# Patient Record
Sex: FEMALE | Race: WHITE | Employment: OTHER | URBAN - NONMETROPOLITAN AREA
[De-identification: names, ages, dates, MRNs, and addresses within clinical notes are randomized per-mention and may not be internally consistent; named-entity substitution may affect disease eponyms.]

---

## 2017-01-11 ENCOUNTER — OFFICE VISIT (OUTPATIENT)
Dept: PODIATRY | Age: 82
End: 2017-01-11

## 2017-01-11 VITALS
SYSTOLIC BLOOD PRESSURE: 118 MMHG | DIASTOLIC BLOOD PRESSURE: 78 MMHG | HEART RATE: 78 BPM | HEIGHT: 63 IN | WEIGHT: 144 LBS | BODY MASS INDEX: 25.52 KG/M2

## 2017-01-11 DIAGNOSIS — M79.674 PAIN IN TOES OF BOTH FEET: ICD-10-CM

## 2017-01-11 DIAGNOSIS — B35.1 DERMATOPHYTOSIS OF NAIL: Primary | ICD-10-CM

## 2017-01-11 DIAGNOSIS — M79.675 PAIN IN TOES OF BOTH FEET: ICD-10-CM

## 2017-01-11 PROCEDURE — 11720 DEBRIDE NAIL 1-5: CPT | Performed by: PODIATRIST

## 2017-01-17 ENCOUNTER — PROCEDURE VISIT (OUTPATIENT)
Dept: OPHTHALMOLOGY | Age: 82
End: 2017-01-17

## 2017-01-17 DIAGNOSIS — H02.056 TRICHIASIS WITHOUT ENTROPION, LEFT: Primary | ICD-10-CM

## 2017-01-17 PROCEDURE — 67825 REVISE EYELASHES: CPT | Performed by: OPHTHALMOLOGY

## 2017-01-17 ASSESSMENT — ENCOUNTER SYMPTOMS
RESPIRATORY NEGATIVE: 1
GASTROINTESTINAL NEGATIVE: 1

## 2017-01-17 ASSESSMENT — VISUAL ACUITY
METHOD: SNELLEN - LINEAR
OS_CC: 20/40
OS_CC+: -1

## 2017-01-17 ASSESSMENT — SLIT LAMP EXAM - LIDS: COMMENTS: 1+ TRICHIASIS - LOWER LID

## 2017-02-17 ENCOUNTER — OFFICE VISIT (OUTPATIENT)
Dept: PODIATRY | Age: 82
End: 2017-02-17

## 2017-02-17 VITALS
RESPIRATION RATE: 20 BRPM | HEIGHT: 63 IN | DIASTOLIC BLOOD PRESSURE: 70 MMHG | WEIGHT: 146.2 LBS | HEART RATE: 72 BPM | BODY MASS INDEX: 25.91 KG/M2 | SYSTOLIC BLOOD PRESSURE: 124 MMHG

## 2017-02-17 DIAGNOSIS — M20.42 HAMMER TOES OF BOTH FEET: ICD-10-CM

## 2017-02-17 DIAGNOSIS — M20.41 HAMMER TOES OF BOTH FEET: ICD-10-CM

## 2017-02-17 DIAGNOSIS — M77.8 CAPSULITIS OF FOOT, LEFT: Primary | ICD-10-CM

## 2017-02-17 DIAGNOSIS — M79.672 LEFT FOOT PAIN: ICD-10-CM

## 2017-02-17 DIAGNOSIS — L84 CORNS AND CALLOSITIES: ICD-10-CM

## 2017-02-17 PROCEDURE — 11056 PARNG/CUTG B9 HYPRKR LES 2-4: CPT | Performed by: PODIATRIST

## 2017-02-17 PROCEDURE — 99213 OFFICE O/P EST LOW 20 MIN: CPT | Performed by: PODIATRIST

## 2017-02-17 PROCEDURE — L3040 FT ARCH SUPRT PREMOLD LONGIT: HCPCS | Performed by: PODIATRIST

## 2017-02-20 DIAGNOSIS — I10 ESSENTIAL HYPERTENSION: ICD-10-CM

## 2017-02-20 RX ORDER — LOSARTAN POTASSIUM 100 MG/1
TABLET ORAL
Qty: 90 TABLET | Refills: 3 | Status: SHIPPED | OUTPATIENT
Start: 2017-02-20 | End: 2018-02-17 | Stop reason: SDUPTHER

## 2017-03-10 ENCOUNTER — OFFICE VISIT (OUTPATIENT)
Dept: OPHTHALMOLOGY | Age: 82
End: 2017-03-10

## 2017-03-10 DIAGNOSIS — H25.13 SENILE NUCLEAR SCLEROSIS, BILATERAL: ICD-10-CM

## 2017-03-10 DIAGNOSIS — H43.812 VITREOUS DEGENERATION, LEFT: ICD-10-CM

## 2017-03-10 DIAGNOSIS — H40.003 GLAUCOMA SUSPECT, BILATERAL: ICD-10-CM

## 2017-03-10 DIAGNOSIS — H02.056 TRICHIASIS WITHOUT ENTROPION, LEFT: Primary | ICD-10-CM

## 2017-03-10 PROCEDURE — 99214 OFFICE O/P EST MOD 30 MIN: CPT | Performed by: OPHTHALMOLOGY

## 2017-03-10 RX ORDER — BENOXINATE HCL/FLUORESCEIN SOD 0.4%-0.25%
1 DROPS OPHTHALMIC (EYE) ONCE
Status: COMPLETED | OUTPATIENT
Start: 2017-03-10 | End: 2017-03-10

## 2017-03-10 RX ORDER — PHENYLEPHRINE HCL 2.5 %
1 DROPS OPHTHALMIC (EYE) ONCE
Status: COMPLETED | OUTPATIENT
Start: 2017-03-10 | End: 2017-03-10

## 2017-03-10 RX ORDER — TROPICAMIDE 10 MG/ML
1 SOLUTION/ DROPS OPHTHALMIC ONCE
Status: COMPLETED | OUTPATIENT
Start: 2017-03-10 | End: 2017-03-10

## 2017-03-10 RX ADMIN — Medication 1 DROP: at 10:49

## 2017-03-10 RX ADMIN — TROPICAMIDE 1 DROP: 10 SOLUTION/ DROPS OPHTHALMIC at 10:50

## 2017-03-10 ASSESSMENT — CONF VISUAL FIELD
METHOD: COUNTING FINGERS
OD_NORMAL: 1
OS_NORMAL: 1

## 2017-03-10 ASSESSMENT — ENCOUNTER SYMPTOMS
GASTROINTESTINAL NEGATIVE: 1
RESPIRATORY NEGATIVE: 1

## 2017-03-10 ASSESSMENT — VISUAL ACUITY
CORRECTION_TYPE: GLASSES
METHOD: SNELLEN - LINEAR
OS_CC: 20/30
OS_CC+: -1

## 2017-03-10 ASSESSMENT — TONOMETRY
OS_IOP_MMHG: 24
OD_IOP_MMHG: 26
IOP_METHOD: APPLANATION W FLURESS DROP

## 2017-03-10 ASSESSMENT — SLIT LAMP EXAM - LIDS
COMMENTS: NORMAL
COMMENTS: NORMAL

## 2017-03-14 ENCOUNTER — OFFICE VISIT (OUTPATIENT)
Dept: OPHTHALMOLOGY | Age: 82
End: 2017-03-14
Payer: MEDICARE

## 2017-03-14 DIAGNOSIS — H40.003 GLAUCOMA SUSPECT, BILATERAL: Primary | ICD-10-CM

## 2017-03-14 PROCEDURE — 92083 EXTENDED VISUAL FIELD XM: CPT | Performed by: OPHTHALMOLOGY

## 2017-03-27 ENCOUNTER — OFFICE VISIT (OUTPATIENT)
Dept: PRIMARY CARE CLINIC | Age: 82
End: 2017-03-27
Payer: MEDICARE

## 2017-03-27 VITALS
BODY MASS INDEX: 25.86 KG/M2 | OXYGEN SATURATION: 96 % | DIASTOLIC BLOOD PRESSURE: 80 MMHG | WEIGHT: 146 LBS | SYSTOLIC BLOOD PRESSURE: 126 MMHG | HEART RATE: 75 BPM

## 2017-03-27 DIAGNOSIS — L98.9 ARM SKIN LESION, LEFT: Primary | ICD-10-CM

## 2017-03-27 PROCEDURE — 99213 OFFICE O/P EST LOW 20 MIN: CPT | Performed by: PHYSICIAN ASSISTANT

## 2017-03-29 ASSESSMENT — ENCOUNTER SYMPTOMS
COLOR CHANGE: 1
COUGH: 0
RESPIRATORY NEGATIVE: 1

## 2017-04-14 ENCOUNTER — OFFICE VISIT (OUTPATIENT)
Dept: OPHTHALMOLOGY | Age: 82
End: 2017-04-14
Payer: MEDICARE

## 2017-04-14 ENCOUNTER — TELEPHONE (OUTPATIENT)
Dept: INTERNAL MEDICINE | Age: 82
End: 2017-04-14

## 2017-04-14 DIAGNOSIS — H40.10X1 COAG (CHRONIC OPEN-ANGLE GLAUCOMA), MILD STAGE: Primary | ICD-10-CM

## 2017-04-14 DIAGNOSIS — H25.13 SENILE NUCLEAR SCLEROSIS, BILATERAL: ICD-10-CM

## 2017-04-14 PROCEDURE — 99213 OFFICE O/P EST LOW 20 MIN: CPT | Performed by: OPHTHALMOLOGY

## 2017-04-14 RX ORDER — BENOXINATE HCL/FLUORESCEIN SOD 0.4%-0.25%
1 DROPS OPHTHALMIC (EYE) ONCE
Status: COMPLETED | OUTPATIENT
Start: 2017-04-14 | End: 2017-04-14

## 2017-04-14 RX ORDER — TIMOLOL MALEATE 5 MG/ML
1 SOLUTION/ DROPS OPHTHALMIC 2 TIMES DAILY
Qty: 1 BOTTLE | Refills: 1 | Status: SHIPPED | OUTPATIENT
Start: 2017-04-14 | End: 2017-07-22 | Stop reason: SDUPTHER

## 2017-04-14 RX ADMIN — Medication 1 DROP: at 10:08

## 2017-04-14 ASSESSMENT — VISUAL ACUITY
OS_CC: 20/30
CORRECTION_TYPE: GLASSES
METHOD: SNELLEN - LINEAR
OS_CC+: -1

## 2017-04-14 ASSESSMENT — TONOMETRY
IOP_METHOD: APPLANATION W FLURESS DROP
OD_IOP_MMHG: 24
OS_IOP_MMHG: 23

## 2017-04-14 ASSESSMENT — SLIT LAMP EXAM - LIDS
COMMENTS: NORMAL
COMMENTS: NORMAL

## 2017-04-14 ASSESSMENT — ENCOUNTER SYMPTOMS
RESPIRATORY NEGATIVE: 1
GASTROINTESTINAL NEGATIVE: 1

## 2017-04-21 DIAGNOSIS — I10 ESSENTIAL HYPERTENSION: ICD-10-CM

## 2017-04-21 RX ORDER — AMLODIPINE BESYLATE 2.5 MG/1
TABLET ORAL
Qty: 90 TABLET | Refills: 3 | Status: SHIPPED | OUTPATIENT
Start: 2017-04-21 | End: 2018-01-23 | Stop reason: SDUPTHER

## 2017-04-25 ENCOUNTER — PROCEDURE VISIT (OUTPATIENT)
Dept: SURGERY | Age: 82
End: 2017-04-25
Payer: MEDICARE

## 2017-04-25 ENCOUNTER — HOSPITAL ENCOUNTER (OUTPATIENT)
Age: 82
Setting detail: SPECIMEN
Discharge: HOME | End: 2017-04-25
Payer: MEDICARE

## 2017-04-25 ENCOUNTER — TELEPHONE (OUTPATIENT)
Dept: SURGERY | Age: 82
End: 2017-04-25

## 2017-04-25 VITALS
HEIGHT: 63 IN | SYSTOLIC BLOOD PRESSURE: 136 MMHG | DIASTOLIC BLOOD PRESSURE: 70 MMHG | TEMPERATURE: 97.7 F | WEIGHT: 144 LBS | HEART RATE: 64 BPM | BODY MASS INDEX: 25.52 KG/M2

## 2017-04-25 DIAGNOSIS — L98.9 SKIN LESION OF LEFT ARM: Primary | ICD-10-CM

## 2017-04-25 PROCEDURE — 99212 OFFICE O/P EST SF 10 MIN: CPT | Performed by: SURGERY

## 2017-04-27 LAB — DERMATOLOGY PATHOLOGY REPORT: NORMAL

## 2017-05-01 ENCOUNTER — NURSE ONLY (OUTPATIENT)
Dept: SURGERY | Age: 82
End: 2017-05-01

## 2017-05-01 VITALS
SYSTOLIC BLOOD PRESSURE: 130 MMHG | DIASTOLIC BLOOD PRESSURE: 70 MMHG | HEART RATE: 76 BPM | BODY MASS INDEX: 25.87 KG/M2 | TEMPERATURE: 98.3 F | WEIGHT: 146 LBS | HEIGHT: 63 IN

## 2017-05-01 DIAGNOSIS — L98.9 SKIN LESION OF LEFT ARM: Primary | ICD-10-CM

## 2017-05-10 ENCOUNTER — OFFICE VISIT (OUTPATIENT)
Dept: OPHTHALMOLOGY | Age: 82
End: 2017-05-10
Payer: MEDICARE

## 2017-05-10 DIAGNOSIS — H40.10X1 COAG (CHRONIC OPEN-ANGLE GLAUCOMA), MILD STAGE: Primary | ICD-10-CM

## 2017-05-10 DIAGNOSIS — H25.13 SENILE NUCLEAR SCLEROSIS, BILATERAL: ICD-10-CM

## 2017-05-10 PROCEDURE — 99213 OFFICE O/P EST LOW 20 MIN: CPT | Performed by: OPHTHALMOLOGY

## 2017-05-10 RX ORDER — BENOXINATE HCL/FLUORESCEIN SOD 0.4%-0.25%
1 DROPS OPHTHALMIC (EYE) ONCE
Status: COMPLETED | OUTPATIENT
Start: 2017-05-10 | End: 2017-05-10

## 2017-05-10 RX ORDER — TIMOLOL MALEATE 5 MG/ML
1 SOLUTION/ DROPS OPHTHALMIC 2 TIMES DAILY
Qty: 3 BOTTLE | Refills: 3 | Status: SHIPPED | OUTPATIENT
Start: 2017-05-10 | End: 2017-08-08

## 2017-05-10 RX ADMIN — Medication 1 DROP: at 08:24

## 2017-05-10 ASSESSMENT — TONOMETRY
OD_IOP_MMHG: 18
OS_IOP_MMHG: 20

## 2017-05-10 ASSESSMENT — ENCOUNTER SYMPTOMS
GASTROINTESTINAL NEGATIVE: 1
RESPIRATORY NEGATIVE: 1

## 2017-05-10 ASSESSMENT — SLIT LAMP EXAM - LIDS
COMMENTS: NORMAL
COMMENTS: NORMAL

## 2017-05-10 ASSESSMENT — VISUAL ACUITY
METHOD: SNELLEN - LINEAR
CORRECTION_TYPE: GLASSES
OS_CC: 20/30

## 2017-05-31 ENCOUNTER — TELEPHONE (OUTPATIENT)
Dept: INTERNAL MEDICINE | Age: 82
End: 2017-05-31

## 2017-06-01 ENCOUNTER — OFFICE VISIT (OUTPATIENT)
Dept: INTERNAL MEDICINE | Age: 82
End: 2017-06-01
Payer: MEDICARE

## 2017-06-01 ENCOUNTER — TELEPHONE (OUTPATIENT)
Dept: INTERNAL MEDICINE | Age: 82
End: 2017-06-01

## 2017-06-01 VITALS
SYSTOLIC BLOOD PRESSURE: 132 MMHG | HEIGHT: 63 IN | WEIGHT: 147 LBS | HEART RATE: 60 BPM | DIASTOLIC BLOOD PRESSURE: 78 MMHG | BODY MASS INDEX: 26.05 KG/M2

## 2017-06-01 DIAGNOSIS — M85.80 OSTEOPENIA: ICD-10-CM

## 2017-06-01 DIAGNOSIS — E03.9 HYPOTHYROIDISM, UNSPECIFIED TYPE: ICD-10-CM

## 2017-06-01 DIAGNOSIS — R73.01 IMPAIRED FASTING GLUCOSE: ICD-10-CM

## 2017-06-01 DIAGNOSIS — S22.32XA CLOSED FRACTURE OF ONE RIB OF LEFT SIDE, INITIAL ENCOUNTER: Primary | ICD-10-CM

## 2017-06-01 DIAGNOSIS — I10 ESSENTIAL HYPERTENSION: ICD-10-CM

## 2017-06-01 DIAGNOSIS — D36.9 ADENOMATOUS POLYP: ICD-10-CM

## 2017-06-01 DIAGNOSIS — E78.5 DYSLIPIDEMIA: ICD-10-CM

## 2017-06-01 PROCEDURE — 99214 OFFICE O/P EST MOD 30 MIN: CPT | Performed by: INTERNAL MEDICINE

## 2017-06-01 RX ORDER — HYDROCODONE BITARTRATE AND ACETAMINOPHEN 5; 325 MG/1; MG/1
TABLET ORAL
Refills: 0 | COMMUNITY
Start: 2017-05-29 | End: 2017-06-13

## 2017-06-03 ASSESSMENT — ENCOUNTER SYMPTOMS
EYE DISCHARGE: 0
DIARRHEA: 0
BLOOD IN STOOL: 0
ABDOMINAL PAIN: 0
CONSTIPATION: 0
VOMITING: 0
COUGH: 0
EYE PAIN: 0
WHEEZING: 0
SORE THROAT: 0
BLURRED VISION: 0
DOUBLE VISION: 0
SHORTNESS OF BREATH: 0
NAUSEA: 0

## 2017-06-13 ENCOUNTER — OFFICE VISIT (OUTPATIENT)
Dept: INTERNAL MEDICINE | Age: 82
End: 2017-06-13
Payer: MEDICARE

## 2017-06-13 ENCOUNTER — OFFICE VISIT (OUTPATIENT)
Dept: PODIATRY | Age: 82
End: 2017-06-13
Payer: MEDICARE

## 2017-06-13 VITALS
SYSTOLIC BLOOD PRESSURE: 138 MMHG | BODY MASS INDEX: 25.92 KG/M2 | HEIGHT: 63 IN | RESPIRATION RATE: 20 BRPM | HEART RATE: 68 BPM | WEIGHT: 146.3 LBS | DIASTOLIC BLOOD PRESSURE: 68 MMHG

## 2017-06-13 VITALS
DIASTOLIC BLOOD PRESSURE: 82 MMHG | SYSTOLIC BLOOD PRESSURE: 132 MMHG | BODY MASS INDEX: 25.87 KG/M2 | HEART RATE: 68 BPM | HEIGHT: 63 IN | WEIGHT: 146 LBS

## 2017-06-13 DIAGNOSIS — S22.32XD CLOSED FRACTURE OF ONE RIB OF LEFT SIDE WITH ROUTINE HEALING, SUBSEQUENT ENCOUNTER: ICD-10-CM

## 2017-06-13 DIAGNOSIS — M79.674 PAIN IN TOES OF BOTH FEET: ICD-10-CM

## 2017-06-13 DIAGNOSIS — B35.1 DERMATOPHYTOSIS OF NAIL: Primary | ICD-10-CM

## 2017-06-13 DIAGNOSIS — Z85.828 HISTORY OF SKIN CANCER: Primary | ICD-10-CM

## 2017-06-13 DIAGNOSIS — R73.01 IMPAIRED FASTING GLUCOSE: ICD-10-CM

## 2017-06-13 DIAGNOSIS — D36.9 ADENOMATOUS POLYP: ICD-10-CM

## 2017-06-13 DIAGNOSIS — E03.9 HYPOTHYROIDISM, UNSPECIFIED TYPE: ICD-10-CM

## 2017-06-13 DIAGNOSIS — C44.90 SKIN CANCER: ICD-10-CM

## 2017-06-13 DIAGNOSIS — E78.5 DYSLIPIDEMIA: ICD-10-CM

## 2017-06-13 DIAGNOSIS — M79.675 PAIN IN TOES OF BOTH FEET: ICD-10-CM

## 2017-06-13 DIAGNOSIS — G31.84 MCI (MILD COGNITIVE IMPAIRMENT): ICD-10-CM

## 2017-06-13 DIAGNOSIS — Z00.00 ROUTINE GENERAL MEDICAL EXAMINATION AT A HEALTH CARE FACILITY: ICD-10-CM

## 2017-06-13 DIAGNOSIS — I10 ESSENTIAL HYPERTENSION: Primary | ICD-10-CM

## 2017-06-13 DIAGNOSIS — M85.80 OSTEOPENIA: ICD-10-CM

## 2017-06-13 PROCEDURE — G0439 PPPS, SUBSEQ VISIT: HCPCS | Performed by: INTERNAL MEDICINE

## 2017-06-13 PROCEDURE — 99213 OFFICE O/P EST LOW 20 MIN: CPT | Performed by: INTERNAL MEDICINE

## 2017-06-13 PROCEDURE — 11720 DEBRIDE NAIL 1-5: CPT | Performed by: PODIATRIST

## 2017-06-13 ASSESSMENT — ENCOUNTER SYMPTOMS
NAUSEA: 0
CONSTIPATION: 0
DIARRHEA: 0
COUGH: 0
DOUBLE VISION: 0
WHEEZING: 0
EYE DISCHARGE: 0
ABDOMINAL PAIN: 0
SHORTNESS OF BREATH: 0
VOMITING: 0
EYE PAIN: 0
BLURRED VISION: 0
SORE THROAT: 0
BLOOD IN STOOL: 0

## 2017-06-13 ASSESSMENT — PATIENT HEALTH QUESTIONNAIRE - PHQ9: SUM OF ALL RESPONSES TO PHQ QUESTIONS 1-9: 0

## 2017-06-13 ASSESSMENT — ANXIETY QUESTIONNAIRES: GAD7 TOTAL SCORE: 0

## 2017-06-13 ASSESSMENT — LIFESTYLE VARIABLES: HOW OFTEN DO YOU HAVE A DRINK CONTAINING ALCOHOL: 0

## 2017-07-22 ENCOUNTER — OFFICE VISIT (OUTPATIENT)
Dept: PRIMARY CARE CLINIC | Age: 82
End: 2017-07-22
Payer: MEDICARE

## 2017-07-22 VITALS
DIASTOLIC BLOOD PRESSURE: 70 MMHG | HEART RATE: 76 BPM | WEIGHT: 143 LBS | TEMPERATURE: 97.7 F | OXYGEN SATURATION: 94 % | SYSTOLIC BLOOD PRESSURE: 124 MMHG | HEIGHT: 63 IN | RESPIRATION RATE: 16 BRPM | BODY MASS INDEX: 25.34 KG/M2

## 2017-07-22 DIAGNOSIS — S81.801A LEG WOUND, RIGHT, INITIAL ENCOUNTER: Primary | ICD-10-CM

## 2017-07-22 PROCEDURE — 99213 OFFICE O/P EST LOW 20 MIN: CPT | Performed by: NURSE PRACTITIONER

## 2017-07-22 ASSESSMENT — ENCOUNTER SYMPTOMS
EYES NEGATIVE: 1
CONSTIPATION: 0
TROUBLE SWALLOWING: 0
SHORTNESS OF BREATH: 0
ALLERGIC/IMMUNOLOGIC NEGATIVE: 1
VOMITING: 0
GASTROINTESTINAL NEGATIVE: 1
COUGH: 0
NAUSEA: 0
ABDOMINAL PAIN: 0
RESPIRATORY NEGATIVE: 1
CHEST TIGHTNESS: 0
DIARRHEA: 0
SINUS PRESSURE: 0

## 2017-07-24 RX ORDER — LEVOTHYROXINE SODIUM 137 UG/1
TABLET ORAL
Qty: 90 TABLET | Refills: 3 | Status: SHIPPED | OUTPATIENT
Start: 2017-07-24 | End: 2018-07-09 | Stop reason: SDUPTHER

## 2017-08-17 RX ORDER — AMMONIUM LACTATE 12 G/100G
CREAM TOPICAL
Qty: 385 G | Refills: 3 | Status: SHIPPED | OUTPATIENT
Start: 2017-08-17 | End: 2018-10-20 | Stop reason: SDUPTHER

## 2017-09-11 ENCOUNTER — OFFICE VISIT (OUTPATIENT)
Dept: OPHTHALMOLOGY | Age: 82
End: 2017-09-11
Payer: MEDICARE

## 2017-09-11 DIAGNOSIS — H40.1190 COAG (CHRONIC OPEN-ANGLE GLAUCOMA): Primary | ICD-10-CM

## 2017-09-11 DIAGNOSIS — H25.13 SENILE NUCLEAR SCLEROSIS, BILATERAL: ICD-10-CM

## 2017-09-11 PROCEDURE — 99213 OFFICE O/P EST LOW 20 MIN: CPT | Performed by: OPHTHALMOLOGY

## 2017-09-11 RX ORDER — BENOXINATE HCL/FLUORESCEIN SOD 0.4%-0.25%
1 DROPS OPHTHALMIC (EYE) ONCE
Status: COMPLETED | OUTPATIENT
Start: 2017-09-11 | End: 2017-09-11

## 2017-09-11 RX ORDER — TIMOLOL MALEATE 5 MG/ML
1 SOLUTION/ DROPS OPHTHALMIC 2 TIMES DAILY
COMMUNITY
Start: 2017-08-21 | End: 2018-10-01 | Stop reason: SINTOL

## 2017-09-11 RX ADMIN — Medication 1 DROP: at 09:19

## 2017-09-11 ASSESSMENT — ENCOUNTER SYMPTOMS
GASTROINTESTINAL NEGATIVE: 1
RESPIRATORY NEGATIVE: 1

## 2017-09-11 ASSESSMENT — VISUAL ACUITY
METHOD: SNELLEN - LINEAR
OS_CC+: -2
OD_CC+: -1
CORRECTION_TYPE: GLASSES
OS_CC: 20/30

## 2017-09-11 ASSESSMENT — TONOMETRY
IOP_METHOD: APPLANATION W FLURESS DROP
OS_IOP_MMHG: 20
OD_IOP_MMHG: 20

## 2017-09-11 ASSESSMENT — SLIT LAMP EXAM - LIDS
COMMENTS: NORMAL
COMMENTS: NORMAL

## 2017-10-06 ENCOUNTER — OFFICE VISIT (OUTPATIENT)
Dept: OPHTHALMOLOGY | Age: 82
End: 2017-10-06
Payer: MEDICARE

## 2017-10-06 DIAGNOSIS — R09.89 LEFT CAROTID BRUIT: Primary | ICD-10-CM

## 2017-10-06 DIAGNOSIS — R09.89 BRUIT OF LEFT CAROTID ARTERY: ICD-10-CM

## 2017-10-06 PROCEDURE — 99214 OFFICE O/P EST MOD 30 MIN: CPT | Performed by: OPHTHALMOLOGY

## 2017-10-06 RX ORDER — TROPICAMIDE 10 MG/ML
1 SOLUTION/ DROPS OPHTHALMIC ONCE
Status: COMPLETED | OUTPATIENT
Start: 2017-10-06 | End: 2017-10-06

## 2017-10-06 RX ORDER — PHENYLEPHRINE HCL 2.5 %
1 DROPS OPHTHALMIC (EYE) ONCE
Status: COMPLETED | OUTPATIENT
Start: 2017-10-06 | End: 2017-10-06

## 2017-10-06 RX ADMIN — TROPICAMIDE 1 DROP: 10 SOLUTION/ DROPS OPHTHALMIC at 14:02

## 2017-10-06 RX ADMIN — Medication 1 DROP: at 14:02

## 2017-10-06 ASSESSMENT — VISUAL ACUITY
OD_CC+: -2
CORRECTION_TYPE: GLASSES
OS_CC: 20/40
METHOD: SNELLEN - LINEAR

## 2017-10-06 ASSESSMENT — SLIT LAMP EXAM - LIDS
COMMENTS: NORMAL
COMMENTS: NORMAL

## 2017-10-06 ASSESSMENT — ENCOUNTER SYMPTOMS
RESPIRATORY NEGATIVE: 1
GASTROINTESTINAL NEGATIVE: 1

## 2017-10-06 NOTE — MR AVS SNAPSHOT
[facility] Ophthalmology [address] [facility] [address] [phone]      You Were Seen for:         Comments    Left carotid bruit   [[id number]]         Vital Signs     Last Menstrual Period Smoking Status                (LMP Unknown) Never Smoker          Additional Information about your Body Mass Index (BMI)           Your BMI as listed above is considered overweight (25.0-29.9). BMI is an estimate of body fat, calculated from your height and weight. The higher your BMI, the greater your risk of heart disease, high blood pressure, type 2 diabetes, stroke, gallstones, arthritis, sleep apnea, and certain cancers. BMI is not perfect. It may overestimate body fat in athletes and people who are more muscular. If your body fat is high you can improve your BMI by decreasing your calorie intake and becoming more physically active. Learn more at: [email or web address]          Instructions    Take medication as prescribed. If you have any problems or concerns before your next scheduled appointment, please call the office at [phone].             Medications and Orders      Medications You Received Today        Date/Time Order Dose Route Action     10/06/2017 1402 phenylephrine (MYDFRIN) 2.5 % ophthalmic solution 1 drop 1 drop Left Eye Given     10/06/2017 1402 tropicamide (MYDRIACYL) 1 % ophthalmic solution 1 drop 1 drop Left Eye Given      Your Current Medications Are              timolol (TIMOPTIC) 0.5 % ophthalmic solution Place 1 drop into both eyes 2 times daily    ammonium lactate (AMLACTIN) 12 % cream apply to affected area twice a day if needed    levothyroxine (SYNTHROID) 137 MCG tablet TAKE 1 TABLET DAILY    Polyethylene Glycol 3350 (MIRALAX PO) Take 17 g by mouth daily as needed    amLODIPine (NORVASC) 2.5 MG tablet TAKE 1 TABLET DAILY    losartan (COZAAR) 100 MG tablet TAKE 1 TABLET DAILY calcium-vitamin D (OSCAL 500/200 D-3) 500-200 MG-UNIT per tablet Take 1 tablet by mouth 2 times daily    Multiple Vitamin (DAILY MULTIVITAMIN PO) Take 2 tablets by mouth daily     aspirin 81 MG tablet Take 81 mg by mouth daily. Allergies           No Known Allergies      We Ordered/Performed the following           ECHO Compl W Dop Color Flow          Additional Information        Basic Information     Date Of Birth Sex Race Ethnicity Preferred Language    3/21/1930 Female White Non-/Non  English      Problem List as of 10/6/2017  Date Reviewed: 10/6/2017                GERD (gastroesophageal reflux disease)    Sciatica    Essential hypertension    Hypothyroidism    Syncope and collapse    MCI (mild cognitive impairment)    Dyslipidemia    Osteopenia    Impaired fasting glucose    Adenomatous polyp      Immunizations as of 10/6/2017     Name Date    DT 11/5/2004    Influenza Virus Vaccine 10/10/2012, 10/12/2011, 10/29/2010    Influenza, High Dose 10/11/2016, 9/30/2015, 11/4/2014, 9/11/2013    Influenza, MDCK, Preservative free 10/4/2014    Pneumococcal 13-valent Conjugate ([id number]) 5/7/2015    Pneumococcal Polysaccharide ([id number]) 10/4/2011, 9/3/2008, 10/28/1998    Tdap (Boostrix, Adacel) 7/22/2015    Zoster 5/23/2008      Preventive Care        Date Due    Yearly Flu Vaccine (1) 9/1/2017    Tetanus Combination Vaccine (2 - Td) 7/22/2025            MyChart Signup           Our records indicate that you have declined MyChart signup.

## 2017-10-06 NOTE — PROGRESS NOTES
Dagmar Lombardi presents today for transient obscuration vision OS. Chief Complaint   Patient presents with    Spots and/or Floaters   . HPI     OS VA/D  OS 'Aura\" seeing a bright light x 3 days  Lasts 15 minutes, then clears, 3 episodes  No HA  Timolol Maleate OU 2xd  HPI as above         I have reviewed the HPI I agree and will use it for the pt. HPI. Review of Systems   Review of Systems   Constitutional: Negative. HENT: Negative. Respiratory: Negative. Cardiovascular: Negative. Gastrointestinal: Negative. Musculoskeletal: Negative. Skin: Negative. Neurological: Negative. Endo/Heme/Allergies: Negative. Main Ophthalmology Exam     External Exam      Right Left    External Normal Normal      Slit Lamp Exam      Right Left    Lids/Lashes Normal Normal    Conjunctiva/Sclera White and quiet White and quiet    Cornea Clear Clear    Anterior Chamber Deep and quiet Deep and quiet    Iris Round and reactive Round and reactive    Lens 2+ Nuclear sclerosis, 1+ Cortical cataract 2+ Nuclear sclerosis, 1+ Cortical cataract      Fundus Exam      Right Left    Disc  Normal    C/D Ratio  0.75    Macula  Normal    Vessels  Normal    Periphery  Normal    Auscultation of carotids  R louder than left                                           L systolic bruit? Not recorded        Visual Acuity     Visual Acuity (Snellen - Linear)      Right Left   Dist cc 20/30 -2 20/40       Correction:  Glasses               Not recorded          Not recorded          Not recorded          IMPRESSION:  1. Left carotid bruit         PLAN:  Discussed all below with patient. 1. Left carotid bruit  Carotid ultrasound    There are no Patient Instructions on file for this visit. No Follow-up on file.

## 2017-10-06 NOTE — PATIENT INSTRUCTIONS
Take medication as prescribed. If you have any problems or concerns before your next scheduled appointment, please call the office at [phone].

## 2017-10-09 ENCOUNTER — OFFICE VISIT (OUTPATIENT)
Dept: PODIATRY | Age: 82
End: 2017-10-09
Payer: MEDICARE

## 2017-10-09 VITALS
HEART RATE: 64 BPM | DIASTOLIC BLOOD PRESSURE: 70 MMHG | RESPIRATION RATE: 20 BRPM | WEIGHT: 145 LBS | SYSTOLIC BLOOD PRESSURE: 124 MMHG | HEIGHT: 63 IN | BODY MASS INDEX: 25.69 KG/M2

## 2017-10-09 DIAGNOSIS — B35.1 DERMATOPHYTOSIS OF NAIL: ICD-10-CM

## 2017-10-09 DIAGNOSIS — I73.9 PVD (PERIPHERAL VASCULAR DISEASE) (HCC): Primary | ICD-10-CM

## 2017-10-09 PROCEDURE — 11721 DEBRIDE NAIL 6 OR MORE: CPT | Performed by: PODIATRIST

## 2017-10-09 NOTE — PROGRESS NOTES
Subjective:  Des Emmanuel is a 80 y.o. female who presents to the office today for routine foot care. Currently denies F/C/N/V. No Known Allergies    Past Medical History:   Diagnosis Date    Adenomatous polyp 06/10    With recommendation for repeat 06/15. Also, diverticulosis was noted.  Cataracts, bilateral     Corneal scar, right eye     Cystocele     history of     Dyslipidemia     GERD (gastroesophageal reflux disease)     egd  4/15 ok    Goiter     benign, history of     Hiatal hernia     Hypertension     Hypokalemia     Hypothyroidism     Impaired fasting glucose     MCI (mild cognitive impairment)     Mini-Mental Status exam 27/30 6/14  declines meds at this point,  MMSE 29/30 on June 13, 2017    Migraines     Murmur, functional     Grade 1/6 systolic, history of     Osteopenia     T -1.0 hip, 03/09, T -0.3 spine, 03/09. 1) Repeat DEXA scan 09/12 with T +0.15, T -1.2 at the hip but -1.8 at the mean femoral neck giving her a FRAX score of 4.3 at the hip. Reclast 10/13 and given 2014,2015, and 1/4/2017, on calcium and vit d,  Redo Dexa 10/14 Frax 4.2% , Frax 4.8% 10 yr hip fracture risk on Dexa 1/17  On Reclast    Posterior vitreous detachment of both eyes     Skin cancer     History of multiple skin cancers. Following with Dr. Lainey Saba. 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  Authorizing Provider   timolol (TIMOPTIC) 0.5 % ophthalmic solution Place 1 drop into both eyes 2 times daily 8/21/17  Yes Historical Provider, MD   ammonium lactate (AMLACTIN) 12 % cream apply to affected area twice a day if needed 8/17/17  Yes Zainab Reyes MD   levothyroxine (SYNTHROID) 137 MCG tablet TAKE 1 TABLET DAILY 7/24/17  Yes Zainab Reyes MD   Polyethylene Glycol 3350 (MIRALAX PO) Take 17 g by mouth daily as needed   Yes Historical Provider, MD   amLODIPine (NORVASC) 2.5 MG tablet TAKE 1 TABLET DAILY 4/21/17  Yes Elvin Jaime MD   losartan (COZAAR) 100 MG tablet TAKE 1 TABLET DAILY 2/20/17  Yes Elvin Jaime MD   calcium-vitamin D (OSCAL 500/200 D-3) 500-200 MG-UNIT per tablet Take 1 tablet by mouth 2 times daily   Yes Historical Provider, MD   Multiple Vitamin (DAILY MULTIVITAMIN PO) Take 2 tablets by mouth daily    Yes Historical Provider, MD   aspirin 81 MG tablet Take 81 mg by mouth daily. Yes Historical Provider, MD       Past Surgical History:   Procedure Laterality Date    APPENDECTOMY      CHOLECYSTECTOMY      Remote.  COLONOSCOPY  06/08/10    COLONOSCOPY  07/18/02    CYSTOCELE REPAIR  02/12/99    Vaginal prolapse, cystocele plus pelvic relaxation.  DILATION AND CURETTAGE OF UTERUS      with hysteroscopy   Mary Lou    still has ovaries     MOHS SURGERY  10/07/09    SCC, left nasal sidewall.  OTHER SURGICAL HISTORY  11/11/08    Anterior repair.  OTHER SURGICAL HISTORY  1988    laser cone    UPPER GASTROINTESTINAL ENDOSCOPY  4/8/2015    hiatal hernia    VEIN SURGERY  06/08/09    Laser ablation of right greater saphenous vein. Family History   Problem Relation Age of Onset    Coronary Art Dis Father     Colon Cancer Brother     Diabetes Neg Hx        Social History   Substance Use Topics    Smoking status: Never Smoker    Smokeless tobacco: Never Used    Alcohol use 0.0 oz/week      Comment: Infrequently. Review of Systems: All 12 systems reviewed and pertinent positives noted above. Lower Extremity Physical Examination:     Vitals:   Vitals:    10/09/17 1436   BP: 124/70   Pulse: 64   Resp: 20     General: AAO x 3 in NAD.      Vascular: DP and PT pulses palpable 2/4, bilateral.  CFT <3 seconds, bilateral.  Hair growth present to the level of the digits, bilateral.  Edema absent, bilateral.  Varicosities absent, bilateral. Erythema absent, bilateral. Distal Rubor absent bilateral.  Temperature within normal limits bilateral.

## 2017-10-16 ENCOUNTER — HOSPITAL ENCOUNTER (OUTPATIENT)
Dept: INTERVENTIONAL RADIOLOGY/VASCULAR | Age: 82
Discharge: HOME | End: 2017-10-16
Payer: MEDICARE

## 2017-10-16 DIAGNOSIS — R09.89 LEFT CAROTID BRUIT: ICD-10-CM

## 2017-10-16 DIAGNOSIS — R09.89 BRUIT OF LEFT CAROTID ARTERY: ICD-10-CM

## 2017-10-16 PROCEDURE — 93880 EXTRACRANIAL BILAT STUDY: CPT

## 2017-10-18 ENCOUNTER — NURSE ONLY (OUTPATIENT)
Dept: LAB | Age: 82
End: 2017-10-18
Payer: MEDICARE

## 2017-10-18 DIAGNOSIS — Z23 NEED FOR PROPHYLACTIC VACCINATION AND INOCULATION AGAINST INFLUENZA: Primary | ICD-10-CM

## 2017-10-18 PROCEDURE — 90662 IIV NO PRSV INCREASED AG IM: CPT | Performed by: INTERNAL MEDICINE

## 2017-10-18 PROCEDURE — G0008 ADMIN INFLUENZA VIRUS VAC: HCPCS | Performed by: INTERNAL MEDICINE

## 2017-10-26 ENCOUNTER — OFFICE VISIT (OUTPATIENT)
Dept: VASCULAR SURGERY | Age: 82
End: 2017-10-26
Payer: MEDICARE

## 2017-10-26 VITALS
HEART RATE: 68 BPM | DIASTOLIC BLOOD PRESSURE: 76 MMHG | BODY MASS INDEX: 25.69 KG/M2 | WEIGHT: 145 LBS | HEIGHT: 63 IN | SYSTOLIC BLOOD PRESSURE: 116 MMHG

## 2017-10-26 DIAGNOSIS — H53.9 VISUAL DISTURBANCE: ICD-10-CM

## 2017-10-26 PROCEDURE — 99203 OFFICE O/P NEW LOW 30 MIN: CPT | Performed by: SURGERY

## 2017-10-26 NOTE — PROGRESS NOTES
[address] [facility]  [facility] VASCULAR SURG  [address] [address]  Ham Payan  3/21/1930  80 y. o.female       Chief Complaint:  Chief Complaint   Patient presents with    Carotid Disease     bruit left carotid artery        History of present Illness:  Pt is here today for evaluation and treatment of carotid stenosis. This is an 80 yr old female who is otherwise healthy. One month ago she experienced transient visual disturbances described as bright lights in the peripheral vision. This occurred on 3 occasions and she has not had anything since. She was worked up and a carotid duplex was ordered based on a carotid bruit. I reviewed her carotid ultrasound with her which shows very minimal disease. I discussed with her that she is at no risk of stroke based on these findings and that she will not need any further testing. Her visual changes are unrelated to carotid disease. 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 Past Surgical History:   Past Surgical History:   Procedure Laterality Date    APPENDECTOMY      CHOLECYSTECTOMY      Remote.  COLONOSCOPY  06/08/10    COLONOSCOPY  07/18/02    CYSTOCELE REPAIR  02/12/99    Vaginal prolapse, cystocele plus pelvic relaxation.  DILATION AND CURETTAGE OF UTERUS      with hysteroscopy   Mary Lou    still has ovaries     MOHS SURGERY  10/07/09    SCC, left nasal sidewall.  OTHER SURGICAL HISTORY  11/11/08    Anterior repair.      OTHER SURGICAL HISTORY  1988    laser cone    UPPER GASTROINTESTINAL ENDOSCOPY  4/8/2015    hiatal hernia    VEIN SURGERY  06/08/09    Laser ablation of right greater saphenous vein. Social History:  reports that she has never smoked. She has never used smokeless tobacco. She reports that she drinks alcohol. She reports that she does not use drugs. Family History: family history includes Colon Cancer in her brother; Coronary Art Dis in her father. Review of Systems:   Constitutional:  negative for  fevers, chills, sweats, fatigue, and weight loss  HEENT:  Positive for vision changes, negative for hearing changes,   Respiratory:  negative for shortness of breath, cough, or congestion  Cardiovascular:  negative for  chest pain, palpitations  Gastrointestinal:  negative for nausea, vomiting, diarrhea, constipation, abdominal pain  Genitourinary:  negative for frequency, dysuria  Integument/Breast:  negative for rash, skin lesions  Musculoskeletal:  negative for muscle aches or joint pain  Neurological:  negative for headaches, dizziness, lightheadedness, numbness, pain and tingling extremities  Behavior/Psych:  negative for depression and anxiety    Allergies: Review of patient's allergies indicates no known allergies.     Current Meds:  Current Outpatient Prescriptions:     timolol (TIMOPTIC) 0.5 % ophthalmic solution, Place 1 drop into both eyes 2 times daily, Disp: , Rfl:     ammonium lactate (AMLACTIN) 12 % cream, apply to affected area twice a day if needed, Disp: 385 g, Rfl: 3    levothyroxine (SYNTHROID) 137 MCG tablet, TAKE 1 TABLET DAILY, Disp: 90 tablet, Rfl: 3    Polyethylene Glycol 3350 (MIRALAX PO), Take 17 g by mouth daily as needed, Disp: , Rfl:     amLODIPine (NORVASC) 2.5 MG tablet, TAKE 1 TABLET DAILY, Disp: 90 tablet, Rfl: 3    losartan (COZAAR) 100 MG tablet, TAKE 1 TABLET DAILY, Disp: 90 tablet, Rfl: 3    calcium-vitamin D (OSCAL 500/200 D-3) 500-200 MG-UNIT per tablet, Take 1 tablet by mouth 2 times daily, Disp: , Rfl:     Multiple Vitamin (DAILY MULTIVITAMIN PO), Take 2 tablets by mouth daily , Disp: , Rfl:     aspirin 81 MG tablet, Take 81 mg by mouth daily. , Disp: , Rfl:     Vital Signs:  Vitals:    10/26/17 1343   BP: 116/76   Pulse: 68       Physical Exam:  General appearance - alert, well appearing and in no acute distress  Mental status - oriented to person, place and time with normal affect  Head - normocephalic and atraumatic  Eyes - pupils equal and reactive, extraocular eye movements intact, conjunctiva clear  Ears - hearing appears to be intact  Nose - no drainage noted  Mouth - mucous membranes moist  Neck - supple, unable to appreciate a carotid bruit, thyroid not palpable, no JVD  Chest - clear to auscultation, normal effort  Heart - normal rate, regular rhythm, no murmurs  Abdomen - soft, non-tender, non-distended, bowel sounds present all four quadrants, no masses, hepatomegaly, splenomegaly or aortic enlargement  Neurological - normal speech, no focal findings or movement disorder noted, cranial nerves II through XII grossly intact  Extremities - peripheral pulses palpable, no pedal edema or calf pain with palpation  Skin - no gross lesions, rashes, or induration noted      R brachial 2+ L brachial 2+   R radial 2+ L radial 2+   R femoral 2+ L femoral 2+   R popliteal 2+ L popliteal 2+   R posterior tibial 2+ L posterior tibial 2+   R dorsalis pedis 2+ L dorsalis pedis 2+     Labs:   Lab Results   Component Value Date    WBC 5.9 02/03/2015    HGB 12.5 02/03/2015    HCT 38.0 02/03/2015    MCV 94.3 02/03/2015     02/03/2015     Lab Results   Component Value Date     01/31/2017    K 4.1 01/31/2017     01/31/2017    CO2 30 01/31/2017    BUN 14 01/31/2017    CREATININE 0.56 01/31/2017    GLUCOSE 114 01/31/2017    CALCIUM 8.7 01/31/2017     No results found for: ALKPHOS, ALT, AST, PROT, BILITOT, BILIDIR, LABALBU  No results found for: LACTA  No results

## 2017-12-08 ENCOUNTER — HOSPITAL ENCOUNTER (OUTPATIENT)
Dept: LAB | Age: 82
Setting detail: SPECIMEN
Discharge: HOME | End: 2017-12-08
Payer: MEDICARE

## 2017-12-08 DIAGNOSIS — E03.9 HYPOTHYROIDISM, UNSPECIFIED TYPE: ICD-10-CM

## 2017-12-08 DIAGNOSIS — R73.01 IMPAIRED FASTING GLUCOSE: ICD-10-CM

## 2017-12-08 LAB
ANION GAP SERPL CALCULATED.3IONS-SCNC: 10 MMOL/L (ref 9–17)
BUN BLDV-MCNC: 12 MG/DL (ref 8–23)
BUN/CREAT BLD: 18 (ref 9–20)
CALCIUM SERPL-MCNC: 8.9 MG/DL (ref 8.6–10.4)
CHLORIDE BLD-SCNC: 101 MMOL/L (ref 98–107)
CO2: 31 MMOL/L (ref 20–31)
CREAT SERPL-MCNC: 0.65 MG/DL (ref 0.5–0.9)
GFR AFRICAN AMERICAN: >60 ML/MIN
GFR NON-AFRICAN AMERICAN: >60 ML/MIN
GFR SERPL CREATININE-BSD FRML MDRD: ABNORMAL ML/MIN/{1.73_M2}
GFR SERPL CREATININE-BSD FRML MDRD: ABNORMAL ML/MIN/{1.73_M2}
GLUCOSE BLD-MCNC: 110 MG/DL (ref 70–99)
POTASSIUM SERPL-SCNC: 3.9 MMOL/L (ref 3.7–5.3)
SODIUM BLD-SCNC: 142 MMOL/L (ref 135–144)
TSH SERPL DL<=0.05 MIU/L-ACNC: 8.12 MIU/L (ref 0.3–5)

## 2017-12-08 PROCEDURE — 84443 ASSAY THYROID STIM HORMONE: CPT

## 2017-12-08 PROCEDURE — 36415 COLL VENOUS BLD VENIPUNCTURE: CPT

## 2017-12-08 PROCEDURE — 80048 BASIC METABOLIC PNL TOTAL CA: CPT

## 2017-12-14 ENCOUNTER — OFFICE VISIT (OUTPATIENT)
Dept: INTERNAL MEDICINE | Age: 82
End: 2017-12-14
Payer: MEDICARE

## 2017-12-14 VITALS
SYSTOLIC BLOOD PRESSURE: 136 MMHG | HEIGHT: 63 IN | DIASTOLIC BLOOD PRESSURE: 68 MMHG | BODY MASS INDEX: 26.05 KG/M2 | HEART RATE: 72 BPM | WEIGHT: 147 LBS

## 2017-12-14 DIAGNOSIS — R73.01 IMPAIRED FASTING GLUCOSE: ICD-10-CM

## 2017-12-14 DIAGNOSIS — E03.9 HYPOTHYROIDISM, UNSPECIFIED TYPE: Primary | ICD-10-CM

## 2017-12-14 DIAGNOSIS — M85.80 OSTEOPENIA, UNSPECIFIED LOCATION: ICD-10-CM

## 2017-12-14 DIAGNOSIS — I10 ESSENTIAL HYPERTENSION: ICD-10-CM

## 2017-12-14 DIAGNOSIS — G31.84 MCI (MILD COGNITIVE IMPAIRMENT): ICD-10-CM

## 2017-12-14 DIAGNOSIS — E78.5 DYSLIPIDEMIA: ICD-10-CM

## 2017-12-14 DIAGNOSIS — D36.9 ADENOMATOUS POLYP: ICD-10-CM

## 2017-12-14 PROCEDURE — 99213 OFFICE O/P EST LOW 20 MIN: CPT | Performed by: INTERNAL MEDICINE

## 2017-12-14 NOTE — PATIENT INSTRUCTIONS
Patient Education        High Blood Pressure: Care Instructions  Your Care Instructions    If your blood pressure is usually above 140/90, you have high blood pressure, or hypertension. That means the top number is 140 or higher or the bottom number is 90 or higher, or both. Despite what a lot of people think, high blood pressure usually doesn't cause headaches or make you feel dizzy or lightheaded. It usually has no symptoms. But it does increase your risk for heart attack, stroke, and kidney or eye damage. The higher your blood pressure, the more your risk increases. Your doctor will give you a goal for your blood pressure. Your goal will be based on your health and your age. An example of a goal is to keep your blood pressure below 140/90. Lifestyle changes, such as eating healthy and being active, are always important to help lower blood pressure. You might also take medicine to reach your blood pressure goal.  Follow-up care is a key part of your treatment and safety. Be sure to make and go to all appointments, and call your doctor if you are having problems. It's also a good idea to know your test results and keep a list of the medicines you take. How can you care for yourself at home? Medical treatment  · If you stop taking your medicine, your blood pressure will go back up. You may take one or more types of medicine to lower your blood pressure. Be safe with medicines. Take your medicine exactly as prescribed. Call your doctor if you think you are having a problem with your medicine. · Talk to your doctor before you start taking aspirin every day. Aspirin can help certain people lower their risk of a heart attack or stroke. But taking aspirin isn't right for everyone, because it can cause serious bleeding. · See your doctor regularly. You may need to see the doctor more often at first or until your blood pressure comes down.   · If you are taking blood pressure medicine, talk to your doctor before you take decongestants or anti-inflammatory medicine, such as ibuprofen. Some of these medicines can raise blood pressure. · Learn how to check your blood pressure at home. Lifestyle changes  · Stay at a healthy weight. This is especially important if you put on weight around the waist. Losing even 10 pounds can help you lower your blood pressure. · If your doctor recommends it, get more exercise. Walking is a good choice. Bit by bit, increase the amount you walk every day. Try for at least 30 minutes on most days of the week. You also may want to swim, bike, or do other activities. · Avoid or limit alcohol. Talk to your doctor about whether you can drink any alcohol. · Try to limit how much sodium you eat to less than 2,300 milligrams (mg) a day. Your doctor may ask you to try to eat less than 1,500 mg a day. · Eat plenty of fruits (such as bananas and oranges), vegetables, legumes, whole grains, and low-fat dairy products. · Lower the amount of saturated fat in your diet. Saturated fat is found in animal products such as milk, cheese, and meat. Limiting these foods may help you lose weight and also lower your risk for heart disease. · Do not smoke. Smoking increases your risk for heart attack and stroke. If you need help quitting, talk to your doctor about stop-smoking programs and medicines. These can increase your chances of quitting for good. When should you call for help? Call 911 anytime you think you may need emergency care. This may mean having symptoms that suggest that your blood pressure is causing a serious heart or blood vessel problem. Your blood pressure may be over 180/110. ? For example, call 911 if:  ? · You have symptoms of a heart attack. These may include:  ¨ Chest pain or pressure, or a strange feeling in the chest.  ¨ Sweating. ¨ Shortness of breath. ¨ Nausea or vomiting.   ¨ Pain, pressure, or a strange feeling in the back, neck, jaw, or upper belly or in one or both shoulders or arms.  ¨ Lightheadedness or sudden weakness. ¨ A fast or irregular heartbeat. ? · You have symptoms of a stroke. These may include:  ¨ Sudden numbness, tingling, weakness, or loss of movement in your face, arm, or leg, especially on only one side of your body. ¨ Sudden vision changes. ¨ Sudden trouble speaking. ¨ Sudden confusion or trouble understanding simple statements. ¨ Sudden problems with walking or balance. ¨ A sudden, severe headache that is different from past headaches. ? · You have severe back or belly pain. ?Do not wait until your blood pressure comes down on its own. Get help right away. ?Call your doctor now or seek immediate care if:  ? · Your blood pressure is much higher than normal (such as 180/110 or higher), but you don't have symptoms. ? · You think high blood pressure is causing symptoms, such as:  ¨ Severe headache. ¨ Blurry vision. ? Watch closely for changes in your health, and be sure to contact your doctor if:  ? · Your blood pressure measures 140/90 or higher at least 2 times. That means the top number is 140 or higher or the bottom number is 90 or higher, or both. ? · You think you may be having side effects from your blood pressure medicine. ? · Your blood pressure is usually normal, but it goes above normal at least 2 times. Where can you learn more? Go to https://InterValvepe"[employer].MODLOFT. org and sign in to your [employer] account. Enter E164 in the Unyqe box to learn more about \"High Blood Pressure: Care Instructions. \"     If you do not have an account, please click on the \"Sign Up Now\" link. Current as of: September 21, 2016  Content Version: 11.4  © 5895-8257 [employer]. Care instructions adapted under license by [employer] [facility] ([facility]).  If you have questions about a medical condition or this instruction, always ask your healthcare professional. Megan Mccloud any warranty or liability for your use of this

## 2017-12-14 NOTE — PROGRESS NOTES
Smita Jackson received counseling on the following healthy behaviors: exercise  Reviewed prior labs and health maintenance  Continue current medications except where noted below, diet and exercise. Discussed use, benefit, and side effects of prescribed medications. Barriers to medication compliance addressed. Patient given educational materials - see patient instructions  Was a self-tracking handout given in paper form or via eduliohart? Yes    Requested Prescriptions      No prescriptions requested or ordered in this encounter       All patient questions answered. Patient voiced understanding. Quality Measures    Body mass index is 25.83 kg/m². Normal. Weight control planned discussed: healthy diet and regular exercise. BP: (!) 152/72. Blood pressure is high. Treatment plan consists of: see progress note below. Fall Risk 6/13/2017 6/2/2016 6/26/2015 6/4/2014   2 or more falls in past year? no no no no   Fall with injury in past year? yes no no no     The patient does not have a history of falls. I did not - not indicated , complete a risk assessment for falls.  A plan of care for falls No Treatment plan indicated    Lab Results   Component Value Date    LDLCHOLESTEROL 93 11/28/2016    (goal LDL reduction with dx if diabetes is 50% LDL reduction)    PHQ Scores 6/13/2017 6/2/2016 3/1/2016 2/3/2015 2/26/2014   PHQ2 Score 0 0 0 0 0   PHQ9 Score 0 0 0 0 0     Interpretation of Total Score Depression Severity: 1-4 = Minimal depression, 5-9 = Mild depression, 10-14 = Moderate depression, 15-19 = Moderately severe depression, 20-27 = Severe depression

## 2017-12-18 NOTE — PROGRESS NOTES
Ron Galan  YOB: 1930    Date of Service:  12/14/2017      HPI:  Cherri Lawrence was seen in the internal medicine office today for:  Chief Complaint  Patient presents with  · Thyroid problem. · Hypertension. · Dyslipidemia. · and continued evaluation and management of chronic medical problems. We addressed the following new, acute or uncontrolled/unstable issues:    · We talked About the thyroid and  the thyroid med is reviewed. The TSH is up out of range . We have not adjusted the dosage. We discussed the way she takes this and she does take it with other pills, particularly with the calcium. I think we need to change that and then repeat the labs. I do not think we should make an adjustment in the meds today. We addressed the following chronic issues:    · Hypertension  - She is doing well with her antihypertensive meds. No chest pain, dizziness or palpitations. · Impaired fasting glucose  - We discussed diet and activity level as it relates to blood sugars and Impaired fasting Glucose and she is working on this. · Colon Polyp  - She has not had any rectal bleeding or change in BMs to suggest any trouble with her history of colon polyp. · Osteopenia  - Doing OK with the prescription medication,  vit D, and Calcium. Working on [employer] bearing exercise. · Dyslipidemia  - She declines med. Working on diet          Review of Systems:  Constitutional:  Negative for chills, fever, and weight loss. HENT:  Negative for congestion, ear pain, and sore throat. Eyes:  Negative for blurred vision, double vision, pain and discharge. Respiratory:  Negative for cough, shortness of breath, and wheezing. Cardiovascular:  Negative for chest pain, palpitations, and PND. Gastrointestinal:  Negative for abdominal pain, blood in stool, constipation, diarrhea, nausea and vomiting. Genitourinary:  Negative for dysuria, frequency, and hematuria.   Musculoskeletal:  Negative for myalgias. Skin:  Negative for rash. Neurological:  Negative for tingling, sensory change, speech change, focal weakness, seizures, and headaches. Endo/Heme/Allergies:  Does not bruise/bleed easily. Psychiatric/Behavioral:  Negative for hallucinations and suicidal ideas. Patient Active Problem List   Diagnosis    Dyslipidemia    Osteopenia    Impaired fasting glucose    Adenomatous polyp    MCI (mild cognitive impairment)    Syncope and collapse    Essential hypertension    Hypothyroidism    Sciatica    GERD (gastroesophageal reflux disease)    Visual disturbance       Medications:    Current Outpatient Prescriptions   Medication Sig Dispense Refill    timolol (TIMOPTIC) 0.5 % ophthalmic solution Place 1 drop into both eyes 2 times daily      ammonium lactate (AMLACTIN) 12 % cream apply to affected area twice a day if needed 385 g 3    levothyroxine (SYNTHROID) 137 MCG tablet TAKE 1 TABLET DAILY 90 tablet 3    Polyethylene Glycol 3350 (MIRALAX PO) Take 17 g by mouth daily as needed      amLODIPine (NORVASC) 2.5 MG tablet TAKE 1 TABLET DAILY 90 tablet 3    losartan (COZAAR) 100 MG tablet TAKE 1 TABLET DAILY 90 tablet 3    calcium-vitamin D (OSCAL 500/200 D-3) 500-200 MG-UNIT per tablet Take 1 tablet by mouth 2 times daily      Multiple Vitamin (DAILY MULTIVITAMIN PO) Take 2 tablets by mouth daily       aspirin 81 MG tablet Take 81 mg by mouth daily. No current facility-administered medications for this visit. Past Medical History:        Diagnosis Date    Adenomatous polyp 06/10    With recommendation for repeat 06/15. Also, diverticulosis was noted.      Cataracts, bilateral     Corneal scar, right eye     Cystocele     history of     Dyslipidemia     GERD (gastroesophageal reflux disease)     egd  4/15 ok    Goiter     benign, history of     Hiatal hernia     Hypertension     Hypokalemia     Hypothyroidism     Impaired fasting glucose     MCI (mild cognitive tenderness. There is no rebound and no guarding. Musculoskeletal:  She exhibits no edema or tenderness. Lymphadenopathy:    She has no cervical adenopathy. Neurological:  She is alert. She has normal strength. She displays normal reflexes. No cranial nerve deficit or sensory deficit. She exhibits normal muscle tone. Skin:  Skin is warm and dry. No rash noted. She is not diaphoretic. No pallor. Psychiatric:  She has a normal mood and affect. Her behavior is normal.  Judgment normal.  Vitals reviewed. Vitals:    12/14/17 0841 12/14/17 0913   BP: (!) 152/72 136/68   Site: Left Arm    Position: Sitting    Cuff Size: Large Adult    Pulse: 72    Weight: 147 lb (66.7 kg)    Height: 5' 3.25\" (1.607 m)      Body mass index is 25.83 kg/m². Wt Readings from Last 3 Encounters:   12/14/17 147 lb (66.7 kg)   10/26/17 145 lb (65.8 kg)   10/09/17 145 lb (65.8 kg)     BP Readings from Last 3 Encounters:   12/14/17 136/68   10/26/17 116/76   10/09/17 124/70       Lab Results   Component Value Date    K 3.9 12/08/2017    CREATININE 0.65 12/08/2017    TSH 8.12 12/08/2017    HCT 38.0 02/03/2015    LABA1C 5.7 01/21/2015    GLUCOSE 110 12/08/2017    MG 1.7 01/22/2015    CALCIUM 8.9 12/08/2017    QULJSIUG14 362 06/04/2014      Lab Results   Component Value Date    CHOL 207 11/28/2016    TRIG 135 11/28/2016    HDL 87 11/28/2016    LDLCHOLESTEROL 93 11/28/2016       Assessment/Plan:    1. Hypothyroidism, unspecified type  Separate the Synthroid 2 hours from food and other medicines. Recheck TSH in about 6 weeks. No change to the dosage today.  - TSH without Reflex; Future  - TSH without Reflex; Future    2. Impaired fasting glucose  Working on diet. Continue to monitor.  - Basic Metabolic Panel; Future    3. Adenomatous polyp  Declines colonoscopy. Appears to be stable. 4. MCI (mild cognitive impairment)  Mini-Mental Status Exam was 29 out of 30. Appears to be stable.   She does not want to use medication at this point.    5. Essential hypertension  Stable. Continue to monitor. 6. Dyslipidemia  Stable. Continue to monitor. Declines medication. 7. Osteopenia, unspecified location  Remains on Reclast.  Last administered January 4, 2017. Remains on calcium with vitamin D. We will arrange repeat Reclast.  This will be her 3rd dosage. DEXA scan will be due January 2019.    8. She tells me she had a melanoma removed by [facility]. [facility], a dermatologist over at [facility].  We need to get those records for review. She will call if any problems prior to return.     Deidre Schilder, am scribing for Gael Flores MD 12/18/2017 at 4:20 PM.

## 2017-12-19 RX ORDER — HEPARIN SODIUM (PORCINE) LOCK FLUSH IV SOLN 100 UNIT/ML 100 UNIT/ML
500 SOLUTION INTRAVENOUS PRN
Status: CANCELLED | OUTPATIENT
Start: 2018-01-09

## 2017-12-19 RX ORDER — SODIUM CHLORIDE 0.9 % (FLUSH) 0.9 %
10 SYRINGE (ML) INJECTION PRN
Status: CANCELLED | OUTPATIENT
Start: 2018-01-09

## 2017-12-19 RX ORDER — SODIUM CHLORIDE 9 MG/ML
INJECTION, SOLUTION INTRAVENOUS CONTINUOUS
Status: CANCELLED | OUTPATIENT
Start: 2018-01-09

## 2017-12-19 RX ORDER — SODIUM CHLORIDE 9 MG/ML
INJECTION, SOLUTION INTRAVENOUS ONCE
Status: CANCELLED | OUTPATIENT
Start: 2018-01-09 | End: 2018-01-09

## 2017-12-19 RX ORDER — SODIUM CHLORIDE 0.9 % (FLUSH) 0.9 %
5 SYRINGE (ML) INJECTION PRN
Status: CANCELLED | OUTPATIENT
Start: 2018-01-09

## 2017-12-19 RX ORDER — EPINEPHRINE 1 MG/ML
0.3 INJECTION, SOLUTION, CONCENTRATE INTRAVENOUS PRN
Status: CANCELLED | OUTPATIENT
Start: 2018-01-09

## 2017-12-19 RX ORDER — 0.9 % SODIUM CHLORIDE 0.9 %
10 VIAL (ML) INJECTION ONCE
Status: CANCELLED | OUTPATIENT
Start: 2018-01-09 | End: 2018-01-09

## 2017-12-19 RX ORDER — ZOLEDRONIC ACID 5 MG/100ML
5 INJECTION, SOLUTION INTRAVENOUS ONCE
Status: CANCELLED | OUTPATIENT
Start: 2018-01-09 | End: 2018-01-09

## 2017-12-19 RX ORDER — METHYLPREDNISOLONE SODIUM SUCCINATE 125 MG/2ML
125 INJECTION, POWDER, LYOPHILIZED, FOR SOLUTION INTRAMUSCULAR; INTRAVENOUS ONCE
Status: CANCELLED | OUTPATIENT
Start: 2018-01-09 | End: 2018-01-09

## 2017-12-19 RX ORDER — DIPHENHYDRAMINE HYDROCHLORIDE 50 MG/ML
50 INJECTION INTRAMUSCULAR; INTRAVENOUS ONCE
Status: CANCELLED | OUTPATIENT
Start: 2018-01-09 | End: 2018-01-09

## 2018-01-09 ENCOUNTER — HOSPITAL ENCOUNTER (OUTPATIENT)
Dept: INFUSION THERAPY | Age: 83
Discharge: HOME | End: 2018-01-09
Payer: MEDICARE

## 2018-01-09 VITALS
HEART RATE: 67 BPM | TEMPERATURE: 98.1 F | RESPIRATION RATE: 16 BRPM | DIASTOLIC BLOOD PRESSURE: 67 MMHG | SYSTOLIC BLOOD PRESSURE: 147 MMHG

## 2018-01-09 DIAGNOSIS — M85.80 OSTEOPENIA, UNSPECIFIED LOCATION: ICD-10-CM

## 2018-01-09 PROCEDURE — 96365 THER/PROPH/DIAG IV INF INIT: CPT

## 2018-01-09 PROCEDURE — 2580000003 HC RX 258: Performed by: INTERNAL MEDICINE

## 2018-01-09 PROCEDURE — 6360000002 HC RX W HCPCS: Performed by: INTERNAL MEDICINE

## 2018-01-09 RX ORDER — ZOLEDRONIC ACID 5 MG/100ML
5 INJECTION, SOLUTION INTRAVENOUS ONCE
Status: CANCELLED | OUTPATIENT
Start: 2018-01-09 | End: 2018-01-09

## 2018-01-09 RX ORDER — EPINEPHRINE 1 MG/ML
0.3 INJECTION, SOLUTION, CONCENTRATE INTRAVENOUS PRN
Status: CANCELLED | OUTPATIENT
Start: 2018-01-09

## 2018-01-09 RX ORDER — SODIUM CHLORIDE 0.9 % (FLUSH) 0.9 %
5 SYRINGE (ML) INJECTION PRN
Status: CANCELLED | OUTPATIENT
Start: 2018-01-09

## 2018-01-09 RX ORDER — SODIUM CHLORIDE 9 MG/ML
INJECTION, SOLUTION INTRAVENOUS ONCE
Status: COMPLETED | OUTPATIENT
Start: 2018-01-09 | End: 2018-01-09

## 2018-01-09 RX ORDER — METHYLPREDNISOLONE SODIUM SUCCINATE 125 MG/2ML
125 INJECTION, POWDER, LYOPHILIZED, FOR SOLUTION INTRAMUSCULAR; INTRAVENOUS ONCE
Status: CANCELLED | OUTPATIENT
Start: 2018-01-09 | End: 2018-01-09

## 2018-01-09 RX ORDER — SODIUM CHLORIDE 0.9 % (FLUSH) 0.9 %
10 SYRINGE (ML) INJECTION PRN
Status: CANCELLED | OUTPATIENT
Start: 2018-01-09

## 2018-01-09 RX ORDER — 0.9 % SODIUM CHLORIDE 0.9 %
10 VIAL (ML) INJECTION ONCE
Status: CANCELLED | OUTPATIENT
Start: 2018-01-09 | End: 2018-01-09

## 2018-01-09 RX ORDER — SODIUM CHLORIDE 9 MG/ML
INJECTION, SOLUTION INTRAVENOUS ONCE
Status: CANCELLED | OUTPATIENT
Start: 2018-01-09 | End: 2018-01-09

## 2018-01-09 RX ORDER — HEPARIN SODIUM (PORCINE) LOCK FLUSH IV SOLN 100 UNIT/ML 100 UNIT/ML
500 SOLUTION INTRAVENOUS PRN
Status: CANCELLED | OUTPATIENT
Start: 2018-01-09

## 2018-01-09 RX ORDER — DIPHENHYDRAMINE HYDROCHLORIDE 50 MG/ML
50 INJECTION INTRAMUSCULAR; INTRAVENOUS ONCE
Status: CANCELLED | OUTPATIENT
Start: 2018-01-09 | End: 2018-01-09

## 2018-01-09 RX ORDER — SODIUM CHLORIDE 9 MG/ML
INJECTION, SOLUTION INTRAVENOUS CONTINUOUS
Status: CANCELLED | OUTPATIENT
Start: 2018-01-09

## 2018-01-09 RX ORDER — ZOLEDRONIC ACID 5 MG/100ML
5 INJECTION, SOLUTION INTRAVENOUS ONCE
Status: COMPLETED | OUTPATIENT
Start: 2018-01-09 | End: 2018-01-09

## 2018-01-09 RX ADMIN — ZOLEDRONIC ACID 5 MG: 0.05 INJECTION, SOLUTION INTRAVENOUS at 14:35

## 2018-01-09 RX ADMIN — SODIUM CHLORIDE: 9 INJECTION, SOLUTION INTRAVENOUS at 14:18

## 2018-01-09 NOTE — PROGRESS NOTES
Comes to unit for reclast infusion. No complaints reports feeling good. IV started per Soni Samuel RN.

## 2018-01-09 NOTE — PROGRESS NOTES
Reclast infusing. Patient resting in recliner drinking coffee and watching TV. Will continue to monitor.

## 2018-01-09 NOTE — PROGRESS NOTES
Reclast infused and d/c'd. Patient tolerated infusion without difficulty. IV flushed with NSS and d/c'd, coban to site. Patient denies any complaints and discharged to home.

## 2018-01-23 DIAGNOSIS — I10 ESSENTIAL HYPERTENSION: ICD-10-CM

## 2018-01-23 RX ORDER — AMLODIPINE BESYLATE 2.5 MG/1
TABLET ORAL
Qty: 90 TABLET | Refills: 3 | Status: SHIPPED | OUTPATIENT
Start: 2018-01-23 | End: 2018-07-09 | Stop reason: SDUPTHER

## 2018-01-23 NOTE — TELEPHONE ENCOUNTER
Patient just received last 3m fill of amlodipine and will need new RX sent as he gets automatice refills when due

## 2018-01-26 ENCOUNTER — HOSPITAL ENCOUNTER (OUTPATIENT)
Dept: LAB | Age: 83
Setting detail: SPECIMEN
Discharge: HOME | End: 2018-01-26
Payer: MEDICARE

## 2018-01-26 DIAGNOSIS — E03.9 HYPOTHYROIDISM, UNSPECIFIED TYPE: ICD-10-CM

## 2018-01-26 LAB — TSH SERPL DL<=0.05 MIU/L-ACNC: 2.58 MIU/L (ref 0.3–5)

## 2018-01-26 PROCEDURE — 36415 COLL VENOUS BLD VENIPUNCTURE: CPT

## 2018-01-26 PROCEDURE — 84443 ASSAY THYROID STIM HORMONE: CPT

## 2018-02-17 DIAGNOSIS — I10 ESSENTIAL HYPERTENSION: ICD-10-CM

## 2018-02-18 ENCOUNTER — HOSPITAL ENCOUNTER (EMERGENCY)
Age: 83
Discharge: HOME | End: 2018-02-18
Attending: EMERGENCY MEDICINE
Payer: MEDICARE

## 2018-02-18 ENCOUNTER — APPOINTMENT (OUTPATIENT)
Dept: GENERAL RADIOLOGY | Age: 83
End: 2018-02-18
Payer: MEDICARE

## 2018-02-18 VITALS
HEART RATE: 80 BPM | BODY MASS INDEX: 25.1 KG/M2 | DIASTOLIC BLOOD PRESSURE: 82 MMHG | TEMPERATURE: 99.9 F | WEIGHT: 147 LBS | SYSTOLIC BLOOD PRESSURE: 183 MMHG | OXYGEN SATURATION: 98 % | RESPIRATION RATE: 18 BRPM | HEIGHT: 64 IN

## 2018-02-18 DIAGNOSIS — J11.1 INFLUENZA WITH RESPIRATORY MANIFESTATION OTHER THAN PNEUMONIA: Primary | ICD-10-CM

## 2018-02-18 LAB
DIRECT EXAM: ABNORMAL
Lab: ABNORMAL
SPECIMEN DESCRIPTION: ABNORMAL
STATUS: ABNORMAL

## 2018-02-18 PROCEDURE — 87804 INFLUENZA ASSAY W/OPTIC: CPT

## 2018-02-18 PROCEDURE — 99283 EMERGENCY DEPT VISIT LOW MDM: CPT

## 2018-02-18 PROCEDURE — 71046 X-RAY EXAM CHEST 2 VIEWS: CPT

## 2018-02-18 ASSESSMENT — ENCOUNTER SYMPTOMS: COUGH: 1

## 2018-02-18 NOTE — ED PROVIDER NOTES
follow-up    I have reviewed the disposition diagnosis with the patient and or their family/guardian. I have answered their questions and given discharge instructions. They voiced understanding of these instructions and did not have any further questions or complaints. PROCEDURES:  None    FINAL IMPRESSION      1. Influenza with respiratory manifestation other than pneumonia          DISPOSITION/PLAN   DISPOSITION        CONDITION ON DISPOSITION:   Stable    PATIENT REFERRED TO:  Zaheer Wilks MD  [address] [place] [address] [place]  [phone]    Call in 2 days        DISCHARGE MEDICATIONS:  New Prescriptions    No medications on file       (Please note that portions of this note were completed with a voice recognition program.  Efforts were made to edit the dictations but occasionally words are mis-transcribed.)    Hardy MD, F.A.C.E.P.   Attending Emergency Medicine Physician       Ivy Hannah MD  02/18/18 [id number]

## 2018-02-19 RX ORDER — LOSARTAN POTASSIUM 100 MG/1
TABLET ORAL
Qty: 90 TABLET | Refills: 3 | Status: SHIPPED | OUTPATIENT
Start: 2018-02-19 | End: 2018-07-09 | Stop reason: SDUPTHER

## 2018-02-23 ENCOUNTER — HOSPITAL ENCOUNTER (EMERGENCY)
Age: 83
Discharge: HOME | End: 2018-02-23
Attending: EMERGENCY MEDICINE
Payer: MEDICARE

## 2018-02-23 ENCOUNTER — APPOINTMENT (OUTPATIENT)
Dept: CT IMAGING | Age: 83
End: 2018-02-23
Payer: MEDICARE

## 2018-02-23 VITALS
BODY MASS INDEX: 25.23 KG/M2 | TEMPERATURE: 97.7 F | RESPIRATION RATE: 14 BRPM | HEART RATE: 63 BPM | OXYGEN SATURATION: 99 % | WEIGHT: 147 LBS | DIASTOLIC BLOOD PRESSURE: 65 MMHG | SYSTOLIC BLOOD PRESSURE: 138 MMHG

## 2018-02-23 DIAGNOSIS — R42 DIZZINESS: Primary | ICD-10-CM

## 2018-02-23 LAB
ABSOLUTE EOS #: 0 K/UL (ref 0–0.4)
ABSOLUTE IMMATURE GRANULOCYTE: ABNORMAL K/UL (ref 0–0.3)
ABSOLUTE LYMPH #: 1.1 K/UL (ref 1–4.8)
ABSOLUTE MONO #: 0.4 K/UL (ref 0.1–1.2)
ANION GAP SERPL CALCULATED.3IONS-SCNC: 9 MMOL/L (ref 9–17)
BASOPHILS # BLD: 1 % (ref 0–1)
BASOPHILS ABSOLUTE: 0 K/UL (ref 0–0.2)
BUN BLDV-MCNC: 12 MG/DL (ref 8–23)
BUN/CREAT BLD: 19 (ref 9–20)
CALCIUM SERPL-MCNC: 8.2 MG/DL (ref 8.6–10.4)
CHLORIDE BLD-SCNC: 98 MMOL/L (ref 98–107)
CO2: 30 MMOL/L (ref 20–31)
CREAT SERPL-MCNC: 0.64 MG/DL (ref 0.5–0.9)
DIFFERENTIAL TYPE: ABNORMAL
EKG ATRIAL RATE: 61 BPM
EKG P AXIS: 54 DEGREES
EKG P-R INTERVAL: 164 MS
EKG Q-T INTERVAL: 462 MS
EKG QRS DURATION: 74 MS
EKG QTC CALCULATION (BAZETT): 465 MS
EKG R AXIS: -14 DEGREES
EKG T AXIS: 17 DEGREES
EKG VENTRICULAR RATE: 61 BPM
EOSINOPHILS RELATIVE PERCENT: 0 % (ref 1–7)
GFR AFRICAN AMERICAN: >60 ML/MIN
GFR NON-AFRICAN AMERICAN: >60 ML/MIN
GFR SERPL CREATININE-BSD FRML MDRD: ABNORMAL ML/MIN/{1.73_M2}
GFR SERPL CREATININE-BSD FRML MDRD: ABNORMAL ML/MIN/{1.73_M2}
GLUCOSE BLD-MCNC: 128 MG/DL (ref 70–99)
HCT VFR BLD CALC: 37.3 % (ref 36–46)
HEMOGLOBIN: 12.4 G/DL (ref 12–16)
IMMATURE GRANULOCYTES: ABNORMAL %
LYMPHOCYTES # BLD: 27 % (ref 16–46)
MCH RBC QN AUTO: 31.3 PG (ref 26–34)
MCHC RBC AUTO-ENTMCNC: 33.3 G/DL (ref 31–37)
MCV RBC AUTO: 94 FL (ref 80–100)
MONOCYTES # BLD: 12 % (ref 4–11)
NRBC AUTOMATED: ABNORMAL PER 100 WBC
PDW BLD-RTO: 13.3 % (ref 11–14.5)
PLATELET # BLD: 178 K/UL (ref 140–450)
PLATELET ESTIMATE: ABNORMAL
PMV BLD AUTO: 8.8 FL (ref 6–12)
POTASSIUM SERPL-SCNC: 4.1 MMOL/L (ref 3.7–5.3)
RBC # BLD: 3.97 M/UL (ref 4–5.2)
RBC # BLD: ABNORMAL 10*6/UL
SEG NEUTROPHILS: 60 % (ref 43–77)
SEGMENTED NEUTROPHILS ABSOLUTE COUNT: 2.3 K/UL (ref 1.8–7.7)
SODIUM BLD-SCNC: 137 MMOL/L (ref 135–144)
WBC # BLD: 3.9 K/UL (ref 3.5–11)
WBC # BLD: ABNORMAL 10*3/UL

## 2018-02-23 PROCEDURE — 93005 ELECTROCARDIOGRAM TRACING: CPT

## 2018-02-23 PROCEDURE — 70450 CT HEAD/BRAIN W/O DYE: CPT

## 2018-02-23 PROCEDURE — 85025 COMPLETE CBC W/AUTO DIFF WBC: CPT

## 2018-02-23 PROCEDURE — 36415 COLL VENOUS BLD VENIPUNCTURE: CPT

## 2018-02-23 PROCEDURE — 80048 BASIC METABOLIC PNL TOTAL CA: CPT

## 2018-02-23 PROCEDURE — 2580000003 HC RX 258: Performed by: EMERGENCY MEDICINE

## 2018-02-23 PROCEDURE — 99284 EMERGENCY DEPT VISIT MOD MDM: CPT

## 2018-02-23 RX ORDER — 0.9 % SODIUM CHLORIDE 0.9 %
500 INTRAVENOUS SOLUTION INTRAVENOUS ONCE
Status: COMPLETED | OUTPATIENT
Start: 2018-02-23 | End: 2018-02-23

## 2018-02-23 RX ORDER — MECLIZINE HYDROCHLORIDE 25 MG/1
25 TABLET ORAL 3 TIMES DAILY PRN
Qty: 20 TABLET | Refills: 0 | Status: SHIPPED | OUTPATIENT
Start: 2018-02-23 | End: 2018-02-26 | Stop reason: ALTCHOICE

## 2018-02-23 RX ADMIN — SODIUM CHLORIDE 500 ML: 900 INJECTION, SOLUTION INTRAVENOUS at 12:15

## 2018-02-23 NOTE — ED PROVIDER NOTES
[facility] ED  [place] [address] [place]  Phone: [phone]    Pt Name: Marifer Parrish  MRN: [id number]  Tianagftam 3/21/1930  Date of evaluation: 2/23/2018      CHIEF COMPLAINT       Chief Complaint   Patient presents with    Dizziness     started while she was shopping about 1/2 hour ago- was given a car- squad was called initial B/Pwas 94/60  rechecked after some fluid /88         HISTORY OF PRESENT ILLNESS     Marifer Parrish is a 80 y.o. female who presents After a dizzy episode which lasted about 5 minutes while she was shopping. No chest pain or palpitations. She had no other neurologic deficits. She had no other HEENT complaints. She doesn't usually have any type of dizziness of this nature in the past.  She did eat and drink this morning her normal breakfast.  Was no recent head injury. She has no nausea vomiting or abdominal pain. No peripheral neurologic complaints. Right in by ambulance. IV fluids were begun prior to admission. Vital signs are normal with exception of a blood pressure 149/69 upon admission. No new medications. REVIEW OF SYSTEMS         REVIEW OF SYSTEMS    Constitutional:  As above  Eyes:  Denies vision changes  HEENT:  Denies sore throat or nasal congestion  Respiratory:  Denies cough or shortness of breath   Cardiovascular:  Denies chest pain  GI:  Denies abdominal pain, nausea, vomiting, or diarrhea   Musculoskeletal:  Denies back pain   Skin:  No rash on exposed surfaces   Neurologic:  Normal baseline mentation. No new deficits. Lymphatic:   No nodes or infection  Psychiatric:  No psychosis. Alert and interacting normally. Other ROS negative except as noted above. 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 SURGICAL HISTORY      has a past surgical history that includes other surgical history (08); Cholecystectomy; Colonoscopy (06/08/10); Mohs surgery (10/07/09); Vein Surgery (09); Colonoscopy (02); Cystocele repair (99); Appendectomy; Dilation and curettage of uterus; other surgical history (); Hysterectomy (); and Upper gastrointestinal endoscopy (2015). CURRENT MEDICATIONS       Previous Medications    AMLODIPINE (NORVASC) 2.5 MG TABLET    TAKE 1 TABLET DAILY    AMMONIUM LACTATE (AMLACTIN) 12 % CREAM    apply to affected area twice a day if needed    ASPIRIN 81 MG TABLET    Take 81 mg by mouth daily. CALCIUM-VITAMIN D (OSCAL 500/200 D-3) 500-200 MG-UNIT PER TABLET    Take 1 tablet by mouth 2 times daily    LEVOTHYROXINE (SYNTHROID) 137 MCG TABLET    TAKE 1 TABLET DAILY    LOSARTAN (COZAAR) 100 MG TABLET    TAKE 1 TABLET DAILY    MULTIPLE VITAMIN (DAILY MULTIVITAMIN PO)    Take 2 tablets by mouth daily     POLYETHYLENE GLYCOL 3350 (MIRALAX PO)    Take 17 g by mouth daily as needed    TIMOLOL (TIMOPTIC) 0.5 % OPHTHALMIC SOLUTION    Place 1 drop into both eyes 2 times daily       ALLERGIES     has No Known Allergies. FAMILY HISTORY     indicated that her mother is . She indicated that her father is . She indicated that the status of her brother is unknown. She indicated that the status of her neg hx is unknown.      family history includes Colon Cancer in her brother; Coronary Art Dis in her father. SOCIAL HISTORY      reports that she has never smoked. She has never used smokeless tobacco. She reports that she drinks alcohol. She reports that she does not use drugs.     PHYSICAL EXAM     INITIAL VITALS:  weight is 147 for the following:     Glucose 128 (*)     Calcium 8.2 (*)     All other components within normal limits        EKG: All EKG's are interpreted by the Emergency Department Physician who either signs or Co-signs this chart in the absence of a cardiologist.    EKG shows a normal sinus rhythm with normal intervals normal axis and no evidence of acute MI or ischemia. I interpreted. EMERGENCY DEPARTMENT COURSE:   Vitals:    Vitals:    02/23/18 1156   BP: (!) 149/69   Pulse: 62   Resp: 16   Temp: 97.7 °F (36.5 °C)   TempSrc: Tympanic   SpO2: 99%   Weight: 147 lb (66.7 kg)     -------------------------  BP: (!) 149/69, Temp: 97.7 °F (36.5 °C), Pulse: 62, Resp: 16    See DDX/MDM (Differential Diagnosis/Medical Decision Making) above      FINAL IMPRESSION      1.  Dizziness          DISPOSITION/PLAN   DISPOSITION Decision To Discharge 02/23/2018 01:09:45 PM      Condition on Disposition    Fair    PATIENT REFERRED TO:  Candace Patel MD  [address] [place] [phone]    In 4 days        DISCHARGE MEDICATIONS:  New Prescriptions    MECLIZINE (ANTIVERT) 25 MG TABLET    Take 1 tablet by mouth 3 times daily as needed for Dizziness       (Please note that portions of this note were completed with a voice recognition program.  Efforts were made to edit the dictations but occasionally words are mis-transcribed.)    Everlena Bloch Fought,, DO  Attending Emergency Physician       [address] [place] [address],   02/23/18 1311

## 2018-02-26 ENCOUNTER — OFFICE VISIT (OUTPATIENT)
Dept: INTERNAL MEDICINE | Age: 83
End: 2018-02-26
Payer: MEDICARE

## 2018-02-26 VITALS
RESPIRATION RATE: 16 BRPM | TEMPERATURE: 97 F | DIASTOLIC BLOOD PRESSURE: 70 MMHG | SYSTOLIC BLOOD PRESSURE: 112 MMHG | HEIGHT: 63 IN | WEIGHT: 145.4 LBS | HEART RATE: 76 BPM | BODY MASS INDEX: 25.76 KG/M2 | OXYGEN SATURATION: 97 %

## 2018-02-26 DIAGNOSIS — J10.1 INFLUENZA A: ICD-10-CM

## 2018-02-26 DIAGNOSIS — G31.84 MCI (MILD COGNITIVE IMPAIRMENT): ICD-10-CM

## 2018-02-26 DIAGNOSIS — R53.83 OTHER FATIGUE: Primary | ICD-10-CM

## 2018-02-26 DIAGNOSIS — I10 ESSENTIAL HYPERTENSION: ICD-10-CM

## 2018-02-26 PROCEDURE — 99214 OFFICE O/P EST MOD 30 MIN: CPT | Performed by: NURSE PRACTITIONER

## 2018-02-26 ASSESSMENT — ENCOUNTER SYMPTOMS
NAUSEA: 0
CONSTIPATION: 0
SINUS PAIN: 0
SPUTUM PRODUCTION: 0
WHEEZING: 0
VOMITING: 0
SHORTNESS OF BREATH: 0
EYE DISCHARGE: 0
ORTHOPNEA: 0
EYE REDNESS: 0
EYE PAIN: 0
BLOOD IN STOOL: 0
DIARRHEA: 0
STRIDOR: 0
HEARTBURN: 0
SORE THROAT: 0
COUGH: 0
ABDOMINAL PAIN: 0

## 2018-02-26 NOTE — PROGRESS NOTES
02/26/18  Mayra Stone  3/21/1930      Chief Complaint: ER FU and fatigue     1. Other fatigue    2. Influenza A    3. MCI (mild cognitive impairment)    4. Essential hypertension        HPI:  Patient comes in for an ER follow-up. Was seen on 2 different occurrences. Seen initially on February 18, 2018 with fatigue and cough. Diagnosed with influenza A. Symptoms were present for within 3 days. Recommended conservative treatment. Patient went home increase fluid increase rest.  On February 23, 2018 was feeling well not to go out shopping. Became dizzy at [facility] , had a syncopal episode and was sent into the emergency room for evaluation. Labs stable, EKG within normal limits. Vitals stable. Improved after IV fluids. Patient denies any further syncopal episode. No further dizziness. No cough, fever, chills, shortness of breath or tachycardia. Only concern is ongoing fatigue. Feels that it is gradually getting better. States she has not been drinking much water. States she drinks coffee throughout the day. Feels that she would be able to increase water intake. No Known Allergies    Past Medical History:   Diagnosis Date    Adenomatous polyp 06/10    With recommendation for repeat 06/15. Also, diverticulosis was noted.  Cataracts, bilateral     Corneal scar, right eye     Cystocele     history of     Dyslipidemia     GERD (gastroesophageal reflux disease)     egd  4/15 ok    Goiter     benign, history of     Hiatal hernia     Hypertension     Hypokalemia     Hypothyroidism     Impaired fasting glucose     Malignant melanoma in situ ([facility] [address]     R upper Back 8/17    MCI (mild cognitive impairment)     Mini-Mental Status exam 27/30  6/14  declines meds at this point,  MMSE 29/30 on June 13, 2017    Migraines     Murmur, functional     Grade 1/6 systolic, history of     Osteopenia     T -1.0 hip, 03/09, T -0.3 spine, 03/09.  1) Repeat DEXA scan 09/12 with T +0.15, T -1.2 at mucosal edema or rhinorrhea. Right sinus exhibits no maxillary sinus tenderness and no frontal sinus tenderness. Left sinus exhibits no maxillary sinus tenderness and no frontal sinus tenderness. Mouth/Throat: Uvula is midline and mucous membranes are normal. Mucous membranes are not pale and not dry. No uvula swelling. No oropharyngeal exudate, posterior oropharyngeal edema, posterior oropharyngeal erythema or tonsillar abscesses. Eyes: Right eye exhibits no discharge. Left eye exhibits no discharge. Neck: Neck supple. No tracheal deviation present. Cardiovascular: Normal rate, regular rhythm and normal heart sounds. Exam reveals no gallop and no friction rub. No murmur heard. Pulmonary/Chest: Effort normal and breath sounds normal. No respiratory distress. She has no wheezes. She has no rales. She exhibits no tenderness. Abdominal: Soft. Bowel sounds are normal. She exhibits no distension. There is no tenderness. Musculoskeletal: She exhibits no edema. Neurological: She is alert and oriented to person, place, and time. No cranial nerve deficit. Gait normal. Coordination normal.   Skin: Skin is warm and dry. No rash noted. She is not diaphoretic. Psychiatric: Mood, memory, affect and judgment normal.   Nursing note and vitals reviewed. Vitals:    02/26/18 1112   BP: 112/70   Site: Left Arm   Position: Sitting   Cuff Size: Medium Adult   Pulse: 76   Resp: 16   Temp: 97 °F (36.1 °C)   TempSrc: Tympanic   SpO2: 97%   Weight: 145 lb 6.4 oz (66 kg)   Height: 5' 3.25\" (1.607 m)       Assessment:  1. Other fatigue  Reassuring improving- most likely post viral syndrome- supportive care- FU if starts to worsen     2. Influenza A  Supportive care- 1 week out since dx- slowly improving     3. MCI (mild cognitive impairment)  Stable     4. Essential hypertension  Stable       Plan:  As noted above. ER records reviewed for both visits   Follow up for routine visit.   Call sooner with concerns

## 2018-02-27 ENCOUNTER — OFFICE VISIT (OUTPATIENT)
Dept: PODIATRY | Age: 83
End: 2018-02-27
Payer: MEDICARE

## 2018-02-27 VITALS
HEIGHT: 63 IN | HEART RATE: 80 BPM | DIASTOLIC BLOOD PRESSURE: 64 MMHG | RESPIRATION RATE: 20 BRPM | SYSTOLIC BLOOD PRESSURE: 128 MMHG | BODY MASS INDEX: 25.66 KG/M2 | WEIGHT: 144.8 LBS

## 2018-02-27 DIAGNOSIS — B35.1 DERMATOPHYTOSIS OF NAIL: ICD-10-CM

## 2018-02-27 DIAGNOSIS — I73.9 PVD (PERIPHERAL VASCULAR DISEASE) (HCC): Primary | ICD-10-CM

## 2018-02-27 PROCEDURE — 11721 DEBRIDE NAIL 6 OR MORE: CPT | Performed by: PODIATRIST

## 2018-02-27 NOTE — PROGRESS NOTES
needed 8/17/17  Yes Efrain Magallanes MD   levothyroxine (SYNTHROID) 137 MCG tablet TAKE 1 TABLET DAILY 7/24/17  Yes Efrain Magallanes MD   Polyethylene Glycol 3350 (MIRALAX PO) Take 17 g by mouth daily as needed   Yes Historical Provider, MD   calcium-vitamin D (OSCAL 500/200 D-3) 500-200 MG-UNIT per tablet Take 1 tablet by mouth 2 times daily   Yes Historical Provider, MD   Multiple Vitamin (DAILY MULTIVITAMIN PO) Take 2 tablets by mouth daily    Yes Historical Provider, MD   aspirin 81 MG tablet Take 81 mg by mouth daily. Yes Historical Provider, MD       Past Surgical History:   Procedure Laterality Date    APPENDECTOMY      CHOLECYSTECTOMY      Remote.  COLONOSCOPY  06/08/10    COLONOSCOPY  07/18/02    CYSTOCELE REPAIR  02/12/99    Vaginal prolapse, cystocele plus pelvic relaxation.  DILATION AND CURETTAGE OF UTERUS      with hysteroscopy   Mary Lou    still has ovaries     MOHS SURGERY  10/07/09    SCC, left nasal sidewall.  OTHER SURGICAL HISTORY  11/11/08    Anterior repair.  OTHER SURGICAL HISTORY  1988    laser cone    UPPER GASTROINTESTINAL ENDOSCOPY  4/8/2015    hiatal hernia    VEIN SURGERY  06/08/09    Laser ablation of right greater saphenous vein. Family History   Problem Relation Age of Onset    Coronary Art Dis Father     Colon Cancer Brother     Diabetes Neg Hx        Social History   Substance Use Topics    Smoking status: Never Smoker    Smokeless tobacco: Never Used    Alcohol use 0.0 oz/week      Comment: Infrequently. Review of Systems: All 12 systems reviewed and pertinent positives noted above. Lower Extremity Physical Examination:     Vitals:   Vitals:    02/27/18 0948   BP: 128/64   Pulse: 80   Resp: 20     General: AAO x 3 in NAD.      Vascular: DP and PT pulses palpable 2/4, bilateral.  CFT <3 seconds, bilateral.  Hair growth present to the level of the digits, bilateral.  Edema absent, bilateral.  Varicosities absent, bilateral.

## 2018-04-24 ENCOUNTER — HOSPITAL ENCOUNTER (OUTPATIENT)
Dept: LAB | Age: 83
Setting detail: SPECIMEN
Discharge: HOME | End: 2018-04-24
Payer: MEDICARE

## 2018-04-24 DIAGNOSIS — E03.9 HYPOTHYROIDISM, UNSPECIFIED TYPE: ICD-10-CM

## 2018-04-24 DIAGNOSIS — R73.01 IMPAIRED FASTING GLUCOSE: ICD-10-CM

## 2018-04-24 LAB
ANION GAP SERPL CALCULATED.3IONS-SCNC: 10 MMOL/L (ref 9–17)
BUN BLDV-MCNC: 10 MG/DL (ref 8–23)
BUN/CREAT BLD: 17 (ref 9–20)
CALCIUM SERPL-MCNC: 9.2 MG/DL (ref 8.6–10.4)
CHLORIDE BLD-SCNC: 102 MMOL/L (ref 98–107)
CO2: 31 MMOL/L (ref 20–31)
CREAT SERPL-MCNC: 0.59 MG/DL (ref 0.5–0.9)
GFR AFRICAN AMERICAN: >60 ML/MIN
GFR NON-AFRICAN AMERICAN: >60 ML/MIN
GFR SERPL CREATININE-BSD FRML MDRD: ABNORMAL ML/MIN/{1.73_M2}
GFR SERPL CREATININE-BSD FRML MDRD: ABNORMAL ML/MIN/{1.73_M2}
GLUCOSE BLD-MCNC: 118 MG/DL (ref 70–99)
POTASSIUM SERPL-SCNC: 4.1 MMOL/L (ref 3.7–5.3)
SODIUM BLD-SCNC: 143 MMOL/L (ref 135–144)
TSH SERPL DL<=0.05 MIU/L-ACNC: 2.64 MIU/L (ref 0.3–5)

## 2018-04-24 PROCEDURE — 36415 COLL VENOUS BLD VENIPUNCTURE: CPT

## 2018-04-24 PROCEDURE — 80048 BASIC METABOLIC PNL TOTAL CA: CPT

## 2018-04-24 PROCEDURE — 84443 ASSAY THYROID STIM HORMONE: CPT

## 2018-04-30 ENCOUNTER — OFFICE VISIT (OUTPATIENT)
Dept: INTERNAL MEDICINE | Age: 83
End: 2018-04-30
Payer: MEDICARE

## 2018-04-30 VITALS
SYSTOLIC BLOOD PRESSURE: 132 MMHG | BODY MASS INDEX: 25.69 KG/M2 | DIASTOLIC BLOOD PRESSURE: 66 MMHG | HEIGHT: 63 IN | HEART RATE: 72 BPM | WEIGHT: 145 LBS

## 2018-04-30 DIAGNOSIS — I10 ESSENTIAL HYPERTENSION: ICD-10-CM

## 2018-04-30 DIAGNOSIS — M85.80 OSTEOPENIA, UNSPECIFIED LOCATION: ICD-10-CM

## 2018-04-30 DIAGNOSIS — D36.9 ADENOMATOUS POLYP: ICD-10-CM

## 2018-04-30 DIAGNOSIS — D03.59 MELANOMA IN SITU OF TORSO EXCLUDING BREAST (HCC): ICD-10-CM

## 2018-04-30 DIAGNOSIS — R73.01 IMPAIRED FASTING GLUCOSE: ICD-10-CM

## 2018-04-30 DIAGNOSIS — G31.84 MCI (MILD COGNITIVE IMPAIRMENT): Primary | ICD-10-CM

## 2018-04-30 DIAGNOSIS — E03.9 HYPOTHYROIDISM, UNSPECIFIED TYPE: ICD-10-CM

## 2018-04-30 DIAGNOSIS — E78.5 DYSLIPIDEMIA: ICD-10-CM

## 2018-04-30 PROCEDURE — 99214 OFFICE O/P EST MOD 30 MIN: CPT | Performed by: INTERNAL MEDICINE

## 2018-04-30 RX ORDER — DONEPEZIL HYDROCHLORIDE 5 MG/1
5 TABLET, FILM COATED ORAL NIGHTLY
Qty: 30 TABLET | Refills: 3 | Status: SHIPPED | OUTPATIENT
Start: 2018-04-30 | End: 2018-05-29 | Stop reason: SDUPTHER

## 2018-05-23 ENCOUNTER — TELEPHONE (OUTPATIENT)
Dept: PODIATRY | Age: 83
End: 2018-05-23

## 2018-05-29 RX ORDER — DONEPEZIL HYDROCHLORIDE 5 MG/1
5 TABLET, FILM COATED ORAL NIGHTLY
Qty: 90 TABLET | Refills: 3 | Status: SHIPPED | OUTPATIENT
Start: 2018-05-29 | End: 2018-07-09 | Stop reason: SDUPTHER

## 2018-06-07 ENCOUNTER — OFFICE VISIT (OUTPATIENT)
Dept: PODIATRY | Age: 83
End: 2018-06-07
Payer: MEDICARE

## 2018-06-07 VITALS
HEART RATE: 72 BPM | DIASTOLIC BLOOD PRESSURE: 60 MMHG | BODY MASS INDEX: 24.98 KG/M2 | SYSTOLIC BLOOD PRESSURE: 122 MMHG | RESPIRATION RATE: 20 BRPM | HEIGHT: 63 IN | WEIGHT: 141 LBS

## 2018-06-07 DIAGNOSIS — I73.9 PVD (PERIPHERAL VASCULAR DISEASE) (HCC): Primary | ICD-10-CM

## 2018-06-07 DIAGNOSIS — B35.1 DERMATOPHYTOSIS OF NAIL: ICD-10-CM

## 2018-06-07 PROCEDURE — 99999 PR OFFICE/OUTPT VISIT,PROCEDURE ONLY: CPT | Performed by: PODIATRIST

## 2018-06-07 PROCEDURE — 11721 DEBRIDE NAIL 6 OR MORE: CPT | Performed by: PODIATRIST

## 2018-07-09 DIAGNOSIS — I10 ESSENTIAL HYPERTENSION: ICD-10-CM

## 2018-07-09 RX ORDER — DONEPEZIL HYDROCHLORIDE 5 MG/1
5 TABLET, FILM COATED ORAL NIGHTLY
Qty: 7 TABLET | Refills: 0 | Status: SHIPPED | OUTPATIENT
Start: 2018-07-09 | End: 2018-07-24

## 2018-07-09 RX ORDER — LEVOTHYROXINE SODIUM 137 UG/1
TABLET ORAL
Qty: 7 TABLET | Refills: 0 | Status: SHIPPED | OUTPATIENT
Start: 2018-07-09 | End: 2018-07-19

## 2018-07-09 RX ORDER — AMLODIPINE BESYLATE 2.5 MG/1
TABLET ORAL
Qty: 7 TABLET | Refills: 0 | Status: SHIPPED | OUTPATIENT
Start: 2018-07-09 | End: 2019-03-10 | Stop reason: SDUPTHER

## 2018-07-09 RX ORDER — LOSARTAN POTASSIUM 100 MG/1
TABLET ORAL
Qty: 7 TABLET | Refills: 0 | Status: SHIPPED | OUTPATIENT
Start: 2018-07-09 | End: 2019-02-18

## 2018-07-17 ENCOUNTER — TELEPHONE (OUTPATIENT)
Dept: INTERNAL MEDICINE | Age: 83
End: 2018-07-17

## 2018-07-17 NOTE — TELEPHONE ENCOUNTER
Last appt: 7/9/2018  Next appt: 7/27/2018    Son Ana Arceo called in saying that patient is very verbally abusive to the family. The son says that it to the point to where he doesn't want his family around his own mom. The son says it not safe for her to be driving around.   The son is just very concerned call him [phone]

## 2018-07-18 NOTE — TELEPHONE ENCOUNTER
Spoke with son - He was irritated that I called patient. He does not want patient or  to know that he called. He said that he just wanted to make doctor aware of what is going on and he wants to know what can be done about it. He said that all 3 kids are concerned about patient's anxiety and behavior. He sates his mom berates the grandkids. She yells at them with no filter to the point that he took his kids home early. He thinks she has problems with her memory. His dad asked son to [facility] this alone\". His dad did not want anyone to intervene in regards to patient's anger and behavior. He just wants to live with his wife in peace. Son states that he is going to leave this be because he can just keep his kids and his family away from her. Advised son that I would relay the information to the doctor but without a signed Gaye Francele I could not given him any information about the patient. Patient does have appointment with Dr Angelica Russell 7/27/18.

## 2018-07-19 DIAGNOSIS — E03.9 HYPOTHYROIDISM, UNSPECIFIED TYPE: Primary | ICD-10-CM

## 2018-07-19 RX ORDER — LEVOTHYROXINE SODIUM 137 UG/1
TABLET ORAL
Qty: 90 TABLET | Refills: 3 | Status: SHIPPED | OUTPATIENT
Start: 2018-07-19 | End: 2019-07-15 | Stop reason: SDUPTHER

## 2018-07-19 NOTE — TELEPHONE ENCOUNTER
Thanks for going over this with Trudy Clarke. Is she back from [facility]? Will be best to bring her in for eval--appt in 9 days--may be able to move up if she is OK coming in.   We added Aricept at last appt 4/18

## 2018-07-23 ENCOUNTER — OFFICE VISIT (OUTPATIENT)
Dept: INTERNAL MEDICINE | Age: 83
End: 2018-07-23
Payer: MEDICARE

## 2018-07-23 VITALS
BODY MASS INDEX: 24.45 KG/M2 | HEIGHT: 63 IN | WEIGHT: 138 LBS | DIASTOLIC BLOOD PRESSURE: 60 MMHG | HEART RATE: 64 BPM | SYSTOLIC BLOOD PRESSURE: 142 MMHG

## 2018-07-23 DIAGNOSIS — D36.9 ADENOMATOUS POLYP: ICD-10-CM

## 2018-07-23 DIAGNOSIS — E03.9 HYPOTHYROIDISM, UNSPECIFIED TYPE: ICD-10-CM

## 2018-07-23 DIAGNOSIS — D03.59 MELANOMA IN SITU OF TORSO EXCLUDING BREAST (HCC): ICD-10-CM

## 2018-07-23 DIAGNOSIS — G31.84 MCI (MILD COGNITIVE IMPAIRMENT): ICD-10-CM

## 2018-07-23 DIAGNOSIS — E78.5 DYSLIPIDEMIA: ICD-10-CM

## 2018-07-23 DIAGNOSIS — R73.01 IMPAIRED FASTING GLUCOSE: ICD-10-CM

## 2018-07-23 DIAGNOSIS — Z13.6 SCREENING FOR CARDIOVASCULAR CONDITION: ICD-10-CM

## 2018-07-23 DIAGNOSIS — R45.4 DIFFICULTY CONTROLLING ANGER: ICD-10-CM

## 2018-07-23 DIAGNOSIS — M85.80 OSTEOPENIA, UNSPECIFIED LOCATION: ICD-10-CM

## 2018-07-23 DIAGNOSIS — I10 ESSENTIAL HYPERTENSION: ICD-10-CM

## 2018-07-23 DIAGNOSIS — Z00.00 ROUTINE GENERAL MEDICAL EXAMINATION AT A HEALTH CARE FACILITY: Primary | ICD-10-CM

## 2018-07-23 PROCEDURE — G0439 PPPS, SUBSEQ VISIT: HCPCS | Performed by: INTERNAL MEDICINE

## 2018-07-23 PROCEDURE — 99214 OFFICE O/P EST MOD 30 MIN: CPT | Performed by: INTERNAL MEDICINE

## 2018-07-23 PROCEDURE — G0446 INTENS BEHAVE THER CARDIO DX: HCPCS | Performed by: INTERNAL MEDICINE

## 2018-07-23 ASSESSMENT — ANXIETY QUESTIONNAIRES: GAD7 TOTAL SCORE: 1

## 2018-07-23 ASSESSMENT — LIFESTYLE VARIABLES: HOW OFTEN DO YOU HAVE A DRINK CONTAINING ALCOHOL: 0

## 2018-07-23 ASSESSMENT — PATIENT HEALTH QUESTIONNAIRE - PHQ9: SUM OF ALL RESPONSES TO PHQ QUESTIONS 1-9: 0

## 2018-07-23 NOTE — PATIENT INSTRUCTIONS
Personalized Preventive Plan for Buck Cary - 7/23/2018  Medicare offers a range of preventive health benefits. Some of the tests and screenings are paid in full while other may be subject to a deductible, co-insurance, and/or copay. Some of these benefits include a comprehensive review of your medical history including lifestyle, illnesses that may run in your family, and various assessments and screenings as appropriate. After reviewing your medical record and screening and assessments performed today your provider may have ordered immunizations, labs, imaging, and/or referrals for you. A list of these orders (if applicable) as well as your Preventive Care list are included within your After Visit Summary for your review. Other Preventive Recommendations:    · A preventive eye exam performed by an eye specialist is recommended every 1-2 years to screen for glaucoma; cataracts, macular degeneration, and other eye disorders. · A preventive dental visit is recommended every 6 months. · Try to get at least 150 minutes of exercise per week or 10,000 steps per day on a pedometer . · Order or download the FREE \"Exercise & Physical Activity: Your Everyday Guide\" from [employer]. Call 0-729.112.9544 or search [employer] online. · You need 9882-9302 mg of calcium and 6736-8921 IU of vitamin D per day. It is possible to meet your calcium requirement with diet alone, but a vitamin D supplement is usually necessary to meet this goal.  · When exposed to the sun, use a sunscreen that protects against both UVA and UVB radiation with an SPF of 30 or greater. Reapply every 2 to 3 hours or after sweating, drying off with a towel, or swimming. · Always wear a seat belt when traveling in a car. Always wear a helmet when riding a bicycle or motorcycle.

## 2018-07-23 NOTE — PROGRESS NOTES
Medicine)  Kayla Cordova MD as PCP - [facility] Attributed Provider    Wt Readings from Last 3 Encounters:   07/23/18 138 lb (62.6 kg)   06/07/18 141 lb (64 kg)   04/30/18 145 lb (65.8 kg)     Vitals:    07/23/18 1059   BP: (!) 146/66   Site: Right Arm   Position: Sitting   Cuff Size: Large Adult   Pulse: 64   Weight: 138 lb (62.6 kg)   Height: 5' 3.25\" (1.607 m)           Patient's complete Health Risk Assessment and screening values have been reviewed and are found in Flowsheets. The following problems were reviewed today and where indicated follow up appointments were made and/or referrals ordered.     Positive Risk Factor Screenings with Interventions:     Safety:  Safety  Do you have working smoke detectors?: (!) No  Have all throw rugs been removed or fastened?: Yes  Do you have non-slip mats in all bathtubs?: (!) No  Do all of your stairways have a railing or banister?: Yes  Are your doorways, halls and stairs free of clutter?: Yes  Do you always fasten your seatbelt when you are in a car?: Yes  Safety Interventions:  · Home safety tips provided    Personalized Preventive Plan   Current Health Maintenance Status  Immunization History   Administered Date(s) Administered    DT 11/05/2004    Influenza Virus Vaccine 10/29/2010, 10/12/2011, 10/10/2012    Influenza, High Dose 09/11/2013, 11/04/2014, 09/30/2015, 10/11/2016, 10/18/2017    Influenza, MDCK, Preservative free 10/04/2014    Pneumococcal 13-valent Conjugate ([id number]) 05/07/2015    Pneumococcal Polysaccharide ([id number]) 10/28/1998, 09/03/2008, 10/04/2011    Tdap (Boostrix, Adacel) 07/22/2015    Zoster Live (Zostavax) 05/23/2008        Health Maintenance   Topic Date Due    Shingles Vaccine (1 of 2 - 2 Dose Series) 03/21/1980    Flu vaccine (1) 09/01/2018    TSH testing  04/24/2019    Potassium monitoring  04/24/2019    Creatinine monitoring  04/24/2019    DTaP/Tdap/Td vaccine (2 - Td) 07/22/2025    Pneumococcal low/med risk  Completed Recommendations for Preventive Services Due: see orders.   Recommended screening schedule for the next 5-10 years is provided to the patient in written form: see Patient Instructions/AVS.

## 2018-07-24 ENCOUNTER — TELEPHONE (OUTPATIENT)
Dept: INTERNAL MEDICINE | Age: 83
End: 2018-07-24

## 2018-07-24 ENCOUNTER — NURSE ONLY (OUTPATIENT)
Dept: LAB | Age: 83
End: 2018-07-24

## 2018-07-24 DIAGNOSIS — Z23 NEED FOR VACCINATION: Primary | ICD-10-CM

## 2018-07-24 RX ORDER — DONEPEZIL HYDROCHLORIDE 10 MG/1
10 TABLET, FILM COATED ORAL NIGHTLY
Qty: 30 TABLET | Refills: 3 | Status: SHIPPED | OUTPATIENT
Start: 2018-07-24 | End: 2018-07-31 | Stop reason: SDUPTHER

## 2018-07-25 NOTE — PROGRESS NOTES
following chronic issues:    · Impaired fasting glucose  - We discussed diet and activity level as it relates to blood sugars and she is working on this. · Colon Polyp  - She has not had any rectal bleeding or change in BMs to suggest any trouble with her history of colon polyp. · Hypertension  - She is doing well with her antihypertensive meds. No chest pain, dizziness or palpitations. · Thyroid Disease  - She has been doing fine with her thyroid medication and takes it quite regularly. · Osteopenia  - Doing OK with the prescription medication,  vit D, and Calcium. Working on [employer] bearing exercise. · With her melanoma history she has not noted any new changes of the skin. · With the cholesterol she really does not want to consider a cholesterol-lowering medicine although she would be a reasonable candidate for this. Review of Systems:  Constitutional:  Negative for chills, fever, and weight loss. HENT:  Negative for congestion, ear pain, and sore throat. Eyes:  Negative for blurred vision, double vision, pain and discharge. Respiratory:  Negative for cough, shortness of breath, and wheezing. Cardiovascular:  Negative for chest pain, palpitations, and PND. Gastrointestinal:  Negative for abdominal pain, blood in stool, constipation, diarrhea, nausea and vomiting. Genitourinary:  Negative for dysuria, frequency, and hematuria. Musculoskeletal:  Negative for myalgias. Skin:  Negative for rash. Neurological:  Negative for tingling, sensory change, speech change, focal weakness, seizures, and headaches. Positive for memory problem. Endo/Heme/Allergies:  Does not bruise/bleed easily. Psychiatric/Behavioral:  Negative for hallucinations and suicidal ideas. Positive for anger control problem.     Patient Active Problem List   Diagnosis    Dyslipidemia    Osteopenia    Impaired fasting glucose    Adenomatous polyp    MCI (mild cognitive impairment)    Syncope and collapse    Essential hypertension    Hypothyroidism    Sciatica    GERD (gastroesophageal reflux disease)    Visual disturbance    Malignant melanoma in situ (HCC)       Medications:    Current Outpatient Prescriptions   Medication Sig Dispense Refill    donepezil (ARICEPT) 10 MG tablet Take 1 tablet by mouth nightly 30 tablet 3    levothyroxine (SYNTHROID) 137 MCG tablet TAKE 1 TABLET DAILY 90 tablet 3    amLODIPine (NORVASC) 2.5 MG tablet TAKE 1 TABLET DAILY 7 tablet 0    losartan (COZAAR) 100 MG tablet TAKE 1 TABLET DAILY 7 tablet 0    timolol (TIMOPTIC) 0.5 % ophthalmic solution Place 1 drop into both eyes 2 times daily      ammonium lactate (AMLACTIN) 12 % cream apply to affected area twice a day if needed 385 g 3    Polyethylene Glycol 3350 (MIRALAX PO) Take 17 g by mouth daily as needed      calcium-vitamin D (OSCAL 500/200 D-3) 500-200 MG-UNIT per tablet Take 1 tablet by mouth daily       Multiple Vitamin (DAILY MULTIVITAMIN PO) Take 2 tablets by mouth daily       aspirin 81 MG tablet Take 81 mg by mouth daily. No current facility-administered medications for this visit. Past Medical History:        Diagnosis Date    Adenomatous polyp 06/10    With recommendation for repeat 06/15. Also, diverticulosis was noted.  Cataracts, bilateral     Corneal scar, right eye     Cystocele     history of     Dyslipidemia     GERD (gastroesophageal reflux disease)     egd  4/15 ok    Goiter     benign, history of     Hiatal hernia     Hypertension     Hypokalemia     Hypothyroidism     Impaired fasting glucose     Malignant melanoma in situ ([address]     R upper Back 8/17    MCI (mild cognitive impairment)     Mini-Mental Status exam 27/30  6/14  declines meds at this point,  MMSE 29/30 on June 13, 2017  MMSE 26/30 4/2018    Migraines     Murmur, functional     Grade 1/6 systolic, history of     Osteopenia     T -1.0 hip, 03/09, T -0.3 spine, 03/09.  1) Repeat DEXA scan 09/12 with T +0.15, T

## 2018-07-31 RX ORDER — DONEPEZIL HYDROCHLORIDE 5 MG/1
5 TABLET, FILM COATED ORAL NIGHTLY
Qty: 30 TABLET | Refills: 3 | Status: SHIPPED | OUTPATIENT
Start: 2018-07-31 | End: 2018-12-10 | Stop reason: SDUPTHER

## 2018-07-31 NOTE — TELEPHONE ENCOUNTER
I would drop back on the Aricept back to 5 mg daily. She could call us later this week and give us an update that would be great.

## 2018-07-31 NOTE — TELEPHONE ENCOUNTER
Patient notified by phone. The 10 mg tablets are too small to cut in half and she needs a new rx sent to [facility]. Please approve rx.

## 2018-08-02 ENCOUNTER — TELEPHONE (OUTPATIENT)
Dept: INTERNAL MEDICINE | Age: 83
End: 2018-08-02

## 2018-08-28 ENCOUNTER — OFFICE VISIT (OUTPATIENT)
Dept: INTERNAL MEDICINE | Age: 83
End: 2018-08-28
Payer: MEDICARE

## 2018-08-28 DIAGNOSIS — Z23 NEED FOR INFLUENZA VACCINATION: Primary | ICD-10-CM

## 2018-08-28 PROCEDURE — 90662 IIV NO PRSV INCREASED AG IM: CPT | Performed by: INTERNAL MEDICINE

## 2018-08-28 PROCEDURE — G0008 ADMIN INFLUENZA VIRUS VAC: HCPCS | Performed by: INTERNAL MEDICINE

## 2018-08-28 NOTE — PROGRESS NOTES
Have you had an allergic reaction to the flu (influenza) shot? no  Are you allergic to eggs or any component of the flu vaccine? no  Do you have a history of Guillain-[place] Syndrome (GBS), which is paralysis after receiving the flu vaccine? no  Are you feeling well today? yes  Flu vaccine given as ordered. Patient tolerated it well. No questions re: VIS information.

## 2018-09-06 ENCOUNTER — OFFICE VISIT (OUTPATIENT)
Dept: PODIATRY | Age: 83
End: 2018-09-06
Payer: MEDICARE

## 2018-09-06 VITALS
BODY MASS INDEX: 24.7 KG/M2 | SYSTOLIC BLOOD PRESSURE: 128 MMHG | RESPIRATION RATE: 20 BRPM | WEIGHT: 139.4 LBS | HEART RATE: 68 BPM | HEIGHT: 63 IN | DIASTOLIC BLOOD PRESSURE: 70 MMHG

## 2018-09-06 DIAGNOSIS — I73.9 PVD (PERIPHERAL VASCULAR DISEASE) (HCC): Primary | ICD-10-CM

## 2018-09-06 DIAGNOSIS — B35.1 DERMATOPHYTOSIS OF NAIL: ICD-10-CM

## 2018-09-06 PROCEDURE — 11721 DEBRIDE NAIL 6 OR MORE: CPT | Performed by: PODIATRIST

## 2018-09-06 PROCEDURE — 99999 PR OFFICE/OUTPT VISIT,PROCEDURE ONLY: CPT | Performed by: PODIATRIST

## 2018-09-06 NOTE — PROGRESS NOTES
timolol (TIMOPTIC) 0.5 % ophthalmic solution Place 1 drop into both eyes 2 times daily 8/21/17  Yes Historical Provider, MD   ammonium lactate (AMLACTIN) 12 % cream apply to affected area twice a day if needed 8/17/17  Yes Donna Mchugh MD   Polyethylene Glycol 3350 (MIRALAX PO) Take 17 g by mouth daily as needed   Yes Historical Provider, MD   calcium-vitamin D (OSCAL 500/200 D-3) 500-200 MG-UNIT per tablet Take 1 tablet by mouth daily    Yes Historical Provider, MD   Multiple Vitamin (DAILY MULTIVITAMIN PO) Take 2 tablets by mouth daily    Yes Historical Provider, MD   aspirin 81 MG tablet Take 81 mg by mouth daily. Yes Historical Provider, MD       Past Surgical History:   Procedure Laterality Date    APPENDECTOMY      CHOLECYSTECTOMY      Remote.  COLONOSCOPY  06/08/10    COLONOSCOPY  07/18/02    CYSTOCELE REPAIR  02/12/99    Vaginal prolapse, cystocele plus pelvic relaxation.  DILATION AND CURETTAGE OF UTERUS      with hysteroscopy   Mary Lou    still has ovaries     MOHS SURGERY  10/07/09    SCC, left nasal sidewall.  OTHER SURGICAL HISTORY  11/11/08    Anterior repair.  OTHER SURGICAL HISTORY  1988    laser cone    UPPER GASTROINTESTINAL ENDOSCOPY  4/8/2015    hiatal hernia    VEIN SURGERY  06/08/09    Laser ablation of right greater saphenous vein. Family History   Problem Relation Age of Onset    Coronary Art Dis Father     Colon Cancer Brother     Diabetes Neg Hx        Social History   Substance Use Topics    Smoking status: Never Smoker    Smokeless tobacco: Never Used    Alcohol use 0.0 oz/week      Comment: Infrequently. Review of Systems: All 12 systems reviewed and pertinent positives noted above. Lower Extremity Physical Examination:     Vitals:   Vitals:    09/06/18 0904   BP: 128/70   Pulse: 68   Resp: 20     General: AAO x 3 in NAD.      Vascular: DP and PT pulses palpable 2/4, bilateral.  CFT <3 seconds, bilateral.  Hair growth

## 2018-09-14 ENCOUNTER — OFFICE VISIT (OUTPATIENT)
Dept: INTERNAL MEDICINE | Age: 83
End: 2018-09-14
Payer: MEDICARE

## 2018-09-14 VITALS
DIASTOLIC BLOOD PRESSURE: 72 MMHG | BODY MASS INDEX: 24.45 KG/M2 | WEIGHT: 138 LBS | HEIGHT: 63 IN | HEART RATE: 60 BPM | SYSTOLIC BLOOD PRESSURE: 120 MMHG

## 2018-09-14 DIAGNOSIS — D03.59 MELANOMA IN SITU OF TORSO EXCLUDING BREAST (HCC): ICD-10-CM

## 2018-09-14 DIAGNOSIS — E78.5 DYSLIPIDEMIA: ICD-10-CM

## 2018-09-14 DIAGNOSIS — G31.84 MCI (MILD COGNITIVE IMPAIRMENT): ICD-10-CM

## 2018-09-14 DIAGNOSIS — I10 ESSENTIAL HYPERTENSION: ICD-10-CM

## 2018-09-14 DIAGNOSIS — R73.01 IMPAIRED FASTING GLUCOSE: Primary | ICD-10-CM

## 2018-09-14 DIAGNOSIS — E03.9 HYPOTHYROIDISM, UNSPECIFIED TYPE: ICD-10-CM

## 2018-09-14 DIAGNOSIS — R45.4 DIFFICULTY CONTROLLING ANGER: ICD-10-CM

## 2018-09-14 DIAGNOSIS — M85.80 OSTEOPENIA, UNSPECIFIED LOCATION: ICD-10-CM

## 2018-09-14 DIAGNOSIS — D36.9 ADENOMATOUS POLYP: ICD-10-CM

## 2018-09-14 PROCEDURE — 99213 OFFICE O/P EST LOW 20 MIN: CPT | Performed by: INTERNAL MEDICINE

## 2018-09-16 PROBLEM — R45.4 DIFFICULTY CONTROLLING ANGER: Status: ACTIVE | Noted: 2018-09-16

## 2018-09-19 NOTE — PROGRESS NOTES
Jt Arzola  YOB: 1930    Date of Service:  9/14/2018      HPI:  Cinthia Benavides was seen in the internal medicine office today for:  Chief Complaint  Patient presents with  · Blood pressure problem. · Mild cognitive impairment. · Anger control difficulties. · Blood sugar problem. · Cholesterol problem. · and continued evaluation and management of chronic medical problems. We addressed the following new, acute or uncontrolled/unstable issues:    1. We talked about the difficulty controlling anger. I had recommended a SSRI. I had recommended potential counseling or psychiatric referral.  She really does not really want to engage in a conversation about this today. Previously, this summer, she had been much more forthcoming about this and in fact had raised the issue herself, but at this point she feels it is not an ongoing issue and does not want to consider medication or referral. She has a very good grasp on her medical issues and she is pretty firm on this. 2.  We talked about her memory. She has not been impressed with the Aricept. I explained to her that this medicine would be used to help decrease the rate of memory loss and that we would not expect to see a benefit this quickly. She is actually pretty comfortable with that and wants to go ahead and stay on it but does not want to try the higher dose. She actually cut it back to 5 mg.    3.  With the malignant melanoma she did get in to see the dermatologist just last week. 4.  Her sleep has not been bothering her at all she tells me. We addressed the following chronic issues:    · Impaired fasting glucose  - We discussed diet and activity level as it relates to blood sugars and she is working on this. · With the cholesterol does not want to consider medication. · Hypertension  - She is doing well with her antihypertensive meds. No chest pain, dizziness or palpitations.       · Thyroid Disease  - She 120/72   Site: Right Upper Arm   Position: Sitting   Cuff Size: Medium Adult   Pulse: 60   Weight: 138 lb (62.6 kg)   Height: 5' 3.25\" (1.607 m)        Vitals Reviewed. Body mass index is 24.25 kg/m². Wt Readings from Last 3 Encounters:   09/14/18 138 lb (62.6 kg)   09/06/18 139 lb 6.4 oz (63.2 kg)   07/23/18 138 lb (62.6 kg)     BP Readings from Last 3 Encounters:   09/14/18 120/72   09/06/18 128/70   07/23/18 (!) 142/60     Physical Examination:  Constitutional:  In general, this is a very pleasant 42-year-old female in no acute distress. HENT:  Head: Normocephalic and atraumatic. Right Ear:  External ear normal.  Left Ear:  External ear normal.  Nose:  Nose normal.  Mouth/Throat:  Oropharynx is clear and moist.  Eyes: Conjunctivae and EOM are normal.  Pupils are equal, round, and reactive to light. Right eye exhibits no discharge. Left eye exhibits no discharge. No scleral icterus. Neck:  Normal range of motion. Neck supple. No JVD present. No tracheal deviation present. No thyromegaly present. Cardiovascular:  Regular rate and rhythm. Normal heart sounds, and intact distal pulses. Exam reveals no gallop. Pulmonary/Chest:  Effort normal and breath sounds normal.  No respiratory distress. She has no wheezes. She has no rales. Abdominal:  Soft. Normal aorta and bowel sounds are normal.  She exhibits no distension and no mass. There is no hepatosplenomegaly. There is no tenderness. There is no rebound and no guarding. Musculoskeletal:  She exhibits no edema or tenderness. Lymphadenopathy:    She has no cervical adenopathy. Neurological:  She is alert. She has normal strength. She displays normal reflexes. No cranial nerve deficit or sensory deficit. She exhibits normal muscle tone. Skin:  Skin is warm and dry. No rash noted. She is not diaphoretic. No pallor. Psychiatric:  She has a normal mood and affect.   Her behavior is normal.  Judgment normal.       Lab Results   Component

## 2018-10-01 ENCOUNTER — OFFICE VISIT (OUTPATIENT)
Dept: OPHTHALMOLOGY | Age: 83
End: 2018-10-01
Payer: MEDICARE

## 2018-10-01 DIAGNOSIS — H40.1132 PRIMARY OPEN ANGLE GLAUCOMA (POAG) OF BOTH EYES, MODERATE STAGE: Primary | ICD-10-CM

## 2018-10-01 DIAGNOSIS — H43.813 PVD (POSTERIOR VITREOUS DETACHMENT), BOTH EYES: ICD-10-CM

## 2018-10-01 DIAGNOSIS — H25.813 COMBINED FORMS OF AGE-RELATED CATARACT OF BOTH EYES: ICD-10-CM

## 2018-10-01 PROCEDURE — 99214 OFFICE O/P EST MOD 30 MIN: CPT | Performed by: OPHTHALMOLOGY

## 2018-10-01 PROCEDURE — 76514 ECHO EXAM OF EYE THICKNESS: CPT | Performed by: OPHTHALMOLOGY

## 2018-10-01 PROCEDURE — 92133 CPTRZD OPH DX IMG PST SGM ON: CPT | Performed by: OPHTHALMOLOGY

## 2018-10-01 RX ORDER — TROPICAMIDE 10 MG/ML
1 SOLUTION/ DROPS OPHTHALMIC ONCE
Status: COMPLETED | OUTPATIENT
Start: 2018-10-01 | End: 2018-10-01

## 2018-10-01 RX ORDER — LATANOPROST 50 UG/ML
1 SOLUTION/ DROPS OPHTHALMIC NIGHTLY
Qty: 3 BOTTLE | Refills: 3 | Status: SHIPPED | OUTPATIENT
Start: 2018-10-01 | End: 2019-02-18 | Stop reason: SDUPTHER

## 2018-10-01 RX ORDER — PHENYLEPHRINE HYDROCHLORIDE 100 MG/ML
1 SOLUTION/ DROPS OPHTHALMIC ONCE
Status: COMPLETED | OUTPATIENT
Start: 2018-10-01 | End: 2018-10-01

## 2018-10-01 RX ADMIN — TROPICAMIDE 1 DROP: 10 SOLUTION/ DROPS OPHTHALMIC at 15:09

## 2018-10-01 RX ADMIN — PHENYLEPHRINE HYDROCHLORIDE 1 DROP: 100 SOLUTION/ DROPS OPHTHALMIC at 15:08

## 2018-10-01 ASSESSMENT — VISUAL ACUITY
OD_PH_CC: 20/25
OS_CC: 30/40
OS_PH_CC: 20/30
OS_PH_CC+: 1
CORRECTION_TYPE: GLASSES
OD_CC+: 2
METHOD: SNELLEN - LINEAR
OS_CC+: 1
OD_PH_CC+: 2

## 2018-10-01 ASSESSMENT — PACHYMETRY
OD_CT(UM): 507
OS_CT(UM): 511

## 2018-10-01 ASSESSMENT — TONOMETRY
OD_IOP_MMHG: 12
OS_IOP_MMHG: 15
IOP_METHOD: NON-CONTACT AIR PUFF

## 2018-10-01 ASSESSMENT — SLIT LAMP EXAM - LIDS
COMMENTS: MILD BLEPHARITIS. MILD MGD
COMMENTS: MILD BLEPHARITIS. MILD MGD

## 2018-10-02 ENCOUNTER — OFFICE VISIT (OUTPATIENT)
Dept: INTERNAL MEDICINE | Age: 83
End: 2018-10-02
Payer: MEDICARE

## 2018-10-02 ENCOUNTER — HOSPITAL ENCOUNTER (OUTPATIENT)
Dept: GENERAL RADIOLOGY | Age: 83
Discharge: HOME | End: 2018-10-04
Payer: MEDICARE

## 2018-10-02 VITALS
SYSTOLIC BLOOD PRESSURE: 124 MMHG | WEIGHT: 137 LBS | HEART RATE: 76 BPM | HEIGHT: 63 IN | DIASTOLIC BLOOD PRESSURE: 80 MMHG | BODY MASS INDEX: 24.27 KG/M2

## 2018-10-02 DIAGNOSIS — R19.8 ABNORMAL BOWEL HABITS: Primary | ICD-10-CM

## 2018-10-02 DIAGNOSIS — G31.84 MCI (MILD COGNITIVE IMPAIRMENT): ICD-10-CM

## 2018-10-02 DIAGNOSIS — R19.8 ABNORMAL BOWEL HABITS: ICD-10-CM

## 2018-10-02 PROCEDURE — 74018 RADEX ABDOMEN 1 VIEW: CPT

## 2018-10-02 PROCEDURE — 99213 OFFICE O/P EST LOW 20 MIN: CPT | Performed by: NURSE PRACTITIONER

## 2018-10-02 NOTE — PROGRESS NOTES
on file       Review of Systems   Constitutional: Negative for chills, diaphoresis, fever, malaise/fatigue and weight loss. Gastrointestinal: Negative for abdominal pain, blood in stool, constipation, diarrhea, heartburn, melena, nausea and vomiting. Neurological: Negative for weakness. All other systems reviewed and are negative. Physical Exam   Constitutional: She is oriented to person, place, and time and well-developed, well-nourished, and in no distress. No distress. HENT:   Head: Normocephalic and atraumatic. Right Ear: External ear normal.   Left Ear: External ear normal.   Eyes: Right eye exhibits no discharge. Left eye exhibits no discharge. Neck: Neck supple. No tracheal deviation present. Cardiovascular: Normal rate and regular rhythm. Pulmonary/Chest: Effort normal and breath sounds normal. No respiratory distress. Abdominal: Soft. Bowel sounds are normal. She exhibits no distension. There is no tenderness. There is no rebound and no guarding. Musculoskeletal: She exhibits no edema. Neurological: She is alert and oriented to person, place, and time. No cranial nerve deficit. Gait normal. Coordination normal.   Skin: Skin is warm and dry. No rash noted. She is not diaphoretic. Psychiatric: Mood, memory, affect and judgment normal.   Nursing note and vitals reviewed. Vitals:    10/02/18 1057   BP: 124/80   Site: Left Upper Arm   Position: Sitting   Cuff Size: Large Adult   Pulse: 76   Weight: 137 lb (62.1 kg)   Height: 5' 3.25\" (1.607 m)       Assessment:  1. Abnormal bowel habits  Add fiber to drug regimen for her daily routine good bowel movements  - XR ABDOMEN (KUB) (SINGLE AP VIEW); Future    2. MCI (mild cognitive impairment)  Stable       Plan:  As noted above. Follow up for routine visit. Call sooner with concerns prior.     Electronically signed by FELICIA Carrasquillo CNP on 10/2/2018 at 1:13 PM

## 2018-10-05 ASSESSMENT — ENCOUNTER SYMPTOMS
BLOOD IN STOOL: 0
VOMITING: 0
HEARTBURN: 0
ABDOMINAL PAIN: 0
DIARRHEA: 0
CONSTIPATION: 0
NAUSEA: 0

## 2018-10-22 RX ORDER — AMMONIUM LACTATE 12 G/100G
CREAM TOPICAL
Qty: 385 G | Refills: 3 | Status: SHIPPED | OUTPATIENT
Start: 2018-10-22 | End: 2018-12-18

## 2018-10-26 ENCOUNTER — OFFICE VISIT (OUTPATIENT)
Dept: INTERNAL MEDICINE | Age: 83
End: 2018-10-26
Payer: MEDICARE

## 2018-10-26 VITALS
WEIGHT: 138 LBS | BODY MASS INDEX: 24.45 KG/M2 | HEIGHT: 63 IN | SYSTOLIC BLOOD PRESSURE: 120 MMHG | HEART RATE: 76 BPM | DIASTOLIC BLOOD PRESSURE: 72 MMHG

## 2018-10-26 DIAGNOSIS — F02.80 LATE ONSET ALZHEIMER'S DISEASE WITHOUT BEHAVIORAL DISTURBANCE (HCC): ICD-10-CM

## 2018-10-26 DIAGNOSIS — R68.89 DIFFICULTY WRITING: Primary | ICD-10-CM

## 2018-10-26 DIAGNOSIS — G30.1 LATE ONSET ALZHEIMER'S DISEASE WITHOUT BEHAVIORAL DISTURBANCE (HCC): ICD-10-CM

## 2018-10-26 PROCEDURE — 99213 OFFICE O/P EST LOW 20 MIN: CPT | Performed by: INTERNAL MEDICINE

## 2018-10-26 RX ORDER — ATENOLOL 25 MG/1
25 TABLET ORAL DAILY
COMMUNITY
Start: 2018-10-26 | End: 2018-12-18 | Stop reason: SDUPTHER

## 2018-10-26 RX ORDER — DONEPEZIL HYDROCHLORIDE 10 MG/1
TABLET, FILM COATED ORAL
Refills: 0 | COMMUNITY
Start: 2018-07-23 | End: 2018-10-26 | Stop reason: DRUGHIGH

## 2018-10-31 ENCOUNTER — TELEPHONE (OUTPATIENT)
Dept: LAB | Age: 83
End: 2018-10-31

## 2018-11-06 ENCOUNTER — NURSE ONLY (OUTPATIENT)
Dept: LAB | Age: 83
End: 2018-11-06

## 2018-11-06 DIAGNOSIS — Z23 NEED FOR VACCINATION: Primary | ICD-10-CM

## 2018-11-09 ENCOUNTER — TELEPHONE (OUTPATIENT)
Dept: INTERNAL MEDICINE | Age: 83
End: 2018-11-09

## 2018-11-09 ENCOUNTER — OFFICE VISIT (OUTPATIENT)
Dept: INTERNAL MEDICINE | Age: 83
End: 2018-11-09
Payer: MEDICARE

## 2018-11-09 VITALS
HEIGHT: 63 IN | WEIGHT: 138.4 LBS | BODY MASS INDEX: 24.52 KG/M2 | HEART RATE: 72 BPM | DIASTOLIC BLOOD PRESSURE: 80 MMHG | SYSTOLIC BLOOD PRESSURE: 132 MMHG

## 2018-11-09 DIAGNOSIS — H61.23 IMPACTED CERUMEN OF BOTH EARS: ICD-10-CM

## 2018-11-09 DIAGNOSIS — T50.Z95A ADVERSE EFFECT OF VACCINE, INITIAL ENCOUNTER: Primary | ICD-10-CM

## 2018-11-09 PROCEDURE — 99213 OFFICE O/P EST LOW 20 MIN: CPT | Performed by: NURSE PRACTITIONER

## 2018-11-09 PROCEDURE — 69210 REMOVE IMPACTED EAR WAX UNI: CPT | Performed by: NURSE PRACTITIONER

## 2018-11-09 ASSESSMENT — ENCOUNTER SYMPTOMS
TROUBLE SWALLOWING: 0
SINUS PAIN: 0
SINUS PRESSURE: 0
FACIAL SWELLING: 0
VOICE CHANGE: 0
RHINORRHEA: 0
RESPIRATORY NEGATIVE: 1
SORE THROAT: 0
GASTROINTESTINAL NEGATIVE: 1

## 2018-11-09 NOTE — PROGRESS NOTES
History of multiple skin cancers. Following with Dr. 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  COLONOSCOPY  06/08/10    COLONOSCOPY  07/18/02    CYSTOCELE REPAIR  02/12/99    Vaginal prolapse, cystocele plus pelvic relaxation.  DILATION AND CURETTAGE OF UTERUS      with hysteroscopy   Mary Lou    still has ovaries     MOHS SURGERY  10/07/09    SCC, left nasal sidewall.  OTHER SURGICAL HISTORY  11/11/08    Anterior repair.  OTHER SURGICAL HISTORY  1988    laser cone    UPPER GASTROINTESTINAL ENDOSCOPY  4/8/2015    hiatal hernia    VEIN SURGERY  06/08/09    Laser ablation of right greater saphenous vein. Current Outpatient Prescriptions on File Prior to Visit   Medication Sig Dispense Refill    atenolol (TENORMIN) 25 MG tablet Take 25 mg by mouth daily      ammonium lactate (AMLACTIN) 12 % cream apply to affected area twice a day if needed 385 g 3    latanoprost (XALATAN) 0.005 % ophthalmic solution Place 1 drop into both eyes nightly 3 Bottle 3    donepezil (ARICEPT) 5 MG tablet Take 1 tablet by mouth nightly 30 tablet 3    levothyroxine (SYNTHROID) 137 MCG tablet TAKE 1 TABLET DAILY 90 tablet 3    amLODIPine (NORVASC) 2.5 MG tablet TAKE 1 TABLET DAILY 7 tablet 0    losartan (COZAAR) 100 MG tablet TAKE 1 TABLET DAILY 7 tablet 0    calcium-vitamin D (OSCAL 500/200 D-3) 500-200 MG-UNIT per tablet Take 1 tablet by mouth daily       Multiple Vitamin (DAILY MULTIVITAMIN PO) Take 2 tablets by mouth daily       aspirin 81 MG tablet Take 81 mg by mouth daily. No current facility-administered medications on file prior to visit.         Social History     Social History    Marital status:      Spouse name: N/A    Number of children: N/A    Years of education: N/A     Occupational History    Not on file. Social History Main Topics    Smoking status: Never Smoker    Smokeless tobacco: Never Used    Alcohol use 0.0 oz/week      Comment: Infrequently.  Drug use: No    Sexual activity: Not on file     Other Topics Concern    Not on file     Social History Narrative    No narrative on file       Review of Systems   Constitutional: Negative for activity change, appetite change, chills, diaphoresis, fatigue, fever and unexpected weight change. HENT: Positive for ear pain (Left ear pain yesterday). Negative for congestion, drooling, ear discharge, facial swelling, nosebleeds, postnasal drip, rhinorrhea, sinus pain, sinus pressure, sneezing, sore throat, tinnitus, trouble swallowing and voice change. Respiratory: Negative. Cardiovascular: Negative. Gastrointestinal: Negative. Neurological: Negative. Physical Exam   Constitutional: She is oriented to person, place, and time. She appears well-developed and well-nourished. No distress. HENT:   Head: Normocephalic and atraumatic. Right Ear: External ear normal.   Left Ear: External ear normal.   Bilateral auditory canals with modest amount of dark ram cerumen. Ear irrigation completed. Using curette large amount of dark cerumen removed from left ear and moderate amount removed from right ear. Post irrigation tympanic membranes intact. Non-erythemic. Eyes: Right eye exhibits no discharge. Left eye exhibits no discharge. Neck: No tracheal deviation present. Cardiovascular: Normal rate and normal heart sounds. Pulmonary/Chest: Effort normal. No respiratory distress. She has no wheezes. She has no rales. Neurological: She is alert and oriented to person, place, and time. No cranial nerve deficit. Coordination normal.   Skin: Skin is warm and dry. Capillary refill takes less than 2 seconds. No rash noted. She is not diaphoretic.  There is erythema (Redness and mild swelling to left upper arm mild tenderness to

## 2018-12-07 ENCOUNTER — OFFICE VISIT (OUTPATIENT)
Dept: PODIATRY | Age: 83
End: 2018-12-07
Payer: MEDICARE

## 2018-12-07 VITALS
SYSTOLIC BLOOD PRESSURE: 128 MMHG | BODY MASS INDEX: 24.63 KG/M2 | HEART RATE: 64 BPM | DIASTOLIC BLOOD PRESSURE: 78 MMHG | HEIGHT: 63 IN | WEIGHT: 139 LBS

## 2018-12-07 DIAGNOSIS — B35.1 DERMATOPHYTOSIS OF NAIL: ICD-10-CM

## 2018-12-07 DIAGNOSIS — I73.9 PVD (PERIPHERAL VASCULAR DISEASE) (HCC): Primary | ICD-10-CM

## 2018-12-07 PROCEDURE — 11721 DEBRIDE NAIL 6 OR MORE: CPT | Performed by: PODIATRIST

## 2018-12-07 PROCEDURE — 99999 PR OFFICE/OUTPT VISIT,PROCEDURE ONLY: CPT | Performed by: PODIATRIST

## 2018-12-07 ASSESSMENT — PATIENT HEALTH QUESTIONNAIRE - PHQ9
1. LITTLE INTEREST OR PLEASURE IN DOING THINGS: 0
2. FEELING DOWN, DEPRESSED OR HOPELESS: 0
SUM OF ALL RESPONSES TO PHQ QUESTIONS 1-9: 0
SUM OF ALL RESPONSES TO PHQ QUESTIONS 1-9: 0
SUM OF ALL RESPONSES TO PHQ9 QUESTIONS 1 & 2: 0

## 2018-12-10 RX ORDER — DONEPEZIL HYDROCHLORIDE 5 MG/1
5 TABLET, FILM COATED ORAL NIGHTLY
Qty: 90 TABLET | Refills: 3 | Status: SHIPPED | OUTPATIENT
Start: 2018-12-10 | End: 2019-10-01 | Stop reason: SDUPTHER

## 2018-12-11 ENCOUNTER — HOSPITAL ENCOUNTER (OUTPATIENT)
Dept: LAB | Age: 83
Discharge: HOME | End: 2018-12-11
Payer: MEDICARE

## 2018-12-11 DIAGNOSIS — E03.9 HYPOTHYROIDISM, UNSPECIFIED TYPE: ICD-10-CM

## 2018-12-11 DIAGNOSIS — I10 ESSENTIAL HYPERTENSION: ICD-10-CM

## 2018-12-11 DIAGNOSIS — R73.01 IMPAIRED FASTING GLUCOSE: ICD-10-CM

## 2018-12-11 LAB
ABSOLUTE EOS #: 0.1 K/UL (ref 0–0.4)
ABSOLUTE IMMATURE GRANULOCYTE: NORMAL K/UL (ref 0–0.3)
ABSOLUTE LYMPH #: 1.8 K/UL (ref 1–4.8)
ABSOLUTE MONO #: 0.6 K/UL (ref 0.1–1.2)
ANION GAP SERPL CALCULATED.3IONS-SCNC: 9 MMOL/L (ref 9–17)
BASOPHILS # BLD: 1 % (ref 0–1)
BASOPHILS ABSOLUTE: 0.1 K/UL (ref 0–0.2)
BUN BLDV-MCNC: 13 MG/DL (ref 8–23)
BUN/CREAT BLD: 18 (ref 9–20)
CALCIUM SERPL-MCNC: 9.1 MG/DL (ref 8.6–10.4)
CHLORIDE BLD-SCNC: 102 MMOL/L (ref 98–107)
CO2: 30 MMOL/L (ref 20–31)
CREAT SERPL-MCNC: 0.74 MG/DL (ref 0.5–0.9)
DIFFERENTIAL TYPE: NORMAL
EOSINOPHILS RELATIVE PERCENT: 1 % (ref 1–7)
GFR AFRICAN AMERICAN: >60 ML/MIN
GFR NON-AFRICAN AMERICAN: >60 ML/MIN
GFR SERPL CREATININE-BSD FRML MDRD: ABNORMAL ML/MIN/{1.73_M2}
GFR SERPL CREATININE-BSD FRML MDRD: ABNORMAL ML/MIN/{1.73_M2}
GLUCOSE BLD-MCNC: 122 MG/DL (ref 70–99)
HCT VFR BLD CALC: 42 % (ref 36–46)
HEMOGLOBIN: 13.5 G/DL (ref 12–16)
IMMATURE GRANULOCYTES: NORMAL %
LYMPHOCYTES # BLD: 24 % (ref 16–46)
MCH RBC QN AUTO: 31 PG (ref 26–34)
MCHC RBC AUTO-ENTMCNC: 32.3 G/DL (ref 31–37)
MCV RBC AUTO: 96.1 FL (ref 80–100)
MONOCYTES # BLD: 8 % (ref 4–11)
NRBC AUTOMATED: NORMAL PER 100 WBC
PDW BLD-RTO: 14.3 % (ref 11–14.5)
PLATELET # BLD: 253 K/UL (ref 140–450)
PLATELET ESTIMATE: NORMAL
PMV BLD AUTO: 8.7 FL (ref 6–12)
POTASSIUM SERPL-SCNC: 4.2 MMOL/L (ref 3.7–5.3)
RBC # BLD: 4.37 M/UL (ref 4–5.2)
RBC # BLD: NORMAL 10*6/UL
SEG NEUTROPHILS: 66 % (ref 43–77)
SEGMENTED NEUTROPHILS ABSOLUTE COUNT: 4.8 K/UL (ref 1.8–7.7)
SODIUM BLD-SCNC: 141 MMOL/L (ref 135–144)
TSH SERPL DL<=0.05 MIU/L-ACNC: 39.48 MIU/L (ref 0.3–5)
WBC # BLD: 7.3 K/UL (ref 3.5–11)
WBC # BLD: NORMAL 10*3/UL

## 2018-12-11 PROCEDURE — 36415 COLL VENOUS BLD VENIPUNCTURE: CPT

## 2018-12-11 PROCEDURE — 85025 COMPLETE CBC W/AUTO DIFF WBC: CPT

## 2018-12-11 PROCEDURE — 84443 ASSAY THYROID STIM HORMONE: CPT

## 2018-12-11 PROCEDURE — 80048 BASIC METABOLIC PNL TOTAL CA: CPT

## 2018-12-14 ENCOUNTER — TELEPHONE (OUTPATIENT)
Dept: INTERNAL MEDICINE | Age: 83
End: 2018-12-14

## 2018-12-14 NOTE — TELEPHONE ENCOUNTER
Lopez Phelps. (patient's son) phoned in reporting that his daughter has been diagnosed with borderline personality disorder. He thinks this might help you with his parents. Rajendra Almeida requested a call from Dr. Cathren Angelucci to further discuss.

## 2018-12-18 ENCOUNTER — OFFICE VISIT (OUTPATIENT)
Dept: INTERNAL MEDICINE | Age: 83
End: 2018-12-18
Payer: MEDICARE

## 2018-12-18 VITALS
DIASTOLIC BLOOD PRESSURE: 62 MMHG | HEIGHT: 63 IN | BODY MASS INDEX: 24.8 KG/M2 | WEIGHT: 140 LBS | HEART RATE: 64 BPM | SYSTOLIC BLOOD PRESSURE: 130 MMHG

## 2018-12-18 DIAGNOSIS — C44.719 BASAL CELL CARCINOMA (BCC) OF LEFT LOWER LEG: ICD-10-CM

## 2018-12-18 DIAGNOSIS — R73.01 IMPAIRED FASTING GLUCOSE: ICD-10-CM

## 2018-12-18 DIAGNOSIS — D36.9 ADENOMATOUS POLYP: ICD-10-CM

## 2018-12-18 DIAGNOSIS — I10 ESSENTIAL HYPERTENSION: ICD-10-CM

## 2018-12-18 DIAGNOSIS — G30.1 LATE ONSET ALZHEIMER'S DISEASE WITHOUT BEHAVIORAL DISTURBANCE (HCC): ICD-10-CM

## 2018-12-18 DIAGNOSIS — R25.1 TREMOR: ICD-10-CM

## 2018-12-18 DIAGNOSIS — E78.5 DYSLIPIDEMIA: ICD-10-CM

## 2018-12-18 DIAGNOSIS — D03.59 MELANOMA IN SITU OF TORSO EXCLUDING BREAST (HCC): ICD-10-CM

## 2018-12-18 DIAGNOSIS — M85.80 OSTEOPENIA, UNSPECIFIED LOCATION: ICD-10-CM

## 2018-12-18 DIAGNOSIS — F02.80 LATE ONSET ALZHEIMER'S DISEASE WITHOUT BEHAVIORAL DISTURBANCE (HCC): ICD-10-CM

## 2018-12-18 DIAGNOSIS — E03.9 HYPOTHYROIDISM, UNSPECIFIED TYPE: Primary | ICD-10-CM

## 2018-12-18 PROCEDURE — 99214 OFFICE O/P EST MOD 30 MIN: CPT | Performed by: INTERNAL MEDICINE

## 2018-12-18 RX ORDER — ATENOLOL 25 MG/1
25 TABLET ORAL DAILY
Qty: 90 TABLET | Refills: 3 | Status: SHIPPED | OUTPATIENT
Start: 2018-12-18 | End: 2019-11-25 | Stop reason: SDUPTHER

## 2018-12-18 ASSESSMENT — PATIENT HEALTH QUESTIONNAIRE - PHQ9
1. LITTLE INTEREST OR PLEASURE IN DOING THINGS: 0
SUM OF ALL RESPONSES TO PHQ QUESTIONS 1-9: 1
SUM OF ALL RESPONSES TO PHQ9 QUESTIONS 1 & 2: 1
2. FEELING DOWN, DEPRESSED OR HOPELESS: 1
SUM OF ALL RESPONSES TO PHQ QUESTIONS 1-9: 1

## 2018-12-23 PROBLEM — R25.1 TREMOR: Status: ACTIVE | Noted: 2018-12-23

## 2018-12-27 NOTE — PROGRESS NOTES
Arm   Position: Sitting   Cuff Size: Large Adult   Pulse: 64   Weight: 140 lb (63.5 kg)   Height: 5' 3.25\" (1.607 m)        Vitals Reviewed. Body mass index is 24.6 kg/m². Wt Readings from Last 3 Encounters:   12/18/18 140 lb (63.5 kg)   12/07/18 139 lb (63 kg)   11/09/18 138 lb 6.4 oz (62.8 kg)     BP Readings from Last 3 Encounters:   12/18/18 130/62   12/07/18 128/78   11/09/18 132/80     Physical Examination:  Constitutional:  She appears well-developed and well-nourished. No distress. HENT:  Head: Normocephalic and atraumatic. Right Ear:  External ear normal.  Left Ear:  External ear normal.  Nose:  Nose normal.  Mouth/Throat:  Oropharynx is clear and moist.  Eyes: Conjunctivae and EOM are normal.  Pupils are equal, round, and reactive to light. Right eye exhibits no discharge. Left eye exhibits no discharge. No scleral icterus. Neck:  Normal range of motion. Neck supple. No JVD present. No tracheal deviation present. No thyromegaly present. No carotid bruit. Cardiovascular:  Regular rate and rhythm. Normal heart sounds, and intact distal pulses. Exam reveals no gallop. Pulmonary/Chest:  Effort normal and breath sounds normal.  No respiratory distress. She has no wheezes. She has no rales. Abdominal:  Soft. Normal aorta and bowel sounds are normal.  She exhibits no distension and no mass. There is no hepatosplenomegaly. There is no tenderness. There is no rebound and no guarding. Musculoskeletal:  She exhibits no edema or tenderness. Lymphadenopathy:    She has no cervical adenopathy. Neurological:  She is alert. She has normal strength. She displays normal reflexes. No cranial nerve deficit or sensory deficit. She exhibits normal muscle tone. Skin:  Skin is warm and dry. On examination of the skin on the left leg there is a 12 mm rolled raised lesion consistent with basal cell carcinoma. No rash noted. She is not diaphoretic. No pallor.    Psychiatric:  She has a normal mood Dispense:  90 tablet     Refill:  3        Carmelo GREGORIO, am personally transcribing for Issa Wilson MD 12/27/2018 at 10:38 AM.    Issa GREGORIO MD, personally performed the services described in this document as transcribed by Carmelo Dietrich, and it is both accurate and complete.     Electronically signed by Issa Wilsno MD 12/27/2018 at 11:22 AM.

## 2019-01-03 RX ORDER — LATANOPROST 50 UG/ML
1 SOLUTION/ DROPS OPHTHALMIC NIGHTLY
Qty: 1 BOTTLE | Refills: 0 | Status: SHIPPED | OUTPATIENT
Start: 2019-01-03 | End: 2019-02-26 | Stop reason: SDUPTHER

## 2019-01-17 ENCOUNTER — HOSPITAL ENCOUNTER (OUTPATIENT)
Dept: LAB | Age: 84
Discharge: HOME | End: 2019-01-17
Payer: MEDICARE

## 2019-01-17 DIAGNOSIS — E03.9 HYPOTHYROIDISM, UNSPECIFIED TYPE: ICD-10-CM

## 2019-01-17 LAB — TSH SERPL DL<=0.05 MIU/L-ACNC: 2.62 MIU/L (ref 0.3–5)

## 2019-01-17 PROCEDURE — 84443 ASSAY THYROID STIM HORMONE: CPT

## 2019-01-17 PROCEDURE — 36415 COLL VENOUS BLD VENIPUNCTURE: CPT

## 2019-02-17 DIAGNOSIS — I10 ESSENTIAL HYPERTENSION: ICD-10-CM

## 2019-02-18 RX ORDER — LATANOPROST 50 UG/ML
1 SOLUTION/ DROPS OPHTHALMIC NIGHTLY
Qty: 3 BOTTLE | Refills: 3 | Status: SHIPPED | OUTPATIENT
Start: 2019-02-18 | End: 2020-01-22 | Stop reason: SDUPTHER

## 2019-02-18 RX ORDER — LOSARTAN POTASSIUM 100 MG/1
TABLET ORAL
Qty: 90 TABLET | Refills: 3 | Status: SHIPPED | OUTPATIENT
Start: 2019-02-18 | End: 2020-02-18 | Stop reason: SDUPTHER

## 2019-02-22 ENCOUNTER — OFFICE VISIT (OUTPATIENT)
Dept: PODIATRY | Age: 84
End: 2019-02-22
Payer: MEDICARE

## 2019-02-22 VITALS
BODY MASS INDEX: 24.59 KG/M2 | HEIGHT: 63 IN | DIASTOLIC BLOOD PRESSURE: 74 MMHG | SYSTOLIC BLOOD PRESSURE: 122 MMHG | HEART RATE: 72 BPM | WEIGHT: 138.8 LBS

## 2019-02-22 DIAGNOSIS — I73.9 PVD (PERIPHERAL VASCULAR DISEASE) (HCC): Primary | ICD-10-CM

## 2019-02-22 DIAGNOSIS — B35.1 DERMATOPHYTOSIS OF NAIL: ICD-10-CM

## 2019-02-22 PROCEDURE — 11721 DEBRIDE NAIL 6 OR MORE: CPT | Performed by: PODIATRIST

## 2019-02-22 PROCEDURE — 99999 PR OFFICE/OUTPT VISIT,PROCEDURE ONLY: CPT | Performed by: PODIATRIST

## 2019-02-26 ENCOUNTER — OFFICE VISIT (OUTPATIENT)
Dept: INTERNAL MEDICINE | Age: 84
End: 2019-02-26
Payer: MEDICARE

## 2019-02-26 VITALS
HEIGHT: 63 IN | HEART RATE: 64 BPM | BODY MASS INDEX: 24.45 KG/M2 | DIASTOLIC BLOOD PRESSURE: 60 MMHG | SYSTOLIC BLOOD PRESSURE: 136 MMHG | WEIGHT: 138 LBS

## 2019-02-26 DIAGNOSIS — Z78.0 MENOPAUSE: ICD-10-CM

## 2019-02-26 DIAGNOSIS — D36.9 ADENOMATOUS POLYP: ICD-10-CM

## 2019-02-26 DIAGNOSIS — E03.9 HYPOTHYROIDISM, UNSPECIFIED TYPE: ICD-10-CM

## 2019-02-26 DIAGNOSIS — D03.59 MELANOMA IN SITU OF TORSO EXCLUDING BREAST (HCC): ICD-10-CM

## 2019-02-26 DIAGNOSIS — R25.1 TREMOR: ICD-10-CM

## 2019-02-26 DIAGNOSIS — E78.5 DYSLIPIDEMIA: ICD-10-CM

## 2019-02-26 DIAGNOSIS — R45.4 DIFFICULTY CONTROLLING ANGER: Primary | ICD-10-CM

## 2019-02-26 DIAGNOSIS — G30.1 LATE ONSET ALZHEIMER'S DISEASE WITHOUT BEHAVIORAL DISTURBANCE (HCC): ICD-10-CM

## 2019-02-26 DIAGNOSIS — I10 ESSENTIAL HYPERTENSION: ICD-10-CM

## 2019-02-26 DIAGNOSIS — F02.80 LATE ONSET ALZHEIMER'S DISEASE WITHOUT BEHAVIORAL DISTURBANCE (HCC): ICD-10-CM

## 2019-02-26 DIAGNOSIS — M85.80 OSTEOPENIA, UNSPECIFIED LOCATION: ICD-10-CM

## 2019-02-26 DIAGNOSIS — R73.01 IMPAIRED FASTING GLUCOSE: ICD-10-CM

## 2019-02-26 PROCEDURE — 99214 OFFICE O/P EST MOD 30 MIN: CPT | Performed by: INTERNAL MEDICINE

## 2019-02-26 RX ORDER — SERTRALINE HYDROCHLORIDE 25 MG/1
25 TABLET, FILM COATED ORAL DAILY
Qty: 30 TABLET | Refills: 3 | Status: SHIPPED | OUTPATIENT
Start: 2019-02-26 | End: 2019-04-11

## 2019-02-26 ASSESSMENT — PATIENT HEALTH QUESTIONNAIRE - PHQ9
1. LITTLE INTEREST OR PLEASURE IN DOING THINGS: 1
SUM OF ALL RESPONSES TO PHQ QUESTIONS 1-9: 2
SUM OF ALL RESPONSES TO PHQ9 QUESTIONS 1 & 2: 2
SUM OF ALL RESPONSES TO PHQ QUESTIONS 1-9: 2
2. FEELING DOWN, DEPRESSED OR HOPELESS: 1

## 2019-03-04 ENCOUNTER — TELEPHONE (OUTPATIENT)
Dept: INTERNAL MEDICINE | Age: 84
End: 2019-03-04

## 2019-03-08 ENCOUNTER — TELEPHONE (OUTPATIENT)
Dept: OPHTHALMOLOGY | Age: 84
End: 2019-03-08

## 2019-03-10 DIAGNOSIS — I10 ESSENTIAL HYPERTENSION: ICD-10-CM

## 2019-03-11 RX ORDER — AMLODIPINE BESYLATE 2.5 MG/1
TABLET ORAL
Qty: 90 TABLET | Refills: 3 | Status: SHIPPED | OUTPATIENT
Start: 2019-03-11 | End: 2020-03-10 | Stop reason: SDUPTHER

## 2019-03-11 RX ORDER — LATANOPROST 50 UG/ML
1 SOLUTION/ DROPS OPHTHALMIC NIGHTLY
Qty: 1 BOTTLE | Refills: 0 | Status: CANCELLED | OUTPATIENT
Start: 2019-03-11

## 2019-03-14 DIAGNOSIS — I10 ESSENTIAL HYPERTENSION: Primary | ICD-10-CM

## 2019-03-15 RX ORDER — SODIUM CHLORIDE 9 MG/ML
INJECTION, SOLUTION INTRAVENOUS CONTINUOUS
Status: CANCELLED | OUTPATIENT
Start: 2019-03-20

## 2019-03-15 RX ORDER — SODIUM CHLORIDE 0.9 % (FLUSH) 0.9 %
10 SYRINGE (ML) INJECTION PRN
Status: CANCELLED | OUTPATIENT
Start: 2019-03-20

## 2019-03-15 RX ORDER — SODIUM CHLORIDE 0.9 % (FLUSH) 0.9 %
5 SYRINGE (ML) INJECTION PRN
Status: CANCELLED | OUTPATIENT
Start: 2019-03-20

## 2019-03-15 RX ORDER — ZOLEDRONIC ACID 5 MG/100ML
5 INJECTION, SOLUTION INTRAVENOUS ONCE
Status: CANCELLED | OUTPATIENT
Start: 2019-03-20 | End: 2019-03-20

## 2019-03-15 RX ORDER — SODIUM CHLORIDE 9 MG/ML
INJECTION, SOLUTION INTRAVENOUS ONCE
Status: CANCELLED | OUTPATIENT
Start: 2019-03-20 | End: 2019-03-20

## 2019-03-15 RX ORDER — DIPHENHYDRAMINE HYDROCHLORIDE 50 MG/ML
50 INJECTION INTRAMUSCULAR; INTRAVENOUS ONCE
Status: CANCELLED | OUTPATIENT
Start: 2019-03-20 | End: 2019-03-20

## 2019-03-15 RX ORDER — EPINEPHRINE 1 MG/ML
0.3 INJECTION, SOLUTION, CONCENTRATE INTRAVENOUS PRN
Status: CANCELLED | OUTPATIENT
Start: 2019-03-20

## 2019-03-15 RX ORDER — 0.9 % SODIUM CHLORIDE 0.9 %
10 VIAL (ML) INJECTION ONCE
Status: CANCELLED | OUTPATIENT
Start: 2019-03-20 | End: 2019-03-20

## 2019-03-15 RX ORDER — HEPARIN SODIUM (PORCINE) LOCK FLUSH IV SOLN 100 UNIT/ML 100 UNIT/ML
500 SOLUTION INTRAVENOUS PRN
Status: CANCELLED | OUTPATIENT
Start: 2019-03-20

## 2019-03-15 RX ORDER — METHYLPREDNISOLONE SODIUM SUCCINATE 125 MG/2ML
125 INJECTION, POWDER, LYOPHILIZED, FOR SOLUTION INTRAMUSCULAR; INTRAVENOUS ONCE
Status: CANCELLED | OUTPATIENT
Start: 2019-03-20 | End: 2019-03-20

## 2019-03-20 ENCOUNTER — HOSPITAL ENCOUNTER (OUTPATIENT)
Dept: INFUSION THERAPY | Age: 84
Discharge: HOME | End: 2019-03-20
Payer: MEDICARE

## 2019-03-20 VITALS
SYSTOLIC BLOOD PRESSURE: 138 MMHG | TEMPERATURE: 98 F | HEART RATE: 64 BPM | RESPIRATION RATE: 16 BRPM | OXYGEN SATURATION: 94 % | DIASTOLIC BLOOD PRESSURE: 50 MMHG

## 2019-03-20 DIAGNOSIS — M85.80 OSTEOPENIA, UNSPECIFIED LOCATION: Primary | ICD-10-CM

## 2019-03-20 LAB
ANION GAP SERPL CALCULATED.3IONS-SCNC: 9 MMOL/L (ref 9–17)
BUN BLDV-MCNC: 13 MG/DL (ref 8–23)
BUN/CREAT BLD: 21 (ref 9–20)
CALCIUM SERPL-MCNC: 8.9 MG/DL (ref 8.6–10.4)
CHLORIDE BLD-SCNC: 100 MMOL/L (ref 98–107)
CO2: 31 MMOL/L (ref 20–31)
CREAT SERPL-MCNC: 0.61 MG/DL (ref 0.5–0.9)
GFR AFRICAN AMERICAN: >60 ML/MIN
GFR NON-AFRICAN AMERICAN: >60 ML/MIN
GFR SERPL CREATININE-BSD FRML MDRD: ABNORMAL ML/MIN/{1.73_M2}
GFR SERPL CREATININE-BSD FRML MDRD: ABNORMAL ML/MIN/{1.73_M2}
GLUCOSE BLD-MCNC: 147 MG/DL (ref 70–99)
POTASSIUM SERPL-SCNC: 4.1 MMOL/L (ref 3.7–5.3)
SODIUM BLD-SCNC: 140 MMOL/L (ref 135–144)

## 2019-03-20 PROCEDURE — 2580000003 HC RX 258: Performed by: INTERNAL MEDICINE

## 2019-03-20 PROCEDURE — 80048 BASIC METABOLIC PNL TOTAL CA: CPT

## 2019-03-20 PROCEDURE — 6360000002 HC RX W HCPCS: Performed by: INTERNAL MEDICINE

## 2019-03-20 PROCEDURE — 96365 THER/PROPH/DIAG IV INF INIT: CPT

## 2019-03-20 RX ORDER — SODIUM CHLORIDE 9 MG/ML
INJECTION, SOLUTION INTRAVENOUS ONCE
Status: COMPLETED | OUTPATIENT
Start: 2019-03-20 | End: 2019-03-20

## 2019-03-20 RX ORDER — SODIUM CHLORIDE 9 MG/ML
INJECTION, SOLUTION INTRAVENOUS CONTINUOUS
Status: CANCELLED | OUTPATIENT
Start: 2019-03-20

## 2019-03-20 RX ORDER — 0.9 % SODIUM CHLORIDE 0.9 %
10 VIAL (ML) INJECTION ONCE
Status: CANCELLED | OUTPATIENT
Start: 2019-03-20 | End: 2019-03-20

## 2019-03-20 RX ORDER — EPINEPHRINE 1 MG/ML
0.3 INJECTION, SOLUTION, CONCENTRATE INTRAVENOUS PRN
Status: CANCELLED | OUTPATIENT
Start: 2019-03-20

## 2019-03-20 RX ORDER — HEPARIN SODIUM (PORCINE) LOCK FLUSH IV SOLN 100 UNIT/ML 100 UNIT/ML
500 SOLUTION INTRAVENOUS PRN
Status: CANCELLED | OUTPATIENT
Start: 2019-03-20

## 2019-03-20 RX ORDER — SODIUM CHLORIDE 0.9 % (FLUSH) 0.9 %
5 SYRINGE (ML) INJECTION PRN
Status: CANCELLED | OUTPATIENT
Start: 2019-03-20

## 2019-03-20 RX ORDER — METHYLPREDNISOLONE SODIUM SUCCINATE 125 MG/2ML
125 INJECTION, POWDER, LYOPHILIZED, FOR SOLUTION INTRAMUSCULAR; INTRAVENOUS ONCE
Status: CANCELLED | OUTPATIENT
Start: 2019-03-20 | End: 2019-03-20

## 2019-03-20 RX ORDER — DIPHENHYDRAMINE HYDROCHLORIDE 50 MG/ML
50 INJECTION INTRAMUSCULAR; INTRAVENOUS ONCE
Status: CANCELLED | OUTPATIENT
Start: 2019-03-20 | End: 2019-03-20

## 2019-03-20 RX ORDER — ZOLEDRONIC ACID 5 MG/100ML
5 INJECTION, SOLUTION INTRAVENOUS ONCE
Status: COMPLETED | OUTPATIENT
Start: 2019-03-20 | End: 2019-03-20

## 2019-03-20 RX ORDER — ZOLEDRONIC ACID 5 MG/100ML
5 INJECTION, SOLUTION INTRAVENOUS ONCE
Status: CANCELLED | OUTPATIENT
Start: 2019-03-20 | End: 2019-03-20

## 2019-03-20 RX ORDER — SODIUM CHLORIDE 9 MG/ML
INJECTION, SOLUTION INTRAVENOUS ONCE
Status: CANCELLED | OUTPATIENT
Start: 2019-03-20 | End: 2019-03-20

## 2019-03-20 RX ORDER — SODIUM CHLORIDE 0.9 % (FLUSH) 0.9 %
10 SYRINGE (ML) INJECTION PRN
Status: CANCELLED | OUTPATIENT
Start: 2019-03-20

## 2019-03-20 RX ADMIN — SODIUM CHLORIDE: 9 INJECTION, SOLUTION INTRAVENOUS at 12:56

## 2019-03-20 RX ADMIN — ZOLEDRONIC ACID 5 MG: 0.05 INJECTION, SOLUTION INTRAVENOUS at 13:40

## 2019-03-20 NOTE — PROGRESS NOTES
Reclast infused and d/c'd.  IV flushed with NSS. Patient denies any complaints. IV d/c'd, coban to site. Patient discharged to home.

## 2019-03-20 NOTE — PROGRESS NOTES
Patient on unit for reclast infusion. IV started in right antecubital BMP drawn. Patient eports taking Calcium with VIt D and not going to destist d/t lack of teeth encouraged if sore in mouth to bring to her primary care physician immediately.

## 2019-04-11 ENCOUNTER — OFFICE VISIT (OUTPATIENT)
Dept: INTERNAL MEDICINE | Age: 84
End: 2019-04-11
Payer: MEDICARE

## 2019-04-11 VITALS
HEIGHT: 63 IN | DIASTOLIC BLOOD PRESSURE: 70 MMHG | HEART RATE: 64 BPM | BODY MASS INDEX: 24.27 KG/M2 | SYSTOLIC BLOOD PRESSURE: 136 MMHG | WEIGHT: 137 LBS

## 2019-04-11 DIAGNOSIS — G30.1 LATE ONSET ALZHEIMER'S DISEASE WITHOUT BEHAVIORAL DISTURBANCE (HCC): ICD-10-CM

## 2019-04-11 DIAGNOSIS — R25.1 TREMOR: ICD-10-CM

## 2019-04-11 DIAGNOSIS — D36.9 ADENOMATOUS POLYP: ICD-10-CM

## 2019-04-11 DIAGNOSIS — R45.4 DIFFICULTY CONTROLLING ANGER: Primary | ICD-10-CM

## 2019-04-11 DIAGNOSIS — E03.9 HYPOTHYROIDISM, UNSPECIFIED TYPE: ICD-10-CM

## 2019-04-11 DIAGNOSIS — F02.80 LATE ONSET ALZHEIMER'S DISEASE WITHOUT BEHAVIORAL DISTURBANCE (HCC): ICD-10-CM

## 2019-04-11 DIAGNOSIS — D03.59 MELANOMA IN SITU OF TORSO EXCLUDING BREAST (HCC): ICD-10-CM

## 2019-04-11 DIAGNOSIS — M85.80 OSTEOPENIA, UNSPECIFIED LOCATION: ICD-10-CM

## 2019-04-11 DIAGNOSIS — I10 ESSENTIAL HYPERTENSION: ICD-10-CM

## 2019-04-11 DIAGNOSIS — E78.5 DYSLIPIDEMIA: ICD-10-CM

## 2019-04-11 DIAGNOSIS — R73.01 IMPAIRED FASTING GLUCOSE: ICD-10-CM

## 2019-04-11 PROCEDURE — 99214 OFFICE O/P EST MOD 30 MIN: CPT | Performed by: INTERNAL MEDICINE

## 2019-04-11 NOTE — PROGRESS NOTES
Noe Del Toro received counseling on the following healthy behaviors: exercise  Reviewed prior labs and health maintenance  Continue current medications except where noted below, diet and exercise. Discussed use, benefit, and side effects of prescribed medications. Barriers to medication compliance addressed. Patient given educational materials - see patient instructions  Was a self-tracking handout given in paper form or via [employer]? Yes    Requested Prescriptions      No prescriptions requested or ordered in this encounter       All patient questions answered. Patient voiced understanding. Quality Measures    Body mass index is 24.08 kg/m². Normal. Weight control planned discussed: healthy diet and regular exercise. BP: 136/70. Blood pressure is normal. Treatment plan consists of: see progress note below. Fall Risk 7/23/2018 6/13/2017 6/2/2016 6/26/2015 6/4/2014   2 or more falls in past year? no no no no no   Fall with injury in past year? no yes no no no     The patient does not have a history of falls. I did not - not indicated , complete a risk assessment for falls.  A plan of care for falls No Treatment plan indicated    Lab Results   Component Value Date    LDLCHOLESTEROL 93 11/28/2016    (goal LDL reduction with dx if diabetes is 50% LDL reduction)    PHQ Scores 2/26/2019 12/18/2018 12/7/2018 7/23/2018 6/13/2017 6/2/2016 3/1/2016   PHQ2 Score 2 1 0 0 0 0 0   PHQ9 Score 2 1 0 0 0 0 0     Interpretation of Total Score Depression Severity: 1-4 = Minimal depression, 5-9 = Mild depression, 10-14 = Moderate depression, 15-19 = Moderately severe depression, 20-27 = Severe depression

## 2019-04-11 NOTE — PATIENT INSTRUCTIONS

## 2019-04-15 NOTE — PROGRESS NOTES
Kingsley Peralta  YOB: 1930    Date of Service:  4/11/2019      HPI:  Andrew Leon was seen in the internal medicine office today for:  Chief Complaint  Patient presents with  · Anger control difficulties. · Memory  · tremor  · and continued evaluation and management of chronic medical problems. We addressed the following new, acute or uncontrolled/unstable issues:    1. She has not been very impressed with the Zoloft yet. She has been on that about 6 weeks. We talked about making a bump in the medication. I think it is too early to call this an unsuccessful treatment trial in that we have not tried it for an adequate period of time or an adequate dosage. I would recommend bumping up to 50 and she is actually pretty receptive to that. She has concerns about her , but she definitely recognizes that she has had trouble with her patience and she tends to have outbreaks of anger which will pass fairly quickly but she does not like that she cannot control that. 2.  The other main concern today is this tremor. We had talked about this before in October 2018. What she really notices is trouble with her writing. We had tried some atenolol. She is very disinclined to try any other medication. We could pursue further evaluation specifically with Neurology for this. This does not really look like Parkinson's, more of an intention tremor or specific tremor with her writing possibly. She thinks that this is progressing, however. We addressed the following chronic issues:    · We talked about her memory and she does not want to consider a change in medicine but she has been taking the Aricept she tells me. · She does not want to consider a lipid-lowering medicine. · Thyroid Disease  - She has been doing fine with her thyroid medication and takes it quite regularly. · Osteopenia  - Doing OK with the prescription medication, vit D, and Calcium.   Working on [employer] bearing exercise. · Impaired fasting glucose  - We discussed diet and activity level as it relates to blood sugars and she is working on this. · Colon Polyp  - She has not had any rectal bleeding or change in BMs to suggest any trouble with her history of colon polyp. Review of Systems:  Constitutional:  Negative for chills, fever, and weight loss. HENT:  Negative for congestion, ear pain, and sore throat. Eyes:  Negative for blurred vision, double vision, pain and discharge. Respiratory:  Negative for cough, shortness of breath, and wheezing. Cardiovascular:  Negative for chest pain, palpitations, and PND. Gastrointestinal:  Negative for abdominal pain, blood in stool, constipation, diarrhea, nausea and vomiting. Genitourinary:  Negative for dysuria, frequency, and hematuria. Musculoskeletal:  Negative for myalgias. Skin:  Negative for rash. Neurological:  Negative for tingling, sensory change, speech change, focal weakness, seizures, and headaches. Positive for tremor. Endo/Heme/Allergies:  Does not bruise/bleed easily. Psychiatric/Behavioral:  Negative for hallucinations and suicidal ideas. Positive for anger control problems.     Patient Active Problem List   Diagnosis    Dyslipidemia    Osteopenia    Impaired fasting glucose    Adenomatous polyp    Syncope and collapse    Essential hypertension    Hypothyroidism    Sciatica    GERD (gastroesophageal reflux disease)    Visual disturbance    Malignant melanoma in situ ([facility] [address]    Difficulty controlling anger    Primary open angle glaucoma (POAG) of both eyes, moderate stage    PVD (posterior vitreous detachment), both eyes    Combined forms of age-related cataract of both eyes    Dementia    Tremor       Medications:    Current Outpatient Medications   Medication Sig Dispense Refill    sertraline (ZOLOFT) 50 MG tablet Take 1 tablet by mouth daily 30 tablet 5    amLODIPine (NORVASC) 2.5 MG tablet TAKE 1 TABLET DAILY 90 tablet 3  losartan (COZAAR) 100 MG tablet TAKE 1 TABLET DAILY 90 tablet 3    latanoprost (XALATAN) 0.005 % ophthalmic solution Place 1 drop into both eyes nightly 3 Bottle 3    atenolol (TENORMIN) 25 MG tablet Take 1 tablet by mouth daily 90 tablet 3    donepezil (ARICEPT) 5 MG tablet Take 1 tablet by mouth nightly 90 tablet 3    levothyroxine (SYNTHROID) 137 MCG tablet TAKE 1 TABLET DAILY 90 tablet 3    calcium-vitamin D (OSCAL 500/200 D-3) 500-200 MG-UNIT per tablet Take 1 tablet by mouth daily       Multiple Vitamin (DAILY MULTIVITAMIN PO) Take 1 tablet by mouth daily       aspirin 81 MG tablet Take 81 mg by mouth daily. No current facility-administered medications for this visit. Past Medical History:        Diagnosis Date    Adenomatous polyp 06/10    With recommendation for repeat 06/15. Also, diverticulosis was noted.  Cataracts, bilateral     Corneal scar, right eye     Cystocele     history of     Dementia     Mini-Mental Status exam 27/30  6/14  declines meds at this point,  MMSE 29/30 on June 13, 2017  MMSE 26/30 4/2018    Dyslipidemia     GERD (gastroesophageal reflux disease)     egd  4/15 ok    Goiter     benign, history of     Hiatal hernia     Hypertension     Hypokalemia     Hypothyroidism     Impaired fasting glucose     Malignant melanoma in situ ([facility] [address]     R upper Back 8/17    Migraines     Murmur, functional     Grade 1/6 systolic, history of     Osteopenia     T -1.0 hip, 03/09, T -0.3 spine, 03/09. 1) Repeat DEXA scan 09/12 with T +0.15, T -1.2 at the hip but -1.8 at the mean femoral neck giving her a FRAX score of 4.3 at the hip. Reclast 10/13 and given 2014,2015, and 1/4/2017, on calcium and vit d,  Redo Dexa 10/14 Frax 4.2% , Frax 4.8% 10 yr hip fracture risk on Dexa 1/17  On Reclast    Posterior vitreous detachment of both eyes     Skin cancer     History of multiple skin cancers.  Following with Dr. 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 Social History     Tobacco Use   Smoking Status Never Smoker   Smokeless Tobacco Never Used     Social History     Substance and Sexual Activity   Alcohol Use Yes    Alcohol/week: 0.0 oz    Comment: Infrequently. Vitals:    04/11/19 1002   BP: 136/70   Site: Left Upper Arm   Position: Sitting   Cuff Size: Small Adult   Pulse: 64   Weight: 137 lb (62.1 kg)   Height: 5' 3.25\" (1.607 m)        Vitals Reviewed. Body mass index is 24.08 kg/m². Wt Readings from Last 3 Encounters:   04/11/19 137 lb (62.1 kg)   02/26/19 138 lb (62.6 kg)   02/22/19 138 lb 12.8 oz (63 kg)     BP Readings from Last 3 Encounters:   04/11/19 136/70   03/20/19 (!) 138/50   02/26/19 136/60     Physical Examination:  Constitutional:  She appears well-developed and well-nourished. No distress. HENT:  Head: Normocephalic and atraumatic. Right Ear:  External ear normal.  Left Ear:  External ear normal.  Nose:  Nose normal.  Mouth/Throat:  Oropharynx is clear and moist.  Eyes: Conjunctivae and EOM are normal.  Pupils are equal, round, and reactive to light. Right eye exhibits no discharge. Left eye exhibits no discharge. No scleral icterus. Neck:  Normal range of motion. Neck supple. No JVD present. No tracheal deviation present. No thyromegaly present. No carotid bruit. Cardiovascular:  Regular rate and rhythm. Normal heart sounds, and intact distal pulses. Exam reveals no gallop. Pulmonary/Chest:  Effort normal and breath sounds normal.  No respiratory distress. She has no wheezes. She has no rales. Abdominal:  Soft. Normal aorta and bowel sounds are normal.  She exhibits no distension and no mass. There is no hepatosplenomegaly. There is no tenderness. There is no rebound and no guarding.   Musculoskeletal:  She exhibits no edema or

## 2019-04-24 ENCOUNTER — PROCEDURE VISIT (OUTPATIENT)
Dept: OPHTHALMOLOGY | Age: 84
End: 2019-04-24
Payer: MEDICARE

## 2019-04-24 DIAGNOSIS — H40.1132 PRIMARY OPEN ANGLE GLAUCOMA (POAG) OF BOTH EYES, MODERATE STAGE: Primary | ICD-10-CM

## 2019-04-24 PROCEDURE — 92083 EXTENDED VISUAL FIELD XM: CPT | Performed by: OPHTHALMOLOGY

## 2019-05-02 ENCOUNTER — OFFICE VISIT (OUTPATIENT)
Dept: OPHTHALMOLOGY | Age: 84
End: 2019-05-02
Payer: MEDICARE

## 2019-05-02 DIAGNOSIS — H40.1132 PRIMARY OPEN ANGLE GLAUCOMA (POAG) OF BOTH EYES, MODERATE STAGE: Primary | ICD-10-CM

## 2019-05-02 PROCEDURE — 92020 GONIOSCOPY: CPT | Performed by: OPHTHALMOLOGY

## 2019-05-02 PROCEDURE — 99213 OFFICE O/P EST LOW 20 MIN: CPT | Performed by: OPHTHALMOLOGY

## 2019-05-02 ASSESSMENT — GONIOSCOPY
OS_TEMPORAL: 2
OD_NASAL: 3
OD_SUPERIOR: 2
OD_TEMPORAL: 2
OS_NASAL: 3
OD_INFERIOR: 3
OS_INFERIOR: 3

## 2019-05-02 ASSESSMENT — VISUAL ACUITY
OS_PH_CC: 20/25
OS_PH_CC+: 2
METHOD: SNELLEN - LINEAR
CORRECTION_TYPE: GLASSES
OS_CC: 20/30
OD_PH_CC: 20/30

## 2019-05-02 ASSESSMENT — SLIT LAMP EXAM - LIDS
COMMENTS: MILD BLEPHARITIS. MILD MGD
COMMENTS: MILD BLEPHARITIS. MILD MGD

## 2019-05-02 ASSESSMENT — CONF VISUAL FIELD
OS_NORMAL: 1
OD_NORMAL: 1

## 2019-05-02 ASSESSMENT — PACHYMETRY
OS_CT(UM): 511
OD_CT(UM): 507

## 2019-05-02 ASSESSMENT — TONOMETRY
OS_IOP_MMHG: 15
IOP_METHOD: NON-CONTACT AIR PUFF
OD_IOP_MMHG: 14

## 2019-05-02 NOTE — PROGRESS NOTES
Stiven dennison 80 y.o. female here for a complete eye exam.      Chief Complaint   Patient presents with    Glaucoma       HPI     Glaucoma     In both eyes. Comments     Patient is here for a 6 month follow up from 10/01/18 for glaucoma. Patient had a visual field done on 04/24/19. GlaucomaLatanoprost OU QHS, last drop was last night. Fundus photos completed today. Review of Systems      Main Ophthalmology Exam     Slit Lamp Exam       Right Left    Lids/Lashes Mild Blepharitis. Mild MGD Mild Blepharitis. Mild MGD    Conjunctiva/Sclera Clear and white Clear and white    Cornea Clear Clear    Anterior Chamber VH 2 Temporal, no cell, no flare VH 2 Temporal, no cell, no flare    Iris Round and reactive Round and reactive    Lens 2+ Nuclear sclerosis, 2+ Cortical cataract 2+ Nuclear sclerosis, 2+ Cortical cataract                   Tonometry     Tonometry (Non-contact air puff, 11:13 AM)       Right Left    Pressure 14 15              Visual Acuity     Visual Acuity (Snellen - Linear)       Right Left    Dist cc 20/40 20/30    Dist ph cc 20/30 20/25 2    Correction:  Glasses               Not recorded         Confrontational Visual Fields     Visual Fields       Right Left     Full Full               Extraocular Movement     Extraocular Movement       Right Left     Full, Ortho Full, Ortho               Not recorded          Past Medical History:   Diagnosis Date    Adenomatous polyp 06/10    With recommendation for repeat 06/15. Also, diverticulosis was noted.      Cataracts, bilateral     Corneal scar, right eye     Cystocele     history of     Dementia     Mini-Mental Status exam 27/30  6/14  declines meds at this point,  MMSE 29/30 on June 13, 2017  MMSE 26/30 4/2018    Dyslipidemia     GERD (gastroesophageal reflux disease)     egd  4/15 ok    Goiter     benign, history of     Hiatal hernia     Hypertension     Hypokalemia     Hypothyroidism     Impaired fasting glucose     Malignant melanoma in situ (HCC)     R upper Back 8/17    Migraines     Murmur, functional     Grade 1/6 systolic, history of     Osteopenia     T -1.0 hip, 03/09, T -0.3 spine, 03/09. 1) Repeat DEXA scan 09/12 with T +0.15, T -1.2 at the hip but -1.8 at the mean femoral neck giving her a FRAX score of 4.3 at the hip. Reclast 10/13 and given 2014,2015, and 1/4/2017, on calcium and vit d,  Redo Dexa 10/14 Frax 4.2% , Frax 4.8% 10 yr hip fracture risk on Dexa 1/17  On Reclast    Posterior vitreous detachment of both eyes     Skin cancer     History of multiple skin cancers. Following with Dr. 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           Current Outpatient Medications:     sertraline (ZOLOFT) 50 MG tablet, Take 1 tablet by mouth daily, Disp: 30 tablet, Rfl: 5    amLODIPine (NORVASC) 2.5 MG tablet, TAKE 1 TABLET DAILY, Disp: 90 tablet, Rfl: 3    losartan (COZAAR) 100 MG tablet, TAKE 1 TABLET DAILY, Disp: 90 tablet, Rfl: 3    latanoprost (XALATAN) 0.005 % ophthalmic solution, Place 1 drop into both eyes nightly, Disp: 3 Bottle, Rfl: 3    atenolol (TENORMIN) 25 MG tablet, Take 1 tablet by mouth daily, Disp: 90 tablet, Rfl: 3    donepezil (ARICEPT) 5 MG tablet, Take 1 tablet by mouth nightly, Disp: 90 tablet, Rfl: 3    levothyroxine (SYNTHROID) 137 MCG tablet, TAKE 1 TABLET DAILY, Disp: 90 tablet, Rfl: 3    calcium-vitamin D (OSCAL 500/200 D-3) 500-200 MG-UNIT per tablet, Take 1 tablet by mouth daily , Disp: , Rfl:     Multiple Vitamin (DAILY MULTIVITAMIN PO), Take 1 tablet by mouth daily , Disp: , Rfl:     aspirin 81 MG tablet, Take 81 mg by mouth daily. , Disp: , Rfl:      No Known Allergies     IMPRESSION:  1. Primary open angle glaucoma (POAG) of both eyes, moderate stage        PLAN:    1.  Primary open angle glaucoma (POAG) of both eyes, moderate stage    Gonioscopy demonstrates mild narrowing of angles OU,  however, IOP stable OU and pt states that she is not  interested in cataract surgery at this time.     Pt is tolerating Latanoprost OU QHS.    CPM.    Electronically signed by Krysta Valentino MD on 5/2/2019 at 11:34 AM

## 2019-05-08 ENCOUNTER — OFFICE VISIT (OUTPATIENT)
Dept: PODIATRY | Age: 84
End: 2019-05-08
Payer: MEDICARE

## 2019-05-08 VITALS
WEIGHT: 139 LBS | BODY MASS INDEX: 24.63 KG/M2 | SYSTOLIC BLOOD PRESSURE: 132 MMHG | HEART RATE: 70 BPM | DIASTOLIC BLOOD PRESSURE: 74 MMHG | HEIGHT: 63 IN

## 2019-05-08 DIAGNOSIS — L84 CORNS AND CALLOSITIES: ICD-10-CM

## 2019-05-08 DIAGNOSIS — B35.1 DERMATOPHYTOSIS OF NAIL: ICD-10-CM

## 2019-05-08 DIAGNOSIS — I73.9 PVD (PERIPHERAL VASCULAR DISEASE) (HCC): Primary | ICD-10-CM

## 2019-05-08 PROCEDURE — 99999 PR OFFICE/OUTPT VISIT,PROCEDURE ONLY: CPT | Performed by: PODIATRIST

## 2019-05-08 PROCEDURE — 11055 PARING/CUTG B9 HYPRKER LES 1: CPT | Performed by: PODIATRIST

## 2019-05-08 PROCEDURE — 11721 DEBRIDE NAIL 6 OR MORE: CPT | Performed by: PODIATRIST

## 2019-05-08 NOTE — PROGRESS NOTES
Foot Care Worksheet  PCP: Anabell Allan MD  Last visit: 4/11/19    Nail description:  Thick , Yellow , Crumbly , Marked limitation of ambulation     Pain resulting from thickened and dystrophy of nail plate Yes    Nails involved  Right   1, 2, 3, 4, 5  (T5-T9)  Left     1, 2, 3, 4, 5  (TA-T4)    Q7 1 Class A Finding - Non traumatic amputation of foot No    Q8 2 Class B Findings - Absent DP pulse No, Absent PT pulse No, Advanced tropic changes (3 required) Yes    Decrease hair growth Yes, Nail changes/thickening Yes, Pigmented changes/discoloration No, Skin texture (thin, shiny) No, Skin color (rubor/redness) No    Q9 1 Class B and 2 Class C Findings  Claudication No, Temperature change Yes, Paresthesia No, Burning No, Edema Yes

## 2019-05-08 NOTE — PROGRESS NOTES
Subjective:  Arian Leung is a 80 y.o. female who presents to the office today for routine foot care. Currently denies F/C/N/V. No Known Allergies    Past Medical History:   Diagnosis Date    Adenomatous polyp 06/10    With recommendation for repeat 06/15. Also, diverticulosis was noted.  Cataracts, bilateral     Corneal scar, right eye     Cystocele     history of     Dementia     Mini-Mental Status exam 27/30  6/14  declines meds at this point,  MMSE 29/30 on June 13, 2017  MMSE 26/30 4/2018    Dyslipidemia     GERD (gastroesophageal reflux disease)     egd  4/15 ok    Goiter     benign, history of     Hiatal hernia     Hypertension     Hypokalemia     Hypothyroidism     Impaired fasting glucose     Malignant melanoma in situ ([address]     R upper Back 8/17    Migraines     Murmur, functional     Grade 1/6 systolic, history of     Osteopenia     T -1.0 hip, 03/09, T -0.3 spine, 03/09. 1) Repeat DEXA scan 09/12 with T +0.15, T -1.2 at the hip but -1.8 at the mean femoral neck giving her a FRAX score of 4.3 at the hip. Reclast 10/13 and given 2014,2015, and 1/4/2017, on calcium and vit d,  Redo Dexa 10/14 Frax 4.2% , Frax 4.8% 10 yr hip fracture risk on Dexa 1/17  On Reclast    Posterior vitreous detachment of both eyes     Skin cancer     History of multiple skin cancers. Following with Dr. 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  Authorizing Provider   sertraline (ZOLOFT) 50 MG tablet Take 1 tablet by mouth daily 4/11/19  Yes Ewelina Tanner MD   amLODIPine (NORVASC) 2.5 MG tablet TAKE 1 TABLET DAILY 3/11/19  Yes Ewelina Tanner MD   losartan (COZAAR) 100 MG tablet TAKE 1 TABLET DAILY 2/18/19  Yes Ewelina Tanner MD   latanoprost (XALATAN) 0.005 % ophthalmic solution Place 1 drop into both eyes nightly 2/18/19  Yes Dayo Carcamo MD atenolol (TENORMIN) 25 MG tablet Take 1 tablet by mouth daily 12/18/18  Yes Tami Rosales MD   donepezil (ARICEPT) 5 MG tablet Take 1 tablet by mouth nightly 12/10/18  Yes Tami Rosales MD   levothyroxine (SYNTHROID) 137 MCG tablet TAKE 1 TABLET DAILY 7/19/18  Yes Taim Rosales MD   calcium-vitamin D (OSCAL 500/200 D-3) 500-200 MG-UNIT per tablet Take 1 tablet by mouth daily    Yes Historical Provider, MD   Multiple Vitamin (DAILY MULTIVITAMIN PO) Take 1 tablet by mouth daily    Yes Historical Provider, MD   aspirin 81 MG tablet Take 81 mg by mouth daily. Yes Historical Provider, MD       Past Surgical History:   Procedure Laterality Date    APPENDECTOMY      CHOLECYSTECTOMY      Remote.  COLONOSCOPY  06/08/10    COLONOSCOPY  07/18/02    CYSTOCELE REPAIR  02/12/99    Vaginal prolapse, cystocele plus pelvic relaxation.  DILATION AND CURETTAGE OF UTERUS      with hysteroscopy   Mary Lou    still has ovaries     MOHS SURGERY  10/07/09    SCC, left nasal sidewall.  OTHER SURGICAL HISTORY  11/11/08    Anterior repair.  OTHER SURGICAL HISTORY  1988    laser cone    UPPER GASTROINTESTINAL ENDOSCOPY  4/8/2015    hiatal hernia    VEIN SURGERY  06/08/09    Laser ablation of right greater saphenous vein. Family History   Problem Relation Age of Onset    Coronary Art Dis Father     Colon Cancer Brother     Diabetes Neg Hx        Social History     Tobacco Use    Smoking status: Never Smoker    Smokeless tobacco: Never Used   Substance Use Topics    Alcohol use: Yes     Alcohol/week: 0.0 oz     Comment: Infrequently. Review of Systems: All 12 systems reviewed and pertinent positives noted above. Lower Extremity Physical Examination:     Vitals:   Vitals:    05/08/19 1018   BP: 132/74   Pulse: 70     General: AAO x 3 in NAD.      Vascular: DP and PT pulses palpable 2/4, bilateral.  CFT <3 seconds, bilateral.  Hair growth present to the level of the digits, bilateral.  Edema absent, bilateral.  Varicosities absent, bilateral. Erythema absent, bilateral. Distal Rubor absent bilateral.  Temperature within normal limits bilateral. Hyperpigmentation absent bilateral. No atrophic skin. Neurological: Sensation intact to light touch to level of digits, bilateral.  Protective sensation intact 10/10 sites via 5.07/10g Colora-Mary Monofilament, bilateral.  negative Tinel's, bilateral.  negative Valleix sign, bilateral.  Vibratory intact bilateral.  Reflexes Decreased bilateral.  Paresthesias negative. Dysthesias negative. Sharp/dull intact bilateral.    Musculoskeletal: Muscle strength 5/5, bilateral.  Pain present upon palpation L hallux nail. Normal medial longitudinal arch, bilateral.  Ankle ROM within normal limits,bilateral.  1st MPJ ROM within normal limits, bilateral.  Dorsally contracted digits present. No other foot deformities. Integument: Warm, dry, supple, Bilateral.  Open lesion absent, Bilateral.  Interdigital maceration absent to web spaces absent, Bilateral.  Nails left 1, 2,3, 4,5 and right 1,2,3,4,5 thickened, dystrophic and crumbly, discolored with subungual debris. Fissures absent, Bilateral. Hyperkeratotic tissue is present. Seen sub R 5th met head    Asessment: Patient is a 80 y.o. female with:    Diagnosis Orders   1. PVD (peripheral vascular disease) ([facility])  KY DEBRIDEMENT OF NAILS, 6 OR MORE    KY TRIM HYPERKERATOTIC SKIN LESION, ONE   2. Dermatophytosis of nail  KY DEBRIDEMENT OF NAILS, 6 OR MORE   3. Corns and callosities  KY TRIM HYPERKERATOTIC SKIN LESION, ONE       Plan: Patient examined and evaluated. Current condition and treatment options discussed in detail. All nails as mentioned above debrided in length and thickness. Paring of 1 benign hyperkeratotic lesions (as listed above) took place with #10 blade or tissue nippers. Patient advised OTC methods for treatment of the mycotic nails. Patient will follow up in 10 weeks.

## 2019-05-17 ENCOUNTER — TELEPHONE (OUTPATIENT)
Dept: INTERNAL MEDICINE | Age: 84
End: 2019-05-17

## 2019-05-17 ENCOUNTER — OFFICE VISIT (OUTPATIENT)
Dept: INTERNAL MEDICINE | Age: 84
End: 2019-05-17
Payer: MEDICARE

## 2019-05-17 VITALS
DIASTOLIC BLOOD PRESSURE: 80 MMHG | HEIGHT: 63 IN | SYSTOLIC BLOOD PRESSURE: 128 MMHG | HEART RATE: 60 BPM | WEIGHT: 142 LBS | BODY MASS INDEX: 25.16 KG/M2

## 2019-05-17 DIAGNOSIS — R73.01 IMPAIRED FASTING GLUCOSE: ICD-10-CM

## 2019-05-17 DIAGNOSIS — D36.9 ADENOMATOUS POLYP: ICD-10-CM

## 2019-05-17 DIAGNOSIS — F02.80 LATE ONSET ALZHEIMER'S DISEASE WITHOUT BEHAVIORAL DISTURBANCE (HCC): ICD-10-CM

## 2019-05-17 DIAGNOSIS — E78.5 DYSLIPIDEMIA: ICD-10-CM

## 2019-05-17 DIAGNOSIS — I10 ESSENTIAL HYPERTENSION: ICD-10-CM

## 2019-05-17 DIAGNOSIS — G30.1 LATE ONSET ALZHEIMER'S DISEASE WITHOUT BEHAVIORAL DISTURBANCE (HCC): ICD-10-CM

## 2019-05-17 DIAGNOSIS — D03.59 MELANOMA IN SITU OF TORSO EXCLUDING BREAST (HCC): ICD-10-CM

## 2019-05-17 DIAGNOSIS — E03.9 HYPOTHYROIDISM, UNSPECIFIED TYPE: ICD-10-CM

## 2019-05-17 DIAGNOSIS — R45.4 DIFFICULTY CONTROLLING ANGER: Primary | ICD-10-CM

## 2019-05-17 DIAGNOSIS — M85.80 OSTEOPENIA, UNSPECIFIED LOCATION: ICD-10-CM

## 2019-05-17 DIAGNOSIS — R25.1 TREMOR: ICD-10-CM

## 2019-05-17 PROCEDURE — 99214 OFFICE O/P EST MOD 30 MIN: CPT | Performed by: INTERNAL MEDICINE

## 2019-05-17 RX ORDER — SERTRALINE HYDROCHLORIDE 100 MG/1
100 TABLET, FILM COATED ORAL DAILY
Qty: 30 TABLET | Refills: 5 | Status: SHIPPED | OUTPATIENT
Start: 2019-05-17 | End: 2019-10-01 | Stop reason: ALTCHOICE

## 2019-05-17 NOTE — PROGRESS NOTES
Vic Castro received counseling on the following healthy behaviors: nutrition, exercise and medication adherence  Reviewed prior labs and health maintenance  Continue current medications except where noted below, diet and exercise. Discussed use, benefit, and side effects of prescribed medications. Barriers to medication compliance addressed. Patient given educational materials - see patient instructions  Was a self-tracking handout given in paper form or via [employer]? Yes    Requested Prescriptions      No prescriptions requested or ordered in this encounter       All patient questions answered. Patient voiced understanding. Quality Measures    Body mass index is 24.96 kg/m². Normal. Weight control planned discussed: healthy diet and regular exercise. BP: 128/80. Blood pressure is normal. Treatment plan consists of: see progress note below. Fall Risk 7/23/2018 6/13/2017 6/2/2016 6/26/2015 6/4/2014   2 or more falls in past year? no no no no no   Fall with injury in past year? no yes no no no     The patient does not have a history of falls. I did , complete a risk assessment for falls.  A plan of care for falls No Treatment plan indicated    Lab Results   Component Value Date    LDLCHOLESTEROL 93 11/28/2016    (goal LDL reduction with dx if diabetes is 50% LDL reduction)    PHQ Scores 2/26/2019 12/18/2018 12/7/2018 7/23/2018 6/13/2017 6/2/2016 3/1/2016   PHQ2 Score 2 1 0 0 0 0 0   PHQ9 Score 2 1 0 0 0 0 0     Interpretation of Total Score Depression Severity: 1-4 = Minimal depression, 5-9 = Mild depression, 10-14 = Moderate depression, 15-19 = Moderately severe depression, 20-27 = Severe depression

## 2019-05-17 NOTE — TELEPHONE ENCOUNTER
Patient would like to establish care with Dr. Hadley Shields. She is a prior patient of Dr. Azucena Barboza. He would like her follow up to be with in 1-2 months, due to medication change of her Zoloft. Please call her to schedule at [phone].

## 2019-05-20 ENCOUNTER — TELEPHONE (OUTPATIENT)
Dept: INTERNAL MEDICINE | Age: 84
End: 2019-05-20

## 2019-05-20 NOTE — TELEPHONE ENCOUNTER
Called patient to schedule DEXA. Patient states she was scheduled for a dexa but cancelled the appointmnet. She does not want to reschedule it.

## 2019-05-21 NOTE — PROGRESS NOTES
Noemy Jackson  YOB: 1930    Date of Service:  5/17/2019      HPI:  Jeanne Mishra was seen in the internal medicine office today for:  Chief Complaint  Patient presents with  · Anger control. · Memory problem. · Blood pressure problem. · Tremor. · Thyroid problem. · and continued evaluation and management of chronic medical problems. We addressed the following new, acute or uncontrolled/unstable issues:    1. The anger control difficulty really is the biggest issue. The Zoloft she is not sure how well that it is working. She is not sure it has really made a lot of difference. We talked about an increased dose of Zoloft. We talked about a change to Prozac as an alternate. After discussion we decided to try a somewhat higher dosage prior to making a change. She made a trip to [place], [place] and that went well and she thought she interacted with the kids well and then one of her children visited and that went well, but she tends to have trouble with her  more than anything with regard to her anger control. We addressed the following chronic issues:    · With her memory, she really does not want to make a change in the medicines there. When we have moved her up to 10 mg on the Aricept she did poorly, but she is comfortable on the 5 mg. · Hypertension  - She is doing well with her antihypertensive meds. No chest pain, dizziness or palpitations. · With the lipids, she tries to work on her diet but does not want to consider a medication. · We talked about her tremor. Her pulse is in the high 50s. I think a higher dose of a beta-blocker is not attractive. At this point, she does not want to try any new medications for this concerned about possible side effects and I think it is reasonable. She seems to be doing pretty well with this although it affects her writing.     · Thyroid Disease  - She has been doing fine with her thyroid medication and takes it quite regularly. · Impaired fasting glucose  - We discussed diet and activity level as it relates to blood sugars and she is working on this. · Colon Polyp  - She has not had any rectal bleeding or change in BMs to suggest any trouble with her history of colon polyp. Review of Systems:  Constitutional:  Negative for chills, fever, and weight loss. HENT:  Negative for congestion, ear pain, and sore throat. Eyes:  Negative for blurred vision, double vision, pain and discharge. Respiratory:  Negative for cough, shortness of breath, and wheezing. Cardiovascular:  Negative for chest pain, palpitations, and PND. Gastrointestinal:  Negative for abdominal pain, blood in stool, constipation, diarrhea, nausea and vomiting. Genitourinary:  Negative for dysuria, frequency, and hematuria. Musculoskeletal:  Negative for myalgias. Skin:  Negative for rash. Neurological:  Negative for tingling, sensory change, speech change, focal weakness, seizures, and headaches. Positive for tremor. Positive for memory problem. Endo/Heme/Allergies:  Does not bruise/bleed easily. Psychiatric/Behavioral:  Negative for hallucinations and suicidal ideas. Positive for anger control problem.     Patient Active Problem List   Diagnosis    Dyslipidemia    Osteopenia    Impaired fasting glucose    Adenomatous polyp    Syncope and collapse    Essential hypertension    Hypothyroidism    Sciatica    GERD (gastroesophageal reflux disease)    Visual disturbance    Malignant melanoma in situ ([facility] [address]    Difficulty controlling anger    Primary open angle glaucoma (POAG) of both eyes, moderate stage    PVD (posterior vitreous detachment), both eyes    Combined forms of age-related cataract of both eyes    Dementia    Tremor       Medications:    Current Outpatient Medications   Medication Sig Dispense Refill    sertraline (ZOLOFT) 100 MG tablet Take 1 tablet by mouth daily 30 tablet 5    amLODIPine (NORVASC) 2.5 MG exhibits no edema or tenderness. Lymphadenopathy:    She has no cervical adenopathy. Neurological:  She is alert. She has normal strength. She displays normal reflexes. No cranial nerve deficit or sensory deficit. She exhibits normal muscle tone. Skin:  Skin is warm and dry. No rash noted. She is not diaphoretic. No pallor. Psychiatric:  She has a normal mood and affect. Her behavior is normal.  Judgment normal.       Lab Results   Component Value Date    K 4.1 03/20/2019    CREATININE 0.61 03/20/2019    TSH 2.62 01/17/2019    HCT 42.0 12/11/2018    LABA1C 5.7 01/21/2015    GLUCOSE 147 03/20/2019    MG 1.7 01/22/2015    CALCIUM 8.9 03/20/2019    GVAJTSEP09 362 06/04/2014      Lab Results   Component Value Date    CHOL 207 11/28/2016    TRIG 135 11/28/2016    HDL 87 11/28/2016    LDLCHOLESTEROL 93 11/28/2016     Labs reviewed and discussed with the patient? Yes    Assessment/Plan:    1. Difficulty controlling anger  Go ahead and increase Zoloft to 100 mg by mouth daily. Followup in one month or two. The plan to establish with Dr. Mari Salcido. If the Zoloft at the higher dose is not helpful for her we could consider a change to Prozac. 2. Late onset Alzheimer's disease without behavioral disturbance  Stable on the lower dose Aricept. She did not do well on the 10 mg dosage. She would prefer not to have another medicine at this point. 3. Essential hypertension  Well controlled on current regimen. No change. 4. Dyslipidemia  She declines lipid-lowering medicine. Trying to work on diet. 5. Tremor  Predominantly she is bothered by the tremor with writing. She wants to hold off on neurology consultation. She will continue the low dose of atenolol. 6. Melanoma in situ of torso excluding breast [facility])  Following with Dermatology. Stable. 7. Hypothyroidism, unspecified type  On Synthroid. TSH has looked good recently. Continue to monitor. No change today.     8. Osteopenia, unspecified location  She had the Reclast in March. We will set up repeat DEXA scan. 9. Impaired fasting glucose  Stable. Continue to monitor. 10. Adenomatous polyp  Stable. Continue to monitor. Declines repeat colonoscopy. She will call if any problems prior to return. Orders Placed This Encounter   Medications    sertraline (ZOLOFT) 100 MG tablet     Sig: Take 1 tablet by mouth daily     Dispense:  30 tablet     Refill:  5        Scotty GREGORIO am personally transcribing for Nini Ahmadi MD 5/21/19 at 7:47 AM.    Nini GREGORIO MD, personally performed the services described in this document as transcribed by Scotty Perez, and it is both accurate and complete.     Electronically signed by Nini Ahmadi MD 5/21/2019 at 12:33 PM.

## 2019-06-28 ENCOUNTER — OFFICE VISIT (OUTPATIENT)
Dept: INTERNAL MEDICINE | Age: 84
End: 2019-06-28
Payer: MEDICARE

## 2019-06-28 VITALS
RESPIRATION RATE: 16 BRPM | WEIGHT: 139 LBS | HEIGHT: 63 IN | HEART RATE: 78 BPM | BODY MASS INDEX: 24.63 KG/M2 | DIASTOLIC BLOOD PRESSURE: 60 MMHG | SYSTOLIC BLOOD PRESSURE: 130 MMHG

## 2019-06-28 DIAGNOSIS — M85.80 OSTEOPENIA, UNSPECIFIED LOCATION: ICD-10-CM

## 2019-06-28 DIAGNOSIS — E78.2 MIXED HYPERLIPIDEMIA: ICD-10-CM

## 2019-06-28 DIAGNOSIS — R45.4 DIFFICULTY CONTROLLING ANGER: ICD-10-CM

## 2019-06-28 DIAGNOSIS — G30.1 LATE ONSET ALZHEIMER'S DISEASE WITHOUT BEHAVIORAL DISTURBANCE (HCC): ICD-10-CM

## 2019-06-28 DIAGNOSIS — I10 ESSENTIAL HYPERTENSION: ICD-10-CM

## 2019-06-28 DIAGNOSIS — E03.4 HYPOTHYROIDISM DUE TO ACQUIRED ATROPHY OF THYROID: ICD-10-CM

## 2019-06-28 DIAGNOSIS — F02.80 LATE ONSET ALZHEIMER'S DISEASE WITHOUT BEHAVIORAL DISTURBANCE (HCC): ICD-10-CM

## 2019-06-28 DIAGNOSIS — R73.01 ELEVATED FASTING GLUCOSE: Primary | ICD-10-CM

## 2019-06-28 DIAGNOSIS — H40.1132 PRIMARY OPEN ANGLE GLAUCOMA (POAG) OF BOTH EYES, MODERATE STAGE: ICD-10-CM

## 2019-06-28 PROCEDURE — 99214 OFFICE O/P EST MOD 30 MIN: CPT | Performed by: INTERNAL MEDICINE

## 2019-06-28 NOTE — PROGRESS NOTES
nocturia or urinary frequency/urgency  Musculoskeletal: positive for - joint pain  negative for - muscle pain or muscular weakness  Neurologic: positive for - memory loss  negative for - gait disturbance, impaired coordination/balance, numbness/tingling, seizures or tremors  Psychiatric: positive for - anger management issues  negative for - anxiety or depression     PHYSICAL EXAMINATION:    Wt Readings from Last 2 Encounters:   06/28/19 139 lb (63 kg)   05/17/19 142 lb (64.4 kg)       Vitals:    06/28/19 0938   BP: 130/60   Site: Right Upper Arm   Position: Sitting   Cuff Size: Medium Adult   Pulse: 78   Resp: 16   Weight: 139 lb (63 kg)   Height: 5' 3.27\" (1.607 m)     Body mass index is 24.41 kg/m². General: This is a 80 y.o.  female who is alert and oriented to person, place and time. She appears to be her stated age and does not appear to be in any acute distress. Skin: Skin color, texture, turgor normal. No rashes or lesions. HEENT/Neck: Head: Normal, normocephalic, atraumatic. Eye: Normal external eye, conjunctiva, lids cornea, TRACIE. Ears: Normal TM's bilaterally. Normal auditory canals and external ears. Non-tender. Nose: Normal external nose, mucus membranes and septum. Pharynx: Dental Hygiene adequate. Normal buccal mucosa. Normal pharynx. Neck / Thyroid: Supple, no masses, nodes, nodules or enlargement. Lungs: Normal - CTA without rales, rhonchi, or wheezing. Heart: regular rate and rhythm, S1, S2 normal, no murmur, click, rub or gallop No S3 or S4. Abdomen: Non-obese, soft, non-distended, non-tender, normal active bowel sounds, no masses palpated and no hepatosplenomegaly  Extremities: Strength is 5/5 bilaterally. Radial pulses are +2/4 bilaterally. Dorsalis pedis pulses are +2/4 bilaterally. There is no clubbing, cyanosis, or edema in any of the extremities. Neurologic: Deep tendon reflexes are 2+/4+ bilaterally.  cranial nerves II-XII are grossly intact  Osteopathic Structural Examination: Patient was examined in the seated and standing positions. There was full range of motion in the cervical, thoracic, and lumbar spines. No scoliosis. No change in thoracic kyphosis or lumbar lordosis. No increased paravertebral muscle tenderness. ASSESSMENT/PLAN:    1. Elevated fasting glucose, stable  - She will continue to watch her diet and exercise to keep her elevated fasting glucose under control.   - We will continue to monitor for the development of diabetes. - Hemoglobin A1C; Future  - Basic Metabolic Panel; Future    2. Essential hypertension, controlled  - She will continue to take her antihypertensive medication     3. Mixed hyperlipidemia, stable  - She will continue to watch her diet and exercise    4. Hypothyroidism due to acquired atrophy of thyroid, stable  - She will continue to take Synthroid    5. Difficulty controlling anger, stable  - We talked about potentially switching to Prozac like Dr. Valencia Lennox had suggested, but she would like to hold off on that for now  - We will continue the Zoloft 100 mg daily    6. Late onset Alzheimer's disease without behavioral disturbance, stable  - She will continue to take Aricept    7. Osteopenia, unspecified location, stable  - We will continue to monitor     8. Primary open angle glaucoma (POAG) of both eyes, moderate stage, stable  - She will continue to use the Xalatan eye drops  - She will continue to see Dr. Jadon Lu This Encounter   Procedures    Hemoglobin A1C     Standing Status:   Future     Standing Expiration Date:   6/27/2020    Basic Metabolic Panel     Standing Status:   Future     Standing Expiration Date:   6/27/2020       Requested Prescriptions      No prescriptions requested or ordered in this encounter       Labs as ordered above. Return in about 2 months (around 8/28/2019).         Electronically signed by Marva Jain DO on 6/28/2019 at 10:31 AM  Internal Medicine

## 2019-06-28 NOTE — PATIENT INSTRUCTIONS
Patient Education        Eating Healthy Foods: Care Instructions  Your Care Instructions    Eating healthy foods can help lower your risk for disease. Healthy food gives you energy and keeps your heart strong, your brain active, your muscles working, and your bones strong. A healthy diet includes a variety of foods from the basic food groups: grains, vegetables, fruits, milk and milk products, and meat and beans. Some people may eat more of their favorite foods from only one food group and, as a result, miss getting the nutrients they need. So, it is important to pay attention not only to what you eat but also to what you are missing from your diet. You can eat a healthy, balanced diet by making a few small changes. Follow-up care is a key part of your treatment and safety. Be sure to make and go to all appointments, and call your doctor if you are having problems. It's also a good idea to know your test results and keep a list of the medicines you take. How can you care for yourself at home? Look at what you eat  · Keep a food diary for a week or two and record everything you eat or drink. Track the number of servings you eat from each food group. · For a balanced diet every day, eat a variety of:  ? 6 or more ounce-equivalents of grains, such as cereals, breads, crackers, rice, or pasta, every day. An ounce-equivalent is 1 slice of bread, 1 cup of ready-to-eat cereal, or ½ cup of cooked rice, cooked pasta, or cooked cereal.  ? 2½ cups of vegetables, especially:  § Dark-green vegetables such as broccoli and spinach. § Orange vegetables such as carrots and sweet potatoes. § Dry beans (such as kwok and kidney beans) and peas (such as lentils). ? 2 cups of fresh, frozen, or canned fruit. A small apple or 1 banana or orange equals 1 cup. ? 3 cups of nonfat or low-fat milk, yogurt, or other milk products. ? 5½ ounces of meat and beans, such as chicken, fish, lean meat, beans, nuts, and seeds.  One egg, 1 marinara sauce instead of cream sauce. ? A vegetable wrap or grilled chicken wrap. ? Broiled or poached food instead of fried or breaded items. Make healthy choices easy  · Buy packaged, prewashed, ready-to-eat fresh vegetables and fruits, such as baby carrots, salad mixes, and chopped or shredded broccoli and cauliflower. · Buy packaged, presliced fruits, such as melon or pineapple. · Choose 100% fruit or vegetable juice instead of soda. Limit juice intake to 4 to 6 oz (½ to ¾ cup) a day. · Blend low-fat yogurt, fruit juice, and canned or frozen fruit to make a smoothie for breakfast or a snack. Where can you learn more? Go to https://[employer]Getbazza. org and sign in to your fitaborate account. Enter T201 in the V2contact box to learn more about \"Eating Healthy Foods: Care Instructions. \"     If you do not have an account, please click on the \"Sign Up Now\" link. Current as of: November 7, 2018  Content Version: 12.0  © 7066-6112 [email or web address]. Care instructions adapted under license by [facility] ([facility]). If you have questions about a medical condition or this instruction, always ask your healthcare professional. [address] any warranty or liability for your use of this information. Patient Education        Walking for Exercise: Care Instructions  Your Care Instructions    Walking is one of the easiest ways to get the exercise you need for good health. A brisk, 30-minute walk each day can help you feel better and have more energy. It can help you lower your risk of disease. Walking can help you keep your bones strong and your heart healthy. Check with your doctor before you start a walking plan if you have heart problems, other health issues, or you have not been active in a long time. Follow your doctor's instructions for safe levels of exercise. Follow-up care is a key part of your treatment and safety.  Be sure to make and go to all appointments, and call your doctor if you are having problems. It's also a good idea to know your test results and keep a list of the medicines you take. How can you care for yourself at home? Getting started  · Start slowly and set a short-term goal. For example, walk for 5 or 10 minutes every day. · Bit by bit, increase the amount you walk every day. Try for at least 30 minutes on most days of the week. You also may want to swim, bike, or do other activities. · If finding enough time is a problem, it is fine to be active in blocks of 10 minutes or more throughout your day and week. · To get the heart-healthy benefits of walking, you need to walk briskly enough to increase your heart rate and breathing, but not so fast that you cannot talk comfortably. · Wear comfortable shoes that fit well and provide good support for your feet and ankles. Staying with your plan  · After you've made walking a habit, set a longer-term goal. You may want to set a goal of walking briskly for longer or walking farther. Experts say to do 2½ hours of moderate activity a week. A faster heartbeat is what defines moderate-level activity. · To stay motivated, walk with friends, coworkers, or pets. · Use a phone shannon or pedometer to track your steps each day. Set a goal to increase your steps. Once you get there, set a higher goal. Aim for 10,000 steps a day. · If the weather keeps you from walking outside, go for walks at the mall with a friend. Local schools and churches may have indoor gyms where you can walk. Fitting a walk into your workday  · Park several blocks away from work, or get off the bus a few stops early. · Use the stairs instead of the elevator, at least for a few floors. · Suggest holding meetings with colleagues during a walk inside or outside the building. · Use the restroom that is the farthest from your desk or workstation. · Use your morning and afternoon breaks to take quick 15-minute walks.   Staying

## 2019-07-17 ENCOUNTER — OFFICE VISIT (OUTPATIENT)
Dept: PODIATRY | Age: 84
End: 2019-07-17
Payer: MEDICARE

## 2019-07-17 VITALS
BODY MASS INDEX: 24.56 KG/M2 | WEIGHT: 138.6 LBS | RESPIRATION RATE: 18 BRPM | DIASTOLIC BLOOD PRESSURE: 70 MMHG | HEIGHT: 63 IN | HEART RATE: 64 BPM | SYSTOLIC BLOOD PRESSURE: 132 MMHG

## 2019-07-17 DIAGNOSIS — B35.1 DERMATOPHYTOSIS OF NAIL: ICD-10-CM

## 2019-07-17 DIAGNOSIS — I73.9 PVD (PERIPHERAL VASCULAR DISEASE) (HCC): Primary | ICD-10-CM

## 2019-07-17 DIAGNOSIS — L84 CORNS AND CALLOSITIES: ICD-10-CM

## 2019-07-17 PROCEDURE — 11721 DEBRIDE NAIL 6 OR MORE: CPT | Performed by: PODIATRIST

## 2019-07-17 PROCEDURE — 99999 PR OFFICE/OUTPT VISIT,PROCEDURE ONLY: CPT | Performed by: PODIATRIST

## 2019-07-17 NOTE — PROGRESS NOTES
reviewed and pertinent positives noted above. Lower Extremity Physical Examination:     Vitals:   Vitals:    07/17/19 1018   BP: 132/70   Pulse: 64   Resp: 18     General: AAO x 3 in NAD. Vascular: DP and PT pulses palpable 2/4, bilateral.  CFT <3 seconds, bilateral.  Hair growth present to the level of the digits, bilateral.  Edema absent, bilateral.  Varicosities absent, bilateral. Erythema absent, bilateral. Distal Rubor absent bilateral.  Temperature within normal limits bilateral. Hyperpigmentation absent bilateral. No atrophic skin. Neurological: Sensation intact to light touch to level of digits, bilateral.  Protective sensation intact 10/10 sites via 5.07/10g [place] Monofilament, bilateral.  negative Tinel's, bilateral.  negative Valleix sign, bilateral.  Vibratory intact bilateral.  Reflexes Decreased bilateral.  Paresthesias negative. Dysthesias negative. Sharp/dull intact bilateral.    Musculoskeletal: Muscle strength 5/5, bilateral.  Pain present upon palpation L hallux nail. Normal medial longitudinal arch, bilateral.  Ankle ROM within normal limits,bilateral.  1st MPJ ROM within normal limits, bilateral.  Dorsally contracted digits present. No other foot deformities. Integument: Warm, dry, supple, Bilateral.  Open lesion absent, Bilateral.  Interdigital maceration absent to web spaces absent, Bilateral.  Nails left 1, 2,3, 4,5 and right 1,2,3,4,5 thickened, dystrophic and crumbly, discolored with subungual debris. Fissures absent, Bilateral. Hyperkeratotic tissue is absent. Seen sub R 5th met head    Asessment: Patient is a 80 y.o. female with:    Diagnosis Orders   1. PVD (peripheral vascular disease) ([facility] [address]     2. Dermatophytosis of nail     3. Corns and callosities         Plan: Patient examined and evaluated. Current condition and treatment options discussed in detail. All nails as mentioned above debrided in length and thickness.   Patient advised OTC methods for treatment of the mycotic nails. Patient will follow up in 10 weeks.

## 2019-10-01 ENCOUNTER — OFFICE VISIT (OUTPATIENT)
Dept: PODIATRY | Age: 84
End: 2019-10-01
Payer: MEDICARE

## 2019-10-01 ENCOUNTER — OFFICE VISIT (OUTPATIENT)
Dept: INTERNAL MEDICINE | Age: 84
End: 2019-10-01
Payer: MEDICARE

## 2019-10-01 VITALS
RESPIRATION RATE: 16 BRPM | HEIGHT: 63 IN | DIASTOLIC BLOOD PRESSURE: 60 MMHG | SYSTOLIC BLOOD PRESSURE: 132 MMHG | HEART RATE: 60 BPM | WEIGHT: 138 LBS | BODY MASS INDEX: 24.45 KG/M2

## 2019-10-01 VITALS
HEIGHT: 63 IN | DIASTOLIC BLOOD PRESSURE: 76 MMHG | BODY MASS INDEX: 24.59 KG/M2 | SYSTOLIC BLOOD PRESSURE: 134 MMHG | HEART RATE: 68 BPM | WEIGHT: 138.8 LBS

## 2019-10-01 DIAGNOSIS — E03.4 HYPOTHYROIDISM DUE TO ACQUIRED ATROPHY OF THYROID: ICD-10-CM

## 2019-10-01 DIAGNOSIS — I10 ESSENTIAL HYPERTENSION: ICD-10-CM

## 2019-10-01 DIAGNOSIS — R73.01 ELEVATED FASTING GLUCOSE: Primary | ICD-10-CM

## 2019-10-01 DIAGNOSIS — G30.1 LATE ONSET ALZHEIMER'S DISEASE WITHOUT BEHAVIORAL DISTURBANCE (HCC): ICD-10-CM

## 2019-10-01 DIAGNOSIS — G25.0 ESSENTIAL TREMOR: ICD-10-CM

## 2019-10-01 DIAGNOSIS — I73.9 PVD (PERIPHERAL VASCULAR DISEASE) (HCC): Primary | ICD-10-CM

## 2019-10-01 DIAGNOSIS — Z23 INFLUENZA VACCINE NEEDED: ICD-10-CM

## 2019-10-01 DIAGNOSIS — B35.1 DERMATOPHYTOSIS OF NAIL: ICD-10-CM

## 2019-10-01 DIAGNOSIS — M85.80 OSTEOPENIA, UNSPECIFIED LOCATION: ICD-10-CM

## 2019-10-01 DIAGNOSIS — R45.4 DIFFICULTY CONTROLLING ANGER: ICD-10-CM

## 2019-10-01 DIAGNOSIS — E78.2 MIXED HYPERLIPIDEMIA: ICD-10-CM

## 2019-10-01 DIAGNOSIS — F02.80 LATE ONSET ALZHEIMER'S DISEASE WITHOUT BEHAVIORAL DISTURBANCE (HCC): ICD-10-CM

## 2019-10-01 DIAGNOSIS — H40.1132 PRIMARY OPEN ANGLE GLAUCOMA (POAG) OF BOTH EYES, MODERATE STAGE: ICD-10-CM

## 2019-10-01 PROCEDURE — 99999 PR OFFICE/OUTPT VISIT,PROCEDURE ONLY: CPT | Performed by: PODIATRIST

## 2019-10-01 PROCEDURE — 99214 OFFICE O/P EST MOD 30 MIN: CPT | Performed by: INTERNAL MEDICINE

## 2019-10-01 PROCEDURE — 11721 DEBRIDE NAIL 6 OR MORE: CPT | Performed by: PODIATRIST

## 2019-10-01 PROCEDURE — 90653 IIV ADJUVANT VACCINE IM: CPT | Performed by: INTERNAL MEDICINE

## 2019-10-01 PROCEDURE — G0008 ADMIN INFLUENZA VIRUS VAC: HCPCS | Performed by: INTERNAL MEDICINE

## 2019-10-01 RX ORDER — PRIMIDONE 50 MG/1
50 TABLET ORAL NIGHTLY
Qty: 30 TABLET | Refills: 5 | Status: SHIPPED | OUTPATIENT
Start: 2019-10-01 | End: 2020-02-11 | Stop reason: SDUPTHER

## 2019-10-01 RX ORDER — FLUOXETINE HYDROCHLORIDE 20 MG/1
20 CAPSULE ORAL DAILY
Qty: 30 CAPSULE | Refills: 0 | Status: SHIPPED | OUTPATIENT
Start: 2019-10-01 | End: 2020-02-11 | Stop reason: SDUPTHER

## 2019-10-01 RX ORDER — DONEPEZIL HYDROCHLORIDE 5 MG/1
5 TABLET, FILM COATED ORAL NIGHTLY
Qty: 90 TABLET | Refills: 3 | Status: SHIPPED | OUTPATIENT
Start: 2019-10-01 | End: 2020-09-08

## 2019-10-01 RX ORDER — OYSTER SHELL CALCIUM WITH VITAMIN D 500; 200 MG/1; [IU]/1
1 TABLET, FILM COATED ORAL DAILY
Qty: 90 TABLET | Refills: 3 | Status: SHIPPED | OUTPATIENT
Start: 2019-10-01 | End: 2021-07-23 | Stop reason: SDUPTHER

## 2019-10-21 ENCOUNTER — TELEPHONE (OUTPATIENT)
Dept: INTERNAL MEDICINE | Age: 84
End: 2019-10-21

## 2019-10-21 ENCOUNTER — IMMUNIZATION (OUTPATIENT)
Dept: LAB | Age: 84
End: 2019-10-21
Payer: MEDICARE

## 2019-10-21 PROCEDURE — 90653 IIV ADJUVANT VACCINE IM: CPT | Performed by: INTERNAL MEDICINE

## 2019-10-21 PROCEDURE — G0008 ADMIN INFLUENZA VIRUS VAC: HCPCS | Performed by: INTERNAL MEDICINE

## 2019-11-04 ENCOUNTER — OFFICE VISIT (OUTPATIENT)
Dept: OPHTHALMOLOGY | Age: 84
End: 2019-11-04
Payer: MEDICARE

## 2019-11-04 DIAGNOSIS — H43.813 PVD (POSTERIOR VITREOUS DETACHMENT), BOTH EYES: ICD-10-CM

## 2019-11-04 DIAGNOSIS — H25.813 COMBINED FORMS OF AGE-RELATED CATARACT OF BOTH EYES: ICD-10-CM

## 2019-11-04 DIAGNOSIS — H40.1132 PRIMARY OPEN ANGLE GLAUCOMA (POAG) OF BOTH EYES, MODERATE STAGE: Primary | ICD-10-CM

## 2019-11-04 PROCEDURE — 92250 FUNDUS PHOTOGRAPHY W/I&R: CPT | Performed by: OPHTHALMOLOGY

## 2019-11-04 PROCEDURE — 99213 OFFICE O/P EST LOW 20 MIN: CPT | Performed by: OPHTHALMOLOGY

## 2019-11-04 ASSESSMENT — ENCOUNTER SYMPTOMS
GASTROINTESTINAL NEGATIVE: 0
EYES NEGATIVE: 0
RESPIRATORY NEGATIVE: 0
ALLERGIC/IMMUNOLOGIC NEGATIVE: 0

## 2019-11-04 ASSESSMENT — CONF VISUAL FIELD
OD_NORMAL: 1
OS_NORMAL: 1

## 2019-11-04 ASSESSMENT — PACHYMETRY
OD_CT(UM): 507
OS_CT(UM): 511

## 2019-11-04 ASSESSMENT — SLIT LAMP EXAM - LIDS
COMMENTS: MILD BLEPHARITIS. MILD MGD
COMMENTS: MILD BLEPHARITIS. MILD MGD

## 2019-11-04 ASSESSMENT — TONOMETRY
OS_IOP_MMHG: 18
OD_IOP_MMHG: 20
IOP_METHOD: NON-CONTACT AIR PUFF

## 2019-11-04 ASSESSMENT — VISUAL ACUITY
METHOD: SNELLEN - LINEAR
OS_PH_CC: 20/30
OS_CC: 20/40
CORRECTION_TYPE: GLASSES
OD_PH_CC: 20/30

## 2019-11-25 ENCOUNTER — OFFICE VISIT (OUTPATIENT)
Dept: PRIMARY CARE CLINIC | Age: 84
End: 2019-11-25
Payer: MEDICARE

## 2019-11-25 ENCOUNTER — HOSPITAL ENCOUNTER (OUTPATIENT)
Age: 84
Setting detail: SPECIMEN
Discharge: HOME | End: 2019-11-25
Payer: MEDICARE

## 2019-11-25 VITALS
WEIGHT: 135.6 LBS | SYSTOLIC BLOOD PRESSURE: 128 MMHG | HEART RATE: 72 BPM | BODY MASS INDEX: 23.15 KG/M2 | OXYGEN SATURATION: 97 % | HEIGHT: 64 IN | DIASTOLIC BLOOD PRESSURE: 76 MMHG | TEMPERATURE: 99.5 F

## 2019-11-25 DIAGNOSIS — R41.82 ALTERED MENTAL STATUS, UNSPECIFIED ALTERED MENTAL STATUS TYPE: ICD-10-CM

## 2019-11-25 DIAGNOSIS — N30.01 ACUTE CYSTITIS WITH HEMATURIA: Primary | ICD-10-CM

## 2019-11-25 LAB
-: ABNORMAL
AMORPHOUS: ABNORMAL
BACTERIA: ABNORMAL
BILIRUBIN URINE: NEGATIVE
CASTS UA: ABNORMAL /LPF (ref 0–2)
COLOR: ABNORMAL
COMMENT UA: ABNORMAL
CRYSTALS, UA: ABNORMAL /HPF
EPITHELIAL CELLS UA: ABNORMAL /HPF (ref 0–5)
GLUCOSE URINE: NEGATIVE
KETONES, URINE: NEGATIVE
LEUKOCYTE ESTERASE, URINE: ABNORMAL
MUCUS: ABNORMAL
NITRITE, URINE: POSITIVE
OTHER OBSERVATIONS UA: ABNORMAL
PH UA: 6 (ref 5–6)
PROTEIN UA: ABNORMAL
RBC UA: ABNORMAL /HPF (ref 0–4)
RENAL EPITHELIAL, UA: ABNORMAL /HPF
SPECIFIC GRAVITY UA: 1.03 (ref 1.01–1.02)
TRICHOMONAS: ABNORMAL
TURBIDITY: ABNORMAL
URINE HGB: ABNORMAL
UROBILINOGEN, URINE: NORMAL
WBC UA: >50 /HPF (ref 0–4)
YEAST: ABNORMAL

## 2019-11-25 PROCEDURE — 99213 OFFICE O/P EST LOW 20 MIN: CPT | Performed by: NURSE PRACTITIONER

## 2019-11-25 PROCEDURE — 81001 URINALYSIS AUTO W/SCOPE: CPT

## 2019-11-25 PROCEDURE — 87086 URINE CULTURE/COLONY COUNT: CPT

## 2019-11-25 PROCEDURE — 87186 SC STD MICRODIL/AGAR DIL: CPT

## 2019-11-25 PROCEDURE — 87077 CULTURE AEROBIC IDENTIFY: CPT

## 2019-11-25 RX ORDER — CIPROFLOXACIN 500 MG/1
500 TABLET, FILM COATED ORAL 2 TIMES DAILY
Qty: 10 TABLET | Refills: 0 | Status: SHIPPED | OUTPATIENT
Start: 2019-11-25 | End: 2019-11-30

## 2019-11-25 RX ORDER — ATENOLOL 25 MG/1
25 TABLET ORAL DAILY
Qty: 90 TABLET | Refills: 3 | Status: SHIPPED | OUTPATIENT
Start: 2019-11-25 | End: 2020-08-19

## 2019-11-25 ASSESSMENT — ENCOUNTER SYMPTOMS
SORE THROAT: 0
NAUSEA: 0
GASTROINTESTINAL NEGATIVE: 1
ABDOMINAL PAIN: 0
RESPIRATORY NEGATIVE: 1
COUGH: 0
VOMITING: 0

## 2019-11-27 LAB
CULTURE: ABNORMAL
Lab: ABNORMAL
SPECIMEN DESCRIPTION: ABNORMAL

## 2019-12-10 ENCOUNTER — OFFICE VISIT (OUTPATIENT)
Dept: PODIATRY | Age: 84
End: 2019-12-10
Payer: MEDICARE

## 2019-12-10 VITALS
BODY MASS INDEX: 23.69 KG/M2 | HEART RATE: 68 BPM | SYSTOLIC BLOOD PRESSURE: 124 MMHG | DIASTOLIC BLOOD PRESSURE: 64 MMHG | WEIGHT: 138 LBS

## 2019-12-10 DIAGNOSIS — I70.203 ATHEROSCLEROSIS OF NATIVE ARTERY OF BOTH LOWER EXTREMITIES, WITH UNSPECIFIED PRESENCE OF CLINICAL MANIFESTATION (HCC): Primary | ICD-10-CM

## 2019-12-10 DIAGNOSIS — B35.1 DERMATOPHYTOSIS OF NAIL: ICD-10-CM

## 2019-12-10 PROCEDURE — 99999 PR OFFICE/OUTPT VISIT,PROCEDURE ONLY: CPT | Performed by: PODIATRIST

## 2019-12-10 PROCEDURE — 11721 DEBRIDE NAIL 6 OR MORE: CPT | Performed by: PODIATRIST

## 2019-12-16 ENCOUNTER — OFFICE VISIT (OUTPATIENT)
Dept: OPTOMETRY | Age: 84
End: 2019-12-16
Payer: COMMERCIAL

## 2019-12-16 DIAGNOSIS — H40.1190 PRIMARY OPEN ANGLE GLAUCOMA, UNSPECIFIED GLAUCOMA STAGE, UNSPECIFIED LATERALITY: ICD-10-CM

## 2019-12-16 DIAGNOSIS — H53.8 BLURRED VISION, BILATERAL: Primary | ICD-10-CM

## 2019-12-16 DIAGNOSIS — H25.13 NUCLEAR SCLEROSIS OF BOTH EYES: ICD-10-CM

## 2019-12-16 PROCEDURE — 99203 OFFICE O/P NEW LOW 30 MIN: CPT | Performed by: OPTOMETRIST

## 2019-12-16 ASSESSMENT — PACHYMETRY
OS_CT(UM): 511
OD_CT(UM): 507

## 2019-12-16 ASSESSMENT — REFRACTION_MANIFEST
OD_CYLINDER: -1.50
OS_SPHERE: +2.50
OD_ADD: +2.50
OS_AXIS: 086
OD_AXIS: 085
OS_ADD: +2.50
OS_CYLINDER: -2.50
OD_SPHERE: +3.50

## 2019-12-16 ASSESSMENT — VISUAL ACUITY
CORRECTION_TYPE: GLASSES
OS_CC: 20/70
METHOD: SNELLEN - LINEAR
OD_CC: 20/50 OU
OS_CC+: -1

## 2019-12-16 ASSESSMENT — REFRACTION_WEARINGRX
OS_CYLINDER: -1.25
OS_ADD: +2.50
OD_SPHERE: +2.75
OD_CYLINDER: -0.25
OS_SPHERE: +2.00
OD_ADD: +2.50
OS_AXIS: 088
OD_AXIS: 093
SPECS_TYPE: BIFOCAL

## 2019-12-16 ASSESSMENT — TONOMETRY
IOP_METHOD: NON-CONTACT AIR PUFF
OD_IOP_MMHG: 14
OS_IOP_MMHG: 13

## 2019-12-16 ASSESSMENT — SLIT LAMP EXAM - LIDS
COMMENTS: NORMAL
COMMENTS: NORMAL

## 2019-12-16 ASSESSMENT — ENCOUNTER SYMPTOMS
EYES NEGATIVE: 0
GASTROINTESTINAL NEGATIVE: 0
ALLERGIC/IMMUNOLOGIC NEGATIVE: 0
RESPIRATORY NEGATIVE: 0

## 2019-12-30 ENCOUNTER — HOSPITAL ENCOUNTER (OUTPATIENT)
Dept: LAB | Age: 84
Discharge: HOME | End: 2019-12-30
Payer: MEDICARE

## 2019-12-30 DIAGNOSIS — R73.01 ELEVATED FASTING GLUCOSE: ICD-10-CM

## 2019-12-30 LAB
ANION GAP SERPL CALCULATED.3IONS-SCNC: 12 MMOL/L (ref 9–17)
BUN BLDV-MCNC: 18 MG/DL (ref 8–23)
BUN/CREAT BLD: 25 (ref 9–20)
CALCIUM SERPL-MCNC: 8.8 MG/DL (ref 8.6–10.4)
CHLORIDE BLD-SCNC: 104 MMOL/L (ref 98–107)
CO2: 29 MMOL/L (ref 20–31)
CREAT SERPL-MCNC: 0.71 MG/DL (ref 0.5–0.9)
ESTIMATED AVERAGE GLUCOSE: 120 MG/DL
GFR AFRICAN AMERICAN: >60 ML/MIN
GFR NON-AFRICAN AMERICAN: >60 ML/MIN
GFR SERPL CREATININE-BSD FRML MDRD: ABNORMAL ML/MIN/{1.73_M2}
GFR SERPL CREATININE-BSD FRML MDRD: ABNORMAL ML/MIN/{1.73_M2}
GLUCOSE BLD-MCNC: 113 MG/DL (ref 70–99)
HBA1C MFR BLD: 5.8 % (ref 4.8–5.9)
POTASSIUM SERPL-SCNC: 3.8 MMOL/L (ref 3.7–5.3)
SODIUM BLD-SCNC: 145 MMOL/L (ref 135–144)

## 2019-12-30 PROCEDURE — 83036 HEMOGLOBIN GLYCOSYLATED A1C: CPT

## 2019-12-30 PROCEDURE — 36415 COLL VENOUS BLD VENIPUNCTURE: CPT

## 2019-12-30 PROCEDURE — 80048 BASIC METABOLIC PNL TOTAL CA: CPT

## 2020-01-02 ENCOUNTER — TELEPHONE (OUTPATIENT)
Dept: INTERNAL MEDICINE | Age: 85
End: 2020-01-02

## 2020-01-02 ENCOUNTER — OFFICE VISIT (OUTPATIENT)
Dept: INTERNAL MEDICINE | Age: 85
End: 2020-01-02
Payer: MEDICARE

## 2020-01-02 VITALS
DIASTOLIC BLOOD PRESSURE: 60 MMHG | HEIGHT: 64 IN | WEIGHT: 138 LBS | BODY MASS INDEX: 23.56 KG/M2 | HEART RATE: 60 BPM | SYSTOLIC BLOOD PRESSURE: 138 MMHG | RESPIRATION RATE: 16 BRPM

## 2020-01-02 PROCEDURE — 99214 OFFICE O/P EST MOD 30 MIN: CPT | Performed by: INTERNAL MEDICINE

## 2020-01-02 ASSESSMENT — PATIENT HEALTH QUESTIONNAIRE - PHQ9
SUM OF ALL RESPONSES TO PHQ QUESTIONS 1-9: 0
SUM OF ALL RESPONSES TO PHQ9 QUESTIONS 1 & 2: 0
SUM OF ALL RESPONSES TO PHQ QUESTIONS 1-9: 0
1. LITTLE INTEREST OR PLEASURE IN DOING THINGS: 0
2. FEELING DOWN, DEPRESSED OR HOPELESS: 0

## 2020-01-02 NOTE — PROGRESS NOTES
visit. Cataract: We will refer to ophthalmology. Reassess next visit. Tension: Blood pressure controlled today. Continue same medications. Diagnosis Orders   1. Essential tremor  CBC Auto Differential    Comprehensive Metabolic Panel   2. Hypothyroidism, unspecified type  TSH with Reflex   3. Late onset Alzheimer's disease without behavioral disturbance (HCC)  Vitamin B12    Vitamin D 25 Hydroxy   4. Vitamin D deficiency  Vitamin D 25 Hydroxy   5. Cataract, unspecified cataract type, unspecified laterality  [facility]         No follow-ups on file. Patient given educational materials - see patient instructions. Discussed use, benefit, and side effects of prescribed medications. All patient questions answered. Pt voiced understanding. Reviewed health maintenance. Electronically signed George Solis MD on 1/2/2020 at 10:31 AM      This note has been created using the Epic electronic health record, and dictated in part by [employer] dictation system. Despite the documenting physician's best efforts, there may be errors in spelling, grammar or syntax.

## 2020-01-02 NOTE — TELEPHONE ENCOUNTER
Patient was seen in office today and forgot to ask you to address a specific problem. She has hand tremors which makes it very difficult to write. She says she can barely sign her name let alone  Write anything else. States if she holds her hands out, they don't shake but seems to be when trying to write. What might you advise?   [phone]

## 2020-01-13 ENCOUNTER — TELEPHONE (OUTPATIENT)
Dept: OPTOMETRY | Age: 85
End: 2020-01-13

## 2020-01-15 ENCOUNTER — TELEPHONE (OUTPATIENT)
Dept: OPHTHALMOLOGY | Age: 85
End: 2020-01-15

## 2020-01-15 NOTE — TELEPHONE ENCOUNTER
Monday 01/13/2020 pt came in asking for specifics on her \"surgery\" on the 22 of January. I explained to her that she is not having surgery on 01/22/2020 but a cataract evaluation to determine if she is a candidate. She left confused as to why she was only having an evaluation when she remembers setting up for surgery. Then this morning she called in and said \" now I am just confirming that I have surgery 01/22/2020\". I explained that no she was just seeing the DrAncelmo To be evaluated for surgery. She explained that she clearly remembers setting up the date for surgery, on the 22nd. I explained that we spoke on Monday when she was in and that I had went through her notes at that time and explained that she was to only see Dr Debbi Harada for an evaluation and not the actual surgery. She was very confused, but said she would see us on Wednesday. I did tell her she would need to bring a  as she will be dilated.

## 2020-01-22 ENCOUNTER — OFFICE VISIT (OUTPATIENT)
Dept: OPHTHALMOLOGY | Age: 85
End: 2020-01-22
Payer: MEDICARE

## 2020-01-22 PROCEDURE — 99213 OFFICE O/P EST LOW 20 MIN: CPT | Performed by: OPHTHALMOLOGY

## 2020-01-22 PROCEDURE — 99211 OFF/OP EST MAY X REQ PHY/QHP: CPT

## 2020-01-22 RX ORDER — PHENYLEPHRINE HCL 2.5 %
1 DROPS OPHTHALMIC (EYE) ONCE
Status: COMPLETED | OUTPATIENT
Start: 2020-01-22 | End: 2020-01-22

## 2020-01-22 RX ORDER — LATANOPROST 50 UG/ML
1 SOLUTION/ DROPS OPHTHALMIC NIGHTLY
Qty: 3 BOTTLE | Refills: 3 | Status: SHIPPED | OUTPATIENT
Start: 2020-01-22 | End: 2020-06-08 | Stop reason: SDUPTHER

## 2020-01-22 RX ORDER — TROPICAMIDE 10 MG/ML
1 SOLUTION/ DROPS OPHTHALMIC ONCE
Status: COMPLETED | OUTPATIENT
Start: 2020-01-22 | End: 2020-01-22

## 2020-01-22 RX ADMIN — TROPICAMIDE 1 DROP: 10 SOLUTION/ DROPS OPHTHALMIC at 15:23

## 2020-01-22 RX ADMIN — Medication 1 DROP: at 15:23

## 2020-01-22 ASSESSMENT — CONF VISUAL FIELD
OS_NORMAL: 1
OD_NORMAL: 1

## 2020-01-22 ASSESSMENT — SLIT LAMP EXAM - LIDS
COMMENTS: 2+ DERMATOCHALASIS - UPPER LID
COMMENTS: 2+ DERMATOCHALASIS - UPPER LID

## 2020-01-22 ASSESSMENT — VISUAL ACUITY
OS_PH_CC: 20/40
CORRECTION_TYPE: GLASSES
OS_CC: 20/50
METHOD: SNELLEN - LINEAR
OS_PH_CC+: -1

## 2020-01-22 ASSESSMENT — TONOMETRY
OS_IOP_MMHG: 16
OD_IOP_MMHG: 17
IOP_METHOD: NON-CONTACT AIR PUFF

## 2020-01-22 ASSESSMENT — PACHYMETRY
OS_CT(UM): 511
OD_CT(UM): 507

## 2020-01-22 NOTE — PROGRESS NOTES
Shalonda Solomon juma 80 y.o. female here for a complete eye exam.      Chief Complaint   Patient presents with    Cataract    Vision Exam       HPI     Cataract     In both eyes. Associated symptoms include blurred vision. Affected activities include driving, night driving and reading. Comments     Pt presents today for a cataract evaluation. Pt says distance while driving is  Problem. Dr. Morales Challenger:  Reports she is getting along fine but does notice some blur especially with some road signs.                   ROS     Positive for: HENT    Negative for: Constitutional, Gastrointestinal, Neurological, Skin, Genitourinary, Musculoskeletal, Endocrine, Cardiovascular, Eyes, Respiratory, Psychiatric, Allergic/Imm, Heme/Lymph          Main Ophthalmology Exam     External Exam       Right Left    External Normal Normal          Slit Lamp Exam       Right Left    Lids/Lashes 2+ Dermatochalasis - upper lid 2+ Dermatochalasis - upper lid    Conjunctiva/Sclera White and quiet White and quiet    Cornea trauma scar centrally Clear    Anterior Chamber Deep and quiet Deep and quiet    Iris Round and reactive Round and reactive    Lens 3+ Nuclear sclerosis, Trace Cortical cataract 3+ Nuclear sclerosis, Trace Cortical cataract    Vitreous Posterior vitreous detachment Posterior vitreous detachment          Fundus Exam       Right Left    Disc Normal Normal    C/D Ratio Vertical 0.4 0.4    Macula 1-2 drusenoid changes -- no RPE mottling or heme Normal    Vessels Normal Normal    Periphery Normal Normal                   Tonometry     Tonometry (Non-contact air puff, 10:40 AM)       Right Left    Pressure 17 16              Visual Acuity     Visual Acuity (Snellen - Linear)       Right Left    Dist cc 20/40 20/50    Dist ph cc NI 20/40 -1    Correction:  Glasses              Pupils     Pupils       Pupils    Right PERRL    Left PERRL               Confrontational Visual Fields     Visual Fields       Right Left     Full Full               Extraocular Movement     Extraocular Movement       Right Left     Full Full               Not recorded          Past Medical History:   Diagnosis Date    Adenomatous polyp 06/10    With recommendation for repeat 06/15. Also, diverticulosis was noted.  Cataracts, bilateral     Combined forms of age-related cataract of both eyes 10/1/2018    Corneal scar, right eye     Cystocele     history of     Dementia ([facility] [address]     Mini-Mental Status exam 27/30 6/14  declines meds at this point,  MMSE 29/30 on June 13, 2017  MMSE 26/30 4/2018    Difficulty controlling anger 9/16/2018    Dyslipidemia     GERD (gastroesophageal reflux disease)     egd  4/15 ok    Goiter     benign, history of     Hiatal hernia     Hypertension     Hypokalemia     Hypothyroidism     Impaired fasting glucose     Malignant melanoma in situ ([facility] [place] [address]     R upper Back 8/17    Migraines     Murmur, functional     Grade 1/6 systolic, history of     Osteopenia     T -1.0 hip, 03/09, T -0.3 spine, 03/09. 1) Repeat DEXA scan 09/12 with T +0.15, T -1.2 at the hip but -1.8 at the mean femoral neck giving her a FRAX score of 4.3 at the hip. Reclast 10/13 and given 2014,2015, and 1/4/2017, on calcium and vit d,  Redo Dexa 10/14 Frax 4.2% , Frax 4.8% 10 yr hip fracture risk on Dexa 1/17  On Reclast    Posterior vitreous detachment of both eyes     Primary open angle glaucoma (POAG) of both eyes, moderate stage 10/1/2018    Sciatica 12/15/2015    Skin cancer     History of multiple skin cancers.  Following with Dr. Jesu Rodriguez--- invasive squamous cell Ca 8/17 L forearm    Syncope and collapse 1/21/2015    Tremor 12/23/2018    Trichiasis of left lower eyelid without entropion     Visual disturbance 10/26/2017          Current Outpatient Medications:     latanoprost (XALATAN) 0.005 % ophthalmic solution, Place 1 drop into both eyes nightly, Disp: 3 Bottle, Rfl: 3    atenolol (TENORMIN) 25 MG tablet, Take 1 tablet by mouth peripheral vision, areas of missing vision such as a curtain or veil over the vision. The patient verbalized understanding. All questions were answered. 3. Primary open angle glaucoma (POAG) of both eyes, moderate stage  Refilled Xalatan OU nightly.   The patient does not have glaucomatous appearing optic nerves presume had a history of high intraocular pressure      Electronically signed by Kenny Pace MD on 1/22/2020 at 11:28 AM

## 2020-01-27 ENCOUNTER — TELEPHONE (OUTPATIENT)
Dept: OPHTHALMOLOGY | Age: 85
End: 2020-01-27

## 2020-02-03 ENCOUNTER — TELEPHONE (OUTPATIENT)
Dept: INTERNAL MEDICINE | Age: 85
End: 2020-02-03

## 2020-02-03 NOTE — TELEPHONE ENCOUNTER
Patient is scheduled with you on 2/11/2020. The family (daughter)  has concerns regarding anxiety, anger that has interrupted her quality of life. They would like to keep it between you and them versus letting the patient know. She tends to get offensive/upset. Daughter would like you to know before the appointment. Feel free to call the daughter, Mane Yeh, with any questions/concerns at [phone]      Patient does have a lot of shaking with hands, concern for her. Patient is defensive about a lot of things.

## 2020-02-11 ENCOUNTER — OFFICE VISIT (OUTPATIENT)
Dept: INTERNAL MEDICINE | Age: 85
End: 2020-02-11
Payer: MEDICARE

## 2020-02-11 ENCOUNTER — TELEPHONE (OUTPATIENT)
Dept: INTERNAL MEDICINE | Age: 85
End: 2020-02-11

## 2020-02-11 VITALS
HEART RATE: 74 BPM | HEIGHT: 64 IN | BODY MASS INDEX: 23.9 KG/M2 | RESPIRATION RATE: 16 BRPM | SYSTOLIC BLOOD PRESSURE: 138 MMHG | DIASTOLIC BLOOD PRESSURE: 68 MMHG | WEIGHT: 140 LBS

## 2020-02-11 PROCEDURE — 99214 OFFICE O/P EST MOD 30 MIN: CPT | Performed by: INTERNAL MEDICINE

## 2020-02-11 PROCEDURE — 99214 OFFICE O/P EST MOD 30 MIN: CPT

## 2020-02-11 RX ORDER — PRIMIDONE 50 MG/1
50 TABLET ORAL NIGHTLY
Qty: 30 TABLET | Refills: 0 | Status: SHIPPED | OUTPATIENT
Start: 2020-02-11 | End: 2020-05-18 | Stop reason: SDUPTHER

## 2020-02-11 RX ORDER — FLUOXETINE 10 MG/1
10 CAPSULE ORAL DAILY
Qty: 90 CAPSULE | Refills: 3 | Status: SHIPPED | OUTPATIENT
Start: 2020-02-11 | End: 2020-04-13 | Stop reason: SDUPTHER

## 2020-02-11 NOTE — TELEPHONE ENCOUNTER
Patients daughter Chris Pérez called and stated that she forgot to ask about the baby aspirin. She said that [facility] mentioned that the Dr may recommend her to stop taking it. Patient and her daughter are wondering if she is still supposed to be taking her baby aspirin or if Dr wants her to stop.

## 2020-02-11 NOTE — PROGRESS NOTES
Rfl: 3    latanoprost (XALATAN) 0.005 % ophthalmic solution, Place 1 drop into both eyes nightly, Disp: 3 Bottle, Rfl: 3    calcium-vitamin D (OSCAL 500/200 D-3) 500-200 MG-UNIT per tablet, Take 1 tablet by mouth daily, Disp: 90 tablet, Rfl: 3    donepezil (ARICEPT) 5 MG tablet, Take 1 tablet by mouth nightly, Disp: 90 tablet, Rfl: 3    levothyroxine (SYNTHROID) 137 MCG tablet, TAKE 1 TABLET DAILY, Disp: 90 tablet, Rfl: 3    amLODIPine (NORVASC) 2.5 MG tablet, TAKE 1 TABLET DAILY, Disp: 90 tablet, Rfl: 3    losartan (COZAAR) 100 MG tablet, TAKE 1 TABLET DAILY, Disp: 90 tablet, Rfl: 3    Multiple Vitamin (DAILY MULTIVITAMIN PO), Take 1 tablet by mouth daily , Disp: , Rfl:     aspirin 81 MG tablet, Take 81 mg by mouth daily. , Disp: , Rfl:     The patient has No Known Allergies. Past Medical History  Isaac Segal  has a past medical history of Adenomatous polyp, Cataracts, bilateral, Combined forms of age-related cataract of both eyes, Corneal scar, right eye, Cystocele, Dementia ([address] Difficulty controlling anger, Dyslipidemia, GERD (gastroesophageal reflux disease), Goiter, Hiatal hernia, Hypertension, Hypokalemia, Hypothyroidism, Impaired fasting glucose, Malignant melanoma in situ ([address] Migraines, Murmur, functional, Osteopenia, Posterior vitreous detachment of both eyes, Primary open angle glaucoma (POAG) of both eyes, moderate stage, Sciatica, Skin cancer, Syncope and collapse, Tremor, 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 Past Surgical History  The patient  has a past surgical history that includes other surgical history (11/11/08); Cholecystectomy; Colonoscopy (06/08/10); Mohs surgery (10/07/09); Vein Surgery (06/08/09); Colonoscopy (07/18/02); Cystocele repair (02/12/99); Appendectomy; Dilation and curettage of uterus; other surgical history (1988); Hysterectomy (1995); and Upper gastrointestinal endoscopy (4/8/2015).     Family History  This patient's family history includes Colon Cancer in her brother; Coronary Art Dis in her father. Social History  Janet Gonzalez  reports that she has never smoked. She has never used smokeless tobacco. She reports current alcohol use. She reports that she does not use drugs. Health Maintenance:    Health Maintenance   Topic Date Due    TSH testing  01/17/2020    Potassium monitoring  12/30/2020    Creatinine monitoring  12/30/2020    DTaP/Tdap/Td vaccine (3 - Td) 07/22/2025    Flu vaccine  Completed    Shingles Vaccine  Completed    Pneumococcal 65+ years Vaccine  Completed    Hepatitis A vaccine  Aged Out    Hepatitis B vaccine  Aged Out    Hib vaccine  Aged Out    Meningococcal (ACWY) vaccine  Aged Out           Objective:     PHYSICAL EXAM  /68 (Site: Left Upper Arm, Position: Sitting, Cuff Size: Medium Adult)   Pulse 74   Resp 16   Ht 5' 4.02\" (1.626 m)   Wt 140 lb (63.5 kg)   LMP  (LMP Unknown)   BMI 24.02 kg/m²   Constitutional: No acute distress. Sits in chair comfortably  Eyes: Sclerae nonicteric. No lid lag or proptosis  HENT: External ears normal. No external lesions on the nose  Neck: No gross masses. Trachea visibly midline  Respiratory: Good air entry bilaterally. No wheezing or crackles  Cardiovascular: Normal S1-S2. No murmurs. No lower extremity edema  Gastrointestinal: No visible masses. No visible hernias  Skin: No abnormal rashes. No abnormal masses  Neurologic: Cranial nerves grossly intact  Psychiatric: Normal affect.  Alert and oriented        Labs Reviewed 2/11/2020:    Lab Results   Component Value Date    WBC 7.3 12/11/2018    HGB 13.5 12/11/2018    HCT 42.0 12/11/2018     12/11/2018    CHOL 207 (H) 11/28/2016    TRIG 135 11/28/2016    HDL 87 11/28/2016     (H) 12/30/2019    K 3.8 12/30/2019     12/30/2019    CREATININE 0.71 12/30/2019    BUN 18 12/30/2019    CO2 29 12/30/2019    TSH 2.62 01/17/2019    INR 1.0 (L) 01/21/2015    LABA1C 5.8 12/30/2019       Plan Alzheimer's disease: Appears to be stable. Continue Aricept for now. Reassess next visit. Melanoma in situ: Followed with dermatology. Will reassess next visit. Tremor: We will prescribe primidone. Reassess next visit. Hypothyroidism: We will obtain TSH. Continue levothyroxine. Hypertension: We will discontinue atenolol. Reassess next visit. Diagnosis Orders   1. Late onset Alzheimer's disease without behavioral disturbance ([facility] [address]     2. Melanoma in situ of torso excluding breast ([facility] [address]     3. Tremor     4. Hypothyroidism, unspecified type     5. Essential hypertension           No follow-ups on file. Patient given educational materials - see patient instructions. Discussed use, benefit, and side effects of prescribed medications. All patient questions answered. Pt voiced understanding. Reviewed health maintenance. Electronically signed Mitzi Lennon MD on 2/11/2020 at 3:32 PM      This note has been created using the Epic electronic health record, and dictated in part by [employer] dictation system. Despite the documenting physician's best efforts, there may be errors in spelling, grammar or syntax.

## 2020-02-12 ENCOUNTER — TELEPHONE (OUTPATIENT)
Dept: INTERNAL MEDICINE | Age: 85
End: 2020-02-12

## 2020-02-18 ENCOUNTER — OFFICE VISIT (OUTPATIENT)
Dept: PODIATRY | Age: 85
End: 2020-02-18
Payer: MEDICARE

## 2020-02-18 VITALS
HEIGHT: 63 IN | HEART RATE: 72 BPM | WEIGHT: 138 LBS | DIASTOLIC BLOOD PRESSURE: 72 MMHG | RESPIRATION RATE: 20 BRPM | BODY MASS INDEX: 24.45 KG/M2 | SYSTOLIC BLOOD PRESSURE: 126 MMHG

## 2020-02-18 PROCEDURE — 99999 PR OFFICE/OUTPT VISIT,PROCEDURE ONLY: CPT | Performed by: PODIATRIST

## 2020-02-18 PROCEDURE — 11721 DEBRIDE NAIL 6 OR MORE: CPT | Performed by: PODIATRIST

## 2020-02-18 RX ORDER — LOSARTAN POTASSIUM 100 MG/1
TABLET ORAL
Qty: 90 TABLET | Refills: 3 | Status: SHIPPED | OUTPATIENT
Start: 2020-02-18 | End: 2020-08-19 | Stop reason: ALTCHOICE

## 2020-02-18 NOTE — PROGRESS NOTES
Alcohol/week: 0.0 standard drinks     Comment: Infrequently. Review of Systems: All 12 systems reviewed and pertinent positives noted above. Lower Extremity Physical Examination:     Vitals:   Vitals:    02/18/20 0952   BP: 126/72   Pulse: 72   Resp: 20     General: AAO x 3 in NAD. Vascular: DP and PT pulses palpable 2/4, bilateral.  CFT <3 seconds, bilateral.  Hair growth present to the level of the digits, bilateral.  Edema absent, bilateral.  Varicosities absent, bilateral. Erythema absent, bilateral. Distal Rubor absent bilateral.  Temperature within normal limits bilateral. Hyperpigmentation absent bilateral. No atrophic skin. Neurological: Sensation intact to light touch to level of digits, bilateral.  Protective sensation intact 10/10 sites via 5.07/10g [place] Monofilament, bilateral.  negative Tinel's, bilateral.  negative Valleix sign, bilateral.  Vibratory intact bilateral.  Reflexes Decreased bilateral.  Paresthesias negative. Dysthesias negative. Sharp/dull intact bilateral.    Musculoskeletal: Muscle strength 5/5, bilateral.  Pain present upon palpation L hallux nail. Normal medial longitudinal arch, bilateral.  Ankle ROM within normal limits,bilateral.  1st MPJ ROM within normal limits, bilateral.  Dorsally contracted digits present. No other foot deformities. Integument: Warm, dry, supple, Bilateral.  Open lesion absent, Bilateral.  Interdigital maceration absent to web spaces absent, Bilateral.  Nails left 1, 2,3, 4,5 and right 1,2,3,4,5 thickened, dystrophic and crumbly, discolored with subungual debris. Fissures absent, Bilateral. Hyperkeratotic tissue is absent. Seen sub R 5th met head    Asessment: Patient is a 80 y.o. female with:    Diagnosis Orders   1. Atherosclerosis of native artery of both lower extremities, with unspecified presence of clinical manifestation ([facility] [address]     2. Dermatophytosis of nail     3.  Corns and callosities         Plan: Patient examined and evaluated. Current condition and treatment options discussed in detail. All nails as mentioned above debrided in length and thickness. Patient advised OTC methods for treatment of the mycotic nails. Patient will follow up in 10 weeks.

## 2020-03-10 RX ORDER — AMLODIPINE BESYLATE 2.5 MG/1
TABLET ORAL
Qty: 90 TABLET | Refills: 3 | Status: SHIPPED | OUTPATIENT
Start: 2020-03-10 | End: 2021-03-06

## 2020-03-11 ENCOUNTER — OFFICE VISIT (OUTPATIENT)
Dept: INTERNAL MEDICINE | Age: 85
End: 2020-03-11
Payer: MEDICARE

## 2020-03-11 VITALS
WEIGHT: 138.01 LBS | HEIGHT: 63 IN | RESPIRATION RATE: 16 BRPM | HEART RATE: 64 BPM | BODY MASS INDEX: 24.45 KG/M2 | SYSTOLIC BLOOD PRESSURE: 138 MMHG | DIASTOLIC BLOOD PRESSURE: 62 MMHG

## 2020-03-11 PROCEDURE — 99214 OFFICE O/P EST MOD 30 MIN: CPT | Performed by: INTERNAL MEDICINE

## 2020-03-11 PROCEDURE — 99214 OFFICE O/P EST MOD 30 MIN: CPT

## 2020-03-11 NOTE — PROGRESS NOTES
[place] [address]  Dept: [phone]  Dept Fax: [phone]  Loc: [phone]     Visit Date:  3/11/2020    Patient:  Khalif Castro  YOB: 1930  Provider: Leticia Velasco MD      NEXT VISIT: Follow-up on neurology recommendations. Follow-up on DEXA      HPI:   She was seen in the internal medicine office today for   Chief Complaint   Patient presents with    Hypertension    Hypothyroidism          Still complains of tremor, which she says is most pronounced with fine writing. Already on primidone, with no benefit. Says that she does not have a lot of tremor with gross movement, says that she is able to handle things well. Denies fever, chills, headache, vision changes. Has a history of osteopenia on previous DEXA. Stable. I advised that we need to order another DEXA to assess. Has a history of mood problems/depression, and was previously on Prozac many years ago. Was started on Prozac last visit, and says that she is doing fine now. Does not think the Prozac changed much. Subjective:      REVIEW OF SYSTEMS    Constitutional: Denies fever, chills  Eyes: Denies recent vision changes  Cardiovascular: Denies chest pain, LL edema  Respiratory: Denies cough, wheezes  Gastrointestinal: Denies abdominal pain, nausea, vomiting  Skin: Denies rash  Endocrine: Denies polyuria  Hematologic: Denies bruising  Genitourinary: Denies dysuria, hematuria  Neurological: Denies headache, numbness        Medications    Current Outpatient Medications:     amLODIPine (NORVASC) 2.5 MG tablet, Take one tablet daily. , Disp: 90 tablet, Rfl: 3    losartan (COZAAR) 100 MG tablet, TAKE 1 TABLET DAILY, Disp: 90 tablet, Rfl: 3    primidone (MYSOLINE) 50 MG tablet, Take 1 tablet by mouth nightly, Disp: 30 tablet, Rfl: 0    FLUoxetine (PROZAC) 10 MG capsule, Take 1 capsule by mouth daily, Disp: 90 capsule, Rfl: 3    latanoprost (XALATAN) 0.005 % ophthalmic solution, Place 1 drop into both eyes nightly, Disp: 3 Bottle, Rfl: 3    calcium-vitamin D (OSCAL 500/200 D-3) 500-200 MG-UNIT per tablet, Take 1 tablet by mouth daily, Disp: 90 tablet, Rfl: 3    donepezil (ARICEPT) 5 MG tablet, Take 1 tablet by mouth nightly, Disp: 90 tablet, Rfl: 3    levothyroxine (SYNTHROID) 137 MCG tablet, TAKE 1 TABLET DAILY, Disp: 90 tablet, Rfl: 3    Multiple Vitamin (DAILY MULTIVITAMIN PO), Take 1 tablet by mouth daily , Disp: , Rfl:     The patient has No Known Allergies. Past Medical History  Manpreet Messina  has a past medical history of Adenomatous polyp, Cataracts, bilateral, Combined forms of age-related cataract of both eyes, Corneal scar, right eye, Cystocele, Dementia ([address] Difficulty controlling anger, Dyslipidemia, GERD (gastroesophageal reflux disease), Goiter, Hiatal hernia, Hypertension, Hypokalemia, Hypothyroidism, Impaired fasting glucose, Malignant melanoma in situ ([address] Migraines, Murmur, functional, Osteopenia, Posterior vitreous detachment of both eyes, Primary open angle glaucoma (POAG) of both eyes, moderate stage, Sciatica, Skin cancer, Syncope and collapse, Tremor, 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 Past Surgical History  The patient  has a past surgical history that includes other surgical history (11/11/08); Cholecystectomy; Colonoscopy (06/08/10); Mohs surgery (10/07/09); Vein Surgery (06/08/09); Colonoscopy (07/18/02); Cystocele repair (02/12/99); Appendectomy; Dilation and curettage of uterus; other surgical history (1988); Hysterectomy (1995); and Upper gastrointestinal endoscopy (4/8/2015). Family History  This patient's family history includes Colon Cancer in her brother; Coronary Art Dis in her father. Social History  Manpreet Messina  reports that she has never smoked. She has never used smokeless tobacco. She reports current alcohol use.  She reports that she does not use drugs. Health Maintenance:    Health Maintenance   Topic Date Due    TSH testing  01/17/2020    Potassium monitoring  12/30/2020    Creatinine monitoring  12/30/2020    DTaP/Tdap/Td vaccine (3 - Td) 07/22/2025    Flu vaccine  Completed    Shingles Vaccine  Completed    Pneumococcal 65+ years Vaccine  Completed    Hepatitis A vaccine  Aged Out    Hepatitis B vaccine  Aged Out    Hib vaccine  Aged Out    Meningococcal (ACWY) vaccine  Aged Out           Objective:     PHYSICAL EXAM  /62 (Site: Left Upper Arm, Position: Sitting, Cuff Size: Medium Adult)   Pulse 64   Resp 16   Ht 5' 2.99\" (1.6 m)   Wt 138 lb 0.1 oz (62.6 kg)   LMP  (LMP Unknown)   BMI 24.45 kg/m²   Constitutional: No acute distress. Sits in chair comfortably  Eyes: Sclerae nonicteric. No lid lag or proptosis  HENT: External ears normal. No external lesions on the nose  Neck: No gross masses. Trachea visibly midline  Respiratory: Good air entry bilaterally. No wheezing or crackles  Cardiovascular: Normal S1-S2. No murmurs. No lower extremity edema  Gastrointestinal: No visible masses. No visible hernias  Skin: No abnormal rashes. No abnormal masses  Neurologic: Cranial nerves grossly intact  Psychiatric: Normal affect. Alert and oriented        Labs Reviewed 3/11/2020:    Lab Results   Component Value Date    WBC 7.3 12/11/2018    HGB 13.5 12/11/2018    HCT 42.0 12/11/2018     12/11/2018    CHOL 207 (H) 11/28/2016    TRIG 135 11/28/2016    HDL 87 11/28/2016     (H) 12/30/2019    K 3.8 12/30/2019     12/30/2019    CREATININE 0.71 12/30/2019    BUN 18 12/30/2019    CO2 29 12/30/2019    TSH 2.62 01/17/2019    INR 1.0 (L) 01/21/2015    LABA1C 5.8 12/30/2019       Plan        Tremor: Possibly caused by Parkinson's. Will refer to neurology. Only affects her with with writing, but she says that it is annoying. Will reassess next visit. Osteopenia:  We will order DEXA scan next visit. Reassess next visit. Difficult to control anger: Continue Prozac for now. Will reassess the need for next visit. Hypertension: Blood pressure controlled. Continue same medication. Diagnosis Orders   1. Tremor  Catalina Bailey MD, Neurology, [place]   2. Osteopenia, unspecified location  DEXA AXIAL SKELETON W VERTEBRAL FX ASST   3. Abnormal findings on diagnostic imaging of limbs   DEXA AXIAL SKELETON W VERTEBRAL FX ASST         No follow-ups on file. Patient given educational materials - see patient instructions. Discussed use, benefit, and side effects of prescribed medications. All patient questions answered. Pt voiced understanding. Reviewed health maintenance. Electronically signed Juan Logan MD on 3/11/2020 at 11:34 AM      This note has been created using the Epic electronic health record, and dictated in part by [facility] dictation system. Despite the documenting physician's best efforts, there may be errors in spelling, grammar or syntax.

## 2020-04-07 RX ORDER — AMMONIUM LACTATE 12 G/100G
CREAM TOPICAL
Qty: 385 G | Refills: 3 | Status: ON HOLD | OUTPATIENT
Start: 2020-04-07 | End: 2022-09-06 | Stop reason: HOSPADM

## 2020-04-13 RX ORDER — FLUOXETINE HYDROCHLORIDE 20 MG/1
20 CAPSULE ORAL DAILY
Qty: 30 CAPSULE | Refills: 2 | Status: SHIPPED | OUTPATIENT
Start: 2020-04-13 | End: 2020-06-02 | Stop reason: SDUPTHER

## 2020-04-13 NOTE — PROGRESS NOTES
I was called by patient's daughter and was told that her mood is not under control, and that she is being really aggressive. Patient is currently on Prozac 10 mg daily, so I advised that we can increase the dose to 20 mg daily. I called the patient and informed him that I will send it to [facility]. Patient is agreeable to the plan, and had previously consented to me talking to her daughter about her condition.

## 2020-04-28 ENCOUNTER — OFFICE VISIT (OUTPATIENT)
Dept: PODIATRY | Age: 85
End: 2020-04-28
Payer: MEDICARE

## 2020-04-28 VITALS
BODY MASS INDEX: 23.99 KG/M2 | WEIGHT: 135.4 LBS | SYSTOLIC BLOOD PRESSURE: 128 MMHG | DIASTOLIC BLOOD PRESSURE: 68 MMHG | HEIGHT: 63 IN | RESPIRATION RATE: 20 BRPM | HEART RATE: 60 BPM

## 2020-04-28 PROCEDURE — 11721 DEBRIDE NAIL 6 OR MORE: CPT | Performed by: PODIATRIST

## 2020-04-28 NOTE — PROGRESS NOTES
Foot Care Worksheet  PCP: Yaw Trejo  Last visit: 03/11/2020    Nail description:  Thick , Yellow , Crumbly , Marked limitation of ambulation     Pain resulting from thickened and dystrophy of nail plate Yes    Nails involved  Right   1, 2, 3, 4, 5  (T5-T9)  Left     1, 2, 3, 4, 5  (TA-T4)    Q7 1 Class A Finding - Non traumatic amputation of foot No    Q8 2 Class B Findings - Absent DP pulse No, Absent PT pulse No, Advanced tropic changes (3 required) Yes    Decrease hair growth Yes, Nail changes/thickening Yes, Pigmented changes/discoloration No, Skin texture (thin, shiny) No, Skin color (rubor/redness) No    Q9 1 Class B and 2 Class C Findings  Claudication No, Temperature change Yes, Paresthesia No, Burning No, Edema Yes
Ina Hopkins MD   ammonium lactate (AMLACTIN) 12 % cream apply to affected area twice a day if needed 4/7/20  Yes Ina Hopkins MD   amLODIPine (NORVASC) 2.5 MG tablet Take one tablet daily. 3/10/20  Yes Ina Hopkins MD   losartan (COZAAR) 100 MG tablet TAKE 1 TABLET DAILY 2/18/20  Yes FELICIA Huston CNP   latanoprost (XALATAN) 0.005 % ophthalmic solution Place 1 drop into both eyes nightly 1/22/20  Yes Anna Perry MD   calcium-vitamin D (OSCAL 500/200 D-3) 500-200 MG-UNIT per tablet Take 1 tablet by mouth daily 10/1/19  Yes Mery Johns,    donepezil (ARICEPT) 5 MG tablet Take 1 tablet by mouth nightly 10/1/19  Yes Desmond Lundberg,    levothyroxine (SYNTHROID) 137 MCG tablet TAKE 1 TABLET DAILY 7/15/19  Yes FELICIA Huston CNP   Multiple Vitamin (DAILY MULTIVITAMIN PO) Take 1 tablet by mouth daily    Yes Historical Provider, MD   primidone (MYSOLINE) 50 MG tablet Take 1 tablet by mouth nightly 2/11/20 3/12/20  Ina Hopkins MD   atenolol (TENORMIN) 25 MG tablet Take 1 tablet by mouth daily 11/25/19 2/11/20  Ina Hopkins MD       Past Surgical History:   Procedure Laterality Date    APPENDECTOMY      CHOLECYSTECTOMY      Remote.  COLONOSCOPY  06/08/10    COLONOSCOPY  07/18/02    CYSTOCELE REPAIR  02/12/99    Vaginal prolapse, cystocele plus pelvic relaxation.  DILATION AND CURETTAGE OF UTERUS      with hysteroscopy   [facility]    still has ovaries     MOHS SURGERY  10/07/09    SCC, left nasal sidewall.  OTHER SURGICAL HISTORY  11/11/08    Anterior repair.  OTHER SURGICAL HISTORY  1988    laser cone    UPPER GASTROINTESTINAL ENDOSCOPY  4/8/2015    hiatal hernia    VEIN SURGERY  06/08/09    Laser ablation of right greater saphenous vein.         Family History   Problem Relation Age of Onset    Coronary Art Dis Father     Colon Cancer Brother     Diabetes Neg Hx     Cataracts Neg Hx     Glaucoma Neg Hx        Social History     Tobacco Use   

## 2020-05-13 ENCOUNTER — HOSPITAL ENCOUNTER (OUTPATIENT)
Dept: LAB | Age: 85
Discharge: HOME | End: 2020-05-13
Payer: MEDICARE

## 2020-05-13 LAB
ABSOLUTE EOS #: 0.13 K/UL (ref 0–0.44)
ABSOLUTE IMMATURE GRANULOCYTE: 0.04 K/UL (ref 0–0.3)
ABSOLUTE LYMPH #: 1.9 K/UL (ref 1.1–3.7)
ABSOLUTE MONO #: 0.77 K/UL (ref 0.1–1.2)
ALBUMIN SERPL-MCNC: 4.3 G/DL (ref 3.5–5.2)
ALBUMIN/GLOBULIN RATIO: 1.6 (ref 1–2.5)
ALP BLD-CCNC: 45 U/L (ref 35–104)
ALT SERPL-CCNC: 6 U/L (ref 5–33)
ANION GAP SERPL CALCULATED.3IONS-SCNC: 10 MMOL/L (ref 9–17)
AST SERPL-CCNC: 21 U/L
BASOPHILS # BLD: 1 % (ref 0–2)
BASOPHILS ABSOLUTE: 0.05 K/UL (ref 0–0.2)
BILIRUB SERPL-MCNC: 0.59 MG/DL (ref 0.3–1.2)
BUN BLDV-MCNC: 18 MG/DL (ref 8–23)
BUN/CREAT BLD: 25 (ref 9–20)
CALCIUM SERPL-MCNC: 9 MG/DL (ref 8.6–10.4)
CHLORIDE BLD-SCNC: 102 MMOL/L (ref 98–107)
CO2: 30 MMOL/L (ref 20–31)
CREAT SERPL-MCNC: 0.72 MG/DL (ref 0.5–0.9)
DIFFERENTIAL TYPE: ABNORMAL
EOSINOPHILS RELATIVE PERCENT: 2 % (ref 1–4)
GFR AFRICAN AMERICAN: >60 ML/MIN
GFR NON-AFRICAN AMERICAN: >60 ML/MIN
GFR SERPL CREATININE-BSD FRML MDRD: ABNORMAL ML/MIN/{1.73_M2}
GFR SERPL CREATININE-BSD FRML MDRD: ABNORMAL ML/MIN/{1.73_M2}
GLUCOSE BLD-MCNC: 118 MG/DL (ref 70–99)
HCT VFR BLD CALC: 40.8 % (ref 36.3–47.1)
HEMOGLOBIN: 13.1 G/DL (ref 11.9–15.1)
IMMATURE GRANULOCYTES: 1 %
LYMPHOCYTES # BLD: 26 % (ref 24–43)
MCH RBC QN AUTO: 32.2 PG (ref 25.2–33.5)
MCHC RBC AUTO-ENTMCNC: 32.1 G/DL (ref 25.2–33.5)
MCV RBC AUTO: 100.2 FL (ref 82.6–102.9)
MONOCYTES # BLD: 10 % (ref 3–12)
NRBC AUTOMATED: 0 PER 100 WBC
PDW BLD-RTO: 13.6 % (ref 11.8–14.4)
PLATELET # BLD: 241 K/UL (ref 138–453)
PLATELET ESTIMATE: ABNORMAL
PMV BLD AUTO: 10.7 FL (ref 8.1–13.5)
POTASSIUM SERPL-SCNC: 3.9 MMOL/L (ref 3.7–5.3)
RBC # BLD: 4.07 M/UL (ref 3.95–5.11)
RBC # BLD: ABNORMAL 10*6/UL
SEG NEUTROPHILS: 60 % (ref 36–65)
SEGMENTED NEUTROPHILS ABSOLUTE COUNT: 4.57 K/UL (ref 1.5–8.1)
SODIUM BLD-SCNC: 142 MMOL/L (ref 135–144)
THYROXINE, FREE: 1.42 NG/DL (ref 0.93–1.7)
TOTAL PROTEIN: 7 G/DL (ref 6.4–8.3)
TSH SERPL DL<=0.05 MIU/L-ACNC: 10.21 MIU/L (ref 0.3–5)
VITAMIN B-12: 396 PG/ML (ref 232–1245)
VITAMIN D 25-HYDROXY: 21.4 NG/ML (ref 30–100)
WBC # BLD: 7.5 K/UL (ref 3.5–11.3)
WBC # BLD: ABNORMAL 10*3/UL

## 2020-05-13 PROCEDURE — 36415 COLL VENOUS BLD VENIPUNCTURE: CPT

## 2020-05-13 PROCEDURE — 85025 COMPLETE CBC W/AUTO DIFF WBC: CPT

## 2020-05-13 PROCEDURE — 84439 ASSAY OF FREE THYROXINE: CPT

## 2020-05-13 PROCEDURE — 84443 ASSAY THYROID STIM HORMONE: CPT

## 2020-05-13 PROCEDURE — 82607 VITAMIN B-12: CPT

## 2020-05-13 PROCEDURE — 80053 COMPREHEN METABOLIC PANEL: CPT

## 2020-05-13 PROCEDURE — 82306 VITAMIN D 25 HYDROXY: CPT

## 2020-05-18 ENCOUNTER — OFFICE VISIT (OUTPATIENT)
Dept: INTERNAL MEDICINE | Age: 85
End: 2020-05-18
Payer: MEDICARE

## 2020-05-18 VITALS
WEIGHT: 136 LBS | HEART RATE: 68 BPM | DIASTOLIC BLOOD PRESSURE: 70 MMHG | BODY MASS INDEX: 24.1 KG/M2 | RESPIRATION RATE: 16 BRPM | SYSTOLIC BLOOD PRESSURE: 134 MMHG | TEMPERATURE: 97.6 F | HEIGHT: 63 IN

## 2020-05-18 PROCEDURE — 99214 OFFICE O/P EST MOD 30 MIN: CPT

## 2020-05-18 PROCEDURE — 99214 OFFICE O/P EST MOD 30 MIN: CPT | Performed by: INTERNAL MEDICINE

## 2020-05-18 RX ORDER — PRIMIDONE 50 MG/1
50 TABLET ORAL NIGHTLY
Qty: 90 TABLET | Refills: 0 | Status: SHIPPED | OUTPATIENT
Start: 2020-05-18 | End: 2020-08-19 | Stop reason: SINTOL

## 2020-05-18 NOTE — PROGRESS NOTES
[place] [address]  Dept: [phone]  Dept Fax: [phone]  Loc: [phone]     Visit Date:  5/18/2020    Patient:  Mary Carmen Bhatt  YOB: 1930  Provider: Yue Avnia MD      NEXT VISIT: Follow-up on thyroid levels      HPI:   She was seen in the internal medicine office today for   Chief Complaint   Patient presents with    Hypertension    Hypothyroidism          Presents to follow-up on chronic medical conditions. Has a history of hypothyroidism, TSH was elevated on last blood work. Says that she takes her thyroid with her other pills. I advised that she needs to take her thyroid for her other pills because it will not be absorbed. She verbalized her agreement. Reassess next visit. Has a history of hypertension which has been stable. Has a history of Alzheimer's dementia which has been stable      Subjective:      REVIEW OF SYSTEMS    Constitutional: Denies fever, chills  Eyes: Denies recent vision changes  Cardiovascular: Denies chest pain, LL edema  Respiratory: Denies cough, wheezes  Gastrointestinal: Denies abdominal pain, nausea, vomiting  Skin: Denies rash  Endocrine: Denies polyuria  Hematologic: Denies bruising  Genitourinary: Denies dysuria, hematuria  Neurological: Denies headache, numbness        Medications    Current Outpatient Medications:     primidone (MYSOLINE) 50 MG tablet, Take 1 tablet by mouth nightly, Disp: 90 tablet, Rfl: 0    FLUoxetine (PROZAC) 20 MG capsule, Take 1 capsule by mouth daily, Disp: 30 capsule, Rfl: 2    ammonium lactate (AMLACTIN) 12 % cream, apply to affected area twice a day if needed, Disp: 385 g, Rfl: 3    amLODIPine (NORVASC) 2.5 MG tablet, Take one tablet daily. , Disp: 90 tablet, Rfl: 3    losartan (COZAAR) 100 MG tablet, TAKE 1 TABLET DAILY, Disp: 90 tablet, Rfl: 3    latanoprost (XALATAN) 0.005 % ophthalmic solution, Place 1 drop into both eyes nightly, Disp: 3 Bottle, Rfl: 3    calcium-vitamin D (OSCAL 500/200 D-3) 500-200 MG-UNIT per tablet, Take 1 tablet by mouth daily, Disp: 90 tablet, Rfl: 3    donepezil (ARICEPT) 5 MG tablet, Take 1 tablet by mouth nightly, Disp: 90 tablet, Rfl: 3    levothyroxine (SYNTHROID) 137 MCG tablet, TAKE 1 TABLET DAILY, Disp: 90 tablet, Rfl: 3    Multiple Vitamin (DAILY MULTIVITAMIN PO), Take 1 tablet by mouth daily , Disp: , Rfl:     The patient has No Known Allergies. Past Medical History  Sammi Verdin  has a past medical history of Adenomatous polyp, Cataracts, bilateral, Combined forms of age-related cataract of both eyes, Corneal scar, right eye, Cystocele, Dementia ([address] Difficulty controlling anger, Dyslipidemia, GERD (gastroesophageal reflux disease), Goiter, Hiatal hernia, Hypertension, Hypokalemia, Hypothyroidism, Impaired fasting glucose, Malignant melanoma in situ ([address] Migraines, Murmur, functional, Osteopenia, Posterior vitreous detachment of both eyes, Primary open angle glaucoma (POAG) of both eyes, moderate stage, Sciatica, Skin cancer, Syncope and collapse, Tremor, 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 Past Surgical History  The patient  has a past surgical history that includes other surgical history (11/11/08); Cholecystectomy; Colonoscopy (06/08/10); Mohs surgery (10/07/09); Vein Surgery (06/08/09); Colonoscopy (07/18/02); Cystocele repair (02/12/99); Appendectomy; Dilation and curettage of uterus; other surgical history (1988); Hysterectomy (1995); and Upper gastrointestinal endoscopy (4/8/2015). Family History  This patient's family history includes Colon Cancer in her brother; Coronary Art Dis in her father. Social History  Sammi Verdin  reports that she has never smoked. She has never used smokeless tobacco. She reports current alcohol use. She reports that she does not use drugs.     Health Maintenance:    Health

## 2020-05-22 ENCOUNTER — OFFICE VISIT (OUTPATIENT)
Dept: NEUROLOGY | Age: 85
End: 2020-05-22
Payer: MEDICARE

## 2020-05-22 VITALS
HEIGHT: 63 IN | WEIGHT: 136 LBS | OXYGEN SATURATION: 98 % | SYSTOLIC BLOOD PRESSURE: 122 MMHG | BODY MASS INDEX: 24.1 KG/M2 | DIASTOLIC BLOOD PRESSURE: 72 MMHG | HEART RATE: 60 BPM

## 2020-05-22 PROBLEM — G31.9 ACQUIRED CEREBRAL ATROPHY (HCC): Status: ACTIVE | Noted: 2020-05-22

## 2020-05-22 PROBLEM — R42 DIZZINESS: Status: ACTIVE | Noted: 2020-05-22

## 2020-05-22 PROBLEM — I67.82 CHRONIC CEREBRAL ISCHEMIA: Status: ACTIVE | Noted: 2020-05-22

## 2020-05-22 PROBLEM — G25.0 ESSENTIAL TREMOR: Status: ACTIVE | Noted: 2020-05-22

## 2020-05-22 PROCEDURE — 99205 OFFICE O/P NEW HI 60 MIN: CPT | Performed by: PSYCHIATRY & NEUROLOGY

## 2020-05-22 PROCEDURE — 99214 OFFICE O/P EST MOD 30 MIN: CPT | Performed by: PSYCHIATRY & NEUROLOGY

## 2020-05-22 ASSESSMENT — ENCOUNTER SYMPTOMS
SINUS PRESSURE: 0
SHORTNESS OF BREATH: 0
NAUSEA: 0
EYE ITCHING: 0
WHEEZING: 0
COLOR CHANGE: 0
COUGH: 0
CHOKING: 0
SORE THROAT: 0
EYE DISCHARGE: 0
EYE REDNESS: 0
PHOTOPHOBIA: 0
VOICE CHANGE: 0
CONSTIPATION: 0
BOWEL INCONTINENCE: 0
EYE PAIN: 0
TROUBLE SWALLOWING: 0
ABDOMINAL DISTENTION: 0
ABDOMINAL PAIN: 0
BACK PAIN: 0
VOMITING: 0
VISUAL CHANGE: 0
APNEA: 0
CHEST TIGHTNESS: 0
DIARRHEA: 0
FACIAL SWELLING: 0
BLOOD IN STOOL: 0

## 2020-05-22 NOTE — PROGRESS NOTES
[facility]  Neurology  [address] [place] [address]  [facility]:[phone]   Fax: [phone]    SUBJECTIVE:     PATIENT ID:  Dai Oakes is a  RIGHT    HANDED 80 y.o. female. Neurologic Problem   The patient's primary symptoms include memory loss. The patient's pertinent negatives include no altered mental status, clumsiness, focal sensory loss, focal weakness, loss of balance, near-syncope, slurred speech, syncope, visual change or weakness. Primary symptoms comment: TREMORS   BOTH  HANDS. This is a recurrent problem. Episode onset: FOR    3  YEARS. The neurological problem developed insidiously. The problem is unchanged. There was right-sided, left-sided and upper extremity focality noted. Pertinent negatives include no abdominal pain, auditory change, aura, back pain, bladder incontinence, bowel incontinence, chest pain, confusion, diaphoresis, dizziness, fatigue, fever, headaches, light-headedness, nausea, neck pain, palpitations, shortness of breath, vertigo or vomiting. Past treatments include bed rest, sleep and medication. The treatment provided no relief. Her past medical history is significant for dementia. There is no history of a bleeding disorder, a clotting disorder, a CVA, head trauma, liver disease, mood changes or seizures. History obtained from  The patient     and other  available   medical  records   were  Also  reviewed. The  Duration,  Quality,  Severity,  Location,  Timing,  Context,  Modifying  Factors   Of   The   Chief   Complaint   And  Present  Illness       Was   Reviewed   In   Chronological   Manner.                                             PATIENT'S  MAIN  CONCERNS INCLUDE :                       1)     H  /O     ESSENTIAL  TREMOR     BOTH  HANDS     FOR  3 -    4   YEARS                                       MORE  SO     RIGHT  HAND                            2)     H/O   FUNCTIONAL IMPAIREMENT       INCLUDING   WRITING DUE  TO     TREMORS                          3)       NO    FAMILY    H/O     ESSENTIAL  TREMOR                       4)        H/O     MILD  DEMENTIA                                       -   ON  ARICEPT     5  MG  DAILY                                PATIENT   NOT  CONCERNED   ABOUT   MEMORY PROBLEMS                         5)    NO  EVIDENCE OF  PARKINSON'S  DISEASE  CURRENTLY                        6)     H/O   DIZZINESS    IN       2018                                             -   RESOLVED                         7)     CT  HEAD  IN   FEB. 2019    SHOWED                                    NO  ACUTE  PATHOLOGY                                      CHRONIC  CEREBRAL ISCHEMIA                                        AND      CEREBRAL      ATROPHY                            8)    MULTIPLE     CO   MORBID  MEDICAL  CONDITIONS                              BEING  FOLLOWED   BY   HER    PCP                                9)    PATIENT  ALSO   ON  PROZAC     20   MG  PO  DAILY                          10)     AT    RISK   FOR  TIA,   CEREBRAL ISCHEMIA  AND  STROKE                         11)    PATIENT   RECEIVED  PRESCRIPTION    FOR      MYSOLINE     50   MG                                FROM   HER  PCP  . PATIENT  MAY    START   THE  SAME     AT   BED. 12)     IN  VIEW  OF  THE  PATIENT'S    MULTIPLE   NEUROLOGICAL                           SYMPTOMS  AND  CONCERNS    FOR  PROLONGED   DURATION,                           AND    MULTIPLE   CO MORBID  MEDICAL  CONDITIONS,                           PATIENT    NEEDS  NEURO  DIAGNOSTIC  EVALUATIONS                      FOR   ANY   NEUROLOGICAL  PATHOLOGIES    AND  OTHER                        CORRECTABLE   ETIOLOGIES;     AND                              PATIENT  REQUESTS   THE  SAME.                                                            PRECIPITATING  FACTORS: including Primary open angle glaucoma (POAG) of both eyes, moderate stage 10/1/2018    Sciatica 12/15/2015    Skin cancer     History of multiple skin cancers. Following with Dr. Kd Fischer--- invasive squamous cell Ca 8/17 L forearm    Syncope and collapse 1/21/2015    Tremor 12/23/2018    Trichiasis of left lower eyelid without entropion     Visual disturbance 10/26/2017         Past Surgical History:   Procedure Laterality Date    APPENDECTOMY      CHOLECYSTECTOMY      Remote.  COLONOSCOPY  06/08/10    COLONOSCOPY  07/18/02    CYSTOCELE REPAIR  02/12/99    Vaginal prolapse, cystocele plus pelvic relaxation.  DILATION AND CURETTAGE OF UTERUS      with hysteroscopy   Mary Lou    still has ovaries     MOHS SURGERY  10/07/09    SCC, left nasal sidewall.  OTHER SURGICAL HISTORY  11/11/08    Anterior repair.  OTHER SURGICAL HISTORY  1988    laser cone    UPPER GASTROINTESTINAL ENDOSCOPY  4/8/2015    hiatal hernia    VEIN SURGERY  06/08/09    Laser ablation of right greater saphenous vein. Current Outpatient Medications   Medication Sig Dispense Refill    primidone (MYSOLINE) 50 MG tablet Take 1 tablet by mouth nightly 90 tablet 0    FLUoxetine (PROZAC) 20 MG capsule Take 1 capsule by mouth daily 30 capsule 2    ammonium lactate (AMLACTIN) 12 % cream apply to affected area twice a day if needed 385 g 3    amLODIPine (NORVASC) 2.5 MG tablet Take one tablet daily.  90 tablet 3    losartan (COZAAR) 100 MG tablet TAKE 1 TABLET DAILY 90 tablet 3    latanoprost (XALATAN) 0.005 % ophthalmic solution Place 1 drop into both eyes nightly 3 Bottle 3    calcium-vitamin D (OSCAL 500/200 D-3) 500-200 MG-UNIT per tablet Take 1 tablet by mouth daily 90 tablet 3    donepezil (ARICEPT) 5 MG tablet Take 1 tablet by mouth nightly 90 tablet 3    levothyroxine (SYNTHROID) 137 MCG tablet TAKE 1 TABLET DAILY 90 tablet 3    Multiple Vitamin (DAILY MULTIVITAMIN PO) Take 1 tablet by mouth daily No current facility-administered medications for this visit. No Known Allergies      Family History   Problem Relation Age of Onset    Coronary Art Dis Father     Colon Cancer Brother     Diabetes Neg Hx     Cataracts Neg Hx     Glaucoma Neg Hx          Social History     Socioeconomic History    Marital status:      Spouse name: Not on file    Number of children: Not on file    Years of education: Not on file    Highest education level: Not on file   Occupational History    Not on file   Social Needs    Financial resource strain: Not on file    Food insecurity     Worry: Not on file     Inability: Not on file    Transportation needs     Medical: Not on file     Non-medical: Not on file   Tobacco Use    Smoking status: Never Smoker    Smokeless tobacco: Never Used   Substance and Sexual Activity    Alcohol use: Yes     Alcohol/week: 0.0 standard drinks     Comment: Infrequently.      Drug use: No    Sexual activity: Not on file   Lifestyle    Physical activity     Days per week: Not on file     Minutes per session: Not on file    Stress: Not on file   Relationships    Social connections     Talks on phone: Not on file     Gets together: Not on file     Attends Yazidi service: Not on file     Active member of club or organization: Not on file     Attends meetings of clubs or organizations: Not on file     Relationship status: Not on file    Intimate partner violence     Fear of current or ex partner: Not on file     Emotionally abused: Not on file     Physically abused: Not on file     Forced sexual activity: Not on file   Other Topics Concern    Not on file   Social History Narrative    Not on file       Vitals:    05/22/20 0923   BP: 122/72   Pulse: 60   SpO2: 98%         Wt Readings from Last 3 Encounters:   05/22/20 136 lb (61.7 kg)   05/18/20 136 lb (61.7 kg)   04/28/20 135 lb 6.4 oz (61.4 kg)         BP Readings from Last 3 Encounters:   05/22/20 122/72   05/18/20 134/70   04/28/20 128/68       Hematology and Coagulation  Lab Results   Component Value Date    WBC 7.5 05/13/2020    RBC 4.07 05/13/2020    HGB 13.1 05/13/2020    HCT 40.8 05/13/2020    .2 05/13/2020    MCH 32.2 05/13/2020    MCHC 32.1 05/13/2020    RDW 13.6 05/13/2020     05/13/2020    MPV 10.7 05/13/2020       Chemistries  Lab Results   Component Value Date     05/13/2020    K 3.9 05/13/2020     05/13/2020    CO2 30 05/13/2020    BUN 18 05/13/2020    CREATININE 0.72 05/13/2020    CALCIUM 9.0 05/13/2020    PROT 7.0 05/13/2020    LABALBU 4.3 05/13/2020    BILITOT 0.59 05/13/2020    ALKPHOS 45 05/13/2020    AST 21 05/13/2020    ALT 6 05/13/2020     Lab Results   Component Value Date    ALKPHOS 45 05/13/2020    ALT 6 05/13/2020    AST 21 05/13/2020    PROT 7.0 05/13/2020    BILITOT 0.59 05/13/2020    LABALBU 4.3 05/13/2020     Lab Results   Component Value Date    BUN 18 05/13/2020    CREATININE 0.72 05/13/2020     Lab Results   Component Value Date    CALCIUM 9.0 05/13/2020    MG 1.7 01/22/2015     Lab Results   Component Value Date    AST 21 05/13/2020    ALT 6 05/13/2020       Lab Results   Component Value Date    CKTOTAL 39 01/21/2015     Lab Results   Component Value Date    [id number] 396 05/13/2020         Review of Systems   Constitutional: Negative for appetite change, chills, diaphoresis, fatigue, fever and unexpected weight change. HENT: Negative for congestion, dental problem, drooling, ear discharge, ear pain, facial swelling, hearing loss, mouth sores, nosebleeds, postnasal drip, sinus pressure, sore throat, tinnitus, trouble swallowing and voice change. Eyes: Negative for photophobia, pain, discharge, redness, itching and visual disturbance. Respiratory: Negative for apnea, cough, choking, chest tightness, shortness of breath and wheezing. Cardiovascular: Negative for chest pain, palpitations, leg swelling and near-syncope.    Gastrointestinal: Negative for abdominal bifurcation as well as soft plaquing seen in the mid common carotid   artery producing approximately 26 and 33% luminal narrowing respectively by   visual diameter assessment.       The peak ICA systolic velocity measurement is 104/13 cm per sec. The peak   systolic common carotid artery velocity 140/18 cm per sec. The ICA to CCA   ratio 0.7.       Antegrade flow to noted in the right vertebral artery.       LEFT:       There are soft atherosclerotic plaquing in the mid left common carotid artery   producing approximately 22% luminal narrowing by visual diameter assessment. There is no plaquing or narrowing identified of the internal carotid artery. The peak ICA systolic velocity measurement is 104/11 cm per sec. The peak   systolic common carotid artery velocity 110/13 cm per sec. The ICA to CCA   ratio 0.9.       Antegrade flow noted in the right left vertebral artery.           Impression   No significant interval change.  The right internal carotid artery   demonstrates 0-50% stenosis .  There is minimal hard plaquing in the proximal   right ICA producing approximately 26% luminal narrowing by visual diameter   assessment.       The left internal carotid artery demonstrates 0-50% stenosis .       Mild soft plaquing at mid bilateral common carotid artery producing   approximately 33% luminal narrowing on the right and 22% on the left side.       Bilateral vertebral arteries are patent with flow in the normal direction.             VISITING DIAGNOSIS:      ICD-10-CM    1. Essential tremor G25.0 CT Head WO Contrast     VL DUP CAROTID BILATERAL     US Transcranial Doppler Complete   2. PVD (posterior vitreous detachment), both eyes H43.813    3. Combined forms of age-related cataract of both eyes H25.813    4. Difficulty controlling anger R45.4    5. Hypothyroidism due to acquired atrophy of thyroid E03.4    6. Essential hypertension I10    7. Impaired fasting glucose R73.01    8.  Osteopenia, unspecified tolerance/dependence & alternative treatments discussed. , Assessed functional status. Dave Anaya MD)                      Orders Placed This Encounter   Procedures    CT Head WO Contrast    VL DUP CAROTID BILATERAL    US Transcranial Doppler Complete                             *  PATIENT  TO  RETURN  THE  CLINIC  AFTER   ABOVE,                       FOR   FURTHER    RE EVALUATIONS                               AND  ADDITIONAL  RECOMMENDATIONS ,                        INCLUDING  MEDICAL  MANAGEMENT,                        AS   CLINICALLY    INDICATED. *PATIENT   TO  FOLLOW  UP  WITH   PRIMARY  CARE         OTHER  CONSULTANTS  AS  BEFORE. *  Maintain   Healthy  Life Style    With   Periodic  Monitoring  Of      Any  Medical  Conditions  Including   Elevated  Blood  Pressure,  Lipid  Profile,     Blood  Sugar levels  AndHeart  Disease. *   Period   Screening  For  Cancers  Involving  Breast,  Colon,    lungs  And  Other  Organs  As  Applicable,  In consultation   With  Your  Primary Care Providers. *Second  Neurological  Opinion  And  Evaluations  In  [facility]  Setting  If  Patient  Is  Interested. * Please   Contact   Neurology  Clinic   Early   If   Are  Any  New  Neurological   And  Any neurological  Concerns. *  If  The  Patient remains  Neurologically  Stable   Return   To  [facility] Neurology Department   IN      2    MONTHS  TIME   FOR  FURTHER              FOLLOW UP.                       *   The  Neurological   Findings,  Possible  Diagnosis,  Differential diagnoses                    And  Options  For    Further   Investigations                   And  management   Are  Discussed  Comprehensively. Medications   And  Prescription   Risks  And  Side effects  Are   Also  Discussed.                      *  If   There is  Any  Significant  Worsening   Of Current  Symptoms  And  Or                  If patient  Develops   Any additional  New  NeurologicalSymptoms                  Or  Significant  Concerns   Should  Call  911 or  Go  To  Emergency  Department  For  Further  Immediate  Evaluation. The   Above  Were  Reviewed  With  patient   and                       questions  Answered  In  Detail. More   Than  50% of face  To face Time   Was  Spent  On  Counseling                    And   Coordination  Of  Care   Of   Patient's  multiple   Neurological  Problems                         And   Comorbid  Medical   Conditions. Electronically signed by Mario Benjamin MD    Board Certified in  Neurology &  In  [facility] ([facility])      DISCLAIMER:   Although every effort was made to ensure the accuracy of this  electronictranscription, some errors in transcription may have occurred. GENERAL PATIENT INSTRUCTIONS:      A Healthy Lifestyle: Care Instructions   Your Care Instructions   A healthy lifestyle can help you feel good, stay at ahealthy weight, and have plenty of energy for both work and play. A healthy lifestyle is something you can share with your whole family.  A healthy lifestyle also can lower your risk for serious health problems, such ashigh blood pressure, heart disease, and diabetes.  You can follow a few steps listed below to improve your health and the health of your family.  Follow-up careis a key part of your treatment and safety. Be sure to make and go to all appointments, and call your doctor if you are having problems. Its also a good idea to know your test results and keep a list of the medicines you take.  How can you care for yourself at home?  Do not eat too much sugar, fat, or fast foods. You can still have dessert and treats nowand then. The goal is moderation.  Start small to improve your eating habits.  Pay attention to portion sizes, drink less juice and soda pop, and eat more fruits and vegetables.  Eat a healthy amount of food. A 3-ounce serving of meat, for example, is about the size of a deck of cards. Fill the rest of your plate with vegetables and whole grains.  Limit theamount of soda and sports drinks you have every day. Drink more water when you are thirsty.  Eat at least 5 servings of fruits and vegetables every day. It may seem like a lot, but it is not hard to reach this goal. Aserving or helping is 1 piece of fruit, 1 cup of vegetables, or 2 cups of leafy, raw vegetables. Have an apple or some carrot sticks as an afternoon snack instead of a candy bar. Try to have fruits and/or vegetables at everymeal.   Make exercise part of your daily routine. You may want to start with simple activities, such as walking, bicycling, or slow swimming. Try chang active 30 to 60 minutes every day. You do not need to do all 30 to 60 minutes all at once. For example, you can exercise 3 times a day for 10 or 20 minutes. Moderate exercise is safe for most people, but it is always agood idea to talk to your doctor before starting an exercise program.   Keep moving. Mayuri Bang the lawn, work in the garden, or [employer]. Take the stairs instead of the elevator at work.  If you smoke, quit. Peoplewho smoke have an increased risk for heart attack, stroke, cancer, and other lung illnesses. Quitting is hard, but there are ways to boost your chance of quitting tobacco for good.  Use nicotine gum, patches, or lozenges.  Ask your doctor about stop-smoking programs and medicines.  Keep trying.  In addition to reducing your risk of diseases in the future, you will notice some benefits soon after you stop using tobacco. If you have shortness of breath or asthma symptoms, they will likely getbetter within a few weeks after you quit.  Limit how much alcohol you drink.  Moderate amounts of alcohol (up to 2 drinks a day for men, 1drink a day for women) are okay. But drinking too much can lead to liver problems, high blood pressure, and other health problems.  health   If you have a family, there are many things you can do together to improve your health.  Eat meals together as a family as often as possible.  Eat healthy foods. This includes fruits, vegetables, lean meats and dairy, and whole grains.  Include your family in your fitness plan. Most peoplethink of activities such as jogging or tennis as the way to fitness, but there are many ways you and your family can be more active. Anything that makes you breathe hard and gets your heart pumping is exercise. Here are sometips:   Walk to do errands or to take your child to school or the bus.  Go for a family bike ride after dinner instead of watching TV.  Where can you learn more?  Go toSensingStriptps://[email or web address].healthFreeLunched. org and sign in to your [employer] account. Enter E789 in the Search HealthInformation box to learn more about \"A Healthy Lifestyle: Care Instructions. \"     If you do not have anaccount, please click on the \"Sign Up Now\" link.  Current as of: July 26, 2016   Content Version: 11.2   © 6987-2099 [employer]. Care instructions adapted under license by [facility] ([facility]). If you have questions about a medical condition or this instruction, always ask your healthcare professional. [employer] disclaims any warranty or liability for your use of this information.

## 2020-05-22 NOTE — PATIENT INSTRUCTIONS
[facility] health department says you can return to your normal activities. Stay home except to get medical care  People who are mildly ill with COVID-19 are able to isolate at home during their illness. You should restrict activities outside your home, except for getting medical care. Do not go to work, school, or public areas. Avoid using public transportation, ride-sharing, or taxis. Separate yourself from other people and animals in your home  People: As much as possible, you should stay in a specific room and away from other people in your home. Also, you should use a separate bathroom, if available. Animals: You should restrict contact with pets and other animals while you are sick with COVID-19, just like you would around other people. Although there have not been reports of pets or other animals becoming sick with COVID-19, it is still recommended that people sick with COVID-19 limit contact with animals until more information is known about the virus. When possible, have another member of your household care for your animals while you are sick. If you are sick with COVID-19, avoid contact with your pet, including petting, snuggling, being kissed or licked, and sharing food. If you must care for your pet or be around animals while you are sick, wash your hands before and after you interact with pets and wear a facemask. Call ahead before visiting your doctor  If you have a medical appointment, call the healthcare provider and tell them that you have or may have COVID-19. This will help the healthcare providers office take steps to keep other people from getting infected or exposed. Wear a facemask  You should wear a facemask when you are around other people (e.g., sharing a room or vehicle) or pets and before you enter a healthcare providers office.  If you are not able to wear a facemask (for example, because it causes trouble breathing), then people who live with you should not stay in the same room with being evaluated for, COVID-19. Put on a facemask before you enter the facility. These steps will help the healthcare providers office to keep other people in the office or waiting room from getting infected or exposed. Ask your healthcare provider to call the local or state health department. Persons who are placed under active monitoring or facilitated self-monitoring should follow instructions provided by their local health department or occupational health professionals, as appropriate. When working with your local health department check their available hours. If you have a medical emergency and need to call 911, notify the dispatch personnel that you have, or are being evaluated for COVID-19. If possible, put on a facemask before emergency medical services arrive. Discontinuing home isolation  Patients with confirmed COVID-19 should remain under home isolation precautions until the risk of secondary transmission to others is thought to be low. The decision to discontinue home isolation precautions should be made on a case-by-case basis, in consultation with healthcare providers and [facility] health departments. * FALL   PRECAUTIONS. *  USE   WALKING  ASSISTANCE  DEVICES     QUAD  CANE  /   [place]      *    TO   AVOID   TO  SLEEP  IN   SUPINE  POSITION. *      WEIGHT   LOSS. *   ADEQUATE   FLUID  INTAKE   AND  ELECTROLYTE  BALANCE           * KEEP  DAIRY  OF   THE  NEUROLOGICAL  SYMPTOMS          *  TO  MAINTAIN  REGULAR  SLEEP  WAKE  CYCLES. *   TO  HAVE  ADEQUATE  REST  AND   SLEEP    HOURS.        *    AVOID  USAGE OF   TOBACCO,  EXCESSIVE  ALCOHOL                AND   ILLEGAL   SUBSTANCES,  IF  ANY          *  Maintain   Healthy  Life Style    With   Periodic  Monitoring  Of      Any  Medical  Conditions  Including   Elevated  Blood  Pressure,  Lipid  Profile,     Blood  Sugar levels  And   Heart  Disease.                 *   Period   Screening  For  Cancers  Involving

## 2020-06-02 RX ORDER — FLUOXETINE HYDROCHLORIDE 20 MG/1
20 CAPSULE ORAL DAILY
Qty: 90 CAPSULE | Refills: 3 | Status: SHIPPED | OUTPATIENT
Start: 2020-06-02 | End: 2020-09-16 | Stop reason: ALTCHOICE

## 2020-06-05 ENCOUNTER — HOSPITAL ENCOUNTER (OUTPATIENT)
Dept: CT IMAGING | Age: 85
Discharge: HOME | End: 2020-06-07
Payer: MEDICARE

## 2020-06-05 ENCOUNTER — HOSPITAL ENCOUNTER (OUTPATIENT)
Dept: INTERVENTIONAL RADIOLOGY/VASCULAR | Age: 85
Discharge: HOME | End: 2020-06-07
Payer: MEDICARE

## 2020-06-05 PROCEDURE — 70450 CT HEAD/BRAIN W/O DYE: CPT

## 2020-06-05 PROCEDURE — 93880 EXTRACRANIAL BILAT STUDY: CPT

## 2020-06-08 RX ORDER — LATANOPROST 50 UG/ML
1 SOLUTION/ DROPS OPHTHALMIC NIGHTLY
Qty: 3 BOTTLE | Refills: 3 | Status: SHIPPED | OUTPATIENT
Start: 2020-06-08 | End: 2021-01-20

## 2020-06-10 ENCOUNTER — HOSPITAL ENCOUNTER (OUTPATIENT)
Dept: NEUROLOGY | Age: 85
Discharge: HOME | End: 2020-06-10
Payer: MEDICARE

## 2020-06-10 PROCEDURE — 93886 INTRACRANIAL COMPLETE STUDY: CPT

## 2020-06-10 NOTE — PROGRESS NOTES
TCD Completed without Emboli Detection. PCP: Kandis See MD    Ordering: Tennille Molina. Demetrio Neurologist    Interpreting Physician: Tennille Molina.  Mia Atkinson    Electronically signed by Leanna Newell RN on 6/10/2020 at 3:32 PM

## 2020-06-23 ENCOUNTER — TELEPHONE (OUTPATIENT)
Dept: PODIATRY | Age: 85
End: 2020-06-23

## 2020-06-23 ENCOUNTER — OFFICE VISIT (OUTPATIENT)
Dept: PODIATRY | Age: 85
End: 2020-06-23
Payer: MEDICARE

## 2020-06-23 VITALS
SYSTOLIC BLOOD PRESSURE: 128 MMHG | WEIGHT: 135 LBS | HEART RATE: 68 BPM | BODY MASS INDEX: 23.92 KG/M2 | DIASTOLIC BLOOD PRESSURE: 80 MMHG | HEIGHT: 63 IN

## 2020-06-23 PROCEDURE — 99999 PR OFFICE/OUTPT VISIT,PROCEDURE ONLY: CPT | Performed by: PODIATRIST

## 2020-06-23 PROCEDURE — 11721 DEBRIDE NAIL 6 OR MORE: CPT | Performed by: PODIATRIST

## 2020-06-23 NOTE — PROGRESS NOTES
6/8/20  Yes Leonarda George Mutter, OD   FLUoxetine (PROZAC) 20 MG capsule Take 1 capsule by mouth daily 6/2/20 5/28/21 Yes Jonny Duran MD   ammonium lactate (AMLACTIN) 12 % cream apply to affected area twice a day if needed 4/7/20  Yes Jonny Duran MD   amLODIPine (NORVASC) 2.5 MG tablet Take one tablet daily. 3/10/20  Yes Jonny Duran MD   losartan (COZAAR) 100 MG tablet TAKE 1 TABLET DAILY 2/18/20  Yes FELICIA Andrea CNP   calcium-vitamin D (OSCAL 500/200 D-3) 500-200 MG-UNIT per tablet Take 1 tablet by mouth daily 10/1/19  Yes William Akers DO   donepezil (ARICEPT) 5 MG tablet Take 1 tablet by mouth nightly 10/1/19  Yes Desmond Lundberg, DO   levothyroxine (SYNTHROID) 137 MCG tablet TAKE 1 TABLET DAILY 7/15/19  Yes FELICIA Andrea CNP   Multiple Vitamin (DAILY MULTIVITAMIN PO) Take 1 tablet by mouth daily    Yes Historical Provider, MD   primidone (MYSOLINE) 50 MG tablet Take 1 tablet by mouth nightly 5/18/20 6/17/20  Clearance MD Renee   atenolol (TENORMIN) 25 MG tablet Take 1 tablet by mouth daily 11/25/19 2/11/20  Clearance MD Renee       Past Surgical History:   Procedure Laterality Date    APPENDECTOMY      CHOLECYSTECTOMY      Remote.  COLONOSCOPY  06/08/10    COLONOSCOPY  07/18/02    CYSTOCELE REPAIR  02/12/99    Vaginal prolapse, cystocele plus pelvic relaxation.  DILATION AND CURETTAGE OF UTERUS      with hysteroscopy   Mary Lou    still has ovaries     MOHS SURGERY  10/07/09    SCC, left nasal sidewall.  OTHER SURGICAL HISTORY  11/11/08    Anterior repair.  OTHER SURGICAL HISTORY  1988    laser cone    UPPER GASTROINTESTINAL ENDOSCOPY  4/8/2015    hiatal hernia    VEIN SURGERY  06/08/09    Laser ablation of right greater saphenous vein.         Family History   Problem Relation Age of Onset    Coronary Art Dis Father     Colon Cancer Brother     Diabetes Neg Hx     Cataracts Neg Hx     Glaucoma Neg Hx        Social History     Tobacco Use    Smoking status: Dermatophytosis of nail     3. Corns and callosities         Plan: Patient examined and evaluated. Current condition and treatment options discussed in detail. All nails as mentioned above debrided in length and thickness. Patient advised OTC methods for treatment of the mycotic nails. Patient will follow up in 10 weeks.

## 2020-06-23 NOTE — TELEPHONE ENCOUNTER
Patient missed her appointment today with Dr. Oniel Collins at 1:00, she thought it was tomorrow. Patient wants to know if she can be squeezed in this week for nail care because she will be leaving for vacation next week. Please call patient back at [phone].

## 2020-06-24 ENCOUNTER — OFFICE VISIT (OUTPATIENT)
Dept: NEUROLOGY | Age: 85
End: 2020-06-24
Payer: MEDICARE

## 2020-06-24 VITALS
BODY MASS INDEX: 24.34 KG/M2 | WEIGHT: 137.4 LBS | RESPIRATION RATE: 14 BRPM | SYSTOLIC BLOOD PRESSURE: 128 MMHG | DIASTOLIC BLOOD PRESSURE: 70 MMHG | HEIGHT: 63 IN | HEART RATE: 68 BPM

## 2020-06-24 PROBLEM — I63.9 CEREBRAL INFARCTION (HCC): Status: ACTIVE | Noted: 2020-06-24

## 2020-06-24 PROCEDURE — 99214 OFFICE O/P EST MOD 30 MIN: CPT | Performed by: PSYCHIATRY & NEUROLOGY

## 2020-06-24 PROCEDURE — 99215 OFFICE O/P EST HI 40 MIN: CPT | Performed by: PSYCHIATRY & NEUROLOGY

## 2020-06-24 ASSESSMENT — ENCOUNTER SYMPTOMS
CHANGE IN BOWEL HABIT: 0
BOWEL INCONTINENCE: 0
EYE REDNESS: 0
VOMITING: 0
EYE ITCHING: 0
SHORTNESS OF BREATH: 0
COLOR CHANGE: 0
BLOOD IN STOOL: 0
SORE THROAT: 0
BACK PAIN: 0
FACIAL SWELLING: 0
CONSTIPATION: 0
EYE DISCHARGE: 0
EYE PAIN: 0
VISUAL CHANGE: 0
ABDOMINAL DISTENTION: 0
DIARRHEA: 0
APNEA: 0
NAUSEA: 0
SWOLLEN GLANDS: 0
WHEEZING: 0
TROUBLE SWALLOWING: 0
SINUS PRESSURE: 0
PHOTOPHOBIA: 0
COUGH: 0
ABDOMINAL PAIN: 0
CHEST TIGHTNESS: 0
VOICE CHANGE: 0
CHOKING: 0

## 2020-06-24 NOTE — PROGRESS NOTES
[facility]  Neurology  [address] [address]  TIMYO:[phone]   Fax: [phone]    SUBJECTIVE:     PATIENT ID:  Marcus Rogers is a  RIGHT    HANDED 80 y.o. female. Neurologic Problem   The patient's primary symptoms include memory loss. The patient's pertinent negatives include no altered mental status, clumsiness, focal sensory loss, focal weakness, loss of balance, near-syncope, slurred speech, syncope, visual change or weakness. Primary symptoms comment: TREMORS   BOTH  HANDS. This is a recurrent problem. Episode onset: FOR    3  YEARS. The neurological problem developed insidiously. The problem is unchanged. There was right-sided, left-sided and upper extremity focality noted. Associated symptoms include dizziness. Pertinent negatives include no abdominal pain, auditory change, aura, back pain, bladder incontinence, bowel incontinence, chest pain, confusion, diaphoresis, fatigue, fever, headaches, light-headedness, nausea, neck pain, palpitations, shortness of breath, vertigo or vomiting. Past treatments include bed rest, sleep and medication. The treatment provided no relief. Her past medical history is significant for a CVA and dementia. There is no history of a bleeding disorder, a clotting disorder, head trauma, liver disease, mood changes or seizures. Dizziness   This is a chronic problem. Episode onset: SINCE    2018. The problem occurs intermittently. The problem has been resolved. Pertinent negatives include no abdominal pain, anorexia, arthralgias, change in bowel habit, chest pain, chills, congestion, coughing, diaphoresis, fatigue, fever, headaches, joint swelling, myalgias, nausea, neck pain, numbness, rash, sore throat, swollen glands, urinary symptoms, vertigo, visual change, vomiting or weakness. The symptoms are aggravated by bending and exertion. She has tried rest and sleep for the symptoms. The treatment provided significant relief. MODIFYING  FACTORS:  fever/infection, exertion/relaxation, position change, stress, weather change,                        medications/alcohol, time of day/darkness/light  Are  absent             Patient   Indicates   The  Presence   And  The  Absence  Of  The  Following    Associated  And   Additional  Neurological    Symptoms:                                Balance  And coordination   problems  absent           Gait problems     absent            Headaches      absent              Migraines           absent           Memory problemspresent              Confusion        absent            Paresthesia   numbness          absent           Seizures  And  Starring  Episodes           absent           Syncope,  Near  syncopal episodes         absent           Speech   problems           absent             Swallowing   Problems      absent            Dizziness,  Light headedness           absent              Vertigo        absent             Generalized   Weakness    absent              focal  Weakness     absent             Tremors         PRESENT                 Sleep  Problems     absent             History  Of   Recent  Head  Injury     absent             History  Of   Recent  TIA     absent             History  Of   Recent    Stroke     absent             Neck  Pain   and   Neck muscle  Spasms  absent               Radiating  down   And   Weakness           absent            Lower back   Pain  And     Spasms  absent              Radiating    Down   And   Weakness          absent                H/OFALLS        absent               History  Of   Visual  Symptoms    absent                  Associated   Diplopia       absent                                               Also   Additional   Symptoms   Present    As  Documented    In   The   detailed                  Review  Of  Systems   And    Please   Refer   To    Them for   Additional    Information.                   Any components 100.2 05/13/2020    MCH 32.2 05/13/2020    MCHC 32.1 05/13/2020    RDW 13.6 05/13/2020     05/13/2020    MPV 10.7 05/13/2020       Chemistries  Lab Results   Component Value Date     05/13/2020    K 3.9 05/13/2020     05/13/2020    CO2 30 05/13/2020    BUN 18 05/13/2020    CREATININE 0.72 05/13/2020    CALCIUM 9.0 05/13/2020    PROT 7.0 05/13/2020    LABALBU 4.3 05/13/2020    BILITOT 0.59 05/13/2020    ALKPHOS 45 05/13/2020    AST 21 05/13/2020    ALT 6 05/13/2020     Lab Results   Component Value Date    ALKPHOS 45 05/13/2020    ALT 6 05/13/2020    AST 21 05/13/2020    PROT 7.0 05/13/2020    BILITOT 0.59 05/13/2020    LABALBU 4.3 05/13/2020     Lab Results   Component Value Date    BUN 18 05/13/2020    CREATININE 0.72 05/13/2020     Lab Results   Component Value Date    CALCIUM 9.0 05/13/2020    MG 1.7 01/22/2015     Lab Results   Component Value Date    AST 21 05/13/2020    ALT 6 05/13/2020       Lab Results   Component Value Date    CKTOTAL 39 01/21/2015     Lab Results   Component Value Date    [id number] 396 05/13/2020         Review of Systems   Constitutional: Negative for appetite change, chills, diaphoresis, fatigue, fever and unexpected weight change. HENT: Negative for congestion, dental problem, drooling, ear discharge, ear pain, facial swelling, hearing loss, mouth sores, nosebleeds, postnasal drip, sinus pressure, sore throat, tinnitus, trouble swallowing and voice change. Eyes: Negative for photophobia, pain, discharge, redness, itching and visual disturbance. Respiratory: Negative for apnea, cough, choking, chest tightness, shortness of breath and wheezing. Cardiovascular: Negative for chest pain, palpitations, leg swelling and near-syncope. Gastrointestinal: Negative for abdominal distention, abdominal pain, anorexia, blood in stool, bowel incontinence, change in bowel habit, constipation, diarrhea, nausea and vomiting.    Endocrine: Negative for cold intolerance, heat intolerance, polydipsia, polyphagia and polyuria. Genitourinary: Negative for bladder incontinence. Musculoskeletal: Negative for arthralgias, back pain, gait problem, joint swelling, myalgias, neck pain and neck stiffness. Skin: Negative for color change, pallor, rash and wound. Allergic/Immunologic: Negative for environmental allergies, food allergies and immunocompromised state. Neurological: Positive for dizziness and tremors. Negative for vertigo, focal weakness, seizures, syncope, facial asymmetry, speech difficulty, weakness, light-headedness, numbness, headaches and loss of balance. Hematological: Negative for adenopathy. Does not bruise/bleed easily. Psychiatric/Behavioral: Positive for memory loss. Negative for agitation, behavioral problems, confusion, decreased concentration, dysphoric mood, hallucinations, self-injury, sleep disturbance and suicidal ideas. The patient is not nervous/anxious and is not hyperactive. OBJECTIVE:    Physical Exam  Constitutional:       Appearance: She is well-developed. HENT:      Head: Normocephalic and atraumatic. No raccoon eyes or Giles's sign. Right Ear: External ear normal.      Left Ear: External ear normal.      Nose: Nose normal.   Eyes:      Conjunctiva/sclera: Conjunctivae normal.      Pupils: Pupils are equal, round, and reactive to light. Neck:      Musculoskeletal: Normal range of motion and neck supple. Normal range of motion. No neck rigidity or muscular tenderness. Thyroid: No thyroid mass or thyromegaly. Vascular: No carotid bruit. Trachea: No tracheal deviation. Meningeal: Brudzinski's sign and Kernig's sign absent. Cardiovascular:      Rate and Rhythm: Normal rate and regular rhythm. Pulmonary:      Effort: Pulmonary effort is normal.   Musculoskeletal:         General: No tenderness. Skin:     General: Skin is warm. Coloration: Skin is not pale. Findings: No erythema or rash. Nails: There is no clubbing. Psychiatric:         Attention and Perception: She is attentive. Mood and Affect: Mood is not anxious or depressed. Affect is not labile, blunt or inappropriate. Speech: She is communicative. Speech is delayed. Speech is not rapid and pressured, slurred or tangential.         Behavior: Behavior is slowed. Behavior is not agitated, aggressive, withdrawn, hyperactive or combative. Behavior is cooperative. Thought Content: Thought content is not paranoid or delusional. Thought content does not include homicidal or suicidal ideation. Thought content does not include homicidal or suicidal plan. Cognition and Memory: Cognition is impaired. Memory is impaired. She exhibits impaired recent memory and impaired remote memory. Judgment: Judgment is not impulsive or inappropriate. NEUROLOGICALEXAMINATION :      A) MENTAL STATUS:                   Alert and  oriented  To time, place  And  Person. No Aphasia. No  Dysarthria. Able   To  Follow      SIMPLE    commands   without   Any  Difficulty. No right  To left confusion. SLOW    Speech  And language function. Insight and  Judgment ,Fund  Of  Knowledge   DECREASED                  Recent  And  Remote memory    DECREASED                  Attention &  Concentration are   DECREASED                           B) CRANIAL NERVES :      CN : Visual  Acuity  And  Visual fields  within normal limits               Fundi  Could  Not  Be  Could  Not  Be  Evaluated. 3,4,6 CN : Both  Pupils are   Reactive and  Equal.  Movements  Are  Intact. No  Nystagmus. No  FEDERICO. No  Afferent  Pupillary  Defect noted. 5 CN :  Normal  Facial sensations and Corneal  Reflexes           7 CN:  Normal  Facial  Symmetry  And  Strength. No facial  Weakness.            8 CN :  Hearing  Appears systolic common carotid artery velocity 110/13 cm per sec. The ICA to CCA   ratio 0.9.       Antegrade flow noted in the right left vertebral artery.           Impression   No significant interval change.  The right internal carotid artery   demonstrates 0-50% stenosis .  There is minimal hard plaquing in the proximal   right ICA producing approximately 26% luminal narrowing by visual diameter   assessment.       The left internal carotid artery demonstrates 0-50% stenosis .       Mild soft plaquing at mid bilateral common carotid artery producing   approximately 33% luminal narrowing on the right and 22% on the left side.       Bilateral vertebral arteries are patent with flow in the normal direction.             VISITING DIAGNOSIS:      ICD-10-CM    1. Cerebral infarction, unspecified mechanism (HCC) I63.9    2. Dizziness R42    3. Difficulty controlling anger R45.4    4. 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 Essential tremor G25.0    6. Chronic cerebral ischemia I67.82    7. Tremor R25.1    8. Dyslipidemia E78.5    9. Visual disturbance H53.9    10. Primary open angle glaucoma (POAG) of both eyes, moderate stage H40.1132    11. Acquired cerebral atrophy ([address] G31.9    12. Dementia with behavioral disturbance, unspecified dementia type ([address] F03.91    13. Essential hypertension I10    14. Impaired fasting glucose R73.01    15. Gastroesophageal reflux disease without esophagitis K21.9    16. PVD (posterior vitreous detachment), both eyes H43.813    17. VBI (vertebrobasilar insufficiency) G45.0                 CONCERNS   &   INCREASED   RISK   FOR         * TIA,  CEREBRO  VASCULAR  ISCHEMIA,     *   DIZZINESS,   VERTEBROBASILAR  INSUFFICIENCY ,         *   COGNITIVE  &   MEMORY PROBLEMS  AND  DEMENTIAS                    VARIOUS  RISK   FACTORS   WERE  REVIEWED   AND   DISCUSSED.         *  PATIENT   HAS  MULTIPLE   MEDICAL, NEUROLOGICAL                        AND   MENTAL HEALTH   PROBLEMS is not hard to reach this goal. Aserving or helping is 1 piece of fruit, 1 cup of vegetables, or 2 cups of leafy, raw vegetables. Have an apple or some carrot sticks as an afternoon snack instead of a candy bar. Try to have fruits and/or vegetables at everymeal.   Make exercise part of your daily routine. You may want to start with simple activities, such as walking, bicycling, or slow swimming. Try chang active 30 to 60 minutes every day. You do not need to do all 30 to 60 minutes all at once. For example, you can exercise 3 times a day for 10 or 20 minutes. Moderate exercise is safe for most people, but it is always agood idea to talk to your doctor before starting an exercise program.   Keep moving. Juhi Mendenhalls the lawn, work in the garden, or [employer]ate. Take the stairs instead of the elevator at work.  If you smoke, quit. Peoplewho smoke have an increased risk for heart attack, stroke, cancer, and other lung illnesses. Quitting is hard, but there are ways to boost your chance of quitting tobacco for good.  Use nicotine gum, patches, or lozenges.  Ask your doctor about stop-smoking programs and medicines.  Keep trying.  In addition to reducing your risk of diseases in the future, you will notice some benefits soon after you stop using tobacco. If you have shortness of breath or asthma symptoms, they will likely getbetter within a few weeks after you quit.  Limit how much alcohol you drink. Moderate amounts of alcohol (up to 2 drinks a day for men, 1drink a day for women) are okay. But drinking too much can lead to liver problems, high blood pressure, and other health problems.  health   If you have a family, there are many things you can do together to improve your health.  Eat meals together as a family as often as possible.  Eat healthy foods. This includes fruits, vegetables, lean meats and dairy, and whole grains.  Include your family in your fitness plan.  Most peoplethink of activities such as jogging or tennis as the way to fitness, but there are many ways you and your family can be more active. Anything that makes you breathe hard and gets your heart pumping is exercise. Here are sometips:   Walk to do errands or to take your child to school or the bus.  Go for a family bike ride after dinner instead of watching TV.  Where can you learn more?  Go [email or web address] and sign in to your [employer] account. Enter W407 in the Search HealthInformation box to learn more about \"A Healthy Lifestyle: Care Instructions. \"     If you do not have anaccount, please click on the \"Sign Up Now\" link.  Current as of: July 26, 2016   Content Version: 11.2   © 8952-1497 [employer]. Care instructions adapted under license by [facility] ([facility], [place]). If you have questions about a medical condition or this instruction, always ask your healthcare professional. [employer] disclaims any warranty or liability for your use of this information.

## 2020-06-24 NOTE — PATIENT INSTRUCTIONS
[facility] NEUROLOGY    Due to the complex nature of our neurological testing, It is the policy of the Neurology Department not to release the results of your testing over the phone. Once all testing is completed and we have all the results back, Dr. Kenny Matthew will then personally go over all the results with you and answer any questions that you may have during a follow up appointment. If you are unable to keep this appointment, please notify the [address] @ [phone], as soon as possible. Please bring your prescription bottles to all appointments. * FALL   PRECAUTIONS. *   ADEQUATE   FLUID  INTAKE   AND  ELECTROLYTE  BALANCE           * KEEP  DAIRY  OF   THE  NEUROLOGICAL  SYMPTOMS          *  TO  MAINTAIN  REGULAR  SLEEP  WAKE  CYCLES. *   TO  HAVE  ADEQUATE  REST  AND   SLEEP    HOURS.        *    AVOID  USAGE OF   TOBACCO,  EXCESSIVE  ALCOHOL                AND   ILLEGAL   SUBSTANCES,  IF  ANY          *  Maintain   Healthy  Life Style    With   Periodic  Monitoring  Of      Any  Medical  Conditions  Including   Elevated  Blood  Pressure,  Lipid  Profile,     Blood  Sugar levels  And   Heart  Disease. *   Period   Screening  For  Cancers  Involving  Breast,  Colon,   lungs  And  Other  Organs  As  Applicable,  In consultation   With  Your  Primary Care Providers. *  If   There is  Any  Significant  Worsening   Of  Current  Symptoms  And  Or  If    Any additional  New  Neurological  Symptoms                 Or  Significant  Concerns   Should  Call  911 or  Go  To  Emergency  Department  For  Further  Immediate  Evaluation.

## 2020-07-09 RX ORDER — LEVOTHYROXINE SODIUM 137 UG/1
TABLET ORAL
Qty: 90 TABLET | Refills: 3 | Status: SHIPPED | OUTPATIENT
Start: 2020-07-09 | End: 2021-06-01

## 2020-07-27 ENCOUNTER — OFFICE VISIT (OUTPATIENT)
Dept: OPHTHALMOLOGY | Age: 85
End: 2020-07-27
Payer: MEDICARE

## 2020-07-27 PROCEDURE — 92083 EXTENDED VISUAL FIELD XM: CPT | Performed by: OPHTHALMOLOGY

## 2020-07-27 PROCEDURE — 66821 AFTER CATARACT LASER SURGERY: CPT | Performed by: OPHTHALMOLOGY

## 2020-07-27 PROCEDURE — 92134 CPTRZ OPH DX IMG PST SGM RTA: CPT | Performed by: OPHTHALMOLOGY

## 2020-07-27 PROCEDURE — 92133 CPTRZD OPH DX IMG PST SGM ON: CPT | Performed by: OPHTHALMOLOGY

## 2020-07-27 PROCEDURE — 67820 REVISE EYELASHES: CPT | Performed by: OPHTHALMOLOGY

## 2020-07-27 PROCEDURE — 92014 COMPRE OPH EXAM EST PT 1/>: CPT | Performed by: OPHTHALMOLOGY

## 2020-07-27 RX ORDER — PREDNISOLONE ACETATE 10 MG/ML
SUSPENSION/ DROPS OPHTHALMIC
Qty: 1 BOTTLE | Refills: 1 | Status: SHIPPED | OUTPATIENT
Start: 2020-07-27 | End: 2020-08-19 | Stop reason: ALTCHOICE

## 2020-07-27 RX ORDER — MOXIFLOXACIN 5 MG/ML
SOLUTION/ DROPS OPHTHALMIC
Qty: 1 BOTTLE | Refills: 1 | Status: SHIPPED | OUTPATIENT
Start: 2020-07-27 | End: 2020-08-19 | Stop reason: ALTCHOICE

## 2020-07-27 ASSESSMENT — VISUAL ACUITY
OS_CC: 20/60
OD_PH_CC: 20/30
OD_CC+: -3
METHOD: SNELLEN - LINEAR
CORRECTION_TYPE: GLASSES
OS_PH_CC+: -2
OS_PH_CC: 20/50

## 2020-07-27 ASSESSMENT — TONOMETRY
OD_IOP_MMHG: 15
OS_IOP_MMHG: 15
IOP_METHOD: TONOPEN

## 2020-07-27 ASSESSMENT — PACHYMETRY
OS_CT(UM): 511
OD_CT(UM): 507

## 2020-07-27 ASSESSMENT — SLIT LAMP EXAM - LIDS: COMMENTS: 2+ DERMATOCHALASIS - UPPER LID

## 2020-07-27 ASSESSMENT — ENCOUNTER SYMPTOMS
ALLERGIC/IMMUNOLOGIC NEGATIVE: 0
RESPIRATORY NEGATIVE: 0
GASTROINTESTINAL NEGATIVE: 0
EYES NEGATIVE: 0

## 2020-07-27 ASSESSMENT — CONF VISUAL FIELD
METHOD: COUNTING FINGERS
OD_NORMAL: 1
OS_NORMAL: 1

## 2020-07-27 NOTE — PROGRESS NOTES
Mick Bradford is a 80 y.o. female here for a complete eye exam.      Chief Complaint   Patient presents with    Glaucoma    Cataract       HPI     She is here today for glaucoma follow up and cataract evaluation. She does feel that her vision is getting worse.            Review of Systems  ROS     Positive for: HENT    Negative for: Constitutional, Gastrointestinal, Neurological, Skin, Genitourinary, Musculoskeletal, Endocrine, Cardiovascular, Eyes, Respiratory, Psychiatric, Allergic/Imm, Heme/Lymph          Main Ophthalmology Exam     External Exam       Right Left    External Normal Normal          Slit Lamp Exam       Right Left    Lids/Lashes 2+ Dermatochalasis - upper lid 2+ Dermatochalasis - upper lid, 2+ Trichiasis - lower lid along with iatrogenic madarosis    Conjunctiva/Sclera White and quiet White and quiet    Cornea trauma scar central just temporal to visual axis, tr PEE 1+ PEE inferior    Anterior Chamber Deep and quiet Deep and quiet    Iris Round and reactive Round and reactive    Lens 2+ Nuclear sclerosis, Trace Cortical cataract 2+ Nuclear sclerosis, Trace Cortical cataract    Vitreous Posterior vitreous detachment Posterior vitreous detachment          Fundus Exam       Right Left    Disc Normal Normal    C/D Ratio Vertical 0.4 0.35    Macula 1-2 drusenoid changes temporal -- no RPE mottling or heme Normal    Vessels Normal Normal    Periphery Normal Normal                   Tonometry     Tonometry (Tonopen, 1:36 PM)       Right Left    Pressure 15 15              Visual Acuity     Visual Acuity (Snellen - Linear)       Right Left    Dist cc 20/40 -3 20/60    Dist ph cc 20/30 20/50 -2    Correction:  Glasses              Pupils     Pupils       Pupils APD    Right PERRL None    Left PERRL None               Confrontational Visual Fields     Visual Fields (Counting fingers)       Right Left     Full Full   X(T) OD                Extraocular Movement     Extraocular Movement       Right Left Full, Ortho Full, Ortho               Not recorded          Past Medical History:   Diagnosis Date    Adenomatous polyp 06/10    With recommendation for repeat 06/15. Also, diverticulosis was noted.  Cataracts, bilateral     Combined forms of age-related cataract of both eyes 10/1/2018    Corneal scar, right eye     Cystocele     history of     Dementia ([facility] [address]     Mini-Mental Status exam 27/30 6/14  declines meds at this point,  MMSE 29/30 on June 13, 2017  MMSE 26/30 4/2018    Difficulty controlling anger 9/16/2018    Dyslipidemia     GERD (gastroesophageal reflux disease)     egd  4/15 ok    Goiter     benign, history of     Hiatal hernia     Hypertension     Hypokalemia     Hypothyroidism     Impaired fasting glucose     Malignant melanoma in situ ([facility] [address]     R upper Back 8/17    Migraines     Murmur, functional     Grade 1/6 systolic, history of     Osteopenia     T -1.0 hip, 03/09, T -0.3 spine, 03/09. 1) Repeat DEXA scan 09/12 with T +0.15, T -1.2 at the hip but -1.8 at the mean femoral neck giving her a FRAX score of 4.3 at the hip. Reclast 10/13 and given 2014,2015, and 1/4/2017, on calcium and vit d,  Redo Dexa 10/14 Frax 4.2% , Frax 4.8% 10 yr hip fracture risk on Dexa 1/17  On Reclast    Posterior vitreous detachment of both eyes     Primary open angle glaucoma (POAG) of both eyes, moderate stage 10/1/2018    Sciatica 12/15/2015    Skin cancer     History of multiple skin cancers.  Following with Dr. Ann Acharya--- invasive squamous cell Ca 8/17 L forearm    Syncope and collapse 1/21/2015    Tremor 12/23/2018    Trichiasis of left lower eyelid without entropion     Visual disturbance 10/26/2017          Current Outpatient Medications:     levothyroxine (SYNTHROID) 137 MCG tablet, TAKE 1 TABLET DAILY, Disp: 90 tablet, Rfl: 3    latanoprost (XALATAN) 0.005 % ophthalmic solution, Place 1 drop into both eyes nightly, Disp: 3 Bottle, Rfl: 3    FLUoxetine (PROZAC) 20 MG capsule, Take 1 capsule by mouth daily, Disp: 90 capsule, Rfl: 3    primidone (MYSOLINE) 50 MG tablet, Take 1 tablet by mouth nightly, Disp: 90 tablet, Rfl: 0    ammonium lactate (AMLACTIN) 12 % cream, apply to affected area twice a day if needed, Disp: 385 g, Rfl: 3    amLODIPine (NORVASC) 2.5 MG tablet, Take one tablet daily. , Disp: 90 tablet, Rfl: 3    losartan (COZAAR) 100 MG tablet, TAKE 1 TABLET DAILY, Disp: 90 tablet, Rfl: 3    calcium-vitamin D (OSCAL 500/200 D-3) 500-200 MG-UNIT per tablet, Take 1 tablet by mouth daily, Disp: 90 tablet, Rfl: 3    donepezil (ARICEPT) 5 MG tablet, Take 1 tablet by mouth nightly, Disp: 90 tablet, Rfl: 3    Multiple Vitamin (DAILY MULTIVITAMIN PO), Take 1 tablet by mouth daily , Disp: , Rfl:      No Known Allergies     IMPRESSION:  1. Combined forms of age-related cataract of both eyes    2. Primary open angle glaucoma (POAG) of both eyes, moderate stage    3. PVD (posterior vitreous detachment), both eyes    4. Trichiasis of left lower eyelid    5. Keratoconjunctivitis sicca of both eyes not specified as Sjogren's        PLAN:    Cataract, BOTH eyes: The cataract is visually significant to the patient and affecting their activities of daily living. I discussed at length the risks, benefits, alternatives, and indications to removal of the cataract and implantation of intraocular lens. The patient is aware that although a low risk there is a true risk for permanent and total loss of vision and need for additional surgeries due to catastrophic infection, retinal tear/detachment, bleeding in the eye. The patient is aware of the risks of refractive surprise, need for glasses at all distances, swelling, inflammation, damage to iris/cornea/other ocular structures, need for laser capsulotomy in future, glaucoma, ptosis. Discussed options of monovision, monofocal with or without toric, multifocal with or without toric IOLs.   The patient understands that toric and multifocal IOLs are not covered by insurance and this would incur additional out of pocket costs. The patient opts for a monofocal IOL without astigmatism correction for emmetropic target and realizes that they may need glasses for all distances. Surgical considerations  Good dilation  DNT, aim close to plano (hyperope)  Modest density  K scar OD, impact on vision discussed    2. Primary open angle glaucoma. Continue latanoprost.  Agree with Dr. Rivera Lobe that appearance without stereotypical glaucomatous appearing atrophy. Some thinning on OCT, which is stable. Unknown T-max. 4.  Areas of inferior conjunctiva and cornea with punctate erosions from lashes. After informed consent obtained, multiple lashes along area of midline left lower eyelid epilated with forceps. Patient tolerated well. Per prior note, patient previously had multiple hyfrecated  9 lashes LLL, which accounts for her madarosis. Return biometry, then CE/IOL OS then OD.     Electronically signed by Royce Pruett MD on 7/27/2020 at 2:58 PM

## 2020-08-04 ENCOUNTER — OFFICE VISIT (OUTPATIENT)
Dept: OPHTHALMOLOGY | Age: 85
End: 2020-08-04
Payer: MEDICARE

## 2020-08-04 PROCEDURE — 92136 OPHTHALMIC BIOMETRY: CPT | Performed by: OPHTHALMOLOGY

## 2020-08-04 NOTE — PATIENT INSTRUCTIONS
Following cataract surgery use:    Moxifloxacin  Use 1 drop in surgery eye 4 times a day for 1 week  Then STOP     Prednisolone Acetate   Use 1 drop in surgery eye 4 times a day for 1 week  Then 1 drop in surgery eye 3 times a day for 1 week  Then 1 drop in surgery eye 2 times a day for 1 week  Then 1 drop in surgery eye once daily for 1 week  Then STOP

## 2020-08-13 ENCOUNTER — HOSPITAL ENCOUNTER (OUTPATIENT)
Dept: LAB | Age: 85
Discharge: HOME | End: 2020-08-13
Payer: MEDICARE

## 2020-08-13 LAB
THYROXINE, FREE: 1.4 NG/DL (ref 0.93–1.7)
TSH SERPL DL<=0.05 MIU/L-ACNC: 10.68 MIU/L (ref 0.3–5)

## 2020-08-13 PROCEDURE — 84439 ASSAY OF FREE THYROXINE: CPT

## 2020-08-13 PROCEDURE — 36415 COLL VENOUS BLD VENIPUNCTURE: CPT

## 2020-08-13 PROCEDURE — 84443 ASSAY THYROID STIM HORMONE: CPT

## 2020-08-18 ENCOUNTER — PRE-PROCEDURE TELEPHONE (OUTPATIENT)
Dept: PREADMISSION TESTING | Age: 85
End: 2020-08-18

## 2020-08-19 ENCOUNTER — OFFICE VISIT (OUTPATIENT)
Dept: INTERNAL MEDICINE | Age: 85
End: 2020-08-19
Payer: MEDICARE

## 2020-08-19 VITALS
SYSTOLIC BLOOD PRESSURE: 120 MMHG | TEMPERATURE: 97.7 F | BODY MASS INDEX: 24.63 KG/M2 | HEART RATE: 72 BPM | DIASTOLIC BLOOD PRESSURE: 70 MMHG | RESPIRATION RATE: 16 BRPM | HEIGHT: 63 IN | WEIGHT: 139 LBS

## 2020-08-19 PROCEDURE — 99214 OFFICE O/P EST MOD 30 MIN: CPT | Performed by: INTERNAL MEDICINE

## 2020-08-19 PROCEDURE — 99214 OFFICE O/P EST MOD 30 MIN: CPT

## 2020-08-19 RX ORDER — ATENOLOL 25 MG/1
25 TABLET ORAL DAILY
COMMUNITY
End: 2020-08-19 | Stop reason: SDUPTHER

## 2020-08-19 RX ORDER — PREDNISOLONE ACETATE 10 MG/ML
1 SUSPENSION/ DROPS OPHTHALMIC 4 TIMES DAILY
COMMUNITY
End: 2020-10-28 | Stop reason: ALTCHOICE

## 2020-08-19 RX ORDER — LANOLIN ALCOHOL/MO/W.PET/CERES
1000 CREAM (GRAM) TOPICAL DAILY
COMMUNITY
End: 2021-10-30

## 2020-08-19 RX ORDER — MOXIFLOXACIN 5 MG/ML
1 SOLUTION/ DROPS OPHTHALMIC 3 TIMES DAILY
COMMUNITY
End: 2020-10-28 | Stop reason: ALTCHOICE

## 2020-08-20 ENCOUNTER — HOSPITAL ENCOUNTER (OUTPATIENT)
Dept: PREADMISSION TESTING | Age: 85
Setting detail: SPECIMEN
Discharge: HOME | End: 2020-08-24
Payer: MEDICARE

## 2020-08-20 PROCEDURE — U0003 INFECTIOUS AGENT DETECTION BY NUCLEIC ACID (DNA OR RNA); SEVERE ACUTE RESPIRATORY SYNDROME CORONAVIRUS 2 (SARS-COV-2) (CORONAVIRUS DISEASE [COVID-19]), AMPLIFIED PROBE TECHNIQUE, MAKING USE OF HIGH THROUGHPUT TECHNOLOGIES AS DESCRIBED BY CMS-2020-01-R: HCPCS

## 2020-08-20 RX ORDER — ATENOLOL 25 MG/1
25 TABLET ORAL DAILY
Qty: 90 TABLET | Refills: 3 | Status: SHIPPED | OUTPATIENT
Start: 2020-08-20 | End: 2021-01-13

## 2020-08-22 LAB — SARS-COV-2, NAA: NOT DETECTED

## 2020-08-23 ENCOUNTER — TELEPHONE (OUTPATIENT)
Dept: PRIMARY CARE CLINIC | Age: 85
End: 2020-08-23

## 2020-08-24 NOTE — PROGRESS NOTES
[place] [address]  Dept: [phone]  Dept Fax: [phone]  Loc: [phone]     Visit Date:  8/19/2020    Patient:  Mayra Jones  YOB: 1930  Provider: Mingo Jackson MD        HPI:   She was seen in the internal medicine office today for   Chief Complaint   Patient presents with    Hyperlipidemia    Hypertension    Hypothyroidism    Pre-op Exam     cataract          Presents to follow-up on chronic medical conditions. Has a history of hypothyroidism, and her last thyroid function testing showed that her thyroid levels are a bit low. Asymptomatic at this time, however she appears confused, which I said could be due to her hypothyroidism. Says that she does not take it early in the morning but takes it with her other pills. I advised that she needs to take at least 30 minutes before other pills. Has a history of hypertension which has been stable. She is not sure of what medication she is using, was on losartan as well as atenolol as well as low-dose amlodipine. I was informed by Sarah Lombardi that she is only using the atenolol, which is okay, as her blood pressure is controlled today. Has a history of Alzheimer's disease which has been stable previously, appears to be more confused today than her previous visits, which could represent a worsening of her condition or reflection of her poor thyroid control. Is set to undergo cataract surgery in a couple weeks.       Subjective:      REVIEW OF SYSTEMS    Constitutional: Denies fever, chills  Eyes: Denies recent vision changes  Cardiovascular: Denies chest pain, LL edema  Respiratory: Denies cough, wheezes  Gastrointestinal: Denies abdominal pain, nausea, vomiting  Skin: Denies rash  Endocrine: Denies polyuria  Hematologic: Denies bruising  Genitourinary: Denies dysuria, hematuria  Neurological: Denies headache, numbness        Medications    Current Outpatient Medications:     atenolol (TENORMIN) 25 MG tablet, Take 1 tablet by mouth daily, Disp: 90 tablet, Rfl: 3    vitamin B-12 (CYANOCOBALAMIN) 1000 MCG tablet, Take 1,000 mcg by mouth daily, Disp: , Rfl:     moxifloxacin (VIGAMOX) 0.5 % ophthalmic solution, 1 drop 3 times daily, Disp: , Rfl:     prednisoLONE acetate (PRED FORTE) 1 % ophthalmic suspension, Place 1 drop into the left eye 4 times daily , Disp: , Rfl:     levothyroxine (SYNTHROID) 137 MCG tablet, TAKE 1 TABLET DAILY, Disp: 90 tablet, Rfl: 3    latanoprost (XALATAN) 0.005 % ophthalmic solution, Place 1 drop into both eyes nightly, Disp: 3 Bottle, Rfl: 3    FLUoxetine (PROZAC) 20 MG capsule, Take 1 capsule by mouth daily, Disp: 90 capsule, Rfl: 3    ammonium lactate (AMLACTIN) 12 % cream, apply to affected area twice a day if needed, Disp: 385 g, Rfl: 3    amLODIPine (NORVASC) 2.5 MG tablet, Take one tablet daily. , Disp: 90 tablet, Rfl: 3    calcium-vitamin D (OSCAL 500/200 D-3) 500-200 MG-UNIT per tablet, Take 1 tablet by mouth daily, Disp: 90 tablet, Rfl: 3    Multiple Vitamin (DAILY MULTIVITAMIN PO), Take 1 tablet by mouth daily , Disp: , Rfl:     donepezil (ARICEPT) 5 MG tablet, Take 1 tablet by mouth nightly (Patient not taking: Reported on 8/19/2020), Disp: 90 tablet, Rfl: 3    The patient has No Known Allergies.     Past Medical History  Kaylee Yañez  has a past medical history of Adenomatous polyp, Cataracts, bilateral, Combined forms of age-related cataract of both eyes, Corneal scar, right eye, Cystocele, Dementia ([address] Difficulty controlling anger, Dyslipidemia, GERD (gastroesophageal reflux disease), Goiter, Hiatal hernia, Hypertension, Hypokalemia, Hypothyroidism, Impaired fasting glucose, Malignant melanoma in situ ([address] Migraines, Murmur, functional, Osteopenia, Posterior vitreous detachment of both eyes, Primary open angle glaucoma (POAG) of both eyes, moderate stage, Sciatica, Skin cancer, Syncope and collapse, Tremor, 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 Past Surgical History  The patient  has a past surgical history that includes other surgical history (11/11/08); Cholecystectomy; Colonoscopy (06/08/10); Mohs surgery (10/07/09); Vein Surgery (06/08/09); Colonoscopy (07/18/02); Cystocele repair (02/12/99); Appendectomy; Dilation and curettage of uterus; other surgical history (1988); Hysterectomy (1995); and Upper gastrointestinal endoscopy (4/8/2015). Family History  This patient's family history includes Colon Cancer in her brother; Coronary Art Dis in her father. Social History  Noe  reports that she has never smoked. She has never used smokeless tobacco. She reports current alcohol use. She reports that she does not use drugs. Health Maintenance:    Health Maintenance   Topic Date Due    Potassium monitoring  05/13/2021    Creatinine monitoring  05/13/2021    TSH testing  08/13/2021    DTaP/Tdap/Td vaccine (3 - Td) 07/22/2025    Flu vaccine  Completed    Shingles Vaccine  Completed    Pneumococcal 65+ years Vaccine  Completed    Hepatitis A vaccine  Aged Out    Hepatitis B vaccine  Aged Out    Hib vaccine  Aged Out    Meningococcal (ACWY) vaccine  Aged Out           Objective:     PHYSICAL EXAM  /70 (Site: Left Upper Arm, Position: Sitting, Cuff Size: Medium Adult)   Pulse 72   Temp 97.7 °F (36.5 °C) (Infrared)   Resp 16   Ht 5' 2.99\" (1.6 m)   Wt 139 lb (63 kg)   LMP  (LMP Unknown)   BMI 24.63 kg/m²   Constitutional: No acute distress. Sits in chair comfortably  Eyes: Sclerae nonicteric. No lid lag or proptosis  HENT: External ears normal. No external lesions on the nose  Neck: No gross masses. Trachea visibly midline  Respiratory: Good air entry bilaterally. No wheezing or crackles  Cardiovascular: Normal S1-S2. No murmurs. No lower extremity edema  Gastrointestinal: No visible masses.  No visible hernias  Skin: No abnormal rashes. No abnormal masses  Neurologic: Cranial nerves grossly intact  Psychiatric: Normal affect. Alert and oriented        Labs Reviewed 8/19/2020:    Lab Results   Component Value Date    WBC 7.5 05/13/2020    HGB 13.1 05/13/2020    HCT 40.8 05/13/2020     05/13/2020    CHOL 207 (H) 11/28/2016    TRIG 135 11/28/2016    HDL 87 11/28/2016    ALT 6 05/13/2020    AST 21 05/13/2020     05/13/2020    K 3.9 05/13/2020     05/13/2020    CREATININE 0.72 05/13/2020    BUN 18 05/13/2020    CO2 30 05/13/2020    TSH 10.68 (H) 08/13/2020    INR 1.0 (L) 01/21/2015    LABA1C 5.8 12/30/2019       Plan        Hypothyroidism: Advise regarding the importance of adherence to medication and taking it in the right time. Will obtain TFTs next visit. Hypertension: Blood pressure controlled. Continue atenolol and low-dose amlodipine. Reassess next. Alzheimer's: Continue same management. Will reassess next visit. Cataracts: Can proceed with cataract surgery. She is low-moderate risk       Diagnosis Orders   1. Hypothyroidism, unspecified type  TSH with Reflex   2. Essential hypertension     3. Late onset Alzheimer's disease without behavioral disturbance ([facility] [address]           No follow-ups on file. Patient given educational materials - see patient instructions. Discussed use, benefit, and side effects of prescribed medications. All patient questions answered. Pt voiced understanding. Reviewed health maintenance. Electronically signed Santy Felix MD on 8/19/2020 at 8:09 AM      This note has been created using the Epic electronic health record, and dictated in part by [employer] dictation system. Despite the documenting physician's best efforts, there may be errors in spelling, grammar or syntax.

## 2020-08-25 ENCOUNTER — ANESTHESIA EVENT (OUTPATIENT)
Dept: OPERATING ROOM | Age: 85
End: 2020-08-25
Payer: MEDICARE

## 2020-08-25 ENCOUNTER — ANESTHESIA (OUTPATIENT)
Dept: OPERATING ROOM | Age: 85
End: 2020-08-25
Payer: MEDICARE

## 2020-08-25 ENCOUNTER — HOSPITAL ENCOUNTER (OUTPATIENT)
Age: 85
Setting detail: OUTPATIENT SURGERY
Discharge: HOME | End: 2020-08-25
Attending: OPHTHALMOLOGY | Admitting: OPHTHALMOLOGY
Payer: MEDICARE

## 2020-08-25 VITALS
TEMPERATURE: 97.3 F | HEART RATE: 70 BPM | WEIGHT: 136.8 LBS | HEIGHT: 63 IN | DIASTOLIC BLOOD PRESSURE: 79 MMHG | RESPIRATION RATE: 16 BRPM | OXYGEN SATURATION: 97 % | SYSTOLIC BLOOD PRESSURE: 191 MMHG | BODY MASS INDEX: 24.24 KG/M2

## 2020-08-25 VITALS — DIASTOLIC BLOOD PRESSURE: 67 MMHG | OXYGEN SATURATION: 98 % | SYSTOLIC BLOOD PRESSURE: 160 MMHG

## 2020-08-25 PROCEDURE — 6370000000 HC RX 637 (ALT 250 FOR IP): Performed by: OPHTHALMOLOGY

## 2020-08-25 PROCEDURE — 3600000002 HC SURGERY LEVEL 2 BASE: Performed by: OPHTHALMOLOGY

## 2020-08-25 PROCEDURE — V2632 POST CHMBR INTRAOCULAR LENS: HCPCS | Performed by: OPHTHALMOLOGY

## 2020-08-25 PROCEDURE — 6360000002 HC RX W HCPCS: Performed by: NURSE ANESTHETIST, CERTIFIED REGISTERED

## 2020-08-25 PROCEDURE — 3600000012 HC SURGERY LEVEL 2 ADDTL 15MIN: Performed by: OPHTHALMOLOGY

## 2020-08-25 PROCEDURE — 2709999900 HC NON-CHARGEABLE SUPPLY: Performed by: OPHTHALMOLOGY

## 2020-08-25 PROCEDURE — 6360000002 HC RX W HCPCS: Performed by: OPHTHALMOLOGY

## 2020-08-25 PROCEDURE — 3700000001 HC ADD 15 MINUTES (ANESTHESIA): Performed by: OPHTHALMOLOGY

## 2020-08-25 PROCEDURE — 7100000010 HC PHASE II RECOVERY - FIRST 15 MIN: Performed by: OPHTHALMOLOGY

## 2020-08-25 PROCEDURE — 2500000003 HC RX 250 WO HCPCS: Performed by: OPHTHALMOLOGY

## 2020-08-25 PROCEDURE — 7100000011 HC PHASE II RECOVERY - ADDTL 15 MIN: Performed by: OPHTHALMOLOGY

## 2020-08-25 PROCEDURE — 3700000000 HC ANESTHESIA ATTENDED CARE: Performed by: OPHTHALMOLOGY

## 2020-08-25 DEVICE — IMPLANTABLE DEVICE: Type: IMPLANTABLE DEVICE | Site: EYE | Status: FUNCTIONAL

## 2020-08-25 RX ORDER — DEXAMETHASONE PHOSPHATE - MOXIFLOXACIN - KETOROLAC TROMETHAMINE 1; .5; .4 MG/ML; MG/ML; MG/ML
INJECTION, SOLUTION OPHTHALMIC PRN
Status: DISCONTINUED | OUTPATIENT
Start: 2020-08-25 | End: 2020-08-25 | Stop reason: ALTCHOICE

## 2020-08-25 RX ORDER — TETRACAINE HYDROCHLORIDE 5 MG/ML
1 SOLUTION OPHTHALMIC SEE ADMIN INSTRUCTIONS
Status: DISCONTINUED | OUTPATIENT
Start: 2020-08-25 | End: 2020-08-25 | Stop reason: HOSPADM

## 2020-08-25 RX ORDER — TROPICAMIDE 10 MG/ML
1 SOLUTION/ DROPS OPHTHALMIC SEE ADMIN INSTRUCTIONS
Status: DISCONTINUED | OUTPATIENT
Start: 2020-08-25 | End: 2020-08-25 | Stop reason: HOSPADM

## 2020-08-25 RX ORDER — LIDOCAINE HYDROCHLORIDE 20 MG/ML
JELLY TOPICAL PRN
Status: DISCONTINUED | OUTPATIENT
Start: 2020-08-25 | End: 2020-08-25 | Stop reason: ALTCHOICE

## 2020-08-25 RX ORDER — EPINEPHRINE 1 MG/ML
INJECTION, SOLUTION, CONCENTRATE INTRAVENOUS PRN
Status: DISCONTINUED | OUTPATIENT
Start: 2020-08-25 | End: 2020-08-25 | Stop reason: ALTCHOICE

## 2020-08-25 RX ORDER — PHENYLEPHRINE HCL 2.5 %
1 DROPS OPHTHALMIC (EYE) SEE ADMIN INSTRUCTIONS
Status: DISCONTINUED | OUTPATIENT
Start: 2020-08-25 | End: 2020-08-25 | Stop reason: HOSPADM

## 2020-08-25 RX ORDER — MIDAZOLAM HYDROCHLORIDE 1 MG/ML
INJECTION INTRAMUSCULAR; INTRAVENOUS PRN
Status: DISCONTINUED | OUTPATIENT
Start: 2020-08-25 | End: 2020-08-25 | Stop reason: SDUPTHER

## 2020-08-25 RX ADMIN — TETRACAINE HYDROCHLORIDE 1 DROP: 5 SOLUTION OPHTHALMIC at 12:44

## 2020-08-25 RX ADMIN — PHENYLEPHRINE HYDROCHLORIDE 1 DROP: 25 SOLUTION/ DROPS OPHTHALMIC at 12:30

## 2020-08-25 RX ADMIN — MIDAZOLAM HYDROCHLORIDE 2 MG: 1 INJECTION, SOLUTION INTRAMUSCULAR; INTRAVENOUS at 13:20

## 2020-08-25 RX ADMIN — TETRACAINE HYDROCHLORIDE 1 DROP: 5 SOLUTION OPHTHALMIC at 12:30

## 2020-08-25 RX ADMIN — TROPICAMIDE 1 DROP: 10 SOLUTION/ DROPS OPHTHALMIC at 12:20

## 2020-08-25 RX ADMIN — TROPICAMIDE 1 DROP: 10 SOLUTION/ DROPS OPHTHALMIC at 12:30

## 2020-08-25 RX ADMIN — TROPICAMIDE 1 DROP: 10 SOLUTION/ DROPS OPHTHALMIC at 12:44

## 2020-08-25 RX ADMIN — PHENYLEPHRINE HYDROCHLORIDE 1 DROP: 25 SOLUTION/ DROPS OPHTHALMIC at 12:20

## 2020-08-25 RX ADMIN — TETRACAINE HYDROCHLORIDE 1 DROP: 5 SOLUTION OPHTHALMIC at 12:20

## 2020-08-25 RX ADMIN — PHENYLEPHRINE HYDROCHLORIDE 1 DROP: 25 SOLUTION/ DROPS OPHTHALMIC at 12:44

## 2020-08-25 ASSESSMENT — PULMONARY FUNCTION TESTS
PIF_VALUE: 0

## 2020-08-25 ASSESSMENT — PAIN SCALES - GENERAL: PAINLEVEL_OUTOF10: 0

## 2020-08-25 ASSESSMENT — PAIN - FUNCTIONAL ASSESSMENT: PAIN_FUNCTIONAL_ASSESSMENT: 0-10

## 2020-08-25 NOTE — ANESTHESIA POSTPROCEDURE EVALUATION
Department of Anesthesiology  Postprocedure Note    Patient: Landon Worley  MRN: [id number]  YOB: 1930  Date of evaluation: 8/25/2020  Time:  1:51 PM     Procedure Summary     Date:  08/25/20 Room / Location:  [address] [place] [address]    Anesthesia Start:  1320 Anesthesia Stop:  1351    Procedure:  Left Cataract Extraction w/ IOL Implant (Left ) Diagnosis:  (U09.884 Left Cataract)    Surgeon:  Mg Marroquin MD Responsible Provider:  FELICIA Zacarias CRNA    Anesthesia Type:  MAC ASA Status:  3          Anesthesia Type: MAC    Alena Phase I: Alena Score: 9    Alena Phase II:      Last vitals: Reviewed and per EMR flowsheets.        Anesthesia Post Evaluation    Patient location during evaluation: PACU  Patient participation: complete - patient participated  Level of consciousness: awake, awake and alert and responsive to light touch  Pain score: 0  Airway patency: patent  Nausea & Vomiting: no nausea and no vomiting  Complications: no  Cardiovascular status: blood pressure returned to baseline and hemodynamically stable  Respiratory status: acceptable  Hydration status: euvolemic

## 2020-08-25 NOTE — OP NOTE
Surgical Operative Report  Date of procedure: 08/25/20 2:10 PM  Preoperative Diagnosis: Visually significant cataract  OS  Postoperative Diagosis:  same  Procedure: Cataract extraction with intraocular lens OS  Anesthesia:  topical proparacaine, lidocaine jelly 2%, intracameral Lidocaine 1%/Phenylephrine 1.5%  Estimated blood loss: Less than 5 cc  Indications for procedure: The patient reports significant difficulties with activities of daily living secondary to cataract formation. The patient understands the risks, benefits, and alternatives of cataract surgery as stated in the informed consent. The patient wishes to proceed with cataract surgery without reservation. Operative Summary:  Informed consent was reviewed and the operative site was confirmed by the patient and marked by the physician in the preop area. Topical proparacaine was instilled onto the operative eye and the patient was prepped and draped in the usual sterile fashion in the OR. A lid speculum was placed in the eye and topical lidocaine jelly 2% was placed on the cornea. LRI  @ with an AK blade          [x] No LRI performed     A paracentesis site was created temporally, and the anterior chamber was filled with Lidocaine 1%/Phenylephrine 1.5%. Viscoat was then placed in the anterior chamber and a temporal clear cornea incision was created. A cystotome was used to begin a capsulorrhexis that was completed with Utrata forceps. Hydrodissection was performed and the lens was then phacoemulsified with the phacohandpiece. The cortical material was then removed with the I/A handpiece and the capsule was filled with cohesive viscoelastic. A +24.0 [id number] @177 lens was inserted into the capsular bag and rotated into position. The cohesive viscoelastic was removed with the I/A handpiece, and the chamber was deepened with BSS. A 0.1 ml bolus of Moxifloxacin 5mg/ml was instilled into the anterior chamber.   The wounds were hydrated, checked for water tightness. 10-0 Biosorb suture in paracentesis site/keratome wound        [x]No suture     The wounds were appropriately sealed. The speculum was removed and the patient was taken to the recovery area. The patient tolerated the procedure well and there were no complications.      Addendum:Omidria 1%/0.3% in BSS    []Yes    [x]No      Electronically signed by Scott Britton MD on 2/6/2020 at 2:19 PM

## 2020-08-25 NOTE — ANESTHESIA PRE PROCEDURE
Department of Anesthesiology  Preprocedure Note       Name:  Deb Flores   Age:  80 y.o.  :  3/21/1930                                          MRN:  [id number]         Date:  2020      Surgeon: Elli Borrego):  Boris Shea MD    Procedure: Procedure(s):  Left Cataract Extraction w/ IOL Implant    Medications prior to admission:   Prior to Admission medications    Medication Sig Start Date End Date Taking? Authorizing Provider   atenolol (TENORMIN) 25 MG tablet Take 1 tablet by mouth daily 20  Yes Vivi Tong MD   vitamin B-12 (CYANOCOBALAMIN) 1000 MCG tablet Take 1,000 mcg by mouth daily   Yes Historical Provider, MD   levothyroxine (SYNTHROID) 137 MCG tablet TAKE 1 TABLET DAILY 20  Yes Vivi Tong MD   FLUoxetine (PROZAC) 20 MG capsule Take 1 capsule by mouth daily 20 Yes Vivi Tong MD   ammonium lactate (AMLACTIN) 12 % cream apply to affected area twice a day if needed 20  Yes Vivi Tong MD   amLODIPine (NORVASC) 2.5 MG tablet Take one tablet daily.  3/10/20  Yes Vivi Tong MD   calcium-vitamin D (OSCAL 500/200 D-3) 500-200 MG-UNIT per tablet Take 1 tablet by mouth daily 10/1/19  Yes Desmond Lundberg DO   Multiple Vitamin (DAILY MULTIVITAMIN PO) Take 1 tablet by mouth daily    Yes Historical Provider, MD   moxifloxacin (VIGAMOX) 0.5 % ophthalmic solution 1 drop 3 times daily    Historical Provider, MD   prednisoLONE acetate (PRED FORTE) 1 % ophthalmic suspension Place 1 drop into the left eye 4 times daily     Historical Provider, MD   latanoprost (XALATAN) 0.005 % ophthalmic solution Place 1 drop into both eyes nightly 20   Leonarda Cat, OD   donepezil (ARICEPT) 5 MG tablet Take 1 tablet by mouth nightly  Patient not taking: Reported on 2020 10/1/19   Regina Rodriguez DO       Current medications:    Current Facility-Administered Medications   Medication Dose Route Frequency Provider Last Rate Last Dose    tetracaine (TETRAVISC) 0.5 % ophthalmic solution 1 drop  1 drop Ophthalmic See Admin Instructions Cindy Appiah MD   1 drop at 08/25/20 1230    tropicamide (MYDRIACYL) 1 % ophthalmic solution 1 drop  1 drop Ophthalmic See Admin Instructions Cindy Appiah MD   1 drop at 08/25/20 1230    phenylephrine (MYDFRIN) 2.5 % ophthalmic solution 1 drop  1 drop Ophthalmic See Admin Instructions Cindy Appiah MD   1 drop at 08/25/20 1230       Allergies:  No Known Allergies    Problem List:    Patient Active Problem List   Diagnosis Code    Dyslipidemia E78.5    Osteopenia M85.80    Impaired fasting glucose R73.01    Adenomatous polyp D36.9    Essential hypertension I10    Hypothyroidism E03.9    Sciatica M54.30    GERD (gastroesophageal reflux disease) K21.9    Visual disturbance H53.9    Malignant melanoma in situ ([address] D03.9    Difficulty controlling anger R45.4    Primary open angle glaucoma (POAG) of both eyes, moderate stage H40.1132    PVD (posterior vitreous detachment), both eyes H43.813    Combined forms of age-related cataract of both eyes H25.813    Dementia ([address] F03.90    Tremor R25.1    Essential tremor G25.0    Dizziness R42    Chronic cerebral ischemia I67.82    Acquired cerebral atrophy ([address] G31.9    Cerebral infarction ([address] I63.9       Past Medical History:        Diagnosis Date    Adenomatous polyp 06/10    With recommendation for repeat 06/15. Also, diverticulosis was noted.      Cataracts, bilateral     Combined forms of age-related cataract of both eyes 10/1/2018    Corneal scar, right eye     Cystocele     history of     Dementia ([address]     Mini-Mental Status exam 27/30  6/14  declines meds at this point,  MMSE 29/30 on June 13, 2017  MMSE 26/30 4/2018    Difficulty controlling anger 9/16/2018    Dyslipidemia     GERD (gastroesophageal reflux disease)     egd  4/15 ok    Goiter     benign, history of     Hiatal hernia     Hypertension     Hypokalemia     Hypothyroidism     Impaired fasting glucose     Malignant melanoma in situ (HCC)     R upper Back 8/17    Migraines     Murmur, functional     Grade 1/6 systolic, history of     Osteopenia     T -1.0 hip, 03/09, T -0.3 spine, 03/09. 1) Repeat DEXA scan 09/12 with T +0.15, T -1.2 at the hip but -1.8 at the mean femoral neck giving her a FRAX score of 4.3 at the hip. Reclast 10/13 and given 2014,2015, and 1/4/2017, on calcium and vit d,  Redo Dexa 10/14 Frax 4.2% , Frax 4.8% 10 yr hip fracture risk on Dexa 1/17  On Reclast    Posterior vitreous detachment of both eyes     Primary open angle glaucoma (POAG) of both eyes, moderate stage 10/1/2018    Sciatica 12/15/2015    Skin cancer     History of multiple skin cancers. Following with Dr. Francisco Taveras--- invasive squamous cell Ca 8/17 L forearm    Syncope and collapse 1/21/2015    Tremor 12/23/2018    Trichiasis of left lower eyelid without entropion     Visual disturbance 10/26/2017       Past Surgical History:        Procedure Laterality Date    APPENDECTOMY      CHOLECYSTECTOMY      Remote.  COLONOSCOPY  06/08/10    COLONOSCOPY  07/18/02    CYSTOCELE REPAIR  02/12/99    Vaginal prolapse, cystocele plus pelvic relaxation.  DILATION AND CURETTAGE OF UTERUS      with hysteroscopy   Mary Lou    still has ovaries     MOHS SURGERY  10/07/09    SCC, left nasal sidewall.  OTHER SURGICAL HISTORY  11/11/08    Anterior repair.  OTHER SURGICAL HISTORY  1988    laser cone    UPPER GASTROINTESTINAL ENDOSCOPY  4/8/2015    hiatal hernia    VEIN SURGERY  06/08/09    Laser ablation of right greater saphenous vein. Social History:    Social History     Tobacco Use    Smoking status: Never Smoker    Smokeless tobacco: Never Used   Substance Use Topics    Alcohol use: Yes     Alcohol/week: 0.0 standard drinks     Comment: Infrequently.                                  Counseling given: Not Answered      Vital Signs (Current):   Vitals:    08/25/20 1220   BP: (!) 150/67 Pulse: 59   Resp: 16   Temp: 36.3 °C (97.3 °F)   TempSrc: Temporal   SpO2: 96%   Weight: 136 lb 12.8 oz (62.1 kg)   Height: 5' 3\" (1.6 m)                                              BP Readings from Last 3 Encounters:   08/25/20 (!) 150/67   08/19/20 120/70   06/24/20 128/70       NPO Status: Time of last liquid consumption: 0900                        Time of last solid consumption: 1730                        Date of last liquid consumption: 08/25/20                        Date of last solid food consumption: 08/24/20    BMI:   Wt Readings from Last 3 Encounters:   08/25/20 136 lb 12.8 oz (62.1 kg)   08/19/20 139 lb (63 kg)   06/24/20 137 lb 6.4 oz (62.3 kg)     Body mass index is 24.23 kg/m². CBC:   Lab Results   Component Value Date    WBC 7.5 05/13/2020    RBC 4.07 05/13/2020    HGB 13.1 05/13/2020    HCT 40.8 05/13/2020    .2 05/13/2020    RDW 13.6 05/13/2020     05/13/2020       CMP:   Lab Results   Component Value Date     05/13/2020    K 3.9 05/13/2020     05/13/2020    CO2 30 05/13/2020    BUN 18 05/13/2020    CREATININE 0.72 05/13/2020    GFRAA >60 05/13/2020    LABGLOM >60 05/13/2020    GLUCOSE 118 05/13/2020    PROT 7.0 05/13/2020    CALCIUM 9.0 05/13/2020    BILITOT 0.59 05/13/2020    ALKPHOS 45 05/13/2020    AST 21 05/13/2020    ALT 6 05/13/2020       POC Tests: No results for input(s): POCGLU, POCNA, POCK, POCCL, POCBUN, POCHEMO, POCHCT in the last 72 hours.     Coags:   Lab Results   Component Value Date    PROTIME 10.7 01/21/2015    INR 1.0 01/21/2015    APTT 22.8 01/21/2015       HCG (If Applicable): No results found for: PREGTESTUR, PREGSERUM, HCG, HCGQUANT     ABGs: No results found for: PHART, PO2ART, ZMO7KUK, XSA7KDV, BEART, Q9GJMXCF     Type & Screen (If Applicable):  No results found for: LABABO, LABRH    Drug/Infectious Status (If Applicable):  No results found for: HIV, HEPCAB    COVID-19 Screening (If Applicable):   Lab Results   Component Value Date COVID19 Not Detected 08/20/2020         Anesthesia Evaluation  Patient summary reviewed and Nursing notes reviewed no history of anesthetic complications:   Airway: Mallampati: II        Dental:    (+) upper dentures      Pulmonary:Negative Pulmonary ROS and normal exam                               Cardiovascular:  Exercise tolerance: good (>4 METS),   (+) hypertension:,     (-)  angina    ECG reviewed      Echocardiogram reviewed                  Neuro/Psych:   (+) headaches:, psychiatric history:            GI/Hepatic/Renal:   (+) GERD: well controlled,           Endo/Other:    (+) hypothyroidism::., .                 Abdominal:           Vascular: negative vascular ROS. Anesthesia Plan      MAC     ASA 3       Induction: intravenous. Anesthetic plan and risks discussed with patient.       Plan discussed with surgical team.                  FELICIA Peguero - CRNA   8/25/2020

## 2020-08-25 NOTE — H&P
Patient:  Heike Lynch  YOB: 1930  Provider: Akilah Funes MD           HPI:   She was seen in the internal medicine office today for        Chief Complaint   Patient presents with    Hyperlipidemia    Hypertension    Hypothyroidism    Pre-op Exam       cataract            Presents to follow-up on chronic medical conditions.     Has a history of hypothyroidism, and her last thyroid function testing showed that her thyroid levels are a bit low. Asymptomatic at this time, however she appears confused, which I said could be due to her hypothyroidism. Says that she does not take it early in the morning but takes it with her other pills. I advised that she needs to take at least 30 minutes before other pills.     Has a history of hypertension which has been stable. She is not sure of what medication she is using, was on losartan as well as atenolol as well as low-dose amlodipine.   I was informed by Francyne Dance that she is only using the atenolol, which is okay, as her blood pressure is controlled today.     Has a history of Alzheimer's disease which has been stable previously, appears to be more confused today than her previous visits, which could represent a worsening of her condition or reflection of her poor thyroid control.     Is set to undergo cataract surgery in a couple weeks.        Subjective:      REVIEW OF SYSTEMS     Constitutional: Denies fever, chills  Eyes: Denies recent vision changes  Cardiovascular: Denies chest pain, LL edema  Respiratory: Denies cough, wheezes  Gastrointestinal: Denies abdominal pain, nausea, vomiting  Skin: Denies rash  Endocrine: Denies polyuria  Hematologic: Denies bruising  Genitourinary: Denies dysuria, hematuria  Neurological: Denies headache, numbness           Medications  Current Medication     Current Outpatient Medications:     atenolol (TENORMIN) 25 MG tablet, Take 1 tablet by mouth daily, Disp: 90 tablet, Rfl: 3    vitamin B-12 (CYANOCOBALAMIN) 1000 MCG tablet, Take 1,000 mcg by mouth daily, Disp: , Rfl:     moxifloxacin (VIGAMOX) 0.5 % ophthalmic solution, 1 drop 3 times daily, Disp: , Rfl:     prednisoLONE acetate (PRED FORTE) 1 % ophthalmic suspension, Place 1 drop into the left eye 4 times daily , Disp: , Rfl:     levothyroxine (SYNTHROID) 137 MCG tablet, TAKE 1 TABLET DAILY, Disp: 90 tablet, Rfl: 3    latanoprost (XALATAN) 0.005 % ophthalmic solution, Place 1 drop into both eyes nightly, Disp: 3 Bottle, Rfl: 3    FLUoxetine (PROZAC) 20 MG capsule, Take 1 capsule by mouth daily, Disp: 90 capsule, Rfl: 3    ammonium lactate (AMLACTIN) 12 % cream, apply to affected area twice a day if needed, Disp: 385 g, Rfl: 3    amLODIPine (NORVASC) 2.5 MG tablet, Take one tablet daily. , Disp: 90 tablet, Rfl: 3    calcium-vitamin D (OSCAL 500/200 D-3) 500-200 MG-UNIT per tablet, Take 1 tablet by mouth daily, Disp: 90 tablet, Rfl: 3    Multiple Vitamin (DAILY MULTIVITAMIN PO), Take 1 tablet by mouth daily , Disp: , Rfl:     donepezil (ARICEPT) 5 MG tablet, Take 1 tablet by mouth nightly (Patient not taking: Reported on 8/19/2020), Disp: 90 tablet, Rfl: 3        The patient has No Known Allergies.     Past Medical History  Jelani Stevenson  has a past medical history of Adenomatous polyp, Cataracts, bilateral, Combined forms of age-related cataract of both eyes, Corneal scar, right eye, Cystocele, Dementia (HCC), Difficulty controlling anger, Dyslipidemia, GERD (gastroesophageal reflux disease), Goiter, Hiatal hernia, Hypertension, Hypokalemia, Hypothyroidism, Impaired fasting glucose, Malignant melanoma in situ ([facility] [address] Migraines, Murmur, functional, Osteopenia, Posterior vitreous detachment of both eyes, Primary open angle glaucoma (POAG) of both eyes, moderate stage, Sciatica, Skin cancer, Syncope and collapse, Tremor, 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     Past Surgical History  The patient  has a past surgical history that Lab Results   Component Value Date     WBC 7.5 05/13/2020     HGB 13.1 05/13/2020     HCT 40.8 05/13/2020      05/13/2020     CHOL 207 (H) 11/28/2016     TRIG 135 11/28/2016     HDL 87 11/28/2016     ALT 6 05/13/2020     AST 21 05/13/2020      05/13/2020     K 3.9 05/13/2020      05/13/2020     CREATININE 0.72 05/13/2020     BUN 18 05/13/2020     CO2 30 05/13/2020     TSH 10.68 (H) 08/13/2020     INR 1.0 (L) 01/21/2015     LABA1C 5.8 12/30/2019        Plan         Hypothyroidism: Advise regarding the importance of adherence to medication and taking it in the right time. Will obtain TFTs next visit.     Hypertension: Blood pressure controlled. Continue atenolol and low-dose amlodipine. Reassess next.     Alzheimer's: Continue same management. Will reassess next visit.     Cataracts: Can proceed with cataract surgery. She is low-moderate risk         Diagnosis Orders   1. Hypothyroidism, unspecified type  TSH with Reflex   2. Essential hypertension      3. Late onset Alzheimer's disease without behavioral disturbance ([facility] [address]              No follow-ups on file.     Patient given educational materials - see patient instructions. Discussed use, benefit, and side effects of prescribed medications. All patient questions answered. Pt voiced understanding. Reviewed health maintenance.          Electronically signed byYAMILE CRISOSTOMO MD on 8/19/2020 at 8:09 AM        This note has been created using the Epic electronic health record, and dictated in part by [employer] dictation system. Despite the documenting physician's best efforts, there may be errors in spelling, grammar or syntax. I have reviewed the history and physical. I examined the patient and find no relevant changes. I have reviewed with the patient the risks, benefits and alternatives to the planned procedures.      Electronically signed by Mahsa Lo MD on 8/25/2020 at 1:24 PM

## 2020-08-26 ENCOUNTER — OFFICE VISIT (OUTPATIENT)
Dept: OPHTHALMOLOGY | Age: 85
End: 2020-08-26
Payer: MEDICARE

## 2020-08-26 PROCEDURE — 99211 OFF/OP EST MAY X REQ PHY/QHP: CPT

## 2020-08-26 PROCEDURE — 99024 POSTOP FOLLOW-UP VISIT: CPT | Performed by: OPHTHALMOLOGY

## 2020-08-26 ASSESSMENT — VISUAL ACUITY
OS_SC: 20/50
METHOD: SNELLEN - LINEAR
OS_SC+: -2
OS_PH_SC: 20/50
OS_PH_SC+: +2

## 2020-08-26 ASSESSMENT — PACHYMETRY
OS_CT(UM): 511
OD_CT(UM): 507

## 2020-08-26 ASSESSMENT — CONF VISUAL FIELD
OD_NORMAL: 1
METHOD: COUNTING FINGERS
OS_NORMAL: 1

## 2020-08-26 ASSESSMENT — ENCOUNTER SYMPTOMS
GASTROINTESTINAL NEGATIVE: 0
ALLERGIC/IMMUNOLOGIC NEGATIVE: 0
RESPIRATORY NEGATIVE: 0
EYES NEGATIVE: 0

## 2020-08-26 ASSESSMENT — SLIT LAMP EXAM - LIDS: COMMENTS: 2+ DERMATOCHALASIS - UPPER LID

## 2020-08-26 ASSESSMENT — TONOMETRY
OS_IOP_MMHG: 27
IOP_METHOD: TONOPEN

## 2020-08-26 NOTE — PROGRESS NOTES
Nenita Singh is a 80 y.o. female here for a complete eye exam.      Chief Complaint   Patient presents with    Post-Op Check       HPI     1 day post op cataract OS   Doing well, no trouble yesterday or through the night.            Review of Systems  ROS     Positive for: HENT    Negative for: Constitutional, Gastrointestinal, Neurological, Skin, Genitourinary, Musculoskeletal, Endocrine, Cardiovascular, Eyes, Respiratory, Psychiatric, Allergic/Imm, Heme/Lymph          Main Ophthalmology Exam     External Exam       Right Left    External Normal Normal          Slit Lamp Exam       Right Left    Lids/Lashes 2+ Dermatochalasis - upper lid 2+ Dermatochalasis - upper lid, 2+ Trichiasis - lower lid along with iatrogenic madarosis    Conjunctiva/Sclera White and quiet White and quiet    Cornea trauma scar central just temporal to visual axis, tr PEE 1+ PEE inferior, tr edmea (diffuse), 2+ MCE main wound    Anterior Chamber Deep and quiet Deep and quiet    Iris Round and reactive Round and reactive    Lens 2+ Nuclear sclerosis, Trace Cortical cataract Posterior chamber intraocular lens, toric at 177, oblique PC folds temporal    Vitreous Posterior vitreous detachment Posterior vitreous detachment          Fundus Exam       Right Left    Disc Normal Normal    C/D Ratio Vertical 0.4 0.35    Macula 1-2 drusenoid changes temporal -- no RPE mottling or heme Normal    Vessels Normal Normal                   Tonometry     Tonometry (Tonopen, 11:17 AM)       Right Left    Pressure  27          Tonometry #2 (Tonopen, 11:17 AM)       Right Left    Pressure  25              Visual Acuity     Visual Acuity (Snellen - Linear)       Right Left    Dist sc  20/50 -2              Pupils     Pupils       Pupils APD    Right PERRL None    Left PERRL None               Confrontational Visual Fields     Visual Fields (Counting fingers)       Right Left     Full Full               Extraocular Movement     Extraocular Movement       Right Left     Full, Ortho Full, Ortho               Not recorded          Past Medical History:   Diagnosis Date    Adenomatous polyp 06/10    With recommendation for repeat 06/15. Also, diverticulosis was noted.  Cataracts, bilateral     Combined forms of age-related cataract of both eyes 10/1/2018    Corneal scar, right eye     Cystocele     history of     Dementia ([facility] [address]     Mini-Mental Status exam 27/30 6/14  declines meds at this point,  MMSE 29/30 on June 13, 2017  MMSE 26/30 4/2018    Difficulty controlling anger 9/16/2018    Dyslipidemia     GERD (gastroesophageal reflux disease)     egd  4/15 ok    Goiter     benign, history of     Hiatal hernia     Hypertension     Hypokalemia     Hypothyroidism     Impaired fasting glucose     Malignant melanoma in situ ([facility] [address]     R upper Back 8/17    Migraines     Murmur, functional     Grade 1/6 systolic, history of     Osteopenia     T -1.0 hip, 03/09, T -0.3 spine, 03/09. 1) Repeat DEXA scan 09/12 with T +0.15, T -1.2 at the hip but -1.8 at the mean femoral neck giving her a FRAX score of 4.3 at the hip. Reclast 10/13 and given 2014,2015, and 1/4/2017, on calcium and vit d,  Redo Dexa 10/14 Frax 4.2% , Frax 4.8% 10 yr hip fracture risk on Dexa 1/17  On Reclast    Posterior vitreous detachment of both eyes     Primary open angle glaucoma (POAG) of both eyes, moderate stage 10/1/2018    Sciatica 12/15/2015    Skin cancer     History of multiple skin cancers.  Following with Dr. Elina Dunham--- invasive squamous cell Ca 8/17 L forearm    Syncope and collapse 1/21/2015    Tremor 12/23/2018    Trichiasis of left lower eyelid without entropion     Visual disturbance 10/26/2017          Current Outpatient Medications:     atenolol (TENORMIN) 25 MG tablet, Take 1 tablet by mouth daily, Disp: 90 tablet, Rfl: 3    vitamin B-12 (CYANOCOBALAMIN) 1000 MCG tablet, Take 1,000 mcg by mouth daily, Disp: , Rfl:     moxifloxacin (VIGAMOX) 0.5 % ophthalmic solution, 1 drop 3 times daily, Disp: , Rfl:     prednisoLONE acetate (PRED FORTE) 1 % ophthalmic suspension, Place 1 drop into the left eye 4 times daily , Disp: , Rfl:     levothyroxine (SYNTHROID) 137 MCG tablet, TAKE 1 TABLET DAILY, Disp: 90 tablet, Rfl: 3    latanoprost (XALATAN) 0.005 % ophthalmic solution, Place 1 drop into both eyes nightly, Disp: 3 Bottle, Rfl: 3    FLUoxetine (PROZAC) 20 MG capsule, Take 1 capsule by mouth daily, Disp: 90 capsule, Rfl: 3    ammonium lactate (AMLACTIN) 12 % cream, apply to affected area twice a day if needed, Disp: 385 g, Rfl: 3    amLODIPine (NORVASC) 2.5 MG tablet, Take one tablet daily. , Disp: 90 tablet, Rfl: 3    calcium-vitamin D (OSCAL 500/200 D-3) 500-200 MG-UNIT per tablet, Take 1 tablet by mouth daily, Disp: 90 tablet, Rfl: 3    donepezil (ARICEPT) 5 MG tablet, Take 1 tablet by mouth nightly (Patient not taking: Reported on 8/19/2020), Disp: 90 tablet, Rfl: 3    Multiple Vitamin (DAILY MULTIVITAMIN PO), Take 1 tablet by mouth daily , Disp: , Rfl:      No Known Allergies     IMPRESSION:  1. Pseudophakia    2. Combined forms of age-related cataract of right eye    3. Primary open angle glaucoma (POAG) of both eyes, moderate stage    4. Keratoconjunctivitis sicca of both eyes not specified as Sjogren's    5. PVD (posterior vitreous detachment), both eyes        PLAN:  1. Patient status post CE IOL OS w/ toric. Mild IOP spike Will observe closely. Pt unsure whether took latanoprost last night. Restart gtt. Patient doing well. Postoperative drops and reasons to call or return reviewed. Proper follow-up arranged. 2.  Cataract, RIGHT eye: The cataract is visually significant to the patient and affecting their activities of daily living. I discussed at length the risks, benefits, alternatives, and indications to removal of the cataract and implantation of intraocular lens.   The patient is aware that although a low risk there is a true risk for permanent and total loss of vision and need for additional surgeries due to catastrophic infection, retinal tear/detachment, bleeding in the eye. The patient is aware of the risks of refractive surprise, need for glasses at all distances, swelling, inflammation, damage to iris/cornea/other ocular structures, need for laser capsulotomy in future, glaucoma, ptosis. Discussed options of monovision, monofocal with or without toric, multifocal with or without toric IOLs. The patient understands that toric and multifocal IOLs are not covered by insurance and this would incur additional out of pocket costs. Surgical considerations  Good dilation  Floppy capsule OS, watch w/ OD  Toric OD (B&L providing)  DNT, aim close to plano (hyperope)  Modest density  K scar OD, impact on vision discussed    3. Primary open angle glaucoma. Continue latanoprost.  Agree with Dr. Jason Ocampo that appearance without stereotypical glaucomatous appearing atrophy. Some thinning on OCT, which is stable. Unknown T-max. 4.  Areas of inferior conjunctiva and cornea with punctate erosions from lashes. After informed consent obtained, multiple lashes along area of midline left lower eyelid epilated with forceps. Patient tolerated well. Per prior note, patient previously had multiple hyfrecated  9 lashes LLL, which accounts for her madarosis. Return 1 wk post op with Dr. Marina Patton (check AR/MR for astigmatism correction), then CE/IOL OD.     Electronically signed by Demetra Mckenna MD on 8/26/2020 at 11:42 AM

## 2020-08-28 ENCOUNTER — TELEPHONE (OUTPATIENT)
Dept: INTERNAL MEDICINE | Age: 85
End: 2020-08-28
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
## 2020-08-31 ENCOUNTER — TELEPHONE (OUTPATIENT)
Dept: INTERNAL MEDICINE | Age: 85
End: 2020-08-31

## 2020-08-31 RX ORDER — QUETIAPINE FUMARATE 25 MG/1
25 TABLET, FILM COATED ORAL EVERY EVENING
Qty: 60 TABLET | Refills: 3 | Status: SHIPPED | OUTPATIENT
Start: 2020-08-31 | End: 2020-09-24 | Stop reason: SINTOL

## 2020-08-31 NOTE — TELEPHONE ENCOUNTER
Marcellina Kehr from [facility] calling and patient is confused how to take her medication. Would like referral to [address] [place] [address] Dionisio Baeza for skilled nursing to assist with setting up medication.

## 2020-08-31 NOTE — TELEPHONE ENCOUNTER
If Prozac is not helping at all, then we probably should taper it off. Seroquel could help with both depression and anxiety so that will be an option. Can start Seroquel at a low dose.   It has to be taken at night because it is a sedative

## 2020-08-31 NOTE — TELEPHONE ENCOUNTER
Let us cut Prozac in half and take it for 2 weeks and then stop it after 2 weeks, and also start Seroquel 25 mg nightly tomorrow night. Problems with Seroquel might cause her to feel drowsy because it is also a sedative in addition to being an anti-agitation/depression. Moreover, in theory it should not be continued long-term, however it might be the best solution for the short-term if it makes the situation better.   I sent Seroquel to [facility]

## 2020-09-01 ENCOUNTER — OFFICE VISIT (OUTPATIENT)
Dept: PODIATRY | Age: 85
End: 2020-09-01
Payer: MEDICARE

## 2020-09-01 VITALS
WEIGHT: 136.6 LBS | BODY MASS INDEX: 24.2 KG/M2 | RESPIRATION RATE: 20 BRPM | SYSTOLIC BLOOD PRESSURE: 128 MMHG | DIASTOLIC BLOOD PRESSURE: 64 MMHG | HEART RATE: 68 BPM

## 2020-09-01 PROCEDURE — 11721 DEBRIDE NAIL 6 OR MORE: CPT | Performed by: PODIATRIST

## 2020-09-01 PROCEDURE — 99999 PR OFFICE/OUTPT VISIT,PROCEDURE ONLY: CPT | Performed by: PODIATRIST

## 2020-09-01 NOTE — PROGRESS NOTES
Subjective:  Glenys Elias is a 80 y.o. female who presents to the office today for routine foot care. Currently denies F/C/N/V. No Known Allergies    Past Medical History:   Diagnosis Date    Adenomatous polyp 06/10    With recommendation for repeat 06/15. Also, diverticulosis was noted.  Cataracts, bilateral     Combined forms of age-related cataract of both eyes 10/1/2018    Corneal scar, right eye     Cystocele     history of     Dementia ([facility] [address]     Mini-Mental Status exam 27/30 6/14  declines meds at this point,  MMSE 29/30 on June 13, 2017  MMSE 26/30 4/2018    Difficulty controlling anger 9/16/2018    Dyslipidemia     GERD (gastroesophageal reflux disease)     egd  4/15 ok    Goiter     benign, history of     Hiatal hernia     Hypertension     Hypokalemia     Hypothyroidism     Impaired fasting glucose     Malignant melanoma in situ ([facility] [address]     R upper Back 8/17    Migraines     Murmur, functional     Grade 1/6 systolic, history of     Osteopenia     T -1.0 hip, 03/09, T -0.3 spine, 03/09. 1) Repeat DEXA scan 09/12 with T +0.15, T -1.2 at the hip but -1.8 at the mean femoral neck giving her a FRAX score of 4.3 at the hip. Reclast 10/13 and given 2014,2015, and 1/4/2017, on calcium and vit d,  Redo Dexa 10/14 Frax 4.2% , Frax 4.8% 10 yr hip fracture risk on Dexa 1/17  On Reclast    Posterior vitreous detachment of both eyes     Primary open angle glaucoma (POAG) of both eyes, moderate stage 10/1/2018    Sciatica 12/15/2015    Skin cancer     History of multiple skin cancers. Following with Dr. Casimiro Santizo--- invasive squamous cell Ca 8/17 L forearm    Syncope and collapse 1/21/2015    Tremor 12/23/2018    Trichiasis of left lower eyelid without entropion     Visual disturbance 10/26/2017       Prior to Admission medications    Medication Sig Start Date End Date Taking?  Authorizing Provider   QUEtiapine (SEROQUEL) 25 MG tablet Take 1 tablet by mouth every evening 8/31/20  Yes Brenden Camarena MD   atenolol (TENORMIN) 25 MG tablet Take 1 tablet by mouth daily 8/20/20  Yes Brenden Camarena MD   vitamin B-12 (CYANOCOBALAMIN) 1000 MCG tablet Take 1,000 mcg by mouth daily   Yes Historical Provider, MD   moxifloxacin (VIGAMOX) 0.5 % ophthalmic solution 1 drop 3 times daily   Yes Historical Provider, MD   prednisoLONE acetate (PRED FORTE) 1 % ophthalmic suspension Place 1 drop into the left eye 4 times daily    Yes Historical Provider, MD   levothyroxine (SYNTHROID) 137 MCG tablet TAKE 1 TABLET DAILY 7/9/20  Yes Brenden Camarena MD   latanoprost (XALATAN) 0.005 % ophthalmic solution Place 1 drop into both eyes nightly 6/8/20  Yes Leonarda Buck, OD   FLUoxetine (PROZAC) 20 MG capsule Take 1 capsule by mouth daily 6/2/20 5/28/21 Yes Brenden Camarena MD   ammonium lactate (AMLACTIN) 12 % cream apply to affected area twice a day if needed 4/7/20  Yes Brenden Camarena MD   amLODIPine (NORVASC) 2.5 MG tablet Take one tablet daily. 3/10/20  Yes Brenden Camarena MD   calcium-vitamin D (OSCAL 500/200 D-3) 500-200 MG-UNIT per tablet Take 1 tablet by mouth daily 10/1/19  Yes Sergio Briones, DO   donepezil (ARICEPT) 5 MG tablet Take 1 tablet by mouth nightly 10/1/19  Yes Desmond Lundberg, DO   Multiple Vitamin (DAILY MULTIVITAMIN PO) Take 1 tablet by mouth daily    Yes Historical Provider, MD       Past Surgical History:   Procedure Laterality Date    APPENDECTOMY      CHOLECYSTECTOMY      Remote.  COLONOSCOPY  06/08/10    COLONOSCOPY  07/18/02    CYSTOCELE REPAIR  02/12/99    Vaginal prolapse, cystocele plus pelvic relaxation.  DILATION AND CURETTAGE OF UTERUS      with hysteroscopy   [address]    still has ovaries     INTRACAPSULAR CATARACT EXTRACTION Left 8/25/2020    Left Cataract Extraction w/ IOL Implant performed by Gloria Thornton MD at [address] [place] [address]  10/07/09    SCC, left nasal sidewall.  OTHER SURGICAL HISTORY  11/11/08    Anterior repair.     [address] Nails left 1, 2,3, 4,5 and right 1,2,3,4,5 thickened, dystrophic and crumbly, discolored with subungual debris. Fissures absent, Bilateral. Hyperkeratotic tissue is absent. Seen sub R 5th met head    Asessment: Patient is a 80 y.o. female with:    Diagnosis Orders   1. Atherosclerosis of native artery of both lower extremities, with unspecified presence of clinical manifestation ([address]     2. Dermatophytosis of nail     3. Corns and callosities         Plan: Patient examined and evaluated. Current condition and treatment options discussed in detail. All nails as mentioned above debrided in length and thickness. Patient advised OTC methods for treatment of the mycotic nails. Patient will follow up in 10 weeks.

## 2020-09-01 NOTE — PROGRESS NOTES
Foot Care Worksheet  PCP: Jesus Hines  Last visit: 08 / 19 / 2020    Nail description:  Thick , Yellow , Crumbly , Marked limitation of ambulation     Pain resulting from thickened and dystrophy of nail plate Yes    Nails involved  Right   1, 2, 3, 4, 5  (T5-T9)  Left     1, 2, 3, 4, 5  (TA-T4)    Q7 1 Class A Finding - Non traumatic amputation of foot No    Q8 2 Class B Findings - Absent DP pulse No, Absent PT pulse No, Advanced tropic changes (3 required) Yes    Decrease hair growth Yes, Nail changes/thickening Yes, Pigmented changes/discoloration No, Skin texture (thin, shiny) No, Skin color (rubor/redness) No    Q9 1 Class B and 2 Class C Findings  Claudication No, Temperature change Yes, Paresthesia No, Burning No, Edema Yes

## 2020-09-02 ENCOUNTER — OFFICE VISIT (OUTPATIENT)
Dept: OPTOMETRY | Age: 85
End: 2020-09-02
Payer: MEDICARE

## 2020-09-02 PROCEDURE — 99024 POSTOP FOLLOW-UP VISIT: CPT | Performed by: OPTOMETRIST

## 2020-09-02 PROCEDURE — 99211 OFF/OP EST MAY X REQ PHY/QHP: CPT

## 2020-09-02 ASSESSMENT — REFRACTION_WEARINGRX
OD_CYLINDER: -0.25
OD_AXIS: 093
SPECS_TYPE: BIFOCAL
OD_ADD: +2.50
OS_CYLINDER: -1.25
OS_AXIS: 088
OS_SPHERE: +2.00
OS_ADD: +2.50
OD_SPHERE: +2.75

## 2020-09-02 ASSESSMENT — REFRACTION_MANIFEST
OS_AXIS: 060
OS_CYLINDER: -2.75
OS_SPHERE: +1.25

## 2020-09-02 ASSESSMENT — TONOMETRY
OS_IOP_MMHG: 22
OD_IOP_MMHG: 17
IOP_METHOD: NON-CONTACT AIR PUFF

## 2020-09-02 ASSESSMENT — PACHYMETRY
OS_CT(UM): 511
OD_CT(UM): 507

## 2020-09-02 ASSESSMENT — VISUAL ACUITY
METHOD: SNELLEN - LINEAR
OS_SC: 20/80
OD_CC+: -1

## 2020-09-02 NOTE — PATIENT INSTRUCTIONS
Taper as instructed  : use the prednisolone drops in the operated eye 3x per day for one week, 2x per day for one week, 1x per day for one week, then discontinue.     Call with any questions or concerns     D/c Moxifloxacin

## 2020-09-02 NOTE — PROGRESS NOTES
 Impaired fasting glucose     Malignant melanoma in situ (HCC)     R upper Back     Migraines     Murmur, functional     Grade 1/6 systolic, history of     Osteopenia     T -1.0 hip, , T -0.3 spine, . 1) Repeat DEXA scan  with T +0.15, T -1.2 at the hip but -1.8 at the mean femoral neck giving her a FRAX score of 4.3 at the hip. Reclast 10/13 and given ,, and 2017, on calcium and vit d,  Redo Dexa 10/14 Frax 4.2% , Frax 4.8% 10 yr hip fracture risk on Dexa   On Reclast    Posterior vitreous detachment of both eyes     Primary open angle glaucoma (POAG) of both eyes, moderate stage 10/1/2018    Sciatica 12/15/2015    Skin cancer     History of multiple skin cancers.  Following with Dr. Barrie Huynh--- invasive squamous cell Ca  L forearm    Syncope and collapse 2015    Tremor 2018    Trichiasis of left lower eyelid without entropion     Visual disturbance 10/26/2017           Main Ophthalmology Exam     External Exam       Right Left    External  Normal          Slit Lamp Exam       Right Left    Lids/Lashes  lashes stuck together and need cleaned     Conjunctiva/Sclera  White and quiet    Cornea  healing well     Anterior Chamber  Deep and quiet    Iris  Round and reactive    Lens  well centered Posterior chamber intraocular lens    Vitreous  vitreous degeneration                    Tonometry     Tonometry (Non-contact air puff, 10:30 AM)       Right Left    Pressure 17 22   IOP.1             14.4  CH:  10.2          3.9  WS: 4.0          1.2                  Not recorded         Not recorded          Visual Acuity (Snellen - Linear)       Right Left    Dist sc  20/80    Dist cc  -1           Pupils     Pupils       Pupils    Right PERRL    Left PERRL               Not recorded         Not recorded            Ophthalmology Exam     Wearing Rx       Sphere Cylinder Axis Add    Right +2.75 -0.25 093 +2.50    Left +2.00 -1.25 088 +2.50    Age:  [age](prior to cat sx)    Type:  Bifocal          Wearing Rx #2       Sphere Cylinder Axis Add    Right +3.50 -1.50 085 +2.50    Left +2.50 -2.50 086 +2.50    Age:  12/19/2019    Type:  do not fill;  cataract evaluation               Manifest Refraction     Manifest Refraction       Sphere Cylinder Axis Dist VA    Right        Left +1.25 -2.75 060 20/30++          Manifest Refraction #2 (Auto)       Sphere Cylinder Axis Dist VA    Right +4.00 -1.75 084     Left +1.25 -3.25 061                      1. Postoperative care for cataract of left eye           Patient Instructions   Taper as instructed  : use the prednisolone drops in the operated eye 3x per day for one week, 2x per day for one week, 1x per day for one week, then discontinue. Call with any questions or concerns     D/c Moxifloxacin        Return for 1 week after surgery on right eye .

## 2020-09-08 RX ORDER — DONEPEZIL HYDROCHLORIDE 5 MG/1
TABLET, FILM COATED ORAL
Qty: 90 TABLET | Refills: 3 | Status: SHIPPED | OUTPATIENT
Start: 2020-09-08 | End: 2020-09-16 | Stop reason: SDUPTHER

## 2020-09-16 ENCOUNTER — OFFICE VISIT (OUTPATIENT)
Dept: INTERNAL MEDICINE | Age: 85
End: 2020-09-16
Payer: MEDICARE

## 2020-09-16 VITALS
DIASTOLIC BLOOD PRESSURE: 62 MMHG | RESPIRATION RATE: 16 BRPM | HEART RATE: 60 BPM | SYSTOLIC BLOOD PRESSURE: 114 MMHG | BODY MASS INDEX: 24.8 KG/M2 | HEIGHT: 63 IN | WEIGHT: 140 LBS

## 2020-09-16 PROCEDURE — 99213 OFFICE O/P EST LOW 20 MIN: CPT | Performed by: INTERNAL MEDICINE

## 2020-09-16 PROCEDURE — 99213 OFFICE O/P EST LOW 20 MIN: CPT

## 2020-09-16 RX ORDER — DONEPEZIL HYDROCHLORIDE 10 MG/1
10 TABLET, FILM COATED ORAL NIGHTLY
Qty: 30 TABLET | Refills: 3 | Status: SHIPPED | OUTPATIENT
Start: 2020-09-16 | End: 2020-09-29 | Stop reason: DRUGHIGH

## 2020-09-21 ENCOUNTER — PATIENT MESSAGE (OUTPATIENT)
Dept: INTERNAL MEDICINE | Age: 85
End: 2020-09-21

## 2020-09-21 NOTE — TELEPHONE ENCOUNTER
It might be better to stop it, the problem with these medications is that the reaction to them is a bit unpredictable, and the only way to know if it can work or not is to actually prescribe it.

## 2020-09-22 ENCOUNTER — TELEPHONE (OUTPATIENT)
Dept: PREADMISSION TESTING | Age: 85
End: 2020-09-22

## 2020-09-23 ENCOUNTER — TELEPHONE (OUTPATIENT)
Dept: PREADMISSION TESTING | Age: 85
End: 2020-09-23

## 2020-09-28 NOTE — PROGRESS NOTES
[place] [address]  Dept: [phone]  Dept Fax: [phone]  Loc: [phone]     Visit Date:  9/16/2020    Patient:  Kamran Briones  YOB: 1930  Provider: Jennifer Bee MD        HPI:   She was seen in the internal medicine office today for   Chief Complaint   Patient presents with    Pre-op Exam     Right cataract          Presents for preoperative clearance. Has a history of cataracts in both eyes, and had the left eye cataract repaired several months ago. Is set to undergo a right cataract surgery in the next couple weeks. She is asymptomatic at this time    Has a history of hypertension which has been stable. Has a history of hypothyroidism which has been stable.       Subjective:      REVIEW OF SYSTEMS    Constitutional: Denies fever, chills  Eyes: Denies recent vision changes  Cardiovascular: Denies chest pain, LL edema  Respiratory: Denies cough, wheezes  Gastrointestinal: Denies abdominal pain, nausea, vomiting  Skin: Denies rash  Endocrine: Denies polyuria  Hematologic: Denies bruising  Genitourinary: Denies dysuria, hematuria  Neurological: Denies headache, numbness        Medications    Current Outpatient Medications:     donepezil (ARICEPT) 10 MG tablet, Take 1 tablet by mouth nightly, Disp: 30 tablet, Rfl: 3    atenolol (TENORMIN) 25 MG tablet, Take 1 tablet by mouth daily, Disp: 90 tablet, Rfl: 3    vitamin B-12 (CYANOCOBALAMIN) 1000 MCG tablet, Take 1,000 mcg by mouth daily, Disp: , Rfl:     moxifloxacin (VIGAMOX) 0.5 % ophthalmic solution, 1 drop 3 times daily, Disp: , Rfl:     prednisoLONE acetate (PRED FORTE) 1 % ophthalmic suspension, Place 1 drop into the left eye 4 times daily , Disp: , Rfl:     levothyroxine (SYNTHROID) 137 MCG tablet, TAKE 1 TABLET DAILY, Disp: 90 tablet, Rfl: 3    latanoprost (XALATAN) 0.005 % ophthalmic solution, Place 1 drop into both eyes nightly, Disp: 3 Bottle, Rfl: 3    ammonium lactate (AMLACTIN) 12 % cream, apply to affected area twice a day if needed, Disp: 385 g, Rfl: 3    amLODIPine (NORVASC) 2.5 MG tablet, Take one tablet daily. , Disp: 90 tablet, Rfl: 3    Multiple Vitamin (DAILY MULTIVITAMIN PO), Take 1 tablet by mouth daily , Disp: , Rfl:     calcium-vitamin D (OSCAL 500/200 D-3) 500-200 MG-UNIT per tablet, Take 1 tablet by mouth daily, Disp: 90 tablet, Rfl: 3    The patient has No Known Allergies. Past Medical History  Manolo Cedeño  has a past medical history of Adenomatous polyp, Cataracts, bilateral, Combined forms of age-related cataract of both eyes, Corneal scar, right eye, Cystocele, Dementia ([address] Difficulty controlling anger, Dyslipidemia, GERD (gastroesophageal reflux disease), Goiter, Hiatal hernia, Hypertension, Hypokalemia, Hypothyroidism, Impaired fasting glucose, Malignant melanoma in situ ([address] Migraines, Murmur, functional, Osteopenia, Posterior vitreous detachment of both eyes, Primary open angle glaucoma (POAG) of both eyes, moderate stage, Sciatica, Skin cancer, Syncope and collapse, Tremor, 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 Past Surgical History  The patient  has a past surgical history that includes other surgical history (11/11/08); Cholecystectomy; Colonoscopy (06/08/10); Mohs surgery (10/07/09); Vein Surgery (06/08/09); Colonoscopy (07/18/02); Cystocele repair (02/12/99); Appendectomy; Dilation and curettage of uterus; other surgical history (1988); Hysterectomy (1995); Upper gastrointestinal endoscopy (4/8/2015); and Intracapsular cataract extraction (Left, 8/25/2020). Family History  This patient's family history includes Colon Cancer in her brother; Coronary Art Dis in her father. Social History  Manolo Cedeño  reports that she has never smoked. She has never used smokeless tobacco. She reports current alcohol use.  She reports that she does not use drugs.    Health Maintenance:    Health Maintenance   Topic Date Due    Statin Therapy  03/21/1930    Potassium monitoring  05/13/2021    Creatinine monitoring  05/13/2021    TSH testing  08/13/2021    DTaP/Tdap/Td vaccine (3 - Td) 07/22/2025    Flu vaccine  Completed    Shingles Vaccine  Completed    Pneumococcal 65+ years Vaccine  Completed    Hepatitis A vaccine  Aged Out    Hepatitis B vaccine  Aged Out    Hib vaccine  Aged Out    Meningococcal (ACWY) vaccine  Aged Out           Objective:     PHYSICAL EXAM  /62 (Site: Left Upper Arm, Position: Sitting, Cuff Size: Medium Adult)   Pulse 60   Resp 16   Ht 5' 3\" (1.6 m)   Wt 140 lb (63.5 kg)   LMP  (LMP Unknown)   BMI 24.80 kg/m²   Constitutional: No acute distress. Sits in chair comfortably  Eyes: Sclerae nonicteric. No lid lag or proptosis  HENT: External ears normal. No external lesions on the nose  Neck: No gross masses. Trachea visibly midline  Respiratory: Good air entry bilaterally. No wheezing no crackles  Cardiovascular: Normal S1-S2. No murmurs. No lower extremity edema  Gastrointestinal: No visible masses. No visible hernias  Skin: No abnormal rashes. No abnormal masses  Neurologic: Cranial nerves grossly intact  Psychiatric: Normal affect. Alert and oriented        Labs Reviewed 9/16/2020:    Lab Results   Component Value Date    WBC 7.5 05/13/2020    HGB 13.1 05/13/2020    HCT 40.8 05/13/2020     05/13/2020    CHOL 207 (H) 11/28/2016    TRIG 135 11/28/2016    HDL 87 11/28/2016    ALT 6 05/13/2020    AST 21 05/13/2020     05/13/2020    K 3.9 05/13/2020     05/13/2020    CREATININE 0.72 05/13/2020    BUN 18 05/13/2020    CO2 30 05/13/2020    TSH 10.68 (H) 08/13/2020    INR 1.0 (L) 01/21/2015    LABA1C 5.8 12/30/2019       Plan        Right cataract: Does not need further testing. She is low-moderate risk. Hypertension: Blood pressure control. Continue same management.     Hypothyroidism: Not optimally controlled, however, dose was increased last visit, and it has not been rechecked yet. Asymptomatic at this time. Diagnosis Orders   1. Cataract, unspecified cataract type, unspecified laterality     2. Hypothyroidism, unspecified type     3. Essential hypertension           No follow-ups on file. Patient given educational materials - see patient instructions. Discussed use, benefit, and side effects of prescribed medications. All patient questions answered. Pt voiced understanding. Reviewed health maintenance. Electronically signed Karen Lujan MD on 9/16/2020 at 3:35 PM      This note has been created using the Epic electronic health record, and dictated in part by [employer] dictation system. Despite the documenting physician's best efforts, there may be errors in spelling, grammar or syntax.

## 2020-09-29 ENCOUNTER — PATIENT MESSAGE (OUTPATIENT)
Dept: INTERNAL MEDICINE | Age: 85
End: 2020-09-29

## 2020-09-29 ENCOUNTER — TELEPHONE (OUTPATIENT)
Dept: INTERNAL MEDICINE | Age: 85
End: 2020-09-29

## 2020-09-29 RX ORDER — DONEPEZIL HYDROCHLORIDE 5 MG/1
5 TABLET, ORALLY DISINTEGRATING ORAL NIGHTLY
COMMUNITY
End: 2021-02-25

## 2020-09-29 NOTE — TELEPHONE ENCOUNTER
patient will be going to [facility].  will arrive there from [facility] as well, and she is planning on going there on Thursday. Daughter, Oz Mackey called and said she will need COVID test first.  [facility] told her she could just come out here and get the test so she could move in on Thursday. Is that correct? I told her that I was not aware of that but maybe there was an alternative for GP patients?   Please let Oz Mackey know what she needs to do [phone]

## 2020-09-29 NOTE — TELEPHONE ENCOUNTER
Spoke with Daughter, [address] [facility]- virtual appt scheduled with Dr. Panfilo Joiner tomorrow am. Would like COVID testing tomorrow afternoon. (declines COVID testing at ).

## 2020-09-30 ENCOUNTER — PRE-PROCEDURE TELEPHONE (OUTPATIENT)
Dept: PREADMISSION TESTING | Age: 85
End: 2020-09-30

## 2020-09-30 ENCOUNTER — VIRTUAL VISIT (OUTPATIENT)
Dept: INTERNAL MEDICINE | Age: 85
End: 2020-09-30
Payer: MEDICARE

## 2020-09-30 ENCOUNTER — HOSPITAL ENCOUNTER (OUTPATIENT)
Dept: PREADMISSION TESTING | Age: 85
Setting detail: SPECIMEN
Discharge: HOME | End: 2020-10-04
Payer: MEDICARE

## 2020-09-30 ENCOUNTER — TELEPHONE (OUTPATIENT)
Dept: INTERNAL MEDICINE | Age: 85
End: 2020-09-30

## 2020-09-30 LAB
SARS-COV-2, RAPID: NOT DETECTED
SARS-COV-2: NORMAL
SARS-COV-2: NORMAL
SOURCE: NORMAL

## 2020-09-30 PROCEDURE — U0002 COVID-19 LAB TEST NON-CDC: HCPCS

## 2020-09-30 PROCEDURE — 99213 OFFICE O/P EST LOW 20 MIN: CPT | Performed by: INTERNAL MEDICINE

## 2020-09-30 NOTE — TELEPHONE ENCOUNTER
Surgery scheduler, Esau Sullivan, spoke with Dr Guillermo Barrera office and confirmed that the rapid Covid swab done today, Sept 30, was sufficient for the surgery on Oct 8.  No further  Covid testing needs to be done prior to her procedure on Oct 8

## 2020-09-30 NOTE — PROGRESS NOTES
DR. Vin Kasper - TELEHEALTH PROGRESS NOTE    CHIEF COMPLAINT/HISTORY OF CHIEF COMPLAINT: This 80 y.o.  female presents via [address] visit today for the purposes of getting a COVID-19 test so that she can be admitted to [facility] living. They would like her to be tested today and they would like the rapid test, if possible. She is asymptomatic for the virus as far as she is aware. There are no other complaints. ALLERGIES/INTOLERANCES: No Known Allergies    MEDICATIONS:   Outpatient Medications Marked as Taking for the 9/30/20 encounter (Virtual Visit) with Ford Goetz, DO   Medication Sig Dispense Refill    donepezil (ARICEPT ODT) 5 MG disintegrating tablet Take 5 mg by mouth nightly      atenolol (TENORMIN) 25 MG tablet Take 1 tablet by mouth daily 90 tablet 3    vitamin B-12 (CYANOCOBALAMIN) 1000 MCG tablet Take 1,000 mcg by mouth daily      levothyroxine (SYNTHROID) 137 MCG tablet TAKE 1 TABLET DAILY 90 tablet 3    ammonium lactate (AMLACTIN) 12 % cream apply to affected area twice a day if needed 385 g 3    amLODIPine (NORVASC) 2.5 MG tablet Take one tablet daily. 90 tablet 3    calcium-vitamin D (OSCAL 500/200 D-3) 500-200 MG-UNIT per tablet Take 1 tablet by mouth daily 90 tablet 3    Multiple Vitamin (DAILY MULTIVITAMIN PO) Take 1 tablet by mouth daily          IMMUNIZATIONS: Reviewed for influenza and pneumococcal status as indicated in electronic record. REVIEW OF SYSTEMS:     Please see history of chief complaint above; otherwise no new problems with respect to General, HEENT, Cardiovascular, Respiratory, Gastrointestinal, Genitourinary, Endocrinologic, Musculoskeletal, or Neuropsychiatric complaints. PHYSICAL EXAMINATION:    (Due to this being a TeleHealth encounter, evaluation of the following organ systems is limited: Vitals/General/Skin/HEENT/Lungs/Heart/Abdomen/Genitourinary/Musculoskeletal/Neurologic.     Wt Readings from Last 2 Encounters:   09/16/20 140 lb (63.5 kg)   09/01/20 136 lb 9.6 oz (62 kg)       Patient-Reported Vitals 9/30/2020   Patient-Reported Weight 140 lb   Patient-Reported Height 5' 3\"   Patient-Reported Systolic 483   Patient-Reported Diastolic 62   Patient-Reported Pulse 60        There is no height or weight on file to calculate BMI. General: This is a 80 y.o.  female who is alert and oriented to person, place and time. She appears to be her stated age and does not appear to be in any acute distress. She was able to follow commands. Affect appropriate for the situation. There were no hallucinations. Skin: Skin color, texture, turgor appears normal. No apparent rashes or lesions. HEENT/Neck: Head: Normal, normocephalic, atraumatic  Eye: Normal appearing external eye, conjunctiva, lids cornea. EOM appear intact bilaterally. Ears: Normal appearing external ears. Nose: Normal appearing external nose. No discharge. Pharynx: Mucous membranes appear moist.  Neck: No masses visualized. Pulmonary/Chest: Chest rises and falls symmetrically with inspiration and expiration. No accessory muscle use noted. Normal respiratory effort. No signs of difficulty breathing or respiratory distress visualized. Abdomen: Non-obese  Musculoskeletal: Normal appearing range of motion of neck. Extremities: Within the limitations of TeleHealth examination there does not appear to be any clubbing, cyanosis, or edema in any of the extremities. Neurologic: No gaze palsy. No facial asymmetry (Cranial Nerve 7 motor function). Exam limited due to video visit. Osteopathic Structural Examination: Unable to perform due to limitations of Telehealth. ASSESSMENT/PLAN:    1. Late onset Alzheimer's disease without behavioral disturbance ([facility] [address]  2. Person under investigation for COVID-19  - We will get the rapid COVID-19 test ordered for her and then we can send the result to [facility].       No orders of the defined types were placed in this encounter. Requested Prescriptions      No prescriptions requested or ordered in this encounter       She will return to see Dr. Mahogany Lee as previously scheduled or sooner if needed. Pursuant to the emergency declaration under the [facility], [address] [place] [address] authority and the [employer] Act, this TeleHealth visit was conducted, with patient's consent, to reduce the patient's risk of exposure to COVID-19 and provide continuity of care for an established patient. Services were provided through a video synchronous discussion virtually (using [email or web address] me) to substitute for in-person clinic visit. The originating site was the patient's home and the distant site was the provider's office. Patient's identity was verified via name and date of birth. Total time spent on the encounter was 20 minutes.          Electronically signed by Evita Tirado DO on 9/30/2020 at 11:35 AM  Internal Medicine

## 2020-09-30 NOTE — TELEPHONE ENCOUNTER
Spoke with [facility] - YARY CHENG @ [address] [facility] test is negative. Pt is planning on being admitted to [facility]. COVID results faxed to [facility]. Daughter Julian Avina [phone]) also notified per phone call. She will discuss with [facility] CHENG and mom if they (pt and - Koko Arriaga) will cotntinue following with Dr. Ervin or you. They will let you know.

## 2020-10-01 ENCOUNTER — OUTSIDE SERVICES (OUTPATIENT)
Dept: INTERNAL MEDICINE | Age: 85
End: 2020-10-01
Payer: MEDICARE

## 2020-10-01 VITALS
RESPIRATION RATE: 16 BRPM | DIASTOLIC BLOOD PRESSURE: 64 MMHG | SYSTOLIC BLOOD PRESSURE: 123 MMHG | HEART RATE: 60 BPM | TEMPERATURE: 98 F

## 2020-10-01 ASSESSMENT — ENCOUNTER SYMPTOMS
CHEST TIGHTNESS: 0
NAUSEA: 0
VOMITING: 0
SORE THROAT: 0
DIARRHEA: 0
SHORTNESS OF BREATH: 0

## 2020-10-01 NOTE — PROGRESS NOTES
[facility]      Jaycee Gonzalez is a 80 y.o. female resident of [facility] who presents today for medical conditions/complaints as noted below. HPI:     HPI    Patient of Dr. Ewelina Devine presents for H&P for admission to [facility]. Impaired fasting glucose is well controlled with diet, A1C 5.8    Hypertension is well controlled on amlodipine and atenolol. Denies chest pain or dyspnea     Not currently on statin for dyslipidemia. TSH is not well controlled. Previous concern for whether she has been taking levothyroxine appropriately. As she is now residing in [facility], staff will assist her in taking her medication properly. History of melanoma. Following with dermatology, Dr. Rees Nip with neurology for chronic cerebral ischemia, cerebral infarction, dementia, cerebral atrophy, and essential tremor. Stable on aricept. History of colon polyps, currently asymptomatic. Continues on calcium supplement for osteopenia. Current Outpatient Medications   Medication Sig Dispense Refill    donepezil (ARICEPT ODT) 5 MG disintegrating tablet Take 5 mg by mouth nightly      atenolol (TENORMIN) 25 MG tablet Take 1 tablet by mouth daily 90 tablet 3    vitamin B-12 (CYANOCOBALAMIN) 1000 MCG tablet Take 1,000 mcg by mouth daily      moxifloxacin (VIGAMOX) 0.5 % ophthalmic solution 1 drop 3 times daily      prednisoLONE acetate (PRED FORTE) 1 % ophthalmic suspension Place 1 drop into the left eye 4 times daily       levothyroxine (SYNTHROID) 137 MCG tablet TAKE 1 TABLET DAILY 90 tablet 3    latanoprost (XALATAN) 0.005 % ophthalmic solution Place 1 drop into both eyes nightly (Patient not taking: Reported on 9/30/2020) 3 Bottle 3    ammonium lactate (AMLACTIN) 12 % cream apply to affected area twice a day if needed 385 g 3    amLODIPine (NORVASC) 2.5 MG tablet Take one tablet daily.  90 tablet 3    calcium-vitamin D (OSCAL 500/200 D-3) 500-200 MG-UNIT per tablet Take 1 tablet by mouth daily 90 tablet 3    Multiple Vitamin (DAILY MULTIVITAMIN PO) Take 1 tablet by mouth daily        No current facility-administered medications for this visit. No Known Allergies    Health Maintenance   Topic Date Due    Potassium monitoring  05/13/2021    Creatinine monitoring  05/13/2021    TSH testing  08/13/2021    DTaP/Tdap/Td vaccine (3 - Td) 07/22/2025    Flu vaccine  Completed    Shingles Vaccine  Completed    Pneumococcal 65+ years Vaccine  Completed    Hepatitis A vaccine  Aged Out    Hepatitis B vaccine  Aged Out    Hib vaccine  Aged Out    Meningococcal (ACWY) vaccine  Aged Out       Subjective:      Review of Systems   Constitutional: Negative for chills and fever. HENT: Negative for congestion and sore throat. Respiratory: Negative for chest tightness and shortness of breath. Cardiovascular: Negative for chest pain and leg swelling. Gastrointestinal: Negative for diarrhea, nausea and vomiting. Genitourinary: Negative for difficulty urinating and dysuria. Musculoskeletal: Negative for gait problem and myalgias. Neurological: Negative for dizziness and headaches. Psychiatric/Behavioral: Negative for confusion and decreased concentration. Objective:     Vitals:    10/01/20 1418   BP: 123/64   Pulse: 60   Resp: 16   Temp: 98 °F (36.7 °C)     Physical Exam  Vitals signs and nursing note reviewed. Constitutional:       General: She is not in acute distress. Appearance: She is well-developed. HENT:      Head: Normocephalic and atraumatic. Right Ear: External ear normal.      Left Ear: External ear normal.   Eyes:      General: Lids are normal.      Extraocular Movements: Extraocular movements intact. Conjunctiva/sclera: Conjunctivae normal.   Neck:      Musculoskeletal: Neck supple. Thyroid: No thyromegaly. Cardiovascular:      Rate and Rhythm: Normal rate and regular rhythm. Heart sounds: No murmur.    Pulmonary:      Effort: Pulmonary effort is normal. No accessory muscle usage or respiratory distress. Breath sounds: Normal breath sounds. No wheezing, rhonchi or rales. Abdominal:      Palpations: Abdomen is soft. Tenderness: There is no abdominal tenderness. There is no guarding or rebound. Musculoskeletal: Normal range of motion. Right lower leg: No edema. Left lower leg: No edema. Lymphadenopathy:      Cervical: No cervical adenopathy. Skin:     General: Skin is warm and dry. Nails: There is no clubbing. Neurological:      Mental Status: She is alert. Coordination: Coordination normal.      Gait: Gait normal.   Psychiatric:         Mood and Affect: Mood normal.         Speech: Speech normal.         Behavior: Behavior normal.         Assessment/Plan:      1. Impaired fasting glucose, stable  - Continue efforts at diet and weight loss    2. Essential hypertension,  controlled  - Continue current medications    3. Dyslipidemia,  stable  - Continue to monitor    4. Hypothyroidism due to acquired atrophy of thyroid, stable  - Recheck TSH with reflex FT4 after patient has been taking levothyroxine regularly for 6 weeks    5. Melanoma in situ of torso excluding breast ([address] stable  - Continue to follow with dermatology    6. Chronic cerebral ischemia, stable  7. Cerebral infarction, unspecified mechanism ([address] stable  8. Dementia with behavioral disturbance, unspecified dementia type ([address] stable  9. Acquired cerebral atrophy ([address] stable  10. Essential tremor, stable  - Continue to follow with neurology    11. Adenomatous polyp, stable  - Currently asymptomatic. Continue to monitor    12. Gastroesophageal reflux disease without esophagitis,  stable  - Continue to monitor    13. Osteopenia, unspecified location, stable  - Continue supplement        Return for follow up with Dr. Jos Preciado as scheduled. No orders of the defined types were placed in this encounter.     No orders of the defined types were placed in this encounter.                 Electronically signed by FAVIAN Pinto on 10/1/2020 at 5:13 PM

## 2020-10-08 ENCOUNTER — HOSPITAL ENCOUNTER (OUTPATIENT)
Age: 85
Setting detail: OUTPATIENT SURGERY
Discharge: HOME | End: 2020-10-08
Attending: OPHTHALMOLOGY | Admitting: OPHTHALMOLOGY
Payer: MEDICARE

## 2020-10-08 ENCOUNTER — PATIENT MESSAGE (OUTPATIENT)
Dept: INTERNAL MEDICINE | Age: 85
End: 2020-10-08

## 2020-10-08 ENCOUNTER — ANESTHESIA EVENT (OUTPATIENT)
Dept: OPERATING ROOM | Age: 85
End: 2020-10-08
Payer: MEDICARE

## 2020-10-08 ENCOUNTER — ANESTHESIA (OUTPATIENT)
Dept: OPERATING ROOM | Age: 85
End: 2020-10-08
Payer: MEDICARE

## 2020-10-08 VITALS
HEIGHT: 64 IN | BODY MASS INDEX: 23.9 KG/M2 | OXYGEN SATURATION: 96 % | TEMPERATURE: 98.1 F | WEIGHT: 140 LBS | DIASTOLIC BLOOD PRESSURE: 76 MMHG | SYSTOLIC BLOOD PRESSURE: 188 MMHG | RESPIRATION RATE: 18 BRPM | HEART RATE: 56 BPM

## 2020-10-08 VITALS — SYSTOLIC BLOOD PRESSURE: 179 MMHG | OXYGEN SATURATION: 100 % | DIASTOLIC BLOOD PRESSURE: 76 MMHG

## 2020-10-08 PROCEDURE — 7100000010 HC PHASE II RECOVERY - FIRST 15 MIN: Performed by: OPHTHALMOLOGY

## 2020-10-08 PROCEDURE — 6360000002 HC RX W HCPCS: Performed by: NURSE ANESTHETIST, CERTIFIED REGISTERED

## 2020-10-08 PROCEDURE — 7100000011 HC PHASE II RECOVERY - ADDTL 15 MIN: Performed by: OPHTHALMOLOGY

## 2020-10-08 PROCEDURE — 3700000000 HC ANESTHESIA ATTENDED CARE: Performed by: OPHTHALMOLOGY

## 2020-10-08 PROCEDURE — 2780000010 HC IMPLANT OTHER: Performed by: OPHTHALMOLOGY

## 2020-10-08 PROCEDURE — 6360000002 HC RX W HCPCS: Performed by: OPHTHALMOLOGY

## 2020-10-08 PROCEDURE — 2709999900 HC NON-CHARGEABLE SUPPLY: Performed by: OPHTHALMOLOGY

## 2020-10-08 PROCEDURE — 3600000012 HC SURGERY LEVEL 2 ADDTL 15MIN: Performed by: OPHTHALMOLOGY

## 2020-10-08 PROCEDURE — 3700000001 HC ADD 15 MINUTES (ANESTHESIA): Performed by: OPHTHALMOLOGY

## 2020-10-08 PROCEDURE — 3600000002 HC SURGERY LEVEL 2 BASE: Performed by: OPHTHALMOLOGY

## 2020-10-08 PROCEDURE — 2500000003 HC RX 250 WO HCPCS: Performed by: OPHTHALMOLOGY

## 2020-10-08 PROCEDURE — 6370000000 HC RX 637 (ALT 250 FOR IP): Performed by: OPHTHALMOLOGY

## 2020-10-08 RX ORDER — PHENYLEPHRINE HCL 2.5 %
1 DROPS OPHTHALMIC (EYE) SEE ADMIN INSTRUCTIONS
Status: DISCONTINUED | OUTPATIENT
Start: 2020-10-08 | End: 2020-10-08 | Stop reason: HOSPADM

## 2020-10-08 RX ORDER — EPINEPHRINE 1 MG/ML
INJECTION, SOLUTION, CONCENTRATE INTRAVENOUS PRN
Status: DISCONTINUED | OUTPATIENT
Start: 2020-10-08 | End: 2020-10-08 | Stop reason: ALTCHOICE

## 2020-10-08 RX ORDER — PREDNISOLONE ACETATE 10 MG/ML
SUSPENSION/ DROPS OPHTHALMIC
Qty: 1 BOTTLE | Refills: 2 | Status: SHIPPED | OUTPATIENT
Start: 2020-10-08 | End: 2020-11-19 | Stop reason: ALTCHOICE

## 2020-10-08 RX ORDER — MOXIFLOXACIN 5 MG/ML
SOLUTION/ DROPS OPHTHALMIC
Qty: 1 BOTTLE | Refills: 0 | Status: SHIPPED | OUTPATIENT
Start: 2020-10-08 | End: 2020-10-28 | Stop reason: ALTCHOICE

## 2020-10-08 RX ORDER — LIDOCAINE HYDROCHLORIDE 20 MG/ML
JELLY TOPICAL PRN
Status: DISCONTINUED | OUTPATIENT
Start: 2020-10-08 | End: 2020-10-08 | Stop reason: ALTCHOICE

## 2020-10-08 RX ORDER — DEXAMETHASONE PHOSPHATE - MOXIFLOXACIN - KETOROLAC TROMETHAMINE 1; .5; .4 MG/ML; MG/ML; MG/ML
INJECTION, SOLUTION OPHTHALMIC PRN
Status: DISCONTINUED | OUTPATIENT
Start: 2020-10-08 | End: 2020-10-08 | Stop reason: ALTCHOICE

## 2020-10-08 RX ORDER — TROPICAMIDE 10 MG/ML
1 SOLUTION/ DROPS OPHTHALMIC SEE ADMIN INSTRUCTIONS
Status: DISCONTINUED | OUTPATIENT
Start: 2020-10-08 | End: 2020-10-08 | Stop reason: HOSPADM

## 2020-10-08 RX ORDER — TETRACAINE HYDROCHLORIDE 5 MG/ML
1 SOLUTION OPHTHALMIC SEE ADMIN INSTRUCTIONS
Status: DISCONTINUED | OUTPATIENT
Start: 2020-10-08 | End: 2020-10-08 | Stop reason: HOSPADM

## 2020-10-08 RX ORDER — MIDAZOLAM HYDROCHLORIDE 1 MG/ML
INJECTION INTRAMUSCULAR; INTRAVENOUS PRN
Status: DISCONTINUED | OUTPATIENT
Start: 2020-10-08 | End: 2020-10-08 | Stop reason: SDUPTHER

## 2020-10-08 RX ADMIN — TETRACAINE HYDROCHLORIDE 1 DROP: 5 SOLUTION OPHTHALMIC at 14:23

## 2020-10-08 RX ADMIN — MIDAZOLAM HYDROCHLORIDE 2 MG: 1 INJECTION, SOLUTION INTRAMUSCULAR; INTRAVENOUS at 14:49

## 2020-10-08 RX ADMIN — TROPICAMIDE 1 DROP: 10 SOLUTION/ DROPS OPHTHALMIC at 14:34

## 2020-10-08 RX ADMIN — TROPICAMIDE 1 DROP: 10 SOLUTION/ DROPS OPHTHALMIC at 14:29

## 2020-10-08 RX ADMIN — PHENYLEPHRINE HYDROCHLORIDE 1 DROP: 25 SOLUTION/ DROPS OPHTHALMIC at 14:29

## 2020-10-08 RX ADMIN — PHENYLEPHRINE HYDROCHLORIDE 1 DROP: 25 SOLUTION/ DROPS OPHTHALMIC at 14:24

## 2020-10-08 RX ADMIN — TETRACAINE HYDROCHLORIDE 1 DROP: 5 SOLUTION OPHTHALMIC at 14:34

## 2020-10-08 RX ADMIN — PHENYLEPHRINE HYDROCHLORIDE 1 DROP: 25 SOLUTION/ DROPS OPHTHALMIC at 14:34

## 2020-10-08 RX ADMIN — TETRACAINE HYDROCHLORIDE 1 DROP: 5 SOLUTION OPHTHALMIC at 14:29

## 2020-10-08 RX ADMIN — TROPICAMIDE 1 DROP: 10 SOLUTION/ DROPS OPHTHALMIC at 14:24

## 2020-10-08 ASSESSMENT — PULMONARY FUNCTION TESTS
PIF_VALUE: 0

## 2020-10-08 ASSESSMENT — PAIN SCALES - GENERAL
PAINLEVEL_OUTOF10: 0
PAINLEVEL_OUTOF10: 0

## 2020-10-08 ASSESSMENT — PAIN - FUNCTIONAL ASSESSMENT: PAIN_FUNCTIONAL_ASSESSMENT: 0-10

## 2020-10-08 NOTE — FLOWSHEET NOTE
rounding on unit. Assessment: Patient is waiting for procedure to begin. Because she ate breakfast patient is being delayed for procedure. Patient does not believe she was instructed not to have breakfast. Patient related that she and her  have had to move from their bungalow at assisted living to an apartment in the main building due to his declining health. She hopes to be able to return to the bungalow. Intervention: Engaged in conversation. Patient expressed appreciation for visit and offer of continued prayer. Plan: Chaplains are available on site or on call 24/7 for spiritual and emotional support. 10/08/20 1320   Encounter Summary   Services provided to: Patient   Referral/Consult From: [facility] Family members; Nondenominational/peri community   Continue Visiting   (10/8/20)   Complexity of Encounter Moderate   Length of Encounter 15 minutes   Routine   Type Pre-procedure   Spiritual/Taoism   Type Spiritual support   Assessment Calm; Approachable   Intervention Active listening;Explored feelings, thoughts, concerns;Sustaining presence/ Ministry of presence   Outcome Expressed gratitude;Engaged in conversation

## 2020-10-08 NOTE — ANESTHESIA PRE PROCEDURE
Department of Anesthesiology  Preprocedure Note       Name:  Frances Chavarria   Age:  80 y.o.  :  3/21/1930                                          MRN:  [id number]         Date:  10/8/2020      Surgeon: Georgina Hutchins):  Freddie Lamas MD    Procedure: Procedure(s):  Right Cataract Extraction w/ IOL Implant    Medications prior to admission:   Prior to Admission medications    Medication Sig Start Date End Date Taking? Authorizing Provider   donepezil (ARICEPT ODT) 5 MG disintegrating tablet Take 5 mg by mouth nightly    Historical Provider, MD   atenolol (TENORMIN) 25 MG tablet Take 1 tablet by mouth daily 20   Jose Bravo MD   vitamin B-12 (CYANOCOBALAMIN) 1000 MCG tablet Take 1,000 mcg by mouth daily    Historical Provider, MD   moxifloxacin (VIGAMOX) 0.5 % ophthalmic solution 1 drop 3 times daily    Historical Provider, MD   prednisoLONE acetate (PRED FORTE) 1 % ophthalmic suspension Place 1 drop into the left eye 4 times daily     Historical Provider, MD   levothyroxine (SYNTHROID) 137 MCG tablet TAKE 1 TABLET DAILY 20   Jose Bravo MD   latanoprost (XALATAN) 0.005 % ophthalmic solution Place 1 drop into both eyes nightly  Patient not taking: Reported on 2020   Leonarda Cat, PATRICIA   ammonium lactate (AMLACTIN) 12 % cream apply to affected area twice a day if needed 20   Jose Bravo MD   amLODIPine (NORVASC) 2.5 MG tablet Take one tablet daily. 3/10/20   Jose Bravo MD   calcium-vitamin D (OSCAL 500/200 D-3) 500-200 MG-UNIT per tablet Take 1 tablet by mouth daily 10/1/19   Contreras Lundberg DO   Multiple Vitamin (DAILY MULTIVITAMIN PO) Take 1 tablet by mouth daily     Historical Provider, MD       Current medications:    No current outpatient medications on file. No current facility-administered medications for this visit.         Allergies:  No Known Allergies    Problem List:    Patient Active Problem List   Diagnosis Code    Dyslipidemia E78.5    Osteopenia M85.80    Impaired fasting glucose R73.01    Adenomatous polyp D36.9    Essential hypertension I10    Hypothyroidism E03.9    Sciatica M54.30    GERD (gastroesophageal reflux disease) K21.9    Visual disturbance H53.9    Malignant melanoma in situ (HCC) D03.9    Difficulty controlling anger R45.4    Primary open angle glaucoma (POAG) of both eyes, moderate stage H40.1132    PVD (posterior vitreous detachment), both eyes H43.813    Combined forms of age-related cataract of both eyes H25.813    Dementia (HCC) F03.90    Tremor R25.1    Essential tremor G25.0    Dizziness R42    Chronic cerebral ischemia I67.82    Acquired cerebral atrophy (HCC) G31.9    Cerebral infarction (HCC) I63.9       Past Medical History:        Diagnosis Date    Adenomatous polyp 06/10    With recommendation for repeat 06/15. Also, diverticulosis was noted.  Cataracts, bilateral     Combined forms of age-related cataract of both eyes 10/1/2018    Corneal scar, right eye     Cystocele     history of     Dementia ([facility]     Mini-Mental Status exam 27/30  6/14  declines meds at this point,  MMSE 29/30 on June 13, 2017  MMSE 26/30 4/2018    Difficulty controlling anger 9/16/2018    Dyslipidemia     GERD (gastroesophageal reflux disease)     egd  4/15 ok    Goiter     benign, history of     Hiatal hernia     Hypertension     Hypokalemia     Hypothyroidism     Impaired fasting glucose     Malignant melanoma in situ ([facility]     R upper Back 8/17    Migraines     Murmur, functional     Grade 1/6 systolic, history of     Osteopenia     T -1.0 hip, 03/09, T -0.3 spine, 03/09. 1) Repeat DEXA scan 09/12 with T +0.15, T -1.2 at the hip but -1.8 at the mean femoral neck giving her a FRAX score of 4.3 at the hip.  Reclast 10/13 and given 2014,2015, and 1/4/2017, on calcium and vit d,  Redo Dexa 10/14 Frax 4.2% , Frax 4.8% 10 yr hip fracture risk on Dexa 1/17  On Reclast    Posterior vitreous detachment of both eyes     Primary open angle glaucoma (POAG) of both eyes, moderate stage 10/1/2018    Sciatica 12/15/2015    Skin cancer     History of multiple skin cancers. Following with Dr. Scott Gonzalez--- invasive squamous cell Ca 8/17 L forearm    Syncope and collapse 1/21/2015    Tremor 12/23/2018    Trichiasis of left lower eyelid without entropion     Visual disturbance 10/26/2017       Past Surgical History:        Procedure Laterality Date    APPENDECTOMY      CHOLECYSTECTOMY      Remote.  COLONOSCOPY  06/08/10    COLONOSCOPY  07/18/02    CYSTOCELE REPAIR  02/12/99    Vaginal prolapse, cystocele plus pelvic relaxation.  DILATION AND CURETTAGE OF UTERUS      with hysteroscopy   Mary Lou    still has ovaries     INTRACAPSULAR CATARACT EXTRACTION Left 8/25/2020    Left Cataract Extraction w/ IOL Implant performed by Angelique Morris MD at [address] [place] [address]  10/07/09    SCC, left nasal sidewall.  OTHER SURGICAL HISTORY  11/11/08    Anterior repair.  OTHER SURGICAL HISTORY  1988    laser cone    UPPER GASTROINTESTINAL ENDOSCOPY  4/8/2015    hiatal hernia    VEIN SURGERY  06/08/09    Laser ablation of right greater saphenous vein. Social History:    Social History     Tobacco Use    Smoking status: Never Smoker    Smokeless tobacco: Never Used   Substance Use Topics    Alcohol use: Yes     Alcohol/week: 0.0 standard drinks     Comment: Infrequently. Counseling given: Not Answered      Vital Signs (Current): There were no vitals filed for this visit.                                            BP Readings from Last 3 Encounters:   10/08/20 (!) 167/70   10/01/20 123/64   09/16/20 114/62       NPO Status:                                                                                 BMI:   Wt Readings from Last 3 Encounters:   10/08/20 140 lb (63.5 kg)   09/16/20 140 lb (63.5 kg)   09/01/20 136 lb 9.6 oz (62 kg)     There is no height or weight on file to calculate BMI.    CBC:   Lab Results   Component Value Date    WBC 7.5 05/13/2020    RBC 4.07 05/13/2020    HGB 13.1 05/13/2020    HCT 40.8 05/13/2020    .2 05/13/2020    RDW 13.6 05/13/2020     05/13/2020       CMP:   Lab Results   Component Value Date     05/13/2020    K 3.9 05/13/2020     05/13/2020    CO2 30 05/13/2020    BUN 18 05/13/2020    CREATININE 0.72 05/13/2020    GFRAA >60 05/13/2020    LABGLOM >60 05/13/2020    GLUCOSE 118 05/13/2020    PROT 7.0 05/13/2020    CALCIUM 9.0 05/13/2020    BILITOT 0.59 05/13/2020    ALKPHOS 45 05/13/2020    AST 21 05/13/2020    ALT 6 05/13/2020       POC Tests: No results for input(s): POCGLU, POCNA, POCK, POCCL, POCBUN, POCHEMO, POCHCT in the last 72 hours.     Coags:   Lab Results   Component Value Date    PROTIME 10.7 01/21/2015    INR 1.0 01/21/2015    APTT 22.8 01/21/2015       HCG (If Applicable): No results found for: PREGTESTUR, PREGSERUM, HCG, HCGQUANT     ABGs: No results found for: PHART, PO2ART, ORP3QDN, APW4THE, BEART, O1TPWHGR     Type & Screen (If Applicable):  No results found for: LABABO, LABRH    Drug/Infectious Status (If Applicable):  No results found for: HIV, HEPCAB    COVID-19 Screening (If Applicable):   Lab Results   Component Value Date    COVID19 Not Detected 09/30/2020    COVID19 Not Detected 08/20/2020         Anesthesia Evaluation  Patient summary reviewed and Nursing notes reviewed no history of anesthetic complications:   Airway: Mallampati: II        Dental:    (+) upper dentures      Pulmonary:Negative Pulmonary ROS and normal exam                               Cardiovascular:  Exercise tolerance: good (>4 METS),   (+) hypertension:,     (-)  angina    ECG reviewed      Echocardiogram reviewed                  Neuro/Psych:   (+) headaches:, psychiatric history:            GI/Hepatic/Renal:   (+) GERD: well controlled,           Endo/Other:    (+) hypothyroidism::., .                 Abdominal:           Vascular: negative vascular ROS. Anesthesia Plan      MAC     ASA 3       Induction: intravenous. Anesthetic plan and risks discussed with patient.       Plan discussed with surgical team.                  FELICIA Mckeon - CRNA   10/8/2020

## 2020-10-08 NOTE — H&P
Conjunctivae normal.   Neck:      Musculoskeletal: Neck supple. Thyroid: No thyromegaly. Cardiovascular:      Rate and Rhythm: Normal rate and regular rhythm. Heart sounds: No murmur. Pulmonary:      Effort: Pulmonary effort is normal. No accessory muscle usage or respiratory distress. Breath sounds: Normal breath sounds. No wheezing, rhonchi or rales. Abdominal:      Palpations: Abdomen is soft. Tenderness: There is no abdominal tenderness. There is no guarding or rebound. Musculoskeletal: Normal range of motion. Right lower leg: No edema. Left lower leg: No edema. Lymphadenopathy:      Cervical: No cervical adenopathy. Skin:     General: Skin is warm and dry. Nails: There is no clubbing. Neurological:      Mental Status: She is alert. Coordination: Coordination normal.      Gait: Gait normal.   Psychiatric:         Mood and Affect: Mood normal.         Speech: Speech normal.         Behavior: Behavior normal.            Assessment/Plan:      1. Impaired fasting glucose, stable  - Continue efforts at diet and weight loss     2. Essential hypertension,  controlled  - Continue current medications     3. Dyslipidemia,  stable  - Continue to monitor     4. Hypothyroidism due to acquired atrophy of thyroid, stable  - Recheck TSH with reflex FT4 after patient has been taking levothyroxine regularly for 6 weeks     5. Melanoma in situ of torso excluding breast ([address] stable  - Continue to follow with dermatology     6. Chronic cerebral ischemia, stable  7. Cerebral infarction, unspecified mechanism ([address] stable  8. Dementia with behavioral disturbance, unspecified dementia type ([address] stable  9. Acquired cerebral atrophy ([address] stable  10. Essential tremor, stable  - Continue to follow with neurology     11. Adenomatous polyp, stable  - Currently asymptomatic. Continue to monitor     12.  Gastroesophageal reflux disease without esophagitis,  stable  - Continue to monitor     13. Osteopenia, unspecified location, stable  - Continue supplement           Return for follow up with Dr. Ryne Byers as scheduled.     No orders of the defined types were placed in this encounter.       Encounter Medications    No orders of the defined types were placed in this encounter.                 Electronically signed by FAVIAN Adams on 10/1/2020 at 5:13 PM                       I have reviewed the history and physical. I examined the patient and find no relevant changes. I have reviewed with the patient the risks, benefits and alternatives to the planned procedures.      Electronically signed by Hanh Thao MD on 10/8/2020 at 2:00 PM

## 2020-10-08 NOTE — TELEPHONE ENCOUNTER
From: Guillaume Siddiqui  To: Stefanie Pro MD  Sent: 10/8/2020 9:01 AM EDT  Subject: Prescription Question    The nurse at [facility] would like an order to have my mom stop taking Quetiapine immediately.   Thank you,  Sparkle

## 2020-10-08 NOTE — OP NOTE
Surgical Operative Report  Date of procedure: 10/08/20 3:16 PM  Preoperative Diagnosis: Visually significant cataract  OD  Postoperative Diagosis:  same  Procedure: Cataract extraction with intraocular lens OD  Anesthesia:  topical proparacaine, lidocaine jelly 2%, intracameral Lidocaine 1%/Phenylephrine 1.5%  Estimated blood loss: Less than 5 cc  Indications for procedure: The patient reports significant difficulties with activities of daily living secondary to cataract formation. The patient understands the risks, benefits, and alternatives of cataract surgery as stated in the informed consent. The patient wishes to proceed with cataract surgery without reservation. Operative Summary:  Informed consent was reviewed and the operative site was confirmed by the patient and marked by the physician in the preop area. Topical proparacaine was instilled onto the operative eye and the patient was prepped and draped in the usual sterile fashion in the OR. A lid speculum was placed in the eye and topical lidocaine jelly 2% was placed on the cornea. LRI  @ with an AK blade          [x] No LRI performed     A paracentesis site was created temporally, and the anterior chamber was filled with Lidocaine 1%/Phenylephrine 1.5%. Viscoat was then placed in the anterior chamber and a temporal clear cornea incision was created. A cystotome was used to begin a capsulorrhexis that was completed with Utrata forceps. Hydrodissection was performed and the lens was then phacoemulsified with the phacohandpiece. The cortical material was then removed with the I/A handpiece and the capsule was filled with cohesive viscoelastic. A [id number]  lens was inserted into the capsular bag and rotated into position @ 7 degrees. The cohesive viscoelastic was removed with the I/A handpiece, and the chamber was deepened with BSS. A 0.1 ml bolus of Moxifloxacin 5mg/ml was instilled into the anterior chamber.   The wounds were hydrated, checked for water tightness. 10-0 Biosorb suture in paracentesis site/keratome wound        [x]No suture     The wounds were appropriately sealed. The speculum was removed and the patient was taken to the recovery area. The patient tolerated the procedure well and there were no complications.      Addendum:Omidria 1%/0.3% in BSS    []Yes    [x]No      Electronically signed by Maggie Guillermo MD on 2/6/2020 at 2:19 PM

## 2020-10-09 ENCOUNTER — OFFICE VISIT (OUTPATIENT)
Dept: OPHTHALMOLOGY | Age: 85
End: 2020-10-09
Payer: MEDICARE

## 2020-10-09 PROCEDURE — 99211 OFF/OP EST MAY X REQ PHY/QHP: CPT

## 2020-10-09 PROCEDURE — 99024 POSTOP FOLLOW-UP VISIT: CPT | Performed by: OPHTHALMOLOGY

## 2020-10-09 ASSESSMENT — ENCOUNTER SYMPTOMS
GASTROINTESTINAL NEGATIVE: 0
RESPIRATORY NEGATIVE: 0
EYES NEGATIVE: 0
ALLERGIC/IMMUNOLOGIC NEGATIVE: 0

## 2020-10-09 ASSESSMENT — CONF VISUAL FIELD
OD_NORMAL: 1
METHOD: COUNTING FINGERS
OS_NORMAL: 1

## 2020-10-09 ASSESSMENT — REFRACTION_MANIFEST
OD_AXIS: 036
METHOD_AUTOREFRACTION: 1
OS_SPHERE: +1.75
OS_CYLINDER: -3.00
OD_SPHERE: -0.25
OS_AXIS: 064
OD_CYLINDER: -1.50

## 2020-10-09 ASSESSMENT — KERATOMETRY
OS_K2POWER_DIOPTERS: 44.50
METHOD_AUTO_MANUAL: AUTOMATED
OD_K2POWER_DIOPTERS: 44.50
OS_AXISANGLE_DEGREES: 172
OD_K1POWER_DIOPTERS: 43.00
OS_K1POWER_DIOPTERS: 43.25
OD_AXISANGLE_DEGREES: 167
OD_AXISANGLE2_DEGREES: 077
OS_AXISANGLE2_DEGREES: 082

## 2020-10-09 ASSESSMENT — VISUAL ACUITY
METHOD: SNELLEN - LINEAR
OD_SC: 20/50

## 2020-10-09 ASSESSMENT — PACHYMETRY
OD_CT(UM): 507
OS_CT(UM): 511

## 2020-10-09 ASSESSMENT — TONOMETRY
IOP_METHOD: TONOPEN
OD_IOP_MMHG: 27

## 2020-10-09 ASSESSMENT — SLIT LAMP EXAM - LIDS: COMMENTS: 2+ DERMATOCHALASIS - UPPER LID

## 2020-10-09 NOTE — PROGRESS NOTES
Travon Rodgers is a 80 y.o. female here for a complete eye exam.      Chief Complaint   Patient presents with    Post-Op Check       HPI     1 day post cataract surgery right eye, doing well  Left eye 8/25/20          Review of Systems  ROS     Positive for: HENT    Negative for: Constitutional, Gastrointestinal, Neurological, Skin, Genitourinary, Musculoskeletal, Endocrine, Cardiovascular, Eyes, Respiratory, Psychiatric, Allergic/Imm, Heme/Lymph          Main Ophthalmology Exam     External Exam       Right Left    External Normal Normal          Slit Lamp Exam       Right Left    Lids/Lashes 2+ Dermatochalasis - upper lid 2+ Dermatochalasis - upper lid, 2+ Trichiasis - lower lid along with iatrogenic madarosis    Conjunctiva/Sclera White and quiet White and quiet    Cornea trauma scar central just temporal to visual axis, tr PEE; 1+ MCE central 1+ PEE inferior, tr edmea    Anterior Chamber 1+ cell, no flare Deep and quiet    Iris Round and reactive Round and reactive    Lens PCIOL toric at 10 degrees Posterior chamber intraocular lens, toric at 177, oblique PC folds temporal    Vitreous Posterior vitreous detachment Posterior vitreous detachment          Fundus Exam       Right Left    Disc Normal Normal    C/D Ratio Vertical 0.4 0.35    Macula 1-2 drusenoid changes temporal -- no RPE mottling or heme Normal    Vessels Normal Normal                   Tonometry     Tonometry (Tonopen, 8:39 AM)       Right Left    Pressure 27           Tonometry #2 (Tonopen, 8:42 AM)       Right Left    Pressure 29               Visual Acuity     Visual Acuity (Snellen - Linear)       Right Left    Dist sc 20/50               Pupils     Pupils       Pupils APD    Right PERRL None    Left PERRL None               Confrontational Visual Fields     Visual Fields (Counting fingers)       Right Left     Full Full               Extraocular Movement     Extraocular Movement       Right Left     Full, Ortho Full, Ortho Was the patient seen in the last year in this department? Yes  LV-9/18/17    Does patient have an active prescription for medications requested? No     Received Request Via: Pharmacy   Keratometry     Keratometry (Automated)       K1 Axis K2 Axis    Right 43.00 077 44.50 167    Left 43.25 082 44.50 172                Past Medical History:   Diagnosis Date    Adenomatous polyp 06/10    With recommendation for repeat 06/15. Also, diverticulosis was noted.  Cataracts, bilateral     Combined forms of age-related cataract of both eyes 10/1/2018    Corneal scar, right eye     Cystocele     history of     Dementia ([facility] [address]     Mini-Mental Status exam 27/30 6/14  declines meds at this point,  MMSE 29/30 on June 13, 2017  MMSE 26/30 4/2018    Difficulty controlling anger 9/16/2018    Dyslipidemia     GERD (gastroesophageal reflux disease)     egd  4/15 ok    Goiter     benign, history of     Hiatal hernia     Hypertension     Hypokalemia     Hypothyroidism     Impaired fasting glucose     Malignant melanoma in situ ([facility] [address]     R upper Back 8/17    Migraines     Murmur, functional     Grade 1/6 systolic, history of     Osteopenia     T -1.0 hip, 03/09, T -0.3 spine, 03/09. 1) Repeat DEXA scan 09/12 with T +0.15, T -1.2 at the hip but -1.8 at the mean femoral neck giving her a FRAX score of 4.3 at the hip. Reclast 10/13 and given 2014,2015, and 1/4/2017, on calcium and vit d,  Redo Dexa 10/14 Frax 4.2% , Frax 4.8% 10 yr hip fracture risk on Dexa 1/17  On Reclast    Posterior vitreous detachment of both eyes     Primary open angle glaucoma (POAG) of both eyes, moderate stage 10/1/2018    Sciatica 12/15/2015    Skin cancer     History of multiple skin cancers.  Following with Dr. 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     Visual disturbance 10/26/2017          Current Outpatient Medications:     prednisoLONE acetate (PRED FORTE) 1 % ophthalmic suspension, One drop in operated eye 4 times per day for 7 days; then 1 drop 3 times per day for 7 days; then 1 drop 2 times a day for 7 days; then 1 drop in each eye for 7 days, Disp: 1 Bottle, Rfl: 2    moxifloxacin (VIGAMOX) 0.5 % ophthalmic solution, One drop in operated eye 4 times per day for 7 days, Disp: 1 Bottle, Rfl: 0    donepezil (ARICEPT ODT) 5 MG disintegrating tablet, Take 5 mg by mouth nightly, Disp: , Rfl:     atenolol (TENORMIN) 25 MG tablet, Take 1 tablet by mouth daily, Disp: 90 tablet, Rfl: 3    vitamin B-12 (CYANOCOBALAMIN) 1000 MCG tablet, Take 1,000 mcg by mouth daily, Disp: , Rfl:     moxifloxacin (VIGAMOX) 0.5 % ophthalmic solution, 1 drop 3 times daily, Disp: , Rfl:     prednisoLONE acetate (PRED FORTE) 1 % ophthalmic suspension, Place 1 drop into the left eye 4 times daily , Disp: , Rfl:     levothyroxine (SYNTHROID) 137 MCG tablet, TAKE 1 TABLET DAILY, Disp: 90 tablet, Rfl: 3    latanoprost (XALATAN) 0.005 % ophthalmic solution, Place 1 drop into both eyes nightly (Patient not taking: Reported on 9/30/2020), Disp: 3 Bottle, Rfl: 3    ammonium lactate (AMLACTIN) 12 % cream, apply to affected area twice a day if needed, Disp: 385 g, Rfl: 3    amLODIPine (NORVASC) 2.5 MG tablet, Take one tablet daily. , Disp: 90 tablet, Rfl: 3    calcium-vitamin D (OSCAL 500/200 D-3) 500-200 MG-UNIT per tablet, Take 1 tablet by mouth daily, Disp: 90 tablet, Rfl: 3    Multiple Vitamin (DAILY MULTIVITAMIN PO), Take 1 tablet by mouth daily , Disp: , Rfl:      No Known Allergies     IMPRESSION:  1. Pseudophakia        PLAN:    1. Patient status post CE IOL OU with toric lenses OU. Mild IOP spike OD 1 day postop. Will observe closely. Hold on IOP drops for now. She has not started her postop drops as there is some confusion with her assisted care facility. Emphasize starting her drops. Replacement drops were sent to the pharmacy. Will check refraction and IOL position. Patient with evidence of loose zonules in both eyes during cataract surgery. Would hesitate to take back for lens rotation if needed. Jennie Antunez Patient doing well. Postoperative drops and reasons to call or return reviewed. Proper follow-up arranged. K scar may limit vision OD. 2.   Primary open angle glaucoma. Continue latanoprost.  Concerned that she is not taking her drops. Agree with Dr. Glenna Schultz that appearance without stereotypical glaucomatous appearing atrophy. Some thinning on OCT, which is stable. Unknown T-max. Return 1 week follow-up after toric CE IOL OU with Dr. Silverman, IOP check, reaffirm drop schedule.     Electronically signed by Merline Hawking, MD on 10/9/2020 at 10:59 AM    (Please note that portions of this note were completed with a voice recognition program. Efforts were made to edit the dictations but occasionally words are mis-transcribed.)

## 2020-10-13 ENCOUNTER — HOSPITAL ENCOUNTER (OUTPATIENT)
Age: 85
Setting detail: SPECIMEN
Discharge: HOME | End: 2020-10-13
Payer: MEDICARE

## 2020-10-13 PROCEDURE — 87086 URINE CULTURE/COLONY COUNT: CPT

## 2020-10-13 PROCEDURE — 81001 URINALYSIS AUTO W/SCOPE: CPT

## 2020-10-13 PROCEDURE — 87186 SC STD MICRODIL/AGAR DIL: CPT

## 2020-10-13 PROCEDURE — 87088 URINE BACTERIA CULTURE: CPT

## 2020-10-14 ENCOUNTER — OFFICE VISIT (OUTPATIENT)
Dept: OPTOMETRY | Age: 85
End: 2020-10-14
Payer: MEDICARE

## 2020-10-14 LAB
-: ABNORMAL
AMORPHOUS: ABNORMAL
BACTERIA: ABNORMAL
BILIRUBIN URINE: NEGATIVE
CASTS UA: ABNORMAL /LPF (ref 0–2)
COLOR: ABNORMAL
COMMENT UA: ABNORMAL
CRYSTALS, UA: ABNORMAL /HPF
EPITHELIAL CELLS UA: ABNORMAL /HPF (ref 0–5)
GLUCOSE URINE: NEGATIVE
KETONES, URINE: NEGATIVE
LEUKOCYTE ESTERASE, URINE: ABNORMAL
MUCUS: ABNORMAL
NITRITE, URINE: POSITIVE
OTHER OBSERVATIONS UA: ABNORMAL
PH UA: 6 (ref 5–6)
PROTEIN UA: NEGATIVE
RBC UA: ABNORMAL /HPF (ref 0–4)
RENAL EPITHELIAL, UA: ABNORMAL /HPF
SPECIFIC GRAVITY UA: 1.01 (ref 1.01–1.02)
TRICHOMONAS: ABNORMAL
TURBIDITY: ABNORMAL
URINE HGB: ABNORMAL
UROBILINOGEN, URINE: NORMAL
WBC UA: ABNORMAL /HPF (ref 0–4)
YEAST: ABNORMAL

## 2020-10-14 PROCEDURE — 99211 OFF/OP EST MAY X REQ PHY/QHP: CPT

## 2020-10-14 PROCEDURE — 99024 POSTOP FOLLOW-UP VISIT: CPT | Performed by: OPTOMETRIST

## 2020-10-14 ASSESSMENT — KERATOMETRY
OD_K1POWER_DIOPTERS: 43.00
OD_AXISANGLE_DEGREES: 004
OD_AXISANGLE2_DEGREES: 094
METHOD_AUTO_MANUAL: AUTOMATED
OD_K2POWER_DIOPTERS: 44.25

## 2020-10-14 ASSESSMENT — VISUAL ACUITY
OS_SC+: -1
OD_SC+: -2
OS_SC: 20/60
METHOD: SNELLEN - LINEAR
OD_SC: 20/50

## 2020-10-14 ASSESSMENT — REFRACTION_MANIFEST
OD_AXIS: 174
OS_SPHERE: +1.00
OS_CYLINDER: -2.75
OD_CYLINDER: -0.75
OD_SPHERE: -0.50
OS_AXIS: 060

## 2020-10-14 ASSESSMENT — PACHYMETRY
OD_CT(UM): 507
OS_CT(UM): 511

## 2020-10-14 ASSESSMENT — REFRACTION_WEARINGRX
OS_SPHERE: +1.25
OS_CYLINDER: -2.75
SPECS_TYPE: NOT FINAL
OS_AXIS: 060

## 2020-10-14 ASSESSMENT — SLIT LAMP EXAM - LIDS
COMMENTS: NORMAL
COMMENTS: NORMAL

## 2020-10-14 ASSESSMENT — TONOMETRY
IOP_METHOD: NON-CONTACT AIR PUFF
OD_IOP_MMHG: 10
OS_IOP_MMHG: 13

## 2020-10-14 NOTE — PATIENT INSTRUCTIONS
Prednisolone 4 x daily for 1 week; then START Friday 3 x daily for 1 week; then 2 x daily for 1 week; then 1 x daily for 1 week.   Discontinue the vigamox on Friday     Suggest +2.50 readers over the counter until getting new glasses prescription

## 2020-10-14 NOTE — PROGRESS NOTES
Roya Zimmerman presents today for   Chief Complaint   Patient presents with    Blurred Vision    Post-Op Check   . HPI     Blurred Vision     In both eyes. Vision is blurred. Context:  reading. Comments     Post op cataract surgery  OD: 10/8/2020  OS: 8/25/2020  Finished drops in left eye  Right Eye: Vigamox 4 x daily for 1 week   Prednisolone 4 x daily for 1 week; then START Friday 3 x daily for 1 week; then 2 x daily for 1 week; then 1 x daily for 1 week. Current Outpatient Medications   Medication Sig Dispense Refill    prednisoLONE acetate (PRED FORTE) 1 % ophthalmic suspension One drop in operated eye 4 times per day for 7 days; then 1 drop 3 times per day for 7 days; then 1 drop 2 times a day for 7 days; then 1 drop in each eye for 7 days 1 Bottle 2    moxifloxacin (VIGAMOX) 0.5 % ophthalmic solution One drop in operated eye 4 times per day for 7 days 1 Bottle 0    donepezil (ARICEPT ODT) 5 MG disintegrating tablet Take 5 mg by mouth nightly      atenolol (TENORMIN) 25 MG tablet Take 1 tablet by mouth daily 90 tablet 3    vitamin B-12 (CYANOCOBALAMIN) 1000 MCG tablet Take 1,000 mcg by mouth daily      moxifloxacin (VIGAMOX) 0.5 % ophthalmic solution 1 drop 3 times daily      prednisoLONE acetate (PRED FORTE) 1 % ophthalmic suspension Place 1 drop into the left eye 4 times daily       levothyroxine (SYNTHROID) 137 MCG tablet TAKE 1 TABLET DAILY 90 tablet 3    latanoprost (XALATAN) 0.005 % ophthalmic solution Place 1 drop into both eyes nightly 3 Bottle 3    ammonium lactate (AMLACTIN) 12 % cream apply to affected area twice a day if needed 385 g 3    amLODIPine (NORVASC) 2.5 MG tablet Take one tablet daily.  90 tablet 3    calcium-vitamin D (OSCAL 500/200 D-3) 500-200 MG-UNIT per tablet Take 1 tablet by mouth daily 90 tablet 3    Multiple Vitamin (DAILY MULTIVITAMIN PO) Take 1 tablet by mouth daily        No current facility-administered medications for this visit. Family History   Problem Relation Age of Onset    Coronary Art Dis Father     Colon Cancer Brother     Diabetes Neg Hx     Cataracts Neg Hx     Glaucoma Neg Hx      Social History     Socioeconomic History    Marital status:      Spouse name: None    Number of children: None    Years of education: None    Highest education level: None   Occupational History    None   Social Needs    Financial resource strain: None    Food insecurity     Worry: None     Inability: None    Transportation needs     Medical: None     Non-medical: None   Tobacco Use    Smoking status: Never Smoker    Smokeless tobacco: Never Used   Substance and Sexual Activity    Alcohol use: Yes     Alcohol/week: 0.0 standard drinks     Comment: Infrequently.  Drug use: No    Sexual activity: None   Lifestyle    Physical activity     Days per week: None     Minutes per session: None    Stress: None   Relationships    Social connections     Talks on phone: None     Gets together: None     Attends Religion service: None     Active member of club or organization: None     Attends meetings of clubs or organizations: None     Relationship status: None    Intimate partner violence     Fear of current or ex partner: None     Emotionally abused: None     Physically abused: None     Forced sexual activity: None   Other Topics Concern    None   Social History Narrative    None     Past Medical History:   Diagnosis Date    Adenomatous polyp 06/10    With recommendation for repeat 06/15. Also, diverticulosis was noted.      Cataracts, bilateral     Combined forms of age-related cataract of both eyes 10/1/2018    Corneal scar, right eye     Cystocele     history of     D[facility] ([facility]     Mini-Mental Status exam 27/30  6/14  declines meds at this point,  MMSE 29/30 on June 13, 2017  MMSE 26/30 4/2018    Difficulty controlling anger 9/16/2018    Dyslipidemia     GERD (gastroesophageal reflux disease)     egd 4/15 ok    Goiter     benign, history of     Hiatal hernia     Hypertension     Hypokalemia     Hypothyroidism     Impaired fasting glucose     Malignant melanoma in situ (HCC)     R upper Back     Migraines     Murmur, functional     Grade 1/6 systolic, history of     Osteopenia     T -1.0 hip, , T -0.3 spine, . 1) Repeat DEXA scan  with T +0.15, T -1.2 at the hip but -1.8 at the mean femoral neck giving her a FRAX score of 4.3 at the hip. Reclast 10/13 and given ,, and 2017, on calcium and vit d,  Redo Dexa 10/14 Frax 4.2% , Frax 4.8% 10 yr hip fracture risk on Dexa   On Reclast    Posterior vitreous detachment of both eyes     Primary open angle glaucoma (POAG) of both eyes, moderate stage 10/1/2018    Sciatica 12/15/2015    Skin cancer     History of multiple skin cancers.  Following with Dr. 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     Visual disturbance 10/26/2017           Main Ophthalmology Exam     External Exam       Right Left    External Normal Normal          Slit Lamp Exam       Right Left    Lids/Lashes Normal Normal    Conjunctiva/Sclera White and quiet White and quiet    Cornea healing well  healing well     Anterior Chamber Deep and quiet Deep and quiet    Iris Round and reactive Round and reactive    Lens Posterior chamber intraocular lens Posterior chamber intraocular lens    Vitreous Normal Normal                   Tonometry     Tonometry (Non-contact air puff, 2:14 PM)       Right Left    Pressure 10 13   IOP.2             10.8  CH:  10.6          9.8  WS: 5.1          4.3                  Not recorded         Not recorded          Visual Acuity (Snellen - Linear)       Right Left    Dist sc 20/50 -2 20/60 -1          Pupils     Pupils       Pupils    Right PERRL    Left PERRL              Neuro/Psych     Neuro/Psych Oriented x3:  Yes    Mood/Affect:  Normal              Keratometry     Keratometry (Automated)       K1 Axis K2 Axis    Right 43.00 094 44.25 004    Left                      Ophthalmology Exam     Wearing Rx       Sphere Cylinder Axis    Right       Left +1.25 -2.75 060    Type:  NOT FINAL              Manifest Refraction     Manifest Refraction       Sphere Cylinder Axis Dist VA    Right -0.50 -0.75 174 20/50    Left +1.00 -2.75 060 20/40          Manifest Refraction #2 (Auto)       Sphere Cylinder Axis Dist VA    Right -0.50 -0.50 174     Left +0.75 -3.25 054                Final Rx       Sphere Cylinder Axis Dist VA    Right -0.50 -0.75 174 20/50    Left +1.00 -2.75 060 20/40    Type:  do not fill             1. Postoperative care for cataract of right eye    2. Postoperative care for cataract of left eye           Patient Instructions   Prednisolone 4 x daily for 1 week; then START Friday 3 x daily for 1 week; then 2 x daily for 1 week; then 1 x daily for 1 week. Discontinue the vigamox on Friday     Suggest +2.50 readers over the counter until getting new glasses prescription      Return in about 3 weeks (around 11/4/2020) for final post op .

## 2020-10-15 LAB
CULTURE: ABNORMAL
Lab: ABNORMAL
SPECIMEN DESCRIPTION: ABNORMAL

## 2020-10-16 RX ORDER — CEPHALEXIN 500 MG/1
500 CAPSULE ORAL 2 TIMES DAILY
COMMUNITY
Start: 2020-10-16 | End: 2020-10-23

## 2020-10-20 ENCOUNTER — TELEPHONE (OUTPATIENT)
Dept: INTERNAL MEDICINE | Age: 85
End: 2020-10-20

## 2020-10-20 ENCOUNTER — VIRTUAL VISIT (OUTPATIENT)
Dept: INTERNAL MEDICINE | Age: 85
End: 2020-10-20
Payer: MEDICARE

## 2020-10-20 PROCEDURE — 99211 OFF/OP EST MAY X REQ PHY/QHP: CPT

## 2020-10-20 RX ORDER — FLUOXETINE 10 MG/1
10 CAPSULE ORAL DAILY
Qty: 90 CAPSULE | Refills: 0 | Status: SHIPPED | OUTPATIENT
Start: 2020-10-20 | End: 2020-11-10 | Stop reason: SDUPTHER

## 2020-10-20 NOTE — PROGRESS NOTES
donepezil (ARICEPT ODT) 5 MG disintegrating tablet Take 5 mg by mouth nightly      atenolol (TENORMIN) 25 MG tablet Take 1 tablet by mouth daily 90 tablet 3    vitamin B-12 (CYANOCOBALAMIN) 1000 MCG tablet Take 1,000 mcg by mouth daily      moxifloxacin (VIGAMOX) 0.5 % ophthalmic solution 1 drop 3 times daily      prednisoLONE acetate (PRED FORTE) 1 % ophthalmic suspension Place 1 drop into the left eye 4 times daily       levothyroxine (SYNTHROID) 137 MCG tablet TAKE 1 TABLET DAILY 90 tablet 3    latanoprost (XALATAN) 0.005 % ophthalmic solution Place 1 drop into both eyes nightly 3 Bottle 3    ammonium lactate (AMLACTIN) 12 % cream apply to affected area twice a day if needed 385 g 3    amLODIPine (NORVASC) 2.5 MG tablet Take one tablet daily. 90 tablet 3    calcium-vitamin D (OSCAL 500/200 D-3) 500-200 MG-UNIT per tablet Take 1 tablet by mouth daily 90 tablet 3    Multiple Vitamin (DAILY MULTIVITAMIN PO) Take 1 tablet by mouth daily        No current facility-administered medications for this visit. No Known Allergies    Health Maintenance   Topic Date Due    Potassium monitoring  05/13/2021    Creatinine monitoring  05/13/2021    TSH testing  08/13/2021    DTaP/Tdap/Td vaccine (3 - Td) 07/22/2025    Flu vaccine  Completed    Shingles Vaccine  Completed    Pneumococcal 65+ years Vaccine  Completed    Hepatitis A vaccine  Aged Out    Hepatitis B vaccine  Aged Out    Hib vaccine  Aged Out    Meningococcal (ACWY) vaccine  Aged Out       Subjective:      Review of Systems   Constitutional: Negative for chills and fever. HENT: Negative for congestion and sore throat. Respiratory: Negative for chest tightness and shortness of breath. Cardiovascular: Negative for chest pain and leg swelling. Gastrointestinal: Negative for diarrhea, nausea and vomiting. Genitourinary: Negative for difficulty urinating and dysuria. Musculoskeletal: Negative for gait problem and myalgias. Neurological: Negative for dizziness and headaches. Psychiatric/Behavioral: Negative for confusion and decreased concentration. Objective: There were no vitals filed for this visit. Physical Exam  Constitutional:       General: She is not in acute distress. Appearance: Normal appearance. HENT:      Head: Normocephalic and atraumatic. Right Ear: External ear normal.      Left Ear: External ear normal.      Nose: No rhinorrhea. Mouth/Throat:      Lips: No lesions. Eyes:      Extraocular Movements: Extraocular movements intact. Conjunctiva/sclera: Conjunctivae normal.   Neck:      Musculoskeletal: Normal range of motion. Vascular: No JVD. Pulmonary:      Effort: Pulmonary effort is normal. No accessory muscle usage or respiratory distress. Skin:     Coloration: Skin is not cyanotic or jaundiced. Neurological:      Mental Status: She is alert. Mental status is at baseline. Psychiatric:         Attention and Perception: Attention and perception normal.         Mood and Affect: Mood and affect normal.         Speech: Speech normal.         Behavior: Behavior normal. Behavior is cooperative. Assessment/Plan:        1. Dementia with behavioral disturbance, unspecified dementia type ([address] stable  - Given her current problems with medication noncompliance, family are in agreement that patient should remain in assisted living at this time. 2. History of medication noncompliance, stable  - [facility] staff is now administering medications to insure compliance    3. Hypothyroidism due to acquired atrophy of thyroid, uncontrolled  - Likely uncontrolled due to medication noncompliance as noted above. [facility] staff will be rechecking TSH this week    4. Anxiety, uncontrolled  5. Difficulty controlling anger, stable  - Will restart prozac given patient was previously well controlled with this    6.  Essential hypertension,  controlled  - Continue current medications    7. Dyslipidemia,  stable  - Continue to monitor        Return for follow up as scheduled with Dr. Ian Schneider. No orders of the defined types were placed in this encounter. Orders Placed This Encounter   Medications    FLUoxetine (PROZAC) 10 MG capsule     Sig: Take 1 capsule by mouth daily     Dispense:  90 capsule     Refill:  0       All patient questions answered. Pt voiced understanding. Spent 23 min on phone with patient's POA discussing care, as well as face to face visit at [facility], both occurred on 10/20/20. This progress note reflects both encounters and were not billed separately.       Electronically signed by FAVIAN Magaña on 10/21/2020 at 9:13 AM

## 2020-10-20 NOTE — TELEPHONE ENCOUNTER
I actually spoke with patient this morning at [facility]. I have phone visit scheduled with her daughter this afternoon.

## 2020-10-20 NOTE — TELEPHONE ENCOUNTER
Can we verify if patient's daughter can discuss at 2:00 instead of 2:30?  Would work better since the 2:00 is double booked and would take a while to room new patient

## 2020-10-21 PROBLEM — F41.9 ANXIETY: Status: ACTIVE | Noted: 2020-10-21

## 2020-10-21 ASSESSMENT — ENCOUNTER SYMPTOMS
CHEST TIGHTNESS: 0
NAUSEA: 0
VOMITING: 0
SHORTNESS OF BREATH: 0
SORE THROAT: 0
DIARRHEA: 0

## 2020-10-22 ENCOUNTER — HOSPITAL ENCOUNTER (OUTPATIENT)
Age: 85
Setting detail: SPECIMEN
Discharge: HOME | End: 2020-10-22
Payer: MEDICARE

## 2020-10-22 LAB — TSH SERPL DL<=0.05 MIU/L-ACNC: 0.32 MIU/L (ref 0.3–5)

## 2020-10-22 PROCEDURE — 84443 ASSAY THYROID STIM HORMONE: CPT

## 2020-10-23 ENCOUNTER — TELEPHONE (OUTPATIENT)
Dept: INTERNAL MEDICINE | Age: 85
End: 2020-10-23

## 2020-10-28 ENCOUNTER — OFFICE VISIT (OUTPATIENT)
Dept: OPTOMETRY | Age: 85
End: 2020-10-28
Payer: MEDICARE

## 2020-10-28 PROCEDURE — 99024 POSTOP FOLLOW-UP VISIT: CPT | Performed by: OPTOMETRIST

## 2020-10-28 PROCEDURE — 99211 OFF/OP EST MAY X REQ PHY/QHP: CPT

## 2020-10-28 ASSESSMENT — VISUAL ACUITY
OS_SC: 20/150
OD_SC: 20/100 OU
METHOD: SNELLEN - LINEAR
OD_SC: 20/150

## 2020-10-28 ASSESSMENT — REFRACTION_MANIFEST
OS_AXIS: 060
OD_CYLINDER: -1.25
OS_ADD: +2.50
OS_CYLINDER: -2.75
OD_ADD: +2.50
OD_SPHERE: -0.50
OS_SPHERE: +1.50
OD_AXIS: 105

## 2020-10-28 ASSESSMENT — REFRACTION_WEARINGRX
OS_CYLINDER: -2.75
OS_SPHERE: +1.00
OD_AXIS: 174
OD_CYLINDER: -0.75
SPECS_TYPE: DO NOT FILL
OS_AXIS: 060
OD_SPHERE: -0.50

## 2020-10-28 ASSESSMENT — SLIT LAMP EXAM - LIDS
COMMENTS: NORMAL
COMMENTS: NORMAL

## 2020-10-28 ASSESSMENT — PACHYMETRY
OD_CT(UM): 507
OS_CT(UM): 511

## 2020-10-28 ASSESSMENT — KERATOMETRY
OS_K1POWER_DIOPTERS: 43.00
OS_AXISANGLE2_DEGREES: 088
OS_K2POWER_DIOPTERS: 44.50
OS_AXISANGLE_DEGREES: 178
METHOD_AUTO_MANUAL: AUTOMATED

## 2020-10-28 ASSESSMENT — TONOMETRY
IOP_METHOD: NON-CONTACT AIR PUFF
OS_IOP_MMHG: 17
OD_IOP_MMHG: 17

## 2020-10-28 NOTE — PATIENT INSTRUCTIONS
New glasses recommended     Call with any questions or concerns    Discontinue eye drops for cataract surgery  post op    Continue the drop for glaucoma (xalatan at night in both eyes)

## 2020-10-28 NOTE — PROGRESS NOTES
Cj Gonzales presents today for   Chief Complaint   Patient presents with    Blurred Vision    Post-Op Check   . HPI     Blurred Vision     In both eyes. Vision is blurred. Context:  reading. Comments     Post op cataract surgery  OD: 10/8/2020  OS: 8/25/2020  Dr. Nilson Andrew  Using the glaucoma drops                Current Outpatient Medications   Medication Sig Dispense Refill    FLUoxetine (PROZAC) 10 MG capsule Take 1 capsule by mouth daily 90 capsule 0    prednisoLONE acetate (PRED FORTE) 1 % ophthalmic suspension One drop in operated eye 4 times per day for 7 days; then 1 drop 3 times per day for 7 days; then 1 drop 2 times a day for 7 days; then 1 drop in each eye for 7 days 1 Bottle 2    donepezil (ARICEPT ODT) 5 MG disintegrating tablet Take 5 mg by mouth nightly      atenolol (TENORMIN) 25 MG tablet Take 1 tablet by mouth daily 90 tablet 3    vitamin B-12 (CYANOCOBALAMIN) 1000 MCG tablet Take 1,000 mcg by mouth daily      levothyroxine (SYNTHROID) 137 MCG tablet TAKE 1 TABLET DAILY 90 tablet 3    latanoprost (XALATAN) 0.005 % ophthalmic solution Place 1 drop into both eyes nightly 3 Bottle 3    ammonium lactate (AMLACTIN) 12 % cream apply to affected area twice a day if needed 385 g 3    amLODIPine (NORVASC) 2.5 MG tablet Take one tablet daily. 90 tablet 3    calcium-vitamin D (OSCAL 500/200 D-3) 500-200 MG-UNIT per tablet Take 1 tablet by mouth daily 90 tablet 3    Multiple Vitamin (DAILY MULTIVITAMIN PO) Take 1 tablet by mouth daily        No current facility-administered medications for this visit.           Family History   Problem Relation Age of Onset    Coronary Art Dis Father     Colon Cancer Brother     Diabetes Neg Hx     Cataracts Neg Hx     Glaucoma Neg Hx      Social History     Socioeconomic History    Marital status:      Spouse name: None    Number of children: None    Years of education: None    Highest education level: None   Occupational History    None   Social Needs    Financial resource strain: None    Food insecurity     Worry: None     Inability: None    Transportation needs     Medical: None     Non-medical: None   Tobacco Use    Smoking status: Never Smoker    Smokeless tobacco: Never Used   Substance and Sexual Activity    Alcohol use: Yes     Alcohol/week: 0.0 standard drinks     Comment: Infrequently.  Drug use: No    Sexual activity: None   Lifestyle    Physical activity     Days per week: None     Minutes per session: None    Stress: None   Relationships    Social connections     Talks on phone: None     Gets together: None     Attends Rastafarian service: None     Active member of club or organization: None     Attends meetings of clubs or organizations: None     Relationship status: None    Intimate partner violence     Fear of current or ex partner: None     Emotionally abused: None     Physically abused: None     Forced sexual activity: None   Other Topics Concern    None   Social History Narrative    None     Past Medical History:   Diagnosis Date    Adenomatous polyp 06/10    With recommendation for repeat 06/15. Also, diverticulosis was noted.  Cataracts, bilateral     Combined forms of age-related cataract of both eyes 10/1/2018    Corneal scar, right eye     Cystocele     history of     Dementia ([facility] [address]     Mini-Mental Status exam 27/30  6/14  declines meds at this point,  MMSE 29/30 on June 13, 2017  MMSE 26/30 4/2018    Difficulty controlling anger 9/16/2018    Dyslipidemia     GERD (gastroesophageal reflux disease)     egd  4/15 ok    Goiter     benign, history of     Hiatal hernia     Hypertension     Hypokalemia     Hypothyroidism     Impaired fasting glucose     Malignant melanoma in situ ([address]     R upper Back 8/17    Migraines     Murmur, functional     Grade 1/6 systolic, history of     Osteopenia     T -1.0 hip, 03/09, T -0.3 spine, 03/09.  1) Repeat DEXA scan 09/12 with T +0.15, T -1.2 at the hip but -1.8 at the mean femoral neck giving her a FRAX score of 4.3 at the hip. Reclast 10/13 and given ,, and 2017, on calcium and vit d,  Redo Dexa 10/14 Frax 4.2% , Frax 4.8% 10 yr hip fracture risk on Dexa   On Reclast    Posterior vitreous detachment of both eyes     Primary open angle glaucoma (POAG) of both eyes, moderate stage 10/1/2018    Sciatica 12/15/2015    Skin cancer     History of multiple skin cancers.  Following with Dr. Cristina Tijerina--- invasive squamous cell Ca  L forearm    Syncope and collapse 2015    Tremor 2018    Trichiasis of left lower eyelid without entropion     Visual disturbance 10/26/2017           Main Ophthalmology Exam     External Exam       Right Left    External Normal Normal          Slit Lamp Exam       Right Left    Lids/Lashes Normal Normal    Conjunctiva/Sclera White and quiet White and quiet    Cornea Clear; some stromal irregularity  Clear; healing well     Anterior Chamber Deep and quiet Deep and quiet    Iris Round and reactive Round and reactive    Lens Posterior chamber intraocular lens Posterior chamber intraocular lens    Vitreous Normal Normal                   Tonometry     Tonometry (Non-contact air puff, 3:01 PM)       Right Left    Pressure 17 17   IOP.8             15.7  CH:  9.0          9.6  WS: 3.8          5.7                  Not recorded         Not recorded          Visual Acuity (Snellen - Linear)       Right Left    Dist sc 20/150 20/150    Near sc 20/100 OU           Pupils     Pupils       Pupils    Right PERRL    Left PERRL              Neuro/Psych     Neuro/Psych     Oriented x3:  Yes    Mood/Affect:  Normal              Keratometry     Keratometry (Automated)       K1 Axis K2 Axis    Right        Left 43.00 088 44.50 178                  Ophthalmology Exam     Wearing Rx       Sphere Cylinder Axis    Right -0.50 -0.75 174    Left +1.00 -2.75 060    Type:  do not fill               Manifest Refraction     Manifest Refraction       Sphere Cylinder [place] [facility]    Right -0.50 -1.25 105 20/40+ +2.50     Left +1.50 -2.75 060 20/40+ +2.50 20//30ou          Manifest Refraction #2 (Auto)       Sphere Cylinder [place] [facility]    Right +0.25 -1.75 105       Left +1.50 -2.75 060             Manifest Refraction Comments    20/40+ou                Final Rx       Sphere Cylinder Axis Add    Right -0.50 -1.25 105 +2.50    Left +1.50 -2.75 060 +2.50    Expiration Date:  10/29/2022            1. Postoperative care for cataract of right eye    2. Postoperative care for cataract of left eye           Patient Instructions   New glasses recommended     Call with any questions or concerns    Discontinue eye drops for cataract surgery  post op    Continue the drop for glaucoma (xalatan at night in both eyes)      Return in about 3 months (around 1/28/2021) for dilation and complete exam after cataract surgery .

## 2020-11-11 ENCOUNTER — OFFICE VISIT (OUTPATIENT)
Dept: PODIATRY | Age: 85
End: 2020-11-11
Payer: MEDICARE

## 2020-11-11 VITALS
HEART RATE: 72 BPM | RESPIRATION RATE: 20 BRPM | DIASTOLIC BLOOD PRESSURE: 70 MMHG | WEIGHT: 138 LBS | BODY MASS INDEX: 23.69 KG/M2 | SYSTOLIC BLOOD PRESSURE: 132 MMHG

## 2020-11-11 PROCEDURE — 11721 DEBRIDE NAIL 6 OR MORE: CPT | Performed by: PODIATRIST

## 2020-11-11 PROCEDURE — 99999 PR OFFICE/OUTPT VISIT,PROCEDURE ONLY: CPT | Performed by: PODIATRIST

## 2020-11-11 PROCEDURE — 11055 PARING/CUTG B9 HYPRKER LES 1: CPT | Performed by: PODIATRIST

## 2020-11-11 NOTE — PROGRESS NOTES
Foot Care Worksheet  PCP: Abhilash Greenfield / Marie Hardy. Gopi Jensen NP  Last visit: 10 / 20 / 2020    Nail description:  Thick , Yellow , Crumbly , Marked limitation of ambulation     Pain resulting from thickened and dystrophy of nail plate Yes    Nails involved  Right   1, 2, 3, 4, 5  (T5-T9)  Left     1, 2, 3, 4, 5  (TA-T4)    Q7 1 Class A Finding - Non traumatic amputation of foot No    Q8 2 Class B Findings - Absent DP pulse No, Absent PT pulse No, Advanced tropic changes (3 required) Yes    Decrease hair growth Yes, Nail changes/thickening Yes, Pigmented changes/discoloration No, Skin texture (thin, shiny) No, Skin color (rubor/redness) No    Q9 1 Class B and 2 Class C Findings  Claudication No, Temperature change Yes, Paresthesia No, Burning No, Edema Yes

## 2020-11-11 NOTE — PROGRESS NOTES
Subjective:  Zane Reyes is a 80 y.o. female who presents to the office today for routine foot care. Currently denies F/C/N/V. No Known Allergies    Past Medical History:   Diagnosis Date    Adenomatous polyp 06/10    With recommendation for repeat 06/15. Also, diverticulosis was noted.  Cataracts, bilateral     Combined forms of age-related cataract of both eyes 10/1/2018    Corneal scar, right eye     Cystocele     history of     Dementia ([facility] [address]     Mini-Mental Status exam 27/30 6/14  declines meds at this point,  MMSE 29/30 on June 13, 2017  MMSE 26/30 4/2018    Difficulty controlling anger 9/16/2018    Dyslipidemia     GERD (gastroesophageal reflux disease)     egd  4/15 ok    Goiter     benign, history of     Hiatal hernia     Hypertension     Hypokalemia     Hypothyroidism     Impaired fasting glucose     Malignant melanoma in situ ([facility] [address]     R upper Back 8/17    Migraines     Murmur, functional     Grade 1/6 systolic, history of     Osteopenia     T -1.0 hip, 03/09, T -0.3 spine, 03/09. 1) Repeat DEXA scan 09/12 with T +0.15, T -1.2 at the hip but -1.8 at the mean femoral neck giving her a FRAX score of 4.3 at the hip. Reclast 10/13 and given 2014,2015, and 1/4/2017, on calcium and vit d,  Redo Dexa 10/14 Frax 4.2% , Frax 4.8% 10 yr hip fracture risk on Dexa 1/17  On Reclast    Posterior vitreous detachment of both eyes     Primary open angle glaucoma (POAG) of both eyes, moderate stage 10/1/2018    Sciatica 12/15/2015    Skin cancer     History of multiple skin cancers. Following with Dr. Garry Newman--- invasive squamous cell Ca 8/17 L forearm    Syncope and collapse 1/21/2015    Tremor 12/23/2018    Trichiasis of left lower eyelid without entropion     Visual disturbance 10/26/2017       Prior to Admission medications    Medication Sig Start Date End Date Taking?  Authorizing Provider   FLUoxetine (PROZAC) 20 MG capsule Take 1 capsule by mouth daily 11/10/20  Yes Ajay Briseno, MD   prednisoLONE acetate (PRED FORTE) 1 % ophthalmic suspension One drop in operated eye 4 times per day for 7 days; then 1 drop 3 times per day for 7 days; then 1 drop 2 times a day for 7 days; then 1 drop in each eye for 7 days 10/8/20  Yes Rosibel Ferrer MD   donepezil (ARICEPT ODT) 5 MG disintegrating tablet Take 5 mg by mouth nightly   Yes Historical Provider, MD   atenolol (TENORMIN) 25 MG tablet Take 1 tablet by mouth daily 8/20/20  Yes Mario Maldonado MD   vitamin B-12 (CYANOCOBALAMIN) 1000 MCG tablet Take 1,000 mcg by mouth daily   Yes Historical Provider, MD   levothyroxine (SYNTHROID) 137 MCG tablet TAKE 1 TABLET DAILY 7/9/20  Yes Mario Maldonado MD   latanoprost (XALATAN) 0.005 % ophthalmic solution Place 1 drop into both eyes nightly 6/8/20  Yes Leonarda Cat, OD   ammonium lactate (AMLACTIN) 12 % cream apply to affected area twice a day if needed 4/7/20  Yes Mario Maldonado MD   amLODIPine (NORVASC) 2.5 MG tablet Take one tablet daily. 3/10/20  Yes Mario Maldonado MD   calcium-vitamin D (OSCAL 500/200 D-3) 500-200 MG-UNIT per tablet Take 1 tablet by mouth daily 10/1/19  Yes Desmond Lundberg, DO   Multiple Vitamin (DAILY MULTIVITAMIN PO) Take 1 tablet by mouth daily    Yes Historical Provider, MD       Past Surgical History:   Procedure Laterality Date    APPENDECTOMY      CHOLECYSTECTOMY      Remote.  COLONOSCOPY  06/08/10    COLONOSCOPY  07/18/02    CYSTOCELE REPAIR  02/12/99    Vaginal prolapse, cystocele plus pelvic relaxation.  DILATION AND CURETTAGE OF UTERUS      with hysteroscopy   Mary Lou    still has ovaries     INTRACAPSULAR CATARACT EXTRACTION Left 8/25/2020    Left Cataract Extraction w/ IOL Implant performed by Rosibel Ferrer MD at [address] [place] [address] Right 10/8/2020    Right Cataract Extraction w/ IOL Implant performed by Rosibel Ferrer MD at [facility]  10/07/09    SCC, left nasal sidewall.     OTHER SURGICAL HISTORY Interdigital maceration absent to web spaces absent, Bilateral.  Nails left 1, 2,3, 4,5 and right 1,2,3,4,5 thickened, dystrophic and crumbly, discolored with subungual debris. Fissures absent, Bilateral. Hyperkeratotic tissue is present. Sub R 4th toe, not returned sub R 5th met head    Asessment: Patient is a 80 y.o. female with:    Diagnosis Orders   1. Corns and callosities     2. Dermatophytosis of nail     3. Atherosclerosis of native artery of both lower extremities, with unspecified presence of clinical manifestation ([facility] [address]         Plan: Patient examined and evaluated. Current condition and treatment options discussed in detail. All nails as mentioned above debrided in length and thickness. Paring of 1 benign hyperkeratotic lesions (as listed above) took place with #10 blade or tissue nippers. Patient advised OTC methods for treatment of the mycotic nails. Patient will follow up in 10 weeks.

## 2020-11-19 ENCOUNTER — TELEPHONE (OUTPATIENT)
Dept: INTERNAL MEDICINE | Age: 85
End: 2020-11-19

## 2020-11-19 ENCOUNTER — OFFICE VISIT (OUTPATIENT)
Dept: INTERNAL MEDICINE | Age: 85
End: 2020-11-19
Payer: MEDICARE

## 2020-11-19 VITALS
SYSTOLIC BLOOD PRESSURE: 110 MMHG | RESPIRATION RATE: 16 BRPM | HEART RATE: 68 BPM | TEMPERATURE: 97.7 F | BODY MASS INDEX: 23.56 KG/M2 | HEIGHT: 64 IN | WEIGHT: 138 LBS | DIASTOLIC BLOOD PRESSURE: 62 MMHG

## 2020-11-19 PROCEDURE — 99214 OFFICE O/P EST MOD 30 MIN: CPT

## 2020-11-19 PROCEDURE — 99214 OFFICE O/P EST MOD 30 MIN: CPT | Performed by: INTERNAL MEDICINE

## 2020-11-20 NOTE — PROGRESS NOTES
[facility]  [address] [place], [place] [id number]  Dept: [phone]  Dept Fax: [phone]  Loc: [phone]     Visit Date:  11/19/2020  Patient:  Olivier Cline  YOB: 1930  Provider: Corinne Canada, MD    This note is in the APSO (Assessment/Plan/Subjective/Objective) format, to reduce scrolling and searching for information by the reviewer. Assessment & Plan      Dementia: Lives in assisted living. There are reports that she might not be taking her medications. Will order home health to assist with her taking her medications and to check her vitals. Hypertension: Blood pressure controlled. Continue same management. Hypothyroidism: We will continue to monitor TFTs. Continue levothyroxine. Diagnosis Orders   1. Dementia with behavioral disturbance, unspecified dementia type ([facility] [address]     2. Essential hypertension     3. Hypothyroidism due to acquired atrophy of thyroid         Discussed use, benefit, and side effects of prescribed medications. All questions answered. Patient voiced understanding. Reviewed health maintenance. HPI:     She was seen in the internal medicine office today for   Chief Complaint   Patient presents with    Blood Sugar Problem    Hypertension    Hyperlipidemia        Presents to follow-up. Has a history of Alzheimer's dementia, and was previously reported she might be forgetting to take her medications at the assisted living. She seems to be doing okay at this time, and is not sure that she needs any help, but ultimately does not mind having someone help her with her medications. Has a history of hypothyroidism which has been stable.     Has a history of hypertension which has been stable      Subjective:      REVIEW OF SYSTEMS    Constitutional: Denies fever, chills  Eyes: Denies recent vision changes  Cardiovascular: Denies chest pain, LL edema  Respiratory: Denies cough, wheezes  Gastrointestinal: Denies abdominal pain, nausea, vomiting  Skin: Denies rash  Endocrine: Denies polyuria  Hematologic: Denies bruising  Genitourinary: Denies dysuria, hematuria  Neurological: Denies headache, numbness      Medications    Current Outpatient Medications:     FLUoxetine (PROZAC) 20 MG capsule, Take 1 capsule by mouth daily, Disp: 90 capsule, Rfl: 1    donepezil (ARICEPT ODT) 5 MG disintegrating tablet, Take 5 mg by mouth nightly, Disp: , Rfl:     atenolol (TENORMIN) 25 MG tablet, Take 1 tablet by mouth daily, Disp: 90 tablet, Rfl: 3    vitamin B-12 (CYANOCOBALAMIN) 1000 MCG tablet, Take 1,000 mcg by mouth daily, Disp: , Rfl:     levothyroxine (SYNTHROID) 137 MCG tablet, TAKE 1 TABLET DAILY, Disp: 90 tablet, Rfl: 3    latanoprost (XALATAN) 0.005 % ophthalmic solution, Place 1 drop into both eyes nightly, Disp: 3 Bottle, Rfl: 3    ammonium lactate (AMLACTIN) 12 % cream, apply to affected area twice a day if needed, Disp: 385 g, Rfl: 3    amLODIPine (NORVASC) 2.5 MG tablet, Take one tablet daily. , Disp: 90 tablet, Rfl: 3    calcium-vitamin D (OSCAL 500/200 D-3) 500-200 MG-UNIT per tablet, Take 1 tablet by mouth daily, Disp: 90 tablet, Rfl: 3    Multiple Vitamin (DAILY MULTIVITAMIN PO), Take 1 tablet by mouth daily , Disp: , Rfl:     The patient has No Known Allergies.     Past Medical History  Xavier Bosworth  has a past medical history of Adenomatous polyp, Cataracts, bilateral, Combined forms of age-related cataract of both eyes, Corneal scar, right eye, Cystocele, Dementia ([address] Difficulty controlling anger, Dyslipidemia, GERD (gastroesophageal reflux disease), Goiter, Hiatal hernia, Hypertension, Hypokalemia, Hypothyroidism, Impaired fasting glucose, Malignant melanoma in situ ([address] Migraines, Murmur, functional, Osteopenia, Posterior vitreous detachment of both eyes, Primary open angle glaucoma (POAG) of both eyes, moderate stage, Sciatica, Skin cancer, Syncope and collapse, Tremor, 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 Past Surgical History  The patient  has a past surgical history that includes other surgical history (11/11/08); Cholecystectomy; Colonoscopy (06/08/10); Mohs surgery (10/07/09); Vein Surgery (06/08/09); Colonoscopy (07/18/02); Cystocele repair (02/12/99); Appendectomy; Dilation and curettage of uterus; other surgical history (1988); Hysterectomy (1995); Upper gastrointestinal endoscopy (4/8/2015); Intracapsular cataract extraction (Left, 8/25/2020); and Intracapsular cataract extraction (Right, 10/8/2020). Family History  This patient's family history includes Colon Cancer in her brother; Coronary Art Dis in her father. Social History  Destin Hightower  reports that she has never smoked. She has never used smokeless tobacco. She reports current alcohol use. She reports that she does not use drugs. Health Maintenance:    Health Maintenance   Topic Date Due    Potassium monitoring  05/13/2021    Creatinine monitoring  05/13/2021    TSH testing  10/22/2021    DTaP/Tdap/Td vaccine (3 - Td) 07/22/2025    Flu vaccine  Completed    Shingles Vaccine  Completed    Pneumococcal 65+ years Vaccine  Completed    Hepatitis A vaccine  Aged Out    Hepatitis B vaccine  Aged Out    Hib vaccine  Aged Out    Meningococcal (ACWY) vaccine  Aged Out           Objective:     PHYSICAL EXAM  /62 (Site: Left Upper Arm, Position: Sitting, Cuff Size: Medium Adult)   Pulse 68   Temp 97.7 °F (36.5 °C) (Infrared)   Resp 16   Ht 5' 4\" (1.626 m)   Wt 138 lb (62.6 kg)   LMP  (LMP Unknown)   BMI 23.69 kg/m²   Constitutional: No acute distress. Sits in chair comfortably  Eyes: Sclerae nonicteric. No lid lag or proptosis  HENT: External ears normal. No external lesions on the nose  Neck: No gross masses. Trachea visibly midline  Respiratory: Good air entry bilaterally.   No wheezing or crackles  Cardiovascular: Normal S1-S2. No murmurs. No lower extremity edema  Gastrointestinal: No visible masses. No visible hernias  Skin: No abnormal rashes. No abnormal masses  Neurologic: Cranial nerves grossly intact  Psychiatric: Normal affect. Alert and oriented        Labs Reviewed 11/19/2020:    Lab Results   Component Value Date    WBC 7.5 05/13/2020    HGB 13.1 05/13/2020    HCT 40.8 05/13/2020     05/13/2020    CHOL 207 (H) 11/28/2016    TRIG 135 11/28/2016    HDL 87 11/28/2016    ALT 6 05/13/2020    AST 21 05/13/2020     05/13/2020    K 3.9 05/13/2020     05/13/2020    CREATININE 0.72 05/13/2020    BUN 18 05/13/2020    CO2 30 05/13/2020    TSH 0.32 10/22/2020    INR 1.0 (L) 01/21/2015    LABA1C 5.8 12/30/2019            Electronically signed Shar CRISOSTOMO MD on 11/19/2020 at 3:08 PM      This note has been created using the Epic electronic health record, and dictated in part by [employer] dictation system. Despite the documenting physician's best efforts, there may be errors in spelling, grammar or syntax.

## 2020-11-30 ENCOUNTER — TELEPHONE (OUTPATIENT)
Dept: INTERNAL MEDICINE | Age: 85
End: 2020-11-30

## 2020-11-30 NOTE — TELEPHONE ENCOUNTER
If there is no growth or change for the last couple of years, then she probably does not need to have it checked, however I am not sure how accurate Mrs. Nicholas Zavala would be, and I do not mind referring her to dermatology if she wants it, or we can wait if she wants that too

## 2020-12-09 ENCOUNTER — OFFICE VISIT (OUTPATIENT)
Dept: OPTOMETRY | Age: 85
End: 2020-12-09
Payer: MEDICARE

## 2020-12-09 PROCEDURE — 99211 OFF/OP EST MAY X REQ PHY/QHP: CPT

## 2020-12-09 PROCEDURE — 99024 POSTOP FOLLOW-UP VISIT: CPT | Performed by: OPTOMETRIST

## 2020-12-09 ASSESSMENT — REFRACTION_MANIFEST
OD_AXIS: 060
OS_CYLINDER: -2.75
OD_SPHERE: PLANO
OD_ADD: +2.75
OS_ADD: +2.75
OS_SPHERE: +1.50
OD_CYLINDER: -1.00
OS_AXIS: 068

## 2020-12-09 ASSESSMENT — TONOMETRY
IOP_METHOD: NON-CONTACT AIR PUFF
OD_IOP_MMHG: 12
OS_IOP_MMHG: 16

## 2020-12-09 ASSESSMENT — REFRACTION_WEARINGRX
OS_ADD: +2.50
OS_AXIS: 060
OS_SPHERE: +1.50
OS_CYLINDER: -2.75
SPECS_TYPE: BIFOCAL
OD_ADD: +2.50
OD_SPHERE: -0.50
OD_CYLINDER: -1.25
OD_AXIS: 105

## 2020-12-09 ASSESSMENT — PACHYMETRY
OS_CT(UM): 511
OD_CT(UM): 507

## 2020-12-09 ASSESSMENT — VISUAL ACUITY
OD_CC: 20/30 OU
OS_CC: 20/25
OD_CC+: -2
METHOD: SNELLEN - LINEAR
CORRECTION_TYPE: GLASSES
OS_CC+: -1

## 2020-12-09 NOTE — PROGRESS NOTES
Jane Fuller presents today for   Chief Complaint   Patient presents with    Blurred Vision    Vision Exam   .    HPI     Blurred Vision     In both eyes. Vision is blurred. Context:  reading. Comments     Last Vision Exam: 10/28/2020 AW  Last Ophthalmology Exam: cat sx ou  Last Filled Glasses Rx: 11/6/2020 glasses remake because the rx did not check in (seg was also changed at this time)   Insurance: [employer] Medicare  Update: Glasses  Reading is getting more blurry  Can read for a short period of time, but then it gets blurry                 Current Outpatient Medications   Medication Sig Dispense Refill    FLUoxetine (PROZAC) 20 MG capsule Take 1 capsule by mouth daily 90 capsule 1    donepezil (ARICEPT ODT) 5 MG disintegrating tablet Take 5 mg by mouth nightly      atenolol (TENORMIN) 25 MG tablet Take 1 tablet by mouth daily 90 tablet 3    vitamin B-12 (CYANOCOBALAMIN) 1000 MCG tablet Take 1,000 mcg by mouth daily      levothyroxine (SYNTHROID) 137 MCG tablet TAKE 1 TABLET DAILY 90 tablet 3    latanoprost (XALATAN) 0.005 % ophthalmic solution Place 1 drop into both eyes nightly 3 Bottle 3    ammonium lactate (AMLACTIN) 12 % cream apply to affected area twice a day if needed 385 g 3    amLODIPine (NORVASC) 2.5 MG tablet Take one tablet daily. 90 tablet 3    calcium-vitamin D (OSCAL 500/200 D-3) 500-200 MG-UNIT per tablet Take 1 tablet by mouth daily 90 tablet 3    Multiple Vitamin (DAILY MULTIVITAMIN PO) Take 1 tablet by mouth daily        No current facility-administered medications for this visit.           Family History   Problem Relation Age of Onset    Coronary Art Dis Father     Colon Cancer Brother     Diabetes Neg Hx     Cataracts Neg Hx     Glaucoma Neg Hx      Social History     Socioeconomic History    Marital status:      Spouse name: None    Number of children: None    Years of education: None    Highest education level: None   Occupational History    None   Social Needs    Financial resource strain: None    Food insecurity     Worry: None     Inability: None    Transportation needs     Medical: None     Non-medical: None   Tobacco Use    Smoking status: Never Smoker    Smokeless tobacco: Never Used   Substance and Sexual Activity    Alcohol use: Yes     Alcohol/week: 0.0 standard drinks     Comment: Infrequently.  Drug use: No    Sexual activity: None   Lifestyle    Physical activity     Days per week: None     Minutes per session: None    Stress: None   Relationships    Social connections     Talks on phone: None     Gets together: None     Attends Mandaen service: None     Active member of club or organization: None     Attends meetings of clubs or organizations: None     Relationship status: None    Intimate partner violence     Fear of current or ex partner: None     Emotionally abused: None     Physically abused: None     Forced sexual activity: None   Other Topics Concern    None   Social History Narrative    None     Past Medical History:   Diagnosis Date    Adenomatous polyp 06/10    With recommendation for repeat 06/15. Also, diverticulosis was noted.  Cataracts, bilateral     Combined forms of age-related cataract of both eyes 10/1/2018    Corneal scar, right eye     Cystocele     history of     Dementia ([facility]     Mini-Mental Status exam 27/30  6/14  declines meds at this point,  MMSE 29/30 on June 13, 2017  MMSE 26/30 4/2018    Difficulty controlling anger 9/16/2018    Dyslipidemia     GERD (gastroesophageal reflux disease)     egd  4/15 ok    Goiter     benign, history of     Hiatal hernia     Hypertension     Hypokalemia     Hypothyroidism     Impaired fasting glucose     Malignant melanoma in situ ([facility]     R upper Back 8/17    Migraines     Murmur, functional     Grade 1/6 systolic, history of     Osteopenia     T -1.0 hip, 03/09, T -0.3 spine, 03/09.  1) Repeat DEXA scan 09/12 with T +0.15, T -1.2 at the hip but -1.8 at the mean femoral neck giving her a FRAX score of 4.3 at the hip. Reclast 10/13 and given ,, and 2017, on calcium and vit d,  Redo Dexa 10/14 Frax 4.2% , Frax 4.8% 10 yr hip fracture risk on Dexa   On Reclast    Posterior vitreous detachment of both eyes     Primary open angle glaucoma (POAG) of both eyes, moderate stage 10/1/2018    Sciatica 12/15/2015    Skin cancer     History of multiple skin cancers. Following with Dr. 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     Visual disturbance 10/26/2017            Not recorded         Tonometry     Tonometry (Non-contact air puff, 3:42 PM)       Right Left    Pressure 12 16   IOP.6             12.2  CH:  10.7          8.2  WS: 6.1          8.3                  Not recorded         Not recorded          Visual Acuity (Snellen - Linear)       Right Left    Dist cc 20/50 -2 20/25 -1    Near cc 20/30 OU     Correction:  Glasses          Pupils     Pupils       Pupils    Right PERRL    Left PERRL              Neuro/Psych     Neuro/Psych     Oriented x3: Yes               Not recorded            Ophthalmology Exam     Wearing Rx       Sphere Cylinder Axis Add    Right -0.50 -1.25 105 +2.50    Left +1.50 -2.75 060 +2.50    Age:  1m    Type:  Bifocal              Manifest Refraction     Manifest Refraction       Sphere Cylinder [place] [facility]    Right Lapel -1.00 060 20/40 +2.75     Left +1.50 -2.75 068 20/30+ +2.75 20/20ou-          Manifest Refraction #2 (Auto)       Sphere Cylinder [place] [facility]    Right +0.00 -0.25 062       Left +1.75 -2.75 068                  Final Rx       Sphere Cylinder Axis Add    Right Lapel -1.00 060 +2.75    Left +1.50 -2.75 068 +2.75    Type:  PAL    Expiration Date:  12/10/2022            1. Postoperative care for cataract of right eye    2.  Postoperative care for cataract of left eye           Patient Instructions   Remake glasses      Return for January with Dr. Marietta Saha .

## 2020-12-21 ENCOUNTER — APPOINTMENT (OUTPATIENT)
Dept: CT IMAGING | Age: 85
End: 2020-12-21
Payer: MEDICARE

## 2020-12-21 ENCOUNTER — APPOINTMENT (OUTPATIENT)
Dept: GENERAL RADIOLOGY | Age: 85
End: 2020-12-21
Payer: MEDICARE

## 2020-12-21 ENCOUNTER — HOSPITAL ENCOUNTER (EMERGENCY)
Age: 85
Discharge: ANOTHER HOSPITAL | End: 2020-12-21
Attending: EMERGENCY MEDICINE
Payer: MEDICARE

## 2020-12-21 VITALS
SYSTOLIC BLOOD PRESSURE: 105 MMHG | HEART RATE: 91 BPM | TEMPERATURE: 96.6 F | OXYGEN SATURATION: 99 % | WEIGHT: 130 LBS | BODY MASS INDEX: 23.04 KG/M2 | DIASTOLIC BLOOD PRESSURE: 58 MMHG | RESPIRATION RATE: 24 BRPM | HEIGHT: 63 IN

## 2020-12-21 LAB
ABSOLUTE EOS #: <0.03 K/UL (ref 0–0.44)
ABSOLUTE IMMATURE GRANULOCYTE: 0.09 K/UL (ref 0–0.3)
ABSOLUTE LYMPH #: 1.14 K/UL (ref 1.1–3.7)
ABSOLUTE MONO #: 0.77 K/UL (ref 0.1–1.2)
ALBUMIN SERPL-MCNC: 3.9 G/DL (ref 3.5–5.2)
ALBUMIN/GLOBULIN RATIO: 1.3 (ref 1–2.5)
ALP BLD-CCNC: 50 U/L (ref 35–104)
ALT SERPL-CCNC: <5 U/L (ref 5–33)
ANION GAP SERPL CALCULATED.3IONS-SCNC: 13 MMOL/L (ref 9–17)
AST SERPL-CCNC: 15 U/L
BASOPHILS # BLD: 1 % (ref 0–2)
BASOPHILS ABSOLUTE: 0.06 K/UL (ref 0–0.2)
BILIRUB SERPL-MCNC: 0.51 MG/DL (ref 0.3–1.2)
BUN BLDV-MCNC: 16 MG/DL (ref 8–23)
BUN/CREAT BLD: 17 (ref 9–20)
CALCIUM SERPL-MCNC: 9.1 MG/DL (ref 8.6–10.4)
CHLORIDE BLD-SCNC: 98 MMOL/L (ref 98–107)
CO2: 25 MMOL/L (ref 20–31)
CREAT SERPL-MCNC: 0.96 MG/DL (ref 0.5–0.9)
DIFFERENTIAL TYPE: ABNORMAL
EKG ATRIAL RATE: 111 BPM
EKG Q-T INTERVAL: 398 MS
EKG QRS DURATION: 72 MS
EKG QTC CALCULATION (BAZETT): 541 MS
EKG R AXIS: -14 DEGREES
EKG T AXIS: 96 DEGREES
EKG VENTRICULAR RATE: 111 BPM
EOSINOPHILS RELATIVE PERCENT: 0 % (ref 1–4)
GFR AFRICAN AMERICAN: >60 ML/MIN
GFR NON-AFRICAN AMERICAN: 55 ML/MIN
GFR SERPL CREATININE-BSD FRML MDRD: ABNORMAL ML/MIN/{1.73_M2}
GFR SERPL CREATININE-BSD FRML MDRD: ABNORMAL ML/MIN/{1.73_M2}
GLUCOSE BLD-MCNC: 188 MG/DL (ref 70–99)
HCT VFR BLD CALC: 43.6 % (ref 36.3–47.1)
HEMOGLOBIN: 13.9 G/DL (ref 11.9–15.1)
IMMATURE GRANULOCYTES: 1 %
INR BLD: 1
LYMPHOCYTES # BLD: 10 % (ref 24–43)
MCH RBC QN AUTO: 30.8 PG (ref 25.2–33.5)
MCHC RBC AUTO-ENTMCNC: 31.9 G/DL (ref 25.2–33.5)
MCV RBC AUTO: 96.7 FL (ref 82.6–102.9)
MONOCYTES # BLD: 7 % (ref 3–12)
NRBC AUTOMATED: 0 PER 100 WBC
PARTIAL THROMBOPLASTIN TIME: 24.5 SEC (ref 23.9–33.8)
PDW BLD-RTO: 13.4 % (ref 11.8–14.4)
PLATELET # BLD: 257 K/UL (ref 138–453)
PLATELET ESTIMATE: ABNORMAL
PMV BLD AUTO: 10.8 FL (ref 8.1–13.5)
POTASSIUM SERPL-SCNC: 3.9 MMOL/L (ref 3.7–5.3)
PROTHROMBIN TIME: 12.6 SEC (ref 11.5–14.2)
RBC # BLD: 4.51 M/UL (ref 3.95–5.11)
RBC # BLD: ABNORMAL 10*6/UL
SARS-COV-2, RAPID: NOT DETECTED
SARS-COV-2: NORMAL
SARS-COV-2: NORMAL
SEG NEUTROPHILS: 81 % (ref 36–65)
SEGMENTED NEUTROPHILS ABSOLUTE COUNT: 9.81 K/UL (ref 1.5–8.1)
SODIUM BLD-SCNC: 136 MMOL/L (ref 135–144)
SOURCE: NORMAL
TOTAL PROTEIN: 7 G/DL (ref 6.4–8.3)
TROPONIN INTERP: ABNORMAL
TROPONIN T: ABNORMAL NG/ML
TROPONIN, HIGH SENSITIVITY: 16 NG/L (ref 0–14)
WBC # BLD: 11.9 K/UL (ref 3.5–11.3)
WBC # BLD: ABNORMAL 10*3/UL

## 2020-12-21 PROCEDURE — 71045 X-RAY EXAM CHEST 1 VIEW: CPT

## 2020-12-21 PROCEDURE — 6370000000 HC RX 637 (ALT 250 FOR IP): Performed by: EMERGENCY MEDICINE

## 2020-12-21 PROCEDURE — 85025 COMPLETE CBC W/AUTO DIFF WBC: CPT

## 2020-12-21 PROCEDURE — 96375 TX/PRO/DX INJ NEW DRUG ADDON: CPT

## 2020-12-21 PROCEDURE — 80053 COMPREHEN METABOLIC PANEL: CPT

## 2020-12-21 PROCEDURE — U0003 INFECTIOUS AGENT DETECTION BY NUCLEIC ACID (DNA OR RNA); SEVERE ACUTE RESPIRATORY SYNDROME CORONAVIRUS 2 (SARS-COV-2) (CORONAVIRUS DISEASE [COVID-19]), AMPLIFIED PROBE TECHNIQUE, MAKING USE OF HIGH THROUGHPUT TECHNOLOGIES AS DESCRIBED BY CMS-2020-01-R: HCPCS

## 2020-12-21 PROCEDURE — 84484 ASSAY OF TROPONIN QUANT: CPT

## 2020-12-21 PROCEDURE — 96374 THER/PROPH/DIAG INJ IV PUSH: CPT

## 2020-12-21 PROCEDURE — 99291 CRITICAL CARE FIRST HOUR: CPT

## 2020-12-21 PROCEDURE — 93005 ELECTROCARDIOGRAM TRACING: CPT | Performed by: EMERGENCY MEDICINE

## 2020-12-21 PROCEDURE — 6360000002 HC RX W HCPCS

## 2020-12-21 PROCEDURE — 2500000003 HC RX 250 WO HCPCS: Performed by: EMERGENCY MEDICINE

## 2020-12-21 PROCEDURE — 85730 THROMBOPLASTIN TIME PARTIAL: CPT

## 2020-12-21 PROCEDURE — 12001 RPR S/N/AX/GEN/TRNK 2.5CM/<: CPT

## 2020-12-21 PROCEDURE — 70450 CT HEAD/BRAIN W/O DYE: CPT

## 2020-12-21 PROCEDURE — 6360000002 HC RX W HCPCS: Performed by: EMERGENCY MEDICINE

## 2020-12-21 PROCEDURE — 72125 CT NECK SPINE W/O DYE: CPT

## 2020-12-21 PROCEDURE — U0002 COVID-19 LAB TEST NON-CDC: HCPCS

## 2020-12-21 PROCEDURE — 85610 PROTHROMBIN TIME: CPT

## 2020-12-21 PROCEDURE — 36415 COLL VENOUS BLD VENIPUNCTURE: CPT

## 2020-12-21 RX ORDER — ASPIRIN 81 MG/1
324 TABLET, CHEWABLE ORAL ONCE
Status: COMPLETED | OUTPATIENT
Start: 2020-12-21 | End: 2020-12-21

## 2020-12-21 RX ORDER — HEPARIN SODIUM 1000 [USP'U]/ML
60 INJECTION, SOLUTION INTRAVENOUS; SUBCUTANEOUS ONCE
Status: COMPLETED | OUTPATIENT
Start: 2020-12-21 | End: 2020-12-21

## 2020-12-21 RX ORDER — LORAZEPAM 2 MG/ML
0.5 INJECTION INTRAMUSCULAR ONCE
Status: COMPLETED | OUTPATIENT
Start: 2020-12-21 | End: 2020-12-21

## 2020-12-21 RX ORDER — ACETAMINOPHEN 325 MG/1
650 TABLET ORAL ONCE
Status: COMPLETED | OUTPATIENT
Start: 2020-12-21 | End: 2020-12-21

## 2020-12-21 RX ORDER — MORPHINE SULFATE 2 MG/ML
INJECTION, SOLUTION INTRAMUSCULAR; INTRAVENOUS
Status: COMPLETED
Start: 2020-12-21 | End: 2020-12-21

## 2020-12-21 RX ORDER — HEPARIN SODIUM 10000 [USP'U]/100ML
INJECTION, SOLUTION INTRAVENOUS
Status: COMPLETED
Start: 2020-12-21 | End: 2020-12-21

## 2020-12-21 RX ORDER — HEPARIN SODIUM 1000 [USP'U]/ML
60 INJECTION, SOLUTION INTRAVENOUS; SUBCUTANEOUS PRN
Status: DISCONTINUED | OUTPATIENT
Start: 2020-12-21 | End: 2020-12-21 | Stop reason: HOSPADM

## 2020-12-21 RX ORDER — EPTIFIBATIDE 0.75 MG/ML
2 INJECTION, SOLUTION INTRAVENOUS CONTINUOUS
Status: DISCONTINUED | OUTPATIENT
Start: 2020-12-21 | End: 2020-12-21 | Stop reason: CLARIF

## 2020-12-21 RX ORDER — MORPHINE SULFATE 2 MG/ML
2 INJECTION, SOLUTION INTRAMUSCULAR; INTRAVENOUS ONCE
Status: COMPLETED | OUTPATIENT
Start: 2020-12-21 | End: 2020-12-21

## 2020-12-21 RX ORDER — LIDOCAINE HYDROCHLORIDE AND EPINEPHRINE 10; 10 MG/ML; UG/ML
20 INJECTION, SOLUTION INFILTRATION; PERINEURAL ONCE
Status: COMPLETED | OUTPATIENT
Start: 2020-12-21 | End: 2020-12-21

## 2020-12-21 RX ORDER — HEPARIN SODIUM 1000 [USP'U]/ML
30 INJECTION, SOLUTION INTRAVENOUS; SUBCUTANEOUS PRN
Status: DISCONTINUED | OUTPATIENT
Start: 2020-12-21 | End: 2020-12-21 | Stop reason: HOSPADM

## 2020-12-21 RX ORDER — HEPARIN SODIUM 10000 [USP'U]/100ML
12 INJECTION, SOLUTION INTRAVENOUS CONTINUOUS
Status: DISCONTINUED | OUTPATIENT
Start: 2020-12-21 | End: 2020-12-21 | Stop reason: HOSPADM

## 2020-12-21 RX ORDER — LORAZEPAM 2 MG/ML
INJECTION INTRAMUSCULAR
Status: DISCONTINUED
Start: 2020-12-21 | End: 2020-12-21 | Stop reason: HOSPADM

## 2020-12-21 RX ADMIN — LORAZEPAM 0.5 MG: 2 INJECTION INTRAMUSCULAR; INTRAVENOUS at 14:48

## 2020-12-21 RX ADMIN — ACETAMINOPHEN 650 MG: 325 TABLET ORAL at 12:49

## 2020-12-21 RX ADMIN — LIDOCAINE HYDROCHLORIDE,EPINEPHRINE BITARTRATE 20 ML: 10; .01 INJECTION, SOLUTION INFILTRATION; PERINEURAL at 14:34

## 2020-12-21 RX ADMIN — HEPARIN SODIUM AND DEXTROSE 12 UNITS/KG/HR: 10000; 5 INJECTION INTRAVENOUS at 15:10

## 2020-12-21 RX ADMIN — HEPARIN SODIUM 12 UNITS/KG/HR: 10000 INJECTION, SOLUTION INTRAVENOUS at 15:10

## 2020-12-21 RX ADMIN — MORPHINE SULFATE 2 MG: 2 INJECTION, SOLUTION INTRAMUSCULAR; INTRAVENOUS at 15:09

## 2020-12-21 RX ADMIN — ASPIRIN 324 MG: 81 TABLET, CHEWABLE ORAL at 14:57

## 2020-12-21 RX ADMIN — TIROFIBAN 0.07 MCG/KG/MIN: 5 INJECTION, SOLUTION INTRAVENOUS at 16:15

## 2020-12-21 RX ADMIN — HEPARIN SODIUM 3540 UNITS: 1000 INJECTION INTRAVENOUS; SUBCUTANEOUS at 15:28

## 2020-12-21 ASSESSMENT — PAIN SCALES - GENERAL
PAINLEVEL_OUTOF10: 0
PAINLEVEL_OUTOF10: 0
PAINLEVEL_OUTOF10: 3
PAINLEVEL_OUTOF10: 9

## 2020-12-21 NOTE — FLOWSHEET NOTE
responded to ED alert. Nurse stated patient was having a heart attack. Nurse said she had spoken to daughter, and was having the nursing home talk to patient's . Patient to be transferred. Patient calm as she said her chest hurt and her arms ached.  provided a supportive presence, prayed with patient, sat and held her hand, and bagged her belongings to ready for transfer. Nurse stated that patient's daughter believes that patient would want everything.   Nurse asked patient who confirmed she did want to receive CPR.       12/21/20 1541   Encounter Summary   Services provided to: Patient   Referral/Consult From: Nurse   Complexity of Encounter High   Length of Encounter 1 hour   Crisis   Type ED Alert   Assessment Calm;Coping   Intervention Active listening;Prayer   Outcome Receptive     Electronically signed by Selena Mcgee on 12/21/2020 at 3:51 PM

## 2020-12-21 NOTE — ED PROVIDER NOTES
[address]      Pt Name: Melvin Galan  MRN: [id number]  [place] 3/21/1930  Date of evaluation: 12/21/2020      CHIEF COMPLAINT       Chief Complaint   Patient presents with   Rheta Lipps     presents for lac to post head s/p fall \" was bending over helping my  and hit my head on the  door jamb\" denies any LOC         HISTORY OF PRESENT ILLNESS      The patient presents with a fall. She was leaning over to help her  and she fell, hitting her head on the door jam.  She did not suffer loss of consciousness. She was not dizzy prior to or following the fall. She denies significant neck pain. She denies weakness or numbness. She denies taking blood thinners. She denies other injury. REVIEW OF SYSTEMS       All systems reviewed and negative unless noted in HPI. The patient denies fever or constitutional symptoms. Denies vision change. Trauma to scalp. Denies any sore throat or rhinorrhea. Denies any neck pain or stiffness. Denies chest pain or shortness of breath. No nausea,  vomiting or diarrhea. Denies any dysuria. Denies urinary frequency or hematuria. Denies musculoskeletal injury, other than to head. Denies any weakness, numbness or focal neurologic deficit. Denies any skin rash or edema. No recent psychiatric issues. No easy bruising or bleeding. Denies any polyuria, polydypsia or history of immunocompromise.        PAST MEDICAL HISTORY has a past medical history of Adenomatous polyp, Cataracts, bilateral, Combined forms of age-related cataract of both eyes, Corneal scar, right eye, Cystocele, Dementia ([address] Difficulty controlling anger, Dyslipidemia, GERD (gastroesophageal reflux disease), Goiter, Hiatal hernia, Hypertension, Hypokalemia, Hypothyroidism, Impaired fasting glucose, Malignant melanoma in situ ([address] Migraines, Murmur, functional, Osteopenia, Posterior vitreous detachment of both eyes, Primary open angle glaucoma (POAG) of both eyes, moderate stage, Sciatica, Skin cancer, Syncope and collapse, Tremor, 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 SURGICAL HISTORY      has a past surgical history that includes other surgical history (11/11/08); Cholecystectomy; Colonoscopy (06/08/10); Mohs surgery (10/07/09); Vein Surgery (06/08/09); Colonoscopy (07/18/02); Cystocele repair (02/12/99); Appendectomy; Dilation and curettage of uterus; other surgical history (1988); Hysterectomy (1995); Upper gastrointestinal endoscopy (4/8/2015); Intracapsular cataract extraction (Left, 8/25/2020); and Intracapsular cataract extraction (Right, 10/8/2020). CURRENT MEDICATIONS       Previous Medications    AMLODIPINE (NORVASC) 2.5 MG TABLET    Take one tablet daily.     AMMONIUM LACTATE (AMLACTIN) 12 % CREAM    apply to affected area twice a day if needed    ATENOLOL (TENORMIN) 25 MG TABLET    Take 1 tablet by mouth daily    CALCIUM-VITAMIN D (OSCAL 500/200 D-3) 500-200 MG-UNIT PER TABLET    Take 1 tablet by mouth daily    DONEPEZIL (ARICEPT ODT) 5 MG DISINTEGRATING TABLET    Take 5 mg by mouth nightly    FLUOXETINE (PROZAC) 20 MG CAPSULE    Take 1 capsule by mouth daily    LATANOPROST (XALATAN) 0.005 % OPHTHALMIC SOLUTION    Place 1 drop into both eyes nightly    LEVOTHYROXINE (SYNTHROID) 137 MCG TABLET    TAKE 1 TABLET DAILY    MULTIPLE VITAMIN (DAILY MULTIVITAMIN PO)    Take 1 tablet by mouth daily RADIOLOGY:   I reviewed the radiologist interpretations:  XR CHEST PORTABLE   Final Result   Mild dependent left basilar atelectasis. Otherwise unremarkable chest.         CT CERVICAL SPINE WO CONTRAST   Final Result   No acute abnormality of the cervical spine. CT HEAD WO CONTRAST   Final Result   Chronic findings in the brain without acute CT abnormality identified. CT CERVICAL SPINE WO CONTRAST (Final result)  Result time 12/21/20 13:27:08  Final result by Raphael Lay MD (12/21/20 13:27:08)                Impression:    No acute abnormality of the cervical spine. Narrative:    EXAMINATION:   CT OF THE CERVICAL SPINE WITHOUT CONTRAST 12/21/2020 1:01 pm     TECHNIQUE:   CT of the cervical spine was performed without the administration of   intravenous contrast. Multiplanar reformatted images are provided for review. Dose modulation, iterative reconstruction, and/or weight based adjustment of   the mA/kV was utilized to reduce the radiation dose to as low as reasonably   achievable. COMPARISON:   None. HISTORY:   ORDERING SYSTEM PROVIDED HISTORY: fall   TECHNOLOGIST PROVIDED HISTORY:   fall   Reason for Exam: Radha Rebel today, laceration posterior head, denies neck pain   Acuity: Acute   Type of Exam: Initial     FINDINGS:   BONES/ALIGNMENT: There is no acute fracture or traumatic malalignment. DEGENERATIVE CHANGES: Mild to moderate multilevel cervical spondylosis noted. SOFT TISSUES: Prevertebral soft tissues are unremarkable.  Vascular   calcification is seen. Edgard Dale is no apical pneumothorax.                     CT HEAD WO CONTRAST (Final result)  Result time 12/21/20 13:08:25  Final result by Brenda Ugarte MD (12/21/20 13:08:25)                Impression:    Chronic findings in the brain without acute CT abnormality identified.              Narrative:    EXAMINATION:   CT OF THE HEAD WITHOUT CONTRAST  12/21/2020 1:00 pm     TECHNIQUE: CT of the head was performed without the administration of intravenous   contrast. Dose modulation, iterative reconstruction, and/or weight based   adjustment of the mA/kV was utilized to reduce the radiation dose to as low   as reasonably achievable. COMPARISON:   06/05/2020     HISTORY:   ORDERING SYSTEM PROVIDED HISTORY: fall   TECHNOLOGIST PROVIDED HISTORY:     fall   Reason for Exam: [address] today, laceration posterior head, denies neck pain   Acuity: Acute   Type of Exam: Initial     FINDINGS:   BRAIN/VENTRICLES: There is no acute intracranial hemorrhage, mass effect or   midline shift.  No abnormal extra-axial fluid collection. Amanda Star   in the lateral left frontal lobe again demonstrated.  Cortical atrophy and   chronic white matter changes in the brain and associated ventricular   enlargement are again demonstrated. ORBITS: The visualized portion of the orbits demonstrate no acute abnormality. SINUSES: The visualized paranasal sinuses and mastoid air cells demonstrate   no acute abnormality.      SOFT TISSUES/SKULL:  No acute abnormality of the visualized skull or soft   tissues.                       LABS:  Results for orders placed or performed during the hospital encounter of 12/21/20   Troponin   Result Value Ref Range    Troponin, High Sensitivity 16 (H) 0 - 14 ng/L    Troponin T NOT REPORTED <0.03 ng/mL    Troponin Interp NOT REPORTED    CBC Auto Differential   Result Value Ref Range    WBC 11.9 (H) 3.5 - 11.3 k/uL    RBC 4.51 3.95 - 5.11 m/uL    Hemoglobin 13.9 11.9 - 15.1 g/dL    Hematocrit 43.6 36.3 - 47.1 %    MCV 96.7 82.6 - 102.9 fL    MCH 30.8 25.2 - 33.5 pg    MCHC 31.9 25.2 - 33.5 g/dL    RDW 13.4 11.8 - 14.4 %    Platelets 281 115 - 151 k/uL    MPV 10.8 8.1 - 13.5 fL    NRBC Automated 0.0 0.0 per 100 WBC    Differential Type NOT REPORTED     Seg Neutrophils 81 (H) 36 - 65 %    Lymphocytes 10 (L) 24 - 43 %    Monocytes 7 3 - 12 %    Eosinophils % 0 (L) 1 - 4 % Basophils 1 0 - 2 %    Immature Granulocytes 1 (H) 0 %    Segs Absolute 9.81 (H) 1.50 - 8.10 k/uL    Absolute Lymph # 1.14 1.10 - 3.70 k/uL    Absolute Mono # 0.77 0.10 - 1.20 k/uL    Absolute Eos # <0.03 0.00 - 0.44 k/uL    Basophils Absolute 0.06 0.00 - 0.20 k/uL    Absolute Immature Granulocyte 0.09 0.00 - 0.30 k/uL    WBC Morphology NOT REPORTED     RBC Morphology NOT REPORTED     Platelet Estimate NOT REPORTED    Comprehensive Metabolic Panel   Result Value Ref Range    Glucose 188 (H) 70 - 99 mg/dL    BUN 16 8 - 23 mg/dL    CREATININE 0.96 (H) 0.50 - 0.90 mg/dL    Bun/Cre Ratio 17 9 - 20    Calcium 9.1 8.6 - 10.4 mg/dL    Sodium 136 135 - 144 mmol/L    Potassium 3.9 3.7 - 5.3 mmol/L    Chloride 98 98 - 107 mmol/L    CO2 25 20 - 31 mmol/L    Anion Gap 13 9 - 17 mmol/L    Alkaline Phosphatase 50 35 - 104 U/L    ALT <5 (L) 5 - 33 U/L    AST 15 <32 U/L    Total Bilirubin 0.51 0.3 - 1.2 mg/dL    Total Protein 7.0 6.4 - 8.3 g/dL    Alb 3.9 3.5 - 5.2 g/dL    Albumin/Globulin Ratio 1.3 1.0 - 2.5    GFR Non-African American 55 (L) >60 mL/min    GFR African American >60 >60 mL/min    GFR Comment          GFR Staging NOT REPORTED    Protime-INR   Result Value Ref Range    Protime 12.6 11.5 - 14.2 sec    INR 1.0    APTT   Result Value Ref Range    PTT 24.5 23.9 - 33.8 sec   COVID-19    Specimen: Other   Result Value Ref Range    SARS-CoV-2          SARS-CoV-2, Rapid Not Detected Not Detected    Source . NASAL SWAB     SARS-CoV-2         EKG 12 Lead   Result Value Ref Range    Ventricular Rate 111 BPM    Atrial Rate 111 BPM    QRS Duration 72 ms    Q-T Interval 398 ms    QTc Calculation (Bazett) 541 ms    R Axis -14 degrees    T Axis 96 degrees         EMERGENCY DEPARTMENT COURSE:   Vitals:    Vitals:    12/21/20 1436 12/21/20 1456 12/21/20 1500 12/21/20 1515   BP: 113/80 121/65 109/70 (!) 105/58   Pulse: 82 95 86 91   Resp: 27 18 19 24   Temp:       TempSrc:       SpO2: 98% 100% 100% 99%   Weight:       Height: -------------------------  BP: (!) 105/58, Temp: 96.6 °F (35.9 °C), Pulse: 91, Resp: 24      Re-evaluation Notes    1425 on reevaluation, the patient states that she feels poorly. She is having some discomfort in her chest and feels faint. She feels very cold. For this reason a cardiac work-up was initiated. I had completed the repair of the patient's wound and was planning to send her home. When I went to talk with her, she indicated she was feeling poorly. We brought her to the room and obtained an EKG which demonstrated a STEMI. I immediately was in contact with the stroke neurologist.  Due to the length of transport times and inability to fly today, the patient will be transferred to [facility], which is closer than [place].  The patient is transferred in stable condition. CRITICAL CARE:     Critical Care: The high probability of sudden, clinically significant deterioration in the patient's condition required the highest level of my preparedness to intervene urgently. ? The services I provided to this patient were to treat and/or prevent clinically significant deterioration. Services included the following: chart data review, reviewing nursing notes and/or old charts, documentation time, consultant collaboration regarding findings and treatment options, medication orders and management, direct patient care, vital sign assessments and ordering, interpreting and reviewing diagnostic studies/lab tests. ?  Aggregate critical care time includes only time during which I was engaged in work directly related to the patient's care, as described above, whether at the bedside or elsewhere in the Emergency Department. It did not include time spent performing other reported procedures or the services of residents, students, nurses, nurse practitioners or physician assistants. ?  Critical Care: 30 minutes. Management of STEMI.     CONSULTS: [facility] and [employer] initiated. 1455  Discussed with Dr. John Johnston, ED attending. Accepts pt for transfer. [address] ambulance has 1 hour ETA. 1520  Discussed with Dr. Louie Collado, fellow at cath lab. Will discuss TPA with Dr. Allegra Holstein. [facility] will call the hospital in [facility] to see if can take pt to cath lab there. 0  Discussed with Dr. Davon Henriquez at [facility].  Will check with his people. 1542  Discussed with Dr. Louie Collado. Pt should not be given TPA due to head trauma. Start aggrastat or integrelin. [address]  Dr. Davon Henriquez called to decline transfer. States the pt is too high a risk for their facility. [address] contacted to let them know that [facility] cannot accept the patient. [address]  Dr. Davon Henriquez called. Will accept pt for transfer. 1600  Pharmacy called; will substitute aggrastat. PROCEDURES:    Laceration repair: The patient's wound was cleansed with chlorhexidine, irrigated with saline, and explored. It was anesthetized with local instillation of 1% lidocaine with epinephrine. 5 staples were placed. FINAL IMPRESSION      1. ST elevation myocardial infarction (STEMI), unspecified artery ([address]    2. Laceration of scalp, initial encounter          DISPOSITION/PLAN   DISPOSITION        Condition on Disposition    stable    PATIENT REFERRED TO:  No follow-up provider specified.     DISCHARGE MEDICATIONS:  New Prescriptions    No medications on file       (Please note that portions of this note were completed with a voice recognition program.  Efforts were made to edit the dictations but occasionally words are mis-transcribed.)    Lopez MD   Attending Emergency Physician         Yoli Sheth MD  12/21/20 0176

## 2020-12-21 NOTE — ED NOTES
Writer spoke with patient's daughter Oralia Bernal. She states she just happened to look at her Mom's mychart and saw she had a ER visit today. Writer advised her patient did come in for a fall and needed staples to the back of her head. After fixing that up Brittani Lee started telling Dr. Farhat Garcia about some chest pain she was having, so we did an EKG. The EKG shows patient is having a heart attack. Writer continued on to let her know we are currently actively working to transfer patient to [facility] for intervention for the heart attack. She verbalizes understanding. Writer had a brief conversation with her regarding Daksha's code status, she states as far as she knows Brittani Lee would want everything done, but she will discuss with her brothers.        Aristeo Logan, RN  12/21/20 9413

## 2020-12-21 NOTE — ED NOTES
While pt is in ER hallway in w/c waiting for disposition to discharge plan for head injury and pt told Dr. Cat Media she was having some CP.      Erin Vega RN  12/21/20 [address], RN  12/21/20 6766

## 2020-12-21 NOTE — ED NOTES
Report given to ELOY KEENE [facility] Cath lab RN.      Jad Barksdale RN  12/21/20 8851

## 2020-12-21 NOTE — ED NOTES
Call received from Dr. Robert Block, he states he talked with his colleagues and they cannot accept the patient because she is too high risk for their hospital. Dr. Farhat Garcia is advised.      Aristeo Logan RN  12/21/20 4322

## 2020-12-21 NOTE — FLOWSHEET NOTE
rounding on ED     Assessment: Patient is stable but asked for food.  checked with nurse and obtained soup for her.  provided blankets to keep her warm and handed care off to incoming  and nursing staff. Intervention: Engaged in conversation. Patient expressed appreciation for visit and offer of continued prayer. Plan: Chaplains are available on site or on call 24/7 for spiritual and emotional support. 12/21/20 1440   Encounter Summary   Services provided to: Patient   Referral/Consult From: [address] [place] [address] Family members; Baptism/peri community;Home care staff   [facility]   ([facility]   Contact Baptism Completed   Continue Visiting   (12/21/20)   Complexity of Encounter Moderate   Length of Encounter 15 minutes   Spiritual/Voodoo   Type Spiritual support   Assessment Approachable   Intervention Active listening;Sustaining presence/ Ministry of presence   Outcome Expressed gratitude;Engaged in conversation

## 2020-12-21 NOTE — ED NOTES
[facility] contacted, no [facility] crew available, will send [facility] from [place] 1.5 hours.      Jad Barksdale RN  12/21/20 6507

## 2020-12-24 ENCOUNTER — APPOINTMENT (OUTPATIENT)
Dept: INTERVENTIONAL RADIOLOGY/VASCULAR | Age: 85
End: 2020-12-24
Payer: MEDICARE

## 2020-12-24 ENCOUNTER — HOSPITAL ENCOUNTER (EMERGENCY)
Age: 85
Discharge: HOME | End: 2020-12-24
Attending: EMERGENCY MEDICINE
Payer: MEDICARE

## 2020-12-24 ENCOUNTER — APPOINTMENT (OUTPATIENT)
Dept: GENERAL RADIOLOGY | Age: 85
End: 2020-12-24
Payer: MEDICARE

## 2020-12-24 VITALS
HEART RATE: 78 BPM | SYSTOLIC BLOOD PRESSURE: 182 MMHG | WEIGHT: 130 LBS | DIASTOLIC BLOOD PRESSURE: 62 MMHG | OXYGEN SATURATION: 95 % | BODY MASS INDEX: 23.03 KG/M2 | RESPIRATION RATE: 21 BRPM | TEMPERATURE: 98.4 F

## 2020-12-24 LAB
ABSOLUTE EOS #: 0 K/UL (ref 0–0.4)
ABSOLUTE IMMATURE GRANULOCYTE: 0 K/UL (ref 0–0.3)
ABSOLUTE LYMPH #: 0.39 K/UL (ref 1–4.8)
ABSOLUTE MONO #: 0.65 K/UL (ref 0.1–1.2)
ALBUMIN SERPL-MCNC: 3.6 G/DL (ref 3.5–5.2)
ALBUMIN/GLOBULIN RATIO: 1.2 (ref 1–2.5)
ALP BLD-CCNC: 48 U/L (ref 35–104)
ALT SERPL-CCNC: 19 U/L (ref 5–33)
ANION GAP SERPL CALCULATED.3IONS-SCNC: 9 MMOL/L (ref 9–17)
AST SERPL-CCNC: 51 U/L
ATYPICAL LYMPHOCYTE ABSOLUTE COUNT: 0.13 K/UL
ATYPICAL LYMPHOCYTES: 1 %
BASOPHILS # BLD: 0 % (ref 0–1)
BASOPHILS ABSOLUTE: 0 K/UL (ref 0–0.2)
BILIRUB SERPL-MCNC: 0.61 MG/DL (ref 0.3–1.2)
BILIRUBIN DIRECT: 0.18 MG/DL
BILIRUBIN, INDIRECT: 0.43 MG/DL (ref 0–1)
BNP INTERPRETATION: ABNORMAL
BUN BLDV-MCNC: 20 MG/DL (ref 8–23)
BUN/CREAT BLD: 33 (ref 9–20)
CALCIUM SERPL-MCNC: 8.9 MG/DL (ref 8.6–10.4)
CHLORIDE BLD-SCNC: 103 MMOL/L (ref 98–107)
CO2: 25 MMOL/L (ref 20–31)
CREAT SERPL-MCNC: 0.6 MG/DL (ref 0.5–0.9)
DIFFERENTIAL TYPE: ABNORMAL
EOSINOPHILS RELATIVE PERCENT: 0 % (ref 1–7)
GFR AFRICAN AMERICAN: >60 ML/MIN
GFR NON-AFRICAN AMERICAN: >60 ML/MIN
GFR SERPL CREATININE-BSD FRML MDRD: ABNORMAL ML/MIN/{1.73_M2}
GFR SERPL CREATININE-BSD FRML MDRD: ABNORMAL ML/MIN/{1.73_M2}
GLOBULIN: 2.9 G/DL (ref 1.5–3.8)
GLUCOSE BLD-MCNC: 126 MG/DL (ref 70–99)
HCT VFR BLD CALC: 35.6 % (ref 36.3–47.1)
HEMOGLOBIN: 11.2 G/DL (ref 11.9–15.1)
IMMATURE GRANULOCYTES: 0 %
INR BLD: 1.1
LYMPHOCYTES # BLD: 3 % (ref 16–46)
MAGNESIUM: 2 MG/DL (ref 1.6–2.6)
MCH RBC QN AUTO: 30.9 PG (ref 25.2–33.5)
MCHC RBC AUTO-ENTMCNC: 31.5 G/DL (ref 25.2–33.5)
MCV RBC AUTO: 98.1 FL (ref 82.6–102.9)
MONOCYTES # BLD: 5 % (ref 4–11)
MORPHOLOGY: ABNORMAL
NRBC AUTOMATED: 0 PER 100 WBC
PARTIAL THROMBOPLASTIN TIME: 27.3 SEC (ref 23.9–33.8)
PDW BLD-RTO: 13.9 % (ref 11.8–14.4)
PLATELET # BLD: 198 K/UL (ref 138–453)
PLATELET ESTIMATE: ABNORMAL
PMV BLD AUTO: 10.7 FL (ref 8.1–13.5)
POTASSIUM SERPL-SCNC: 3.8 MMOL/L (ref 3.7–5.3)
PRO-BNP: 3714 PG/ML
PROTHROMBIN TIME: 14 SEC (ref 11.5–14.2)
RBC # BLD: 3.63 M/UL (ref 3.95–5.11)
RBC # BLD: ABNORMAL 10*6/UL
SARS-COV-2, RAPID: NOT DETECTED
SARS-COV-2: NORMAL
SARS-COV-2: NORMAL
SEG NEUTROPHILS: 91 % (ref 43–77)
SEGMENTED NEUTROPHILS ABSOLUTE COUNT: 11.73 K/UL (ref 1.5–8.1)
SODIUM BLD-SCNC: 137 MMOL/L (ref 135–144)
SOURCE: NORMAL
TOTAL PROTEIN: 6.5 G/DL (ref 6.4–8.3)
TROPONIN INTERP: ABNORMAL
TROPONIN T: ABNORMAL NG/ML
TROPONIN, HIGH SENSITIVITY: 1193 NG/L (ref 0–14)
TROPONIN, HIGH SENSITIVITY: 1220 NG/L (ref 0–14)
TROPONIN, HIGH SENSITIVITY: 1249 NG/L (ref 0–14)
WBC # BLD: 12.9 K/UL (ref 3.5–11.3)
WBC # BLD: ABNORMAL 10*3/UL

## 2020-12-24 PROCEDURE — 99283 EMERGENCY DEPT VISIT LOW MDM: CPT

## 2020-12-24 PROCEDURE — 80048 BASIC METABOLIC PNL TOTAL CA: CPT

## 2020-12-24 PROCEDURE — 83880 ASSAY OF NATRIURETIC PEPTIDE: CPT

## 2020-12-24 PROCEDURE — 93971 EXTREMITY STUDY: CPT

## 2020-12-24 PROCEDURE — U0002 COVID-19 LAB TEST NON-CDC: HCPCS

## 2020-12-24 PROCEDURE — 2580000003 HC RX 258: Performed by: EMERGENCY MEDICINE

## 2020-12-24 PROCEDURE — 85610 PROTHROMBIN TIME: CPT

## 2020-12-24 PROCEDURE — 84484 ASSAY OF TROPONIN QUANT: CPT

## 2020-12-24 PROCEDURE — 36415 COLL VENOUS BLD VENIPUNCTURE: CPT

## 2020-12-24 PROCEDURE — 71045 X-RAY EXAM CHEST 1 VIEW: CPT

## 2020-12-24 PROCEDURE — 85730 THROMBOPLASTIN TIME PARTIAL: CPT

## 2020-12-24 PROCEDURE — 83735 ASSAY OF MAGNESIUM: CPT

## 2020-12-24 PROCEDURE — 80076 HEPATIC FUNCTION PANEL: CPT

## 2020-12-24 PROCEDURE — U0003 INFECTIOUS AGENT DETECTION BY NUCLEIC ACID (DNA OR RNA); SEVERE ACUTE RESPIRATORY SYNDROME CORONAVIRUS 2 (SARS-COV-2) (CORONAVIRUS DISEASE [COVID-19]), AMPLIFIED PROBE TECHNIQUE, MAKING USE OF HIGH THROUGHPUT TECHNOLOGIES AS DESCRIBED BY CMS-2020-01-R: HCPCS

## 2020-12-24 PROCEDURE — 93005 ELECTROCARDIOGRAM TRACING: CPT | Performed by: EMERGENCY MEDICINE

## 2020-12-24 PROCEDURE — 85025 COMPLETE CBC W/AUTO DIFF WBC: CPT

## 2020-12-24 RX ORDER — 0.9 % SODIUM CHLORIDE 0.9 %
250 INTRAVENOUS SOLUTION INTRAVENOUS ONCE
Status: COMPLETED | OUTPATIENT
Start: 2020-12-24 | End: 2020-12-24

## 2020-12-24 RX ADMIN — SODIUM CHLORIDE 250 ML: 9 INJECTION, SOLUTION INTRAVENOUS at 18:07

## 2020-12-24 ASSESSMENT — PAIN SCALES - GENERAL: PAINLEVEL_OUTOF10: 8

## 2020-12-24 ASSESSMENT — ENCOUNTER SYMPTOMS
COUGH: 0
SHORTNESS OF BREATH: 0

## 2020-12-24 ASSESSMENT — PAIN DESCRIPTION - LOCATION: LOCATION: LEG

## 2020-12-24 ASSESSMENT — PAIN DESCRIPTION - ORIENTATION: ORIENTATION: RIGHT

## 2020-12-24 NOTE — ED TRIAGE NOTES
Getting dressed around 9am and developed cramping to right leg that lasted about 20min. Leg is now aching and feels weak.

## 2020-12-24 NOTE — ED PROVIDER NOTES
 GERD (gastroesophageal reflux disease)     egd  4/15 ok    Goiter     benign, history of     Hiatal hernia     Hypertension     Hypokalemia     Hypothyroidism     Impaired fasting glucose     Malignant melanoma in situ (HCC)     R upper Back 8/17    Migraines     Murmur, functional     Grade 1/6 systolic, history of     Osteopenia     T -1.0 hip, 03/09, T -0.3 spine, 03/09. 1) Repeat DEXA scan 09/12 with T +0.15, T -1.2 at the hip but -1.8 at the mean femoral neck giving her a FRAX score of 4.3 at the hip. Reclast 10/13 and given 2014,2015, and 1/4/2017, on calcium and vit d,  Redo Dexa 10/14 Frax 4.2% , Frax 4.8% 10 yr hip fracture risk on Dexa 1/17  On Reclast    Posterior vitreous detachment of both eyes     Primary open angle glaucoma (POAG) of both eyes, moderate stage 10/1/2018    Sciatica 12/15/2015    Skin cancer     History of multiple skin cancers. Following with Dr. Earle Ha--- invasive squamous cell Ca 8/17 L forearm    Syncope and collapse 1/21/2015    Tremor 12/23/2018    Trichiasis of left lower eyelid without entropion     Visual disturbance 10/26/2017         SURGICALHISTORY       Past Surgical History:   Procedure Laterality Date    APPENDECTOMY      CHOLECYSTECTOMY      Remote.  COLONOSCOPY  06/08/10    COLONOSCOPY  07/18/02    CYSTOCELE REPAIR  02/12/99    Vaginal prolapse, cystocele plus pelvic relaxation.  DILATION AND CURETTAGE OF UTERUS      with hysteroscopy   Mary Lou    still has ovaries     INTRACAPSULAR CATARACT EXTRACTION Left 8/25/2020    Left Cataract Extraction w/ IOL Implant performed by Nadean Lefort, MD at [place] [id number] Right 10/8/2020    Right Cataract Extraction w/ IOL Implant performed by Nadean Lefort, MD at [address] [place] [address]  10/07/09    SCC, left nasal sidewall.  OTHER SURGICAL HISTORY  11/11/08    Anterior repair.      OTHER SURGICAL HISTORY  1988    laser cone  UPPER GASTROINTESTINAL ENDOSCOPY  4/8/2015    hiatal hernia    VEIN SURGERY  06/08/09    Laser ablation of right greater saphenous vein. CURRENT MEDICATIONS       Previous Medications    AMLODIPINE (NORVASC) 2.5 MG TABLET    Take one tablet daily. AMMONIUM LACTATE (AMLACTIN) 12 % CREAM    apply to affected area twice a day if needed    ATENOLOL (TENORMIN) 25 MG TABLET    Take 1 tablet by mouth daily    CALCIUM-VITAMIN D (OSCAL 500/200 D-3) 500-200 MG-UNIT PER TABLET    Take 1 tablet by mouth daily    DONEPEZIL (ARICEPT ODT) 5 MG DISINTEGRATING TABLET    Take 5 mg by mouth nightly    FLUOXETINE (PROZAC) 20 MG CAPSULE    Take 1 capsule by mouth daily    LATANOPROST (XALATAN) 0.005 % OPHTHALMIC SOLUTION    Place 1 drop into both eyes nightly    LEVOTHYROXINE (SYNTHROID) 137 MCG TABLET    TAKE 1 TABLET DAILY    MULTIPLE VITAMIN (DAILY MULTIVITAMIN PO)    Take 1 tablet by mouth daily     VITAMIN B-12 (CYANOCOBALAMIN) 1000 MCG TABLET    Take 1,000 mcg by mouth daily       ALLERGIES     Patient has no known allergies. FAMILY HISTORY       Family History   Problem Relation Age of Onset    Coronary Art Dis Father     Colon Cancer Brother     Diabetes Neg Hx     Cataracts Neg Hx     Glaucoma Neg Hx           SOCIAL HISTORY       Social History     Socioeconomic History    Marital status:      Spouse name: None    Number of children: None    Years of education: None    Highest education level: None   Occupational History    None   Social Needs    Financial resource strain: None    Food insecurity     Worry: None     Inability: None    Transportation needs     Medical: None     Non-medical: None   Tobacco Use    Smoking status: Never Smoker    Smokeless tobacco: Never Used   Substance and Sexual Activity    Alcohol use: Yes     Alcohol/week: 0.0 standard drinks     Comment: Infrequently.      Drug use: No    Sexual activity: None   Lifestyle    Physical activity Days per week: None     Minutes per session: None    Stress: None   Relationships    Social connections     Talks on phone: None     Gets together: None     Attends Zoroastrian service: None     Active member of club or organization: None     Attends meetings of clubs or organizations: None     Relationship status: None    Intimate partner violence     Fear of current or ex partner: None     Emotionally abused: None     Physically abused: None     Forced sexual activity: None   Other Topics Concern    None   Social History Narrative    None       SCREENINGS             PHYSICAL EXAM    (up to 7 for level 4, 8 or more for level 5)     ED Triage Vitals [12/24/20 1321]   BP Temp Temp Source Pulse Resp SpO2 Height Weight   (!) 172/52 98.4 °F (36.9 °C) Tympanic 65 16 93 % -- 130 lb (59 kg)       Physical Exam  Vitals signs reviewed. Constitutional:       General: She is not in acute distress. Appearance: She is not ill-appearing. HENT:      Head: Normocephalic. Right Ear: External ear normal.      Left Ear: External ear normal.      Nose: Nose normal.   Eyes:      Extraocular Movements: Extraocular movements intact. Neck:      Musculoskeletal: Neck supple. Cardiovascular:      Rate and Rhythm: Normal rate and regular rhythm. Pulmonary:      Effort: Pulmonary effort is normal.      Breath sounds: Normal breath sounds. Abdominal:      Palpations: Abdomen is soft. Tenderness: There is no abdominal tenderness. Musculoskeletal: Normal range of motion. General: Tenderness present. No swelling. Skin:     General: Skin is warm and dry. Coloration: Skin is pale. Neurological:      General: No focal deficit present. Mental Status: She is alert. Mental status is at baseline.          DIAGNOSTIC RESULTS     EKG: All EKG's are interpreted by the Emergency Department Physician who either signs orCo-signs this chart in the absence of a cardiologist. Sinus rhythm with rate of 61 beats a minute. Left axis deviation. Inferolateral ischemia. RADIOLOGY:   Non-plain film images such as CT, Ultrasound and MRI are read by the radiologist. Plain radiographic images are visualized and preliminarily interpreted by the emergency physician with the below findings:    Interpretation per the Radiologist below, ifavailable at the time of this note:    VL DUP LOWER EXTREMITY VENOUS RIGHT   Final Result   No evidence of DVT in the right lower extremity. XR CHEST PORTABLE   Final Result   Interval increase in interstitial markings and left pleural effusion with   left basilar atelectasis suggesting developing pulmonary vascular congestion.                ED BEDSIDE ULTRASOUND:   Performed by ED Physician - none    LABS:  Labs Reviewed   CBC WITH AUTO DIFFERENTIAL - Abnormal; Notable for the following components:       Result Value    WBC 12.9 (*)     RBC 3.63 (*)     Hemoglobin 11.2 (*)     Hematocrit 35.6 (*)     Lymphocytes 3 (*)     Seg Neutrophils 91 (*)     Eosinophils % 0 (*)     Absolute Lymph # 0.39 (*)     Segs Absolute 11.73 (*)     All other components within normal limits   HEPATIC FUNCTION PANEL - Abnormal; Notable for the following components:    AST 51 (*)     All other components within normal limits   TROPONIN - Abnormal; Notable for the following components:    Troponin, High Sensitivity 1,193 (*)     All other components within normal limits   BASIC METABOLIC PANEL - Abnormal; Notable for the following components:    Glucose 126 (*)     Bun/Cre Ratio 33 (*)     All other components within normal limits   TROPONIN - Abnormal; Notable for the following components:    Troponin, High Sensitivity 1,220 (*)     All other components within normal limits   BRAIN NATRIURETIC PEPTIDE - Abnormal; Notable for the following components:    Pro-BNP 3,714 (*)     All other components within normal limits

## 2020-12-25 LAB
EKG ATRIAL RATE: 59 BPM
EKG ATRIAL RATE: 61 BPM
EKG P AXIS: 76 DEGREES
EKG P AXIS: 88 DEGREES
EKG P-R INTERVAL: 156 MS
EKG P-R INTERVAL: 158 MS
EKG Q-T INTERVAL: 444 MS
EKG Q-T INTERVAL: 456 MS
EKG QRS DURATION: 82 MS
EKG QRS DURATION: 88 MS
EKG QTC CALCULATION (BAZETT): 446 MS
EKG QTC CALCULATION (BAZETT): 451 MS
EKG R AXIS: -34 DEGREES
EKG R AXIS: -35 DEGREES
EKG T AXIS: -12 DEGREES
EKG T AXIS: -22 DEGREES
EKG VENTRICULAR RATE: 59 BPM
EKG VENTRICULAR RATE: 61 BPM

## 2020-12-27 ENCOUNTER — HOSPITAL ENCOUNTER (EMERGENCY)
Age: 85
Discharge: HOME | End: 2020-12-27
Attending: EMERGENCY MEDICINE
Payer: MEDICARE

## 2020-12-27 ENCOUNTER — APPOINTMENT (OUTPATIENT)
Dept: GENERAL RADIOLOGY | Age: 85
End: 2020-12-27
Payer: MEDICARE

## 2020-12-27 VITALS
OXYGEN SATURATION: 94 % | WEIGHT: 138 LBS | TEMPERATURE: 97.7 F | SYSTOLIC BLOOD PRESSURE: 146 MMHG | BODY MASS INDEX: 23.56 KG/M2 | HEIGHT: 64 IN | RESPIRATION RATE: 16 BRPM | HEART RATE: 79 BPM | DIASTOLIC BLOOD PRESSURE: 84 MMHG

## 2020-12-27 LAB
ABSOLUTE EOS #: 0.06 K/UL (ref 0–0.44)
ABSOLUTE IMMATURE GRANULOCYTE: 0.07 K/UL (ref 0–0.3)
ABSOLUTE LYMPH #: 0.65 K/UL (ref 1.1–3.7)
ABSOLUTE MONO #: 0.91 K/UL (ref 0.1–1.2)
ANION GAP SERPL CALCULATED.3IONS-SCNC: 10 MMOL/L (ref 9–17)
BASOPHILS # BLD: 0 % (ref 0–2)
BASOPHILS ABSOLUTE: 0.03 K/UL (ref 0–0.2)
BNP INTERPRETATION: ABNORMAL
BUN BLDV-MCNC: 13 MG/DL (ref 8–23)
BUN/CREAT BLD: 21 (ref 9–20)
CALCIUM SERPL-MCNC: 8.3 MG/DL (ref 8.6–10.4)
CHLORIDE BLD-SCNC: 101 MMOL/L (ref 98–107)
CO2: 26 MMOL/L (ref 20–31)
CREAT SERPL-MCNC: 0.61 MG/DL (ref 0.5–0.9)
DIFFERENTIAL TYPE: ABNORMAL
EOSINOPHILS RELATIVE PERCENT: 1 % (ref 1–4)
GFR AFRICAN AMERICAN: >60 ML/MIN
GFR NON-AFRICAN AMERICAN: >60 ML/MIN
GFR SERPL CREATININE-BSD FRML MDRD: ABNORMAL ML/MIN/{1.73_M2}
GFR SERPL CREATININE-BSD FRML MDRD: ABNORMAL ML/MIN/{1.73_M2}
GLUCOSE BLD-MCNC: 103 MG/DL (ref 70–99)
HCT VFR BLD CALC: 35.6 % (ref 36.3–47.1)
HEMOGLOBIN: 11.4 G/DL (ref 11.9–15.1)
IMMATURE GRANULOCYTES: 1 %
LYMPHOCYTES # BLD: 7 % (ref 24–43)
MAGNESIUM: 1.8 MG/DL (ref 1.6–2.6)
MCH RBC QN AUTO: 30.6 PG (ref 25.2–33.5)
MCHC RBC AUTO-ENTMCNC: 32 G/DL (ref 25.2–33.5)
MCV RBC AUTO: 95.7 FL (ref 82.6–102.9)
MONOCYTES # BLD: 10 % (ref 3–12)
MYOGLOBIN: 40 NG/ML (ref 25–58)
NRBC AUTOMATED: 0 PER 100 WBC
PDW BLD-RTO: 13.6 % (ref 11.8–14.4)
PLATELET # BLD: 243 K/UL (ref 138–453)
PLATELET ESTIMATE: ABNORMAL
PMV BLD AUTO: 10.4 FL (ref 8.1–13.5)
POTASSIUM SERPL-SCNC: 3.1 MMOL/L (ref 3.7–5.3)
PRO-BNP: 2402 PG/ML
RBC # BLD: 3.72 M/UL (ref 3.95–5.11)
RBC # BLD: ABNORMAL 10*6/UL
SEG NEUTROPHILS: 81 % (ref 36–65)
SEGMENTED NEUTROPHILS ABSOLUTE COUNT: 7.68 K/UL (ref 1.5–8.1)
SODIUM BLD-SCNC: 137 MMOL/L (ref 135–144)
TROPONIN INTERP: ABNORMAL
TROPONIN INTERP: ABNORMAL
TROPONIN T: ABNORMAL NG/ML
TROPONIN T: ABNORMAL NG/ML
TROPONIN, HIGH SENSITIVITY: 779 NG/L (ref 0–14)
TROPONIN, HIGH SENSITIVITY: 791 NG/L (ref 0–14)
WBC # BLD: 9.4 K/UL (ref 3.5–11.3)
WBC # BLD: ABNORMAL 10*3/UL

## 2020-12-27 PROCEDURE — 6370000000 HC RX 637 (ALT 250 FOR IP): Performed by: EMERGENCY MEDICINE

## 2020-12-27 PROCEDURE — 83735 ASSAY OF MAGNESIUM: CPT

## 2020-12-27 PROCEDURE — 85025 COMPLETE CBC W/AUTO DIFF WBC: CPT

## 2020-12-27 PROCEDURE — 99285 EMERGENCY DEPT VISIT HI MDM: CPT

## 2020-12-27 PROCEDURE — 83880 ASSAY OF NATRIURETIC PEPTIDE: CPT

## 2020-12-27 PROCEDURE — 93005 ELECTROCARDIOGRAM TRACING: CPT | Performed by: EMERGENCY MEDICINE

## 2020-12-27 PROCEDURE — 83874 ASSAY OF MYOGLOBIN: CPT

## 2020-12-27 PROCEDURE — 84484 ASSAY OF TROPONIN QUANT: CPT

## 2020-12-27 PROCEDURE — 71045 X-RAY EXAM CHEST 1 VIEW: CPT

## 2020-12-27 PROCEDURE — 80048 BASIC METABOLIC PNL TOTAL CA: CPT

## 2020-12-27 PROCEDURE — 36415 COLL VENOUS BLD VENIPUNCTURE: CPT

## 2020-12-27 RX ORDER — IBUPROFEN 600 MG/1
600 TABLET ORAL 3 TIMES DAILY PRN
Qty: 15 TABLET | Refills: 0 | Status: SHIPPED | OUTPATIENT
Start: 2020-12-27 | End: 2020-12-27 | Stop reason: CLARIF

## 2020-12-27 RX ORDER — IBUPROFEN 800 MG/1
800 TABLET ORAL ONCE
Status: DISCONTINUED | OUTPATIENT
Start: 2020-12-27 | End: 2020-12-27

## 2020-12-27 RX ADMIN — POTASSIUM BICARBONATE 20 MEQ: 782 TABLET, EFFERVESCENT ORAL at 13:58

## 2020-12-27 ASSESSMENT — ENCOUNTER SYMPTOMS: SHORTNESS OF BREATH: 0

## 2020-12-27 NOTE — ED NOTES
Writer spoke with daughter Belgica Bagley and Nurse Isidro Damon at [facility]. Both updated. Transport arranged by [facility].      Fortunato Toro RN  12/27/20 [id number]

## 2020-12-27 NOTE — ED NOTES
Pt ambulatory to bathroom with steady and upright gait. Resp even and NL. Pt updated on plan of care and denies questions or distress.      Victor Hugo Lazo RN  12/27/20 0531

## 2020-12-27 NOTE — ED PROVIDER NOTES
[facility] ED  EMERGENCY DEPARTMENT ENCOUNTER      Pt Name: Gloria Brewer  MRN: [id number]  [place] 3/21/1930  Date of evaluation: 12/27/2020  Provider: Aga Tipton MD    CHIEF COMPLAINT     Chief Complaint   Patient presents with    Chest Pain     Pt c/o mid sternal chest pain occuring approx 20min prior to arrival while laying in bed. Denies SOB, nausea, lightheadedness or other associated symptom. Took 1 of her 's sl ntg and pain went away. Pt states \"I think I was feeling anxious and that was all. \" Pt placed on full cardio resp monitor upon arrival.         HISTORY OF PRESENT ILLNESS   (Location/Symptom, Timing/Onset, Context/Setting,Quality, Duration, Modifying Factors, Severity)  Note limiting factors. Gloria Brewer is a 80 y.o. female who presents to the emergency department stating she had anterior chest pain lasting about 2 minutes with complete resolution and not accompanied with any other symptoms. She thinks it was anxiety related. She pressed her life alert button. She states EMS gave her a nitroglycerin tablet but her pain had already resolved by then. She is symptom-free upon arrival.  6 days ago patient had presented with an inferior wall STEMI and was transferred to [facility] where she had 2 stents placed in her right coronary artery. The history is provided by the patient and medical records. Nursing Notes werereviewed. REVIEW OF SYSTEMS    (2-9 systems for level 4, 10 or more for level 5)     Review of Systems   Constitutional: Negative for fever. Respiratory: Negative for shortness of breath. Musculoskeletal: Negative for myalgias. All other systems reviewed and are negative. Except as noted above the remainder of the review of systems was reviewed and negative.        PAST MEDICAL HISTORY     Past Medical History:   Diagnosis Date    Adenomatous polyp 06/10 With recommendation for repeat 06/15. Also, diverticulosis was noted.  Cataracts, bilateral     Combined forms of age-related cataract of both eyes 10/1/2018    Corneal scar, right eye     Cystocele     history of     Dementia ([facility] [address]     Mini-Mental Status exam 27/30 6/14  declines meds at this point,  MMSE 29/30 on June 13, 2017  MMSE 26/30 4/2018    Difficulty controlling anger 9/16/2018    Dyslipidemia     GERD (gastroesophageal reflux disease)     egd  4/15 ok    Goiter     benign, history of     Hiatal hernia     Hypertension     Hypokalemia     Hypothyroidism     Impaired fasting glucose     Malignant melanoma in situ ([facility] [address]     R upper Back 8/17    Migraines     Murmur, functional     Grade 1/6 systolic, history of     Osteopenia     T -1.0 hip, 03/09, T -0.3 spine, 03/09. 1) Repeat DEXA scan 09/12 with T +0.15, T -1.2 at the hip but -1.8 at the mean femoral neck giving her a FRAX score of 4.3 at the hip. Reclast 10/13 and given 2014,2015, and 1/4/2017, on calcium and vit d,  Redo Dexa 10/14 Frax 4.2% , Frax 4.8% 10 yr hip fracture risk on Dexa 1/17  On Reclast    Posterior vitreous detachment of both eyes     Primary open angle glaucoma (POAG) of both eyes, moderate stage 10/1/2018    Sciatica 12/15/2015    Skin cancer     History of multiple skin cancers. Following with Dr. Ezequiel Eisenberg--- invasive squamous cell Ca 8/17 L forearm    Syncope and collapse 1/21/2015    Tremor 12/23/2018    Trichiasis of left lower eyelid without entropion     Visual disturbance 10/26/2017         SURGICALHISTORY       Past Surgical History:   Procedure Laterality Date    APPENDECTOMY      CHOLECYSTECTOMY      Remote.  COLONOSCOPY  06/08/10    COLONOSCOPY  07/18/02    CYSTOCELE REPAIR  02/12/99    Vaginal prolapse, cystocele plus pelvic relaxation.      DILATION AND CURETTAGE OF UTERUS      with hysteroscopy   Mary Lou    still has ovaries  INTRACAPSULAR CATARACT EXTRACTION Left 8/25/2020    Left Cataract Extraction w/ IOL Implant performed by Aide Coronado MD at [place] [address] Right 10/8/2020    Right Cataract Extraction w/ IOL Implant performed by Aide Coronado MD at [address] [place] [address]  10/07/09    SCC, left nasal sidewall.  OTHER SURGICAL HISTORY  11/11/08    Anterior repair.  OTHER SURGICAL HISTORY  1988    laser cone    UPPER GASTROINTESTINAL ENDOSCOPY  4/8/2015    hiatal hernia    VEIN SURGERY  06/08/09    Laser ablation of right greater saphenous vein. CURRENT MEDICATIONS       Current Discharge Medication List      CONTINUE these medications which have NOT CHANGED    Details   FLUoxetine (PROZAC) 20 MG capsule Take 1 capsule by mouth daily  Qty: 90 capsule, Refills: 1      donepezil (ARICEPT ODT) 5 MG disintegrating tablet Take 5 mg by mouth nightly      atenolol (TENORMIN) 25 MG tablet Take 1 tablet by mouth daily  Qty: 90 tablet, Refills: 3      vitamin B-12 (CYANOCOBALAMIN) 1000 MCG tablet Take 1,000 mcg by mouth daily      levothyroxine (SYNTHROID) 137 MCG tablet TAKE 1 TABLET DAILY  Qty: 90 tablet, Refills: 3    Associated Diagnoses: Hypothyroidism, unspecified type      latanoprost (XALATAN) 0.005 % ophthalmic solution Place 1 drop into both eyes nightly  Qty: 3 Bottle, Refills: 3      ammonium lactate (AMLACTIN) 12 % cream apply to affected area twice a day if needed  Qty: 385 g, Refills: 3      amLODIPine (NORVASC) 2.5 MG tablet Take one tablet daily. Qty: 90 tablet, Refills: 3    Associated Diagnoses: Essential hypertension      calcium-vitamin D (OSCAL 500/200 D-3) 500-200 MG-UNIT per tablet Take 1 tablet by mouth daily  Qty: 90 tablet, Refills: 3      Multiple Vitamin (DAILY MULTIVITAMIN PO) Take 1 tablet by mouth daily              ALLERGIES     Patient has no known allergies.     FAMILY HISTORY       Family History   Problem Relation Age of Onset  Coronary Art Dis Father     Colon Cancer Brother     Diabetes Neg Hx     Cataracts Neg Hx     Glaucoma Neg Hx           SOCIAL HISTORY       Social History     Socioeconomic History    Marital status:      Spouse name: Not on file    Number of children: Not on file    Years of education: Not on file    Highest education level: Not on file   Occupational History    Not on file   Social Needs    Financial resource strain: Not on file    Food insecurity     Worry: Not on file     Inability: Not on file    Transportation needs     Medical: Not on file     Non-medical: Not on file   Tobacco Use    Smoking status: Never Smoker    Smokeless tobacco: Never Used   Substance and Sexual Activity    Alcohol use: Yes     Alcohol/week: 0.0 standard drinks     Comment: Infrequently.      Drug use: No    Sexual activity: Not on file   Lifestyle    Physical activity     Days per week: Not on file     Minutes per session: Not on file    Stress: Not on file   Relationships    Social connections     Talks on phone: Not on file     Gets together: Not on file     Attends Jehovah's witness service: Not on file     Active member of club or organization: Not on file     Attends meetings of clubs or organizations: Not on file     Relationship status: Not on file    Intimate partner violence     Fear of current or ex partner: Not on file     Emotionally abused: Not on file     Physically abused: Not on file     Forced sexual activity: Not on file   Other Topics Concern    Not on file   Social History Narrative    Not on file       SCREENINGS    [place] Coma Scale  Eye Opening: Spontaneous  Best Verbal Response: Oriented  Best Motor Response: Obeys commands  [place] Coma Scale Score: 15        PHYSICAL EXAM    (up to 7 for level 4, 8 or more for level 5)     ED Triage Vitals [12/27/20 1201]   BP Temp Temp Source Pulse Resp SpO2 Height Weight (!) 175/58 97.7 °F (36.5 °C) Tympanic 57 15 96 % 5' 4\" (1.626 m) 138 lb (62.6 kg)       Physical Exam  Vitals signs reviewed. Constitutional:       General: She is not in acute distress. HENT:      Head: Normocephalic. Right Ear: External ear normal.      Left Ear: External ear normal.      Nose: Nose normal.   Eyes:      Extraocular Movements: Extraocular movements intact. Neck:      Musculoskeletal: Neck supple. Cardiovascular:      Rate and Rhythm: Regular rhythm. Bradycardia present. Pulmonary:      Effort: Pulmonary effort is normal.      Breath sounds: Normal breath sounds. Chest:      Chest wall: No tenderness. Abdominal:      General: Bowel sounds are normal.      Palpations: Abdomen is soft. Tenderness: There is no abdominal tenderness. Musculoskeletal: Normal range of motion. General: No swelling or tenderness. Skin:     General: Skin is warm and dry. Coloration: Skin is pale. Neurological:      General: No focal deficit present. Mental Status: She is alert and oriented to person, place, and time. Psychiatric:         Mood and Affect: Mood normal.         DIAGNOSTIC RESULTS     EKG: All EKG's are interpreted by the Emergency Department Physician who either signs orCo-signs this chart in the absence of a cardiologist.    Sinus bradycardia with a rate of 61 beats a minute. Leftward axis. No acute ST elevation. Old inferior wall infarct. QT prolongation. RADIOLOGY:   Non-plain film images such as CT, Ultrasound and MRI are read by the radiologist. Plain radiographic images are visualized and preliminarily interpreted by the emergency physician with the below findings:    Interpretation per the Radiologist below, ifavailable at the time of this note:    XR CHEST PORTABLE   Final Result   Unchanged left pleural effusion with possible underlying atelectasis and/or   pneumonia. Resolving vascular congestion.                ED BEDSIDE ULTRASOUND: 1. Atypical chest pain          DISPOSITION/PLAN   DISPOSITION Decision To Discharge 12/27/2020 02:49:32 PM      PATIENT REFERRED TO:  Logan Lee MD  [address] [place] [id number] [facility], [address] [place] [id number]  [phone]    In 3 days        DISCHARGE MEDICATIONS:         Problem List:  Patient Active Problem List   Diagnosis Code    Dyslipidemia E78.5    Osteopenia M85.80    Impaired fasting glucose R73.01    Adenomatous polyp D36.9    Essential hypertension I10    Hypothyroidism E03.9    Sciatica M54.30    GERD (gastroesophageal reflux disease) K21.9    Visual disturbance H53.9    Malignant melanoma in situ ([facility] [address] D03.9    Difficulty controlling anger R45.4    Primary open angle glaucoma (POAG) of both eyes, moderate stage H40.1132    PVD (posterior vitreous detachment), both eyes H43.813    Combined forms of age-related cataract of both eyes H25.813    Dementia ([address] F03.90    Tremor R25.1    Essential tremor G25.0    Dizziness R42    Chronic cerebral ischemia I67.82    Acquired cerebral atrophy (HCC) G31.9    Cerebral infarction (HCC) I63.9    Anxiety F41.9           Summation      Patient Course: Discharged. ED Medicationsadministered this visit:    Medications   potassium bicarb-citric acid (EFFER-K) effervescent tablet 20 mEq (20 mEq Oral Given 12/27/20 1358)       New Prescriptions from this visit:    Current Discharge Medication List          Follow-up:  Logan Lee MD  [address] [place] [id number]  [phone]    In 3 days          Final Impression:   1.  Atypical chest pain               (Please note that portions of this note were completed with a voice recognitionprogram.  Efforts were made to edit the dictations but occasionally words are mis-transcribed.)    Megan Arroyo MD (electronically signed)  Attending Emergency Physician Chago Rodas MD  12/27/20 3431

## 2020-12-28 ENCOUNTER — TELEPHONE (OUTPATIENT)
Dept: INTERNAL MEDICINE | Age: 85
End: 2020-12-28

## 2020-12-28 LAB
EKG ATRIAL RATE: 61 BPM
EKG Q-T INTERVAL: 500 MS
EKG QRS DURATION: 80 MS
EKG QTC CALCULATION (BAZETT): 503 MS
EKG R AXIS: -40 DEGREES
EKG T AXIS: 30 DEGREES
EKG VENTRICULAR RATE: 61 BPM

## 2020-12-28 NOTE — TELEPHONE ENCOUNTER
Patient was admitted to [facility] for one day. Has staples in back of head that need to be removed in 10-12 days. This happened on 12/22/2020. [facility] can remove these if they have a written order. Please advise.

## 2020-12-29 ENCOUNTER — HOSPITAL ENCOUNTER (OUTPATIENT)
Age: 85
Setting detail: SPECIMEN
Discharge: HOME | End: 2020-12-29
Payer: MEDICARE

## 2020-12-29 PROCEDURE — 81001 URINALYSIS AUTO W/SCOPE: CPT

## 2020-12-29 PROCEDURE — 87086 URINE CULTURE/COLONY COUNT: CPT

## 2020-12-30 LAB
-: ABNORMAL
AMORPHOUS: ABNORMAL
BACTERIA: ABNORMAL
BILIRUBIN URINE: NEGATIVE
CASTS UA: ABNORMAL /LPF (ref 0–2)
COLOR: ABNORMAL
COMMENT UA: ABNORMAL
CRYSTALS, UA: ABNORMAL /HPF
EPITHELIAL CELLS UA: ABNORMAL /HPF (ref 0–5)
GLUCOSE URINE: NEGATIVE
KETONES, URINE: NEGATIVE
LEUKOCYTE ESTERASE, URINE: NEGATIVE
MUCUS: ABNORMAL
NITRITE, URINE: NEGATIVE
OTHER OBSERVATIONS UA: ABNORMAL
PH UA: 7 (ref 5–6)
PROTEIN UA: NEGATIVE
RBC UA: ABNORMAL /HPF (ref 0–4)
RENAL EPITHELIAL, UA: ABNORMAL /HPF
SPECIFIC GRAVITY UA: 1.01 (ref 1.01–1.02)
TRICHOMONAS: ABNORMAL
TURBIDITY: ABNORMAL
URINE HGB: NEGATIVE
UROBILINOGEN, URINE: NORMAL
WBC UA: ABNORMAL /HPF (ref 0–4)
YEAST: ABNORMAL

## 2020-12-31 LAB
CULTURE: NORMAL
Lab: NORMAL
SPECIMEN DESCRIPTION: NORMAL

## 2021-01-06 ENCOUNTER — OFFICE VISIT (OUTPATIENT)
Dept: INTERNAL MEDICINE | Age: 86
End: 2021-01-06
Payer: MEDICARE

## 2021-01-06 ENCOUNTER — OFFICE VISIT (OUTPATIENT)
Dept: OPHTHALMOLOGY | Age: 86
End: 2021-01-06
Payer: MEDICARE

## 2021-01-06 VITALS
BODY MASS INDEX: 23.93 KG/M2 | HEIGHT: 64 IN | SYSTOLIC BLOOD PRESSURE: 136 MMHG | WEIGHT: 140.2 LBS | DIASTOLIC BLOOD PRESSURE: 80 MMHG | RESPIRATION RATE: 20 BRPM | TEMPERATURE: 98.3 F | HEART RATE: 62 BPM

## 2021-01-06 DIAGNOSIS — H40.1132 PRIMARY OPEN ANGLE GLAUCOMA (POAG) OF BOTH EYES, MODERATE STAGE: ICD-10-CM

## 2021-01-06 DIAGNOSIS — I25.10 CORONARY ARTERY DISEASE INVOLVING NATIVE HEART WITHOUT ANGINA PECTORIS, UNSPECIFIED VESSEL OR LESION TYPE: Primary | ICD-10-CM

## 2021-01-06 DIAGNOSIS — D03.59 MELANOMA IN SITU OF TORSO EXCLUDING BREAST (HCC): ICD-10-CM

## 2021-01-06 DIAGNOSIS — Z96.1 PSEUDOPHAKIA: Primary | ICD-10-CM

## 2021-01-06 DIAGNOSIS — I48.91 ATRIAL FIBRILLATION, UNSPECIFIED TYPE (HCC): ICD-10-CM

## 2021-01-06 DIAGNOSIS — F03.91 DEMENTIA WITH BEHAVIORAL DISTURBANCE, UNSPECIFIED DEMENTIA TYPE: ICD-10-CM

## 2021-01-06 DIAGNOSIS — H16.223 KERATOCONJUNCTIVITIS SICCA OF BOTH EYES NOT SPECIFIED AS SJOGREN'S: ICD-10-CM

## 2021-01-06 PROCEDURE — 1111F DSCHRG MED/CURRENT MED MERGE: CPT | Performed by: INTERNAL MEDICINE

## 2021-01-06 PROCEDURE — 99214 OFFICE O/P EST MOD 30 MIN: CPT | Performed by: INTERNAL MEDICINE

## 2021-01-06 PROCEDURE — 92012 INTRM OPH EXAM EST PATIENT: CPT | Performed by: OPHTHALMOLOGY

## 2021-01-06 ASSESSMENT — KERATOMETRY
OS_AXISANGLE_DEGREES: 179
OD_AXISANGLE2_DEGREES: 100
METHOD_AUTO_MANUAL: AUTOMATED
OD_K1POWER_DIOPTERS: 42.50
OS_K1POWER_DIOPTERS: 43.00
OD_K2POWER_DIOPTERS: 44.30
OS_AXISANGLE2_DEGREES: 089
OD_AXISANGLE_DEGREES: 010
OS_K2POWER_DIOPTERS: 45.25

## 2021-01-06 ASSESSMENT — VISUAL ACUITY
METHOD: SNELLEN - LINEAR
OD_CC: J2 OU
CORRECTION_TYPE: GLASSES
OS_CC: 20/40
OS_CC+: -1
OD_CC+: -1

## 2021-01-06 ASSESSMENT — PATIENT HEALTH QUESTIONNAIRE - PHQ9
SUM OF ALL RESPONSES TO PHQ QUESTIONS 1-9: 0
2. FEELING DOWN, DEPRESSED OR HOPELESS: 0
SUM OF ALL RESPONSES TO PHQ QUESTIONS 1-9: 0
SUM OF ALL RESPONSES TO PHQ QUESTIONS 1-9: 0

## 2021-01-06 ASSESSMENT — REFRACTION_WEARINGRX
OD_SPHERE: PLANO
OS_CYLINDER: -2.75
OD_AXIS: 060
SPECS_TYPE: PAL
OD_CYLINDER: -1.00
OS_SPHERE: +1.50
OS_AXIS: 068
OD_ADD: +2.75
OS_ADD: +2.75

## 2021-01-06 ASSESSMENT — REFRACTION_MANIFEST
OS_CYLINDER: -2.75
OD_AXIS: 089
OD_CYLINDER: -0.50
OD_SPHERE: -0.50
OS_AXIS: 064
METHOD_AUTOREFRACTION: 1
OS_SPHERE: +1.25

## 2021-01-06 ASSESSMENT — TONOMETRY
OS_IOP_MMHG: 15
IOP_METHOD: TONOPEN
OD_IOP_MMHG: 14

## 2021-01-06 ASSESSMENT — SLIT LAMP EXAM - LIDS: COMMENTS: 2+ DERMATOCHALASIS - UPPER LID

## 2021-01-06 ASSESSMENT — PACHYMETRY
OS_CT(UM): 511
OD_CT(UM): 507

## 2021-01-06 NOTE — PROGRESS NOTES
Carolyn Leon is a 80 y.o. female here for a complete eye exam.      Chief Complaint   Patient presents with    Post-Op Check       HPI     Follow up after cataract surgery  OD:10/08/2020  OS: 8/25/2020  Last saw Dr. Daniel Saavedra 12/9/2020 and got new glasses  Not currently taking any eye drops.           Review of Systems  ROS     Negative for: Constitutional, Neurological, Musculoskeletal, Cardiovascular, Psychiatric          Main Ophthalmology Exam     External Exam       Right Left    External Normal Normal          Slit Lamp Exam       Right Left    Lids/Lashes 2+ Dermatochalasis - upper lid 2+ Dermatochalasis - upper lid, 2+ Trichiasis - lower lid along with iatrogenic madarosis    Conjunctiva/Sclera White and quiet White and quiet    Cornea trauma scar central just temporal to visual axis, 1 unit the next expect with the next patient is here for+ PEE 1+ PEE inferior, tr edmea    Anterior Chamber no cell, no flare Deep and quiet    Iris Round and reactive Round and reactive    Lens PCIOL toric at 20 degrees Posterior chamber intraocular lens, toric at 25, PC folds temporal    Vitreous Posterior vitreous detachment Posterior vitreous detachment          Fundus Exam       Right Left    Disc Normal Normal    C/D Ratio Vertical 0.4 0.35    Macula 1-2 drusenoid changes temporal -- no RPE mottling or heme Normal    Vessels Normal Normal    Periphery Normal Normal                   Tonometry     Tonometry (tonopen, 2:34 PM)       Right Left    Pressure 14 15              Visual Acuity     Visual Acuity (Snellen - Linear)       Right Left    Dist cc 20/60 -1 20/40 -1    Dist ph cc NI NI    Near cc J2 OU     Correction: Glasses               Not recorded          Not recorded          Not recorded        Keratometry     Keratometry (Automated)       K1 Axis K2 Axis    Right 42.50 100 44.30 010    Left 43.00 089 45.25 179                Past Medical History:   Diagnosis Date    Adenomatous polyp 06/10    With recommendation for repeat 06/15. Also, diverticulosis was noted.  Cataracts, bilateral     Combined forms of age-related cataract of both eyes 10/1/2018    Corneal scar, right eye     Cystocele     history of     Dementia ([facility] [address]     Mini-Mental Status exam 27/30 6/14  declines meds at this point,  MMSE 29/30 on June 13, 2017  MMSE 26/30 4/2018    Difficulty controlling anger 9/16/2018    Dyslipidemia     GERD (gastroesophageal reflux disease)     egd  4/15 ok    Goiter     benign, history of     Hiatal hernia     Hypertension     Hypokalemia     Hypothyroidism     Impaired fasting glucose     Malignant melanoma in situ ([facility] [address]     R upper Back 8/17    Migraines     Murmur, functional     Grade 1/6 systolic, history of     Osteopenia     T -1.0 hip, 03/09, T -0.3 spine, 03/09. 1) Repeat DEXA scan 09/12 with T +0.15, T -1.2 at the hip but -1.8 at the mean femoral neck giving her a FRAX score of 4.3 at the hip. Reclast 10/13 and given 2014,2015, and 1/4/2017, on calcium and vit d,  Redo Dexa 10/14 Frax 4.2% , Frax 4.8% 10 yr hip fracture risk on Dexa 1/17  On Reclast    Posterior vitreous detachment of both eyes     Primary open angle glaucoma (POAG) of both eyes, moderate stage 10/1/2018    Sciatica 12/15/2015    Skin cancer     History of multiple skin cancers.  Following with Dr. Kaylan Grant--- invasive squamous cell Ca 8/17 L forearm    Syncope and collapse 1/21/2015    Tremor 12/23/2018    Trichiasis of left lower eyelid without entropion     Visual disturbance 10/26/2017          Current Outpatient Medications:     FLUoxetine (PROZAC) 20 MG capsule, Take 1 capsule by mouth daily, Disp: 90 capsule, Rfl: 1    donepezil (ARICEPT ODT) 5 MG disintegrating tablet, Take 5 mg by mouth nightly, Disp: , Rfl:     atenolol (TENORMIN) 25 MG tablet, Take 1 tablet by mouth daily, Disp: 90 tablet, Rfl: 3    vitamin B-12 (CYANOCOBALAMIN) 1000 MCG tablet, Take 1,000 mcg by mouth daily, Disp: , Rfl:    levothyroxine (SYNTHROID) 137 MCG tablet, TAKE 1 TABLET DAILY, Disp: 90 tablet, Rfl: 3    latanoprost (XALATAN) 0.005 % ophthalmic solution, Place 1 drop into both eyes nightly, Disp: 3 Bottle, Rfl: 3    ammonium lactate (AMLACTIN) 12 % cream, apply to affected area twice a day if needed, Disp: 385 g, Rfl: 3    amLODIPine (NORVASC) 2.5 MG tablet, Take one tablet daily. , Disp: 90 tablet, Rfl: 3    calcium-vitamin D (OSCAL 500/200 D-3) 500-200 MG-UNIT per tablet, Take 1 tablet by mouth daily, Disp: 90 tablet, Rfl: 3    Multiple Vitamin (DAILY MULTIVITAMIN PO), Take 1 tablet by mouth daily , Disp: , Rfl:      No Known Allergies     IMPRESSION:  1. Pseudophakia    2. Primary open angle glaucoma (POAG) of both eyes, moderate stage    3. Keratoconjunctivitis sicca of both eyes not specified as Sjogren's        PLAN:    1. Stabilizing after CE IOL OU. Lens OS is slightly off axis from intended axis. Per below, do not recommend repeat surgery. Patient with evidence of loose zonules in both eyes during cataract surgery. Patient doing well aside from some issues with reading. She is fairly dry. Will try artificial tears. Postoperative drops and reasons to call or return reviewed. Proper follow-up arranged. K scar may limit vision OD. 2.   Primary open angle glaucoma. Continue latanoprost.  Concerned that she is not taking her drops. Agree with Dr. Elizabeth Larry that appearance without stereotypical glaucomatous appearing atrophy. Some thinning on OCT, which is stable. Unknown T-max. 3.  Artificial tears 3-4 times a day OU      Return 2-3 mons f/u ALLIE AR.     Electronically signed by Chery Louis MD on 1/6/2021 at 3:25 PM    (Please note that portions of this note were completed with a voice recognition program. Efforts were made to edit the dictations but occasionally words are mis-transcribed.)

## 2021-01-07 ENCOUNTER — TELEPHONE (OUTPATIENT)
Dept: INTERNAL MEDICINE | Age: 86
End: 2021-01-07

## 2021-01-07 NOTE — TELEPHONE ENCOUNTER
I did ask the  and patient about his and they both stated she is taking all of her medications as directed.

## 2021-01-07 NOTE — TELEPHONE ENCOUNTER
Patients  called and stated the patient is having depression/anxiety. Very weak and has had taya cups all morning. Spoke with the patient and she is feeling uncertain and apprehensive. No thoughts of suicide. Patient stated it just started today.        [facility]

## 2021-01-07 NOTE — TELEPHONE ENCOUNTER
We need to make sure that she is taking her Prozac, because that would treat her depression anxiety.   If she is not then she can just restart it, I can prescribe it if she does not have it

## 2021-01-11 ENCOUNTER — TELEPHONE (OUTPATIENT)
Dept: INTERNAL MEDICINE | Age: 86
End: 2021-01-11
Payer: MEDICARE

## 2021-01-11 DIAGNOSIS — I25.10 ATHEROSCLEROSIS OF NATIVE CORONARY ARTERY OF NATIVE HEART WITHOUT ANGINA PECTORIS: ICD-10-CM

## 2021-01-11 DIAGNOSIS — Z48.812 AFTERCARE FOLLOWING SURGERY OF THE CIRCULATORY SYSTEM, NEC: Primary | ICD-10-CM

## 2021-01-11 DIAGNOSIS — I10 ESSENTIAL HYPERTENSION: ICD-10-CM

## 2021-01-11 DIAGNOSIS — E78.5 HYPERLIPIDEMIA, UNSPECIFIED HYPERLIPIDEMIA TYPE: ICD-10-CM

## 2021-01-11 PROCEDURE — G0180 MD CERTIFICATION HHA PATIENT: HCPCS | Performed by: INTERNAL MEDICINE

## 2021-01-13 RX ORDER — ATENOLOL 25 MG/1
TABLET ORAL
Qty: 90 TABLET | Refills: 3 | Status: SHIPPED | OUTPATIENT
Start: 2021-01-13 | End: 2021-02-01 | Stop reason: SDUPTHER

## 2021-01-20 ENCOUNTER — OFFICE VISIT (OUTPATIENT)
Dept: PODIATRY | Age: 86
End: 2021-01-20
Payer: MEDICARE

## 2021-01-20 ENCOUNTER — PATIENT MESSAGE (OUTPATIENT)
Dept: OPTOMETRY | Age: 86
End: 2021-01-20

## 2021-01-20 VITALS
HEART RATE: 60 BPM | BODY MASS INDEX: 23.41 KG/M2 | WEIGHT: 136.4 LBS | RESPIRATION RATE: 20 BRPM | SYSTOLIC BLOOD PRESSURE: 122 MMHG | DIASTOLIC BLOOD PRESSURE: 62 MMHG

## 2021-01-20 DIAGNOSIS — B35.1 DERMATOPHYTOSIS OF NAIL: ICD-10-CM

## 2021-01-20 DIAGNOSIS — I70.203 ATHEROSCLEROSIS OF NATIVE ARTERY OF BOTH LOWER EXTREMITIES, WITH UNSPECIFIED PRESENCE OF CLINICAL MANIFESTATION (HCC): Primary | ICD-10-CM

## 2021-01-20 DIAGNOSIS — L84 CORNS AND CALLOSITIES: ICD-10-CM

## 2021-01-20 PROCEDURE — 11721 DEBRIDE NAIL 6 OR MORE: CPT | Performed by: PODIATRIST

## 2021-01-20 PROCEDURE — 11055 PARING/CUTG B9 HYPRKER LES 1: CPT | Performed by: PODIATRIST

## 2021-01-20 PROCEDURE — 99999 PR OFFICE/OUTPT VISIT,PROCEDURE ONLY: CPT | Performed by: PODIATRIST

## 2021-01-20 NOTE — PROGRESS NOTES
Subjective:  Martha Virk is a 80 y.o. female who presents to the office today for routine foot care. Currently denies F/C/N/V. No Known Allergies    Past Medical History:   Diagnosis Date    Adenomatous polyp 06/10    With recommendation for repeat 06/15. Also, diverticulosis was noted.  Cataracts, bilateral     Combined forms of age-related cataract of both eyes 10/1/2018    Corneal scar, right eye     Cystocele     history of     Dementia ([facility] [address]     Mini-Mental Status exam 27/30 6/14  declines meds at this point,  MMSE 29/30 on June 13, 2017  MMSE 26/30 4/2018    Difficulty controlling anger 9/16/2018    Dyslipidemia     GERD (gastroesophageal reflux disease)     egd  4/15 ok    Goiter     benign, history of     Hiatal hernia     Hypertension     Hypokalemia     Hypothyroidism     Impaired fasting glucose     Malignant melanoma in situ ([facility] [address]     R upper Back 8/17    Migraines     Murmur, functional     Grade 1/6 systolic, history of     Osteopenia     T -1.0 hip, 03/09, T -0.3 spine, 03/09. 1) Repeat DEXA scan 09/12 with T +0.15, T -1.2 at the hip but -1.8 at the mean femoral neck giving her a FRAX score of 4.3 at the hip. Reclast 10/13 and given 2014,2015, and 1/4/2017, on calcium and vit d,  Redo Dexa 10/14 Frax 4.2% , Frax 4.8% 10 yr hip fracture risk on Dexa 1/17  On Reclast    Posterior vitreous detachment of both eyes     Primary open angle glaucoma (POAG) of both eyes, moderate stage 10/1/2018    Sciatica 12/15/2015    Skin cancer     History of multiple skin cancers. Following with Dr. Oneyda Benavides--- invasive squamous cell Ca 8/17 L forearm    Syncope and collapse 1/21/2015    Tremor 12/23/2018    Trichiasis of left lower eyelid without entropion     Visual disturbance 10/26/2017       Prior to Admission medications    Medication Sig Start Date End Date Taking?  Authorizing Provider   atenolol (TENORMIN) 25 MG tablet TAKE 1 TABLET DAILY 1/13/21  Yes Ki Chance MD FLUoxetine (PROZAC) 20 MG capsule Take 1 capsule by mouth daily 11/10/20  Yes Leeroy Kussmaul, MD   donepezil (ARICEPT ODT) 5 MG disintegrating tablet Take 5 mg by mouth nightly   Yes Historical Provider, MD   vitamin B-12 (CYANOCOBALAMIN) 1000 MCG tablet Take 1,000 mcg by mouth daily   Yes Historical Provider, MD   levothyroxine (SYNTHROID) 137 MCG tablet TAKE 1 TABLET DAILY 7/9/20  Yes Leeroy Kussmaul, MD   latanoprost (XALATAN) 0.005 % ophthalmic solution Place 1 drop into both eyes nightly 6/8/20  Yes Leonarda Cat, OD   ammonium lactate (AMLACTIN) 12 % cream apply to affected area twice a day if needed 4/7/20  Yes Leeroy Kussmaul, MD   amLODIPine (NORVASC) 2.5 MG tablet Take one tablet daily. 3/10/20  Yes Leeroy Kussmaul, MD   calcium-vitamin D (OSCAL 500/200 D-3) 500-200 MG-UNIT per tablet Take 1 tablet by mouth daily 10/1/19  Yes Desmond Lundberg, DO   Multiple Vitamin (DAILY MULTIVITAMIN PO) Take 1 tablet by mouth daily    Yes Historical Provider, MD   ibuprofen (ADVIL;MOTRIN) 600 MG tablet Take 1 tablet by mouth 3 times daily as needed for Pain (Take with food.) 12/27/20 12/27/20  Lisa Bland MD       Past Surgical History:   Procedure Laterality Date    APPENDECTOMY      CHOLECYSTECTOMY      Remote.  COLONOSCOPY  06/08/10    COLONOSCOPY  07/18/02    CYSTOCELE REPAIR  02/12/99    Vaginal prolapse, cystocele plus pelvic relaxation.  DILATION AND CURETTAGE OF UTERUS      with hysteroscopy   Mary Lou    still has ovaries     INTRACAPSULAR CATARACT EXTRACTION Left 8/25/2020    Left Cataract Extraction w/ IOL Implant performed by Telly Calabrese MD at [place] [address] Right 10/8/2020    Right Cataract Extraction w/ IOL Implant performed by Telly Calabrese MD at [address] [place] [address]  10/07/09    [facility], left nasal sidewall.  OTHER SURGICAL HISTORY  11/11/08    Anterior repair.      OTHER SURGICAL HISTORY  1988    laser cone    UPPER GASTROINTESTINAL ENDOSCOPY dystrophic and crumbly, discolored with subungual debris. Fissures absent, Bilateral. Hyperkeratotic tissue is present. Sub R 4th toe, not returned sub R 5th met head    Asessment: Patient is a 80 y.o. female with:    Diagnosis Orders   1. Atherosclerosis of native artery of both lower extremities, with unspecified presence of clinical manifestation ([address]     2. Dermatophytosis of nail     3. Corns and callosities         Plan: Patient examined and evaluated. Current condition and treatment options discussed in detail. All nails as mentioned above debrided in length and thickness. Paring of 1 benign hyperkeratotic lesions (as listed above) took place with #10 blade or tissue nippers. Patient advised OTC methods for treatment of the mycotic nails. Patient will follow up in 10 weeks.

## 2021-01-20 NOTE — TELEPHONE ENCOUNTER
From: Florentino Santillan  To: Oliva Jhaveri OD  Sent: 1/20/2021 1:29 PM EST  Subject: Prescription Question    Please remove latanoprost . No longer needed.

## 2021-01-20 NOTE — PROGRESS NOTES
Foot Care Worksheet  PCP: Oscar Mail   Last visit: 1 / 06 / 2021    Nail description:  Thick , Yellow , Crumbly , Marked limitation of ambulation     Pain resulting from thickened and dystrophy of nail plate Yes    Nails involved  Right   1, 2, 3, 4, 5  (T5-T9)  Left     1, 2, 3, 4, 5  (TA-T4)    Q7 1 Class A Finding - Non traumatic amputation of foot No    Q8 2 Class B Findings - Absent DP pulse No, Absent PT pulse No, Advanced tropic changes (3 required) Yes    Decrease hair growth Yes, Nail changes/thickening Yes, Pigmented changes/discoloration No, Skin texture (thin, shiny) No, Skin color (rubor/redness) No    Q9 1 Class B and 2 Class C Findings  Claudication No, Temperature change Yes, Paresthesia No, Burning No, Edema Yes

## 2021-01-21 DIAGNOSIS — I10 ESSENTIAL HYPERTENSION: ICD-10-CM

## 2021-01-21 DIAGNOSIS — E03.4 HYPOTHYROIDISM DUE TO ACQUIRED ATROPHY OF THYROID: ICD-10-CM

## 2021-01-21 DIAGNOSIS — R41.0 CONFUSION: Primary | ICD-10-CM

## 2021-01-22 ENCOUNTER — HOSPITAL ENCOUNTER (OUTPATIENT)
Dept: LAB | Age: 86
Discharge: HOME | End: 2021-01-22
Payer: MEDICARE

## 2021-01-22 DIAGNOSIS — R41.0 CONFUSION: ICD-10-CM

## 2021-01-22 DIAGNOSIS — I10 ESSENTIAL HYPERTENSION: ICD-10-CM

## 2021-01-22 DIAGNOSIS — E03.4 HYPOTHYROIDISM DUE TO ACQUIRED ATROPHY OF THYROID: ICD-10-CM

## 2021-01-22 LAB
-: ABNORMAL
ABSOLUTE EOS #: 0.08 K/UL (ref 0–0.44)
ABSOLUTE IMMATURE GRANULOCYTE: 0.04 K/UL (ref 0–0.3)
ABSOLUTE LYMPH #: 1.23 K/UL (ref 1.1–3.7)
ABSOLUTE MONO #: 0.77 K/UL (ref 0.1–1.2)
ALBUMIN SERPL-MCNC: 4.1 G/DL (ref 3.5–5.2)
ALBUMIN/GLOBULIN RATIO: 1.3 (ref 1–2.5)
ALP BLD-CCNC: 71 U/L (ref 35–104)
ALT SERPL-CCNC: 9 U/L (ref 5–33)
AMORPHOUS: ABNORMAL
ANION GAP SERPL CALCULATED.3IONS-SCNC: 8 MMOL/L (ref 9–17)
AST SERPL-CCNC: 22 U/L
BACTERIA: ABNORMAL
BASOPHILS # BLD: 1 % (ref 0–2)
BASOPHILS ABSOLUTE: 0.05 K/UL (ref 0–0.2)
BILIRUB SERPL-MCNC: 0.51 MG/DL (ref 0.3–1.2)
BILIRUBIN URINE: NEGATIVE
BUN BLDV-MCNC: 12 MG/DL (ref 8–23)
BUN/CREAT BLD: 18 (ref 9–20)
CALCIUM SERPL-MCNC: 9.5 MG/DL (ref 8.6–10.4)
CASTS UA: ABNORMAL /LPF (ref 0–2)
CHLORIDE BLD-SCNC: 99 MMOL/L (ref 98–107)
CO2: 31 MMOL/L (ref 20–31)
COLOR: ABNORMAL
COMMENT UA: ABNORMAL
CREAT SERPL-MCNC: 0.66 MG/DL (ref 0.5–0.9)
CRYSTALS, UA: ABNORMAL /HPF
DIFFERENTIAL TYPE: ABNORMAL
EOSINOPHILS RELATIVE PERCENT: 1 % (ref 1–4)
EPITHELIAL CELLS UA: ABNORMAL /HPF (ref 0–5)
GFR AFRICAN AMERICAN: >60 ML/MIN
GFR NON-AFRICAN AMERICAN: >60 ML/MIN
GFR SERPL CREATININE-BSD FRML MDRD: ABNORMAL ML/MIN/{1.73_M2}
GFR SERPL CREATININE-BSD FRML MDRD: ABNORMAL ML/MIN/{1.73_M2}
GLUCOSE BLD-MCNC: 184 MG/DL (ref 70–99)
GLUCOSE URINE: NEGATIVE
HCT VFR BLD CALC: 42.3 % (ref 36.3–47.1)
HEMOGLOBIN: 12.9 G/DL (ref 11.9–15.1)
IMMATURE GRANULOCYTES: 1 %
KETONES, URINE: NEGATIVE
LEUKOCYTE ESTERASE, URINE: ABNORMAL
LYMPHOCYTES # BLD: 16 % (ref 24–43)
MCH RBC QN AUTO: 30.3 PG (ref 25.2–33.5)
MCHC RBC AUTO-ENTMCNC: 30.5 G/DL (ref 25.2–33.5)
MCV RBC AUTO: 99.3 FL (ref 82.6–102.9)
MONOCYTES # BLD: 10 % (ref 3–12)
MUCUS: ABNORMAL
NITRITE, URINE: NEGATIVE
NRBC AUTOMATED: 0 PER 100 WBC
OTHER OBSERVATIONS UA: ABNORMAL
PDW BLD-RTO: 14.5 % (ref 11.8–14.4)
PH UA: 6 (ref 5–6)
PLATELET # BLD: 253 K/UL (ref 138–453)
PLATELET ESTIMATE: ABNORMAL
PMV BLD AUTO: 10.7 FL (ref 8.1–13.5)
POTASSIUM SERPL-SCNC: 3.6 MMOL/L (ref 3.7–5.3)
PROTEIN UA: NEGATIVE
RBC # BLD: 4.26 M/UL (ref 3.95–5.11)
RBC # BLD: ABNORMAL 10*6/UL
RBC UA: ABNORMAL /HPF (ref 0–4)
RENAL EPITHELIAL, UA: ABNORMAL /HPF
SEG NEUTROPHILS: 71 % (ref 36–65)
SEGMENTED NEUTROPHILS ABSOLUTE COUNT: 5.79 K/UL (ref 1.5–8.1)
SODIUM BLD-SCNC: 138 MMOL/L (ref 135–144)
SPECIFIC GRAVITY UA: 1.01 (ref 1.01–1.02)
THYROXINE, FREE: 1.94 NG/DL (ref 0.93–1.7)
TOTAL PROTEIN: 7.3 G/DL (ref 6.4–8.3)
TRICHOMONAS: ABNORMAL
TSH SERPL DL<=0.05 MIU/L-ACNC: 2.95 MIU/L (ref 0.3–5)
TURBIDITY: ABNORMAL
URINE HGB: ABNORMAL
UROBILINOGEN, URINE: NORMAL
WBC # BLD: 8 K/UL (ref 3.5–11.3)
WBC # BLD: ABNORMAL 10*3/UL
WBC UA: ABNORMAL /HPF (ref 0–4)
YEAST: ABNORMAL

## 2021-01-22 PROCEDURE — 84439 ASSAY OF FREE THYROXINE: CPT

## 2021-01-22 PROCEDURE — 85025 COMPLETE CBC W/AUTO DIFF WBC: CPT

## 2021-01-22 PROCEDURE — 81001 URINALYSIS AUTO W/SCOPE: CPT

## 2021-01-22 PROCEDURE — 36415 COLL VENOUS BLD VENIPUNCTURE: CPT

## 2021-01-22 PROCEDURE — 84443 ASSAY THYROID STIM HORMONE: CPT

## 2021-01-22 PROCEDURE — 80053 COMPREHEN METABOLIC PANEL: CPT

## 2021-01-25 RX ORDER — NITROFURANTOIN 25; 75 MG/1; MG/1
100 CAPSULE ORAL 2 TIMES DAILY
Qty: 14 CAPSULE | Refills: 0 | Status: SHIPPED | OUTPATIENT
Start: 2021-01-25 | End: 2021-02-01

## 2021-01-25 RX ORDER — OLANZAPINE 2.5 MG/1
2.5 TABLET ORAL NIGHTLY
Qty: 30 TABLET | Refills: 3 | Status: SHIPPED | OUTPATIENT
Start: 2021-01-25 | End: 2021-04-06 | Stop reason: SDUPTHER

## 2021-01-29 ENCOUNTER — OFFICE VISIT (OUTPATIENT)
Dept: CARDIOLOGY | Age: 86
End: 2021-01-29
Payer: MEDICARE

## 2021-01-29 VITALS
HEART RATE: 63 BPM | WEIGHT: 139 LBS | HEIGHT: 64 IN | BODY MASS INDEX: 23.73 KG/M2 | SYSTOLIC BLOOD PRESSURE: 138 MMHG | DIASTOLIC BLOOD PRESSURE: 65 MMHG

## 2021-01-29 DIAGNOSIS — E78.5 DYSLIPIDEMIA: ICD-10-CM

## 2021-01-29 DIAGNOSIS — I10 ESSENTIAL HYPERTENSION: ICD-10-CM

## 2021-01-29 DIAGNOSIS — I25.10 CORONARY ARTERY DISEASE INVOLVING NATIVE CORONARY ARTERY OF NATIVE HEART WITHOUT ANGINA PECTORIS: Primary | ICD-10-CM

## 2021-01-29 PROCEDURE — 93005 ELECTROCARDIOGRAM TRACING: CPT | Performed by: INTERNAL MEDICINE

## 2021-01-29 PROCEDURE — 93010 ELECTROCARDIOGRAM REPORT: CPT | Performed by: INTERNAL MEDICINE

## 2021-01-29 PROCEDURE — 99214 OFFICE O/P EST MOD 30 MIN: CPT | Performed by: INTERNAL MEDICINE

## 2021-01-29 PROCEDURE — 99204 OFFICE O/P NEW MOD 45 MIN: CPT | Performed by: INTERNAL MEDICINE

## 2021-01-29 RX ORDER — TICAGRELOR 90 MG/1
1 TABLET ORAL 2 TIMES DAILY
COMMUNITY
Start: 2020-12-22 | End: 2021-02-01 | Stop reason: SDUPTHER

## 2021-01-29 RX ORDER — NITROGLYCERIN 0.4 MG/1
0.4 TABLET SUBLINGUAL EVERY 5 MIN PRN
COMMUNITY
End: 2022-09-06

## 2021-01-29 RX ORDER — ASPIRIN 81 MG/1
1 TABLET ORAL DAILY
COMMUNITY
Start: 2020-12-21

## 2021-01-29 RX ORDER — ATORVASTATIN CALCIUM 40 MG/1
1 TABLET, FILM COATED ORAL DAILY
COMMUNITY
Start: 2020-12-22 | End: 2021-02-01 | Stop reason: SDUPTHER

## 2021-01-29 ASSESSMENT — ENCOUNTER SYMPTOMS
SHORTNESS OF BREATH: 0
CHEST TIGHTNESS: 0
VOMITING: 0
EYE DISCHARGE: 0
BACK PAIN: 0
ABDOMINAL PAIN: 0
NAUSEA: 0

## 2021-01-29 NOTE — PROGRESS NOTES
Today's Date: 1/29/2021  Patient's Name: Dewey Cyr  Patient's age: 80 y.o., 3/21/1930    Subjective: The patient is a 80y.o. year old, , female is in the office for CAD. Had recent STEMI with ALLIE of RCA on 12/2020. Feels very good with no chest pain or SOB, no dizziness or syncope and no palpitations. Past Medical History:   has a past medical history of Adenomatous polyp, Cataracts, bilateral, Combined forms of age-related cataract of both eyes, Corneal scar, right eye, Cystocele, Dementia ([address] Difficulty controlling anger, Dyslipidemia, GERD (gastroesophageal reflux disease), Goiter, Hiatal hernia, Hypertension, Hypokalemia, Hypothyroidism, Impaired fasting glucose, Malignant melanoma in situ ([address] Migraines, Murmur, functional, Osteopenia, Posterior vitreous detachment of both eyes, Primary open angle glaucoma (POAG) of both eyes, moderate stage, Sciatica, Skin cancer, Syncope and collapse, Tremor, 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 Past Surgical History:   has a past surgical history that includes other surgical history (11/11/08); Cholecystectomy; Colonoscopy (06/08/10); Mohs surgery (10/07/09); Vein Surgery (06/08/09); Colonoscopy (07/18/02); Cystocele repair (02/12/99); Appendectomy; Dilation and curettage of uterus; other surgical history (1988); Hysterectomy (1995); Upper gastrointestinal endoscopy (4/8/2015); Intracapsular cataract extraction (Left, 8/25/2020); and Intracapsular cataract extraction (Right, 10/8/2020). Home Medications:  Prior to Admission medications    Medication Sig Start Date End Date Taking?  Authorizing Provider   OLANZapine (ZYPREXA) 2.5 MG tablet Take 1 tablet by mouth nightly 1/25/21   Poonam Galo MD   nitrofurantoin, macrocrystal-monohydrate, (MACROBID) 100 MG capsule Take 1 capsule by mouth 2 times daily for 7 days 1/25/21 2/1/21  Poonam Galo MD   atenolol (TENORMIN) 25 MG tablet TAKE 1 TABLET DAILY 1/13/21   Alejandro Cabral MD   ibuprofen (ADVIL;MOTRIN) 600 MG tablet Take 1 tablet by mouth 3 times daily as needed for Pain (Take with food.) 12/27/20 12/27/20  Suzi Sol MD   FLUoxetine (PROZAC) 20 MG capsule Take 1 capsule by mouth daily 11/10/20   Alejandro Cabral MD   donepezil (ARICEPT ODT) 5 MG disintegrating tablet Take 5 mg by mouth nightly    Historical Provider, MD   vitamin B-12 (CYANOCOBALAMIN) 1000 MCG tablet Take 1,000 mcg by mouth daily    Historical Provider, MD   levothyroxine (SYNTHROID) 137 MCG tablet TAKE 1 TABLET DAILY 7/9/20   Alejandro Cabral MD   ammonium lactate (AMLACTIN) 12 % cream apply to affected area twice a day if needed 4/7/20   Alejandro Cabral MD   amLODIPine (NORVASC) 2.5 MG tablet Take one tablet daily. 3/10/20   Alejandro Cabral MD   calcium-vitamin D (OSCAL 500/200 D-3) 500-200 MG-UNIT per tablet Take 1 tablet by mouth daily 10/1/19   Cephus Rock Toño, DO   Multiple Vitamin (DAILY MULTIVITAMIN PO) Take 1 tablet by mouth daily     Historical Provider, MD       Allergies:  Patient has no known allergies. Social History:   reports that she has never smoked. She has never used smokeless tobacco. She reports current alcohol use. She reports that she does not use drugs. Review of Systems:  Review of Systems   Constitutional: Negative for chills, fatigue and fever. HENT: Negative for congestion and dental problem. Eyes: Negative for discharge. Respiratory: Negative for chest tightness and shortness of breath. Cardiovascular: Negative for chest pain and palpitations. Gastrointestinal: Negative for abdominal pain, nausea and vomiting. Endocrine: Negative for cold intolerance and heat intolerance. Genitourinary: Negative for difficulty urinating and dyspareunia. Musculoskeletal: Negative for arthralgias and back pain. Neurological: Negative for dizziness and facial asymmetry. Psychiatric/Behavioral: Negative for agitation and behavioral problems.        Physical Exam:  LMP  (LMP PVD (posterior vitreous detachment), both eyes    Combined forms of age-related cataract of both eyes    Dementia with behavioral disturbance (HCC)    Tremor    Essential tremor    Dizziness    Chronic cerebral ischemia    Acquired cerebral atrophy (HCC)    Cerebral infarction ([facility] [address]    Anxiety    Atrial fibrillation (HCC)       Assessment/Plan:  1. STEMI on 12/22020. ALLIE of proximal and distal RCA. Mild to moderate disease of other arteries. PReserved LV systolic function. Echo 12/2020 EF 45-50%. Mild to moderate TR. Moderate MR.  RVSP 46 mm Hg. Stable, continue current medications. 2. HPL. On Statin. Will follow on Lipid profile. 3. PAF at time of STEMI. Has been in [facility]. 4. Essential hypertension. Well controlled. 5. Hypothyroidism.  Followed by PCP        Damian Cueva MD  [facility]           [phone]

## 2021-02-01 ENCOUNTER — TELEPHONE (OUTPATIENT)
Dept: CARDIOLOGY | Age: 86
End: 2021-02-01

## 2021-02-01 ENCOUNTER — TELEPHONE (OUTPATIENT)
Dept: INTERNAL MEDICINE | Age: 86
End: 2021-02-01

## 2021-02-01 RX ORDER — ATORVASTATIN CALCIUM 40 MG/1
40 TABLET, FILM COATED ORAL DAILY
Qty: 90 TABLET | Refills: 3 | Status: SHIPPED | OUTPATIENT
Start: 2021-02-01 | End: 2021-02-01 | Stop reason: SDUPTHER

## 2021-02-01 RX ORDER — TICAGRELOR 90 MG/1
90 TABLET ORAL 2 TIMES DAILY
Qty: 60 TABLET | Refills: 0 | Status: SHIPPED | OUTPATIENT
Start: 2021-02-01 | End: 2022-04-20 | Stop reason: SDUPTHER

## 2021-02-01 RX ORDER — ATENOLOL 25 MG/1
25 TABLET ORAL DAILY
Qty: 90 TABLET | Refills: 3 | Status: SHIPPED | OUTPATIENT
Start: 2021-02-01 | End: 2022-01-10

## 2021-02-01 RX ORDER — ATORVASTATIN CALCIUM 40 MG/1
40 TABLET, FILM COATED ORAL DAILY
Qty: 30 TABLET | Refills: 0 | Status: SHIPPED | OUTPATIENT
Start: 2021-02-01 | End: 2021-02-25 | Stop reason: SDUPTHER

## 2021-02-01 RX ORDER — ATORVASTATIN CALCIUM 40 MG/1
40 TABLET, FILM COATED ORAL DAILY
Qty: 30 TABLET | Refills: 0 | Status: SHIPPED | OUTPATIENT
Start: 2021-02-01 | End: 2021-03-30

## 2021-02-01 RX ORDER — ATENOLOL 25 MG/1
25 TABLET ORAL DAILY
Qty: 30 TABLET | Refills: 0 | Status: SHIPPED | OUTPATIENT
Start: 2021-02-01 | End: 2021-02-08 | Stop reason: SDUPTHER

## 2021-02-01 RX ORDER — ATORVASTATIN CALCIUM 40 MG/1
40 TABLET, FILM COATED ORAL DAILY
Qty: 90 TABLET | Refills: 3 | Status: SHIPPED | OUTPATIENT
Start: 2021-02-01

## 2021-02-01 NOTE — TELEPHONE ENCOUNTER
Pt daughter called to ask for refill on Brilinta that the pt has been out of for a couple days. Please send to [facility] for a one month and then full script to [facility].     Mickey Ruiz- daughter [phone]    Last Appt:  1/29/2021  Next Appt:   7/30/2021  Med verified in [address] [place] [address] Rd

## 2021-02-01 NOTE — TELEPHONE ENCOUNTER
Pt's daughter called wondering if there is a cheaper alternative for the patient's Brilinta? Daughter stated that the patient is on a fixed income and that the [facility] is too expensive for her.      Last Appt:  1/29/2021  Next Appt:   7/30/2021  Med verified in [address] [place] [address] Rd

## 2021-02-01 NOTE — TELEPHONE ENCOUNTER
[facility] didn't get the script for the atenolol it and atorvastatin pended for [facility] and she needs 30 day supply to [facility]. It was the cardiologist in [place] that told her to double up on the Atenolol. On 12/29/2020. Daughter is okay with her going back on the 25mg.

## 2021-02-01 NOTE — TELEPHONE ENCOUNTER
Patient needs short term sent to [facility] until her [facility] order arrives.

## 2021-02-01 NOTE — PROGRESS NOTES
Post-Discharge Transitional Care Management Services or Hospital Follow Up      Karlee Yip   YOB: 1930    Date of Office Visit:  1/6/2021  Date of Hospital Admission: 12/27/20  Date of Hospital Discharge: 12/27/20  Readmission Risk Score(high >=14%. Medium >=10%):No data recorded    Care management risk score Rising risk (score 2-5) and Complex Care (Scores >=6): 5     Non face to face  following discharge, date last encounter closed (first attempt may have been earlier): *No documented post hospital discharge outreach found in the last 14 days *No documented post hospital discharge outreach found in the last 14 days    Call initiated 2 business days of discharge: *No response recorded in the last 14 days     Patient Active Problem List   Diagnosis    Dyslipidemia    Osteopenia    Impaired fasting glucose    Adenomatous polyp    Essential hypertension    Hypothyroidism    Sciatica    GERD (gastroesophageal reflux disease)    Visual disturbance    Malignant melanoma in situ ([address]    Difficulty controlling anger    Primary open angle glaucoma (POAG) of both eyes, moderate stage    PVD (posterior vitreous detachment), both eyes    Combined forms of age-related cataract of both eyes    Dementia with behavioral disturbance ([address]    Tremor    Essential tremor    Dizziness    Chronic cerebral ischemia    Acquired cerebral atrophy ([address]    Cerebral infarction ([address]    Anxiety    Atrial fibrillation ([address]       No Known Allergies    Medications listed as ordered at the time of discharge from hospital   Jose Francisco Saldivar   [employer] Medication Instructions RTD:[id number]    Printed on:02/01/21 0856   Medication Information                      amLODIPine (NORVASC) 2.5 MG tablet  Take one tablet daily.              ammonium lactate (AMLACTIN) 12 % cream  apply to affected area twice a day if needed             aspirin 81 MG EC tablet  Take 1 tablet by mouth daily atenolol (TENORMIN) 25 MG tablet  TAKE 1 TABLET DAILY             atorvastatin (LIPITOR) 40 MG tablet  Take 1 tablet by mouth daily             BRILINTA 90 MG TABS tablet  Take 1 tablet by mouth 2 times daily             calcium-vitamin D (OSCAL 500/200 D-3) 500-200 MG-UNIT per tablet  Take 1 tablet by mouth daily             donepezil (ARICEPT ODT) 5 MG disintegrating tablet  Take 5 mg by mouth nightly             FLUoxetine (PROZAC) 20 MG capsule  Take 1 capsule by mouth daily             levothyroxine (SYNTHROID) 137 MCG tablet  TAKE 1 TABLET DAILY             Multiple Vitamin (DAILY MULTIVITAMIN PO)  Take 1 tablet by mouth daily              nitrofurantoin, macrocrystal-monohydrate, (MACROBID) 100 MG capsule  Take 1 capsule by mouth 2 times daily for 7 days             nitroGLYCERIN (NITROSTAT) 0.4 MG SL tablet  Place 0.4 mg under the tongue every 5 minutes as needed for Chest pain up to max of 3 total doses. If no relief after 3 dose, call 911. OLANZapine (ZYPREXA) 2.5 MG tablet  Take 1 tablet by mouth nightly             vitamin B-12 (CYANOCOBALAMIN) 1000 MCG tablet  Take 1,000 mcg by mouth daily                   Medications marked \"taking\" at this time  Outpatient Medications Marked as Taking for the 1/6/21 encounter (Office Visit) with Alejandro Cabral MD   Medication Sig Dispense Refill    FLUoxetine (PROZAC) 20 MG capsule Take 1 capsule by mouth daily 90 capsule 1    donepezil (ARICEPT ODT) 5 MG disintegrating tablet Take 5 mg by mouth nightly      [DISCONTINUED] atenolol (TENORMIN) 25 MG tablet Take 1 tablet by mouth daily 90 tablet 3    vitamin B-12 (CYANOCOBALAMIN) 1000 MCG tablet Take 1,000 mcg by mouth daily      levothyroxine (SYNTHROID) 137 MCG tablet TAKE 1 TABLET DAILY 90 tablet 3    ammonium lactate (AMLACTIN) 12 % cream apply to affected area twice a day if needed 385 g 3    amLODIPine (NORVASC) 2.5 MG tablet Take one tablet daily.  90 tablet 3    calcium-vitamin D (OSCAL 500/200 D-3) 500-200 MG-UNIT per tablet Take 1 tablet by mouth daily 90 tablet 3    Multiple Vitamin (DAILY MULTIVITAMIN PO) Take 1 tablet by mouth daily           Medications patient taking as of now reconciled against medications ordered at time of hospital discharge: Yes    Chief Complaint   Patient presents with    Follow-Up from Hospital     chest pain on 12/27/20 /transferred to [address] [place] [address] 759 Hypertension    Hypothyroidism       HPI    Inpatient course: Discharge summary reviewed- see chart. Interval history/Current status: Was treated in [facility] for STEMI, underwent a cath which showed 99 disease in the RCA and had 2 stents placed in the RCA. Says that she feels better now, denies chest pain, shortness of breath, palpitations. Review of Systems    Vitals:    01/06/21 1336 01/06/21 1347   BP: (!) 140/70 136/80   Site: Left Upper Arm    Position: Sitting    Cuff Size: Medium Adult    Pulse: 62    Resp: 20    Temp: 98.3 °F (36.8 °C)    Weight: 140 lb 3.2 oz (63.6 kg)    Height: 5' 4\" (1.626 m)      Body mass index is 24.07 kg/m². Wt Readings from Last 3 Encounters:   01/29/21 139 lb (63 kg)   01/20/21 136 lb 6.4 oz (61.9 kg)   01/06/21 140 lb 3.2 oz (63.6 kg)     BP Readings from Last 3 Encounters:   01/29/21 138/65   01/20/21 122/62   01/06/21 136/80       Physical Exam        Assessment/Plan:  1. Dementia with behavioral disturbance, unspecified dementia type ([facility]  We will continue to monitor. Unsure that she is able to take her medications, however, it seems that they still want to be in assisted living rather than a nursing home type of environment. 2. Atrial fibrillation, unspecified type (HCC)  Regular rate and rhythm at this time. Will refer to cardiology for CAD as above    3. Melanoma in situ of torso excluding breast ([address]  Asymptomatic at this time    4.  Coronary artery disease involving native heart without angina pectoris, unspecified vessel or lesion type  Underwent cath in

## 2021-02-05 NOTE — TELEPHONE ENCOUNTER
I spoke to pt. She lives in a [place] [address] at [facility]. Then I spoke to Daughter Jennifer Ruiz. She asked to be called first.  She has since discovered that $275.00 was part of deductible this year. Brilinta is now $75 for 3 months, which is good for them. No need for samples.

## 2021-02-08 ENCOUNTER — TELEPHONE (OUTPATIENT)
Dept: INTERNAL MEDICINE | Age: 86
End: 2021-02-08
Payer: MEDICARE

## 2021-02-08 DIAGNOSIS — I10 ESSENTIAL HYPERTENSION: ICD-10-CM

## 2021-02-08 DIAGNOSIS — R53.1 WEAKNESS: ICD-10-CM

## 2021-02-08 DIAGNOSIS — E03.4 HYPOTHYROIDISM DUE TO ACQUIRED ATROPHY OF THYROID: ICD-10-CM

## 2021-02-08 DIAGNOSIS — F03.91 DEMENTIA WITH BEHAVIORAL DISTURBANCE, UNSPECIFIED DEMENTIA TYPE: Primary | ICD-10-CM

## 2021-02-08 PROCEDURE — G0180 MD CERTIFICATION HHA PATIENT: HCPCS | Performed by: INTERNAL MEDICINE

## 2021-02-24 ENCOUNTER — TELEPHONE (OUTPATIENT)
Dept: INTERNAL MEDICINE | Age: 86
End: 2021-02-24
Payer: MEDICARE

## 2021-02-24 DIAGNOSIS — Z48.812 AFTERCARE FOLLOWING SURGERY OF THE CIRCULATORY SYSTEM, NEC: Primary | ICD-10-CM

## 2021-02-24 DIAGNOSIS — E78.5 HYPERLIPIDEMIA, UNSPECIFIED HYPERLIPIDEMIA TYPE: ICD-10-CM

## 2021-02-24 DIAGNOSIS — I10 HYPERTENSION, ESSENTIAL: ICD-10-CM

## 2021-02-24 DIAGNOSIS — I25.10 ATHEROSCLEROSIS OF NATIVE CORONARY ARTERY OF NATIVE HEART WITHOUT ANGINA PECTORIS: ICD-10-CM

## 2021-02-24 PROCEDURE — G0179 MD RECERTIFICATION HHA PT: HCPCS | Performed by: INTERNAL MEDICINE

## 2021-02-25 RX ORDER — ACETAMINOPHEN 325 MG/1
650 TABLET ORAL 2 TIMES DAILY PRN
COMMUNITY
End: 2021-08-04

## 2021-02-25 RX ORDER — LOSARTAN POTASSIUM 100 MG/1
100 TABLET ORAL DAILY
COMMUNITY
End: 2021-04-06 | Stop reason: SDUPTHER

## 2021-03-01 RX ORDER — FLUOXETINE HYDROCHLORIDE 20 MG/1
20 CAPSULE ORAL DAILY
Qty: 90 CAPSULE | Refills: 3 | Status: SHIPPED | OUTPATIENT
Start: 2021-03-01 | End: 2021-06-03

## 2021-03-01 NOTE — TELEPHONE ENCOUNTER
Patients daughter called in requesting a refill on Prozac sent to [facility]     Medication pended if agreeable.     Last Appt:  1/6/2021  Next Appt:   3/9/2021  Med verified in [address] [place] [address] Rd

## 2021-03-03 ENCOUNTER — TELEPHONE (OUTPATIENT)
Dept: INTERNAL MEDICINE | Age: 86
End: 2021-03-03

## 2021-03-03 NOTE — TELEPHONE ENCOUNTER
Patients daughter informed and voices understanding, states patient has not been taking losartan so she would like it removed from medication list.

## 2021-03-03 NOTE — TELEPHONE ENCOUNTER
No need for blood work because we did blood work recently. Regarding losartan, she should just continue what she is doing right now, we will need to check her blood pressure during her next appointment and then we can decide. She should continue with if she has been already taking it, and should not start it if she has not been taking it.

## 2021-03-03 NOTE — TELEPHONE ENCOUNTER
Daughter called wanting to know if Dr. Yajaira Helms wants patient to have labs done before upcoming appt. If so they would like to come today to do those today. Please advise. They are also unsure if patient should be taking losartan they are aware it is not on her medication list but was on it previously they believe from a doctor in [address] [place] [address] and are unsure if she needs to continue it please adivse.    [facility]

## 2021-03-05 DIAGNOSIS — I10 ESSENTIAL HYPERTENSION: ICD-10-CM

## 2021-03-06 RX ORDER — AMLODIPINE BESYLATE 2.5 MG/1
TABLET ORAL
Qty: 90 TABLET | Refills: 3 | Status: ON HOLD | OUTPATIENT
Start: 2021-03-06 | End: 2021-11-04 | Stop reason: ALTCHOICE

## 2021-03-09 ENCOUNTER — OFFICE VISIT (OUTPATIENT)
Dept: INTERNAL MEDICINE | Age: 86
End: 2021-03-09
Payer: MEDICARE

## 2021-03-09 VITALS
WEIGHT: 142.3 LBS | BODY MASS INDEX: 24.3 KG/M2 | HEIGHT: 64 IN | TEMPERATURE: 97.8 F | RESPIRATION RATE: 18 BRPM | HEART RATE: 60 BPM | SYSTOLIC BLOOD PRESSURE: 138 MMHG | DIASTOLIC BLOOD PRESSURE: 68 MMHG

## 2021-03-09 DIAGNOSIS — Z00.00 ROUTINE GENERAL MEDICAL EXAMINATION AT A HEALTH CARE FACILITY: Primary | ICD-10-CM

## 2021-03-09 DIAGNOSIS — E78.5 HYPERLIPIDEMIA, UNSPECIFIED HYPERLIPIDEMIA TYPE: ICD-10-CM

## 2021-03-09 DIAGNOSIS — I10 ESSENTIAL HYPERTENSION: ICD-10-CM

## 2021-03-09 PROCEDURE — 99211 OFF/OP EST MAY X REQ PHY/QHP: CPT

## 2021-03-09 PROCEDURE — G0439 PPPS, SUBSEQ VISIT: HCPCS | Performed by: INTERNAL MEDICINE

## 2021-03-09 ASSESSMENT — LIFESTYLE VARIABLES
HAVE YOU OR SOMEONE ELSE BEEN INJURED AS A RESULT OF YOUR DRINKING: 0
HOW OFTEN DURING THE LAST YEAR HAVE YOU FAILED TO DO WHAT WAS NORMALLY EXPECTED FROM YOU BECAUSE OF DRINKING: 0
AUDIT TOTAL SCORE: 1
HOW MANY STANDARD DRINKS CONTAINING ALCOHOL DO YOU HAVE ON A TYPICAL DAY: 0
HAS A RELATIVE, FRIEND, DOCTOR, OR ANOTHER HEALTH PROFESSIONAL EXPRESSED CONCERN ABOUT YOUR DRINKING OR SUGGESTED YOU CUT DOWN: 0
HOW OFTEN DURING THE LAST YEAR HAVE YOU HAD A FEELING OF GUILT OR REMORSE AFTER DRINKING: 0
HOW OFTEN DURING THE LAST YEAR HAVE YOU NEEDED AN ALCOHOLIC DRINK FIRST THING IN THE MORNING TO GET YOURSELF GOING AFTER A NIGHT OF HEAVY DRINKING: 0

## 2021-03-09 ASSESSMENT — PATIENT HEALTH QUESTIONNAIRE - PHQ9
SUM OF ALL RESPONSES TO PHQ QUESTIONS 1-9: 0
1. LITTLE INTEREST OR PLEASURE IN DOING THINGS: 0

## 2021-03-09 NOTE — PROGRESS NOTES
tablet TAKE 1 TABLET DAILY  James Carvajal MD   ammonium lactate (AMLACTIN) 12 % cream apply to affected area twice a day if needed  James Carvajal MD   calcium-vitamin D (OSCAL 500/200 D-3) 500-200 MG-UNIT per tablet Take 1 tablet by mouth daily  Desmond Lundberg,    Multiple Vitamin (DAILY MULTIVITAMIN PO) Take 1 tablet by mouth daily   Historical Provider, MD       Past Medical History:   Diagnosis Date    Adenomatous polyp 06/10    With recommendation for repeat 06/15. Also, diverticulosis was noted.  Cataracts, bilateral     Combined forms of age-related cataract of both eyes 10/1/2018    Corneal scar, right eye     Cystocele     history of     Dementia ([facility]     Mini-Mental Status exam 27/30 6/14  declines meds at this point,  MMSE 29/30 on June 13, 2017  MMSE 26/30 4/2018    Difficulty controlling anger 9/16/2018    Dyslipidemia     GERD (gastroesophageal reflux disease)     egd  4/15 ok    Goiter     benign, history of     Hiatal hernia     Hypertension     Hypokalemia     Hypothyroidism     Impaired fasting glucose     Malignant melanoma in situ ([facility]     R upper Back 8/17    Migraines     Murmur, functional     Grade 1/6 systolic, history of     Osteopenia     T -1.0 hip, 03/09, T -0.3 spine, 03/09. 1) Repeat DEXA scan 09/12 with T +0.15, T -1.2 at the hip but -1.8 at the mean femoral neck giving her a FRAX score of 4.3 at the hip. Reclast 10/13 and given 2014,2015, and 1/4/2017, on calcium and vit d,  Redo Dexa 10/14 Frax 4.2% , Frax 4.8% 10 yr hip fracture risk on Dexa 1/17  On Reclast    Posterior vitreous detachment of both eyes     Primary open angle glaucoma (POAG) of both eyes, moderate stage 10/1/2018    Sciatica 12/15/2015    Skin cancer     History of multiple skin cancers.  Following with Dr. Edinson Ellison--- invasive squamous cell Ca 8/17 L forearm    Syncope and collapse 1/21/2015    Tremor 12/23/2018    Trichiasis of left lower eyelid without entropion     Visual disturbance 10/26/2017       Past Surgical History:   Procedure Laterality Date    APPENDECTOMY      CHOLECYSTECTOMY      Remote.  COLONOSCOPY  06/08/10    COLONOSCOPY  07/18/02    CYSTOCELE REPAIR  02/12/99    Vaginal prolapse, cystocele plus pelvic relaxation.  DILATION AND CURETTAGE OF UTERUS      with hysteroscopy   Mary Lou    still has ovaries     INTRACAPSULAR CATARACT EXTRACTION Left 8/25/2020    Left Cataract Extraction w/ IOL Implant performed by Ky Durant MD at [place] [address] Right 10/8/2020    Right Cataract Extraction w/ IOL Implant performed by Ky Durant MD at [address] [place] [address]  10/07/09    SCC, left nasal sidewall.  OTHER SURGICAL HISTORY  11/11/08    Anterior repair.  OTHER SURGICAL HISTORY  1988    laser cone    UPPER GASTROINTESTINAL ENDOSCOPY  4/8/2015    hiatal hernia    VEIN SURGERY  06/08/09    Laser ablation of right greater saphenous vein. Family History   Problem Relation Age of Onset    Coronary Art Dis Father     Colon Cancer Brother     Diabetes Neg Hx     Cataracts Neg Hx     Glaucoma Neg Hx        CareTeam (Including outside providers/suppliers regularly involved in providing care):   Patient Care Team:  Silver Howell MD as PCP - General (Internal Medicine)  Silver Howell MD as PCP - [address] [place] [address] Dr Begum Provider    Wt Readings from Last 3 Encounters:   01/29/21 139 lb (63 kg)   01/20/21 136 lb 6.4 oz (61.9 kg)   01/06/21 140 lb 3.2 oz (63.6 kg)     There were no vitals filed for this visit. There is no height or weight on file to calculate BMI. Based upon direct observation of the patient, evaluation of cognition reveals global memory impairment noted. Patient's complete Health Risk Assessment and screening values have been reviewed and are found in Flowsheets.  The following problems were reviewed today and where indicated follow up appointments were made and/or referrals ordered. Positive Risk Factor Screenings with Interventions:     Fall Risk:  2 or more falls in past year?: no  Fall with injury in past year?: (!) yes(12/2020 stitches in head)  Fall Risk Interventions:    · Home safety tips provided    Cognitive: Words recalled: 0 Words Recalled  Clock Drawing Test (CDT) Score: (!) Abnormal(all the numbers on the face of the clock were correct but the hands were placed incorrectly)  Total Score Interpretation: Positive Mini-Cog  Did the patient refuse to take the cognition test?: No  Cognitive Impairment Interventions:  · Patient advised to follow-up in this office for further evaluation and treatment within 3 month(s)       General Health and ACP:  General  In general, how would you say your health is?: Very Good  In the past 7 days, have you experienced any of the following?  New or Increased Pain, New or Increased Fatigue, Loneliness, Social Isolation, Stress or Anger?: (!) Anger  Do you get the social and emotional support that you need?: Yes  Do you have a Living Will?: Yes  Advance Directives     Power of  Living Will ACP-Advance Directive ACP-Power of Rony Garcia on 09/24/20 Not on File Not on File [address] [facility] Risk Interventions:  · Anger: patient advised to follow-up in this office for further evaluation and treatment within 3 month(s)    Health Habits/Nutrition:  Health Habits/Nutrition  Do you exercise for at least 20 minutes 2-3 times per week?: (!) No  Have you lost any weight without trying in the past 3 months?: No  Do you eat only one meal per day?: No  Have you seen the dentist within the past year?: Yes     Health Habits/Nutrition Interventions:  · Inadequate physical activity:  patient is not ready to increase his/her physical activity level at this time    Hearing/Vision:  No exam data present  Hearing/Vision  Do you or your family notice any trouble with your hearing that hasn't been managed with hearing aids?: No  Do you have difficulty driving, watching TV, or doing any of your daily activities because of your eyesight?: (!) Yes  Have you had an eye exam within the past year?: Yes  Hearing/Vision Interventions:  · Vision concerns:  patient encouraged to make appointment with his/her eye specialist    Safety:  Safety  Do you have working smoke detectors?: Yes  Have all throw rugs been removed or fastened?: (!) No  Do you have non-slip mats or surfaces in all bathtubs/showers?: Yes  Do all of your stairways have a railing or banister?: Yes  Are your doorways, halls and stairs free of clutter?: Yes  Do you always fasten your seatbelt when you are in a car?: Yes  Safety Interventions:  · Home safety tips provided    ADL:  ADLs  In the past 7 days, did you need help from others to perform any of the following everyday activities? Eating, dressing, grooming, bathing, toileting, or walking/balance?: None  In the past 7 days, did you need help from others to take care of any of the following?  Laundry, housekeeping, banking/finances, shopping, telephone use, food preparation, transportation, or taking medications?: (!) Taking Medications(Lives in an assisted facility, [employer], meals & transportation provided)  ADL Interventions:  · Patient declines any further evaluation/treatment for this issue, lives in assisted living    Personalized Preventive Plan   Current Health Maintenance Status  Immunization History   Administered Date(s) Administered    DT (pediatric) 11/05/2004    Influenza Virus Vaccine 10/29/2010, 10/12/2011, 10/10/2012    Influenza, High Dose (Fluzone 65 yrs and older) 09/11/2013, 11/04/2014, 09/30/2015, 10/11/2016, 10/18/2017, 08/28/2018, 08/16/2020    Influenza, MDCK, Preservative free 10/04/2014    Influenza, Triv, inactivated, subunit, adjuvanted, IM (Fluad 65 yrs and older) 10/01/2019, 10/21/2019, 10/15/2020    Pneumococcal Conjugate 13-valent ([id number]) 05/07/2015    Pneumococcal Polysaccharide

## 2021-03-09 NOTE — PATIENT INSTRUCTIONS
Personalized Preventive Plan for Lyle Brown - 3/9/2021  Medicare offers a range of preventive health benefits. Some of the tests and screenings are paid in full while other may be subject to a deductible, co-insurance, and/or copay. Some of these benefits include a comprehensive review of your medical history including lifestyle, illnesses that may run in your family, and various assessments and screenings as appropriate. After reviewing your medical record and screening and assessments performed today your provider may have ordered immunizations, labs, imaging, and/or referrals for you. A list of these orders (if applicable) as well as your Preventive Care list are included within your After Visit Summary for your review. Other Preventive Recommendations:    · A preventive eye exam performed by an eye specialist is recommended every 1-2 years to screen for glaucoma; cataracts, macular degeneration, and other eye disorders. · A preventive dental visit is recommended every 6 months. · Try to get at least 150 minutes of exercise per week or 10,000 steps per day on a pedometer . · Order or download the FREE \"Exercise & Physical Activity: Your Everyday Guide\" from [employer]. Call 9-242.584.5541 or search [employer] online. · You need 8278-9285 mg of calcium and 1455-8372 IU of vitamin D per day. It is possible to meet your calcium requirement with diet alone, but a vitamin D supplement is usually necessary to meet this goal.  · When exposed to the sun, use a sunscreen that protects against both UVA and UVB radiation with an SPF of 30 or greater. Reapply every 2 to 3 hours or after sweating, drying off with a towel, or swimming. · Always wear a seat belt when traveling in a car. Always wear a helmet when riding a bicycle or motorcycle.

## 2021-03-30 NOTE — PROGRESS NOTES
[facility] INTERNAL MEDICINE    Visit Date:  3/9/2021  Patient:  Kenn Diego  YOB: 1930    Assessment & Plan     Hypertension: Blood pressure controlled. Continue atenolol. Hyperlipidemia: Continue lovastatin. We will continue to monitor     Diagnosis Orders   1. Routine general medical examination at a health care facility     2. Essential hypertension     3. Hyperlipidemia, unspecified hyperlipidemia type       Subjective    Chief Complaint   Patient presents with    Medicare AWV     yearly    Hypertension    Hypothyroidism    Other     dementia with behavorial disturbance     Presents for follow-up. Has a history of hypertension and hyperlipidemia which have been unchanged. She is asymptomatic at this time. Lives in assisted living, and I received reports that she might forget to take her medications, however, she has no complaints at this time. Objective  /68   Pulse 60   Temp 97.8 °F (36.6 °C) (Tympanic)   Resp 18   Ht 5' 4\" (1.626 m)   Wt 142 lb 4.8 oz (64.5 kg)   LMP  (LMP Unknown)   BMI 24.43 kg/m²   Constitutional: No acute distress. Sits in chair comfortably  Eyes: Sclerae nonicteric. No lid lag or proptosis  HENT: External ears normal. No external lesions on the nose  Neck: No gross masses. Trachea visibly midline  Respiratory: Good air entry bilaterally. No wheezing or crackles  Cardiovascular: Normal S1-S2. No murmurs. No lower extremity edema  Gastrointestinal: No visible masses. No visible hernias  Skin: No abnormal rashes. No abnormal masses  Neurologic: Cranial nerves grossly intact  Psychiatric: Normal affect.  Alert and oriented    Medications  Current Outpatient Medications:     amLODIPine (NORVASC) 2.5 MG tablet, TAKE 1 TABLET DAILY, Disp: 90 tablet, Rfl: 3    FLUoxetine (PROZAC) 20 MG capsule, Take 1 capsule by mouth daily, Disp: 90 capsule, Rfl: 3    acetaminophen (TYLENOL) 325 MG tablet, Take 650 mg by mouth 2 times daily as needed for Pain, Disp: , Rfl:     losartan (COZAAR) 100 MG tablet, Take 100 mg by mouth daily, Disp: , Rfl:     BRILINTA 90 MG TABS tablet, Take 1 tablet by mouth 2 times daily, Disp: 60 tablet, Rfl: 0    atenolol (TENORMIN) 25 MG tablet, Take 1 tablet by mouth daily, Disp: 90 tablet, Rfl: 3    atorvastatin (LIPITOR) 40 MG tablet, Take 1 tablet by mouth daily, Disp: 90 tablet, Rfl: 3    aspirin 81 MG EC tablet, Take 1 tablet by mouth daily, Disp: , Rfl:     nitroGLYCERIN (NITROSTAT) 0.4 MG SL tablet, Place 0.4 mg under the tongue every 5 minutes as needed for Chest pain up to max of 3 total doses. If no relief after 3 dose, call 911., Disp: , Rfl:     OLANZapine (ZYPREXA) 2.5 MG tablet, Take 1 tablet by mouth nightly, Disp: 30 tablet, Rfl: 3    levothyroxine (SYNTHROID) 137 MCG tablet, TAKE 1 TABLET DAILY, Disp: 90 tablet, Rfl: 3    ammonium lactate (AMLACTIN) 12 % cream, apply to affected area twice a day if needed, Disp: 385 g, Rfl: 3    calcium-vitamin D (OSCAL 500/200 D-3) 500-200 MG-UNIT per tablet, Take 1 tablet by mouth daily, Disp: 90 tablet, Rfl: 3    Multiple Vitamin (DAILY MULTIVITAMIN PO), Take 1 tablet by mouth daily , Disp: , Rfl:     atorvastatin (LIPITOR) 40 MG tablet, Take 1 tablet by mouth daily, Disp: 30 tablet, Rfl: 0    vitamin B-12 (CYANOCOBALAMIN) 1000 MCG tablet, Take 1,000 mcg by mouth daily, Disp: , Rfl:   The patient has no active allergies.   Past Medical History  Humaira Contreras  has a past medical history of Adenomatous polyp, Cataracts, bilateral, Combined forms of age-related cataract of both eyes, Corneal scar, right eye, Cystocele, Dementia ([address] Difficulty controlling anger, Dyslipidemia, GERD (gastroesophageal reflux disease), Goiter, Hiatal hernia, Hypertension, Hypokalemia, Hypothyroidism, Impaired fasting glucose, Malignant melanoma in situ ([address] Migraines, Murmur, functional, Osteopenia, Posterior vitreous detachment of both eyes, Primary open angle glaucoma (POAG) of both eyes, moderate stage, Sciatica, Skin cancer, Syncope and collapse, Tremor, 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 Past Surgical History  The patient  has a past surgical history that includes other surgical history (11/11/08); Cholecystectomy; Colonoscopy (06/08/10); Mohs surgery (10/07/09); Vein Surgery (06/08/09); Colonoscopy (07/18/02); Cystocele repair (02/12/99); Appendectomy; Dilation and curettage of uterus; other surgical history (1988); Hysterectomy (1995); Upper gastrointestinal endoscopy (4/8/2015); Intracapsular cataract extraction (Left, 8/25/2020); and Intracapsular cataract extraction (Right, 10/8/2020). Family History  This patient's family history includes Colon Cancer in her brother; Coronary Art Dis in her father. Social History  Natasha Roman  reports that she has never smoked. She has never used smokeless tobacco. She reports current alcohol use. She reports that she does not use drugs. Discussed use, benefit, and side effects of prescribed medications. All questions answered. Patient voiced understanding. Reviewed health maintenance. Electronically signed Deisy Ogden MD on 3/9/2021 at 2:02 PM    This note has been created using the Epic electronic health record, and dictated in part by [email or web address] [facility] dictation system. Despite the documenting physician's best efforts, there may be errors in spelling, grammar or syntax.

## 2021-03-31 ENCOUNTER — OFFICE VISIT (OUTPATIENT)
Dept: PODIATRY | Age: 86
End: 2021-03-31
Payer: MEDICARE

## 2021-03-31 VITALS
RESPIRATION RATE: 20 BRPM | SYSTOLIC BLOOD PRESSURE: 134 MMHG | DIASTOLIC BLOOD PRESSURE: 66 MMHG | HEART RATE: 64 BPM | BODY MASS INDEX: 23.69 KG/M2 | WEIGHT: 138 LBS

## 2021-03-31 DIAGNOSIS — L84 CORNS AND CALLOSITIES: ICD-10-CM

## 2021-03-31 DIAGNOSIS — B35.1 DERMATOPHYTOSIS OF NAIL: ICD-10-CM

## 2021-03-31 DIAGNOSIS — I70.203 ATHEROSCLEROSIS OF NATIVE ARTERY OF BOTH LOWER EXTREMITIES, WITH UNSPECIFIED PRESENCE OF CLINICAL MANIFESTATION (HCC): Primary | ICD-10-CM

## 2021-03-31 PROCEDURE — 11721 DEBRIDE NAIL 6 OR MORE: CPT | Performed by: PODIATRIST

## 2021-03-31 PROCEDURE — 99999 PR OFFICE/OUTPT VISIT,PROCEDURE ONLY: CPT | Performed by: PODIATRIST

## 2021-03-31 NOTE — PROGRESS NOTES
FLUoxetine (PROZAC) 20 MG capsule Take 1 capsule by mouth daily 3/1/21  Yes Amandeep Gage MD   acetaminophen (TYLENOL) 325 MG tablet Take 650 mg by mouth 2 times daily as needed for Pain   Yes Historical Provider, MD   losartan (COZAAR) 100 MG tablet Take 100 mg by mouth daily   Yes Historical Provider, MD   BRILINTA 90 MG TABS tablet Take 1 tablet by mouth 2 times daily 2/1/21  Yes Vimal Seals,    atenolol (TENORMIN) 25 MG tablet Take 1 tablet by mouth daily 2/1/21  Yes Amandeep Gage MD   atorvastatin (LIPITOR) 40 MG tablet Take 1 tablet by mouth daily 2/1/21  Yes Amandeep Gage MD   aspirin 81 MG EC tablet Take 1 tablet by mouth daily 12/21/20  Yes Historical Provider, MD   nitroGLYCERIN (NITROSTAT) 0.4 MG SL tablet Place 0.4 mg under the tongue every 5 minutes as needed for Chest pain up to max of 3 total doses. If no relief after 3 dose, call 911. Yes Historical Provider, MD   OLANZapine (ZYPREXA) 2.5 MG tablet Take 1 tablet by mouth nightly 1/25/21  Yes Amandeep Gage MD   vitamin B-12 (CYANOCOBALAMIN) 1000 MCG tablet Take 1,000 mcg by mouth daily   Yes Historical Provider, MD   levothyroxine (SYNTHROID) 137 MCG tablet TAKE 1 TABLET DAILY 7/9/20  Yes Amandeep Gage MD   ammonium lactate (AMLACTIN) 12 % cream apply to affected area twice a day if needed 4/7/20  Yes Amandeep Gage MD   calcium-vitamin D (OSCAL 500/200 D-3) 500-200 MG-UNIT per tablet Take 1 tablet by mouth daily 10/1/19  Yes Desmond Lundberg,    Multiple Vitamin (DAILY MULTIVITAMIN PO) Take 1 tablet by mouth daily    Yes Historical Provider, MD   ibuprofen (ADVIL;MOTRIN) 600 MG tablet Take 1 tablet by mouth 3 times daily as needed for Pain (Take with food.) 12/27/20 12/27/20  Kenia Levin MD       Past Surgical History:   Procedure Laterality Date    APPENDECTOMY      CHOLECYSTECTOMY      Remote.  COLONOSCOPY  06/08/10    COLONOSCOPY  07/18/02    CYSTOCELE REPAIR  02/12/99    Vaginal prolapse, cystocele plus pelvic relaxation.      DILATION AND CURETTAGE OF UTERUS      with hysteroscopy   Mary Lou    still has ovaries     INTRACAPSULAR CATARACT EXTRACTION Left 8/25/2020    Left Cataract Extraction w/ IOL Implant performed by Dany Bucio MD at [place] [address] Right 10/8/2020    Right Cataract Extraction w/ IOL Implant performed by Dany Bucio MD at [address] [place] [address]  10/07/09    SCC, left nasal sidewall.  OTHER SURGICAL HISTORY  11/11/08    Anterior repair.  OTHER SURGICAL HISTORY  1988    laser cone    UPPER GASTROINTESTINAL ENDOSCOPY  4/8/2015    hiatal hernia    VEIN SURGERY  06/08/09    Laser ablation of right greater saphenous vein. Family History   Problem Relation Age of Onset    Coronary Art Dis Father     Colon Cancer Brother     Diabetes Neg Hx     Cataracts Neg Hx     Glaucoma Neg Hx        Social History     Tobacco Use    Smoking status: Never Smoker    Smokeless tobacco: Never Used   Substance Use Topics    Alcohol use: Yes     Alcohol/week: 0.0 standard drinks     Comment: Infrequently. Review of Systems: All 12 systems reviewed and pertinent positives noted above. Lower Extremity Physical Examination:     Vitals:   Vitals:    03/31/21 1305   BP: 134/66   Pulse: 64   Resp: 20     General: AAO x 3 in NAD. Vascular: DP and PT pulses palpable 2/4, bilateral.  CFT <3 seconds, bilateral.  Hair growth present to the level of the digits, bilateral.  Edema absent, bilateral.  Varicosities absent, bilateral. Erythema absent, bilateral. Distal Rubor absent bilateral.  Temperature within normal limits bilateral. Hyperpigmentation absent bilateral. No atrophic skin.     Neurological: Sensation intact to light touch to level of digits, bilateral.  Protective sensation intact 10/10 sites via 5.07/10g [place] Monofilament, bilateral.  negative Tinel's, bilateral.  negative Valleix sign, bilateral.  Vibratory intact bilateral.  Reflexes Decreased bilateral.  Paresthesias negative. Dysthesias negative. Sharp/dull intact bilateral.    Musculoskeletal: Muscle strength 5/5, bilateral.  Pain present upon palpation L hallux nail. Normal medial longitudinal arch, bilateral.  Ankle ROM within normal limits,bilateral.  1st MPJ ROM within normal limits, bilateral.  Dorsally contracted digits present. No other foot deformities. Integument: Warm, dry, supple, Bilateral.  Open lesion absent, Bilateral.  Interdigital maceration absent to web spaces absent, Bilateral.  Nails left 1, 2,3, 4,5 and right 1,2,3,4,5 thickened, dystrophic and crumbly, discolored with subungual debris. Fissures absent, Bilateral. Hyperkeratotic tissue is absent. Sub R 4th toe, not returned sub R 5th met head    Asessment: Patient is a 80 y.o. female with:    Diagnosis Orders   1. Atherosclerosis of native artery of both lower extremities, with unspecified presence of clinical manifestation ([address]     2. Dermatophytosis of nail     3. Corns and callosities         Plan: Patient examined and evaluated. Current condition and treatment options discussed in detail. All nails as mentioned above debrided in length and thickness. Patient advised OTC methods for treatment of the mycotic nails. Patient will follow up in 10 weeks.

## 2021-03-31 NOTE — PROGRESS NOTES
Foot Care Worksheet  PCP: Gina Up   Last visit: 03 / 09 / 2021    Nail description:  Thick , Yellow , Crumbly , Marked limitation of ambulation     Pain resulting from thickened and dystrophy of nail plate Yes    Nails involved  Right   1, 2, 3, 4, 5  (T5-T9)  Left     1, 2, 3, 4, 5  (TA-T4)    Q7 1 Class A Finding - Non traumatic amputation of foot No    Q8 2 Class B Findings - Absent DP pulse No, Absent PT pulse No, Advanced tropic changes (3 required) Yes    Decrease hair growth Yes, Nail changes/thickening Yes, Pigmented changes/discoloration No, Skin texture (thin, shiny) No, Skin color (rubor/redness) No    Q9 1 Class B and 2 Class C Findings  Claudication No, Temperature change Yes, Paresthesia No, Burning No, Edema Yes

## 2021-04-06 ENCOUNTER — PATIENT MESSAGE (OUTPATIENT)
Dept: INTERNAL MEDICINE | Age: 86
End: 2021-04-06

## 2021-04-06 RX ORDER — OLANZAPINE 2.5 MG/1
2.5 TABLET ORAL NIGHTLY
Qty: 90 TABLET | Refills: 3 | Status: SHIPPED | OUTPATIENT
Start: 2021-04-06 | End: 2022-02-28

## 2021-04-06 RX ORDER — LOSARTAN POTASSIUM 100 MG/1
100 TABLET ORAL DAILY
Qty: 90 TABLET | Refills: 3 | Status: SHIPPED | OUTPATIENT
Start: 2021-04-06 | End: 2022-03-14

## 2021-04-06 NOTE — TELEPHONE ENCOUNTER
From: Marta Back  To: Corinne Hutchins MD  Sent: 4/6/2021 4:10 PM EDT  Subject: Non-Urgent Medical Question    Request new RX for Olanzapine 2.5 mg  And  Losartin 100mg    From [employer] -its Currently at [facility].   Thank you,  Sparkle

## 2021-05-28 ENCOUNTER — TELEPHONE (OUTPATIENT)
Dept: INTERNAL MEDICINE | Age: 86
End: 2021-05-28

## 2021-05-28 DIAGNOSIS — I63.9 CEREBRAL INFARCTION, UNSPECIFIED MECHANISM (HCC): ICD-10-CM

## 2021-05-28 DIAGNOSIS — I48.91 ATRIAL FIBRILLATION, UNSPECIFIED TYPE (HCC): ICD-10-CM
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
## 2021-05-28 NOTE — TELEPHONE ENCOUNTER
Daughter called her Mom fell this morning. She thinks she is ok, but woulod like home health started again.

## 2021-05-29 ENCOUNTER — HOSPITAL ENCOUNTER (EMERGENCY)
Age: 86
Discharge: HOME | End: 2021-05-29
Attending: EMERGENCY MEDICINE
Payer: MEDICARE

## 2021-05-29 ENCOUNTER — APPOINTMENT (OUTPATIENT)
Dept: GENERAL RADIOLOGY | Age: 86
End: 2021-05-29
Payer: MEDICARE

## 2021-05-29 VITALS
HEIGHT: 64 IN | HEART RATE: 62 BPM | RESPIRATION RATE: 16 BRPM | DIASTOLIC BLOOD PRESSURE: 63 MMHG | OXYGEN SATURATION: 96 % | BODY MASS INDEX: 22.53 KG/M2 | TEMPERATURE: 96.7 F | WEIGHT: 132 LBS | SYSTOLIC BLOOD PRESSURE: 158 MMHG

## 2021-05-29 DIAGNOSIS — S20.211A RIB CONTUSION, RIGHT, INITIAL ENCOUNTER: Primary | ICD-10-CM

## 2021-05-29 PROCEDURE — 72100 X-RAY EXAM L-S SPINE 2/3 VWS: CPT

## 2021-05-29 PROCEDURE — 99285 EMERGENCY DEPT VISIT HI MDM: CPT

## 2021-05-29 PROCEDURE — 6370000000 HC RX 637 (ALT 250 FOR IP): Performed by: EMERGENCY MEDICINE

## 2021-05-29 PROCEDURE — 71101 X-RAY EXAM UNILAT RIBS/CHEST: CPT

## 2021-05-29 RX ORDER — IBUPROFEN 600 MG/1
600 TABLET ORAL ONCE
Status: COMPLETED | OUTPATIENT
Start: 2021-05-29 | End: 2021-05-29

## 2021-05-29 RX ORDER — HYDROCODONE BITARTRATE AND ACETAMINOPHEN 5; 325 MG/1; MG/1
1 TABLET ORAL EVERY 6 HOURS PRN
Qty: 10 TABLET | Refills: 0 | Status: SHIPPED | OUTPATIENT
Start: 2021-05-29 | End: 2021-06-01

## 2021-05-29 RX ADMIN — IBUPROFEN 600 MG: 600 TABLET, FILM COATED ORAL at 10:27

## 2021-05-29 ASSESSMENT — PAIN SCALES - GENERAL
PAINLEVEL_OUTOF10: 0
PAINLEVEL_OUTOF10: 0
PAINLEVEL_OUTOF10: 1

## 2021-05-29 ASSESSMENT — PAIN DESCRIPTION - PAIN TYPE: TYPE: ACUTE PAIN;OTHER (COMMENT)

## 2021-05-29 ASSESSMENT — PAIN DESCRIPTION - ONSET: ONSET: ON-GOING

## 2021-05-29 ASSESSMENT — ENCOUNTER SYMPTOMS
BACK PAIN: 1
SHORTNESS OF BREATH: 0
SORE THROAT: 0
DIARRHEA: 0
VOMITING: 0

## 2021-05-29 ASSESSMENT — PAIN DESCRIPTION - DESCRIPTORS: DESCRIPTORS: SHARP;STABBING

## 2021-05-29 ASSESSMENT — PAIN DESCRIPTION - FREQUENCY: FREQUENCY: INTERMITTENT

## 2021-05-29 ASSESSMENT — PAIN DESCRIPTION - LOCATION: LOCATION: BACK

## 2021-05-29 ASSESSMENT — PAIN DESCRIPTION - ORIENTATION: ORIENTATION: RIGHT

## 2021-05-29 ASSESSMENT — PAIN DESCRIPTION - DIRECTION: RADIATING_TOWARDS: DENIES

## 2021-05-29 NOTE — ED PROVIDER NOTES
[facility] ED  [address]  Phone: [phone]        [address] [place] [address] ED  EMERGENCY DEPARTMENT ENCOUNTER      Pt Name: Paula Gallo  MRN: [id number]  Lalito 3/21/1930  Date of evaluation: 5/29/2021  Provider: Marv Ferguson DO    CHIEF COMPLAINT       Chief Complaint   Patient presents with    Fall     Mechanical fall while ambulating into a house, slipped on a step and fell between the step and a car. Patient denies hit to head, negative LOC, denies blood thinners, no lightheadedness/dizziness prior to fall. Large bruise on upper right arm. Pain to right side of back, denies radiation of pain. HISTORY OF PRESENT ILLNESS   (Location/Symptom, Timing/Onset,Context/Setting, Quality, Duration, Modifying Factors, Severity)  Note limiting factors. Paula Gallo is a 80 y.o. female who presents to the emergency department For the evaluation of a fall. Patient states that yesterday she was walking into her house and she slipped and fell backwards under a car. She emphasizes that she was not run over by the car. She did not hit her head or lose consciousness. She did obtain a bruise on her right upper extremity and pain to her right middle/low back. Seems to only be bothering her when she walks. She has no pain at rest.  Hurts a little bit with a deep breath in. Again pain is in right middle/lower back. She denies bowel or bladder incontinence and denies numbness, tingling or weakness in her legs. She did not take any medicine for the pain. She denies any other acute complaints    Nursing Notes were reviewed. REVIEW OF SYSTEMS    (2-9systems for level 4, 10 or more for level 5)     Review of Systems   Constitutional: Negative for fever. HENT: Negative for sore throat. Respiratory: Negative for shortness of breath. Cardiovascular: Negative for chest pain. Gastrointestinal: Negative for diarrhea and vomiting.    Genitourinary: Negative for dysuria. Musculoskeletal: Positive for back pain. Skin: Negative for rash. Bruising right upper extremity   Neurological: Negative for weakness. All other systems reviewed and are negative. Except asnoted above the remainder of the review of systems was reviewed and negative. PAST MEDICAL HISTORY     Past Medical History:   Diagnosis Date    Adenomatous polyp 06/10    With recommendation for repeat 06/15. Also, diverticulosis was noted.  Cataracts, bilateral     Combined forms of age-related cataract of both eyes 10/1/2018    Corneal scar, right eye     Cystocele     history of     Dementia ([address]     Mini-Mental Status exam 27/30 6/14  declines meds at this point,  MMSE 29/30 on June 13, 2017  MMSE 26/30 4/2018    Difficulty controlling anger 9/16/2018    Dyslipidemia     GERD (gastroesophageal reflux disease)     egd  4/15 ok    Goiter     benign, history of     Hiatal hernia     Hypertension     Hypokalemia     Hypothyroidism     Impaired fasting glucose     Malignant melanoma in situ [address]     R upper Back 8/17    Migraines     Murmur, functional     Grade 1/6 systolic, history of     Osteopenia     T -1.0 hip, 03/09, T -0.3 spine, 03/09. 1) Repeat DEXA scan 09/12 with T +0.15, T -1.2 at the hip but -1.8 at the mean femoral neck giving her a FRAX score of 4.3 at the hip. Reclast 10/13 and given 2014,2015, and 1/4/2017, on calcium and vit d,  Redo Dexa 10/14 Frax 4.2% , Frax 4.8% 10 yr hip fracture risk on Dexa 1/17  On Reclast    Posterior vitreous detachment of both eyes     Primary open angle glaucoma (POAG) of both eyes, moderate stage 10/1/2018    Sciatica 12/15/2015    Skin cancer     History of multiple skin cancers.  Following with Dr. Meryl Claudio--- invasive squamous cell Ca 8/17 L forearm    Syncope and collapse 1/21/2015    Tremor 12/23/2018    Trichiasis of left lower eyelid without entropion     Visual disturbance 10/26/2017 SURGICAL HISTORY       Past Surgical History:   Procedure Laterality Date    APPENDECTOMY      CHOLECYSTECTOMY      Remote.  COLONOSCOPY  06/08/10    COLONOSCOPY  07/18/02    CYSTOCELE REPAIR  02/12/99    Vaginal prolapse, cystocele plus pelvic relaxation.  DILATION AND CURETTAGE OF UTERUS      with hysteroscopy   [id number] [address]    still has ovaries     INTRACAPSULAR CATARACT EXTRACTION Left 8/25/2020    Left Cataract Extraction w/ IOL Implant performed by Samuella Mohs, MD at [place] [address] Right 10/8/2020    Right Cataract Extraction w/ IOL Implant performed by Samuella Mohs, MD at [address] [place] [address]  10/07/09    SCC, left nasal sidewall.  OTHER SURGICAL HISTORY  11/11/08    Anterior repair.  OTHER SURGICAL HISTORY  1988    laser cone    UPPER GASTROINTESTINAL ENDOSCOPY  4/8/2015    hiatal hernia    VEIN SURGERY  06/08/09    Laser ablation of right greater saphenous vein. CURRENT MEDICATIONS     Previous Medications    ACETAMINOPHEN (TYLENOL) 325 MG TABLET    Take 650 mg by mouth 2 times daily as needed for Pain    AMLODIPINE (NORVASC) 2.5 MG TABLET    TAKE 1 TABLET DAILY    AMMONIUM LACTATE (AMLACTIN) 12 % CREAM    apply to affected area twice a day if needed    ASPIRIN 81 MG EC TABLET    Take 1 tablet by mouth daily    ATENOLOL (TENORMIN) 25 MG TABLET    Take 1 tablet by mouth daily    ATORVASTATIN (LIPITOR) 40 MG TABLET    Take 1 tablet by mouth daily    BRILINTA 90 MG TABS TABLET    Take 1 tablet by mouth 2 times daily    CALCIUM-VITAMIN D (OSCAL 500/200 D-3) 500-200 MG-UNIT PER TABLET    Take 1 tablet by mouth daily    FLUOXETINE (PROZAC) 20 MG CAPSULE    Take 1 capsule by mouth daily    LEVOTHYROXINE (SYNTHROID) 137 MCG TABLET    TAKE 1 TABLET DAILY    LOSARTAN (COZAAR) 100 MG TABLET    Take 1 tablet by mouth daily    MISC.  DEVICES [facility]    Home health referral. Dx: fall at home, atrial fibrillation, hx stroke MULTIPLE VITAMIN (DAILY MULTIVITAMIN PO)    Take 1 tablet by mouth daily     NITROGLYCERIN (NITROSTAT) 0.4 MG SL TABLET    Place 0.4 mg under the tongue every 5 minutes as needed for Chest pain up to max of 3 total doses. If no relief after 3 dose, call 911. OLANZAPINE (ZYPREXA) 2.5 MG TABLET    Take 1 tablet by mouth nightly    VITAMIN B-12 (CYANOCOBALAMIN) 1000 MCG TABLET    Take 1,000 mcg by mouth daily       ALLERGIES     Patient has no known allergies. FAMILY HISTORY       Family History   Problem Relation Age of Onset    Coronary Art Dis Father     Colon Cancer Brother     Diabetes Neg Hx     Cataracts Neg Hx     Glaucoma Neg Hx           SOCIAL HISTORY       Social History     Socioeconomic History    Marital status:      Spouse name: None    Number of children: None    Years of education: None    Highest education level: None   Occupational History    None   Tobacco Use    Smoking status: Never Smoker    Smokeless tobacco: Never Used   Vaping Use    Vaping Use: Never used   Substance and Sexual Activity    Alcohol use: Yes     Alcohol/week: 0.0 standard drinks     Comment: Infrequently.  Drug use: No    Sexual activity: None   Other Topics Concern    None   Social History Narrative    None     Social Determinants of Health     Financial Resource Strain:     Difficulty of Paying Living Expenses:    Food Insecurity:     Worried About Running Out of Food in the Last Year:     [address] in the Last Year:    Transportation Needs:     Lack of Transportation (Medical):      Lack of Transportation (Non-Medical):    Physical Activity:     Days of Exercise per Week:     Minutes of Exercise per Session:    Stress:     Feeling of Stress :    Social Connections:     Frequency of Communication with Friends and Family:     Frequency of Social Gatherings with Friends and Family:     Attends [employer] Services:     Active Member of Clubs or Organizations:     Attends [employer] or Organization Meetings:     Marital Status:    Intimate Partner Violence:     Fear of Current or Ex-Partner:     Emotionally Abused:     Physically Abused:     Sexually Abused:        SCREENINGS    [place] Coma Scale  Eye Opening: Spontaneous  Best Verbal Response: Oriented  Best Motor Response: Obeys commands  [place] Coma Scale Score: 15        PHYSICAL EXAM    (up to 7 for level 4, 8 or more for level 5)     ED Triage Vitals [05/29/21 0946]   BP Temp Temp Source Pulse Resp SpO2 Height Weight   (!) 174/53 96.7 °F (35.9 °C) Tympanic 60 18 96 % 5' 4\" (1.626 m) 132 lb (59.9 kg)       Physical Exam  Vitals and nursing note reviewed. Constitutional:       General: She is not in acute distress. Appearance: Normal appearance. She is not ill-appearing or toxic-appearing. HENT:      Head: Normocephalic and atraumatic. Nose: Nose normal. No congestion. Mouth/Throat:      Mouth: Mucous membranes are moist.   Eyes:      General:         Right eye: No discharge. Left eye: No discharge. Conjunctiva/sclera: Conjunctivae normal.   Cardiovascular:      Rate and Rhythm: Normal rate and regular rhythm. Pulses: Normal pulses. Heart sounds: Normal heart sounds. No murmur heard. Pulmonary:      Effort: Pulmonary effort is normal. No respiratory distress. Breath sounds: Normal breath sounds. No wheezing. Abdominal:      General: Abdomen is flat. There is no distension. Palpations: Abdomen is soft. Tenderness: There is no abdominal tenderness. Musculoskeletal:         General: Tenderness present. No deformity or signs of injury. Normal range of motion. Cervical back: Normal range of motion. Comments: Mild point tenderness in the right posterior lower ribs, no midline tenderness, step-off or deformity in the back. No bruising noted in the back. Skin:     General: Skin is warm and dry. Capillary Refill: Capillary refill takes less than 2 seconds. requiring hospitalization or inpatient management. They have remained hemodynamically stable and are stable for discharge with outpatient follow-up. Standard anticipatory guidance given to patient upon discharge. Have given them a specific time frame in which to follow-up and who to follow-up with. I have also advised them that they should return to the emergency department if they get worse, or not getting better or develop any new or concerning symptoms. Patient demonstrates understanding.    [TS]      ED Course User Index  [TS] Stiven Cardoso DO         PROCEDURES:  Unless otherwise noted below, none     Procedures    FINAL IMPRESSION      1. Rib contusion, right, initial encounter          DISPOSITION/PLAN   DISPOSITION Decision To Discharge 05/29/2021 10:26:18 AM      PATIENT REFERRED TO:  Vee Retana MD  [address] [place] [address]  [phone]    In 1 week        DISCHARGE MEDICATIONS:  New Prescriptions    HYDROCODONE-ACETAMINOPHEN (NORCO) 5-325 MG PER TABLET    Take 1 tablet by mouth every 6 hours as needed for Pain for up to 3 days. Intended supply: 3 days.  Take lowest dose possible to manage pain          (Please note that portions of this note were completed with a voice recognition program.  Efforts were made to edit the dictations but occasionally words are mis-transcribed.)    Stiven Cardoso DO (electronically signed)  Board Certified Emergency Physician          Stiven Cardoso DO  05/29/21 0941

## 2021-06-01 DIAGNOSIS — E03.9 HYPOTHYROIDISM, UNSPECIFIED TYPE: ICD-10-CM

## 2021-06-01 RX ORDER — LEVOTHYROXINE SODIUM 137 UG/1
TABLET ORAL
Qty: 90 TABLET | Refills: 3 | Status: SHIPPED | OUTPATIENT
Start: 2021-06-01 | End: 2021-09-30 | Stop reason: DRUGHIGH

## 2021-06-02 ENCOUNTER — TELEPHONE (OUTPATIENT)
Dept: INTERNAL MEDICINE | Age: 86
End: 2021-06-02

## 2021-06-03 RX ORDER — FLUOXETINE HYDROCHLORIDE 20 MG/1
CAPSULE ORAL
Qty: 90 CAPSULE | Refills: 3 | Status: SHIPPED | OUTPATIENT
Start: 2021-06-03 | End: 2022-05-31

## 2021-06-03 NOTE — TELEPHONE ENCOUNTER
Pharmacy requesting a refill of the below medication which has been pended for you:     Requested Prescriptions     Pending Prescriptions Disp Refills    FLUoxetine (PROZAC) 20 MG capsule [Pharmacy Med Name: FLUOXETINE HCL CAPS 20MG] 90 capsule 3     Sig: TAKE 1 CAPSULE DAILY       Last Appointment Date: 3/9/2021  Next Appointment Date: 6/9/2021    No Known Allergies

## 2021-06-07 ENCOUNTER — PATIENT MESSAGE (OUTPATIENT)
Dept: INTERNAL MEDICINE | Age: 86
End: 2021-06-07

## 2021-06-16 ENCOUNTER — OFFICE VISIT (OUTPATIENT)
Dept: PODIATRY | Age: 86
End: 2021-06-16
Payer: MEDICARE

## 2021-06-16 VITALS
DIASTOLIC BLOOD PRESSURE: 80 MMHG | RESPIRATION RATE: 20 BRPM | HEART RATE: 64 BPM | SYSTOLIC BLOOD PRESSURE: 138 MMHG | WEIGHT: 144 LBS | BODY MASS INDEX: 24.72 KG/M2

## 2021-06-16 DIAGNOSIS — I70.203 ATHEROSCLEROSIS OF NATIVE ARTERY OF BOTH LOWER EXTREMITIES, WITH UNSPECIFIED PRESENCE OF CLINICAL MANIFESTATION (HCC): Primary | ICD-10-CM

## 2021-06-16 DIAGNOSIS — B35.1 DERMATOPHYTOSIS OF NAIL: ICD-10-CM

## 2021-06-16 DIAGNOSIS — L84 CORNS AND CALLOSITIES: ICD-10-CM

## 2021-06-16 PROCEDURE — 99999 PR OFFICE/OUTPT VISIT,PROCEDURE ONLY: CPT | Performed by: PODIATRIST

## 2021-06-16 PROCEDURE — 11721 DEBRIDE NAIL 6 OR MORE: CPT | Performed by: PODIATRIST

## 2021-06-16 NOTE — PROGRESS NOTES
Foot Care Worksheet  PCP: Pramod Downing   Last visit: 03 / 09 / 2021    Nail description:  Thick , Yellow , Crumbly , Marked limitation of ambulation     Pain resulting from thickened and dystrophy of nail plate Yes    Nails involved  Right   1, 2, 3, 4, 5  (T5-T9)  Left     1, 2, 3, 4, 5  (TA-T4)    Q7 1 Class A Finding - Non traumatic amputation of foot No    Q8 2 Class B Findings - Absent DP pulse No, Absent PT pulse No, Advanced tropic changes (3 required) Yes    Decrease hair growth Yes, Nail changes/thickening Yes, Pigmented changes/discoloration No, Skin texture (thin, shiny) No, Skin color (rubor/redness) No    Q9 1 Class B and 2 Class C Findings  Claudication No, Temperature change Yes, Paresthesia No, Burning No, Edema Yes
07/18/02    CYSTOCELE REPAIR  02/12/99    Vaginal prolapse, cystocele plus pelvic relaxation.  DILATION AND CURETTAGE OF UTERUS      with hysteroscopy   Mary Lou    still has ovaries     INTRACAPSULAR CATARACT EXTRACTION Left 8/25/2020    Left Cataract Extraction w/ IOL Implant performed by Nirali Carrion MD at [facility] Right 10/8/2020    Right Cataract Extraction w/ IOL Implant performed by Nirali Carrion MD at [address] [place] [address]  10/07/09    SCC, left nasal sidewall.  OTHER SURGICAL HISTORY  11/11/08    Anterior repair.  OTHER SURGICAL HISTORY  1988    laser cone    UPPER GASTROINTESTINAL ENDOSCOPY  4/8/2015    hiatal hernia    VEIN SURGERY  06/08/09    Laser ablation of right greater saphenous vein. Family History   Problem Relation Age of Onset    Coronary Art Dis Father     Colon Cancer Brother     Diabetes Neg Hx     Cataracts Neg Hx     Glaucoma Neg Hx        Social History     Tobacco Use    Smoking status: Never Smoker    Smokeless tobacco: Never Used   Substance Use Topics    Alcohol use: Yes     Alcohol/week: 0.0 standard drinks     Comment: Infrequently. Review of Systems: All 12 systems reviewed and pertinent positives noted above. Lower Extremity Physical Examination:     Vitals:   Vitals:    06/16/21 1323   BP: 138/80   Pulse: 64   Resp: 20     General: AAO x 3 in NAD. Vascular: DP and PT pulses palpable 2/4, bilateral.  CFT <3 seconds, bilateral.  Hair growth present to the level of the digits, bilateral.  Edema absent, bilateral.  Varicosities absent, bilateral. Erythema absent, bilateral. Distal Rubor absent bilateral.  Temperature within normal limits bilateral. Hyperpigmentation absent bilateral. No atrophic skin.     Neurological: Sensation intact to light touch to level of digits, bilateral.  Protective sensation intact 10/10 sites via 5.07/10g [place]-Mary Monofilament, bilateral.

## 2021-06-22 ENCOUNTER — PATIENT MESSAGE (OUTPATIENT)
Dept: INTERNAL MEDICINE | Age: 86
End: 2021-06-22

## 2021-06-22 NOTE — TELEPHONE ENCOUNTER
I will see them both 3 months from now, which would be 6 months from the last appointment.   Obviously they are welcome to come in or call if they need anything in the meantime

## 2021-06-22 NOTE — TELEPHONE ENCOUNTER
From: Latoya Pettit  To: Jessica Rodriguez MD  Sent: 6/22/2021 9:38 AM EDT  Subject: Non-Urgent Medical Question    Hi Dr Georgette Dumont  My parents do not have a scheduled appt. in the past they seem to have one every 3 mos. Do you want them To continue or can we schedule as needed with a standing every 6 mos?

## 2021-07-09 ENCOUNTER — TELEPHONE (OUTPATIENT)
Dept: INTERNAL MEDICINE | Age: 86
End: 2021-07-09

## 2021-07-09 DIAGNOSIS — M85.88 OTHER SPECIFIED DISORDERS OF BONE DENSITY AND STRUCTURE, OTHER SITE: ICD-10-CM

## 2021-07-09 DIAGNOSIS — M85.80 OSTEOPENIA, UNSPECIFIED LOCATION: Primary | ICD-10-CM

## 2021-07-09 NOTE — TELEPHONE ENCOUNTER
[place] in rad called stating patient is due for her dexa, previous order is . Can we please place new order patient has appt Tuesday.

## 2021-07-13 ENCOUNTER — TELEPHONE (OUTPATIENT)
Dept: INTERNAL MEDICINE | Age: 86
End: 2021-07-13
Payer: MEDICARE

## 2021-07-13 ENCOUNTER — HOSPITAL ENCOUNTER (OUTPATIENT)
Dept: BONE DENSITY | Age: 86
Discharge: HOME | End: 2021-07-15
Payer: MEDICARE

## 2021-07-13 DIAGNOSIS — F02.818: ICD-10-CM

## 2021-07-13 DIAGNOSIS — I48.91 ATRIAL FIBRILLATION, UNSPECIFIED TYPE (HCC): ICD-10-CM

## 2021-07-13 DIAGNOSIS — M85.80 OSTEOPENIA, UNSPECIFIED LOCATION: ICD-10-CM

## 2021-07-13 DIAGNOSIS — M85.88 OTHER SPECIFIED DISORDERS OF BONE DENSITY AND STRUCTURE, OTHER SITE: ICD-10-CM

## 2021-07-13 DIAGNOSIS — E03.9 HYPOTHYROIDISM, UNSPECIFIED TYPE: ICD-10-CM

## 2021-07-13 DIAGNOSIS — I10 HYPERTENSION, ESSENTIAL: Primary | ICD-10-CM

## 2021-07-13 PROCEDURE — G0180 MD CERTIFICATION HHA PATIENT: HCPCS | Performed by: INTERNAL MEDICINE

## 2021-07-13 PROCEDURE — 77085 DXA BONE DENSITY AXL VRT FX: CPT

## 2021-07-21 ENCOUNTER — OUTSIDE SERVICES (OUTPATIENT)
Dept: INTERNAL MEDICINE | Age: 86
End: 2021-07-21
Payer: MEDICARE

## 2021-07-21 VITALS — HEART RATE: 90 BPM | SYSTOLIC BLOOD PRESSURE: 128 MMHG | RESPIRATION RATE: 16 BRPM | DIASTOLIC BLOOD PRESSURE: 82 MMHG

## 2021-07-21 DIAGNOSIS — I63.9 CEREBRAL INFARCTION, UNSPECIFIED MECHANISM (HCC): ICD-10-CM

## 2021-07-21 DIAGNOSIS — R45.4 DIFFICULTY CONTROLLING ANGER: ICD-10-CM

## 2021-07-21 DIAGNOSIS — I48.91 ATRIAL FIBRILLATION, UNSPECIFIED TYPE (HCC): ICD-10-CM

## 2021-07-21 DIAGNOSIS — G25.0 ESSENTIAL TREMOR: ICD-10-CM

## 2021-07-21 DIAGNOSIS — K21.9 GASTROESOPHAGEAL REFLUX DISEASE WITHOUT ESOPHAGITIS: ICD-10-CM

## 2021-07-21 DIAGNOSIS — I10 ESSENTIAL HYPERTENSION: ICD-10-CM

## 2021-07-21 DIAGNOSIS — E78.5 DYSLIPIDEMIA: ICD-10-CM

## 2021-07-21 DIAGNOSIS — G31.9 ACQUIRED CEREBRAL ATROPHY (HCC): ICD-10-CM

## 2021-07-21 DIAGNOSIS — R73.01 IMPAIRED FASTING GLUCOSE: Primary | ICD-10-CM

## 2021-07-21 DIAGNOSIS — E03.4 HYPOTHYROIDISM DUE TO ACQUIRED ATROPHY OF THYROID: ICD-10-CM

## 2021-07-21 ASSESSMENT — ENCOUNTER SYMPTOMS
VOMITING: 0
SHORTNESS OF BREATH: 0
CHEST TIGHTNESS: 0
NAUSEA: 0
DIARRHEA: 0
SORE THROAT: 0

## 2021-07-21 NOTE — PROGRESS NOTES
[facility]  [address] [employer], [address] Sandi Mosher  [phone]        HPI:     HPI    Patient presents for history and physical for admission to [facility]. Patient and her  currently reside in the [facility] to [facility], and moving to the [facility]. Impaired fasting glucose managed with diet. Hypertension is well controlled on amlodipine, atenolol, and losartan. Denies chest pain or dyspnea. Continues on atorvastatin for dyslipidemia. She has had previous evaluation by cardiology for atrial fibrillation. Currently anticoagulated as she has been in normal sinus rhythm. History of STEMI. ALLIE of proximal and distal RCA. Continues on Brilinta. Hypothyroidism is well controlled on current dose of levothyroxine. Follows with neurology for history of cerebral infarction, cerebral atrophy, and essential tremor. She does have a history of anger issues. Generally stable on Zyprexa. Current Outpatient Medications   Medication Sig Dispense Refill    FLUoxetine (PROZAC) 20 MG capsule TAKE 1 CAPSULE DAILY 90 capsule 3    levothyroxine (SYNTHROID) 137 MCG tablet TAKE 1 TABLET DAILY 90 tablet 3    Misc.  [facility] Home health referral. Dx: fall at home, atrial fibrillation, hx stroke 1 Device 0    losartan (COZAAR) 100 MG tablet Take 1 tablet by mouth daily 90 tablet 3    OLANZapine (ZYPREXA) 2.5 MG tablet Take 1 tablet by mouth nightly 90 tablet 3    amLODIPine (NORVASC) 2.5 MG tablet TAKE 1 TABLET DAILY 90 tablet 3    acetaminophen (TYLENOL) 325 MG tablet Take 650 mg by mouth 2 times daily as needed for Pain      BRILINTA 90 MG TABS tablet Take 1 tablet by mouth 2 times daily 60 tablet 0    atenolol (TENORMIN) 25 MG tablet Take 1 tablet by mouth daily 90 tablet 3    atorvastatin (LIPITOR) 40 MG tablet Take 1 tablet by mouth daily 90 tablet 3    aspirin 81 MG EC tablet Take 1 tablet by mouth daily      nitroGLYCERIN (NITROSTAT) 0.4 MG SL tablet External ear normal.      Left Ear: External ear normal.      Nose: No rhinorrhea. Mouth/Throat:      Lips: No lesions. Eyes:      Conjunctiva/sclera: Conjunctivae normal.   Neck:      Vascular: No JVD. Pulmonary:      Effort: Pulmonary effort is normal. No accessory muscle usage or respiratory distress. Musculoskeletal:      Cervical back: Normal range of motion. Skin:     Coloration: Skin is not cyanotic or jaundiced. Neurological:      Mental Status: She is alert. Mental status is at baseline. Psychiatric:         Attention and Perception: Attention and perception normal.         Mood and Affect: Mood and affect normal.         Speech: Speech normal.         Behavior: Behavior is cooperative. Thought Content: Thought content normal.         Assessment/Plan:        1. Impaired fasting glucose,  stable  - Continue to monitor    2. Essential hypertension,  stable  - Continue to monitor    3. Dyslipidemia,  stable  - Continue to monitor    4. Atrial fibrillation, unspecified type ([address] stable  - Continue to follow with cardiology    5. Hypothyroidism due to acquired atrophy of thyroid,  stable  - Continue to monitor    6. Gastroesophageal reflux disease without esophagitis,  stable  - Continue to monitor    7. Cerebral infarction, unspecified mechanism ([address] stable  8. Acquired cerebral atrophy ([address] stable  9. Essential tremor, stable  - Continue to follow with neurology    10. Difficulty controlling anger, stable  - Stable on zyprexa        All patient questions answered. Pt voiced understanding. Electronically signed by FELICIA Vidales CNP, NP-C on 7/21/2021 at 11:48 AM     German Yoo, was evaluated through a synchronous (real-time) audio-video encounter. The patient (or guardian if applicable) is aware that this is a billable service. Verbal consent to proceed has been obtained within the past 12 months.  The visit was conducted pursuant to the emergency declaration under the [address] [place] [address], [address] [place] [address] waiver authority and the [employer] General Act. Patient identification was verified, and a caregiver was present when appropriate. The patient was located in a state where the provider was credentialed to provide care. Total time spent for this encounter: Not billed by time    --FELICIA Carrington CNP on 7/21/2021 at 11:48 AM    An electronic signature was used to authenticate this note.

## 2021-07-23 ENCOUNTER — TELEPHONE (OUTPATIENT)
Dept: INTERNAL MEDICINE | Age: 86
End: 2021-07-23

## 2021-07-23 NOTE — TELEPHONE ENCOUNTER
I printed off future appts. Can Dr. Wali Odonnell put in the order for the Calcium and Vitamin D to be faxed as well?

## 2021-07-23 NOTE — TELEPHONE ENCOUNTER
----- Message from Mildred Carlos sent at 7/23/2021 10:14 AM EDT -----  Subject: Message to Provider    QUESTIONS  Information for Provider? needs a list of upcoming appointments fax to   [phone], also needs the order for calcium and vitamin d sent  ---------------------------------------------------------------------------  --------------  [address]  What is the best way for the office to contact you? OK to leave message on   voicemail  Preferred Call Back Phone Number? [phone]  ---------------------------------------------------------------------------  --------------  SCRIPT ANSWERS  Relationship to Patient?  Third Party  Representative Name? Kalvin Leventhal from [address] [place] [address] living in [place]

## 2021-07-30 ENCOUNTER — OFFICE VISIT (OUTPATIENT)
Dept: CARDIOLOGY | Age: 86
End: 2021-07-30
Payer: MEDICARE

## 2021-07-30 VITALS
HEIGHT: 64 IN | SYSTOLIC BLOOD PRESSURE: 132 MMHG | BODY MASS INDEX: 24.75 KG/M2 | WEIGHT: 145 LBS | DIASTOLIC BLOOD PRESSURE: 67 MMHG | HEART RATE: 58 BPM

## 2021-07-30 DIAGNOSIS — I25.10 CORONARY ARTERY DISEASE INVOLVING NATIVE CORONARY ARTERY OF NATIVE HEART WITHOUT ANGINA PECTORIS: Primary | ICD-10-CM

## 2021-07-30 DIAGNOSIS — I10 ESSENTIAL HYPERTENSION: ICD-10-CM

## 2021-07-30 DIAGNOSIS — E78.5 DYSLIPIDEMIA: ICD-10-CM

## 2021-07-30 DIAGNOSIS — Z98.62 S/P ANGIOPLASTY: ICD-10-CM

## 2021-07-30 DIAGNOSIS — I48.91 ATRIAL FIBRILLATION, UNSPECIFIED TYPE (HCC): ICD-10-CM

## 2021-07-30 PROCEDURE — 99214 OFFICE O/P EST MOD 30 MIN: CPT | Performed by: INTERNAL MEDICINE

## 2021-07-30 PROCEDURE — 93005 ELECTROCARDIOGRAM TRACING: CPT | Performed by: INTERNAL MEDICINE

## 2021-07-30 PROCEDURE — 93010 ELECTROCARDIOGRAM REPORT: CPT | Performed by: INTERNAL MEDICINE

## 2021-07-30 ASSESSMENT — ENCOUNTER SYMPTOMS
EYE DISCHARGE: 0
CHEST TIGHTNESS: 0
SHORTNESS OF BREATH: 0
ABDOMINAL PAIN: 0
BACK PAIN: 0
NAUSEA: 0
VOMITING: 0

## 2021-07-30 NOTE — PROGRESS NOTES
Today's Date: 7/30/2021  Patient's Name: Marin Peralta  Patient's age: 80 y.o., 3/21/1930    CC: follow up for CAD, PCI    HPI:  The patient is a 80y.o. year old, , female is in the office for CAD. Had recent STEMI with ALLIE of RCA on 12/2020. Feels very good with no chest pain or SOB, no dizziness or syncope and no palpitations. States she tries to stay busy and active. Lives at a NH. Past Medical History:   has a past medical history of Adenomatous polyp, Cataracts, bilateral, Combined forms of age-related cataract of both eyes, Corneal scar, right eye, Cystocele, Dementia ([address] Difficulty controlling anger, Dyslipidemia, GERD (gastroesophageal reflux disease), Goiter, Hiatal hernia, Hypertension, Hypokalemia, Hypothyroidism, Impaired fasting glucose, Malignant melanoma in situ ([address] Migraines, Murmur, functional, Osteopenia, Posterior vitreous detachment of both eyes, Primary open angle glaucoma (POAG) of both eyes, moderate stage, Sciatica, Skin cancer, Syncope and collapse, Tremor, 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 Past Surgical History:   has a past surgical history that includes other surgical history (11/11/08); Cholecystectomy; Colonoscopy (06/08/10); Mohs surgery (10/07/09); Vein Surgery (06/08/09); Colonoscopy (07/18/02); Cystocele repair (02/12/99); Appendectomy; Dilation and curettage of uterus; other surgical history (1988); Hysterectomy (1995); Upper gastrointestinal endoscopy (4/8/2015); Intracapsular cataract extraction (Left, 8/25/2020); and Intracapsular cataract extraction (Right, 10/8/2020). Home Medications:  Prior to Admission medications    Medication Sig Start Date End Date Taking?  Authorizing Provider   calcium-vitamin D (OSCAL 500/200 D-3) 500-200 MG-UNIT per tablet Take 1 tablet by mouth daily 7/23/21  Yes Sam Steinberg MD   FLUoxetine (PROZAC) 20 MG capsule TAKE 1 CAPSULE DAILY 6/3/21  Yes Amilcar Lux MD levothyroxine (SYNTHROID) 137 MCG tablet TAKE 1 TABLET DAILY 6/1/21  Yes Stephanie Skinner MD   losartan (COZAAR) 100 MG tablet Take 1 tablet by mouth daily 4/6/21  Yes Stephanie Skinner MD   OLANZapine (ZYPREXA) 2.5 MG tablet Take 1 tablet by mouth nightly 4/6/21  Yes Stephanie Skinner MD   amLODIPine (NORVASC) 2.5 MG tablet TAKE 1 TABLET DAILY 3/6/21  Yes Stephanie Skinner MD   acetaminophen (TYLENOL) 325 MG tablet Take 650 mg by mouth 2 times daily as needed for Pain   Yes Historical Provider, MD   BRILINTA 90 MG TABS tablet Take 1 tablet by mouth 2 times daily 2/1/21  Yes Vimal Seals DO   atenolol (TENORMIN) 25 MG tablet Take 1 tablet by mouth daily 2/1/21  Yes Stephanie Skinner MD   atorvastatin (LIPITOR) 40 MG tablet Take 1 tablet by mouth daily 2/1/21  Yes Stephanie Skinner MD   aspirin 81 MG EC tablet Take 1 tablet by mouth daily 12/21/20  Yes Historical Provider, MD   nitroGLYCERIN (NITROSTAT) 0.4 MG SL tablet Place 0.4 mg under the tongue every 5 minutes as needed for Chest pain up to max of 3 total doses. If no relief after 3 dose, call 911. Yes Historical Provider, MD   vitamin B-12 (CYANOCOBALAMIN) 1000 MCG tablet Take 1,000 mcg by mouth daily   Yes Historical Provider, MD   ammonium lactate (AMLACTIN) 12 % cream apply to affected area twice a day if needed 4/7/20  Yes Stephanie Skinner MD   Multiple Vitamin (DAILY MULTIVITAMIN PO) Take 1 tablet by mouth daily    Yes Historical Provider, MD   ibuprofen (ADVIL;MOTRIN) 600 MG tablet Take 1 tablet by mouth 3 times daily as needed for Pain (Take with food.) 12/27/20 12/27/20  Cyril Mosher MD       Allergies:  Patient has no known allergies. Social History:   reports that she has never smoked. She has never used smokeless tobacco. She reports current alcohol use. She reports that she does not use drugs. Review of Systems:  Review of Systems   Constitutional: Negative for chills, fatigue and fever. HENT: Negative for congestion and dental problem. Eyes: Negative for discharge.    Respiratory: Negative for chest tightness and shortness of breath. Cardiovascular: Negative for chest pain and palpitations. Gastrointestinal: Negative for abdominal pain, nausea and vomiting. Endocrine: Negative for cold intolerance and heat intolerance. Genitourinary: Negative for difficulty urinating and dyspareunia. Musculoskeletal: Negative for arthralgias and back pain. Neurological: Negative for dizziness and facial asymmetry. Psychiatric/Behavioral: Negative for agitation and behavioral problems. Physical Exam:  /67   Pulse 58   Ht 5' 4\" (1.626 m)   Wt 145 lb (65.8 kg)   LMP  (LMP Unknown)   BMI 24.89 kg/m² BP    Physical Exam  Constitutional:       Appearance: Normal appearance. HENT:      Head: Normocephalic and atraumatic. Cardiovascular:      Rate and Rhythm: Normal rate and regular rhythm. Pulses: Normal pulses. Heart sounds: Murmur (ejection systolic murmur) heard. Pulmonary:      Effort: Pulmonary effort is normal. No respiratory distress. Breath sounds: Normal breath sounds. No stridor. Abdominal:      General: There is no distension. Palpations: There is no mass. Musculoskeletal:         General: No swelling or tenderness. Cervical back: No rigidity. No muscular tenderness. Skin:     Capillary Refill: Capillary refill takes less than 2 seconds. Coloration: Skin is not jaundiced or pale. Neurological:      General: No focal deficit present. Mental Status: She is alert and oriented to person, place, and time. Cranial Nerves: No cranial nerve deficit. Sensory: No sensory deficit. Psychiatric:         Mood and Affect: Mood normal.         Behavior: Behavior normal.     Cardiac Data:  ECG:  Sinus  Bradycardia   -RSR(V1) -nondiagnostic.    -Left axis. Voltage criteria for LVH  (R(aVL) exceeds 1.01 mV). -  Nonspecific T-abnormality.      Labs:   Lab Results   Component Value Date    CHOL 207 (H) 11/28/2016    TRIG 135 11/28/2016    HDL 87 11/28/2016    LDLCHOLESTEROL 93 11/28/2016    VLDL 27 11/28/2016    CHOLHDLRATIO 2.4 11/28/2016     Lab Results   Component Value Date     01/22/2021    K 3.6 (L) 01/22/2021    CL 99 01/22/2021    CO2 31 01/22/2021    BUN 12 01/22/2021    CREATININE 0.66 01/22/2021    GLUCOSE 184 (H) 01/22/2021    CALCIUM 9.5 01/22/2021    PROT 7.3 01/22/2021    LABALBU 4.1 01/22/2021    BILITOT 0.51 01/22/2021    ALKPHOS 71 01/22/2021    AST 22 01/22/2021    ALT 9 01/22/2021    LABGLOM >60 01/22/2021    GFRAA >60 01/22/2021    GLOB 2.9 12/24/2020       Assessment/Plan:  1. CAD- s/p STEMI on 12/22020. ALLIE of proximal and distal RCA. Mild to moderate disease of other arteries. PReserved LV systolic function.  - Echo 12/2020 EF 45-50%. Mild to moderate TR. Moderate MR.  RVSP 46 mm Hg.   - continue current medications. 2. HPL. On Statin. - will update lipids    3. PAF at time of STEMI. Has been in NSR. 4. Essential hypertension. Well controlled. 5. Hypothyroidism. Followed by PCP    The patient is to continue heart healthy diet, weight loss and exercise as tolerated. Patient's medications and side effects were discussed. Medication refills were provided if needed. Follow up appointment timing was discussed. All questions and concerns were addressed to patient's satisfaction. The patient is to follow up in 6 months or sooner if necessary. Thank you for allowing me to participate in the care of this patient, please do not hesitate to call if you have any questions. Lynnette Taveras DO, [address] [facility], [address] [facility]  [facility]  [phone]

## 2021-08-02 ENCOUNTER — NURSE TRIAGE (OUTPATIENT)
Dept: OTHER | Facility: CLINIC | Age: 86
End: 2021-08-02

## 2021-08-02 NOTE — TELEPHONE ENCOUNTER
\"What do you think is causing the leg swelling? \"      Unknown    8. MEDICAL HISTORY: \"Do you have a history of heart failure, kidney disease, liver failure, or cancer? \"      No    9. RECURRENT SYMPTOM: \"Have you had leg swelling before? \" If so, ask: \"When was the last time? \" \"What happened that time? \"      No    10. OTHER SYMPTOMS: \"Do you have any other symptoms? \" (e.g., chest pain, difficulty breathing)        No    11. PREGNANCY: \"Is there any chance you are pregnant? \" \"When was your last menstrual period? \"        n/a    Protocols used: LEG SWELLING AND EDEMA-ADULT-[place]

## 2021-08-04 ENCOUNTER — PATIENT MESSAGE (OUTPATIENT)
Dept: INTERNAL MEDICINE | Age: 86
End: 2021-08-04

## 2021-08-04 RX ORDER — SENNOSIDES 8.6 MG
650 CAPSULE ORAL 2 TIMES DAILY PRN
COMMUNITY
End: 2022-09-20 | Stop reason: DRUGHIGH

## 2021-08-04 NOTE — TELEPHONE ENCOUNTER
From: Gloria Brewer  To: Stella Leblanc MD  Sent: 8/4/2021 1:18 PM EDT  Subject: Non-Urgent Medical Question    My parents are now in the assisted living at [facility]. They are no longer in their bungalow. Mom is getting more and more confused. I would like to request that if she calls for an appointment as she did a few days ago that you tell her to start with [facility]. We have all explained this but shes having a hard time remembering. [facility] is supposed to contact you as well but just in case I thought Id better let you know. My Dad understands and will go through the proper channels. Thank you. Please let me know if you have any concerns.   Chandler Farley

## 2021-08-05 ENCOUNTER — HOSPITAL ENCOUNTER (OUTPATIENT)
Age: 86
Setting detail: SPECIMEN
Discharge: HOME | End: 2021-08-05
Payer: MEDICARE

## 2021-08-05 DIAGNOSIS — I25.10 CORONARY ARTERY DISEASE INVOLVING NATIVE CORONARY ARTERY OF NATIVE HEART WITHOUT ANGINA PECTORIS: ICD-10-CM

## 2021-08-05 LAB
CHOLESTEROL/HDL RATIO: 1.8
CHOLESTEROL: 99 MG/DL
HDLC SERPL-MCNC: 55 MG/DL
LDL CHOLESTEROL: 35 MG/DL (ref 0–130)
TRIGL SERPL-MCNC: 45 MG/DL
VLDLC SERPL CALC-MCNC: NORMAL MG/DL (ref 1–30)

## 2021-08-05 PROCEDURE — 80061 LIPID PANEL: CPT

## 2021-08-05 PROCEDURE — 36415 COLL VENOUS BLD VENIPUNCTURE: CPT

## 2021-09-06 ENCOUNTER — APPOINTMENT (OUTPATIENT)
Dept: CT IMAGING | Age: 86
End: 2021-09-06
Payer: MEDICARE

## 2021-09-06 ENCOUNTER — HOSPITAL ENCOUNTER (EMERGENCY)
Age: 86
Discharge: HOME | End: 2021-09-07
Attending: EMERGENCY MEDICINE
Payer: MEDICARE

## 2021-09-06 DIAGNOSIS — S01.81XA LACERATION OF FOREHEAD, INITIAL ENCOUNTER: Primary | ICD-10-CM

## 2021-09-06 DIAGNOSIS — S09.90XA INJURY OF HEAD, INITIAL ENCOUNTER: ICD-10-CM

## 2021-09-06 PROCEDURE — 12011 RPR F/E/E/N/L/M 2.5 CM/<: CPT

## 2021-09-06 PROCEDURE — 72125 CT NECK SPINE W/O DYE: CPT

## 2021-09-06 PROCEDURE — 70486 CT MAXILLOFACIAL W/O DYE: CPT

## 2021-09-06 PROCEDURE — 99285 EMERGENCY DEPT VISIT HI MDM: CPT

## 2021-09-06 PROCEDURE — 70450 CT HEAD/BRAIN W/O DYE: CPT

## 2021-09-06 RX ORDER — LIDOCAINE HYDROCHLORIDE AND EPINEPHRINE 10; 10 MG/ML; UG/ML
20 INJECTION, SOLUTION INFILTRATION; PERINEURAL ONCE
Status: DISCONTINUED | OUTPATIENT
Start: 2021-09-07 | End: 2021-09-06

## 2021-09-06 RX ORDER — LIDOCAINE HYDROCHLORIDE 10 MG/ML
20 INJECTION, SOLUTION INFILTRATION; PERINEURAL ONCE
Status: COMPLETED | OUTPATIENT
Start: 2021-09-07 | End: 2021-09-07

## 2021-09-06 ASSESSMENT — ENCOUNTER SYMPTOMS
VOMITING: 0
RHINORRHEA: 0
SHORTNESS OF BREATH: 0
DIARRHEA: 0
ABDOMINAL PAIN: 0
COUGH: 0
BACK PAIN: 0
EYE PAIN: 0
SORE THROAT: 0
NAUSEA: 0

## 2021-09-07 ENCOUNTER — OUTSIDE SERVICES (OUTPATIENT)
Dept: INTERNAL MEDICINE | Age: 86
End: 2021-09-07
Payer: MEDICARE

## 2021-09-07 VITALS
BODY MASS INDEX: 23.56 KG/M2 | OXYGEN SATURATION: 96 % | HEIGHT: 64 IN | HEART RATE: 61 BPM | DIASTOLIC BLOOD PRESSURE: 53 MMHG | SYSTOLIC BLOOD PRESSURE: 152 MMHG | WEIGHT: 138 LBS | TEMPERATURE: 98.4 F | RESPIRATION RATE: 18 BRPM

## 2021-09-07 VITALS
SYSTOLIC BLOOD PRESSURE: 130 MMHG | HEART RATE: 67 BPM | OXYGEN SATURATION: 95 % | RESPIRATION RATE: 18 BRPM | TEMPERATURE: 97 F | DIASTOLIC BLOOD PRESSURE: 61 MMHG

## 2021-09-07 DIAGNOSIS — S01.111D LACERATION OF EYEBROW AND FOREHEAD, RIGHT, SUBSEQUENT ENCOUNTER: ICD-10-CM

## 2021-09-07 DIAGNOSIS — S01.81XD LACERATION OF EYEBROW AND FOREHEAD, RIGHT, SUBSEQUENT ENCOUNTER: ICD-10-CM
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
## 2021-09-07 DIAGNOSIS — S09.90XD INJURY OF HEAD, SUBSEQUENT ENCOUNTER: ICD-10-CM

## 2021-09-07 PROCEDURE — 2500000003 HC RX 250 WO HCPCS: Performed by: EMERGENCY MEDICINE

## 2021-09-07 RX ADMIN — LIDOCAINE HYDROCHLORIDE 20 ML: 10 INJECTION, SOLUTION INFILTRATION; PERINEURAL at 00:00

## 2021-09-07 ASSESSMENT — PAIN SCALES - GENERAL: PAINLEVEL_OUTOF10: 0

## 2021-09-07 ASSESSMENT — ENCOUNTER SYMPTOMS
RHINORRHEA: 0
VOMITING: 0
COUGH: 0
NAUSEA: 0
WHEEZING: 0
DIARRHEA: 0

## 2021-09-07 NOTE — PROGRESS NOTES
[facility]      Jane Fuller is a 80 y.o. female resident of [facility] who presents today for medical conditions/complaints as noted below. HPI:     HPI    Patient presents for ER follow-up after a recent fall at the assisted living facility in which she resides. She was reaching for a light switch, fell forward and struck her head. Received a 1 cm laceration to her right anterior temporal forehead. Did have CT of cervical spine, facial bones, and head, which showed no acute findings. She states she has been doing well since discharge. She did receive 4 sutures to her laceration. She denies any residual headache or dizziness. Advised to have sutures removed in 5 to 6 days. Current Outpatient Medications   Medication Sig Dispense Refill    acetaminophen (TYLENOL) 650 MG extended release tablet Take 650 mg by mouth 2 times daily as needed for Pain      calcium-vitamin D (OSCAL 500/200 D-3) 500-200 MG-UNIT per tablet Take 1 tablet by mouth daily 90 tablet 3    FLUoxetine (PROZAC) 20 MG capsule TAKE 1 CAPSULE DAILY 90 capsule 3    levothyroxine (SYNTHROID) 137 MCG tablet TAKE 1 TABLET DAILY 90 tablet 3    losartan (COZAAR) 100 MG tablet Take 1 tablet by mouth daily 90 tablet 3    OLANZapine (ZYPREXA) 2.5 MG tablet Take 1 tablet by mouth nightly 90 tablet 3    amLODIPine (NORVASC) 2.5 MG tablet TAKE 1 TABLET DAILY 90 tablet 3    BRILINTA 90 MG TABS tablet Take 1 tablet by mouth 2 times daily 60 tablet 0    atenolol (TENORMIN) 25 MG tablet Take 1 tablet by mouth daily 90 tablet 3    atorvastatin (LIPITOR) 40 MG tablet Take 1 tablet by mouth daily 90 tablet 3    aspirin 81 MG EC tablet Take 1 tablet by mouth daily      nitroGLYCERIN (NITROSTAT) 0.4 MG SL tablet Place 0.4 mg under the tongue every 5 minutes as needed for Chest pain up to max of 3 total doses. If no relief after 3 dose, call 911.       vitamin B-12 (CYANOCOBALAMIN) 1000 MCG tablet Take 1,000 mcg by mouth Mental status is at baseline. Psychiatric:         Mood and Affect: Mood normal.         Behavior: Behavior normal.         Assessment/Plan:        1. Fall, subsequent encounter  2. Laceration of eyebrow and forehead, right, subsequent encounter  3. Injury of head, subsequent encounter  - Patient doing well since discharge. Will plan to return for suture removal as scheduled, [facility] staff notes they already have plans for suture removal. Monitor for any acute changes. Return if symptoms worsen or fail to improve, for suture removal as scheduled. No orders of the defined types were placed in this encounter. No orders of the defined types were placed in this encounter.                 Electronically signed by Karyle Colder, APRN - CNP on 9/7/2021 at 10:25 AM

## 2021-09-07 NOTE — ED PROVIDER NOTES
[address] [facility] ED  [address]  Phone: [phone]    [place]          Pt Name: Lorene Walls  MRN: [id number]  [place] 3/21/1930  Date of evaluation: 9/6/2021      CHIEF COMPLAINT       Chief Complaint   Patient presents with    Laceration       HISTORY OF PRESENT ILLNESS       Lorene Walls is a 80 y.o. female who presents after tripping and falling. She was reaching for a light switch in an unfamiliar location and fell forward and struck her face. No loss of consciousness. Her  called EMS. Patient is awake, alert and otherwise appropriate. Denies pain anywhere else or any other injury. Does have a 1 cm laceration to her right anterior temporal forehead region. No other injuries noted. Denies other symptoms. States she is not on anticoagulants. REVIEW OF SYSTEMS       Review of Systems   Constitutional: Negative for chills, fatigue and fever. HENT: Negative for rhinorrhea and sore throat. Eyes: Negative for pain. Respiratory: Negative for cough and shortness of breath. Cardiovascular: Negative for chest pain. Gastrointestinal: Negative for abdominal pain, diarrhea, nausea and vomiting. Genitourinary: Negative for difficulty urinating. Musculoskeletal: Negative for back pain and neck pain. Skin: Positive for wound. Negative for rash. Neurological: Negative for weakness and headaches.           PAST MEDICAL HISTORY    has a past medical history of Adenomatous polyp, Cataracts, bilateral, Combined forms of age-related cataract of both eyes, Corneal scar, right eye, Cystocele, Dementia ([address] Difficulty controlling anger, Dyslipidemia, GERD (gastroesophageal reflux disease), Goiter, Hiatal hernia, Hypertension, Hypokalemia, Hypothyroidism, Impaired fasting glucose, Malignant melanoma in situ ([address] Migraines, Murmur, functional, Osteopenia, Posterior vitreous detachment of both eyes, Primary open angle glaucoma (POAG) of both eyes, moderate stage, Sciatica, Skin cancer, Syncope and collapse, Tremor, 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 SURGICAL HISTORY      has a past surgical history that includes other surgical history (11/11/08); Cholecystectomy; Colonoscopy (06/08/10); Mohs surgery (10/07/09); Vein Surgery (06/08/09); Colonoscopy (07/18/02); Cystocele repair (02/12/99); Appendectomy; Dilation and curettage of uterus; other surgical history (1988); Hysterectomy (1995); Upper gastrointestinal endoscopy (4/8/2015); Intracapsular cataract extraction (Left, 8/25/2020); and Intracapsular cataract extraction (Right, 10/8/2020). CURRENT MEDICATIONS       Current Discharge Medication List      CONTINUE these medications which have NOT CHANGED    Details   acetaminophen (TYLENOL) 650 MG extended release tablet Take 650 mg by mouth 2 times daily as needed for Pain      calcium-vitamin D (OSCAL 500/200 D-3) 500-200 MG-UNIT per tablet Take 1 tablet by mouth daily  Qty: 90 tablet, Refills: 3      FLUoxetine (PROZAC) 20 MG capsule TAKE 1 CAPSULE DAILY  Qty: 90 capsule, Refills: 3      levothyroxine (SYNTHROID) 137 MCG tablet TAKE 1 TABLET DAILY  Qty: 90 tablet, Refills: 3    Comments: YOUR PATIENT HAS REQUESTED A REFILL OF THIS MEDICATION, PREVIOUSLY AUTHORIZED BY ANOTHER PRESCRIBER.   Associated Diagnoses: Hypothyroidism, unspecified type      losartan (COZAAR) 100 MG tablet Take 1 tablet by mouth daily  Qty: 90 tablet, Refills: 3      OLANZapine (ZYPREXA) 2.5 MG tablet Take 1 tablet by mouth nightly  Qty: 90 tablet, Refills: 3      amLODIPine (NORVASC) 2.5 MG tablet TAKE 1 TABLET DAILY  Qty: 90 tablet, Refills: 3    Associated Diagnoses: Essential hypertension      BRILINTA 90 MG TABS tablet Take 1 tablet by mouth 2 times daily  Qty: 60 tablet, Refills: 0      atenolol (TENORMIN) 25 MG tablet Take 1 tablet by mouth daily  Qty: 90 tablet, Refills: 3      atorvastatin (LIPITOR) 40 MG tablet Take 1 tablet by mouth daily  Qty: 90 tablet, Refills: 3      aspirin 81 MG EC tablet Take 1 tablet by mouth daily      nitroGLYCERIN (NITROSTAT) 0.4 MG SL tablet Place 0.4 mg under the tongue every 5 minutes as needed for Chest pain up to max of 3 total doses. If no relief after 3 dose, call 911. vitamin B-12 (CYANOCOBALAMIN) 1000 MCG tablet Take 1,000 mcg by mouth daily      ammonium lactate (AMLACTIN) 12 % cream apply to affected area twice a day if needed  Qty: 385 g, Refills: 3      Multiple Vitamin (DAILY MULTIVITAMIN PO) Take 1 tablet by mouth daily              ALLERGIES     has No Known Allergies. FAMILY HISTORY     She indicated that her mother is . She indicated that her father is . She indicated that the status of her brother is unknown. She indicated that her other is alive. She indicated that the status of her neg hx is unknown.     family history includes Colon Cancer in her brother; Coronary Art Dis in her father. SOCIAL HISTORY      reports that she has never smoked. She has never used smokeless tobacco. She reports current alcohol use. She reports that she does not use drugs. PHYSICAL EXAM     INITIAL VITALS:  height is 5' 4\" (1.626 m) and weight is 138 lb (62.6 kg). Her tympanic temperature is 98.4 °F (36.9 °C). Her blood pressure is 152/53 (abnormal) and her pulse is 61. Her respiration is 18 and oxygen saturation is 96%. Physical Exam  Vitals reviewed. Constitutional:       General: She is not in acute distress. Appearance: She is well-developed. She is not ill-appearing or toxic-appearing. HENT:      Head: Normocephalic. Comments: 1 cm laceration as shown on the picture. No active bleeding. No other significant facial traumatic findings. Right Ear: External ear normal.      Left Ear: External ear normal.   Eyes:      General: Lids are normal.   Neck:      Trachea: No tracheal deviation.    Cardiovascular:      Rate and Rhythm: Normal rate and regular rhythm. Pulmonary:      Effort: Pulmonary effort is normal. No respiratory distress. Breath sounds: Normal breath sounds. Abdominal:      Palpations: Abdomen is soft. Tenderness: There is no abdominal tenderness. Skin:     General: Skin is warm and dry. Neurological:      Mental Status: She is alert. GCS: GCS eye subscore is 4. GCS verbal subscore is 5. GCS motor subscore is 6. Psychiatric:         Speech: Speech normal.           DIFFERENTIAL DIAGNOSIS/ MDM:     Plan to be to image the head, face and cervical spine and suture the wound. DIAGNOSTIC RESULTS     EKG: All EKG's are interpreted by the Emergency Department Physician who either signs or Co-signs this chart in the absence of a cardiologist.        RADIOLOGY:   Interpretation per the Radiologist below, if available at the time of this note:  CT CERVICAL SPINE WO CONTRAST   Final Result   1. Anterior right frontal scalp mild focal soft tissue contusion. 2. No evidence of associated acute intracranial trauma. 3. Bilateral mid frontal remote infarction. 4. Severe cerebral white matter chronic microvascular ischemic disease. 5. Punctate calcification within the right caudate nucleus suggesting   association with remote hemorrhage versus chronic granulomatous process. 6. No evidence of acute maxillofacial fracture. 7. No acute abnormality of the cervical spine. 8. C4/C5 mild bilateral, C5/C6 moderate left and mild right neural foraminal   stenosis secondary to encroachment by disc osteophyte complex. CT FACIAL BONES WO CONTRAST   Final Result   1. Anterior right frontal scalp mild focal soft tissue contusion. 2. No evidence of associated acute intracranial trauma. 3. Bilateral mid frontal remote infarction. 4. Severe cerebral white matter chronic microvascular ischemic disease.    5. Punctate calcification within the right caudate nucleus suggesting   association with remote hemorrhage versus chronic granulomatous process. 6. No evidence of acute maxillofacial fracture. 7. No acute abnormality of the cervical spine. 8. C4/C5 mild bilateral, C5/C6 moderate left and mild right neural foraminal   stenosis secondary to encroachment by disc osteophyte complex. CT HEAD WO CONTRAST   Final Result   1. Anterior right frontal scalp mild focal soft tissue contusion. 2. No evidence of associated acute intracranial trauma. 3. Bilateral mid frontal remote infarction. 4. Severe cerebral white matter chronic microvascular ischemic disease. 5. Punctate calcification within the right caudate nucleus suggesting   association with remote hemorrhage versus chronic granulomatous process. 6. No evidence of acute maxillofacial fracture. 7. No acute abnormality of the cervical spine. 8. C4/C5 mild bilateral, C5/C6 moderate left and mild right neural foraminal   stenosis secondary to encroachment by disc osteophyte complex. No results found. LABS:  No results found for this visit on 09/06/21. EMERGENCY DEPARTMENT COURSE:     The patient was given the following medications:  Orders Placed This Encounter   Medications    DISCONTD: lidocaine-EPINEPHrine 1 %-1:503113 injection 20 mL    lidocaine 1 % injection 20 mL        Vitals:    Vitals:    09/06/21 2257 09/06/21 2300   BP: (!) 143/57 (!) 152/53   Pulse: 61    Resp: 18    Temp: 98.4 °F (36.9 °C)    TempSrc: Tympanic    SpO2: 97% 96%   Weight: 138 lb (62.6 kg)    Height: 5' 4\" (1.626 m)      -------------------------  BP: (!) 152/53, Temp: 98.4 °F (36.9 °C), Pulse: 61, Resp: 18      Re-evaluation Notes    Patient doing well on reevaluation. No acute changes. No acute intracranial, cervical or facial findings from the fall today. Chronic changes and findings noted. No intracranial hemorrhage. The wound was sutured with 4 sutures and hemostasis was achieved.   Does have soft tissue swelling surrounding the area at this time as well. Plan to be to discharge patient home. She is awake, alert and appropriate and I feel appropriate for discharge and outpatient follow-up. Patient is agreeable and comfortable with this plan as well. Advised to have the sutures removed in the next 5 to 6 days. Advised return for any new or concerning symptoms. Wound was thoroughly cleansed and it is not dirty. Tetanus vaccine is up-to-date. I do not feel she requires antibiotics. There are no signs of infection, there are no foreign bodies within the wound, there are no signs of compartment syndrome. The pulses are 2/4. The motor is 5/5. The sensation is intact. The patient has full range of motion. The patient was instructed to follow up in 2-3 days with their family doctor for a wound check. We also discussed returning to the Emergency Department immediately if new or worsening symptoms occur. We have discussed the symptoms which are most concerning (e.g., increasing pain, fever, drainage from the wound or other signs of infection, numbness, weakness, color change or coldness to the extremity) that necessitate immediate return. The patient was advised to keep the wound clean and dry, they may apply antibiotic ointment to the wound. The patient understands that at this time there is no evidence for a more malignant underlying process, but the patient also understands that early in the process of an illness or injury, an emergency department workup can be falsely reassuring. Routine discharge counseling was given, and the patient understands that worsening, changing or persistent symptoms should prompt an immediate call or follow up with their primary physician or return to the emergency department. The importance of appropriate follow up was also discussed. I have reviewed the disposition diagnosis with the patient and or their family/guardian. I have answered their questions and given discharge instructions.   They voiced understanding of these instructions and did not have any further questions or complaints. CONSULTS:    None    CRITICAL CARE:     None    PROCEDURES:    Lac Repair    Date/Time: 9/7/2021 12:16 AM  Performed by: Julita Cancino DO  Authorized by: Julita Cancino DO     Consent:     Consent obtained:  Verbal    Consent given by:  Patient    Risks discussed:  Infection and poor cosmetic result  Anesthesia (see MAR for exact dosages): Anesthesia method:  Local infiltration    Local anesthetic:  Lidocaine 1% w/o epi  Laceration details:     Location:  Face    Face location:  Forehead    Length (cm):  1    Depth (mm):  4  Repair type:     Repair type: Intermediate  Pre-procedure details:     Preparation:  Patient was prepped and draped in usual sterile fashion  Exploration:     Wound exploration: wound explored through full range of motion and entire depth of wound probed and visualized      Wound extent: no underlying fracture noted    Treatment:     Area cleansed with:  Hibiclens    Amount of cleaning:  Standard    Irrigation solution:  Sterile water  Skin repair:     Repair method:  Sutures    Suture size:  5-0    Suture material:  Nylon    Suture technique:  Simple interrupted    Number of sutures:  4  Approximation:     Approximation:  Close  Post-procedure details:     Dressing:  Non-adherent dressing    Patient tolerance of procedure: Tolerated well, no immediate complications          FINAL IMPRESSION      1. Laceration of forehead, initial encounter    2. Injury of head, initial encounter          DISPOSITION/PLAN   DISPOSITION Decision To Discharge 09/07/2021 12:42:52 AM      Condition on Disposition    Improved    PATIENT REFERRED TO:  No follow-up provider specified.     DISCHARGE MEDICATIONS:  Current Discharge Medication List          (Please note that portions of this note were completed with a voice recognition program.  Efforts were made to edit the dictations but occasionally words are

## 2021-09-07 NOTE — ED TRIAGE NOTES
Pt states that she was reaching for something and fell forward loosing her balance and momentum hitting table at lateral right eyebrow approx 1cm

## 2021-09-08 ENCOUNTER — TELEPHONE (OUTPATIENT)
Dept: INTERNAL MEDICINE | Age: 86
End: 2021-09-08

## 2021-09-08 NOTE — TELEPHONE ENCOUNTER
Ok to remove sutures per ER visit recommendations  Visit was 09/06/21, was advised to have sutures removed in 5 or 6 days.

## 2021-09-08 NOTE — TELEPHONE ENCOUNTER
Last appt: 3/9/2021  Next appt: 9/23/2021    [facility] called asking for an order to remove sutures on Monday Sept 13.

## 2021-09-09 ENCOUNTER — PATIENT MESSAGE (OUTPATIENT)
Dept: INTERNAL MEDICINE | Age: 86
End: 2021-09-09

## 2021-09-09 RX ORDER — MEMANTINE HYDROCHLORIDE 5 MG/1
5 TABLET ORAL DAILY
COMMUNITY
End: 2021-09-22 | Stop reason: SDUPTHER

## 2021-09-09 NOTE — TELEPHONE ENCOUNTER
From: Camden Calero  To: Deidra Roldan MD  Sent: 9/9/2021 3:26 PM EDT  Subject: Non-Urgent Medical Question    Please see message sent 8/4/2021.   Thank you,  Jacky Martinez

## 2021-09-22 ENCOUNTER — PATIENT MESSAGE (OUTPATIENT)
Dept: INTERNAL MEDICINE | Age: 86
End: 2021-09-22

## 2021-09-22 RX ORDER — MEMANTINE HYDROCHLORIDE 5 MG/1
5 TABLET ORAL DAILY
Qty: 90 TABLET | Refills: 2 | Status: SHIPPED | OUTPATIENT
Start: 2021-09-22 | End: 2022-02-21 | Stop reason: SDUPTHER

## 2021-09-22 NOTE — TELEPHONE ENCOUNTER
From: Evi Ireland  To: Ambrose Almanzar MD  Sent: 9/22/2021 2:36 PM EDT  Subject: Prescription Question    Hi Dr Nirmala Rodgers,  If you havent already can you send the [facility]?   Thank you,  Sparkle

## 2021-09-23 ENCOUNTER — HOSPITAL ENCOUNTER (OUTPATIENT)
Dept: LAB | Age: 86
Discharge: HOME | End: 2021-09-23
Payer: MEDICARE

## 2021-09-23 ENCOUNTER — HOSPITAL ENCOUNTER (OUTPATIENT)
Age: 86
Setting detail: SPECIMEN
Discharge: HOME | End: 2021-09-23
Payer: MEDICARE

## 2021-09-23 ENCOUNTER — OFFICE VISIT (OUTPATIENT)
Dept: INTERNAL MEDICINE | Age: 86
End: 2021-09-23
Payer: MEDICARE

## 2021-09-23 VITALS
DIASTOLIC BLOOD PRESSURE: 72 MMHG | HEART RATE: 62 BPM | HEIGHT: 64 IN | BODY MASS INDEX: 24.72 KG/M2 | TEMPERATURE: 97.3 F | SYSTOLIC BLOOD PRESSURE: 134 MMHG | WEIGHT: 144.8 LBS | RESPIRATION RATE: 18 BRPM

## 2021-09-23 DIAGNOSIS — E78.5 DYSLIPIDEMIA: ICD-10-CM

## 2021-09-23 DIAGNOSIS — E03.9 HYPOTHYROIDISM, UNSPECIFIED TYPE: ICD-10-CM

## 2021-09-23 DIAGNOSIS — I10 ESSENTIAL HYPERTENSION: ICD-10-CM

## 2021-09-23 DIAGNOSIS — I10 ESSENTIAL HYPERTENSION: Primary | ICD-10-CM

## 2021-09-23 DIAGNOSIS — R41.3 MEMORY LOSS: ICD-10-CM

## 2021-09-23 LAB
ABSOLUTE EOS #: 0.13 K/UL (ref 0–0.44)
ABSOLUTE IMMATURE GRANULOCYTE: 0.04 K/UL (ref 0–0.3)
ABSOLUTE LYMPH #: 1.25 K/UL (ref 1.1–3.7)
ABSOLUTE MONO #: 0.83 K/UL (ref 0.1–1.2)
ALBUMIN SERPL-MCNC: 4.1 G/DL (ref 3.5–5.2)
ALBUMIN/GLOBULIN RATIO: 1.2 (ref 1–2.5)
ALP BLD-CCNC: 78 U/L (ref 35–104)
ALT SERPL-CCNC: 8 U/L (ref 5–33)
ANION GAP SERPL CALCULATED.3IONS-SCNC: 7 MMOL/L (ref 9–17)
AST SERPL-CCNC: 22 U/L
BASOPHILS # BLD: 1 % (ref 0–2)
BASOPHILS ABSOLUTE: 0.07 K/UL (ref 0–0.2)
BILIRUB SERPL-MCNC: 0.55 MG/DL (ref 0.3–1.2)
BUN BLDV-MCNC: 13 MG/DL (ref 8–23)
BUN/CREAT BLD: 16 (ref 9–20)
CALCIUM SERPL-MCNC: 9.4 MG/DL (ref 8.6–10.4)
CHLORIDE BLD-SCNC: 104 MMOL/L (ref 98–107)
CO2: 31 MMOL/L (ref 20–31)
CREAT SERPL-MCNC: 0.81 MG/DL (ref 0.5–0.9)
DIFFERENTIAL TYPE: ABNORMAL
EOSINOPHILS RELATIVE PERCENT: 2 % (ref 1–4)
GFR AFRICAN AMERICAN: >60 ML/MIN
GFR NON-AFRICAN AMERICAN: >60 ML/MIN
GFR SERPL CREATININE-BSD FRML MDRD: ABNORMAL ML/MIN/{1.73_M2}
GFR SERPL CREATININE-BSD FRML MDRD: ABNORMAL ML/MIN/{1.73_M2}
GLUCOSE BLD-MCNC: 103 MG/DL (ref 70–99)
HCT VFR BLD CALC: 36.4 % (ref 36.3–47.1)
HEMOGLOBIN: 11.2 G/DL (ref 11.9–15.1)
IMMATURE GRANULOCYTES: 1 %
LYMPHOCYTES # BLD: 15 % (ref 24–43)
MCH RBC QN AUTO: 30.3 PG (ref 25.2–33.5)
MCHC RBC AUTO-ENTMCNC: 30.8 G/DL (ref 25.2–33.5)
MCV RBC AUTO: 98.4 FL (ref 82.6–102.9)
MONOCYTES # BLD: 10 % (ref 3–12)
NRBC AUTOMATED: 0 PER 100 WBC
PDW BLD-RTO: 14.1 % (ref 11.8–14.4)
PLATELET # BLD: 261 K/UL (ref 138–453)
PLATELET ESTIMATE: ABNORMAL
PMV BLD AUTO: 10.7 FL (ref 8.1–13.5)
POTASSIUM SERPL-SCNC: 3.9 MMOL/L (ref 3.7–5.3)
RBC # BLD: 3.7 M/UL (ref 3.95–5.11)
RBC # BLD: ABNORMAL 10*6/UL
SEG NEUTROPHILS: 71 % (ref 36–65)
SEGMENTED NEUTROPHILS ABSOLUTE COUNT: 5.8 K/UL (ref 1.5–8.1)
SODIUM BLD-SCNC: 142 MMOL/L (ref 135–144)
THYROXINE, FREE: 2.47 NG/DL (ref 0.93–1.7)
TOTAL PROTEIN: 7.4 G/DL (ref 6.4–8.3)
TSH SERPL DL<=0.05 MIU/L-ACNC: 0.01 MIU/L (ref 0.3–5)
VITAMIN B-12: 758 PG/ML (ref 232–1245)
WBC # BLD: 8.1 K/UL (ref 3.5–11.3)
WBC # BLD: ABNORMAL 10*3/UL

## 2021-09-23 PROCEDURE — 99213 OFFICE O/P EST LOW 20 MIN: CPT

## 2021-09-23 PROCEDURE — 84439 ASSAY OF FREE THYROXINE: CPT

## 2021-09-23 PROCEDURE — 82607 VITAMIN B-12: CPT

## 2021-09-23 PROCEDURE — 36415 COLL VENOUS BLD VENIPUNCTURE: CPT

## 2021-09-23 PROCEDURE — 81001 URINALYSIS AUTO W/SCOPE: CPT

## 2021-09-23 PROCEDURE — 80053 COMPREHEN METABOLIC PANEL: CPT

## 2021-09-23 PROCEDURE — 99214 OFFICE O/P EST MOD 30 MIN: CPT | Performed by: INTERNAL MEDICINE

## 2021-09-23 PROCEDURE — 84443 ASSAY THYROID STIM HORMONE: CPT

## 2021-09-23 PROCEDURE — 85025 COMPLETE CBC W/AUTO DIFF WBC: CPT

## 2021-09-23 NOTE — PROGRESS NOTES
[facility] INTERNAL MEDICINE    Visit Date:  9/23/2021  Patient:  Travon Rodgers  YOB: 1930    Assessment & Plan     Memory loss: We will order UA as well as vitamin B12 and thyroid function. We will continue to monitor. Likely caused by Alzheimer's disease. Hypothyroidism: We will order TFTs. Continue to monitor. Hypertension: We will discontinue amlodipine due to edema. Blood pressure controlled today. Continue atenolol and amlodipine and losartan. Hyperlipidemia: Continue atorvastatin. We will continue to monitor. Diagnosis Orders   1. Essential hypertension  CBC Auto Differential    Comprehensive Metabolic Panel   2. Dyslipidemia     3. Hypothyroidism, unspecified type  TSH with Reflex   4. Memory loss  Vitamin B12    Urinalysis Reflex to Culture       Chief Complaint  Altered Mental Status (6mo), Hypertension, and Dementia    History of Present Illness   Presents to follow-up. She had a fall around a week ago at [facility] when trying to turn the light off. She herself does not recall the fall, and is not able to tell me why. It is possible that she had a mechanical fall and I was told by her caretaker that she could not catch her balance. Of note, she was on Lasix previously couple weeks ago. She reports that she has lower extremity swelling at this time, and is on amlodipine. I advised that we discontinue the amlodipine, but I will not want to start a water pill at this time because it might cause dizziness and falls. Has a history of hypertension, hyperlipidemia, hypothyroidism that is unchanged    Objective  /72   Pulse 62   Temp 97.3 °F (36.3 °C) (Tympanic)   Resp 18   Ht 5' 4\" (1.626 m)   Wt 144 lb 12.8 oz (65.7 kg)   LMP  (LMP Unknown)   BMI 24.85 kg/m²   Constitutional: No acute distress. Sits in chair comfortably  Eyes: Sclerae nonicteric.  No lid lag or proptosis  HENT: External ears normal. No external lesions on the nose  Neck: No gross masses. Trachea visibly midline  Respiratory: Good air entry bilaterally. No wheezing or crackles  Cardiovascular: Normal S1-S2. No murmurs. No lower extremity edema  Gastrointestinal: No visible masses. No visible hernias  Skin: No abnormal rashes. No abnormal masses  Neurologic: Cranial nerves grossly intact  Psychiatric: Normal affect. Alert and oriented    Medications  Current Outpatient Medications:     memantine (NAMENDA) 5 MG tablet, Take 1 tablet by mouth daily, Disp: 90 tablet, Rfl: 2    acetaminophen (TYLENOL) 650 MG extended release tablet, Take 650 mg by mouth 2 times daily as needed for Pain, Disp: , Rfl:     calcium-vitamin D (OSCAL 500/200 D-3) 500-200 MG-UNIT per tablet, Take 1 tablet by mouth daily, Disp: 90 tablet, Rfl: 3    FLUoxetine (PROZAC) 20 MG capsule, TAKE 1 CAPSULE DAILY, Disp: 90 capsule, Rfl: 3    levothyroxine (SYNTHROID) 137 MCG tablet, TAKE 1 TABLET DAILY, Disp: 90 tablet, Rfl: 3    losartan (COZAAR) 100 MG tablet, Take 1 tablet by mouth daily, Disp: 90 tablet, Rfl: 3    OLANZapine (ZYPREXA) 2.5 MG tablet, Take 1 tablet by mouth nightly, Disp: 90 tablet, Rfl: 3    amLODIPine (NORVASC) 2.5 MG tablet, TAKE 1 TABLET DAILY, Disp: 90 tablet, Rfl: 3    BRILINTA 90 MG TABS tablet, Take 1 tablet by mouth 2 times daily, Disp: 60 tablet, Rfl: 0    atenolol (TENORMIN) 25 MG tablet, Take 1 tablet by mouth daily, Disp: 90 tablet, Rfl: 3    atorvastatin (LIPITOR) 40 MG tablet, Take 1 tablet by mouth daily, Disp: 90 tablet, Rfl: 3    aspirin 81 MG EC tablet, Take 1 tablet by mouth daily, Disp: , Rfl:     nitroGLYCERIN (NITROSTAT) 0.4 MG SL tablet, Place 0.4 mg under the tongue every 5 minutes as needed for Chest pain up to max of 3 total doses.  If no relief after 3 dose, call 911., Disp: , Rfl:     vitamin B-12 (CYANOCOBALAMIN) 1000 MCG tablet, Take 1,000 mcg by mouth daily, Disp: , Rfl:     ammonium lactate (AMLACTIN) 12 % cream, apply to affected area twice a day if needed, Disp: 385 g, Rfl: 3    Multiple Vitamin (DAILY MULTIVITAMIN PO), Take 1 tablet by mouth daily , Disp: , Rfl:     Allergies  Patient has no known allergies. Past Medical History:   Diagnosis Date    Adenomatous polyp 06/10    With recommendation for repeat 06/15. Also, diverticulosis was noted.  Cataracts, bilateral     Combined forms of age-related cataract of both eyes 10/1/2018    Corneal scar, right eye     Cystocele     history of     Dementia ([facility] [address]     Mini-Mental Status exam 27/30 6/14  declines meds at this point,  MMSE 29/30 on June 13, 2017  MMSE 26/30 4/2018    Difficulty controlling anger 9/16/2018    Dyslipidemia     GERD (gastroesophageal reflux disease)     egd  4/15 ok    Goiter     benign, history of     Hiatal hernia     Hypertension     Hypokalemia     Hypothyroidism     Impaired fasting glucose     Malignant melanoma in situ ([facility] [address]     R upper Back 8/17    Migraines     Murmur, functional     Grade 1/6 systolic, history of     Osteopenia     T -1.0 hip, 03/09, T -0.3 spine, 03/09. 1) Repeat DEXA scan 09/12 with T +0.15, T -1.2 at the hip but -1.8 at the mean femoral neck giving her a FRAX score of 4.3 at the hip. Reclast 10/13 and given 2014,2015, and 1/4/2017, on calcium and vit d,  Redo Dexa 10/14 Frax 4.2% , Frax 4.8% 10 yr hip fracture risk on Dexa 1/17  On Reclast    Posterior vitreous detachment of both eyes     Primary open angle glaucoma (POAG) of both eyes, moderate stage 10/1/2018    Sciatica 12/15/2015    Skin cancer     History of multiple skin cancers. Following with Dr. 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      COLONOSCOPY  06/08/10    COLONOSCOPY  07/18/02    CYSTOCELE REPAIR 02/12/99    Vaginal prolapse, cystocele plus pelvic relaxation.  DILATION AND CURETTAGE OF UTERUS      with hysteroscopy   Mary Lou    still has ovaries     INTRACAPSULAR CATARACT EXTRACTION Left 8/25/2020    Left Cataract Extraction w/ IOL Implant performed by Prashanth Coats MD at [place] [address] Right 10/8/2020    Right Cataract Extraction w/ IOL Implant performed by Prashanth Coats MD at [address] [place] [address]  10/07/09    SCC, left nasal sidewall.  OTHER SURGICAL HISTORY  11/11/08    Anterior repair.  OTHER SURGICAL HISTORY  1988    laser cone    UPPER GASTROINTESTINAL ENDOSCOPY  4/8/2015    hiatal hernia    VEIN SURGERY  06/08/09    Laser ablation of right greater saphenous vein. Family History  This patient's family history includes Colon Cancer in her brother; Coronary Art Dis in her father. Social History  Yancy Rubio  reports that she has never smoked. She has never used smokeless tobacco. She reports current alcohol use. She reports that she does not use drugs. Discussed use, benefit, and side effects of prescribed medications. All questions answered. Patient voiced understanding. Reviewed health maintenance. Electronically signed Michael Grande MD on 9/23/2021 at 1:39 PM    This note has been created using the Epic electronic health record, and dictated in part by [employer] dictation system. Despite the documenting physician's best efforts, there may be errors in spelling, grammar or syntax.

## 2021-09-24 DIAGNOSIS — R41.3 MEMORY LOSS: ICD-10-CM

## 2021-09-24 LAB
-: ABNORMAL
AMORPHOUS: ABNORMAL
BACTERIA: ABNORMAL
BILIRUBIN URINE: NEGATIVE
CASTS UA: ABNORMAL /LPF (ref 0–2)
COLOR: ABNORMAL
COMMENT UA: ABNORMAL
CRYSTALS, UA: ABNORMAL /HPF
EPITHELIAL CELLS UA: ABNORMAL /HPF (ref 0–5)
GLUCOSE URINE: NEGATIVE
KETONES, URINE: NEGATIVE
LEUKOCYTE ESTERASE, URINE: NEGATIVE
MUCUS: ABNORMAL
NITRITE, URINE: NEGATIVE
OTHER OBSERVATIONS UA: ABNORMAL
PH UA: 7.5 (ref 5–6)
PROTEIN UA: NEGATIVE
RBC UA: ABNORMAL /HPF (ref 0–4)
RENAL EPITHELIAL, UA: ABNORMAL /HPF
SPECIFIC GRAVITY UA: 1.01 (ref 1.01–1.02)
TRICHOMONAS: ABNORMAL
TURBIDITY: ABNORMAL
URINE HGB: ABNORMAL
UROBILINOGEN, URINE: NORMAL
WBC UA: ABNORMAL /HPF (ref 0–4)
YEAST: ABNORMAL

## 2021-09-29 ENCOUNTER — OFFICE VISIT (OUTPATIENT)
Dept: PODIATRY | Age: 86
End: 2021-09-29
Payer: MEDICARE

## 2021-09-29 VITALS
BODY MASS INDEX: 25.1 KG/M2 | WEIGHT: 147 LBS | SYSTOLIC BLOOD PRESSURE: 126 MMHG | DIASTOLIC BLOOD PRESSURE: 68 MMHG | HEART RATE: 80 BPM | HEIGHT: 64 IN

## 2021-09-29 DIAGNOSIS — I70.203 ATHEROSCLEROSIS OF NATIVE ARTERY OF BOTH LOWER EXTREMITIES, WITH UNSPECIFIED PRESENCE OF CLINICAL MANIFESTATION (HCC): Primary | ICD-10-CM

## 2021-09-29 DIAGNOSIS — L84 CORNS AND CALLOSITIES: ICD-10-CM

## 2021-09-29 DIAGNOSIS — B35.1 DERMATOPHYTOSIS OF NAIL: ICD-10-CM

## 2021-09-29 PROCEDURE — 99999 PR OFFICE/OUTPT VISIT,PROCEDURE ONLY: CPT | Performed by: PODIATRIST

## 2021-09-29 PROCEDURE — 11056 PARNG/CUTG B9 HYPRKR LES 2-4: CPT | Performed by: PODIATRIST

## 2021-09-29 PROCEDURE — 11721 DEBRIDE NAIL 6 OR MORE: CPT | Performed by: PODIATRIST

## 2021-09-29 NOTE — PROGRESS NOTES
Foot Care Worksheet  PCP: Mary Da Silva   Last visit: 9/7/21. Fabiola Knapp CNP, Leatha Downing    Nail description:  Thick , Yellow , Crumbly , Marked limitation of ambulation     Pain resulting from thickened and dystrophy of nail plate Yes    Nails involved  Right   1, 2, 3, 4, 5  (T5-T9)  Left     1, 2, 3, 4, 5  (TA-T4)    Q7 1 Class A Finding - Non traumatic amputation of foot No    Q8 2 Class B Findings - Absent DP pulse No, Absent PT pulse No, Advanced tropic changes (3 required) Yes    Decrease hair growth Yes, Nail changes/thickening Yes, Pigmented changes/discoloration No, Skin texture (thin, shiny) No, Skin color (rubor/redness) No    Q9 1 Class B and 2 Class C Findings  Claudication No, Temperature change Yes, Paresthesia No, Burning No, Edema Yes

## 2021-09-29 NOTE — PROGRESS NOTES
Subjective:  Dipti Collins is a 80 y.o. female who presents to the office today for routine foot care. Currently denies F/C/N/V. No Known Allergies    Past Medical History:   Diagnosis Date    Adenomatous polyp 06/10    With recommendation for repeat 06/15. Also, diverticulosis was noted.  Cataracts, bilateral     Combined forms of age-related cataract of both eyes 10/1/2018    Corneal scar, right eye     Cystocele     history of     Dementia ([facility] [address]     Mini-Mental Status exam 27/30 6/14  declines meds at this point,  MMSE 29/30 on June 13, 2017  MMSE 26/30 4/2018    Difficulty controlling anger 9/16/2018    Dyslipidemia     GERD (gastroesophageal reflux disease)     egd  4/15 ok    Goiter     benign, history of     Hiatal hernia     Hypertension     Hypokalemia     Hypothyroidism     Impaired fasting glucose     Malignant melanoma in situ ([facility] [address]     R upper Back 8/17    Migraines     Murmur, functional     Grade 1/6 systolic, history of     Osteopenia     T -1.0 hip, 03/09, T -0.3 spine, 03/09. 1) Repeat DEXA scan 09/12 with T +0.15, T -1.2 at the hip but -1.8 at the mean femoral neck giving her a FRAX score of 4.3 at the hip. Reclast 10/13 and given 2014,2015, and 1/4/2017, on calcium and vit d,  Redo Dexa 10/14 Frax 4.2% , Frax 4.8% 10 yr hip fracture risk on Dexa 1/17  On Reclast    Posterior vitreous detachment of both eyes     Primary open angle glaucoma (POAG) of both eyes, moderate stage 10/1/2018    Sciatica 12/15/2015    Skin cancer     History of multiple skin cancers. Following with Dr. Paige Duarte--- invasive squamous cell Ca 8/17 L forearm    Syncope and collapse 1/21/2015    Tremor 12/23/2018    Trichiasis of left lower eyelid without entropion     Visual disturbance 10/26/2017       Prior to Admission medications    Medication Sig Start Date End Date Taking?  Authorizing Provider   [facility]) 5 MG tablet Take 1 tablet by mouth daily 9/22/21  Yes Teja Woodson MD acetaminophen (TYLENOL) 650 MG extended release tablet Take 650 mg by mouth 2 times daily as needed for Pain   Yes Historical Provider, MD   calcium-vitamin D (OSCAL 500/200 D-3) 500-200 MG-UNIT per tablet Take 1 tablet by mouth daily 7/23/21  Yes So Anderson MD   FLUoxetine (PROZAC) 20 MG capsule TAKE 1 CAPSULE DAILY 6/3/21  Yes Jules Roach MD   levothyroxine (SYNTHROID) 137 MCG tablet TAKE 1 TABLET DAILY  Patient taking differently: 125 mcg  6/1/21  Yes Jules Roach MD   losartan (COZAAR) 100 MG tablet Take 1 tablet by mouth daily 4/6/21  Yes Jules Roach MD   OLANZapine (ZYPREXA) 2.5 MG tablet Take 1 tablet by mouth nightly 4/6/21  Yes Jules Roach MD   amLODIPine (NORVASC) 2.5 MG tablet TAKE 1 TABLET DAILY 3/6/21  Yes Jules Roach MD   BRILINTA 90 MG TABS tablet Take 1 tablet by mouth 2 times daily 2/1/21  Yes Vimal Seals DO   atenolol (TENORMIN) 25 MG tablet Take 1 tablet by mouth daily 2/1/21  Yes Jules Roach MD   atorvastatin (LIPITOR) 40 MG tablet Take 1 tablet by mouth daily 2/1/21  Yes Jules Roach MD   aspirin 81 MG EC tablet Take 1 tablet by mouth daily 12/21/20  Yes Historical Provider, MD   nitroGLYCERIN (NITROSTAT) 0.4 MG SL tablet Place 0.4 mg under the tongue every 5 minutes as needed for Chest pain up to max of 3 total doses. If no relief after 3 dose, call 911. Yes Historical Provider, MD   vitamin B-12 (CYANOCOBALAMIN) 1000 MCG tablet Take 1,000 mcg by mouth daily   Yes Historical Provider, MD   ammonium lactate (AMLACTIN) 12 % cream apply to affected area twice a day if needed 4/7/20  Yes Jules Roach MD   Multiple Vitamin (DAILY MULTIVITAMIN PO) Take 1 tablet by mouth daily    Yes Historical Provider, MD   ibuprofen (ADVIL;MOTRIN) 600 MG tablet Take 1 tablet by mouth 3 times daily as needed for Pain (Take with food.) 12/27/20 12/27/20  Aga Tipton MD       Past Surgical History:   Procedure Laterality Date    APPENDECTOMY      CHOLECYSTECTOMY      Remote.      COLONOSCOPY  06/08/10    COLONOSCOPY 07/18/02    CYSTOCELE REPAIR  02/12/99    Vaginal prolapse, cystocele plus pelvic relaxation.  DILATION AND CURETTAGE OF UTERUS      with hysteroscopy   [address]    still has ovaries     INTRACAPSULAR CATARACT EXTRACTION Left 8/25/2020    Left Cataract Extraction w/ IOL Implant performed by Sintia Nicholson MD at [address] Right 10/8/2020    Right Cataract Extraction w/ IOL Implant performed by Sintia Nicholson MD at [facility]  10/07/09    SCC, left nasal sidewall.  OTHER SURGICAL HISTORY  11/11/08    Anterior repair.  OTHER SURGICAL HISTORY  1988    laser cone    UPPER GASTROINTESTINAL ENDOSCOPY  4/8/2015    hiatal hernia    VEIN SURGERY  06/08/09    Laser ablation of right greater saphenous vein. Family History   Problem Relation Age of Onset    Coronary Art Dis Father     Colon Cancer Brother     Diabetes Neg Hx     Cataracts Neg Hx     Glaucoma Neg Hx        Social History     Tobacco Use    Smoking status: Never Smoker    Smokeless tobacco: Never Used   Substance Use Topics    Alcohol use: Yes     Alcohol/week: 0.0 standard drinks     Comment: Infrequently. Review of Systems: All 12 systems reviewed and pertinent positives noted above. Lower Extremity Physical Examination:     Vitals:   Vitals:    09/29/21 0906   BP: 126/68   Pulse: 80     General: AAO x 3 in NAD. Vascular: DP and PT pulses palpable 2/4, bilateral.  CFT <3 seconds, bilateral.  Hair growth present to the level of the digits, bilateral.  Edema absent, bilateral.  Varicosities absent, bilateral. Erythema absent, bilateral. Distal Rubor absent bilateral.  Temperature within normal limits bilateral. Hyperpigmentation absent bilateral. No atrophic skin.     Neurological: Sensation intact to light touch to level of digits, bilateral.  Protective sensation intact 10/10 sites via 5.07/10g [place] Monofilament, bilateral.  negative Tinel's, bilateral.  negative Valleix sign, bilateral.  Vibratory intact bilateral.  Reflexes Decreased bilateral.  Paresthesias negative. Dysthesias negative. Sharp/dull intact bilateral.    Musculoskeletal: Muscle strength 5/5, bilateral.  Pain present upon palpation L hallux nail. Normal medial longitudinal arch, bilateral.  Ankle ROM within normal limits,bilateral.  1st MPJ ROM within normal limits, bilateral.  Dorsally contracted digits present. No other foot deformities. Integument: Warm, dry, supple, Bilateral.  Open lesion absent, Bilateral.  Interdigital maceration absent to web spaces absent, Bilateral.  Nails left 1, 2,3, 4,5 and right 1,2,3,4,5 thickened, dystrophic and crumbly, discolored with subungual debris. Fissures absent, Bilateral. Hyperkeratotic tissue is present. Sub R 4th toe, and medial l hallux    Asessment: Patient is a 80 y.o. female with:    Diagnosis Orders   1. Atherosclerosis of native artery of both lower extremities, with unspecified presence of clinical manifestation ([address]     2. Dermatophytosis of nail     3. Corns and callosities         Plan: Patient examined and evaluated. Current condition and treatment options discussed in detail. All nails as mentioned above debrided in length and thickness. Paring of 2 benign hyperkeratotic lesions (as listed above) took place with #10 blade or tissue nippers. Patient advised OTC methods for treatment of the mycotic nails. Patient will follow up in 10 weeks.

## 2021-09-30 DIAGNOSIS — E03.9 HYPOTHYROIDISM, UNSPECIFIED TYPE: ICD-10-CM

## 2021-09-30 RX ORDER — LEVOTHYROXINE SODIUM 0.12 MG/1
125 TABLET ORAL DAILY
Qty: 90 TABLET | Refills: 0 | Status: SHIPPED | OUTPATIENT
Start: 2021-09-30 | End: 2021-12-13

## 2021-09-30 NOTE — TELEPHONE ENCOUNTER
Pt needs a script for new dosage for her levothyroxine 125mcg, take one tablet daily. Pt uses [facility].

## 2021-10-30 ENCOUNTER — APPOINTMENT (OUTPATIENT)
Dept: CT IMAGING | Age: 86
DRG: 481 | End: 2021-10-30
Payer: MEDICARE

## 2021-10-30 ENCOUNTER — APPOINTMENT (OUTPATIENT)
Dept: GENERAL RADIOLOGY | Age: 86
DRG: 481 | End: 2021-10-30
Payer: MEDICARE

## 2021-10-30 ENCOUNTER — HOSPITAL ENCOUNTER (INPATIENT)
Age: 86
LOS: 6 days | Discharge: NURSING, REHAB OR LONG-TERM CARE | DRG: 481 | End: 2021-11-05
Attending: EMERGENCY MEDICINE | Admitting: FAMILY MEDICINE
Payer: MEDICARE

## 2021-10-30 DIAGNOSIS — S72.001A CLOSED RIGHT HIP FRACTURE, INITIAL ENCOUNTER (HCC): Primary | ICD-10-CM

## 2021-10-30 PROBLEM — K44.9 HIATAL HERNIA: Status: ACTIVE | Noted: 2021-10-30

## 2021-10-30 PROBLEM — N81.10 FEMALE BLADDER PROLAPSE: Status: ACTIVE | Noted: 2021-10-30
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
## 2021-10-30 PROBLEM — R01.1 HEART MURMUR: Status: ACTIVE | Noted: 2021-10-30

## 2021-10-30 PROBLEM — G43.909 MIGRAINE: Status: ACTIVE | Noted: 2021-10-30

## 2021-10-30 PROBLEM — H26.9 CATARACT: Status: ACTIVE | Noted: 2021-10-30

## 2021-10-30 PROBLEM — S72.141A DISPLACED INTERTROCHANTERIC FRACTURE OF RIGHT FEMUR, INITIAL ENCOUNTER FOR CLOSED FRACTURE (HCC): Status: ACTIVE | Noted: 2021-10-30

## 2021-10-30 PROBLEM — I25.10 CORONARY ARTERIOSCLEROSIS: Status: ACTIVE | Noted: 2021-10-30

## 2021-10-30 PROBLEM — S72.141A CLOSED DISPLACED INTERTROCHANTERIC FRACTURE OF RIGHT FEMUR (HCC): Status: ACTIVE | Noted: 2021-10-30

## 2021-10-30 PROBLEM — I21.3 ACUTE ST SEGMENT ELEVATION MYOCARDIAL INFARCTION (HCC): Status: ACTIVE | Noted: 2021-10-30

## 2021-10-30 PROBLEM — E78.00 HYPERCHOLESTEROLEMIA: Status: ACTIVE | Noted: 2021-10-30

## 2021-10-30 LAB
ABSOLUTE EOS #: 0.09 K/UL (ref 0–0.44)
ABSOLUTE IMMATURE GRANULOCYTE: 0.09 K/UL (ref 0–0.3)
ABSOLUTE LYMPH #: 0.8 K/UL (ref 1.1–3.7)
ABSOLUTE MONO #: 1.04 K/UL (ref 0.1–1.2)
ANION GAP SERPL CALCULATED.3IONS-SCNC: 10 MMOL/L (ref 9–17)
BASOPHILS # BLD: 1 % (ref 0–2)
BASOPHILS ABSOLUTE: 0.06 K/UL (ref 0–0.2)
BUN BLDV-MCNC: 16 MG/DL (ref 8–23)
BUN/CREAT BLD: 21 (ref 9–20)
CALCIUM SERPL-MCNC: 9 MG/DL (ref 8.6–10.4)
CHLORIDE BLD-SCNC: 106 MMOL/L (ref 98–107)
CO2: 27 MMOL/L (ref 20–31)
CREAT SERPL-MCNC: 0.76 MG/DL (ref 0.5–0.9)
DIFFERENTIAL TYPE: ABNORMAL
EOSINOPHILS RELATIVE PERCENT: 1 % (ref 1–4)
GFR AFRICAN AMERICAN: >60 ML/MIN
GFR NON-AFRICAN AMERICAN: >60 ML/MIN
GFR SERPL CREATININE-BSD FRML MDRD: ABNORMAL ML/MIN/{1.73_M2}
GFR SERPL CREATININE-BSD FRML MDRD: ABNORMAL ML/MIN/{1.73_M2}
GLUCOSE BLD-MCNC: 132 MG/DL (ref 70–99)
HCT VFR BLD CALC: 34.5 % (ref 36.3–47.1)
HEMOGLOBIN: 10.7 G/DL (ref 11.9–15.1)
IMMATURE GRANULOCYTES: 1 %
INR BLD: 1.1
LYMPHOCYTES # BLD: 7 % (ref 24–43)
MCH RBC QN AUTO: 30.2 PG (ref 25.2–33.5)
MCHC RBC AUTO-ENTMCNC: 31 G/DL (ref 25.2–33.5)
MCV RBC AUTO: 97.5 FL (ref 82.6–102.9)
MONOCYTES # BLD: 9 % (ref 3–12)
NRBC AUTOMATED: 0 PER 100 WBC
PARTIAL THROMBOPLASTIN TIME: 27.6 SEC (ref 23.9–33.8)
PDW BLD-RTO: 14.8 % (ref 11.8–14.4)
PLATELET # BLD: 229 K/UL (ref 138–453)
PLATELET ESTIMATE: ABNORMAL
PMV BLD AUTO: 10.9 FL (ref 8.1–13.5)
POTASSIUM SERPL-SCNC: 3.5 MMOL/L (ref 3.7–5.3)
PROTHROMBIN TIME: 14 SEC (ref 11.5–14.2)
RBC # BLD: 3.54 M/UL (ref 3.95–5.11)
RBC # BLD: ABNORMAL 10*6/UL
SARS-COV-2, RAPID: NOT DETECTED
SEG NEUTROPHILS: 81 % (ref 36–65)
SEGMENTED NEUTROPHILS ABSOLUTE COUNT: 9.23 K/UL (ref 1.5–8.1)
SODIUM BLD-SCNC: 143 MMOL/L (ref 135–144)
SPECIMEN DESCRIPTION: NORMAL
WBC # BLD: 11.3 K/UL (ref 3.5–11.3)
WBC # BLD: ABNORMAL 10*3/UL

## 2021-10-30 PROCEDURE — 36415 COLL VENOUS BLD VENIPUNCTURE: CPT

## 2021-10-30 PROCEDURE — 85730 THROMBOPLASTIN TIME PARTIAL: CPT

## 2021-10-30 PROCEDURE — 93005 ELECTROCARDIOGRAM TRACING: CPT | Performed by: EMERGENCY MEDICINE

## 2021-10-30 PROCEDURE — 71045 X-RAY EXAM CHEST 1 VIEW: CPT

## 2021-10-30 PROCEDURE — 85025 COMPLETE CBC W/AUTO DIFF WBC: CPT

## 2021-10-30 PROCEDURE — 6360000002 HC RX W HCPCS

## 2021-10-30 PROCEDURE — 87635 SARS-COV-2 COVID-19 AMP PRB: CPT

## 2021-10-30 PROCEDURE — 72125 CT NECK SPINE W/O DYE: CPT

## 2021-10-30 PROCEDURE — 85610 PROTHROMBIN TIME: CPT

## 2021-10-30 PROCEDURE — 6370000000 HC RX 637 (ALT 250 FOR IP)

## 2021-10-30 PROCEDURE — 70450 CT HEAD/BRAIN W/O DYE: CPT

## 2021-10-30 PROCEDURE — 99285 EMERGENCY DEPT VISIT HI MDM: CPT

## 2021-10-30 PROCEDURE — 2060000000 HC ICU INTERMEDIATE R&B

## 2021-10-30 PROCEDURE — APPSS30 APP SPLIT SHARED TIME 16-30 MINUTES

## 2021-10-30 PROCEDURE — 2580000003 HC RX 258

## 2021-10-30 PROCEDURE — 96374 THER/PROPH/DIAG INJ IV PUSH: CPT

## 2021-10-30 PROCEDURE — 80048 BASIC METABOLIC PNL TOTAL CA: CPT

## 2021-10-30 PROCEDURE — 51702 INSERT TEMP BLADDER CATH: CPT

## 2021-10-30 PROCEDURE — 6360000002 HC RX W HCPCS: Performed by: EMERGENCY MEDICINE

## 2021-10-30 PROCEDURE — 73700 CT LOWER EXTREMITY W/O DYE: CPT

## 2021-10-30 PROCEDURE — 96375 TX/PRO/DX INJ NEW DRUG ADDON: CPT

## 2021-10-30 RX ORDER — POLYETHYLENE GLYCOL 3350 17 G/17G
17 POWDER, FOR SOLUTION ORAL DAILY PRN
Status: DISCONTINUED | OUTPATIENT
Start: 2021-10-30 | End: 2021-11-05 | Stop reason: HOSPADM

## 2021-10-30 RX ORDER — SODIUM CHLORIDE FOR INHALATION 0.9 %
3 VIAL, NEBULIZER (ML) INHALATION
Status: DISCONTINUED | OUTPATIENT
Start: 2021-10-30 | End: 2021-10-31

## 2021-10-30 RX ORDER — ASPIRIN 81 MG/1
81 TABLET ORAL DAILY
Status: DISCONTINUED | OUTPATIENT
Start: 2021-10-31 | End: 2021-11-02

## 2021-10-30 RX ORDER — LOSARTAN POTASSIUM 50 MG/1
100 TABLET ORAL DAILY
Status: DISCONTINUED | OUTPATIENT
Start: 2021-10-31 | End: 2021-11-05 | Stop reason: HOSPADM

## 2021-10-30 RX ORDER — HYDROCODONE BITARTRATE AND ACETAMINOPHEN 5; 325 MG/1; MG/1
1 TABLET ORAL EVERY 4 HOURS PRN
Status: DISCONTINUED | OUTPATIENT
Start: 2021-10-30 | End: 2021-11-05 | Stop reason: HOSPADM

## 2021-10-30 RX ORDER — MORPHINE SULFATE 4 MG/ML
4 INJECTION, SOLUTION INTRAMUSCULAR; INTRAVENOUS ONCE
Status: COMPLETED | OUTPATIENT
Start: 2021-10-30 | End: 2021-10-30

## 2021-10-30 RX ORDER — ALBUTEROL SULFATE 90 UG/1
2 AEROSOL, METERED RESPIRATORY (INHALATION)
Status: DISCONTINUED | OUTPATIENT
Start: 2021-10-30 | End: 2021-10-31

## 2021-10-30 RX ORDER — ACETAMINOPHEN 325 MG/1
650 TABLET ORAL EVERY 6 HOURS PRN
Status: DISCONTINUED | OUTPATIENT
Start: 2021-10-30 | End: 2021-11-05 | Stop reason: HOSPADM

## 2021-10-30 RX ORDER — ATORVASTATIN CALCIUM 40 MG/1
40 TABLET, FILM COATED ORAL DAILY
Status: DISCONTINUED | OUTPATIENT
Start: 2021-10-31 | End: 2021-11-05 | Stop reason: HOSPADM

## 2021-10-30 RX ORDER — SODIUM CHLORIDE 9 MG/ML
25 INJECTION, SOLUTION INTRAVENOUS PRN
Status: DISCONTINUED | OUTPATIENT
Start: 2021-10-30 | End: 2021-11-05 | Stop reason: HOSPADM

## 2021-10-30 RX ORDER — ACETAMINOPHEN 650 MG/1
650 SUPPOSITORY RECTAL EVERY 6 HOURS PRN
Status: DISCONTINUED | OUTPATIENT
Start: 2021-10-30 | End: 2021-11-05 | Stop reason: HOSPADM

## 2021-10-30 RX ORDER — FAMOTIDINE 20 MG/1
20 TABLET, FILM COATED ORAL DAILY
Status: DISCONTINUED | OUTPATIENT
Start: 2021-10-31 | End: 2021-11-05 | Stop reason: HOSPADM

## 2021-10-30 RX ORDER — FLUOXETINE HYDROCHLORIDE 20 MG/1
20 CAPSULE ORAL DAILY
Status: DISCONTINUED | OUTPATIENT
Start: 2021-10-31 | End: 2021-11-05 | Stop reason: HOSPADM

## 2021-10-30 RX ORDER — AMLODIPINE BESYLATE 2.5 MG/1
2.5 TABLET ORAL DAILY
Status: DISCONTINUED | OUTPATIENT
Start: 2021-10-31 | End: 2021-10-30

## 2021-10-30 RX ORDER — OYSTER SHELL CALCIUM WITH VITAMIN D 500; 200 MG/1; [IU]/1
1 TABLET, FILM COATED ORAL DAILY
Status: DISCONTINUED | OUTPATIENT
Start: 2021-10-31 | End: 2021-11-05 | Stop reason: HOSPADM

## 2021-10-30 RX ORDER — ALBUTEROL SULFATE 2.5 MG/3ML
2.5 SOLUTION RESPIRATORY (INHALATION)
Status: DISCONTINUED | OUTPATIENT
Start: 2021-10-30 | End: 2021-11-05 | Stop reason: HOSPADM

## 2021-10-30 RX ORDER — FENTANYL CITRATE 50 UG/ML
25 INJECTION, SOLUTION INTRAMUSCULAR; INTRAVENOUS ONCE
Status: COMPLETED | OUTPATIENT
Start: 2021-10-30 | End: 2021-10-30

## 2021-10-30 RX ORDER — MORPHINE SULFATE 2 MG/ML
2 INJECTION, SOLUTION INTRAMUSCULAR; INTRAVENOUS ONCE
Status: COMPLETED | OUTPATIENT
Start: 2021-10-30 | End: 2021-10-30

## 2021-10-30 RX ORDER — OLANZAPINE 2.5 MG/1
2.5 TABLET ORAL NIGHTLY
Status: DISCONTINUED | OUTPATIENT
Start: 2021-10-30 | End: 2021-11-05 | Stop reason: HOSPADM

## 2021-10-30 RX ORDER — MORPHINE SULFATE 2 MG/ML
1 INJECTION, SOLUTION INTRAMUSCULAR; INTRAVENOUS
Status: DISCONTINUED | OUTPATIENT
Start: 2021-10-30 | End: 2021-11-05 | Stop reason: HOSPADM

## 2021-10-30 RX ORDER — METOPROLOL TARTRATE 5 MG/5ML
5 INJECTION INTRAVENOUS EVERY 6 HOURS PRN
Status: DISCONTINUED | OUTPATIENT
Start: 2021-10-30 | End: 2021-11-05 | Stop reason: HOSPADM

## 2021-10-30 RX ORDER — MV-MIN/FOLIC/VIT K/LYCOP/COQ10 200-100MCG
1 CAPSULE ORAL DAILY
Status: DISCONTINUED | OUTPATIENT
Start: 2021-10-31 | End: 2021-11-01 | Stop reason: CLARIF

## 2021-10-30 RX ORDER — ATENOLOL 25 MG/1
25 TABLET ORAL DAILY
Status: DISCONTINUED | OUTPATIENT
Start: 2021-10-31 | End: 2021-11-05 | Stop reason: HOSPADM

## 2021-10-30 RX ORDER — FUROSEMIDE 40 MG/1
40 TABLET ORAL DAILY
COMMUNITY
Start: 2021-09-15 | End: 2021-12-10 | Stop reason: SDUPTHER

## 2021-10-30 RX ORDER — SODIUM CHLORIDE 0.9 % (FLUSH) 0.9 %
5-40 SYRINGE (ML) INJECTION PRN
Status: DISCONTINUED | OUTPATIENT
Start: 2021-10-30 | End: 2021-11-05 | Stop reason: HOSPADM

## 2021-10-30 RX ORDER — SODIUM CHLORIDE 9 MG/ML
INJECTION, SOLUTION INTRAVENOUS CONTINUOUS
Status: DISCONTINUED | OUTPATIENT
Start: 2021-10-30 | End: 2021-11-04

## 2021-10-30 RX ORDER — ONDANSETRON 2 MG/ML
4 INJECTION INTRAMUSCULAR; INTRAVENOUS EVERY 6 HOURS PRN
Status: DISCONTINUED | OUTPATIENT
Start: 2021-10-30 | End: 2021-11-05 | Stop reason: HOSPADM

## 2021-10-30 RX ORDER — MEMANTINE HYDROCHLORIDE 5 MG/1
5 TABLET ORAL DAILY
Status: DISCONTINUED | OUTPATIENT
Start: 2021-10-31 | End: 2021-11-05 | Stop reason: HOSPADM

## 2021-10-30 RX ORDER — SODIUM CHLORIDE 0.9 % (FLUSH) 0.9 %
5-40 SYRINGE (ML) INJECTION EVERY 12 HOURS SCHEDULED
Status: DISCONTINUED | OUTPATIENT
Start: 2021-10-30 | End: 2021-11-05 | Stop reason: HOSPADM

## 2021-10-30 RX ADMIN — FENTANYL CITRATE 25 MCG: 50 INJECTION INTRAMUSCULAR; INTRAVENOUS at 18:59

## 2021-10-30 RX ADMIN — MORPHINE SULFATE 1 MG: 2 INJECTION, SOLUTION INTRAMUSCULAR; INTRAVENOUS at 22:45

## 2021-10-30 RX ADMIN — MORPHINE SULFATE 2 MG: 2 INJECTION, SOLUTION INTRAMUSCULAR; INTRAVENOUS at 18:20

## 2021-10-30 RX ADMIN — MORPHINE SULFATE 4 MG: 4 INJECTION, SOLUTION INTRAMUSCULAR; INTRAVENOUS at 20:18

## 2021-10-30 RX ADMIN — SODIUM CHLORIDE: 9 INJECTION, SOLUTION INTRAVENOUS at 22:02

## 2021-10-30 ASSESSMENT — PAIN DESCRIPTION - PAIN TYPE
TYPE: ACUTE PAIN
TYPE: ACUTE PAIN

## 2021-10-30 ASSESSMENT — PAIN SCALES - GENERAL
PAINLEVEL_OUTOF10: 8
PAINLEVEL_OUTOF10: 5
PAINLEVEL_OUTOF10: 7
PAINLEVEL_OUTOF10: 10
PAINLEVEL_OUTOF10: 0
PAINLEVEL_OUTOF10: 9

## 2021-10-30 ASSESSMENT — PAIN DESCRIPTION - DESCRIPTORS
DESCRIPTORS: SHARP
DESCRIPTORS: SHARP

## 2021-10-30 ASSESSMENT — PAIN DESCRIPTION - ONSET
ONSET: GRADUAL
ONSET: GRADUAL

## 2021-10-30 ASSESSMENT — PAIN DESCRIPTION - ORIENTATION
ORIENTATION: RIGHT
ORIENTATION: RIGHT

## 2021-10-30 ASSESSMENT — PAIN DESCRIPTION - FREQUENCY
FREQUENCY: INTERMITTENT
FREQUENCY: INTERMITTENT

## 2021-10-30 ASSESSMENT — PAIN DESCRIPTION - LOCATION
LOCATION: HIP
LOCATION: HIP
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
## 2021-10-31 LAB
ABSOLUTE EOS #: 0.07 K/UL (ref 0–0.44)
ABSOLUTE IMMATURE GRANULOCYTE: 0.06 K/UL (ref 0–0.3)
ABSOLUTE LYMPH #: 0.86 K/UL (ref 1.1–3.7)
ABSOLUTE MONO #: 1.03 K/UL (ref 0.1–1.2)
ALBUMIN SERPL-MCNC: 3.1 G/DL (ref 3.5–5.2)
ALBUMIN/GLOBULIN RATIO: 1.2 (ref 1–2.5)
ALP BLD-CCNC: 61 U/L (ref 35–104)
ALT SERPL-CCNC: 10 U/L (ref 5–33)
ANION GAP SERPL CALCULATED.3IONS-SCNC: 9 MMOL/L (ref 9–17)
AST SERPL-CCNC: 23 U/L
BASOPHILS # BLD: 0 % (ref 0–2)
BASOPHILS ABSOLUTE: 0.04 K/UL (ref 0–0.2)
BILIRUB SERPL-MCNC: 0.6 MG/DL (ref 0.3–1.2)
BUN BLDV-MCNC: 17 MG/DL (ref 8–23)
BUN/CREAT BLD: 25 (ref 9–20)
CALCIUM SERPL-MCNC: 8.1 MG/DL (ref 8.6–10.4)
CHLORIDE BLD-SCNC: 108 MMOL/L (ref 98–107)
CO2: 26 MMOL/L (ref 20–31)
CREAT SERPL-MCNC: 0.67 MG/DL (ref 0.5–0.9)
DIFFERENTIAL TYPE: ABNORMAL
EKG ATRIAL RATE: 64 BPM
EKG P AXIS: 60 DEGREES
EKG P-R INTERVAL: 144 MS
EKG Q-T INTERVAL: 458 MS
EKG QRS DURATION: 80 MS
EKG QTC CALCULATION (BAZETT): 472 MS
EKG R AXIS: -35 DEGREES
EKG T AXIS: 52 DEGREES
EKG VENTRICULAR RATE: 64 BPM
EOSINOPHILS RELATIVE PERCENT: 1 % (ref 1–4)
GFR AFRICAN AMERICAN: >60 ML/MIN
GFR NON-AFRICAN AMERICAN: >60 ML/MIN
GFR SERPL CREATININE-BSD FRML MDRD: ABNORMAL ML/MIN/{1.73_M2}
GFR SERPL CREATININE-BSD FRML MDRD: ABNORMAL ML/MIN/{1.73_M2}
GLUCOSE BLD-MCNC: 129 MG/DL (ref 70–99)
HCT VFR BLD CALC: 25.7 % (ref 36.3–47.1)
HEMOGLOBIN: 8 G/DL (ref 11.9–15.1)
IMMATURE GRANULOCYTES: 1 %
LYMPHOCYTES # BLD: 7 % (ref 24–43)
MAGNESIUM: 1.8 MG/DL (ref 1.6–2.6)
MCH RBC QN AUTO: 30.4 PG (ref 25.2–33.5)
MCHC RBC AUTO-ENTMCNC: 31.1 G/DL (ref 25.2–33.5)
MCV RBC AUTO: 97.7 FL (ref 82.6–102.9)
MONOCYTES # BLD: 9 % (ref 3–12)
NRBC AUTOMATED: 0 PER 100 WBC
PARTIAL THROMBOPLASTIN TIME: 31.5 SEC (ref 23.9–33.8)
PDW BLD-RTO: 14.7 % (ref 11.8–14.4)
PLATELET # BLD: 181 K/UL (ref 138–453)
PLATELET ESTIMATE: ABNORMAL
PMV BLD AUTO: 11.7 FL (ref 8.1–13.5)
POTASSIUM SERPL-SCNC: 3.1 MMOL/L (ref 3.7–5.3)
RBC # BLD: 2.63 M/UL (ref 3.95–5.11)
RBC # BLD: ABNORMAL 10*6/UL
SEG NEUTROPHILS: 82 % (ref 36–65)
SEGMENTED NEUTROPHILS ABSOLUTE COUNT: 9.94 K/UL (ref 1.5–8.1)
SODIUM BLD-SCNC: 143 MMOL/L (ref 135–144)
TOTAL PROTEIN: 5.7 G/DL (ref 6.4–8.3)
WBC # BLD: 12 K/UL (ref 3.5–11.3)
WBC # BLD: ABNORMAL 10*3/UL

## 2021-10-31 PROCEDURE — 6360000002 HC RX W HCPCS: Performed by: FAMILY MEDICINE

## 2021-10-31 PROCEDURE — 6370000000 HC RX 637 (ALT 250 FOR IP)

## 2021-10-31 PROCEDURE — 80053 COMPREHEN METABOLIC PANEL: CPT

## 2021-10-31 PROCEDURE — 83735 ASSAY OF MAGNESIUM: CPT

## 2021-10-31 PROCEDURE — 2060000000 HC ICU INTERMEDIATE R&B

## 2021-10-31 PROCEDURE — 94761 N-INVAS EAR/PLS OXIMETRY MLT: CPT

## 2021-10-31 PROCEDURE — 36415 COLL VENOUS BLD VENIPUNCTURE: CPT

## 2021-10-31 PROCEDURE — 2580000003 HC RX 258

## 2021-10-31 PROCEDURE — 85025 COMPLETE CBC W/AUTO DIFF WBC: CPT

## 2021-10-31 PROCEDURE — 2700000000 HC OXYGEN THERAPY PER DAY

## 2021-10-31 PROCEDURE — 6370000000 HC RX 637 (ALT 250 FOR IP): Performed by: FAMILY MEDICINE

## 2021-10-31 PROCEDURE — 85730 THROMBOPLASTIN TIME PARTIAL: CPT

## 2021-10-31 RX ORDER — POTASSIUM CHLORIDE 20 MEQ/1
40 TABLET, EXTENDED RELEASE ORAL ONCE
Status: COMPLETED | OUTPATIENT
Start: 2021-10-31 | End: 2021-10-31

## 2021-10-31 RX ORDER — FUROSEMIDE 10 MG/ML
20 INJECTION INTRAMUSCULAR; INTRAVENOUS ONCE
Status: COMPLETED | OUTPATIENT
Start: 2021-10-31 | End: 2021-10-31

## 2021-10-31 RX ADMIN — POTASSIUM CHLORIDE 40 MEQ: 20 TABLET, EXTENDED RELEASE ORAL at 16:45

## 2021-10-31 RX ADMIN — FUROSEMIDE 20 MG: 10 INJECTION, SOLUTION INTRAMUSCULAR; INTRAVENOUS at 13:46

## 2021-10-31 RX ADMIN — SODIUM CHLORIDE: 9 INJECTION, SOLUTION INTRAVENOUS at 07:23

## 2021-10-31 RX ADMIN — SODIUM CHLORIDE: 9 INJECTION, SOLUTION INTRAVENOUS at 16:49

## 2021-10-31 ASSESSMENT — PAIN SCALES - PAIN ASSESSMENT IN ADVANCED DEMENTIA (PAINAD)
TOTALSCORE: 4
CONSOLABILITY: 1
FACIALEXPRESSION: 1
NEGVOCALIZATION: 1
FACIALEXPRESSION: 0
NEGVOCALIZATION: 2
TOTALSCORE: 1
NEGVOCALIZATION: 1
CONSOLABILITY: 0
BREATHING: 0
BODYLANGUAGE: 1
BODYLANGUAGE: 1
CONSOLABILITY: 1
TOTALSCORE: 6
BODYLANGUAGE: 0
FACIALEXPRESSION: 2
BREATHING: 0
BREATHING: 0

## 2021-10-31 ASSESSMENT — PAIN DESCRIPTION - PROGRESSION
CLINICAL_PROGRESSION: NOT CHANGED

## 2021-10-31 ASSESSMENT — PAIN DESCRIPTION - FREQUENCY
FREQUENCY: INTERMITTENT
FREQUENCY: INTERMITTENT

## 2021-10-31 ASSESSMENT — PAIN SCALES - GENERAL
PAINLEVEL_OUTOF10: 0
PAINLEVEL_OUTOF10: 6
PAINLEVEL_OUTOF10: 1
PAINLEVEL_OUTOF10: 0
PAINLEVEL_OUTOF10: 5

## 2021-10-31 ASSESSMENT — PAIN DESCRIPTION - ORIENTATION
ORIENTATION: RIGHT
ORIENTATION: RIGHT

## 2021-10-31 ASSESSMENT — PAIN DESCRIPTION - PAIN TYPE
TYPE: ACUTE PAIN

## 2021-10-31 ASSESSMENT — PAIN DESCRIPTION - DESCRIPTORS
DESCRIPTORS: SHARP
DESCRIPTORS: SHARP

## 2021-10-31 ASSESSMENT — PAIN DESCRIPTION - LOCATION
LOCATION: HIP
LOCATION: HIP

## 2021-10-31 ASSESSMENT — PAIN DESCRIPTION - ONSET
ONSET: ON-GOING
ONSET: GRADUAL

## 2021-10-31 NOTE — RT PROTOCOL NOTE
RT Inhaler-Nebulizer Bronchodilator Protocol Note    There is a bronchodilator order in the chart from a provider indicating to follow the RT Bronchodilator Protocol and there is an Initiate RT Inhaler-Nebulizer Bronchodilator Protocol order as well (see protocol at bottom of note). CXR Findings:  XR CHEST PORTABLE    Result Date: 10/30/2021  Pulmonary edema with left pleural effusion. The findings from the last RT Protocol Assessment were as follows:   History Pulmonary Disease: None or smoker <15 pack years  Respiratory Pattern: Regular pattern and RR 12-20 bpm  Breath Sounds: Slightly diminished and/or crackles  Cough: Strong, spontaneous, non-productive  Indication for Bronchodilator Therapy: Decreased or absent breath sounds  Bronchodilator Assessment Score: 2    Aerosolized bronchodilator medication orders have been revised according to the RT Inhaler-Nebulizer Bronchodilator Protocol below. Respiratory Therapist to perform RT Therapy Protocol Assessment initially then follow the protocol. Repeat RT Therapy Protocol Assessment PRN for score 0-3 or on second treatment, BID, and PRN for scores above 3. No Indications  adjust the frequency to every 6 hours PRN wheezing or bronchospasm, if no treatments needed after 48 hours then discontinue using Per Protocol order mode. If indication present, adjust the RT bronchodilator orders based on the Bronchodilator Assessment Score as indicated below. Use Inhaler orders unless patient has one or more of the following: on home nebulizer, not able to hold breath for 10 seconds, is not alert and oriented, cannot activate and use MDI correctly, or respiratory rate 25 breaths per minute or more, then use the equivalent nebulizer order(s) with same Frequency and PRN reasons based on the score. If a patient is on this medication at home then do not decrease Frequency below that used at home.     0-3  enter or revise RT bronchodilator order(s) to equivalent RT Bronchodilator order with Frequency of every 4 hours PRN for wheezing or increased work of breathing using Per Protocol order mode. 4-6  enter or revise RT Bronchodilator order(s) to two equivalent RT bronchodilator orders with one order with BID Frequency and one order with Frequency of every 4 hours PRN wheezing or increased work of breathing using Per Protocol order mode. 7-10  enter or revise RT Bronchodilator order(s) to two equivalent RT bronchodilator orders with one order with TID Frequency and one order with Frequency of every 4 hours PRN wheezing or increased work of breathing using Per Protocol order mode. 11-13  enter or revise RT Bronchodilator order(s) to one equivalent RT bronchodilator order with QID Frequency and an Albuterol order with Frequency of every 4 hours PRN wheezing or increased work of breathing using Per Protocol order mode. Greater than 13  enter or revise RT Bronchodilator order(s) to one equivalent RT bronchodilator order with every 4 hours Frequency and an Albuterol order with Frequency of every 2 hours PRN wheezing or increased work of breathing using Per Protocol order mode. RT to enter RT Home Evaluation for COPD & MDI Assessment order using Per Protocol order mode.     Electronically signed by Bianca Nance RCP on 10/31/2021 at 4:47 AM

## 2021-10-31 NOTE — PLAN OF CARE
Problem: Falls - Risk of:  Goal: Will remain free from falls  Description: Will remain free from falls  Outcome: Ongoing  Goal: Absence of physical injury  Description: Absence of physical injury  Outcome: Ongoing     Problem: Pain:  Goal: Pain level will decrease  Description: Pain level will decrease  Outcome: Ongoing  Goal: Control of acute pain  Description: Control of acute pain  Outcome: Ongoing  Goal: Control of chronic pain  Description: Control of chronic pain  Outcome: Ongoing     Problem: Discharge Planning:  Goal: Participates in care planning  Description: Participates in care planning  Outcome: Ongoing  Goal: Discharged to appropriate level of care  Description: Discharged to appropriate level of care  Outcome: Ongoing     Problem:  Activity Intolerance:  Goal: Ability to tolerate increased activity will improve  Description: Ability to tolerate increased activity will improve  Outcome: Ongoing     Problem: Mobility - Impaired:  Goal: Mobility will improve to maximum level  Description: Mobility will improve to maximum level  Outcome: Ongoing

## 2021-10-31 NOTE — PLAN OF CARE
Problem: Falls - Risk of:  Goal: Will remain free from falls  Description: Will remain free from falls  10/31/2021 1238 by Ariana Acuna RN  Outcome: Ongoing  10/31/2021 0332 by Dennys Hemphill RN  Outcome: Ongoing  Goal: Absence of physical injury  Description: Absence of physical injury  10/31/2021 1238 by Ariana Acuna RN  Outcome: Ongoing  10/31/2021 0332 by Dennys Hemphill RN  Outcome: Ongoing     Problem: Pain:  Description: Pain management should include both nonpharmacologic and pharmacologic interventions. Goal: Pain level will decrease  Description: Pain level will decrease  10/31/2021 1238 by Ariana Acuna RN  Outcome: Ongoing  10/31/2021 0332 by Dennys Hemphill RN  Outcome: Ongoing  Goal: Control of acute pain  Description: Control of acute pain  10/31/2021 1238 by Ariana Acuna RN  Outcome: Ongoing  10/31/2021 0332 by Dennys Hemphill RN  Outcome: Ongoing  Goal: Control of chronic pain  Description: Control of chronic pain  10/31/2021 1238 by Ariana Acuna RN  Outcome: Ongoing  10/31/2021 0332 by Dennys Hemphill RN  Outcome: Ongoing     Problem: Discharge Planning:  Goal: Participates in care planning  Description: Participates in care planning  10/31/2021 1238 by Ariana Acuna RN  Outcome: Ongoing  10/31/2021 0332 by Dennys Hemphill RN  Outcome: Ongoing  Goal: Discharged to appropriate level of care  Description: Discharged to appropriate level of care  10/31/2021 1238 by Ariana Acuna RN  Outcome: Ongoing  10/31/2021 0332 by Dennys Hemphill RN  Outcome: Ongoing     Problem:  Activity Intolerance:  Goal: Ability to tolerate increased activity will improve  Description: Ability to tolerate increased activity will improve  10/31/2021 1238 by Ariana Acuna RN  Outcome: Ongoing  10/31/2021 0332 by Dennys Hemphill RN  Outcome: Ongoing     Problem: Mobility - Impaired:  Goal: Mobility will improve to maximum level  Description: Mobility will improve to maximum level  10/31/2021 1238 by Garcia Hyman RN  Outcome: Ongoing  10/31/2021 0332 by Ariadna Mcfarlane RN  Outcome: Ongoing     Problem: Skin Integrity:  Goal: Will show no infection signs and symptoms  Description: Will show no infection signs and symptoms  Outcome: Ongoing  Goal: Absence of new skin breakdown  Description: Absence of new skin breakdown  Outcome: Ongoing  Goal: Demonstration of wound healing without infection will improve  Description: Demonstration of wound healing without infection will improve  Outcome: Ongoing  Goal: Risk for impaired skin integrity will decrease  Description: Risk for impaired skin integrity will decrease  Outcome: Ongoing     Problem:  Activity:  Goal: Ability to ambulate will improve  Description: Ability to ambulate will improve  Outcome: Ongoing  Goal: Ability to perform activities at highest level will improve  Description: Ability to perform activities at highest level will improve  Outcome: Ongoing     Problem: Health Behavior:  Goal: Identification of resources available to assist in meeting health care needs will improve  Description: Identification of resources available to assist in meeting health care needs will improve  Outcome: Ongoing     Problem: Nutritional:  Goal: Ability to attain and maintain optimal nutritional status will improve  Description: Ability to attain and maintain optimal nutritional status will improve  Outcome: Ongoing     Problem: Physical Regulation:  Goal: Will remain free from infection  Description: Will remain free from infection  Outcome: Ongoing  Goal: Postoperative complications will be avoided or minimized  Description: Postoperative complications will be avoided or minimized  Outcome: Ongoing  Goal: Diagnostic test results will improve  Description: Diagnostic test results will improve  Outcome: Ongoing     Problem: Respiratory:  Goal: Ability to maintain adequate ventilation will improve  Description: Ability to maintain adequate ventilation will improve  Outcome: Ongoing     Problem: Safety:  Goal: Ability to remain free from injury will improve  Description: Ability to remain free from injury will improve  Outcome: Ongoing     Problem: Self-Care:  Goal: Ability to meet self-care needs will improve  Description: Ability to meet self-care needs will improve  Outcome: Ongoing     Problem: Self-Concept:  Goal: Ability to maintain and perform role responsibilities to the fullest extent possible will improve  Description: Ability to maintain and perform role responsibilities to the fullest extent possible will improve  Outcome: Ongoing  Goal: Verbalizations of decreased anxiety will increase  Description: Verbalizations of decreased anxiety will increase  Outcome: Ongoing     Problem: Sensory:  Goal: Pain level will decrease  Description: Pain level will decrease  10/31/2021 1238 by Jin Triana RN  Outcome: Ongoing  10/31/2021 0332 by Nichelle Turner RN  Outcome: Ongoing     Problem: Tissue Perfusion:  Goal: Peripheral tissue perfusion will improve  Description: Peripheral tissue perfusion will improve  Outcome: Ongoing  Goal: Risk of venous thrombosis will decrease  Description: Risk of venous thrombosis will decrease  Outcome: Ongoing

## 2021-10-31 NOTE — FLOWSHEET NOTE
Spoke with son, Marden Scheuermann, about code status and is in agreement about DNRCC-A status. Marden Scheuermann states he will be in Monday to take care of consent and IMM.

## 2021-10-31 NOTE — PROGRESS NOTES
Incentive Spirometry education and demonstration given by Respiratory Therapy. Pt achieving 500 mL at time of instruction. Incentive Spirometer left at bedside and   Patient instructed to do a minimum of 10 breaths every hour.       Abad Willams RCP  4:48 AM

## 2021-10-31 NOTE — FLOWSHEET NOTE
Writer spoke with jolene be (POA) about surgical consent, IMM form, and code status. Hill Allen states her brother, Mckayla Day, who is also on POA list will be in to address consent and IMM.  After speaking about code status Hill Allen wishes for her mother to be a DNRCC-A.

## 2021-10-31 NOTE — H&P
HOSPITALIST ADMISSION H&P      REASON FOR ADMISSION:  Displaced intertrochanteric fracture of Rt femur  ESTIMATED LENGTH OF STAY: >2 midnights, 3-4 days. ATTENDING/ADMITTING PHYSICIAN: Rozanne Bamberger, MD  PCP: Jesus Leger MD    HISTORY OF PRESENT ILLNESS:      The patient is a 80 y.o. female patient of Jesus Leger MD who presents from ER with c/o fall while walking down the slater with her son. \"I can't describe it, its so hard to explain\". Patient is a poor historian. Per ER note patient fell getting out of the car landing on her right hip and hitting her head. Hypertension: 191/58. Atrial fib: irregularly irregular    In ER:   CT Head w/o: Impression  No evidence of acute intracranial process.  Moderate atrophy and chronic  small vessel ischemic changes again noted, along with several areas remote  cortical infarct. CT Cervical Spine:   Impression  Moderate multilevel cervical spine degenerative changes with bony neural  foraminal narrowing as above.  No acute fracture or subluxation. CT Hip Right w/o: Impression  Acute comminuted and moderately displaced intertrochanteric right femur  fracture with free-floating greater trochanteric and lesser trochanteric  fragments.  Mild bilateral hip osteoarthritis.  No hip dislocation. CXR:   Impression  Pulmonary edema with left pleural effusion. EKG:  Normal sinus rhythm  Left axis deviation  Abnormal ECG  When compared with ECG of 27-DEC-2020 12:04,  Sinus rhythm has replaced Junctional rhythm  ST no longer elevated in Inferior leads  Nonspecific T wave abnormality no longer evident in Anterolateral leads    Wounds and LDAs present prior to admission: Michaels, multiple bruises bilateral arms, and right hip. See below for additional PMH.     Patient hlgi-mhwgzquscb-vihlmsrg-available records reviewed, including, but not limited to ER records, imaging results, lab results, office records, personal records, and [facility] -- no signs of abuse or diversion. Past Medical History:   Diagnosis Date    Adenomatous polyp 06/10    With recommendation for repeat 06/15. Also, diverticulosis was noted.  Cataracts, bilateral     Combined forms of age-related cataract of both eyes 10/1/2018    Corneal scar, right eye     Cystocele     history of     Dementia ([facility] [address]     Mini-Mental Status exam 27/30 6/14  declines meds at this point,  MMSE 29/30 on June 13, 2017  MMSE 26/30 4/2018    Difficulty controlling anger 9/16/2018    Dyslipidemia     GERD (gastroesophageal reflux disease)     egd  4/15 ok    Goiter     benign, history of     Hiatal hernia     Hypertension     Hypokalemia     Hypothyroidism     Impaired fasting glucose     Malignant melanoma in situ ([address]     R upper Back 8/17    Migraines     Murmur, functional     Grade 1/6 systolic, history of     Osteopenia     T -1.0 hip, 03/09, T -0.3 spine, 03/09. 1) Repeat DEXA scan 09/12 with T +0.15, T -1.2 at the hip but -1.8 at the mean femoral neck giving her a FRAX score of 4.3 at the hip. Reclast 10/13 and given 2014,2015, and 1/4/2017, on calcium and vit d,  Redo Dexa 10/14 Frax 4.2% , Frax 4.8% 10 yr hip fracture risk on Dexa 1/17  On Reclast    Posterior vitreous detachment of both eyes     Primary open angle glaucoma (POAG) of both eyes, moderate stage 10/1/2018    Sciatica 12/15/2015    Skin cancer     History of multiple skin cancers. Following with Dr. Husain Figures--- invasive squamous cell Ca 8/17 L forearm    Syncope and collapse 1/21/2015    Tremor 12/23/2018    Trichiasis of left lower eyelid without entropion     Visual disturbance 10/26/2017           Past Surgical History:   Procedure Laterality Date    APPENDECTOMY      CHOLECYSTECTOMY      Remote.  COLONOSCOPY  06/08/10    COLONOSCOPY  07/18/02    CYSTOCELE REPAIR  02/12/99    Vaginal prolapse, cystocele plus pelvic relaxation.      DILATION AND CURETTAGE OF UTERUS      with hysteroscopy   Mary oLu still has ovaries     INTRACAPSULAR CATARACT EXTRACTION Left 8/25/2020    Left Cataract Extraction w/ IOL Implant performed by Danyelle Gray MD at [place] [id number] Right 10/8/2020    Right Cataract Extraction w/ IOL Implant performed by D[facility]elle [facility] MD at [facility]  10/07/09    [facility], left nasal sidewall.  OTHER SURGICAL HISTORY  11/11/08    Anterior repair.  OTHER SURGICAL HISTORY  1988    laser cone    UPPER GASTROINTESTINAL ENDOSCOPY  4/8/2015    hiatal hernia    VEIN SURGERY  06/08/09    Laser ablation of right greater saphenous vein. Medications Prior to Admission:    Medications Prior to Admission: levothyroxine (SYNTHROID) 125 MCG tablet, Take 1 tablet by mouth Daily  memantine (NAMENDA) 5 MG tablet, Take 1 tablet by mouth daily  calcium-vitamin D (OSCAL 500/200 D-3) 500-200 MG-UNIT per tablet, Take 1 tablet by mouth daily  FLUoxetine (PROZAC) 20 MG capsule, TAKE 1 CAPSULE DAILY  losartan (COZAAR) 100 MG tablet, Take 1 tablet by mouth daily  OLANZapine (ZYPREXA) 2.5 MG tablet, Take 1 tablet by mouth nightly  BRILINTA 90 MG TABS tablet, Take 1 tablet by mouth 2 times daily  atenolol (TENORMIN) 25 MG tablet, Take 1 tablet by mouth daily  atorvastatin (LIPITOR) 40 MG tablet, Take 1 tablet by mouth daily  aspirin 81 MG EC tablet, Take 1 tablet by mouth daily  Multiple Vitamin (DAILY MULTIVITAMIN PO), Take 1 tablet by mouth daily   furosemide (LASIX) 40 MG tablet, Take 40 mg by mouth daily  acetaminophen (TYLENOL) 650 MG extended release tablet, Take 650 mg by mouth 2 times daily as needed for Pain  amLODIPine (NORVASC) 2.5 MG tablet, TAKE 1 TABLET DAILY (Patient not taking: Reported on 10/14/2021)  nitroGLYCERIN (NITROSTAT) 0.4 MG SL tablet, Place 0.4 mg under the tongue every 5 minutes as needed for Chest pain up to max of 3 total doses.  If no relief after 3 dose, call 911.  ammonium lactate (AMLACTIN) 12 % cream, apply to affected area twice a day if needed    Allergies:    Patient has no known allergies. Social History:    reports that she has never smoked. She has never used smokeless tobacco. She reports current alcohol use. She reports that she does not use drugs. Family History:   family history includes Colon Cancer in her brother; Coronary Art Dis in her father. REVIEW OF SYSTEMS:  See HPI and problem list; otherwise no other new complaints with respect to eyes, ENT, neck, pulmonary, coronary, chest, GI, , endocrine, musculoskeletal, immune system/connective tissue disease, hematologic, neurologic, psychiatric, skin, lymphatics, or malignancies. Code status: patient/family wishes for Full Code at this time. PHYSICAL EXAM:  Vitals:  BP (!) 191/58   Pulse 76   Temp 98.8 °F (37.1 °C) (Oral)   Resp 20   Ht 5' 4\" (1.626 m)   Wt 138 lb 14.4 oz (63 kg)   LMP  (LMP Unknown)   SpO2 93%   BMI 23.84 kg/m²     General: awake, alert, cooperative and drowsy  HEENT: External nose normal, Normocephalic, Atraumatic and Neck with full ROM  Neck: Supple and No Bruits  Chest/Lungs: Clear to Auscultation without Rales, Rhonchi, or Wheezes, Bases Diminished Bilaterally and Respirations even and unlabored  Cardiac: Irregularly Irregular and Pedal Pulses Palpable Bilaterally  GI/Abdomen: Bowel Sounds Present and Soft, Non-tender, without Guarding or Rebound Tenderness  : st catheter present  Extremities/Musculoskeletal: Right leg shortened and externally rotated., All four extremities without edema and Generalized weakness  Skin: Bruising bilateral arms and Rt hip and Skin warm and dry  Neuro: Dementia at baseline, Alert and Oriented, to Person, to Time, to Place, to Situation, No Localizing Signs/Symptoms, follows commands with all 4 extremities and Deficets Noted Limited movement Right lower extremity.   Psychiatric: Normal mood and affect      LABS:    CBC with Differential:    Lab Results   Component Value Date    WBC 11.3 10/30/2021    RBC 3.54 10/30/2021    HGB 10.7 10/30/2021    HCT 34.5 10/30/2021     10/30/2021    MCV 97.5 10/30/2021    MCH 30.2 10/30/2021    MCHC 31.0 10/30/2021    RDW 14.8 10/30/2021    LYMPHOPCT 7 10/30/2021    MONOPCT 9 10/30/2021    BASOPCT 1 10/30/2021    MONOSABS 1.04 10/30/2021    LYMPHSABS 0.80 10/30/2021    EOSABS 0.09 10/30/2021    BASOSABS 0.06 10/30/2021    DIFFTYPE NOT REPORTED 10/30/2021     CMP:    Lab Results   Component Value Date     10/30/2021    K 3.5 10/30/2021     10/30/2021    CO2 27 10/30/2021    BUN 16 10/30/2021    CREATININE 0.76 10/30/2021    GFRAA >60 10/30/2021    LABGLOM >60 10/30/2021    GLUCOSE 132 10/30/2021    PROT 7.4 09/23/2021    LABALBU 4.1 09/23/2021    CALCIUM 9.0 10/30/2021    BILITOT 0.55 09/23/2021    ALKPHOS 78 09/23/2021    AST 22 09/23/2021    ALT 8 09/23/2021     Hepatic Function Panel:    Lab Results   Component Value Date    ALKPHOS 78 09/23/2021    ALT 8 09/23/2021    AST 22 09/23/2021    PROT 7.4 09/23/2021    BILITOT 0.55 09/23/2021    BILIDIR 0.18 12/24/2020    IBILI 0.43 12/24/2020    LABALBU 4.1 09/23/2021     PT/INR:    Lab Results   Component Value Date    PROTIME 14.0 10/30/2021    INR 1.1 10/30/2021     PTT:    Lab Results   Component Value Date    APTT 27.6 10/30/2021   [APTT}    ASSESSMENT:      Patient Active Problem List   Diagnosis    Dyslipidemia    Osteopenia    Impaired fasting glucose    Adenomatous polyp    Essential hypertension    Hypothyroidism    Sciatica    GERD (gastroesophageal reflux disease)    Visual disturbance    Malignant melanoma in situ (HCC)    Difficulty controlling anger    Primary open angle glaucoma (POAG) of both eyes, moderate stage    PVD (posterior vitreous detachment), both eyes    Combined forms of age-related cataract of both eyes    Dementia with behavioral disturbance (HCC)    Tremor    Essential tremor    Dizziness    Chronic cerebral ischemia    Acquired cerebral atrophy

## 2021-11-01 ENCOUNTER — APPOINTMENT (OUTPATIENT)
Dept: GENERAL RADIOLOGY | Age: 86
DRG: 481 | End: 2021-11-01
Payer: MEDICARE

## 2021-11-01 PROBLEM — S72.001A CLOSED RIGHT HIP FRACTURE, INITIAL ENCOUNTER (HCC): Status: ACTIVE | Noted: 2021-10-30

## 2021-11-01 LAB
-: NORMAL
ABSOLUTE EOS #: 0.29 K/UL (ref 0–0.44)
ABSOLUTE IMMATURE GRANULOCYTE: 0.05 K/UL (ref 0–0.3)
ABSOLUTE LYMPH #: 0.79 K/UL (ref 1.1–3.7)
ABSOLUTE MONO #: 1.22 K/UL (ref 0.1–1.2)
ANION GAP SERPL CALCULATED.3IONS-SCNC: 7 MMOL/L (ref 9–17)
BASOPHILS # BLD: 1 % (ref 0–2)
BASOPHILS ABSOLUTE: 0.05 K/UL (ref 0–0.2)
BUN BLDV-MCNC: 19 MG/DL (ref 8–23)
BUN/CREAT BLD: 26 (ref 9–20)
CALCIUM SERPL-MCNC: 7.8 MG/DL (ref 8.6–10.4)
CHLORIDE BLD-SCNC: 108 MMOL/L (ref 98–107)
CO2: 26 MMOL/L (ref 20–31)
CREAT SERPL-MCNC: 0.72 MG/DL (ref 0.5–0.9)
DIFFERENTIAL TYPE: ABNORMAL
EOSINOPHILS RELATIVE PERCENT: 3 % (ref 1–4)
GFR AFRICAN AMERICAN: >60 ML/MIN
GFR NON-AFRICAN AMERICAN: >60 ML/MIN
GFR SERPL CREATININE-BSD FRML MDRD: ABNORMAL ML/MIN/{1.73_M2}
GFR SERPL CREATININE-BSD FRML MDRD: ABNORMAL ML/MIN/{1.73_M2}
GLUCOSE BLD-MCNC: 112 MG/DL (ref 70–99)
HCT VFR BLD CALC: 26.2 % (ref 36.3–47.1)
HCT VFR BLD CALC: 28.5 % (ref 36.3–47.1)
HEMOGLOBIN: 7.9 G/DL (ref 11.9–15.1)
HEMOGLOBIN: 9.2 G/DL (ref 11.9–15.1)
IMMATURE GRANULOCYTES: 1 %
INR BLD: 1.3
LV EF: 55 %
LVEF MODALITY: NORMAL
LYMPHOCYTES # BLD: 8 % (ref 24–43)
MCH RBC QN AUTO: 29.8 PG (ref 25.2–33.5)
MCHC RBC AUTO-ENTMCNC: 30.2 G/DL (ref 25.2–33.5)
MCV RBC AUTO: 98.9 FL (ref 82.6–102.9)
MONOCYTES # BLD: 12 % (ref 3–12)
NRBC AUTOMATED: 0 PER 100 WBC
PDW BLD-RTO: 14.8 % (ref 11.8–14.4)
PLATELET # BLD: 149 K/UL (ref 138–453)
PLATELET ESTIMATE: ABNORMAL
PMV BLD AUTO: 10.8 FL (ref 8.1–13.5)
POTASSIUM SERPL-SCNC: 3.8 MMOL/L (ref 3.7–5.3)
PROTHROMBIN TIME: 15.4 SEC (ref 11.5–14.2)
RBC # BLD: 2.65 M/UL (ref 3.95–5.11)
RBC # BLD: ABNORMAL 10*6/UL
REASON FOR REJECTION: NORMAL
SARS-COV-2, RAPID: NOT DETECTED
SEG NEUTROPHILS: 75 % (ref 36–65)
SEGMENTED NEUTROPHILS ABSOLUTE COUNT: 7.98 K/UL (ref 1.5–8.1)
SODIUM BLD-SCNC: 141 MMOL/L (ref 135–144)
SPECIMEN DESCRIPTION: NORMAL
WBC # BLD: 10.4 K/UL (ref 3.5–11.3)
WBC # BLD: ABNORMAL 10*3/UL
ZZ NTE CLEAN UP: ORDERED TEST: NORMAL
ZZ NTE WITH NAME CLEAN UP: SPECIMEN SOURCE: NORMAL

## 2021-11-01 PROCEDURE — 6370000000 HC RX 637 (ALT 250 FOR IP): Performed by: INTERNAL MEDICINE

## 2021-11-01 PROCEDURE — 99232 SBSQ HOSP IP/OBS MODERATE 35: CPT | Performed by: INTERNAL MEDICINE

## 2021-11-01 PROCEDURE — 36415 COLL VENOUS BLD VENIPUNCTURE: CPT

## 2021-11-01 PROCEDURE — 86850 RBC ANTIBODY SCREEN: CPT

## 2021-11-01 PROCEDURE — 2060000000 HC ICU INTERMEDIATE R&B

## 2021-11-01 PROCEDURE — 80048 BASIC METABOLIC PNL TOTAL CA: CPT

## 2021-11-01 PROCEDURE — 93306 TTE W/DOPPLER COMPLETE: CPT

## 2021-11-01 PROCEDURE — 85014 HEMATOCRIT: CPT

## 2021-11-01 PROCEDURE — 36430 TRANSFUSION BLD/BLD COMPNT: CPT

## 2021-11-01 PROCEDURE — 86900 BLOOD TYPING SEROLOGIC ABO: CPT

## 2021-11-01 PROCEDURE — 99222 1ST HOSP IP/OBS MODERATE 55: CPT | Performed by: NURSE PRACTITIONER

## 2021-11-01 PROCEDURE — 2500000003 HC RX 250 WO HCPCS: Performed by: INTERNAL MEDICINE

## 2021-11-01 PROCEDURE — 2580000003 HC RX 258: Performed by: INTERNAL MEDICINE

## 2021-11-01 PROCEDURE — 6370000000 HC RX 637 (ALT 250 FOR IP)

## 2021-11-01 PROCEDURE — 6360000002 HC RX W HCPCS: Performed by: INTERNAL MEDICINE

## 2021-11-01 PROCEDURE — 87635 SARS-COV-2 COVID-19 AMP PRB: CPT

## 2021-11-01 PROCEDURE — 85025 COMPLETE CBC W/AUTO DIFF WBC: CPT

## 2021-11-01 PROCEDURE — 71045 X-RAY EXAM CHEST 1 VIEW: CPT

## 2021-11-01 PROCEDURE — 85018 HEMOGLOBIN: CPT

## 2021-11-01 PROCEDURE — 99222 1ST HOSP IP/OBS MODERATE 55: CPT | Performed by: INTERNAL MEDICINE

## 2021-11-01 PROCEDURE — 86901 BLOOD TYPING SEROLOGIC RH(D): CPT

## 2021-11-01 PROCEDURE — 85610 PROTHROMBIN TIME: CPT

## 2021-11-01 PROCEDURE — 86920 COMPATIBILITY TEST SPIN: CPT

## 2021-11-01 PROCEDURE — 2580000003 HC RX 258

## 2021-11-01 PROCEDURE — P9016 RBC LEUKOCYTES REDUCED: HCPCS

## 2021-11-01 RX ORDER — MORPHINE SULFATE 2 MG/ML
2 INJECTION, SOLUTION INTRAMUSCULAR; INTRAVENOUS EVERY 5 MIN PRN
Status: CANCELLED | OUTPATIENT
Start: 2021-11-01

## 2021-11-01 RX ORDER — FENTANYL CITRATE 50 UG/ML
50 INJECTION, SOLUTION INTRAMUSCULAR; INTRAVENOUS EVERY 5 MIN PRN
Status: CANCELLED | OUTPATIENT
Start: 2021-11-01

## 2021-11-01 RX ORDER — DIPHENHYDRAMINE HYDROCHLORIDE 50 MG/ML
12.5 INJECTION INTRAMUSCULAR; INTRAVENOUS
Status: CANCELLED | OUTPATIENT
Start: 2021-11-01 | End: 2021-11-01

## 2021-11-01 RX ORDER — FUROSEMIDE 10 MG/ML
20 INJECTION INTRAMUSCULAR; INTRAVENOUS 2 TIMES DAILY
Status: DISCONTINUED | OUTPATIENT
Start: 2021-11-01 | End: 2021-11-05 | Stop reason: HOSPADM

## 2021-11-01 RX ORDER — FENTANYL CITRATE 50 UG/ML
25 INJECTION, SOLUTION INTRAMUSCULAR; INTRAVENOUS EVERY 5 MIN PRN
Status: CANCELLED | OUTPATIENT
Start: 2021-11-01

## 2021-11-01 RX ORDER — HYDROCODONE BITARTRATE AND ACETAMINOPHEN 5; 325 MG/1; MG/1
1-2 TABLET ORAL
Qty: 42 TABLET | Refills: 0 | Status: SHIPPED | OUTPATIENT
Start: 2021-11-01 | End: 2021-11-04 | Stop reason: ALTCHOICE

## 2021-11-01 RX ORDER — POTASSIUM CHLORIDE 20 MEQ/1
40 TABLET, EXTENDED RELEASE ORAL ONCE
Status: DISCONTINUED | OUTPATIENT
Start: 2021-11-01 | End: 2021-11-01

## 2021-11-01 RX ORDER — OXYCODONE HYDROCHLORIDE 5 MG/1
10 TABLET ORAL PRN
Status: CANCELLED | OUTPATIENT
Start: 2021-11-01 | End: 2021-11-01

## 2021-11-01 RX ORDER — ONDANSETRON 2 MG/ML
4 INJECTION INTRAMUSCULAR; INTRAVENOUS PRN
Status: CANCELLED | OUTPATIENT
Start: 2021-11-01

## 2021-11-01 RX ORDER — MULTIVITAMIN WITH IRON
1 TABLET ORAL DAILY
Status: DISCONTINUED | OUTPATIENT
Start: 2021-11-01 | End: 2021-11-05 | Stop reason: HOSPADM

## 2021-11-01 RX ORDER — OXYCODONE HYDROCHLORIDE 5 MG/1
5 TABLET ORAL PRN
Status: CANCELLED | OUTPATIENT
Start: 2021-11-01 | End: 2021-11-01

## 2021-11-01 RX ORDER — SODIUM CHLORIDE 9 MG/ML
INJECTION, SOLUTION INTRAVENOUS PRN
Status: DISCONTINUED | OUTPATIENT
Start: 2021-11-01 | End: 2021-11-05 | Stop reason: HOSPADM

## 2021-11-01 RX ORDER — FUROSEMIDE 10 MG/ML
20 INJECTION INTRAMUSCULAR; INTRAVENOUS ONCE
Status: DISCONTINUED | OUTPATIENT
Start: 2021-11-01 | End: 2021-11-01

## 2021-11-01 RX ADMIN — CALCIUM CARBONATE-VITAMIN D TAB 500 MG-200 UNIT 1 TABLET: 500-200 TAB at 08:43

## 2021-11-01 RX ADMIN — CALCIUM CHLORIDE 1000 MG: 100 INJECTION, SOLUTION INTRAVENOUS; INTRAVENTRICULAR at 09:17

## 2021-11-01 RX ADMIN — SODIUM CHLORIDE: 9 INJECTION, SOLUTION INTRAVENOUS at 04:38

## 2021-11-01 ASSESSMENT — PAIN SCALES - PAIN ASSESSMENT IN ADVANCED DEMENTIA (PAINAD)
BREATHING: 0
NEGVOCALIZATION: 0
BODYLANGUAGE: 0
BODYLANGUAGE: 0
BREATHING: 0
BODYLANGUAGE: 0
NEGVOCALIZATION: 0
FACIALEXPRESSION: 0
CONSOLABILITY: 0
BODYLANGUAGE: 0
FACIALEXPRESSION: 0
NEGVOCALIZATION: 0
CONSOLABILITY: 0
FACIALEXPRESSION: 0
BREATHING: 0
TOTALSCORE: 0
TOTALSCORE: 0
NEGVOCALIZATION: 0
TOTALSCORE: 0
TOTALSCORE: 0
FACIALEXPRESSION: 0
BREATHING: 0
CONSOLABILITY: 0
NEGVOCALIZATION: 0
TOTALSCORE: 0
CONSOLABILITY: 0
FACIALEXPRESSION: 0
BREATHING: 0
CONSOLABILITY: 0
TOTALSCORE: 0
FACIALEXPRESSION: 0
BODYLANGUAGE: 0
CONSOLABILITY: 0
FACIALEXPRESSION: 0
BODYLANGUAGE: 0
TOTALSCORE: 0
BREATHING: 0
BODYLANGUAGE: 0
BODYLANGUAGE: 0
NEGVOCALIZATION: 0
FACIALEXPRESSION: 0
CONSOLABILITY: 0
BODYLANGUAGE: 0
BREATHING: 0
CONSOLABILITY: 0
TOTALSCORE: 0
NEGVOCALIZATION: 0
FACIALEXPRESSION: 0
BREATHING: 0
BREATHING: 0
NEGVOCALIZATION: 0
TOTALSCORE: 0
BREATHING: 0
TOTALSCORE: 0
NEGVOCALIZATION: 0
FACIALEXPRESSION: 0
BREATHING: 0
TOTALSCORE: 0
CONSOLABILITY: 0
TOTALSCORE: 0
BODYLANGUAGE: 0
CONSOLABILITY: 0
BREATHING: 0
TOTALSCORE: 0
BREATHING: 0
BODYLANGUAGE: 0
NEGVOCALIZATION: 0
NEGVOCALIZATION: 0
BREATHING: 0
TOTALSCORE: 0
CONSOLABILITY: 0
NEGVOCALIZATION: 0
CONSOLABILITY: 0
BREATHING: 0
BODYLANGUAGE: 0
NEGVOCALIZATION: 0
FACIALEXPRESSION: 0
BREATHING: 0
CONSOLABILITY: 0
CONSOLABILITY: 0
TOTALSCORE: 0
BODYLANGUAGE: 0
TOTALSCORE: 0
TOTALSCORE: 0
NEGVOCALIZATION: 0
FACIALEXPRESSION: 0
FACIALEXPRESSION: 0
TOTALSCORE: 0
BREATHING: 0
BODYLANGUAGE: 0
NEGVOCALIZATION: 0
FACIALEXPRESSION: 0
NEGVOCALIZATION: 0
CONSOLABILITY: 0
BREATHING: 0
NEGVOCALIZATION: 0
FACIALEXPRESSION: 0
FACIALEXPRESSION: 0
CONSOLABILITY: 0
NEGVOCALIZATION: 0
FACIALEXPRESSION: 0
CONSOLABILITY: 0
CONSOLABILITY: 0
FACIALEXPRESSION: 0
BODYLANGUAGE: 0
BODYLANGUAGE: 0

## 2021-11-01 ASSESSMENT — PAIN SCALES - GENERAL
PAINLEVEL_OUTOF10: 6
PAINLEVEL_OUTOF10: 0
PAINLEVEL_OUTOF10: 4
PAINLEVEL_OUTOF10: 0

## 2021-11-01 NOTE — FLOWSHEET NOTE
rounding in PCU    Assessment: Patient in bed, alone in room. She presents to have confusion and asked several times when she was going home. She states she would like her  called and welcomes prayer. Intervention:  prayed with patient and then contacted her . Outcome: Patient thanked  and began to fall asleep.  spoke with Becky Barraza who stated he was aware and  had visited patient yesterday. Plan: Chaplains will remain available to offer spiritual and emotional support as needed. 11/01/21 1000   Encounter Summary   Services provided to: Patient   Referral/Consult From: [facility] Family members; Scientology/peri community   Place of [facility] [employer] Completed   Continue Visiting   (11/1/21)   Complexity of Encounter Low   Length of Encounter 15 minutes   Spiritual Assessment Completed Yes   Spiritual/Tenriism   Type Spiritual support   Assessment Approachable  (Confused)   Intervention Prayer;Sustaining presence/ Ministry of presence   Outcome Expressed gratitude   Electronically signed by Adelaida Mccoy on 11/1/2021 at 11:01 AM

## 2021-11-01 NOTE — PROGRESS NOTES
11/01/2021    MONOSABS 1.22 11/01/2021    LYMPHSABS 0.79 11/01/2021    EOSABS 0.29 11/01/2021    BASOSABS 0.05 11/01/2021    DIFFTYPE NOT REPORTED 11/01/2021     BMP:    Lab Results   Component Value Date     11/01/2021    K 3.8 11/01/2021     11/01/2021    CO2 26 11/01/2021    BUN 19 11/01/2021    LABALBU 3.1 10/31/2021    CREATININE 0.72 11/01/2021    CALCIUM 7.8 11/01/2021    GFRAA >60 11/01/2021    LABGLOM >60 11/01/2021    GLUCOSE 112 11/01/2021           Physical Exam:  Vitals: BP (!) 157/59   Pulse 72   Temp 98.9 °F (37.2 °C) (Axillary)   Resp 23   Ht 5' 4\" (1.626 m)   Wt 144 lb 1.6 oz (65.4 kg)   LMP  (LMP Unknown)   SpO2 95%   BMI 24.73 kg/m²   24 hour intake/output:    Intake/Output Summary (Last 24 hours) at 11/1/2021 1543  Last data filed at 11/1/2021 1526  Gross per 24 hour   Intake 2781.6 ml   Output 2225 ml   Net 556.6 ml     Last 3 weights: Wt Readings from Last 3 Encounters:   11/01/21 144 lb 1.6 oz (65.4 kg)   09/29/21 147 lb (66.7 kg)   09/23/21 144 lb 12.8 oz (65.7 kg)     HEENT: Normocephalic and Atraumatic  Neck: Supple, No Masses, Tenderness, Nodularity and No Lymphadenopathy  Chest/Lungs: Clear to Auscultation without Rales, Rhonchi, or Wheezes and Distant Breath Sounds  Cardiac: Regular Rate and Rhythm --II/VI PHILIP  GI/Abdomen: Bowel Sounds Present and Soft, Non-tender, without Guarding or Rebound Tenderness  : st catheter---clear pale yellow  EXT/Skin: right hip pain--minimal movement, No Edema, No Cyanosis and No Clubbing  Neuro: alert---confusional state--dementia worsening----generalized weakness      Assessment:    Principal Problem:    Displaced intertrochanteric fracture of right femur, initial encounter for closed fracture [facility])  Active Problems:    Closed right hip fracture, initial encounter ([facility] [address]  Resolved Problems:    * No resolved hospital problems.  LANRE Mckinnon.                      91  WF  Donna Aaron,, IM; DC Cardiology--[facility];  saman Omalley, Orthopedics]                  FULL CODE        LIZZY KRAUS--10.30.2021    Anti-infectives:     Planned repair right hip fracture---11.2.2021---Haman  Right hip fracture---10.30.2021---fall getting out of car---                       10.30.2021--no LOC----traumatic osteopenic fracture  Acute comminuted and moderately displaced IT right femur fracture                       with free-floating greater trochanteric and lesser                        trochanter fragments--mild bilateral OA hips---no hip                        dislocation       CT right hip---10.30.2021---as above       CT C-spine----10.30.2021--multilevel cervical spine                        degenerative changes narrowing--no fracture or                           subluxation       CT head---10.30.2021---no MBS--moderate atrophy and chronic                         small vessel ischemic disease---remote cortical                         infarctions   ASCVD          2D ECHO--11.1.2021--RAUL--NLVSF---MICHELLE without stenosis-                        MAC MV thickening--mild MR--moderate TR---RVSP ~                    83 mm Hg---severe pulmonary hypertension---increased                    IVC diameter and impaired or no inspiratory variation---                   LVEF ~ 55%          STEMI--12.2020          ALLIE---RCA---12.2020          2D ECHO---12.2020--mild-to-moderate TR--moderate MR--                   RVSP ~ 46 mm Hg--LVEF ~ 45-50%      Severe pulmonary hypertension   Hypertension                   DUS--CV--1.22.2015--no hemodynamically significant                              stenosis                     2D ECHO--1.6.2015--NLVSF--LAE--MAC--mild MR--                             MICHELLE--mild-to-moderate TR--RVSP ~ 30 mm Hg--                             Grade 2 DD--LVEF ~ 65%                   MI ruled out-----1.22.2015--1.6.2015  II/VI PHILIP  Dyslipidemia  Hypothyroidism  Impaired fasting glucose  GERD   I/VI PHILIP  Cognitive impairment  Anger management issues  Hypocalcemia    PMH:   osteopenia, adenomatous polyp, SCC--nose, bilateral               cataracts, trichiasis left lower eyelid, bilateral posterior               vitreous detachment, HH, goiter, I/VI PHILIP, right eye               corneal scar, migraine headaches, UTI--1.5.2014,                leukocytosis--1.6.2015, hypokalemia--1.5.2014, syncope--              collapse--1.21.2015, malignant melanoma--right upper               back--2017, osteopenia, tremor---2019  PSH:   cholecystectomy, colonoscopy--2010--2002, Mohs--2009,               laser ablation right GSV--2009, cystocele repair--1999,               appendectomy, D&C-uterus, hysterectomy--1988,               anterior repair--2008, laser conization--1988, EGD--2015,               cataract extraction--IOL--OU--2020    Allergies:  Flagyl--rash       Plan:    1. Right hip fracture---planned repair----11.2.2021---Jessie  2. Seen by Cardiology  3.   See orders    Electronically signed by Helene Burden on 11/1/2021 at 3:43 PM    Hospitalist

## 2021-11-01 NOTE — CONSULTS
[facility]  In Patient Cardiology Consult             Date:   11/1/2021  Patient name: Maxx Negrete  Date of admission:  10/30/2021  4:44 PM  MRN:   [id number]  YOB: 1930    Reason for Admission: Fall    CHIEF COMPLAINT:  Fall     History Obtained From:  Pt and chart    HISTORY OF PRESENT ILLNESS:    This is a 49-year-old female who presented after a fall. Apparently she was getting out of her vehicle. She slipped. She fell on her right hip. She did hit the back of her head. She denies any loss of consciousness. She states her hip pain is the worst.  We are consulted for preoperative stratification. She denies complaints of chest pain, orthopnea, PND. Admits to some mild LE edema for past few months. Known to me from office visit on 7/21:  1. CAD- s/p STEMI on 12/22020. ALLIE of proximal and distal RCA. Mild to moderate disease of other arteries. Preserved LV systolic function.  - Echo 12/2020 EF 45-50%. Mild to moderate TR. Moderate MR.  RVSP 46 mm Hg.   - continue current medications.      2. HPL. On Statin. - will update lipids     3. PAF at time of STEMI. Has been in NSR.      4. Essential hypertension. Well controlled.      5. Hypothyroidism. Followed by PCP    Past Medical History:    Past Medical History:   Diagnosis Date    Adenomatous polyp 06/10    With recommendation for repeat 06/15. Also, diverticulosis was noted.      Cataracts, bilateral     Combined forms of age-related cataract of both eyes 10/1/2018    Corneal scar, right eye     Cystocele     history of     Dementia ([facility] [address]     Mini-Mental Status exam 27/30  6/14  declines meds at this point,  MMSE 29/30 on June 13, 2017  MMSE 26/30 4/2018    Difficulty controlling anger 9/16/2018    Dyslipidemia     GERD (gastroesophageal reflux disease)     egd  4/15 ok    Goiter     benign, history of     Hiatal hernia     Hypertension     Hypokalemia     Hypothyroidism     Impaired fasting glucose  Malignant melanoma in situ ([facility] [address]     R upper Back 8/17    Migraines     Murmur, functional     Grade 1/6 systolic, history of     Osteopenia     T -1.0 hip, 03/09, T -0.3 spine, 03/09. 1) Repeat DEXA scan 09/12 with T +0.15, T -1.2 at the hip but -1.8 at the mean femoral neck giving her a FRAX score of 4.3 at the hip. Reclast 10/13 and given 2014,2015, and 1/4/2017, on calcium and vit d,  Redo Dexa 10/14 Frax 4.2% , Frax 4.8% 10 yr hip fracture risk on Dexa 1/17  On Reclast    Posterior vitreous detachment of both eyes     Primary open angle glaucoma (POAG) of both eyes, moderate stage 10/1/2018    Sciatica 12/15/2015    Skin cancer     History of multiple skin cancers. Following with Dr. José Ceja--- invasive squamous cell Ca 8/17 L forearm    Syncope and collapse 1/21/2015    Tremor 12/23/2018    Trichiasis of left lower eyelid without entropion     Visual disturbance 10/26/2017         Past Surgical History:    Past Surgical History:   Procedure Laterality Date    APPENDECTOMY      CHOLECYSTECTOMY      Remote.  COLONOSCOPY  06/08/10    COLONOSCOPY  07/18/02    CYSTOCELE REPAIR  02/12/99    Vaginal prolapse, cystocele plus pelvic relaxation.  DILATION AND CURETTAGE OF UTERUS      with hysteroscopy   [id number] [facility]    still has ovaries     INTRACAPSULAR CATARACT EXTRACTION Left 8/25/2020    Left Cataract Extraction w/ IOL Implant performed by Julio César Levy MD at [place] [address] Right 10/8/2020    Right Cataract Extraction w/ IOL Implant performed by Julio César Levy MD at [address] [place] [address]  10/07/09    SCC, left nasal sidewall.  OTHER SURGICAL HISTORY  11/11/08    Anterior repair.  OTHER SURGICAL HISTORY  1988    laser cone    UPPER GASTROINTESTINAL ENDOSCOPY  4/8/2015    hiatal hernia    VEIN SURGERY  06/08/09    Laser ablation of right greater saphenous vein.           Home Medications:    Outpatient Medications Marked as Taking for the 10/30/21 encounter [facility] Encounter)   Medication Sig Dispense Refill    HYDROcodone-acetaminophen (NORCO) 5-325 MG per tablet Take 1-2 tablets by mouth every 4-6 hours as needed for Pain for up to 7 days. 42 tablet 0    levothyroxine (SYNTHROID) 125 MCG tablet Take 1 tablet by mouth Daily 90 tablet 0    memantine (NAMENDA) 5 MG tablet Take 1 tablet by mouth daily 90 tablet 2    calcium-vitamin D (OSCAL 500/200 D-3) 500-200 MG-UNIT per tablet Take 1 tablet by mouth daily 90 tablet 3    FLUoxetine (PROZAC) 20 MG capsule TAKE 1 CAPSULE DAILY 90 capsule 3    losartan (COZAAR) 100 MG tablet Take 1 tablet by mouth daily 90 tablet 3    OLANZapine (ZYPREXA) 2.5 MG tablet Take 1 tablet by mouth nightly 90 tablet 3    BRILINTA 90 MG TABS tablet Take 1 tablet by mouth 2 times daily 60 tablet 0    atenolol (TENORMIN) 25 MG tablet Take 1 tablet by mouth daily 90 tablet 3    atorvastatin (LIPITOR) 40 MG tablet Take 1 tablet by mouth daily 90 tablet 3    aspirin 81 MG EC tablet Take 1 tablet by mouth daily      Multiple Vitamin (DAILY MULTIVITAMIN PO) Take 1 tablet by mouth daily           Allergies:  Patient has no known allergies. Social History:    Social History     Socioeconomic History    Marital status:      Spouse name: Quyen Albright    Number of children: 3    Years of education: None    Highest education level: None   Occupational History    None   Tobacco Use    Smoking status: Never Smoker    Smokeless tobacco: Never Used   Vaping Use    Vaping Use: Never used   Substance and Sexual Activity    Alcohol use: Yes     Alcohol/week: 0.0 standard drinks     Comment: Infrequently.      Drug use: No    Sexual activity: None   Other Topics Concern    None   Social History Narrative    None     Social Determinants of Health     Financial Resource Strain:     Difficulty of Paying Living Expenses:    Food Insecurity:     Worried About Running Out of Food in the Last Year:  Ran Out of Food in the Last Year:    Transportation Needs:     Lack of Transportation (Medical):  Lack of Transportation (Non-Medical):    Physical Activity:     Days of Exercise per Week:     Minutes of Exercise per Session:    Stress:     Feeling of Stress :    Social Connections:     Frequency of Communication with Friends and Family:     Frequency of Social Gatherings with Friends and Family:     Attends [employer] Services:     Active Member of Clubs or Organizations:     Attends Club or Organization Meetings:     Marital Status:    Intimate Partner Violence:     Fear of Current or Ex-Partner:     Emotionally Abused:     Physically Abused:     Sexually Abused:         Family History:   Family History   Problem Relation Age of Onset    Coronary Art Dis Father     Colon Cancer Brother     Diabetes Neg Hx     Cataracts Neg Hx     Glaucoma Neg Hx        REVIEW OF SYSTEMS:    · Constitutional: there has been no unanticipated weight loss. There's been No change in energy level, No change in activity level. · Eyes: No visual changes or diplopia. No scleral icterus. · ENT: No Headaches, hearing loss or vertigo. No mouth sores or sore throat. · Cardiovascular: As HPI  · Respiratory: As HPI  · Gastrointestinal: No abdominal pain, appetite loss, blood in stools. No change in bowel or bladder habits. · Genitourinary: No dysuria, trouble voiding, or hematuria. · Musculoskeletal:  No gait disturbance, No weakness or joint complaints. · Integumentary: No rash or pruritis. · Neurological: No headache, diplopia, change in muscle strength, numbness or tingling. No change in gait, balance, coordination, mood, affect, memory, mentation, behavior. · Psychiatric: No anxiety, or depression. · Endocrine: No temperature intolerance. No excessive thirst, fluid intake, or urination. No tremor.   · Hematologic/Lymphatic: No abnormal bruising or bleeding, blood clots or swollen lymph nodes.  · Allergic/Immunologic: No nasal congestion or hives. PHYSICAL EXAM:    Physical Examination:    BP (!) 149/56   Pulse 70   Temp 98.6 °F (37 °C) (Tympanic)   Resp 22   Ht 5' 4\" (1.626 m)   Wt 144 lb 1.6 oz (65.4 kg)   LMP  (LMP Unknown)   SpO2 97%   BMI 24.73 kg/m²    Constitutional and General Appearance: alert, cooperative, no distress and appears stated age  HEENT: PERRL, no cervical lymphadenopathy. No masses palpable. Normal oral mucosa  Respiratory:  · Normal excursion and expansion without use of accessory muscles  · Resp Auscultation: Good respiratory effort. No for increased work of breathing. On auscultation: Clear  Cardiovascular:  · Heart tones are crisp and normal. regular S1 and S2. Murmurs: none  · Jugular venous pulsation Normal  Abdomen:  · No masses or tenderness  · Bowel sounds present  Extremities:  ·  No Cyanosis or Clubbing  ·  Lower extremity edema: none  ·  Skin: Warm and dry  Neurological:  · Alert and oriented. · Moves all extremities well  · No abnormalities of mood, affect, memory, mentation, or behavior are noted    DATA:    Diagnostics:      EKG:   Results for orders placed or performed during the hospital encounter of 10/30/21   EKG 12 Lead   Result Value Ref Range    Ventricular Rate 64 BPM    Atrial Rate 64 BPM    P-R Interval 144 ms    QRS Duration 80 ms    Q-T Interval 458 ms    QTc Calculation (Bazett) 472 ms    P Axis 60 degrees    R Axis -35 degrees    T Axis 52 degrees    Narrative    Normal sinus rhythm  Left axis deviation  Abnormal ECG       Echo:  Results for orders placed or performed during the hospital encounter of 10/30/21   ECHO Complete 2D W Doppler W Color  Normal left ventricular diameter. Left ventricular systolic function is normal. Left ventricular ejection  fraction 55 %. Left atrium is mildly dilated. Right atrial dilatation. Right ventricular dilatation with reduced systolic function. Aortic valve sclerosis without stenosis.   Mild mitral valve thickening with annular calcification. Mild mitral regurgitation. Moderate tricuspid regurgitation. Estimated right ventricular systolic pressure is 83 mmHg. Severe pulmonary  hypertension. Left pleural effusion. IVC Increased diameter and impaired or no inspiratory variation indicating  elevated RA filling pressure (i.e. CVP) . Labs:     CBC:   Recent Labs     10/31/21  0534 11/01/21  0705   WBC 12.0* 10.4   HGB 8.0* 7.9*   HCT 25.7* 26.2*    149     BMP:   Recent Labs     10/31/21  0534 11/01/21  0705    141   K 3.1* 3.8   CO2 26 26   BUN 17 19   CREATININE 0.67 0.72   LABGLOM >60 >60   GLUCOSE 129* 112*     BNP: No results for input(s): BNP in the last 72 hours. PT/INR:   Recent Labs     10/30/21  1820 11/01/21  0706   PROTIME 14.0 15.4*   INR 1.1 1.3     APTT:  Recent Labs     10/30/21  1820 10/31/21  0534   APTT 27.6 31.5     CARDIAC ENZYMES:No results for input(s): TROPHS in the last 72 hours. FASTING LIPID PANEL:  Lab Results   Component Value Date    HDL 55 08/05/2021    TRIG 45 08/05/2021     LIVER PROFILE:  Recent Labs     10/31/21  0534   AST 23   ALT 10   LABALBU 3.1*         IMPRESSION:      - Preop risk for Hip surgery  - Acute HFpEF  - RV dilation with reduced function and severely elevated RVSP 83 / Severe Pulm HTN   - Pleural effusion  - Dilated IVC  - CAD s/p PCI on 12/20  - DL  - PAF- only during STEMI- NSR  - HTN  - Anemia    RECOMMENDATIONS:  - hold ASA/Brilinta due to anemia  - needs diuresis  - reviewed Echo  - her RV findings/Pulm HTN are worrisome  - recommend lasix IV bid  - recommend giving some PRBCs  - if CXR improved tomorrow along with improvement in H/H, then can proceed at mod/high risk but not prohibitive risk. - I had a long detailed conversion with the patient, , and youngest son about her higher risk status, but they feel that without surgery should have a very poor prognosis.  This is reasonable, and I think here the benefits of surgery outweigh the risks. Discussed with patient and nursing. Thank you for allowing me to participate in the care of this patient, please do not hesitate to call if you have any questions. Meera Stein DO, [facility], [address], [address], [place] [address] [facility]  [email or web address]  [phone]

## 2021-11-01 NOTE — PROGRESS NOTES
DISCHARGE BARRIERS       Reason for Referral:  SW completed a Psychosocial Assessment for evaluation of patient's mental health, social status, and functional capacity within the community. Hayes Rabago is a 80year old admitted due to Displaced intertrochanteric fracture of right femur, initial encounter for closed fracture. Mental Status:  Alert and engaging during assessment. Decision Making:  Makes own decisions. Family/Social/Home Environment: Resident at [address]. Support: Discussed a good social support network  Current Services: [address]  Current DMEs: Patient reports none    PCP: Bobby Vincent MD and repots no issues affording medication.  status:  None   ADLs and means of transportation: Patient States she's Independent in ADLs. Patient does not drive however, has support for transportation as needed. Food insecurity or needed financial assistance: Patient denies any food insecurity or financial concerns at this time. ACP and Code Status: Patient is a DNR-CCA and has an Advance Directive. Collaborative List of SNF/ECF/HH were provided: No discharge order at this time. Anticipated Needs/Discharge Plan:  Spoke with patient about discharge plan. Patient verbalizes understanding of the plan of care and denies discharge needs or further services at this time. SW provided business card. SW will continue to monitor needs and assist as appropriate.           Electronically signed by BASIA Bruce on 11/1/2021 at 2:00 PM

## 2021-11-01 NOTE — PLAN OF CARE
Problem: Falls - Risk of:  Goal: Will remain free from falls  Description: Will remain free from falls  10/31/2021 2315 by Tenisha Peterson RN  Outcome: Ongoing  10/31/2021 1238 by Caitlin Goldman RN  Outcome: Ongoing  Goal: Absence of physical injury  Description: Absence of physical injury  10/31/2021 2315 by Tenisha Peterson RN  Outcome: Ongoing  10/31/2021 1238 by Caitlin Goldman RN  Outcome: Ongoing     Problem: Pain:  Goal: Pain level will decrease  Description: Pain level will decrease  10/31/2021 2315 by Tenisha Peterson RN  Outcome: Ongoing  10/31/2021 1238 by Caitlin Goldman RN  Outcome: Ongoing  Goal: Control of acute pain  Description: Control of acute pain  10/31/2021 2315 by Tenisha Peterson RN  Outcome: Ongoing  10/31/2021 1238 by Caitlin Goldman RN  Outcome: Ongoing  Goal: Control of chronic pain  Description: Control of chronic pain  10/31/2021 2315 by Tenisha Peterson RN  Outcome: Ongoing  10/31/2021 1238 by Caitlin Goldman RN  Outcome: Ongoing     Problem: Discharge Planning:  Goal: Participates in care planning  Description: Participates in care planning  10/31/2021 2315 by Tenisha Peterson RN  Outcome: Ongoing  10/31/2021 1238 by Caitlin Goldman RN  Outcome: Ongoing  Goal: Discharged to appropriate level of care  Description: Discharged to appropriate level of care  10/31/2021 2315 by Tenisha Peterson RN  Outcome: Ongoing  10/31/2021 1238 by Caitlin Goldman RN  Outcome: Ongoing     Problem:  Activity Intolerance:  Goal: Ability to tolerate increased activity will improve  Description: Ability to tolerate increased activity will improve  10/31/2021 2315 by Tenisha Peterson RN  Outcome: Ongoing  10/31/2021 1238 by Caitlin Goldman RN  Outcome: Ongoing     Problem: Mobility - Impaired:  Goal: Mobility will improve to maximum level  Description: Mobility will improve to maximum level  10/31/2021 2315 by Tenisha Peterson RN  Outcome: Ongoing  10/31/2021 1238 by Caitlin Goldman RN  Outcome: Ongoing improve  10/31/2021 2315 by Wang Barriga RN  Outcome: Ongoing  10/31/2021 1238 by Quoc Cunningham RN  Outcome: Ongoing     Problem: Physical Regulation:  Goal: Will remain free from infection  Description: Will remain free from infection  10/31/2021 2315 by Wang Barriga RN  Outcome: Ongoing  10/31/2021 1238 by Quoc Cunningham RN  Outcome: Ongoing  Goal: Postoperative complications will be avoided or minimized  Description: Postoperative complications will be avoided or minimized  10/31/2021 2315 by Wang Barriga RN  Outcome: Ongoing  10/31/2021 1238 by Quoc Cunningham RN  Outcome: Ongoing  Goal: Diagnostic test results will improve  Description: Diagnostic test results will improve  10/31/2021 2315 by Wang Barriga RN  Outcome: Ongoing  10/31/2021 1238 by Quoc Cunningham RN  Outcome: Ongoing     Problem: Respiratory:  Goal: Ability to maintain adequate ventilation will improve  Description: Ability to maintain adequate ventilation will improve  10/31/2021 2315 by Wang Barriga RN  Outcome: Ongoing  10/31/2021 1238 by Quoc Cunningham RN  Outcome: Ongoing     Problem: Safety:  Goal: Ability to remain free from injury will improve  Description: Ability to remain free from injury will improve  10/31/2021 2315 by Wang Barriga RN  Outcome: Ongoing  10/31/2021 1238 by Quoc Cunningham RN  Outcome: Ongoing     Problem: Self-Care:  Goal: Ability to meet self-care needs will improve  Description: Ability to meet self-care needs will improve  10/31/2021 2315 by Wang Barriga RN  Outcome: Ongoing  10/31/2021 1238 by Quoc Cunningham RN  Outcome: Ongoing     Problem: Self-Concept:  Goal: Ability to maintain and perform role responsibilities to the fullest extent possible will improve  Description: Ability to maintain and perform role responsibilities to the fullest extent possible will improve  10/31/2021 2315 by Wang Barriga RN  Outcome: Ongoing  10/31/2021 1238 by Quoc Cunningham RN  Outcome: Ongoing  Goal: Verbalizations of decreased anxiety will increase  Description: Verbalizations of decreased anxiety will increase  10/31/2021 2315 by Edgard Herrmann RN  Outcome: Ongoing  10/31/2021 1238 by Nedra Parada RN  Outcome: Ongoing     Problem: Sensory:  Goal: Pain level will decrease  Description: Pain level will decrease  10/31/2021 2315 by Edgard Herrmann RN  Outcome: Ongoing  10/31/2021 1238 by Nedra Parada RN  Outcome: Ongoing     Problem: Tissue Perfusion:  Goal: Peripheral tissue perfusion will improve  Description: Peripheral tissue perfusion will improve  10/31/2021 2315 by Edgard Herrmann RN  Outcome: Ongoing  10/31/2021 1238 by Nedra Parada RN  Outcome: Ongoing  Goal: Risk of venous thrombosis will decrease  Description: Risk of venous thrombosis will decrease  10/31/2021 2315 by Edgard Herrmann RN  Outcome: Ongoing  10/31/2021 1238 by Nedra Parada RN  Outcome: Ongoing     Problem: Urinary Elimination:  Goal: Ability to reestablish a normal urinary elimination pattern will improve - after catheter removal  Description: Ability to reestablish a normal urinary elimination pattern will improve  10/31/2021 2315 by Edgard Herrmann RN  Outcome: Ongoing  10/31/2021 1238 by Nedra Parada RN  Outcome: Ongoing

## 2021-11-01 NOTE — PLAN OF CARE
Problem: Falls - Risk of:  Goal: Will remain free from falls  Description: Will remain free from falls  11/1/2021 0857 by Anjali Rubio RN  Outcome: Ongoing  10/31/2021 2315 by Lindsey Hirsch RN  Outcome: Ongoing     Problem: Falls - Risk of:  Goal: Absence of physical injury  Description: Absence of physical injury  11/1/2021 0857 by Anjali Rubio RN  Outcome: Ongoing  10/31/2021 2315 by Lindsey Hirsch RN  Outcome: Ongoing     Problem: Pain:  Goal: Pain level will decrease  Description: Pain level will decrease  11/1/2021 0857 by Anjali Rubio RN  Outcome: Ongoing  10/31/2021 2315 by Lindsey Hirsch RN  Outcome: Ongoing     Problem: Discharge Planning:  Goal: Participates in care planning  Description: Participates in care planning  11/1/2021 0857 by Anjali Rubio RN  Outcome: Ongoing  10/31/2021 2315 by Lindsey Hirsch RN  Outcome: Ongoing
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
## 2021-11-02 ENCOUNTER — ANESTHESIA EVENT (OUTPATIENT)
Dept: OPERATING ROOM | Age: 86
DRG: 481 | End: 2021-11-02
Payer: MEDICARE

## 2021-11-02 ENCOUNTER — APPOINTMENT (OUTPATIENT)
Dept: GENERAL RADIOLOGY | Age: 86
DRG: 481 | End: 2021-11-02
Payer: MEDICARE

## 2021-11-02 ENCOUNTER — ANESTHESIA (OUTPATIENT)
Dept: OPERATING ROOM | Age: 86
DRG: 481 | End: 2021-11-02
Payer: MEDICARE

## 2021-11-02 VITALS
SYSTOLIC BLOOD PRESSURE: 142 MMHG | DIASTOLIC BLOOD PRESSURE: 59 MMHG | TEMPERATURE: 97.2 F | RESPIRATION RATE: 11 BRPM | OXYGEN SATURATION: 98 %

## 2021-11-02 LAB
ABO/RH: NORMAL
ABSOLUTE EOS #: 0.05 K/UL (ref 0–0.44)
ABSOLUTE EOS #: 0.29 K/UL (ref 0–0.44)
ABSOLUTE IMMATURE GRANULOCYTE: 0.06 K/UL (ref 0–0.3)
ABSOLUTE IMMATURE GRANULOCYTE: 0.07 K/UL (ref 0–0.3)
ABSOLUTE LYMPH #: 0.61 K/UL (ref 1.1–3.7)
ABSOLUTE LYMPH #: 1.37 K/UL (ref 1.1–3.7)
ABSOLUTE MONO #: 1.35 K/UL (ref 0.1–1.2)
ABSOLUTE MONO #: 1.42 K/UL (ref 0.1–1.2)
ALBUMIN SERPL-MCNC: 3.2 G/DL (ref 3.5–5.2)
ALBUMIN/GLOBULIN RATIO: 1 (ref 1–2.5)
ALP BLD-CCNC: 65 U/L (ref 35–104)
ALT SERPL-CCNC: 8 U/L (ref 5–33)
ANION GAP SERPL CALCULATED.3IONS-SCNC: 12 MMOL/L (ref 9–17)
ANION GAP SERPL CALCULATED.3IONS-SCNC: 7 MMOL/L (ref 9–17)
ANTIBODY SCREEN: NEGATIVE
ARM BAND NUMBER: NORMAL
AST SERPL-CCNC: 22 U/L
BASOPHILS # BLD: 0 % (ref 0–2)
BASOPHILS # BLD: 1 % (ref 0–2)
BASOPHILS ABSOLUTE: 0.04 K/UL (ref 0–0.2)
BASOPHILS ABSOLUTE: 0.05 K/UL (ref 0–0.2)
BILIRUB SERPL-MCNC: 0.92 MG/DL (ref 0.3–1.2)
BILIRUBIN DIRECT: 0.36 MG/DL
BILIRUBIN, INDIRECT: 0.56 MG/DL (ref 0–1)
BLD PROD TYP BPU: NORMAL
BUN BLDV-MCNC: 20 MG/DL (ref 8–23)
BUN BLDV-MCNC: 20 MG/DL (ref 8–23)
BUN/CREAT BLD: 30 (ref 9–20)
CALCIUM SERPL-MCNC: 8.3 MG/DL (ref 8.6–10.4)
CALCIUM SERPL-MCNC: 8.5 MG/DL (ref 8.6–10.4)
CHLORIDE BLD-SCNC: 102 MMOL/L (ref 98–107)
CHLORIDE BLD-SCNC: 107 MMOL/L (ref 98–107)
CO2: 26 MMOL/L (ref 20–31)
CO2: 27 MMOL/L (ref 20–31)
CREAT SERPL-MCNC: 0.67 MG/DL (ref 0.5–0.9)
CREAT SERPL-MCNC: 0.68 MG/DL (ref 0.5–0.9)
CROSSMATCH RESULT: NORMAL
DIFFERENTIAL TYPE: ABNORMAL
DIFFERENTIAL TYPE: ABNORMAL
DISPENSE STATUS BLOOD BANK: NORMAL
EOSINOPHILS RELATIVE PERCENT: 0 % (ref 1–4)
EOSINOPHILS RELATIVE PERCENT: 3 % (ref 1–4)
EXPIRATION DATE: NORMAL
GFR AFRICAN AMERICAN: >60 ML/MIN
GFR AFRICAN AMERICAN: >60 ML/MIN
GFR NON-AFRICAN AMERICAN: >60 ML/MIN
GFR NON-AFRICAN AMERICAN: >60 ML/MIN
GFR SERPL CREATININE-BSD FRML MDRD: ABNORMAL ML/MIN/{1.73_M2}
GLUCOSE BLD-MCNC: 104 MG/DL (ref 70–99)
GLUCOSE BLD-MCNC: 116 MG/DL (ref 70–99)
HCT VFR BLD CALC: 30.7 % (ref 36.3–47.1)
HCT VFR BLD CALC: 32.7 % (ref 36.3–47.1)
HEMOGLOBIN: 10.5 G/DL (ref 11.9–15.1)
HEMOGLOBIN: 9.5 G/DL (ref 11.9–15.1)
IMMATURE GRANULOCYTES: 1 %
IMMATURE GRANULOCYTES: 1 %
LYMPHOCYTES # BLD: 13 % (ref 24–43)
LYMPHOCYTES # BLD: 4 % (ref 24–43)
MAGNESIUM: 1.6 MG/DL (ref 1.6–2.6)
MCH RBC QN AUTO: 30.1 PG (ref 25.2–33.5)
MCH RBC QN AUTO: 30.4 PG (ref 25.2–33.5)
MCHC RBC AUTO-ENTMCNC: 30.9 G/DL (ref 25.2–33.5)
MCHC RBC AUTO-ENTMCNC: 32.1 G/DL (ref 25.2–33.5)
MCV RBC AUTO: 93.7 FL (ref 82.6–102.9)
MCV RBC AUTO: 98.1 FL (ref 82.6–102.9)
MONOCYTES # BLD: 10 % (ref 3–12)
MONOCYTES # BLD: 13 % (ref 3–12)
NRBC AUTOMATED: 0 PER 100 WBC
NRBC AUTOMATED: ABNORMAL PER 100 WBC
PDW BLD-RTO: 14.1 % (ref 11.8–14.4)
PDW BLD-RTO: 14.6 % (ref 11.8–14.4)
PLATELET # BLD: 157 K/UL (ref 138–453)
PLATELET # BLD: 183 K/UL (ref 138–453)
PLATELET ESTIMATE: ABNORMAL
PLATELET ESTIMATE: ABNORMAL
PMV BLD AUTO: 11 FL (ref 8.1–13.5)
PMV BLD AUTO: 11.4 FL (ref 8.1–13.5)
POTASSIUM SERPL-SCNC: 3.3 MMOL/L (ref 3.7–5.3)
POTASSIUM SERPL-SCNC: 3.8 MMOL/L (ref 3.7–5.3)
POTASSIUM SERPL-SCNC: 3.9 MMOL/L (ref 3.7–5.3)
RBC # BLD: 3.13 M/UL (ref 3.95–5.11)
RBC # BLD: 3.49 M/UL (ref 3.95–5.11)
RBC # BLD: ABNORMAL 10*6/UL
RBC # BLD: ABNORMAL 10*6/UL
SEG NEUTROPHILS: 69 % (ref 36–65)
SEG NEUTROPHILS: 85 % (ref 36–65)
SEGMENTED NEUTROPHILS ABSOLUTE COUNT: 12.06 K/UL (ref 1.5–8.1)
SEGMENTED NEUTROPHILS ABSOLUTE COUNT: 7.72 K/UL (ref 1.5–8.1)
SODIUM BLD-SCNC: 140 MMOL/L (ref 135–144)
SODIUM BLD-SCNC: 141 MMOL/L (ref 135–144)
TOTAL PROTEIN: 6.3 G/DL (ref 6.4–8.3)
TRANSFUSION STATUS: NORMAL
UNIT DIVISION: 0
UNIT NUMBER: NORMAL
WBC # BLD: 10.8 K/UL (ref 3.5–11.3)
WBC # BLD: 14.3 K/UL (ref 3.5–11.3)
WBC # BLD: ABNORMAL 10*3/UL
WBC # BLD: ABNORMAL 10*3/UL

## 2021-11-02 PROCEDURE — 6360000002 HC RX W HCPCS: Performed by: NURSE PRACTITIONER

## 2021-11-02 PROCEDURE — 6360000002 HC RX W HCPCS: Performed by: INTERNAL MEDICINE

## 2021-11-02 PROCEDURE — 2580000003 HC RX 258: Performed by: NURSE PRACTITIONER

## 2021-11-02 PROCEDURE — 2500000003 HC RX 250 WO HCPCS: Performed by: NURSE PRACTITIONER

## 2021-11-02 PROCEDURE — 3600000012 HC SURGERY LEVEL 2 ADDTL 15MIN: Performed by: ORTHOPAEDIC SURGERY

## 2021-11-02 PROCEDURE — 2709999900 HC NON-CHARGEABLE SUPPLY: Performed by: ORTHOPAEDIC SURGERY

## 2021-11-02 PROCEDURE — 6360000002 HC RX W HCPCS

## 2021-11-02 PROCEDURE — 82248 BILIRUBIN DIRECT: CPT

## 2021-11-02 PROCEDURE — 2720000010 HC SURG SUPPLY STERILE: Performed by: ORTHOPAEDIC SURGERY

## 2021-11-02 PROCEDURE — 2700000000 HC OXYGEN THERAPY PER DAY

## 2021-11-02 PROCEDURE — 83735 ASSAY OF MAGNESIUM: CPT

## 2021-11-02 PROCEDURE — 3209999900 FLUORO FOR SURGICAL PROCEDURES

## 2021-11-02 PROCEDURE — 2580000003 HC RX 258

## 2021-11-02 PROCEDURE — 6370000000 HC RX 637 (ALT 250 FOR IP)

## 2021-11-02 PROCEDURE — 6360000002 HC RX W HCPCS: Performed by: NURSE ANESTHETIST, CERTIFIED REGISTERED

## 2021-11-02 PROCEDURE — 84132 ASSAY OF SERUM POTASSIUM: CPT

## 2021-11-02 PROCEDURE — 3700000001 HC ADD 15 MINUTES (ANESTHESIA): Performed by: ORTHOPAEDIC SURGERY

## 2021-11-02 PROCEDURE — 36415 COLL VENOUS BLD VENIPUNCTURE: CPT

## 2021-11-02 PROCEDURE — 2500000003 HC RX 250 WO HCPCS: Performed by: NURSE ANESTHETIST, CERTIFIED REGISTERED

## 2021-11-02 PROCEDURE — 99231 SBSQ HOSP IP/OBS SF/LOW 25: CPT | Performed by: INTERNAL MEDICINE

## 2021-11-02 PROCEDURE — 27245 TREAT THIGH FRACTURE: CPT | Performed by: PHYSICIAN ASSISTANT

## 2021-11-02 PROCEDURE — 97530 THERAPEUTIC ACTIVITIES: CPT

## 2021-11-02 PROCEDURE — 80048 BASIC METABOLIC PNL TOTAL CA: CPT

## 2021-11-02 PROCEDURE — 71045 X-RAY EXAM CHEST 1 VIEW: CPT

## 2021-11-02 PROCEDURE — 3700000000 HC ANESTHESIA ATTENDED CARE: Performed by: ORTHOPAEDIC SURGERY

## 2021-11-02 PROCEDURE — 97165 OT EVAL LOW COMPLEX 30 MIN: CPT | Performed by: OCCUPATIONAL THERAPIST

## 2021-11-02 PROCEDURE — 85025 COMPLETE CBC W/AUTO DIFF WBC: CPT

## 2021-11-02 PROCEDURE — 93005 ELECTROCARDIOGRAM TRACING: CPT | Performed by: INTERNAL MEDICINE

## 2021-11-02 PROCEDURE — 0QS606Z REPOSITION RIGHT UPPER FEMUR WITH INTRAMEDULLARY INTERNAL FIXATION DEVICE, OPEN APPROACH: ICD-10-PCS | Performed by: ORTHOPAEDIC SURGERY

## 2021-11-02 PROCEDURE — 2580000003 HC RX 258: Performed by: ORTHOPAEDIC SURGERY

## 2021-11-02 PROCEDURE — 7100000001 HC PACU RECOVERY - ADDTL 15 MIN: Performed by: ORTHOPAEDIC SURGERY

## 2021-11-02 PROCEDURE — 6360000002 HC RX W HCPCS: Performed by: ORTHOPAEDIC SURGERY

## 2021-11-02 PROCEDURE — C1713 ANCHOR/SCREW BN/BN,TIS/BN: HCPCS | Performed by: ORTHOPAEDIC SURGERY

## 2021-11-02 PROCEDURE — 2580000003 HC RX 258: Performed by: NURSE ANESTHETIST, CERTIFIED REGISTERED

## 2021-11-02 PROCEDURE — 3600000002 HC SURGERY LEVEL 2 BASE: Performed by: ORTHOPAEDIC SURGERY

## 2021-11-02 PROCEDURE — 7100000000 HC PACU RECOVERY - FIRST 15 MIN: Performed by: ORTHOPAEDIC SURGERY

## 2021-11-02 PROCEDURE — 94761 N-INVAS EAR/PLS OXIMETRY MLT: CPT

## 2021-11-02 PROCEDURE — 27245 TREAT THIGH FRACTURE: CPT | Performed by: ORTHOPAEDIC SURGERY

## 2021-11-02 PROCEDURE — 97161 PT EVAL LOW COMPLEX 20 MIN: CPT

## 2021-11-02 PROCEDURE — 2500000003 HC RX 250 WO HCPCS: Performed by: ORTHOPAEDIC SURGERY

## 2021-11-02 PROCEDURE — 2060000000 HC ICU INTERMEDIATE R&B

## 2021-11-02 PROCEDURE — 6370000000 HC RX 637 (ALT 250 FOR IP): Performed by: NURSE PRACTITIONER

## 2021-11-02 PROCEDURE — 80053 COMPREHEN METABOLIC PANEL: CPT

## 2021-11-02 DEVICE — INTERTAN LAG/COMPRESSION SCREW KIT                                    85MM / 80MM
Type: IMPLANTABLE DEVICE | Site: HIP | Status: FUNCTIONAL
Brand: TRIGEN

## 2021-11-02 DEVICE — TRIGEN INTERTAN 10S 10MM X 36CM 125DEGREE RIGHT
Type: IMPLANTABLE DEVICE | Site: HIP | Status: FUNCTIONAL
Brand: TRIGEN

## 2021-11-02 DEVICE — TRIGEN LOW PROFILE SCREW 5.0MM X 37.5MM
Type: IMPLANTABLE DEVICE | Site: HIP | Status: FUNCTIONAL
Brand: TRIGEN

## 2021-11-02 RX ORDER — SODIUM CHLORIDE, SODIUM LACTATE, POTASSIUM CHLORIDE, CALCIUM CHLORIDE 600; 310; 30; 20 MG/100ML; MG/100ML; MG/100ML; MG/100ML
INJECTION, SOLUTION INTRAVENOUS CONTINUOUS PRN
Status: DISCONTINUED | OUTPATIENT
Start: 2021-11-02 | End: 2021-11-02 | Stop reason: SDUPTHER

## 2021-11-02 RX ORDER — MAGNESIUM SULFATE 1 G/100ML
1000 INJECTION INTRAVENOUS ONCE
Status: COMPLETED | OUTPATIENT
Start: 2021-11-02 | End: 2021-11-02

## 2021-11-02 RX ORDER — TRANEXAMIC ACID 100 MG/ML
INJECTION, SOLUTION INTRAVENOUS PRN
Status: DISCONTINUED | OUTPATIENT
Start: 2021-11-02 | End: 2021-11-02 | Stop reason: SDUPTHER

## 2021-11-02 RX ORDER — ASPIRIN 81 MG/1
81 TABLET ORAL DAILY
Status: DISCONTINUED | OUTPATIENT
Start: 2021-11-03 | End: 2021-11-05 | Stop reason: HOSPADM

## 2021-11-02 RX ORDER — MIDAZOLAM HYDROCHLORIDE 1 MG/ML
INJECTION INTRAMUSCULAR; INTRAVENOUS PRN
Status: DISCONTINUED | OUTPATIENT
Start: 2021-11-02 | End: 2021-11-02 | Stop reason: SDUPTHER

## 2021-11-02 RX ORDER — FUROSEMIDE 10 MG/ML
20 INJECTION INTRAMUSCULAR; INTRAVENOUS ONCE
Status: COMPLETED | OUTPATIENT
Start: 2021-11-02 | End: 2021-11-02

## 2021-11-02 RX ORDER — KETAMINE HCL IN NACL, ISO-OSM 100MG/10ML
SYRINGE (ML) INJECTION PRN
Status: DISCONTINUED | OUTPATIENT
Start: 2021-11-02 | End: 2021-11-02 | Stop reason: SDUPTHER

## 2021-11-02 RX ORDER — FENTANYL CITRATE 50 UG/ML
INJECTION, SOLUTION INTRAMUSCULAR; INTRAVENOUS PRN
Status: DISCONTINUED | OUTPATIENT
Start: 2021-11-02 | End: 2021-11-02 | Stop reason: SDUPTHER

## 2021-11-02 RX ORDER — POTASSIUM CHLORIDE 7.45 MG/ML
10 INJECTION INTRAVENOUS
Status: COMPLETED | OUTPATIENT
Start: 2021-11-02 | End: 2021-11-02

## 2021-11-02 RX ADMIN — POTASSIUM CHLORIDE 10 MEQ: 7.46 INJECTION, SOLUTION INTRAVENOUS at 07:36

## 2021-11-02 RX ADMIN — SODIUM CHLORIDE: 9 INJECTION, SOLUTION INTRAVENOUS at 06:27

## 2021-11-02 RX ADMIN — MIDAZOLAM HYDROCHLORIDE 0.5 MG: 1 INJECTION, SOLUTION INTRAMUSCULAR; INTRAVENOUS at 11:00

## 2021-11-02 RX ADMIN — SODIUM CHLORIDE, PRESERVATIVE FREE 10 ML: 5 INJECTION INTRAVENOUS at 15:37

## 2021-11-02 RX ADMIN — SODIUM CHLORIDE, PRESERVATIVE FREE 10 ML: 5 INJECTION INTRAVENOUS at 18:59

## 2021-11-02 RX ADMIN — Medication 2000 MG: at 19:02

## 2021-11-02 RX ADMIN — MIDAZOLAM HYDROCHLORIDE 0.5 MG: 1 INJECTION, SOLUTION INTRAMUSCULAR; INTRAVENOUS at 10:56

## 2021-11-02 RX ADMIN — METOPROLOL TARTRATE 5 MG: 5 INJECTION INTRAVENOUS at 15:36

## 2021-11-02 RX ADMIN — SODIUM CHLORIDE: 9 INJECTION, SOLUTION INTRAVENOUS at 13:15

## 2021-11-02 RX ADMIN — Medication 2000 MG: at 10:54

## 2021-11-02 RX ADMIN — FENTANYL CITRATE 50 MCG: 50 INJECTION, SOLUTION INTRAMUSCULAR; INTRAVENOUS at 10:56

## 2021-11-02 RX ADMIN — POTASSIUM CHLORIDE 10 MEQ: 7.46 INJECTION, SOLUTION INTRAVENOUS at 09:34

## 2021-11-02 RX ADMIN — FUROSEMIDE 20 MG: 10 INJECTION, SOLUTION INTRAMUSCULAR; INTRAVENOUS at 10:05

## 2021-11-02 RX ADMIN — Medication 15 MG: at 10:56

## 2021-11-02 RX ADMIN — POTASSIUM CHLORIDE 10 MEQ: 7.46 INJECTION, SOLUTION INTRAVENOUS at 08:26

## 2021-11-02 RX ADMIN — SODIUM CHLORIDE, POTASSIUM CHLORIDE, SODIUM LACTATE AND CALCIUM CHLORIDE: 600; 310; 30; 20 INJECTION, SOLUTION INTRAVENOUS at 10:48

## 2021-11-02 RX ADMIN — POTASSIUM CHLORIDE 10 MEQ: 7.46 INJECTION, SOLUTION INTRAVENOUS at 06:25

## 2021-11-02 RX ADMIN — SODIUM CHLORIDE, PRESERVATIVE FREE 10 ML: 5 INJECTION INTRAVENOUS at 19:03

## 2021-11-02 RX ADMIN — MAGNESIUM SULFATE 1000 MG: 1 INJECTION INTRAVENOUS at 17:05

## 2021-11-02 RX ADMIN — MIDAZOLAM HYDROCHLORIDE 1 MG: 1 INJECTION, SOLUTION INTRAMUSCULAR; INTRAVENOUS at 10:53

## 2021-11-02 RX ADMIN — TRANEXAMIC ACID 1000 MG: 1 INJECTION, SOLUTION INTRAVENOUS at 10:54

## 2021-11-02 ASSESSMENT — PAIN SCALES - PAIN ASSESSMENT IN ADVANCED DEMENTIA (PAINAD)
BREATHING: 0
TOTALSCORE: 0
TOTALSCORE: 0
NEGVOCALIZATION: 0
BREATHING: 0
TOTALSCORE: 0
TOTALSCORE: 0
BREATHING: 0
FACIALEXPRESSION: 0
NEGVOCALIZATION: 0
FACIALEXPRESSION: 0
TOTALSCORE: 0
NEGVOCALIZATION: 0
FACIALEXPRESSION: 0
NEGVOCALIZATION: 0
NEGVOCALIZATION: 0
FACIALEXPRESSION: 0
NEGVOCALIZATION: 0
BODYLANGUAGE: 0
NEGVOCALIZATION: 0
CONSOLABILITY: 0
CONSOLABILITY: 0
BREATHING: 0
TOTALSCORE: 0
BREATHING: 0
FACIALEXPRESSION: 0
TOTALSCORE: 0
BREATHING: 0
NEGVOCALIZATION: 0
BODYLANGUAGE: 0
FACIALEXPRESSION: 0
CONSOLABILITY: 0
CONSOLABILITY: 0
BODYLANGUAGE: 0
NEGVOCALIZATION: 0
NEGVOCALIZATION: 0
BODYLANGUAGE: 0
CONSOLABILITY: 0
CONSOLABILITY: 0
NEGVOCALIZATION: 0
BREATHING: 0
TOTALSCORE: 0
NEGVOCALIZATION: 0
TOTALSCORE: 0
BREATHING: 0
BODYLANGUAGE: 0
CONSOLABILITY: 0
NEGVOCALIZATION: 0
CONSOLABILITY: 0
BODYLANGUAGE: 0
BODYLANGUAGE: 0
CONSOLABILITY: 0
TOTALSCORE: 0
CONSOLABILITY: 0
BODYLANGUAGE: 0
TOTALSCORE: 0
CONSOLABILITY: 0
TOTALSCORE: 0
FACIALEXPRESSION: 0
TOTALSCORE: 0
FACIALEXPRESSION: 0
FACIALEXPRESSION: 0
BREATHING: 0
NEGVOCALIZATION: 0
BREATHING: 0
BODYLANGUAGE: 0
CONSOLABILITY: 0
BODYLANGUAGE: 0
BREATHING: 0
BREATHING: 0
FACIALEXPRESSION: 0
FACIALEXPRESSION: 0
BODYLANGUAGE: 0
BODYLANGUAGE: 0
FACIALEXPRESSION: 0
TOTALSCORE: 0
FACIALEXPRESSION: 0
FACIALEXPRESSION: 0
BODYLANGUAGE: 0
BREATHING: 0
BREATHING: 0
BODYLANGUAGE: 0
CONSOLABILITY: 0
CONSOLABILITY: 0
BODYLANGUAGE: 0
TOTALSCORE: 0
TOTALSCORE: 0
CONSOLABILITY: 0
BODYLANGUAGE: 0
FACIALEXPRESSION: 0
NEGVOCALIZATION: 0
BREATHING: 0
FACIALEXPRESSION: 0
BREATHING: 0
CONSOLABILITY: 0
FACIALEXPRESSION: 0
BODYLANGUAGE: 0
NEGVOCALIZATION: 0
TOTALSCORE: 0
NEGVOCALIZATION: 0
FACIALEXPRESSION: 0
TOTALSCORE: 0
BODYLANGUAGE: 0
BODYLANGUAGE: 0
CONSOLABILITY: 0
FACIALEXPRESSION: 0
BODYLANGUAGE: 0
BREATHING: 0
FACIALEXPRESSION: 0
TOTALSCORE: 0
TOTALSCORE: 0
CONSOLABILITY: 0
BREATHING: 0
NEGVOCALIZATION: 0
FACIALEXPRESSION: 0
BREATHING: 0
TOTALSCORE: 0
NEGVOCALIZATION: 0
BODYLANGUAGE: 0
CONSOLABILITY: 0

## 2021-11-02 ASSESSMENT — PAIN SCALES - WONG BAKER
WONGBAKER_NUMERICALRESPONSE: 8
WONGBAKER_NUMERICALRESPONSE: 8

## 2021-11-02 ASSESSMENT — PAIN DESCRIPTION - PAIN TYPE
TYPE: SURGICAL PAIN;ACUTE PAIN
TYPE: SURGICAL PAIN

## 2021-11-02 ASSESSMENT — PAIN DESCRIPTION - LOCATION
LOCATION: HIP
LOCATION: HIP

## 2021-11-02 ASSESSMENT — PAIN DESCRIPTION - PROGRESSION: CLINICAL_PROGRESSION: GRADUALLY IMPROVING

## 2021-11-02 ASSESSMENT — PAIN SCALES - GENERAL
PAINLEVEL_OUTOF10: 5
PAINLEVEL_OUTOF10: 0
PAINLEVEL_OUTOF10: 0
PAINLEVEL_OUTOF10: 2
PAINLEVEL_OUTOF10: 4

## 2021-11-02 ASSESSMENT — PAIN DESCRIPTION - FREQUENCY: FREQUENCY: INTERMITTENT

## 2021-11-02 ASSESSMENT — PAIN DESCRIPTION - DESCRIPTORS: DESCRIPTORS: SORE

## 2021-11-02 ASSESSMENT — ENCOUNTER SYMPTOMS: SHORTNESS OF BREATH: 1

## 2021-11-02 ASSESSMENT — PAIN DESCRIPTION - ORIENTATION
ORIENTATION: RIGHT
ORIENTATION: RIGHT

## 2021-11-02 NOTE — ANESTHESIA POSTPROCEDURE EVALUATION
Department of Anesthesiology  Postprocedure Note    Patient: Vickey Lanes  MRN: [id number]  YOB: 1930  Date of evaluation: 11/2/2021  Time:  1:10 PM     Procedure Summary     Date: 11/02/21 Room / Location: [address] [place] [address]    Anesthesia Start: 9171 Anesthesia Stop: 9138    Procedure: Right Hip Intertan (Right ) Diagnosis: (trochanteric femur fracture)    Surgeons: Loretta Sanon MD Responsible Provider: FELICIA Cheema CRNA    Anesthesia Type: general, regional ASA Status: 4          Anesthesia Type: general, regional    Alena Phase I: Alena Score: 8    Alena Phase II:      Last vitals: Reviewed and per EMR flowsheets.        Anesthesia Post Evaluation    Patient location during evaluation: PACU  Patient participation: complete - patient participated  Level of consciousness: awake  Pain score: 0  Airway patency: patent  Nausea & Vomiting: no nausea and no vomiting  Complications: no  Cardiovascular status: blood pressure returned to baseline and hemodynamically stable  Respiratory status: acceptable, spontaneous ventilation and nasal cannula  Hydration status: euvolemic

## 2021-11-02 NOTE — PROGRESS NOTES
Occupational Therapy   Occupational Therapy Initial Assessment  Date: 2021   Patient Name: Jeff Chavez  MRN: [id number]     : 3/21/1930    Date of Service: 2021    Discharge Recommendations:  ECF with OT       Assessment   Performance deficits / Impairments: Decreased functional mobility ; Decreased ADL status; Decreased ROM; Decreased balance;Decreased safe awareness;Decreased cognition;Decreased endurance;Decreased high-level IADLs;Decreased coordination  Treatment Diagnosis: R femur fracture  Prognosis: Guarded  Decision Making: Low Complexity  REQUIRES OT FOLLOW UP: Yes  Safety Devices  Safety Devices in place: Yes  Type of devices: Left in bed;Call light within reach;Nurse notified           Patient Diagnosis(es): The encounter diagnosis was Closed right hip fracture, initial encounter ([facility] [address] has a past medical history of Adenomatous polyp, Cataracts, bilateral, Combined forms of age-related cataract of both eyes, Corneal scar, right eye, Cystocele, Dementia ([address] Difficulty controlling anger, Dyslipidemia, GERD (gastroesophageal reflux disease), Goiter, Hiatal hernia, Hypertension, Hypokalemia, Hypothyroidism, Impaired fasting glucose, Malignant melanoma in situ ([address] Migraines, Murmur, functional, Osteopenia, Posterior vitreous detachment of both eyes, Primary open angle glaucoma (POAG) of both eyes, moderate stage, Sciatica, Skin cancer, Syncope and collapse, Tremor, 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 has a past surgical history that includes other surgical history (08); Cholecystectomy; Colonoscopy (06/08/10); Mohs surgery (10/07/09); Vein Surgery (09); Colonoscopy (02); Cystocele repair (99); Appendectomy; Dilation and curettage of uterus; other surgical history (); Hysterectomy (); Upper gastrointestinal endoscopy (2015);  Intracapsular cataract extraction (Left, 2020); and Intracapsular cataract extraction (Right, 10/8/2020).     Treatment Diagnosis: R femur fracture      Restrictions  Restrictions/Precautions  Restrictions/Precautions: Weight Bearing  Lower Extremity Weight Bearing Restrictions  Right Lower Extremity Weight Bearing: Weight Bearing As Tolerated    Subjective   General  Chart Reviewed: Yes  Patient assessed for rehabilitation services?: Yes  Family / Caregiver Present: Yes  Referring Practitioner: Bisi Wilson  Diagnosis: displaced intertrochanteric fracture of right femur  Subjective  Subjective: Patient rec'd in bed, 80 yr old female with recent fall  Patient Currently in Pain: Yes  Pain Assessment  Pain Assessment: Faces  Pain Level: 5  Churchill-Baker Pain Rating: Hurts whole lot  Pain Type: Surgical pain;Acute pain  Pain Location: Hip  Pain Orientation: Right  Vital Signs  Pulse: 74  Resp: 18  BP: (!) 180/82  MAP (mmHg): 110  Patient Currently in Pain: Yes     Social/Functional History  Social/Functional History  Lives With: Spouse  Type of Home: Assisted living  Home Layout: One level  Home Access: Level entry  Bathroom Shower/Tub: Walk-in shower  Bathroom Toilet: Handicap height  Bathroom Equipment: Shower chair, Grab bars in shower, Toilet raiser  Bathroom Accessibility: Accessible  Receives Help From: Personal care attendant  ADL Assistance: Independent (per patient: reports independence)  Homemaking Assistance: Independent  Ambulation Assistance: Independent  Transfer Assistance: Independent  Active : No  Occupation: Retired  Additional Comments: Patient demonstrating poor cognition       Objective   Vision: Within Functional Limits  Hearing: Within functional limits    Orientation  Overall Orientation Status: Impaired  Orientation Level: Oriented to person  Observation/Palpation  Posture: Fair     ADL  LE Dressing: Dependent/Total  Toileting: Dependent/Total        Bed mobility  Supine to Sit: Maximum assistance;2 Person assistance  Sit to Supine: Maximum assistance;2 Person assistance  Scooting: Maximal assistance;2 Person assistance  Transfers  Sit to stand: 2 Person assistance;Maximum assistance  Stand to sit: 2 Person assistance;Maximum assistance  Transfer Comments: Able to complete a partial stand for <1min     Cognition  Overall Cognitive Status: Exceptions  Following Commands: Follows one step commands with repetition; Follows one step commands with increased time  Attention Span: Difficulty attending to directions  Memory: Decreased recall of recent events  Safety Judgement: Decreased awareness of need for safety  Problem Solving: Assistance required to generate solutions;Assistance required to implement solutions  Initiation: Requires cues for some  Sequencing: Requires cues for some          Plan   Plan  Times per week: 2-3  Current Treatment Recommendations: Functional Mobility Training, Safety Education & Training, Self-Care / ADL, Equipment Evaluation, Education, & procurement      Goals  Short term goals  Time Frame for Short term goals: duration of hospital stay  Short term goal 1: Patient to complete BSC transfer with max a x1 and mod a of 2nd person using a/e as needed  Short term goal 2: Patient to sit EOB and complete simple ADL tasks with max a x1 and mod a of 2nd person using a/e as needed       Therapy Time   Individual Concurrent Group Co-treatment   Time In 1500         Time Out 1520         Minutes 20         Timed Code Treatment Minutes: 0 Minutes       CLAUDIA CASTANON [address], OT

## 2021-11-02 NOTE — PROGRESS NOTES
Physical Therapy    Facility/Department: [facility]  PROGRESSIVE CARE  Initial Assessment    NAME: Gwendolyn Vigil  : 3/21/1930  MRN: [id number]    Date of Service: 2021    Discharge Recommendations:  Continue to assess pending progress, ECF with PT        Assessment   Body structures, Functions, Activity limitations: Decreased functional mobility ; Decreased safe awareness;Decreased balance;Decreased cognition;Decreased ADL status; Decreased ROM; Decreased endurance; Increased pain;Decreased strength;Decreased posture  Prognosis: Fair  Decision Making: Low Complexity  Patient Education: Disscussed with Son needs for placement  REQUIRES PT FOLLOW UP: Yes  Activity Tolerance  Activity Tolerance: Patient limited by pain; Patient limited by cognitive status       Patient Diagnosis(es): The encounter diagnosis was Closed right hip fracture, initial encounter ([facility] [address] has a past medical history of Adenomatous polyp, Cataracts, bilateral, Combined forms of age-related cataract of both eyes, Corneal scar, right eye, Cystocele, Dementia ([address] Difficulty controlling anger, Dyslipidemia, GERD (gastroesophageal reflux disease), Goiter, Hiatal hernia, Hypertension, Hypokalemia, Hypothyroidism, Impaired fasting glucose, Malignant melanoma in situ ([address] Migraines, Murmur, functional, Osteopenia, Posterior vitreous detachment of both eyes, Primary open angle glaucoma (POAG) of both eyes, moderate stage, Sciatica, Skin cancer, Syncope and collapse, Tremor, 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 has a past surgical history that includes other surgical history (08); Cholecystectomy; Colonoscopy (06/08/10); Mohs surgery (10/07/09); Vein Surgery (09); Colonoscopy (02); Cystocele repair (99); Appendectomy; Dilation and curettage of uterus; other surgical history (); Hysterectomy (); Upper gastrointestinal endoscopy (2015);  Intracapsular cataract extraction (Left, 8/25/2020); and Intracapsular cataract extraction (Right, 10/8/2020). Restrictions     Vision/Hearing  Vision: Within Functional Limits  Hearing: Within functional limits     Subjective  General  Chart Reviewed: Yes  Patient assessed for rehabilitation services?: Yes  Pain Screening  Patient Currently in Pain: Yes  Pain Assessment  Pain Assessment: Faces  Churchill-Baker Pain Rating: Hurts whole lot  Vital Signs  Patient Currently in Pain: Yes       Orientation  Orientation  Overall Orientation Status: Impaired  Orientation Level: Oriented to person  Social/Functional History  Social/Functional History  Lives With: Spouse  Type of Home: Assisted living  Home Layout: One level  Home Access: Level entry  Bathroom Shower/Tub: Walk-in shower  Bathroom Toilet: Handicap height  Bathroom Equipment: Shower chair, Grab bars in shower, Toilet raiser  Bathroom Accessibility: Accessible  Receives Help From: Personal care attendant  ADL Assistance: Independent (per patient is indpendent. Unsure if cognitively able to state needs.)  Homemaking Assistance: Independent  Occupation: Retired  Cognition        Objective     Observation/Palpation  Posture: Fair    AROM RLE (degrees)  RLE General AROM: hip flexion to 90  AROM LLE (degrees)  LLE AROM : WFL  Strength RLE  Comment: grossly 4/5  Strength LLE  Comment: grossly 3-/5        Bed mobility  Bridging: Dependent/Total  Supine to Sit: Maximum assistance;2 Person assistance  Sit to Supine: Maximum assistance;2 Person assistance  Scooting: Maximal assistance;2 Person assistance  Transfers  Sit to Stand: 2 Person Assistance;Maximum Assistance  Stand to sit: 2 Person Assistance;Maximum Assistance  Ambulation  Ambulation?: No  WB Status: FWB, patient unable to attain upright stand this date.      Balance  Posture: Fair  Sitting - Static: Poor;-  Comments: Patient requiring max assist for sitting at [address]  Times per day: Twice a day  Current Treatment Recommendations: Strengthening, Transfer Training, Endurance Training, Neuromuscular Re-education, Patient/Caregiver Education & Training, Equipment Evaluation, Education, & procurement, ROM, Balance Training, Gait Training, Home Exercise Program, Safety Education & Training, Functional Mobility Training    G-Code       OutComes Score                                                  AM-PAC Score             Goals  Short term goals  Time Frame for Short term goals: LOS  Short term goal 1: Bed mobility with mod assist x 1  Short term goal 2: Transfers with max assist x 1  Short term goal 3: Sitting at EOB x 5 min with mod assist x 1  Patient Goals   Patient goals : none stated       Therapy Time   Individual Concurrent Group Co-treatment   Time In 0300         Time Out 0320         Minutes 20         Timed Code Treatment Minutes: [address], PT

## 2021-11-02 NOTE — H&P
History and Physical Update    Pt Name: Prudence Huddleston  MRN: [id number]  YOB: 1930  Date of evaluation: 11/2/2021    [x] I have examined the patient and reviewed the H&P/Consult and there are no changes to the patient or plans.     [] I have examined the patient and reviewed the H&P/Consult and have noted the following changes:        FELICIA Lu CNP   Electronically signed 11/2/2021 at 9:03 AM

## 2021-11-02 NOTE — PROGRESS NOTES
Hospitalist Progress Note    Patient:  Kassidy Sylvester     YOB: 1930    MRN: [id number]   Admit date: 10/30/2021     Acct: [de-identified]     PCP: Esther CRISOSTOMO MD    CC--Interval History:    POD 0 right hip fracture repair----cephalomedullary nail--11.2.2021---[facility]    ASCVD--stable    Severe pulmonary hypertension    HTN---146/55 pre-operative    K = 3.3 ----> potassium replacement    IFG---BG = 104----postoperatively goal 140-180    II/VI PHILIP    Dementia     See note below     All other ROS negative except noted in HPI    Diet:  Diet NPO Exceptions are: Sips of Water with Meds    Medications:  Scheduled Meds:   potassium chloride  10 mEq IntraVENous Q1H    multivitamin  1 tablet Oral Daily    furosemide  20 mg IntraVENous BID    sodium chloride flush  5-40 mL IntraVENous 2 times per day    [Held by provider] aspirin  81 mg Oral Daily    atenolol  25 mg Oral Daily    atorvastatin  40 mg Oral Daily    [Held by provider] ticagrelor  90 mg Oral BID    calcium-vitamin D  1 tablet Oral Daily    FLUoxetine  20 mg Oral Daily    levothyroxine  125 mcg Oral Daily    losartan  100 mg Oral Daily    memantine  5 mg Oral Daily    OLANZapine  2.5 mg Oral Nightly    famotidine  20 mg Oral Daily     Continuous Infusions:   sodium chloride      sodium chloride      sodium chloride Stopped (11/02/21 0739)     PRN Meds:sodium chloride, sodium chloride flush, sodium chloride, polyethylene glycol, acetaminophen **OR** acetaminophen, albuterol, ondansetron, morphine, HYDROcodone-acetaminophen, metoprolol    Objective:  Labs:  CBC with Differential:    Lab Results   Component Value Date    WBC 10.8 11/02/2021    RBC 3.13 11/02/2021    HGB 9.5 11/02/2021    HCT 30.7 11/02/2021     11/02/2021    MCV 98.1 11/02/2021    MCH 30.4 11/02/2021    MCHC 30.9 11/02/2021    RDW 14.6 11/02/2021    LYMPHOPCT 13 11/02/2021    MONOPCT 13 11/02/2021    BASOPCT 1 11/02/2021    MONOSABS 1.35 11/02/2021    LYMPHSABS 1.37 11/02/2021    EOSABS 0.29 11/02/2021    BASOSABS 0.05 11/02/2021    DIFFTYPE NOT REPORTED 11/02/2021     BMP:    Lab Results   Component Value Date     11/02/2021    K 3.9 11/02/2021     11/02/2021    CO2 27 11/02/2021    BUN 20 11/02/2021    LABALBU 3.1 10/31/2021    CREATININE 0.67 11/02/2021    CALCIUM 8.3 11/02/2021    GFRAA >60 11/02/2021    LABGLOM >60 11/02/2021    GLUCOSE 104 11/02/2021           Physical Exam:  Vitals: BP (!) 182/70   Pulse 65   Temp 96.8 °F (36 °C) (Tympanic)   Resp 19   Ht 5' 4\" (1.626 m)   Wt 154 lb 12.8 oz (70.2 kg)   LMP  (LMP Unknown)   SpO2 97%   BMI 26.57 kg/m²   24 hour intake/output:    Intake/Output Summary (Last 24 hours) at 11/2/2021 0807  Last data filed at 11/2/2021 0454  Gross per 24 hour   Intake 860.42 ml   Output 2100 ml   Net -1239.58 ml     Last 3 weights: Wt Readings from Last 3 Encounters:   11/02/21 154 lb 12.8 oz (70.2 kg)   09/29/21 147 lb (66.7 kg)   09/23/21 144 lb 12.8 oz (65.7 kg)     HEENT: Normocephalic and Atraumatic  Neck: Supple, No Masses, Tenderness, Nodularity and No Lymphadenopathy  Chest/Lungs: Clear to Auscultation without Rales, Rhonchi, or Wheezes and Distant Breath Sounds  Cardiac: Regular Rate and Rhythm---II/VI PHILIP  GI/Abdomen: Bowel Sounds Present and Soft, Non-tender, without Guarding or Rebound Tenderness  : st--clear pale yellow  EXT/Skin: right hip pain [pre-op]-----SCDs--heel protectors--, No Edema, No Cyanosis and No Clubbing  Neuro: alert---dementia = baseline--- and No Localizing Signs/Symptoms      Assessment:    Principal Problem:    Displaced intertrochanteric fracture of right femur, initial encounter for closed fracture [facility])  Active Problems:    Closed right hip fracture, initial encounter ([facility] [address]  Resolved Problems:    * No resolved hospital problems.  LANRE Roberts.                      91  WF  Dion Pop,, IM; DC Cardiology--[facility];  saman Omalley, Orthopedics]                  FULL CODE LIZZY KRAUS--10.30.2021    Anti-infectives:     POD _____  cephalomedullary nail--- right hip fracture---                       11.2.2021--[facility]  Right hip fracture---10.30.2021---fall getting out of car---                       10.30.2021--no LOC----traumatic osteopenic fracture  Acute comminuted and moderately displaced IT right femur fracture                       with free-floating greater trochanteric and lesser                        trochanter fragments--mild bilateral OA hips---no hip                        dislocation       CXR---11.2.2021--slight improvement suspected pulmonary                         edema----hazy bibasilar--some compressive                         atelectasis--no new abnormality       CXR----11.1.2021---interval improvement of pulmonary edema--                       improved aeration       CXR----10.31.2021--pulmonary edema--left pleural effusion        CT right hip---10.30.2021---as above       CT C-spine----10.30.2021--multilevel cervical spine                        degenerative changes narrowing--no fracture or                           subluxation       CT head---10.30.2021---no MBS--moderate atrophy and chronic                         small vessel ischemic disease---remote cortical                         infarctions         EKD----10.30.2021---NSR---64---LAD   ASCVD          2D ECHO--11.1.2021--RAUL--NLVSF---MICHELLE without stenosis-                        MAC MV thickening--mild MR--moderate TR---RVSP ~                    83 mm Hg---severe pulmonary hypertension---increased                    IVC diameter and impaired or no inspiratory variation---                   LVEF ~ 55%          STEMI--12.2020          ALLIE---RCA---12.2020          2D ECHO---12.2020--mild-to-moderate TR--moderate MR--                   RVSP ~ 46 mm Hg--LVEF ~ 45-50%      Severe pulmonary hypertension   Hypertension                   DUS--CV--1.22.2015--no hemodynamically significant stenosis                     2D ECHO--1.6.2015--NLVSF--LAE--MAC--mild MR--                             MICHELLE--mild-to-moderate TR--RVSP ~ 30 mm Hg--                             Grade 2 DD--LVEF ~ 65%                   MI ruled out-----1.22.2015--1.6.2015  II/VI PHILIP  Dyslipidemia  Hypothyroidism  Impaired fasting glucose  GERD   I/VI PHILIP  Cognitive impairment  Anger management issues  Hypocalcemia    Hypokalemia   PMH:   osteopenia, adenomatous polyp, SCC--nose, bilateral               cataracts, trichiasis left lower eyelid, bilateral posterior               vitreous detachment, HH, goiter, I/VI PHILIP, right eye               corneal scar, migraine headaches, UTI--1.5.2014,                leukocytosis--1.6.2015, hypokalemia--1.5.2014, syncope--              collapse--1.21.2015, malignant melanoma--right upper               back--2017, osteopenia, tremor---2019  PSH:   cholecystectomy, colonoscopy--2010--2002, Mohs--2009,               laser ablation right GSV--2009, cystocele repair--1999,               appendectomy, D&C-uterus, hysterectomy--1988,               anterior repair--2008, laser conization--1988, EGD--2015,               cataract extraction--IOL--OU--2020    Allergies:  Flagyl--rash         Plan:  1. Surgery completed---check---CBC--BMET--Mg---EKG  2. Potassium replacement  3. Michaels  4. Lasix for pulmonary edema   5.   See orders     Electronically signed by Onesimo Pelayo on 11/2/2021 at 8:07 AM    Hospitalist

## 2021-11-02 NOTE — FLOWSHEET NOTE
rounding on PCU [employer] entering for volunteer [employer]    Assessment: Patient has difficulty communicating but accepted prayer. Intervention: Engaged in conversation.  prayed with patient and family. Patient expressed appreciation for visit and offer of continued prayer. Plan: Chaplains are available on site or on call 24/7 for spiritual and emotional support.      11/02/21 1355   Encounter Summary   Services provided to: Patient   Referral/Consult From: Rounding   Continue Visiting   (11/2/21)   Complexity of Encounter Low   Length of Encounter 15 minutes   Routine   Type Pre-procedure   Spiritual/Samaritan   Type Spiritual support   Assessment Approachable   Intervention Active listening;Prayer   Outcome Expressed gratitude;Engaged in conversation

## 2021-11-02 NOTE — ANESTHESIA PROCEDURE NOTES
Spinal Block    Patient location during procedure: OR  Start time: 11/2/2021 11:00 AM  End time: 11/2/2021 11:10 AM  Reason for block: post-op pain management and primary anesthetic  Staffing  Performed: resident/CRNA   Resident/CRNA: FELICIA Cerna CRNA  Preanesthetic Checklist  Completed: patient identified, IV checked, site marked, risks and benefits discussed, surgical consent, monitors and equipment checked, pre-op evaluation, timeout performed, anesthesia consent given, oxygen available and patient being monitored  Spinal Block  Patient position: right lateral decubitus  Prep: ChloraPrep and site prepped and draped  Patient monitoring: cardiac monitor, continuous pulse ox, frequent blood pressure checks and continuous capnometry  Approach: midline  Location: L2/L3  Provider prep: mask and sterile gloves  Local infiltration: lidocaine  Agent: bupivacaine  Dose: 1.6  Dose: 1.6  Needle  Needle type: Pencan   Needle gauge: 25 G  Needle length: 3.5 in  Assessment  Sensory level: T8  Swirl obtained: Yes  CSF: clear  Attempts: 1  Hemodynamics: stable

## 2021-11-02 NOTE — PLAN OF CARE
Problem: Falls - Risk of:  Goal: Will remain free from falls  Description: Will remain free from falls  11/2/2021 0827 by Leta Heath RN  Outcome: Ongoing  11/2/2021 0455 by Chilango Barnes RN  Outcome: Ongoing  Goal: Absence of physical injury  Description: Absence of physical injury  11/2/2021 0827 by Leta Heath RN  Outcome: Ongoing  11/2/2021 0455 by Chilango Barnes RN  Outcome: Ongoing     Problem: Pain:  Goal: Pain level will decrease  Description: Pain level will decrease  11/2/2021 0827 by Leta Heath RN  Outcome: Ongoing  11/2/2021 0455 by Chilango Barnes RN  Outcome: Ongoing  Goal: Control of acute pain  Description: Control of acute pain  11/2/2021 0827 by Leta Heath RN  Outcome: Ongoing  11/2/2021 0455 by Chilango Barnes RN  Outcome: Ongoing  Goal: Control of chronic pain  Description: Control of chronic pain  11/2/2021 0827 by Leta Heath RN  Outcome: Ongoing  11/2/2021 0455 by Chilango Barnes RN  Outcome: Ongoing     Problem: Discharge Planning:  Goal: Participates in care planning  Description: Participates in care planning  11/2/2021 0827 by Leta Heath RN  Outcome: Ongoing  11/2/2021 0455 by Chilango Barnes RN  Outcome: Ongoing  Goal: Discharged to appropriate level of care  Description: Discharged to appropriate level of care  11/2/2021 0827 by Leta Heath RN  Outcome: Ongoing  11/2/2021 0455 by Chilango Barnes RN  Outcome: Ongoing     Problem:  Activity Intolerance:  Goal: Ability to tolerate increased activity will improve  Description: Ability to tolerate increased activity will improve  11/2/2021 0827 by Leta Heath RN  Outcome: Ongoing  11/2/2021 0455 by Chilango Barnes RN  Outcome: Ongoing     Problem: Mobility - Impaired:  Goal: Mobility will improve to maximum level  Description: Mobility will improve to maximum level  11/2/2021 0827 by Leta Heath RN  Outcome: Ongoing  11/2/2021 0455 by Chilango Barnes RN  Outcome: Ongoing     Problem: Skin Integrity:  Goal: Will show no infection signs and symptoms  Description: Will show no infection signs and symptoms  11/2/2021 0827 by Leta Heath RN  Outcome: Ongoing  11/2/2021 0455 by Chilango Barnes RN  Outcome: Ongoing  Goal: Absence of new skin breakdown  Description: Absence of new skin breakdown  11/2/2021 0827 by Leta Heath RN  Outcome: Ongoing  11/2/2021 0455 by Chilango Barnes RN  Outcome: Ongoing  Goal: Demonstration of wound healing without infection will improve  Description: Demonstration of wound healing without infection will improve  11/2/2021 0827 by Leta Heath RN  Outcome: Ongoing  11/2/2021 0455 by Chilango Barnes RN  Outcome: Ongoing  Goal: Risk for impaired skin integrity will decrease  Description: Risk for impaired skin integrity will decrease  11/2/2021 0827 by Leta Heath RN  Outcome: Ongoing  11/2/2021 0455 by Chilango Barnes RN  Outcome: Ongoing     Problem:  Activity:  Goal: Ability to ambulate will improve  Description: Ability to ambulate will improve  11/2/2021 0827 by Leta Heath RN  Outcome: Ongoing  11/2/2021 0455 by Chilango Barnes RN  Outcome: Ongoing  Goal: Ability to perform activities at highest level will improve  Description: Ability to perform activities at highest level will improve  11/2/2021 0827 by Leta Heath RN  Outcome: Ongoing  11/2/2021 0455 by Chilango Barnes RN  Outcome: Ongoing     Problem: Health Behavior:  Goal: Identification of resources available to assist in meeting health care needs will improve  Description: Identification of resources available to assist in meeting health care needs will improve  11/2/2021 0827 by Leta Heath RN  Outcome: Ongoing  11/2/2021 0455 by Chilango Barnes RN  Outcome: Ongoing     Problem: Nutritional:  Goal: Ability to attain and maintain optimal nutritional status will improve  Description: Ability to attain and maintain optimal nutritional status will improve  11/2/2021 0827 by Leta Heath RN  Outcome: Ongoing  11/2/2021 0455 by Douglas Andrade RN  Outcome: Ongoing     Problem: Physical Regulation:  Goal: Will remain free from infection  Description: Will remain free from infection  11/2/2021 0827 by Cj Pichardo RN  Outcome: Ongoing  11/2/2021 0455 by Douglas Andrade RN  Outcome: Ongoing  Goal: Postoperative complications will be avoided or minimized  Description: Postoperative complications will be avoided or minimized  11/2/2021 0827 by Cj Pichardo RN  Outcome: Ongoing  11/2/2021 0455 by Douglas Andrade RN  Outcome: Ongoing  Goal: Diagnostic test results will improve  Description: Diagnostic test results will improve  11/2/2021 0827 by Cj Pichardo RN  Outcome: Ongoing  11/2/2021 0455 by Douglas Andrade RN  Outcome: Ongoing     Problem: Respiratory:  Goal: Ability to maintain adequate ventilation will improve  Description: Ability to maintain adequate ventilation will improve  11/2/2021 0827 by Cj Pichardo RN  Outcome: Ongoing  11/2/2021 0455 by Douglas Andrade RN  Outcome: Ongoing     Problem: Safety:  Goal: Ability to remain free from injury will improve  Description: Ability to remain free from injury will improve  11/2/2021 0827 by Cj Pichardo RN  Outcome: Ongoing  11/2/2021 0455 by Douglas Andrade RN  Outcome: Ongoing     Problem: Self-Care:  Goal: Ability to meet self-care needs will improve  Description: Ability to meet self-care needs will improve  11/2/2021 0827 by Cj Pichardo RN  Outcome: Ongoing  11/2/2021 0455 by Douglas Andrade RN  Outcome: Ongoing     Problem: Self-Concept:  Goal: Ability to maintain and perform role responsibilities to the fullest extent possible will improve  Description: Ability to maintain and perform role responsibilities to the fullest extent possible will improve  11/2/2021 0827 by Cj Pichardo RN  Outcome: Ongoing  11/2/2021 0455 by Douglas Andrade RN  Outcome: Ongoing  Goal: Verbalizations of decreased anxiety will increase  Description: Verbalizations of decreased anxiety will increase  11/2/2021 0827 by Ever Salvador RN  Outcome: Ongoing  11/2/2021 0455 by Danish Mustafa RN  Outcome: Ongoing     Problem: Sensory:  Goal: Pain level will decrease  Description: Pain level will decrease  11/2/2021 0827 by Ever Salvador RN  Outcome: Ongoing  11/2/2021 0455 by Danish Mustafa RN  Outcome: Ongoing     Problem: Tissue Perfusion:  Goal: Peripheral tissue perfusion will improve  Description: Peripheral tissue perfusion will improve  11/2/2021 0827 by Ever Salvador RN  Outcome: Ongoing  11/2/2021 0455 by Danish Mustafa RN  Outcome: Ongoing  Goal: Risk of venous thrombosis will decrease  Description: Risk of venous thrombosis will decrease  11/2/2021 0827 by Ever Salvador RN  Outcome: Ongoing  11/2/2021 0455 by Danish Mustafa RN  Outcome: Ongoing     Problem: Urinary Elimination:  Goal: Ability to reestablish a normal urinary elimination pattern will improve - after catheter removal  Description: Ability to reestablish a normal urinary elimination pattern will improve  11/2/2021 0827 by Ever Salvador RN  Outcome: Ongoing  11/2/2021 0455 by Danish Mustafa RN  Outcome: Ongoing

## 2021-11-02 NOTE — PROGRESS NOTES
Assessment completed - patient resting in bed. Alert and oriented to self only. She states that she is here for the bridge game. Reoriented frequently without success. Right hip drsgs dry and intact. Ice pack remains on hip. Michaels patent draining yellow urine. Denies complaints of pain.      No further needs noted at this time

## 2021-11-02 NOTE — ANESTHESIA PRE PROCEDURE
Department of Anesthesiology  Preprocedure Note       Name:  Maribell Flor   Age:  80 y.o.  :  3/21/1930                                          MRN:  [id number]         Date:  2021      Surgeon: Lord Jeffries):  Wil Carter MD    Procedure: Procedure(s):  nv-Right Hip Intertan (rapid to be done in PCU )    Medications prior to admission:   Prior to Admission medications    Medication Sig Start Date End Date Taking? Authorizing Provider   HYDROcodone-acetaminophen (NORCO) 5-325 MG per tablet Take 1-2 tablets by mouth every 4-6 hours as needed for Pain for up to 7 days.  21 Yes Brittany Miles APRN - CNP   levothyroxine (SYNTHROID) 125 MCG tablet Take 1 tablet by mouth Daily 21  Yes Leila Christine APRN - CNP   memantine (NAMENDA) 5 MG tablet Take 1 tablet by mouth daily 21  Yes David Coffey MD   calcium-vitamin D (OSCAL 500/200 D-3) 500-200 MG-UNIT per tablet Take 1 tablet by mouth daily 21  Yes Christa Browne MD   FLUoxetine (PROZAC) 20 MG capsule TAKE 1 CAPSULE DAILY 6/3/21  Yes David Coffey MD   losartan (COZAAR) 100 MG tablet Take 1 tablet by mouth daily 21  Yes David Coffey MD   OLANZapine (ZYPREXA) 2.5 MG tablet Take 1 tablet by mouth nightly 21  Yes David Coffey MD   BRILINTA 90 MG TABS tablet Take 1 tablet by mouth 2 times daily 21  Yes Vimal Seals DO   atenolol (TENORMIN) 25 MG tablet Take 1 tablet by mouth daily 21  Yes David Coffey MD   atorvastatin (LIPITOR) 40 MG tablet Take 1 tablet by mouth daily 21  Yes David Coffey MD   aspirin 81 MG EC tablet Take 1 tablet by mouth daily 20  Yes Historical Provider, MD   Multiple Vitamin (DAILY MULTIVITAMIN PO) Take 1 tablet by mouth daily    Yes Historical Provider, MD   furosemide (LASIX) 40 MG tablet Take 40 mg by mouth daily 9/15/21   Historical Provider, MD   acetaminophen (TYLENOL) 650 MG extended release tablet Take 650 mg by mouth 2 times daily as needed for Pain    Historical Provider, MD Paused at 11/02/21 0739    [MAR Hold] ondansetron (ZOFRAN) injection 4 mg  4 mg IntraVENous Q6H PRN Darwyn Cava, APRN - NP        [Held by provider] aspirin EC tablet 81 mg  81 mg Oral Daily Darwyn Cava, APRN - NP        [facility] atenolol (TENORMIN) tablet 25 mg  25 mg Oral Daily Darwyn Cava, APRN - NP   25 mg at 11/02/21 0806    [MAR Hold] atorvastatin (LIPITOR) tablet 40 mg  40 mg Oral Daily Darwyn Cava, APRN - NP   40 mg at 11/01/21 0844    [Held by provider] ticagrelor (BRILINTA) tablet 90 mg  90 mg Oral BID Darwyn Cava, APRN - NP        [facility]] calcium-vitamin D (OSCAL-500) 500-200 MG-UNIT per tablet 1 tablet  1 tablet Oral Daily Darwyn Cava, APRN - NP        [facility] Hold] FLUoxetine (PROZAC) capsule 20 mg  20 mg Oral Daily Darwyn Cava, APRN - NP   20 mg at 11/01/21 0843    [MAR Hold] levothyroxine (SYNTHROID) tablet 125 mcg  125 mcg Oral Daily Darwyn Cava, APRN - NP   125 mcg at 11/01/21 0621    [MAR Hold] losartan (COZAAR) tablet 100 mg  100 mg Oral Daily Darwyn Cava, APRN - NP   100 mg at 11/02/21 0806    [MAR Hold] memantine (NAMENDA) tablet 5 mg  5 mg Oral Daily Darwyn Cava, APRN - NP   5 mg at 11/01/21 0843    [MAR Hold] OLANZapine (ZYPREXA) tablet 2.5 mg  2.5 mg Oral Nightly Darwyn Cava, APRN - NP   2.5 mg at 11/01/21 2028    [MAR Hold] famotidine (PEPCID) tablet 20 mg  20 mg Oral Daily Darwyn Cava, APRN - NP   20 mg at 11/01/21 0844    [MAR Hold] morphine (PF) injection 1 mg  1 mg IntraVENous Q2H PRN Darwyn Cava, APRN - NP   1 mg at 10/30/21 2245    [MAR Hold] HYDROcodone-acetaminophen (NORCO) 5-325 MG per tablet 1 tablet  1 tablet Oral Q4H PRN Darwyn Cava, APRN - NP   1 tablet at 11/02/21 0057    [MAR Hold] metoprolol (LOPRESSOR) injection 5 mg  5 mg IntraVENous Q6H PRN Darwyn Cava, APRN - NP           Allergies:  No Known Allergies    Problem List:    Patient Active Problem List   Diagnosis Code    Dyslipidemia E78.5    Osteopenia M85.80    Impaired fasting glucose R73.01    Adenomatous polyp D36.9    Essential hypertension I10    Hypothyroidism E03.9    Sciatica M54.30    GERD (gastroesophageal reflux disease) K21.9    Visual disturbance H53.9    Malignant melanoma in situ (HCC) D03.9    Difficulty controlling anger R45.4    Primary open angle glaucoma (POAG) of both eyes, moderate stage H40.1132    PVD (posterior vitreous detachment), both eyes H43.813    Combined forms of age-related cataract of both eyes H25.813    Dementia with behavioral disturbance ([facility]) F03.91    Tremor R25.1    Essential tremor G25.0    Dizziness R42    Chronic cerebral ischemia I67.82    Acquired cerebral atrophy ([facility]) G31.9    Cerebral infarction ([facility]) I63.9    Anxiety F41.9    Atrial fibrillation ([facility]) I48.91    Displaced intertrochanteric fracture of right femur, initial encounter for closed fracture ([facility]) S72.141A    Acute ST segment elevation myocardial infarction ([facility]) I21.3    Heart murmur R01.1    Migraine G43.909    Female bladder prolapse N81.10    Falls W19. XXXA    Hiatal hernia K44.9    Hypercholesterolemia E78.00    Coronary arteriosclerosis I25.10    Cataract H26.9    [facility] S72.001A       Past Medical History:        Diagnosis Date    Adenomatous polyp 06/10    With recommendation for repeat 06/15. Also, diverticulosis was noted.      Cataracts, bilateral     Combined forms of age-related cataract of both eyes 10/1/2018    Corneal scar, right eye     Cystocele     history of     Dementia ([facility]     Mini-Mental Status exam 27/30  6/14  declines meds at this point,  MMSE 29/30 on June 13, 2017  MMSE 26/30 4/2018    Difficulty controlling anger 9/16/2018    Dyslipidemia     GERD (gastroesophageal reflux disease)     egd  4/15 ok    Goiter     benign, history of     Hiatal hernia     Hypertension     Hypokalemia     Hypothyroidism     Impaired fasting glucose     Malignant melanoma in situ ([facility]     R upper Back 8/17    Migraines     Murmur, functional     Grade 1/6 systolic, history of     Osteopenia     T -1.0 hip, 03/09, T -0.3 spine, 03/09. 1) Repeat DEXA scan 09/12 with T +0.15, T -1.2 at the hip but -1.8 at the mean femoral neck giving her a FRAX score of 4.3 at the hip. Reclast 10/13 and given 2014,2015, and 1/4/2017, on calcium and vit d,  Redo Dexa 10/14 Frax 4.2% , Frax 4.8% 10 yr hip fracture risk on Dexa 1/17  On Reclast    Posterior vitreous detachment of both eyes     Primary open angle glaucoma (POAG) of both eyes, moderate stage 10/1/2018    Sciatica 12/15/2015    Skin cancer     History of multiple skin cancers. Following with Dr. Rock Ambrocio--- invasive squamous cell Ca 8/17 L forearm    Syncope and collapse 1/21/2015    Tremor 12/23/2018    Trichiasis of left lower eyelid without entropion     Visual disturbance 10/26/2017       Past Surgical History:        Procedure Laterality Date    APPENDECTOMY      CHOLECYSTECTOMY      Remote.  COLONOSCOPY  06/08/10    COLONOSCOPY  07/18/02    CYSTOCELE REPAIR  02/12/99    Vaginal prolapse, cystocele plus pelvic relaxation.  DILATION AND CURETTAGE OF UTERUS      with hysteroscopy   Mary Lou    still has ovaries     INTRACAPSULAR CATARACT EXTRACTION Left 8/25/2020    Left Cataract Extraction w/ IOL Implant performed by Tc Sanches MD at [place] [address] Right 10/8/2020    Right Cataract Extraction w/ IOL Implant performed by Tc Sanches MD at [address] [place] [address]  10/07/09    SCC, left nasal sidewall.  OTHER SURGICAL HISTORY  11/11/08    Anterior repair.  OTHER SURGICAL HISTORY  1988    laser cone    UPPER GASTROINTESTINAL ENDOSCOPY  4/8/2015    hiatal hernia    VEIN SURGERY  06/08/09    Laser ablation of right greater saphenous vein.         Social History:    Social History     Tobacco Use    Smoking status: Never Smoker    Smokeless tobacco: Never Used Substance Use Topics    Alcohol use: Yes     Alcohol/week: 0.0 standard drinks     Comment: Infrequently. Counseling given: Not Answered      Vital Signs (Current):   Vitals:    11/02/21 0306 11/02/21 0445 11/02/21 0607 11/02/21 0730   BP: (!) 141/45  (!) 146/55 (!) 182/70   Pulse: 59  61 65   Resp: 12  17 19   Temp: 35.7 °C (96.2 °F)  36 °C (96.8 °F)    TempSrc: Tympanic  Tympanic    SpO2: 96%  98% 97%   Weight:  154 lb 12.8 oz (70.2 kg)     Height:                                                  BP Readings from Last 3 Encounters:   11/02/21 (!) 182/70   09/29/21 126/68   09/23/21 134/72       NPO Status:                                                                                 BMI:   Wt Readings from Last 3 Encounters:   11/02/21 154 lb 12.8 oz (70.2 kg)   09/29/21 147 lb (66.7 kg)   09/23/21 144 lb 12.8 oz (65.7 kg)     Body mass index is 26.57 kg/m². CBC:   Lab Results   Component Value Date    WBC 10.8 11/02/2021    RBC 3.13 11/02/2021    HGB 9.5 11/02/2021    HCT 30.7 11/02/2021    MCV 98.1 11/02/2021    RDW 14.6 11/02/2021     11/02/2021       CMP:   Lab Results   Component Value Date     11/02/2021    K 3.3 11/02/2021     11/02/2021    CO2 27 11/02/2021    BUN 20 11/02/2021    CREATININE 0.67 11/02/2021    GFRAA >60 11/02/2021    LABGLOM >60 11/02/2021    GLUCOSE 104 11/02/2021    PROT 5.7 10/31/2021    CALCIUM 8.3 11/02/2021    BILITOT 0.60 10/31/2021    ALKPHOS 61 10/31/2021    AST 23 10/31/2021    ALT 10 10/31/2021       POC Tests: No results for input(s): POCGLU, POCNA, POCK, POCCL, POCBUN, POCHEMO, POCHCT in the last 72 hours.     Coags:   Lab Results   Component Value Date    PROTIME 15.4 11/01/2021    INR 1.3 11/01/2021    APTT 31.5 10/31/2021       HCG (If Applicable): No results found for: PREGTESTUR, PREGSERUM, HCG, HCGQUANT     ABGs: No results found for: PHART, PO2ART, BOH1JZZ, IUV4QHV, BEART, F3MWGHBE     Type & Screen (If Applicable):  No results found for: LABABO, LABRH    Drug/Infectious Status (If Applicable):  No results found for: HIV, HEPCAB    COVID-19 Screening (If Applicable):   Lab Results   Component Value Date    COVID19 Not Detected 11/01/2021    COVID19 Not Detected 08/20/2020           Anesthesia Evaluation  Patient summary reviewed and Nursing notes reviewed no history of anesthetic complications:   Airway: Mallampati: II  TM distance: >3 FB   Neck ROM: full  Mouth opening: > = 3 FB Dental:    (+) upper dentures      Pulmonary: breath sounds clear to auscultation  (+) shortness of breath: chronic,                             Cardiovascular:  Exercise tolerance: poor (<4 METS),   (+) hypertension:, past MI:, CAD:, dysrhythmias: atrial fibrillation, hyperlipidemia    (-)  angina    ECG reviewed  Rhythm: regular  Rate: normal  Echocardiogram reviewed    Cleared by cardiology              Neuro/Psych:   (+) psychiatric history:depression/anxiety             GI/Hepatic/Renal:   (+) hiatal hernia, GERD:,           Endo/Other:    (+) hypothyroidism::., .                 Abdominal:             Vascular:   + PVD, aortic or cerebral, . Other Findings:             Anesthesia Plan      general and regional     ASA 4     (Plan to try to place spinal, if unable will go to GETA. May place A-line if deemed necessary. Pt is a DNR. No CPR, No Electrical shock, MEDS ONLY. Family discussion that pt is high risk for surgery.  )  Induction: intravenous. arterial line  MIPS: Postoperative opioids intended and Prophylactic antiemetics administered. Anesthetic plan and risks discussed with patient and child/children. Use of blood products discussed with patient whom consented to blood products.    Plan discussed with surgical team.                FELICIA Mcdowell - JOANA   11/2/2021

## 2021-11-02 NOTE — FLOWSHEET NOTE
rounding on PCU    Assessment: Patient is back in room but is not quite lucid.  is present and conversed with  He is having trouble accepting that they may be  if she goes to rehabilitation. He spoke lovingly of their 79 years of marriage. Intervention: Engaged in conversation. Patient expressed appreciation for visit and offer of continued prayer. Plan: Chaplains are available on site or on call 24/7 for spiritual and emotional support.      11/02/21 3430   Encounter Summary   Services provided to: Patient and family together   Referral/Consult From: Rounding   Complexity of Encounter Moderate   Length of Encounter 15 minutes   Routine   Type Post-procedure   Spiritual/Orthodoxy   Type Spiritual support   Assessment Approachable   Intervention Active listening   Outcome Expressed gratitude;Engaged in conversation

## 2021-11-02 NOTE — OP NOTE
Operative Note      Patient: Jaquelyn Schaumann  YOB: 1930  MRN: [id number]    Date of Procedure: 11/2/2021    Pre-Op Diagnosis: Right closed displaced intertrochanteric femur fracture    Post-Op Diagnosis: Same       Procedure:  Open treatment right intertrochanteric femur fracture with a cephalomedullary nail 47315    Surgeon(s):  Johan Marin MD    Assistant:    Assistant: Otilio Arzola  Physician Assistant: Juan Gil PA-C    Anesthesia: Spinal    Estimated Blood Loss (mL): 693     Complications: None    Specimens:   * No specimens in log *    Implants:  Implant Name Type Inv. Item Serial No.  Lot No. LRB No. Used Action   KIT SCR LAG L85MM [id number] L80MM DIA7MM COMPR INTEGR INTLOK  KIT SCR LAG L85MM [id number] L80MM DIA7MM COMPR INTEGR [employer] [place] [id number] Right 1 Implanted   NAIL IM L36CM [id number] 125DEG RT FEM TRAPEZOIDAL ORTH RECON  NAIL IM L36CM [id number] 125DEG RT FEM TRAPEZOIDAL ORTH RECON  [employer]- [id number] Right 1 Implanted   SCREW BNE L37.5MM DIA5MM EDDIE TIB G TI ST SELF DRL HEX DRV  SCREW BNE L37.5MM DIA5MM EDDIE TIB G TI ST SELF DRL HEX DRV  [employer]- [id number] Right 1 Implanted         Drains:   Urethral Catheter Straight-tip 16 fr (Active)   Catheter Indications Prolonged immobilization (e.g. unstable thoracic or lumbar spine, multiple traumatic injuries such as pelvic fractures) 11/02/21 0822   Site Assessment No urethral drainage 11/02/21 0822   Urine Color Yellow 11/02/21 1012   Urine Appearance Clear 11/02/21 1012   Urine Odor Malodorous 11/01/21 1800   Output (mL) 850 mL 11/02/21 1012       Findings: Confirmed    Detailed Description of Procedure:       INDICATION FOR PROCEDURE     The patient is an 80y.o.-year-old with a history of a fall. Patient complained of Right hip pain. The patient has multiple medical comorbidities and was admitted with an intertrochanteric hip fracture.  Patient was evaluated medically and felt patient to be of acceptable risk. It was felt a cephomedullary nail would be the best treatment option. Discussed with patient and elected to proceed. NARRATIVE:     PROCEDURE DETAILS     The patient was taken to the operating room, underwent a general anesthetic. The fractured side was placed in longitudinal traction to aid in the reduction. The other leg was draped out of the field. Timeout was taken, consent was confirmed. The patient received 2 gram of Ancef preoperatively. Started with 3.2 mm guide pin on the medial face of the greater trochanter and was then advanced under fluoroscopic guidance. The incision size was increased. A 16 mm channel reamer was then passed down to the level of the lesser trochanter. We placed the 10 mm x 360 mm Intertan nail down to the appropriate depth. We used the jig and a 3.2 mm guide pin placed in the center of the femoral head. We then used a dimpler followed by a cannulated reamer,the rigid reamer and the anti-rotation bar. We placed a 85 mm lag screw, 80 mm compression screw. Good compression noted across the fracture site. Traction was removed and then placed a 37.5 mm distal locking screw with a free-handed technique under fluoroscopic guidance. We injected local anesthetic including morphine and Toradol. Wounds were irrigated and closed with 2-0 Vicryl and Prineo. The patient was then awakened and returned to recovery room in fair condition. POSTOPERATIVE PLAN: Weight bearing as tolerated, dry dressings p.r.n. First postop visit will be in 8 weeks x-rays, 2 views of the Right femur . The physician assistant assisted throughout the procedure with positioning, draping, retraction, wound closure, and dressings application.       Solo Hawkins MD               Electronically signed by Solo Hawkins MD on 11/2/2021 at 11:42 AM

## 2021-11-02 NOTE — PLAN OF CARE
improve  Description: Diagnostic test results will improve  Outcome: Ongoing     Problem: Self-Care:  Goal: Ability to meet self-care needs will improve  Description: Ability to meet self-care needs will improve  Outcome: Ongoing     Problem: Tissue Perfusion:  Goal: Peripheral tissue perfusion will improve  Description: Peripheral tissue perfusion will improve  Outcome: Ongoing  Goal: Risk of venous thrombosis will decrease  Description: Risk of venous thrombosis will decrease  Outcome: Ongoing     Problem: Sensory:  Goal: Pain level will decrease  Description: Pain level will decrease  Outcome: Ongoing     Problem: Urinary Elimination:  Goal: Ability to reestablish a normal urinary elimination pattern will improve - after catheter removal  Description: Ability to reestablish a normal urinary elimination pattern will improve  Outcome: Ongoing

## 2021-11-03 LAB
ABSOLUTE EOS #: 0.17 K/UL (ref 0–0.44)
ABSOLUTE IMMATURE GRANULOCYTE: 0.08 K/UL (ref 0–0.3)
ABSOLUTE LYMPH #: 0.67 K/UL (ref 1.1–3.7)
ABSOLUTE MONO #: 1.37 K/UL (ref 0.1–1.2)
ANION GAP SERPL CALCULATED.3IONS-SCNC: 10 MMOL/L (ref 9–17)
BASOPHILS # BLD: 1 % (ref 0–2)
BASOPHILS ABSOLUTE: 0.06 K/UL (ref 0–0.2)
BUN BLDV-MCNC: 23 MG/DL (ref 8–23)
BUN/CREAT BLD: 31 (ref 9–20)
CALCIUM SERPL-MCNC: 8.1 MG/DL (ref 8.6–10.4)
CHLORIDE BLD-SCNC: 104 MMOL/L (ref 98–107)
CO2: 27 MMOL/L (ref 20–31)
CREAT SERPL-MCNC: 0.75 MG/DL (ref 0.5–0.9)
DIFFERENTIAL TYPE: ABNORMAL
EOSINOPHILS RELATIVE PERCENT: 1 % (ref 1–4)
GFR AFRICAN AMERICAN: >60 ML/MIN
GFR NON-AFRICAN AMERICAN: >60 ML/MIN
GFR SERPL CREATININE-BSD FRML MDRD: ABNORMAL ML/MIN/{1.73_M2}
GFR SERPL CREATININE-BSD FRML MDRD: ABNORMAL ML/MIN/{1.73_M2}
GLUCOSE BLD-MCNC: 124 MG/DL (ref 70–99)
HCT VFR BLD CALC: 30 % (ref 36.3–47.1)
HEMOGLOBIN: 9.4 G/DL (ref 11.9–15.1)
IMMATURE GRANULOCYTES: 1 %
LYMPHOCYTES # BLD: 5 % (ref 24–43)
MAGNESIUM: 2 MG/DL (ref 1.6–2.6)
MCH RBC QN AUTO: 30.2 PG (ref 25.2–33.5)
MCHC RBC AUTO-ENTMCNC: 31.3 G/DL (ref 25.2–33.5)
MCV RBC AUTO: 96.5 FL (ref 82.6–102.9)
MONOCYTES # BLD: 11 % (ref 3–12)
NRBC AUTOMATED: 0 PER 100 WBC
PDW BLD-RTO: 14.3 % (ref 11.8–14.4)
PLATELET # BLD: 192 K/UL (ref 138–453)
PLATELET ESTIMATE: ABNORMAL
PMV BLD AUTO: 11.4 FL (ref 8.1–13.5)
POTASSIUM SERPL-SCNC: 3.6 MMOL/L (ref 3.7–5.3)
POTASSIUM SERPL-SCNC: 3.7 MMOL/L (ref 3.7–5.3)
RBC # BLD: 3.11 M/UL (ref 3.95–5.11)
RBC # BLD: ABNORMAL 10*6/UL
SEG NEUTROPHILS: 81 % (ref 36–65)
SEGMENTED NEUTROPHILS ABSOLUTE COUNT: 10.18 K/UL (ref 1.5–8.1)
SODIUM BLD-SCNC: 141 MMOL/L (ref 135–144)
WBC # BLD: 12.5 K/UL (ref 3.5–11.3)
WBC # BLD: ABNORMAL 10*3/UL

## 2021-11-03 PROCEDURE — 94761 N-INVAS EAR/PLS OXIMETRY MLT: CPT

## 2021-11-03 PROCEDURE — 92610 EVALUATE SWALLOWING FUNCTION: CPT | Performed by: SPEECH-LANGUAGE PATHOLOGIST

## 2021-11-03 PROCEDURE — 83735 ASSAY OF MAGNESIUM: CPT

## 2021-11-03 PROCEDURE — 97530 THERAPEUTIC ACTIVITIES: CPT

## 2021-11-03 PROCEDURE — 36415 COLL VENOUS BLD VENIPUNCTURE: CPT

## 2021-11-03 PROCEDURE — 6360000002 HC RX W HCPCS: Performed by: NURSE PRACTITIONER

## 2021-11-03 PROCEDURE — 6370000000 HC RX 637 (ALT 250 FOR IP): Performed by: NURSE PRACTITIONER

## 2021-11-03 PROCEDURE — 2060000000 HC ICU INTERMEDIATE R&B

## 2021-11-03 PROCEDURE — 2700000000 HC OXYGEN THERAPY PER DAY

## 2021-11-03 PROCEDURE — 85025 COMPLETE CBC W/AUTO DIFF WBC: CPT

## 2021-11-03 PROCEDURE — 99232 SBSQ HOSP IP/OBS MODERATE 35: CPT | Performed by: INTERNAL MEDICINE

## 2021-11-03 PROCEDURE — 80048 BASIC METABOLIC PNL TOTAL CA: CPT

## 2021-11-03 PROCEDURE — 2580000003 HC RX 258: Performed by: NURSE PRACTITIONER

## 2021-11-03 PROCEDURE — 6370000000 HC RX 637 (ALT 250 FOR IP)

## 2021-11-03 PROCEDURE — 2580000003 HC RX 258

## 2021-11-03 PROCEDURE — 84132 ASSAY OF SERUM POTASSIUM: CPT

## 2021-11-03 RX ADMIN — SODIUM CHLORIDE: 9 INJECTION, SOLUTION INTRAVENOUS at 09:18

## 2021-11-03 RX ADMIN — Medication 2000 MG: at 03:33

## 2021-11-03 RX ADMIN — POTASSIUM BICARBONATE 40 MEQ: 782 TABLET, EFFERVESCENT ORAL at 07:05

## 2021-11-03 RX ADMIN — SODIUM CHLORIDE, PRESERVATIVE FREE 10 ML: 5 INJECTION INTRAVENOUS at 17:20

## 2021-11-03 ASSESSMENT — PAIN SCALES - PAIN ASSESSMENT IN ADVANCED DEMENTIA (PAINAD)
BODYLANGUAGE: 0
BREATHING: 0
TOTALSCORE: 0
BREATHING: 0
BODYLANGUAGE: 0
FACIALEXPRESSION: 0
NEGVOCALIZATION: 0
CONSOLABILITY: 0
TOTALSCORE: 0
BODYLANGUAGE: 0
FACIALEXPRESSION: 0
CONSOLABILITY: 0
FACIALEXPRESSION: 0
BREATHING: 0
FACIALEXPRESSION: 0
BODYLANGUAGE: 0
FACIALEXPRESSION: 0
TOTALSCORE: 0
CONSOLABILITY: 0
TOTALSCORE: 0
BODYLANGUAGE: 0
NEGVOCALIZATION: 0
CONSOLABILITY: 0
NEGVOCALIZATION: 0
CONSOLABILITY: 0
NEGVOCALIZATION: 0
NEGVOCALIZATION: 0
TOTALSCORE: 0

## 2021-11-03 ASSESSMENT — PAIN DESCRIPTION - LOCATION
LOCATION: HIP
LOCATION: HIP

## 2021-11-03 ASSESSMENT — PAIN DESCRIPTION - ORIENTATION
ORIENTATION: RIGHT
ORIENTATION: RIGHT

## 2021-11-03 ASSESSMENT — PAIN SCALES - GENERAL
PAINLEVEL_OUTOF10: 0
PAINLEVEL_OUTOF10: 5
PAINLEVEL_OUTOF10: 0
PAINLEVEL_OUTOF10: 7
PAINLEVEL_OUTOF10: 0

## 2021-11-03 ASSESSMENT — PAIN DESCRIPTION - PAIN TYPE
TYPE: SURGICAL PAIN
TYPE: SURGICAL PAIN

## 2021-11-03 ASSESSMENT — PAIN SCALES - WONG BAKER: WONGBAKER_NUMERICALRESPONSE: 8

## 2021-11-03 NOTE — PROGRESS NOTES
Speech Language Pathology      Attempted bedside swallow evaluation. Patient unable to be aroused long enough for complete assessment. Patient was provided 1mL thin liquid with significant swallow delay and weak throat clear. Patient opened mouth and removed 1/4 tsp puree from spoon, but no other oral initiation or pharyngeal swallow despite verbal and tactile prompts. Nursing wanting to give oral meds crushed in applesauce. Patient inappropriate for p.o. intake at this time. SLP to check back in afternoon for appropriateness.       James Rodriguez, MS, CCC-SLP

## 2021-11-03 NOTE — FLOWSHEET NOTE
Alyssa roundscott on PCU    Assessment:  Patient sleeping soundly at time of visit. Intervention:      Outcome:    Plan:  Chaplains will remain available to offer spiritual and emotional support as needed.

## 2021-11-03 NOTE — PROGRESS NOTES
Occupational Therapy  Facility/Department: [facility]  PROGRESSIVE CARE  Daily Treatment Note  NAME: Prisca Boykin  : 3/21/1930  MRN: [id number]    Date of Service: 11/3/2021    Discharge Recommendations:  ECF with OT       Assessment   Performance deficits / Impairments: Decreased functional mobility ; Decreased ADL status; Decreased ROM; Decreased balance;Decreased safe awareness;Decreased cognition;Decreased endurance;Decreased high-level IADLs;Decreased coordination  Treatment Diagnosis: R femur fracture  Prognosis: Guarded  REQUIRES OT FOLLOW UP: Yes  Activity Tolerance  Activity Tolerance: Patient limited by pain  Safety Devices  Safety Devices in place: Yes  Type of devices: Left in bed;Call light within reach;Nurse notified         Patient Diagnosis(es): The encounter diagnosis was Closed right hip fracture, initial encounter ([facility] [address] has a past medical history of Adenomatous polyp, Cataracts, bilateral, Combined forms of age-related cataract of both eyes, Corneal scar, right eye, Cystocele, Dementia ([address] Difficulty controlling anger, Dyslipidemia, GERD (gastroesophageal reflux disease), Goiter, Hiatal hernia, Hypertension, Hypokalemia, Hypothyroidism, Impaired fasting glucose, Malignant melanoma in situ ([address] Migraines, Murmur, functional, Osteopenia, Posterior vitreous detachment of both eyes, Primary open angle glaucoma (POAG) of both eyes, moderate stage, Sciatica, Skin cancer, Syncope and collapse, Tremor, 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 has a past surgical history that includes other surgical history (08); Cholecystectomy; Colonoscopy (06/08/10); Mohs surgery (10/07/09); Vein Surgery (09); Colonoscopy (02); Cystocele repair (99); Appendectomy; Dilation and curettage of uterus; other surgical history (); Hysterectomy (); Upper gastrointestinal endoscopy (2015);  Intracapsular cataract extraction (Left, 2020); Intracapsular cataract extraction (Right, 10/8/2020); and Hip fracture surgery (Right, 11/2/2021).     Restrictions  Restrictions/Precautions  Restrictions/Precautions: Weight Bearing  Lower Extremity Weight Bearing Restrictions  Right Lower Extremity Weight Bearing: Weight Bearing As Tolerated  Subjective   General  Chart Reviewed: Yes  Patient assessed for rehabilitation services?: Yes  Family / Caregiver Present: Yes (family present upon arrival, left during treatment)  Referring Practitioner: Brittany Wing  Diagnosis: displaced intertrochanteric fracture of right femur  Subjective  Subjective: Patient rec'd in bed, 80 yr old female with recent fall  General Comment  Comments: CO-treat with PTA this date for bed mobility and transfers  Pain Assessment  Churchill-Cottrell Pain Rating: Hurts whole lot  Pain Type: Surgical pain  Pain Location: Hip  Pain Orientation: Right  Vital Signs  Patient Currently in Pain: Yes   Orientation  Orientation  Overall Orientation Status: Impaired  Orientation Level: Oriented to person;Disoriented to situation  Objective             Balance  Sitting Balance: Minimal assistance (able to sit EOB with fair upright posture; cues to bring BLEs to floor)  Standing Balance: Maximum assistance (x2)  Standing Balance  Time: [id number]  Bed mobility  Supine to Sit: Maximum assistance;2 Person assistance  Sit to Supine: Maximum assistance;2 Person assistance  Scooting: Maximal assistance;2 Person assistance  Transfers  Sit to stand: Maximum assistance;2 Person assistance  Stand to sit: Maximum assistance;2 Person assistance  Transfer Comments: Partial stand [id number]     Plan   Plan  Times per week: 2-3  Current Treatment Recommendations: Functional Mobility Training, Safety Education & Training, Self-Care / ADL, Equipment Evaluation, Education, & procurement    Goals  Short term goals  Time Frame for Short term goals: duration of hospital stay  Short term goal 1: Patient to complete BSC transfer with max a x1 and mod a of 2nd person using a/e as needed  Short term goal 2: Patient to sit EOB and complete simple ADL tasks with max a x1 and mod a of 2nd person using a/e as needed       Therapy Time   Individual Concurrent Group Co-treatment   Time In 1344         Time Out 1400         Minutes 16         Timed Code Treatment Minutes: 1604 [facility], \[facility]\""

## 2021-11-03 NOTE — PLAN OF CARE
Problem: Falls - Risk of:  Goal: Will remain free from falls  Description: Will remain free from falls  11/3/2021 1012 by Leanne Silvestre RN  Outcome: Ongoing  11/3/2021 0538 by Ida Maradiaga RN  Outcome: Met This Shift  Goal: Absence of physical injury  Description: Absence of physical injury  11/3/2021 1012 by Leanne Silvestre RN  Outcome: Ongoing  11/3/2021 0538 by Ida Maradiaga RN  Outcome: Met This Shift     Problem: Pain:  Goal: Pain level will decrease  Description: Pain level will decrease  11/3/2021 1012 by Leanne Silvestre RN  Outcome: Ongoing  11/3/2021 0538 by Ida Maradiaga RN  Outcome: Ongoing  Goal: Control of acute pain  Description: Control of acute pain  11/3/2021 1012 by Leanne Silvestre RN  Outcome: Ongoing  11/3/2021 0538 by Ida Maradiaga RN  Outcome: Ongoing  Goal: Control of chronic pain  Description: Control of chronic pain  11/3/2021 1012 by Leanne Silvestre RN  Outcome: Ongoing  11/3/2021 0538 by Ida Maradiaga RN  Outcome: Ongoing     Problem: Discharge Planning:  Goal: Participates in care planning  Description: Participates in care planning  11/3/2021 1012 by Leanne Silvestre RN  Outcome: Ongoing  11/3/2021 0538 by Ida Maradiaga RN  Outcome: Ongoing  Goal: Discharged to appropriate level of care  Description: Discharged to appropriate level of care  11/3/2021 1012 by Leanne Silvestre RN  Outcome: Ongoing  11/3/2021 0538 by Ida Maradiaga RN  Outcome: Ongoing     Problem:  Activity Intolerance:  Goal: Ability to tolerate increased activity will improve  Description: Ability to tolerate increased activity will improve  11/3/2021 1012 by Leanne Silvestre RN  Outcome: Ongoing  11/3/2021 0538 by Ida Maradiaga RN  Outcome: Ongoing     Problem: Mobility - Impaired:  Goal: Mobility will improve to maximum level  Description: Mobility will improve to maximum level  11/3/2021 1012 by Leanne Silvestre RN  Outcome: Ongoing  11/3/2021 0538 by Ida Maradiaga RN  Outcome: Ongoing     Problem: Skin Integrity:  Goal: Will show no infection signs and symptoms  Description: Will show no infection signs and symptoms  11/3/2021 1012 by Perla Meneses RN  Outcome: Ongoing  11/3/2021 0538 by Zion Rosenbaum RN  Outcome: Met This Shift  Goal: Absence of new skin breakdown  Description: Absence of new skin breakdown  11/3/2021 1012 by Perla Meneses RN  Outcome: Ongoing  11/3/2021 0538 by Zion Rosenbaum RN  Outcome: Met This Shift  Goal: Demonstration of wound healing without infection will improve  Description: Demonstration of wound healing without infection will improve  11/3/2021 1012 by Perla Meneses RN  Outcome: Ongoing  11/3/2021 0538 by Zion Rosenbaum RN  Outcome: Ongoing  Goal: Risk for impaired skin integrity will decrease  Description: Risk for impaired skin integrity will decrease  11/3/2021 1012 by Perla Meneses RN  Outcome: Ongoing  11/3/2021 0538 by Zion Rosenbaum RN  Outcome: Ongoing     Problem:  Activity:  Goal: Ability to ambulate will improve  Description: Ability to ambulate will improve  11/3/2021 1012 by Perla Meneses RN  Outcome: Ongoing  11/3/2021 0538 by Zion Rosenbaum RN  Outcome: Ongoing  Goal: Ability to perform activities at highest level will improve  Description: Ability to perform activities at highest level will improve  11/3/2021 1012 by Perla Meneses RN  Outcome: Ongoing  11/3/2021 0538 by Zion Rosenbaum RN  Outcome: Ongoing     Problem: Health Behavior:  Goal: Identification of resources available to assist in meeting health care needs will improve  Description: Identification of resources available to assist in meeting health care needs will improve  11/3/2021 1012 by Perla Meneses RN  Outcome: Ongoing  11/3/2021 0538 by Zion Rosenbaum RN  Outcome: Ongoing     Problem: Nutritional:  Goal: Ability to attain and maintain optimal nutritional status will improve  Description: Ability to attain and maintain optimal nutritional status will improve  11/3/2021 1012 by Marley CASTANON increase  Description: Verbalizations of decreased anxiety will increase  11/3/2021 1012 by Alfredo Ireland RN  Outcome: Ongoing  11/3/2021 0538 by Esthela Mensah RN  Outcome: Ongoing     Problem: Sensory:  Goal: Pain level will decrease  Description: Pain level will decrease  11/3/2021 1012 by Alfredo Ireland RN  Outcome: Ongoing  11/3/2021 0538 by Esthela Mensah RN  Outcome: Ongoing     Problem: Tissue Perfusion:  Goal: Peripheral tissue perfusion will improve  Description: Peripheral tissue perfusion will improve  11/3/2021 1012 by Alfredo Ireland RN  Outcome: Ongoing  11/3/2021 0538 by Esthela Mensah RN  Outcome: Met This Shift  Goal: Risk of venous thrombosis will decrease  Description: Risk of venous thrombosis will decrease  11/3/2021 1012 by Alfredo Ireland RN  Outcome: Ongoing  11/3/2021 0538 by Esthela Mensah RN  Outcome: Ongoing     Problem: Urinary Elimination:  Goal: Ability to reestablish a normal urinary elimination pattern will improve - after catheter removal  Description: Ability to reestablish a normal urinary elimination pattern will improve  11/3/2021 1012 by Alfredo Ireland RN  Outcome: Ongoing  11/3/2021 0538 by Esthela Mensah RN  Outcome: Ongoing

## 2021-11-03 NOTE — PROGRESS NOTES
Orthopaedic Progress Note      SUBJECTIVE   Ms. Demond Mercado is post op day # 1     Patient notes no new concerns  Resting comfortably      OBJECTIVE      Physical    VITALS:  BP (!) 149/56   Pulse 71   Temp 97.8 °F (36.6 °C) (Oral)   Resp 14   Ht 5' 4\" (1.626 m)   Wt 156 lb 14.4 oz (71.2 kg)   LMP  (LMP Unknown)   SpO2 97%   BMI 26.93 kg/m²   I/O last 3 completed shifts: In: 7499 [P.O.:100;  I.V.:1228]  Out: 2650 [Urine:2600; Blood:50]      Right hip dressing dry and intact      Data  CBC:   Lab Results   Component Value Date    WBC 12.5 11/03/2021    HGB 9.4 11/03/2021     11/03/2021     BMP:    Lab Results   Component Value Date     11/03/2021    K 3.6 11/03/2021     11/03/2021    CO2 27 11/03/2021    BUN 23 11/03/2021    CREATININE 0.75 11/03/2021    CALCIUM 8.1 11/03/2021    GLUCOSE 124 11/03/2021     Uric Acid:  No components found for: URIC  PT/INR:    Lab Results   Component Value Date    PROTIME 15.4 11/01/2021    INR 1.3 11/01/2021     Troponin:  No results found for: TROPONINI  Urine Culture:  No components found for: CURINE      Current Inpatient Medications    Current Facility-Administered Medications: aspirin EC tablet 81 mg, 81 mg, Oral, Daily  ticagrelor (BRILINTA) tablet 90 mg, 90 mg, Oral, BID  multivitamin 1 tablet, 1 tablet, Oral, Daily  0.9 % sodium chloride infusion, , IntraVENous, PRN  furosemide (LASIX) injection 20 mg, 20 mg, IntraVENous, BID  sodium chloride flush 0.9 % injection 5-40 mL, 5-40 mL, IntraVENous, 2 times per day  sodium chloride flush 0.9 % injection 5-40 mL, 5-40 mL, IntraVENous, PRN  0.9 % sodium chloride infusion, 25 mL, IntraVENous, PRN  polyethylene glycol (GLYCOLAX) packet 17 g, 17 g, Oral, Daily PRN  acetaminophen (TYLENOL) tablet 650 mg, 650 mg, Oral, Q6H PRN **OR** acetaminophen (TYLENOL) suppository 650 mg, 650 mg, Rectal, Q6H PRN  albuterol (PROVENTIL) nebulizer solution 2.5 mg, 2.5 mg, Nebulization, As Directed RT PRN  0.9 % sodium chloride infusion, , IntraVENous, Continuous  ondansetron (ZOFRAN) injection 4 mg, 4 mg, IntraVENous, Q6H PRN  atenolol (TENORMIN) tablet 25 mg, 25 mg, Oral, Daily  atorvastatin (LIPITOR) tablet 40 mg, 40 mg, Oral, Daily  calcium-vitamin D (OSCAL-500) 500-200 MG-UNIT per tablet 1 tablet, 1 tablet, Oral, Daily  FLUoxetine (PROZAC) capsule 20 mg, 20 mg, Oral, Daily  levothyroxine (SYNTHROID) tablet 125 mcg, 125 mcg, Oral, Daily  losartan (COZAAR) tablet 100 mg, 100 mg, Oral, Daily  memantine (NAMENDA) tablet 5 mg, 5 mg, Oral, Daily  OLANZapine (ZYPREXA) tablet 2.5 mg, 2.5 mg, Oral, Nightly  famotidine (PEPCID) tablet 20 mg, 20 mg, Oral, Daily  morphine (PF) injection 1 mg, 1 mg, IntraVENous, Q2H PRN  HYDROcodone-acetaminophen (NORCO) 5-325 MG per tablet 1 tablet, 1 tablet, Oral, Q4H PRN  metoprolol (LOPRESSOR) injection 5 mg, 5 mg, IntraVENous, Q6H PRN        PLAN    Up with PT, WBAT, ECF

## 2021-11-03 NOTE — PROGRESS NOTES
[facility] Outpatient Speech Therapy   Phone: [phone]  Fax: [phone] EVALUATION    YOB: 1930  Gender: female  MRN:  [id number]  Referring Physician: Dr. Johan Aviles  Diagnosis:  dysphagia  Secondary Diagnosis: dysphagia    History of Present Illness/Injury: Patient presented to ED on 10/30/21 s/p fall. She landed on her right him and then fell backwards and hit her head. She underwent surgery for right hip on 11/02/21. Patient noted to have some pocketing and poor oral initiation at breakfast this morning and coughing with thin liquids. Past Medical History:   Diagnosis Date    Adenomatous polyp 06/10    With recommendation for repeat 06/15. Also, diverticulosis was noted.  Cataracts, bilateral     Combined forms of age-related cataract of both eyes 10/1/2018    Corneal scar, right eye     Cystocele     history of     Dementia ([address]     Mini-Mental Status exam 27/30 6/14  declines meds at this point,  MMSE 29/30 on June 13, 2017  MMSE 26/30 4/2018    Difficulty controlling anger 9/16/2018    Dyslipidemia     GERD (gastroesophageal reflux disease)     egd  4/15 ok    Goiter     benign, history of     Hiatal hernia     Hypertension     Hypokalemia     Hypothyroidism     Impaired fasting glucose     Malignant melanoma in situ ([address]     R upper Back 8/17    Migraines     Murmur, functional     Grade 1/6 systolic, history of     Osteopenia     T -1.0 hip, 03/09, T -0.3 spine, 03/09. 1) Repeat DEXA scan 09/12 with T +0.15, T -1.2 at the hip but -1.8 at the mean femoral neck giving her a FRAX score of 4.3 at the hip.  Reclast 10/13 and given 2014,2015, and 1/4/2017, on calcium and vit d,  Redo Dexa 10/14 Frax 4.2% , Frax 4.8% 10 yr hip fracture risk on Dexa 1/17  On Reclast    Posterior vitreous detachment of both eyes     Primary open angle glaucoma (POAG) of both eyes, moderate stage 10/1/2018    Sciatica 12/15/2015    Skin cancer History of multiple skin cancers. Following with Dr. 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     Visual disturbance 10/26/2017          TIME IN: 02:28 PM  TIME OUT: 02:48 PM  TOTAL TIME: 20 minutes     Current Diet: [x] NPO (was on regular diet prior to hospitalization)  [] Regular diet  [] Soft and bite-sized diet (Dysphagia III) [] Minced and moist diet (Dysphagia II)   [] Pureed diet  (dysphagia I)  [] Liquidised      Current Liquids: [] Thin  [] Mildly Thick (Nectar thick) [] Moderately Thick (Honey thick) [] Extremely Thick (Spoon-Pudding)    Respiratory Status: [] Independent [x] Nasal Cannula [] Oxygen Mask                  [] Tracheostomy [] Other:      [] Ventilator/Settings:    Behavioral Observation: [x] Alert  [] Oriented [] Confused      [] Lethargic [] Dysarthric       [] Limited Direction Following        [] Agitated      [] Other:    ORAL MECHANISM EVALUATION:               Comments:  Facial /Labial [x]WFL []Impaired []DNT    Lingual [x]WFL []Impaired []DNT    Dentition []WFL [x]Impaired []DNT Missing lower molars.   Wears upper partial.   Velum [x]WFL []Impaired []DNT    Vocal Quality [x]WFL []Impaired []DNT    Sensation [x]WFL []Impaired []DNT    Cough [x]WFL []Impaired []DNT    Gag []WFL []Impaired [x]DNT    Other: []WFL []ImpaIred []DNT        PATIENT WAS EVALUATED USING:  [x] Thin liquid via spoon, cup, straw    [] Mildly thick (Nectar thick liquid)  [] Moderately thick (Honey thick liquid)/Liquidised  [] Extremely thick (Pudding thick liquid)  [x] Solid   [x] Soft and bite sized  [] Minced and moist   [x] Puree    ORAL PHASE: [x] WFL    [] Impaired:   [] Loss of bolus from lips    [] Impaired AP movement   [] Pocketing Left     [] Pocketing Right    [] Lingual Pumping    [] Impaired Mastication   [] Reduced Bolus Formation    [] Impaired Oral Initiation    [] OTHER:    PHARYNGEAL PHASE:   [x] WFL: Pharyngeal phase appears WFLs, but cannot completely rule out pharyngeal phase deficits from a bedside swallow evaluation alone. [] Impaired:   [] Delayed Swallow     [] Decreased Hyolaryngeal Elevation   [] Audible Swallow   [] Suspected Pharyngeal Residue due to spontaneous multiple swallow. [] OTHER:    SIGNS AND SYMPTOMS OF LARYNGEAL PENETRATION / ASPIRATION:  [] NO sign/symptoms of aspiration evident with this evaluation, but cannot rule out silent aspiration. [] Throat Clear    [x] Immediate Cough with thin liquids via straw  [] Delayed Cough  [] Wet Vocal Quality  [] Change in Pulmonary Status   [] OTHER:    IMPRESSIONS:  Patient presents with oral preparatory phase, oral phase, and pharyngeal phase of swallow WFL. Patient noted to have + s/sx aspiration with thin liquids via straw but does well via spoon and small sips via cup. RECOMMENDATIONS:   Modified Barium Swallow:     [] Is indicated to further assess      [x] Is NOT indicated at this time; Will recommend as        appropriate. DIET RECOMMENDATIONS: [] NPO [x] Regular diet [] Soft and bite-sized diet (Dysphagia III) [] Minced and moist diet (Dysphagia II)   [] Pureed diet  (dysphagia I)  [] Liquidised    LIQUID RECOMMENDATIONS:   [x] Thin  [] Mildly Thick (Nectar thick)  [] Moderately Thick (Honey thick) [] Extremely Thick (Spoon-Pudding)    STRATEGIES:   [] Strategies pending MBS results.   [x] Full upright position    [x] Small bite/sip   [x] No Straw   [] Multiple Swallow  [] Chin tuck   [] Head turn  [] Pulmonary monitoring  [] Oral care after all meals  [] Supervision   [x] Medication in applesauce   []Direct 1:1 Supervision  [] Spoon all liquids  [] Alternate solid / liquid  [] Limit distractions   [] Monitor for fatigue  [] OTHER:    PATIENT STRENGTHS:  [] Motivated    [x] Cooperative  [x] Good family support    [] Prior Level of Function                 [] OTHER:      EDUCATION:    Method of Education:   [x] Discussion     [] Demonstration    [] Written     [] Other    Evaluation of Patients Response to Education:        [x] Patient and or caregiver verbalized understanding  [] Patient and or Caregiver demonstrated without assistance  [] Patient and or Caregiver demonstrated with assistance  [] Needs additional instruction to demonstrate understanding of education    PATIENT GOALS: [] Pt did not state. Will further assess during treatment. [] Return to the least restricted diet possible     [x] Return to previous level of function     [] OTHER:    PLAN / TREATMENT RECOMMENDATIONS:  [x] No further speech therapy services indicated.   [] Speech Therapy evaluation to assess speech, language, cognition and/or voice  [] Recommendations pending MBS  [] Skilled SLP intervention 30 minutes, 5 days per week  [] OTHER:          lectronically signed by:    [address], Christiane Thomas           Date:11/3/2021

## 2021-11-03 NOTE — PROGRESS NOTES
10/8/2020); and Hip fracture surgery (Right, 2021). Restrictions  Restrictions/Precautions  Restrictions/Precautions: Weight Bearing  Lower Extremity Weight Bearing Restrictions  Right Lower Extremity Weight Bearing: Weight Bearing As Tolerated  Subjective   General  Chart Reviewed: Yes  Response To Previous Treatment: Patient with no complaints from previous session. Family / Caregiver Present: Yes (spouse and daugther)  Subjective  Subjective: Patient supine in bed upon arrival and willing to participate in therapy. Pain Screening  Patient Currently in Pain: Other (comment) (Patient unable to give accurate pain rating.)  Pain Assessment  Pain Type: Surgical pain  Pain Location: Hip  Pain Orientation: Right  Vital Signs  Patient Currently in Pain: Other (comment) (Patient unable to give accurate pain rating.)  Oxygen Therapy  O2 Device: Nasal cannula  O2 Flow Rate (L/min): 2 L/min       Orientation  Orientation  Overall Orientation Status: Impaired  Orientation Level: Oriented to person  Cognition      Objective   Bed mobility  Bridging: Dependent/Total  Supine to Sit: Maximum assistance;2 Person assistance  Sit to Supine: 2 Person assistance;Maximum assistance  Scootin Person assistance;Maximal assistance  Transfers  Sit to Stand: 2 Person Assistance;Maximum Assistance  Stand to sit: 2 Person Assistance;Maximum Assistance  Bed to Chair: Unable to assess  Stand Pivot Transfers: Unable to assess  Squat Pivot Transfers: Unable to assess  Lateral Transfers: Unable to assess  Car Transfer: Unable to assess  Ambulation  Ambulation?: No  WB Status: patient unable to stand upright without max assist x2. Stairs/Curb  Stairs?: No  Neuromuscular Education  NDT Treatment: Sitting;Standing  Balance  Posture: Fair  Sitting - Static: Poor  Sitting - Dynamic: Poor  Standing - Static: Poor  Standing - Dynamic: Poor  Exercises  Comments: No LE exercies performed this date due to time spent on transfers.   Other exercises  Other exercises?: No                        G-Code     OutComes Score                                                     AM-PAC Score             Goals  Short term goals  Time Frame for Short term goals: LOS  Short term goal 1: Bed mobility with mod assist x 1  Short term goal 2: Transfers with max assist x 1  Short term goal 3: Sitting at EOB x 5 min with mod assist x 1  Patient Goals   Patient goals : none stated    Plan    Plan  Times per day: Twice a day  Current Treatment Recommendations: Strengthening, Transfer Training, Endurance Training, Neuromuscular Re-education, Patient/Caregiver Education & Training, Equipment Evaluation, Education, & procurement, ROM, Balance Training, Gait Training, Home Exercise Program, Safety Education & Training, Functional Mobility Training  Safety Devices  Type of devices: Gait belt, Nurse notified, Call light within reach, Left in bed, Bed alarm in place, Patient at risk for falls, All fall risk precautions in place     Therapy Time   Individual Concurrent Group Co-treatment   Time In 1344         Time Out 1400         Minutes 16             Co Treat with OT this date.     Rosi Waite, PTA

## 2021-11-03 NOTE — PROGRESS NOTES
Hospitalist Progress Note    Patient:  Jeff Chavez     YOB: 1930    MRN: [id number]   Admit date: 10/30/2021     Acct: [de-identified]     PCP: Xander CRISOSTOMO MD    CC--Interval History:   POD 1 right hip CM nail repair of right hip fracture-----11.2.2021    Very lethargic in AM----improved in afternoon    Seen by speech pathology---fortunately, swallowing preserved    Dementia----severe with episodes of confusion    See note below     All other ROS negative except noted in HPI    Diet:  ADULT DIET;  Regular    Medications:  Scheduled Meds:   aspirin  81 mg Oral Daily    ticagrelor  90 mg Oral BID    multivitamin  1 tablet Oral Daily    furosemide  20 mg IntraVENous BID    sodium chloride flush  5-40 mL IntraVENous 2 times per day    atenolol  25 mg Oral Daily    atorvastatin  40 mg Oral Daily    calcium-vitamin D  1 tablet Oral Daily    FLUoxetine  20 mg Oral Daily    levothyroxine  125 mcg Oral Daily    losartan  100 mg Oral Daily    memantine  5 mg Oral Daily    OLANZapine  2.5 mg Oral Nightly    famotidine  20 mg Oral Daily     Continuous Infusions:   sodium chloride      sodium chloride      sodium chloride 50 mL/hr at 11/02/21 1959     PRN Meds:sodium chloride, sodium chloride flush, sodium chloride, polyethylene glycol, acetaminophen **OR** acetaminophen, albuterol, ondansetron, morphine, HYDROcodone-acetaminophen, metoprolol    Objective:  Labs:  CBC with Differential:    Lab Results   Component Value Date    WBC 12.5 11/03/2021    RBC 3.11 11/03/2021    HGB 9.4 11/03/2021    HCT 30.0 11/03/2021     11/03/2021    MCV 96.5 11/03/2021    MCH 30.2 11/03/2021    MCHC 31.3 11/03/2021    RDW 14.3 11/03/2021    LYMPHOPCT 5 11/03/2021    MONOPCT 11 11/03/2021    BASOPCT 1 11/03/2021    MONOSABS 1.37 11/03/2021    LYMPHSABS 0.67 11/03/2021    EOSABS 0.17 11/03/2021    BASOSABS 0.06 11/03/2021    DIFFTYPE NOT REPORTED 11/03/2021     BMP:    Lab Results   Component Value Date     11/03/2021    K 3.7 11/03/2021     11/03/2021    CO2 27 11/03/2021    BUN 23 11/03/2021    LABALBU 3.2 11/02/2021    CREATININE 0.75 11/03/2021    CALCIUM 8.1 11/03/2021    GFRAA >60 11/03/2021    LABGLOM >60 11/03/2021    GLUCOSE 124 11/03/2021           Physical Exam:  Vitals: BP (!) 149/56   Pulse 67   Temp 98.4 °F (36.9 °C) (Tympanic)   Resp 14   Ht 5' 4\" (1.626 m)   Wt 156 lb 14.4 oz (71.2 kg)   LMP  (LMP Unknown)   SpO2 91%   BMI 26.93 kg/m²   24 hour intake/output:    Intake/Output Summary (Last 24 hours) at 11/3/2021 0742  Last data filed at 11/3/2021 0000  Gross per 24 hour   Intake 1328 ml   Output 2650 ml   Net -1322 ml     Last 3 weights: Wt Readings from Last 3 Encounters:   11/03/21 156 lb 14.4 oz (71.2 kg)   09/29/21 147 lb (66.7 kg)   09/23/21 144 lb 12.8 oz (65.7 kg)     HEENT: Normocephalic and Atraumatic  Neck: Supple, No Masses, Tenderness, Nodularity and No Lymphadenopathy  Chest/Lungs: Clear to Auscultation without Rales, Rhonchi, or Wheezes and Distant Breath Sounds  Cardiac: Regular Rate and Rhythm  GI/Abdomen: Bowel Sounds Present and Soft, Non-tender, without Guarding or Rebound Tenderness  : Not examined  EXT/Skin: sp right hip fracture repair, No Edema, No Cyanosis and No Clubbing  Neuro: lethargic, but arousable---more interactive as the day progressed      Assessment:    Principal Problem:    Displaced intertrochanteric fracture of right femur, initial encounter for closed fracture [facility])  Active Problems:    Closed right hip fracture, initial encounter ([facility] [address]  Resolved Problems:    * No resolved hospital problems.  LANRE Goldsmith  WF  Chip Donn,, IM; DC Cardiology--[facility];  sp [facility], Orthopedics]                  FULL CODE        LIZZY KRAUS--10.30.2021    Anti-infectives:     POD _____  cephalomedullary nail--- right hip fracture---                       11.2.2021--[place]  Right hip fracture---10.30.2021---fall getting out of car---                       10.30.2021--no LOC----traumatic osteopenic fracture  Acute comminuted and moderately displaced IT right femur fracture                       with free-floating greater trochanteric and lesser                        trochanter fragments--mild bilateral OA hips---no hip                        dislocation       EKG---11.2.2021---postoperatively---SR----64       CXR---11.2.2021--slight improvement suspected pulmonary                         edema----hazy bibasilar--some compressive                         atelectasis--no new abnormality       CXR----11.1.2021---interval improvement of pulmonary edema--                       improved aeration       CXR----10.31.2021--pulmonary edema--left pleural effusion        CT right hip---10.30.2021---as above       CT C-spine----10.30.2021--multilevel cervical spine                        degenerative changes narrowing--no fracture or                           subluxation       CT head---10.30.2021---no MBS--moderate atrophy and chronic                         small vessel ischemic disease---remote cortical                         infarctions         EKD----10.30.2021---NSR---64---LAD   ASCVD          2D ECHO--11.1.2021--RAUL--NLVSF---MICHELLE without stenosis-                        MAC MV thickening--mild MR--moderate TR---RVSP ~                    83 mm Hg---severe pulmonary hypertension---increased                    IVC diameter and impaired or no inspiratory variation---                   LVEF ~ 55%          STEMI--12.2020          ALLIE---RCA---12.2020          2D ECHO---12.2020--mild-to-moderate TR--moderate MR--                   RVSP ~ 46 mm Hg--LVEF ~ 45-50%      Severe pulmonary hypertension   Hypertension                   DUS--CV--1.22.2015--no hemodynamically significant                              stenosis                     2D ECHO--1.6.2015--NLVSF--LAE--MAC--mild MR--                             MICHELLE--mild-to-moderate TR--RVSP ~ 30 mm Hg-- Grade 2 DD--LVEF ~ 65%                   MI ruled out-----1.22.2015--1.6.2015  II/VI PHILIP  Dyslipidemia  Hypothyroidism  Impaired fasting glucose  GERD   I/VI PHILIP  Cognitive impairment  Anger management issues  Hypocalcemia    Hypokalemia   Hypomagnesemia   PMH:   osteopenia, adenomatous polyp, SCC--nose, bilateral               cataracts, trichiasis left lower eyelid, bilateral posterior               vitreous detachment, HH, goiter, I/VI PHILIP, right eye               corneal scar, migraine headaches, UTI--1.5.2014,                leukocytosis--1.6.2015, hypokalemia--1.5.2014, syncope--              collapse--1.21.2015, malignant melanoma--right upper               back--2017, osteopenia, tremor---2019  PSH:   cholecystectomy, colonoscopy--2010--2002, Mohs--2009,               laser ablation right GSV--2009, cystocele repair--1999,               appendectomy, D&C-uterus, hysterectomy--1988,               anterior repair--2008, laser conization--1988, EGD--2015,               cataract extraction--IOL--OU--2020    Allergies:  Flagyl--rash         Plan:  1. Hip fracture---physical--OT therapies  2. Speech pathology ---> thin--regular  3. Confusion---dementia---sun-downer---reorient  4. Potassium replacement  5. Working on placement---clearly will require rehab  6.   See orders    Electronically signed by Helene Burden on 11/3/2021 at 7:42 AM    Hospitalist

## 2021-11-03 NOTE — PLAN OF CARE
Problem: Discharge Planning:  Goal: Participates in care planning  Description: Participates in care planning  11/3/2021 1035 by Usha Joyner RN  Outcome: Met This Shift  11/3/2021 1012 by Jorge Armas RN  Outcome: Ongoing  11/3/2021 0538 by Adolfo Bell RN  Outcome: Ongoing  Goal: Discharged to appropriate level of care  Description: Discharged to appropriate level of care  11/3/2021 1035 by Usha Joyner RN  Outcome: Met This Shift  11/3/2021 1012 by Jorge Armas RN  Outcome: Ongoing  11/3/2021 0538 by Adolfo Bell RN  Outcome: Ongoing   Patient/Family/Responsible party discussed discharge planning, provided with list of SNF/Home health agencies. Chose [facility] [place]. Information called and faxed.

## 2021-11-03 NOTE — PLAN OF CARE
Problem: Falls - Risk of:  Goal: Will remain free from falls  Description: Will remain free from falls  Outcome: Met This Shift  Goal: Absence of physical injury  Description: Absence of physical injury  Outcome: Met This Shift     Problem: Skin Integrity:  Goal: Will show no infection signs and symptoms  Description: Will show no infection signs and symptoms  Outcome: Met This Shift  Goal: Absence of new skin breakdown  Description: Absence of new skin breakdown  Outcome: Met This Shift     Problem: Physical Regulation:  Goal: Will remain free from infection  Description: Will remain free from infection  Outcome: Met This Shift  Goal: Postoperative complications will be avoided or minimized  Description: Postoperative complications will be avoided or minimized  Outcome: Met This Shift     Problem: Safety:  Goal: Ability to remain free from injury will improve  Description: Ability to remain free from injury will improve  Outcome: Met This Shift     Problem: Tissue Perfusion:  Goal: Peripheral tissue perfusion will improve  Description: Peripheral tissue perfusion will improve  Outcome: Met This Shift     Problem: Pain:  Goal: Pain level will decrease  Description: Pain level will decrease  Outcome: Ongoing  Goal: Control of acute pain  Description: Control of acute pain  Outcome: Ongoing  Goal: Control of chronic pain  Description: Control of chronic pain  Outcome: Ongoing     Problem: Discharge Planning:  Goal: Participates in care planning  Description: Participates in care planning  Outcome: Ongoing  Goal: Discharged to appropriate level of care  Description: Discharged to appropriate level of care  Outcome: Ongoing     Problem:  Activity Intolerance:  Goal: Ability to tolerate increased activity will improve  Description: Ability to tolerate increased activity will improve  Outcome: Ongoing     Problem: Mobility - Impaired:  Goal: Mobility will improve to maximum level  Description: Mobility will improve to increase  Outcome: Ongoing     Problem: Sensory:  Goal: Pain level will decrease  Description: Pain level will decrease  Outcome: Ongoing     Problem: Tissue Perfusion:  Goal: Risk of venous thrombosis will decrease  Description: Risk of venous thrombosis will decrease  Outcome: Ongoing     Problem: Urinary Elimination:  Goal: Ability to reestablish a normal urinary elimination pattern will improve - after catheter removal  Description: Ability to reestablish a normal urinary elimination pattern will improve  Outcome: Ongoing

## 2021-11-03 NOTE — PROGRESS NOTES
Physical Therapy        Attempted to see patient this morning for therapy. Nursing was in room with patient performing morning rounds. Patient would open eyes to name but would drift back off the sleep quickly. Per nursing, instructed to hold therapy this morning due to patient being very drowsy. Will attempt to see this afternoon as time permits or tomorrow 10/0/5332 depending on patient status.      Justyna Ayala, PTA

## 2021-11-04 LAB
ABSOLUTE EOS #: 0.1 K/UL (ref 0–0.44)
ABSOLUTE IMMATURE GRANULOCYTE: 0.07 K/UL (ref 0–0.3)
ABSOLUTE LYMPH #: 0.8 K/UL (ref 1.1–3.7)
ABSOLUTE MONO #: 1.3 K/UL (ref 0.1–1.2)
ANION GAP SERPL CALCULATED.3IONS-SCNC: 11 MMOL/L (ref 9–17)
BASOPHILS # BLD: 1 % (ref 0–2)
BASOPHILS ABSOLUTE: 0.06 K/UL (ref 0–0.2)
BUN BLDV-MCNC: 19 MG/DL (ref 8–23)
BUN/CREAT BLD: 45 (ref 9–20)
CALCIUM SERPL-MCNC: 8 MG/DL (ref 8.6–10.4)
CHLORIDE BLD-SCNC: 103 MMOL/L (ref 98–107)
CO2: 28 MMOL/L (ref 20–31)
CREAT SERPL-MCNC: 0.42 MG/DL (ref 0.5–0.9)
DIFFERENTIAL TYPE: ABNORMAL
EKG ATRIAL RATE: 277 BPM
EKG Q-T INTERVAL: 452 MS
EKG QRS DURATION: 76 MS
EKG QTC CALCULATION (BAZETT): 466 MS
EKG R AXIS: -26 DEGREES
EKG T AXIS: 48 DEGREES
EKG VENTRICULAR RATE: 64 BPM
EOSINOPHILS RELATIVE PERCENT: 1 % (ref 1–4)
GFR AFRICAN AMERICAN: >60 ML/MIN
GFR NON-AFRICAN AMERICAN: >60 ML/MIN
GFR SERPL CREATININE-BSD FRML MDRD: ABNORMAL ML/MIN/{1.73_M2}
GFR SERPL CREATININE-BSD FRML MDRD: ABNORMAL ML/MIN/{1.73_M2}
GLUCOSE BLD-MCNC: 125 MG/DL (ref 70–99)
HCT VFR BLD CALC: 29.4 % (ref 36.3–47.1)
HEMOGLOBIN: 9.3 G/DL (ref 11.9–15.1)
IMMATURE GRANULOCYTES: 1 %
LYMPHOCYTES # BLD: 7 % (ref 24–43)
MCH RBC QN AUTO: 30.2 PG (ref 25.2–33.5)
MCHC RBC AUTO-ENTMCNC: 31.6 G/DL (ref 25.2–33.5)
MCV RBC AUTO: 95.5 FL (ref 82.6–102.9)
MONOCYTES # BLD: 11 % (ref 3–12)
NRBC AUTOMATED: 0 PER 100 WBC
PDW BLD-RTO: 14.2 % (ref 11.8–14.4)
PLATELET # BLD: 221 K/UL (ref 138–453)
PLATELET ESTIMATE: ABNORMAL
PMV BLD AUTO: 11 FL (ref 8.1–13.5)
POTASSIUM SERPL-SCNC: 3.2 MMOL/L (ref 3.7–5.3)
POTASSIUM SERPL-SCNC: 3.2 MMOL/L (ref 3.7–5.3)
POTASSIUM SERPL-SCNC: 3.9 MMOL/L (ref 3.7–5.3)
RBC # BLD: 3.08 M/UL (ref 3.95–5.11)
RBC # BLD: ABNORMAL 10*6/UL
SEG NEUTROPHILS: 79 % (ref 36–65)
SEGMENTED NEUTROPHILS ABSOLUTE COUNT: 10.07 K/UL (ref 1.5–8.1)
SODIUM BLD-SCNC: 142 MMOL/L (ref 135–144)
WBC # BLD: 12.4 K/UL (ref 3.5–11.3)
WBC # BLD: ABNORMAL 10*3/UL

## 2021-11-04 PROCEDURE — 97110 THERAPEUTIC EXERCISES: CPT

## 2021-11-04 PROCEDURE — 99231 SBSQ HOSP IP/OBS SF/LOW 25: CPT | Performed by: INTERNAL MEDICINE

## 2021-11-04 PROCEDURE — 6360000002 HC RX W HCPCS: Performed by: NURSE PRACTITIONER

## 2021-11-04 PROCEDURE — 6370000000 HC RX 637 (ALT 250 FOR IP): Performed by: NURSE PRACTITIONER

## 2021-11-04 PROCEDURE — 2500000003 HC RX 250 WO HCPCS: Performed by: INTERNAL MEDICINE

## 2021-11-04 PROCEDURE — 2580000003 HC RX 258

## 2021-11-04 PROCEDURE — 97535 SELF CARE MNGMENT TRAINING: CPT

## 2021-11-04 PROCEDURE — 97530 THERAPEUTIC ACTIVITIES: CPT

## 2021-11-04 PROCEDURE — 6370000000 HC RX 637 (ALT 250 FOR IP): Performed by: INTERNAL MEDICINE

## 2021-11-04 PROCEDURE — 2500000003 HC RX 250 WO HCPCS: Performed by: NURSE PRACTITIONER

## 2021-11-04 PROCEDURE — 84132 ASSAY OF SERUM POTASSIUM: CPT

## 2021-11-04 PROCEDURE — 2060000000 HC ICU INTERMEDIATE R&B

## 2021-11-04 PROCEDURE — 6360000002 HC RX W HCPCS: Performed by: INTERNAL MEDICINE

## 2021-11-04 PROCEDURE — 97116 GAIT TRAINING THERAPY: CPT

## 2021-11-04 PROCEDURE — 85025 COMPLETE CBC W/AUTO DIFF WBC: CPT

## 2021-11-04 PROCEDURE — 80048 BASIC METABOLIC PNL TOTAL CA: CPT

## 2021-11-04 PROCEDURE — 36415 COLL VENOUS BLD VENIPUNCTURE: CPT

## 2021-11-04 RX ORDER — POTASSIUM CHLORIDE 7.45 MG/ML
10 INJECTION INTRAVENOUS
Status: DISCONTINUED | OUTPATIENT
Start: 2021-11-04 | End: 2021-11-04

## 2021-11-04 RX ORDER — SODIUM CHLORIDE AND POTASSIUM CHLORIDE .9; .15 G/100ML; G/100ML
SOLUTION INTRAVENOUS CONTINUOUS
Status: DISCONTINUED | OUTPATIENT
Start: 2021-11-04 | End: 2021-11-05 | Stop reason: HOSPADM

## 2021-11-04 RX ORDER — METOPROLOL TARTRATE 5 MG/5ML
5 INJECTION INTRAVENOUS EVERY 6 HOURS
Status: DISCONTINUED | OUTPATIENT
Start: 2021-11-04 | End: 2021-11-05 | Stop reason: HOSPADM

## 2021-11-04 RX ADMIN — POTASSIUM CHLORIDE 10 MEQ: 7.46 INJECTION, SOLUTION INTRAVENOUS at 09:14

## 2021-11-04 RX ADMIN — POTASSIUM BICARBONATE 40 MEQ: 782 TABLET, EFFERVESCENT ORAL at 15:18

## 2021-11-04 RX ADMIN — SODIUM CHLORIDE: 9 INJECTION, SOLUTION INTRAVENOUS at 05:42

## 2021-11-04 RX ADMIN — METOPROLOL TARTRATE 5 MG: 5 INJECTION INTRAVENOUS at 06:43

## 2021-11-04 RX ADMIN — POTASSIUM BICARBONATE 40 MEQ: 782 TABLET, EFFERVESCENT ORAL at 11:03

## 2021-11-04 RX ADMIN — POTASSIUM BICARBONATE 40 MEQ: 782 TABLET, EFFERVESCENT ORAL at 18:14

## 2021-11-04 ASSESSMENT — PAIN SCALES - WONG BAKER: WONGBAKER_NUMERICALRESPONSE: 6

## 2021-11-04 ASSESSMENT — PAIN DESCRIPTION - ORIENTATION
ORIENTATION: RIGHT

## 2021-11-04 ASSESSMENT — PAIN DESCRIPTION - DESCRIPTORS
DESCRIPTORS: ACHING

## 2021-11-04 ASSESSMENT — PAIN DESCRIPTION - FREQUENCY: FREQUENCY: CONTINUOUS

## 2021-11-04 ASSESSMENT — PAIN DESCRIPTION - LOCATION
LOCATION: HIP

## 2021-11-04 ASSESSMENT — PAIN SCALES - PAIN ASSESSMENT IN ADVANCED DEMENTIA (PAINAD)
BODYLANGUAGE: 1
TOTALSCORE: 6
FACIALEXPRESSION: 0
TOTALSCORE: 0
BREATHING: 0
FACIALEXPRESSION: 1
BREATHING: 1
CONSOLABILITY: 0
BODYLANGUAGE: 0
BREATHING: 1
BODYLANGUAGE: 1
CONSOLABILITY: 2
NEGVOCALIZATION: 1
NEGVOCALIZATION: 0
TOTALSCORE: 6
CONSOLABILITY: 2
NEGVOCALIZATION: 1
FACIALEXPRESSION: 1

## 2021-11-04 ASSESSMENT — PAIN SCALES - GENERAL
PAINLEVEL_OUTOF10: 0
PAINLEVEL_OUTOF10: 7
PAINLEVEL_OUTOF10: 5
PAINLEVEL_OUTOF10: 0
PAINLEVEL_OUTOF10: 6
PAINLEVEL_OUTOF10: 6
PAINLEVEL_OUTOF10: 10

## 2021-11-04 ASSESSMENT — PAIN DESCRIPTION - PAIN TYPE
TYPE: SURGICAL PAIN

## 2021-11-04 NOTE — PROGRESS NOTES
Occupational Therapy  Facility/Department: [facility]  PROGRESSIVE CARE  Daily Treatment Note  NAME: Erlinda Harris  : 3/21/1930  MRN: [id number]    Date of Service: 2021    Discharge Recommendations:  ECF with OT       Assessment   Performance deficits / Impairments: Decreased functional mobility ; Decreased ADL status; Decreased ROM; Decreased balance;Decreased safe awareness;Decreased cognition;Decreased endurance;Decreased high-level IADLs;Decreased coordination  Treatment Diagnosis: R femur fracture  Prognosis: Guarded  OT Education: Transfer Training  REQUIRES OT FOLLOW UP: Yes  Activity Tolerance  Activity Tolerance: Patient limited by pain; Patient Tolerated treatment well  Safety Devices  Safety Devices in place: Yes  Type of devices: Left in bed;Call light within reach;Nurse notified         Patient Diagnosis(es): The encounter diagnosis was Closed right hip fracture, initial encounter ([facility] [address] has a past medical history of Adenomatous polyp, Cataracts, bilateral, Combined forms of age-related cataract of both eyes, Corneal scar, right eye, Cystocele, Dementia ([address] Difficulty controlling anger, Dyslipidemia, GERD (gastroesophageal reflux disease), Goiter, Hiatal hernia, Hypertension, Hypokalemia, Hypothyroidism, Impaired fasting glucose, Malignant melanoma in situ ([address] Migraines, Murmur, functional, Osteopenia, Posterior vitreous detachment of both eyes, Primary open angle glaucoma (POAG) of both eyes, moderate stage, Sciatica, Skin cancer, Syncope and collapse, Tremor, 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 has a past surgical history that includes other surgical history (08); Cholecystectomy; Colonoscopy (06/08/10); Mohs surgery (10/07/09); Vein Surgery (09); Colonoscopy (02); Cystocele repair (99); Appendectomy; Dilation and curettage of uterus; other surgical history (); Hysterectomy ();  Upper gastrointestinal endoscopy (4/8/2015); Intracapsular cataract extraction (Left, 8/25/2020); Intracapsular cataract extraction (Right, 10/8/2020); and Hip fracture surgery (Right, 11/2/2021). Restrictions  Restrictions/Precautions  Restrictions/Precautions: Weight Bearing  Lower Extremity Weight Bearing Restrictions  Right Lower Extremity Weight Bearing: Weight Bearing As Tolerated  Subjective   General  Chart Reviewed: Yes  Patient assessed for rehabilitation services?: Yes  Response to previous treatment: Patient with no complaints from previous session  Family / Caregiver Present: No  Referring Practitioner: Tod Kruger  Diagnosis: displaced intertrochanteric fracture of right femur  Subjective  Subjective: Patient rec'd in bed, pleasant and cooperative 80 yr old female with recent fall  General Comment  Comments: CO-treat with PTA  Pain Assessment  [place] Pain Rating: Hurts even more  Pain Type: Surgical pain  Pain Location: Hip  Pain Orientation: Right  Vital Signs  Patient Currently in Pain: Yes   Orientation  Orientation  Overall Orientation Status: Impaired  Orientation Level: Oriented to person;Oriented to place; Disoriented to time;Disoriented to situation  Objective    ADL  Grooming: Moderate assistance (set-up/item retrieval of comb, deodorant, and chapstick)  Additional Comments: Able to complete grooming tasks after initial set-up of items while seated at EOB        Balance  Sitting Balance: Contact guard assistance (able to remain seated at EOB to complete grooming with fair posture)  Standing Balance: Moderate assistance (x2)  Standing Balance  Time: 3x1:00min  Bed mobility  Supine to Sit: Moderate assistance;2 Person assistance  Sit to Supine: Moderate assistance;2 Person assistance  Scooting: Moderate assistance;2 Person assistance  Transfers  Sit to stand:  Moderate assistance;Maximum assistance;2 Person assistance  Stand to sit: Moderate assistance;Maximum assistance;2 Person assistance  Transfer Comments: standing at EOB with rolling walker for UB support; demo'd poor/fair weight shift and bearing on LLE     Plan   Plan  Times per week: 2-3  Current Treatment Recommendations: Functional Mobility Training, Safety Education & Training, Self-Care / ADL, Equipment Evaluation, Education, & procurement    Goals  Short term goals  Time Frame for Short term goals: duration of hospital stay  Short term goal 1: Patient to complete BSC transfer with max a x1 and mod a of 2nd person using a/e as needed  Short term goal 2: Patient to sit EOB and complete simple ADL tasks with max a x1 and mod a of 2nd person using a/e as needed - GOAL MET       Therapy Time   Individual Concurrent Group Co-treatment   Time In 1248         Time Out 1318         Minutes 30         Timed Code Treatment Minutes: 1415 [facility], [facility]

## 2021-11-04 NOTE — ADT AUTH CERT
ortho op note (11/2) by Reji Esteves RN       Review Status Review Entered   In Primary 11/4/2021 09:41      Criteria Review   Date of Procedure: 11/2/2021     Pre-Op Diagnosis: Right closed displaced intertrochanteric femur fracture     Post-Op Diagnosis: Same       Procedure:  Open treatment right intertrochanteric femur fracture with a cephalomedullary nail 06257     Surgeon(s):  Niraj Gordon MD     Assistant:    Assistant: Guillermo Dumont  Physician Assistant: Eufemia Hartley PA-C     Anesthesia: Spinal     Estimated Blood Loss (IM): 585      Complications: None     Specimens:   * No specimens in log *     Implants:  Implant Name Type Inv.  Item Serial No.  Lot No. LRB No. Used Action   KIT SCR LAG L85MM [id number] L80MM DIA7MM COMPR INTEGR INTLOK   KIT SCR LAG L85MM [id number] L80MM DIA7MM COMPR INTEGR INTLOK   [employer] [id number] Right 1 Implanted   NAIL IM L36CM [id number] 125DEG RT FEM TRAPEZOIDAL ORTH RECON   NAIL IM L36CM [id number] 125DEG RT FEM TRAPEZOIDAL ORTH RECON   [employer] [id number] Right 1 Implanted   SCREW BNE L37.5MM DIA5MM EDDIE TIB G TI ST SELF DRL HEX DRV   SCREW BNE L37.5MM DIA5MM EDDIE TIB G TI ST SELF DRL HEX DRV   [employer]ild[employer]ella [id number] Right 1 Implanted          Drains:           Urethral Catheter Straight-tip 16 fr (Active)   Catheter Indications Prolonged immobilization (e.g. unstable thoracic or lumbar spine, multiple traumatic injuries such as pelvic fractures) 11/02/21 0822   Site Assessment No urethral drainage 11/02/21 0822   Urine Color Yellow 11/02/21 1012   Urine Appearance Clear 11/02/21 1012   Urine Odor Malodorous 11/01/21 1800   Output (mL) 850 mL 11/02/21 1012         Findings: Confirmed     Detailed Description of Procedure:         INDICATION FOR PROCEDURE      The patient is an 80y.o.-year-old with a history of a fall.  Patient complained of Right hip pain.  The patient has multiple medical comorbidities and was admitted with an intertrochanteric hip fracture. Patient was evaluated medically and felt patient to be of acceptable risk.  It was felt a cephomedullary nail would be the best treatment option.  Discussed with patient and elected to proceed.     NARRATIVE:      PROCEDURE DETAILS      The patient was taken to the operating room, underwent a general anesthetic. The fractured side was placed in longitudinal traction to aid in the reduction.  The other leg was draped out of the field. Timeout was taken, consent was confirmed.  The patient received 2 gram of Ancef preoperatively. Started with 3.2 mm guide pin on the medial face of the greater trochanter and was then advanced under fluoroscopic guidance. The incision size was increased.  A 16 mm channel reamer was then passed down to the level of the lesser trochanter. We placed the 10 mm x 360 mm Intertan nail down to the appropriate depth. We used the jig and a 3.2 mm guide pin placed in the center of the femoral head. We then used a dimpler followed by a cannulated reamer,the rigid reamer and the anti-rotation bar. We placed a 85 mm lag screw, 80 mm compression screw. Good compression noted across the fracture site. Traction was removed and then placed a 37.5 mm distal locking screw with a free-handed technique under fluoroscopic guidance. We injected local anesthetic including morphine and Toradol.  Wounds were irrigated and closed with 2-0 Vicryl and Prineo. The patient was then awakened and returned to recovery room in fair condition.     POSTOPERATIVE PLAN: Weight bearing as tolerated, dry dressings p.r.n.  First postop visit will be in 8 weeks x-rays, 2 views of the Right femur .     The physician assistant assisted throughout the procedure with positioning, draping, retraction, wound closure, and dressings application.      ortho consult by Monica Mena RN       Review Status Review Entered   In Primary 11/4/2021 09:40      Criteria Review      CHIEF COMPLAINT:  Right hip fracture      HISTORY OF PRESENT ILLNESS:       The patient is a 80 y. o. female  who presents with a right intertrochanteric hip fracture. She had a fall landing on her right hip. Bruising noted to right hip, skin is intact. Pt states she has pain to right hip. She does have a hx of dementia. Pt's son (POA) and  in room. Surgical risks and benefits were discussed with pt and POA. They elected to proceed with surgery tomorrow. She is awaiting cardiac clearance. hgb 7.9 dropped from 10.7. acute blood loss anemia. Pt's home meds include Brilinta and aspirin. These are on hold, will restart after sx. Type and cross ordered.  1 unit PRBCs transfusion to be given today before surgery. Pt denies numbness and tingling.      ASSESSMENT:Principal Problem:    Displaced intertrochanteric fracture of right femur, initial encounter for closed fracture [facility])  Active Problems:    Closed displaced intertrochanteric fracture of right femur (HCC)  Resolved Problems:    * No resolved hospital problems.  *        PLAN as discussed with Dr. Lesley Lua:  1)  Surgery tomorrow for R hip intertan- consent obtained  2) NPO after midnight  3) Labs in am   4) Bedrest

## 2021-11-04 NOTE — PLAN OF CARE
Problem: Falls - Risk of:  Goal: Will remain free from falls  Description: Will remain free from falls  Outcome: Ongoing  Goal: Absence of physical injury  Description: Absence of physical injury  Outcome: Ongoing     Problem: Pain:  Goal: Pain level will decrease  Description: Pain level will decrease  Outcome: Ongoing  Goal: Control of acute pain  Description: Control of acute pain  Outcome: Ongoing  Goal: Control of chronic pain  Description: Control of chronic pain  Outcome: Ongoing     Problem: Discharge Planning:  Goal: Participates in care planning  Description: Participates in care planning  Outcome: Ongoing  Goal: Discharged to appropriate level of care  Description: Discharged to appropriate level of care  Outcome: Ongoing

## 2021-11-04 NOTE — PROGRESS NOTES
Hospitalist Progress Note    Patient:  John Fuchs     YOB: 1930    MRN: [id number]   Admit date: 10/30/2021     Acct: [de-identified]     PCP: Moraima CRISOSTOMO MD    CC--Interval History:    POD 2 right hip fracture --CM nail---11.2.2021---[facility]    More alert and interactive----but dementia---confusion = baseline    HTN---variable BPs    K = 3.2 ----> potassium replacement    [facility]    Will require rehab inpatient for physical---OT therapies---24 hour supervision    See note below     All other ROS negative except noted in HPI    Diet:  ADULT DIET;  Regular    Medications:  Scheduled Meds:   metoprolol  5 mg IntraVENous Q6H    aspirin  81 mg Oral Daily    ticagrelor  90 mg Oral BID    multivitamin  1 tablet Oral Daily    furosemide  20 mg IntraVENous BID    sodium chloride flush  5-40 mL IntraVENous 2 times per day    atenolol  25 mg Oral Daily    atorvastatin  40 mg Oral Daily    calcium-vitamin D  1 tablet Oral Daily    FLUoxetine  20 mg Oral Daily    levothyroxine  125 mcg Oral Daily    losartan  100 mg Oral Daily    memantine  5 mg Oral Daily    OLANZapine  2.5 mg Oral Nightly    famotidine  20 mg Oral Daily     Continuous Infusions:   sodium chloride      sodium chloride      sodium chloride 50 mL/hr at 11/04/21 0542     PRN Meds:sodium chloride, sodium chloride flush, sodium chloride, polyethylene glycol, acetaminophen **OR** acetaminophen, albuterol, ondansetron, morphine, HYDROcodone-acetaminophen, metoprolol    Objective:  Labs:  CBC with Differential:    Lab Results   Component Value Date    WBC 12.4 11/04/2021    RBC 3.08 11/04/2021    HGB 9.3 11/04/2021    HCT 29.4 11/04/2021     11/04/2021    MCV 95.5 11/04/2021    MCH 30.2 11/04/2021    MCHC 31.6 11/04/2021    RDW 14.2 11/04/2021    LYMPHOPCT 7 11/04/2021    MONOPCT 11 11/04/2021    BASOPCT 1 11/04/2021    MONOSABS 1.30 11/04/2021    LYMPHSABS 0.80 11/04/2021    EOSABS 0.10 11/04/2021    BASOSABS 0.06 11/04/2021 fracture---                       11.2.2021--[facility]  Right hip fracture---10.30.2021---fall getting out of car---                       10.30.2021--no LOC----traumatic osteopenic fracture  Acute comminuted and moderately displaced IT right femur fracture                       with free-floating greater trochanteric and lesser                        trochanter fragments--mild bilateral OA hips---no hip                        dislocation       EKG---11.2.2021---postoperatively---SR----64       CXR---11.2.2021--slight improvement suspected pulmonary                         edema----hazy bibasilar--some compressive                         atelectasis--no new abnormality       CXR----11.1.2021---interval improvement of pulmonary edema--                       improved aeration       CXR----10.31.2021--pulmonary edema--left pleural effusion        CT right hip---10.30.2021---as above       CT C-spine----10.30.2021--multilevel cervical spine                        degenerative changes narrowing--no fracture or                           subluxation       CT head---10.30.2021---no MBS--moderate atrophy and chronic                         small vessel ischemic disease---remote cortical                         infarctions         EKD----10.30.2021---NSR---64---LAD   ASCVD          2D ECHO--11.1.2021--RAUL--NLVSF---MICHELLE without stenosis-                        MAC MV thickening--mild MR--moderate TR---RVSP ~                    83 mm Hg---severe pulmonary hypertension---increased                    IVC diameter and impaired or no inspiratory variation---                   LVEF ~ 55%          STEMI--12.2020          ALLIE---RCA---12.2020          2D ECHO---12.2020--mild-to-moderate TR--moderate MR--                   RVSP ~ 46 mm Hg--LVEF ~ 45-50%      Severe pulmonary hypertension   Hypertension                   DUS--CV--1.22.2015--no hemodynamically significant                              stenosis                     2D ECHO--1.6.2015--NLVSF--LAE--MAC--mild MR--                             MICHELLE--mild-to-moderate TR--RVSP ~ 30 mm Hg--                             Grade 2 DD--LVEF ~ 65%                   MI ruled out-----1.22.2015--1.6.2015  II/VI PHILIP  Dyslipidemia  Hypothyroidism  Impaired fasting glucose  GERD   I/VI PHILIP  Cognitive impairment  Anger management issues  Hypocalcemia    Hypokalemia   Hypomagnesemia   PMH:   osteopenia, adenomatous polyp, SCC--nose, bilateral               cataracts, trichiasis left lower eyelid, bilateral posterior               vitreous detachment, HH, goiter, I/VI PHILIP, right eye               corneal scar, migraine headaches, UTI--1.5.2014,                leukocytosis--1.6.2015, hypokalemia--1.5.2014, syncope--              collapse--1.21.2015, malignant melanoma--right upper               back--2017, osteopenia, tremor---2019  PSH:   cholecystectomy, colonoscopy--2010--2002, Mohs--2009,               laser ablation right GSV--2009, cystocele repair--1999,               appendectomy, D&C-uterus, hysterectomy--1988,               anterior repair--2008, laser conization--1988, EGD--2015,               cataract extraction--IOL--OU--2020    Allergies:  Flagyl--rash     Plan:  1.  [facility]  2. Physical--OT therapies  3. Trying to get placement at [facility]  4. Potassium replacement  5. HTN---adjust BP medications as required  6.    See orders     Electronically signed by Suellen Suazo on 11/4/2021 at 2:07 PM    Hospitalist

## 2021-11-04 NOTE — FLOWSHEET NOTE
11/04/21 1815   Vital Signs   BP (!) 188/62   MAP (mmHg) 99     Dr. Jacy Alex updated of pt current medications to tx htn, current bp, prn lop and scheduled lop q 6. No new orders received.   RN will continue to monitor BP

## 2021-11-04 NOTE — PROGRESS NOTES
Physical Therapy  Facility/Department: [facility]  PROGRESSIVE CARE  Daily Treatment Note  NAME: Kassidy Sylvester  : 3/21/1930  MRN: [id number]    Date of Service: 2021    Discharge Recommendations:  Continue to assess pending progress, ECF with PT        Assessment   Body structures, Functions, Activity limitations: Decreased functional mobility ; Decreased safe awareness;Decreased balance;Decreased cognition;Decreased ADL status; Decreased ROM; Decreased endurance; Increased pain;Decreased strength;Decreased posture  Prognosis: Fair  Decision Making: Low Complexity  REQUIRES PT FOLLOW UP: Yes  Activity Tolerance  Activity Tolerance: Patient limited by pain; Patient limited by cognitive status     Patient Diagnosis(es): The encounter diagnosis was Closed right hip fracture, initial encounter ([facility] [address] has a past medical history of Adenomatous polyp, Cataracts, bilateral, Combined forms of age-related cataract of both eyes, Corneal scar, right eye, Cystocele, Dementia ([address] Difficulty controlling anger, Dyslipidemia, GERD (gastroesophageal reflux disease), Goiter, Hiatal hernia, Hypertension, Hypokalemia, Hypothyroidism, Impaired fasting glucose, Malignant melanoma in situ ([address] Migraines, Murmur, functional, Osteopenia, Posterior vitreous detachment of both eyes, Primary open angle glaucoma (POAG) of both eyes, moderate stage, Sciatica, Skin cancer, Syncope and collapse, Tremor, 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 has a past surgical history that includes other surgical history (08); Cholecystectomy; Colonoscopy (06/08/10); Mohs surgery (10/07/09); Vein Surgery (09); Colonoscopy (02); Cystocele repair (99); Appendectomy; Dilation and curettage of uterus; other surgical history (); Hysterectomy (); Upper gastrointestinal endoscopy (2015); Intracapsular cataract extraction (Left, 2020);  Intracapsular cataract extraction (Right, 10/8/2020); and Hip fracture surgery (Right, 11/2/2021). Restrictions  Restrictions/Precautions  Restrictions/Precautions: Weight Bearing  Lower Extremity Weight Bearing Restrictions  Right Lower Extremity Weight Bearing: Weight Bearing As Tolerated  Subjective   General  Chart Reviewed: Yes  Response To Previous Treatment: Patient with no complaints from previous session. Family / Caregiver Present: No  Subjective  Subjective: Patient supine in bed upon arrival and willing to participate in therapy. Pain Screening  Patient Currently in Pain: No  Pain Assessment  Pain Assessment: 0-10  Pain Level: 0  Pain Type: Surgical pain  Pain Location: Hip  Pain Orientation: Right  Pain Descriptors: Aching  Vital Signs  Patient Currently in Pain: No  Oxygen Therapy  O2 Device: None (Room air)       Orientation  Orientation  Overall Orientation Status: Impaired  Orientation Level: Oriented to person  Cognition      Objective   Bed mobility  Bridging: Unable to assess  Rolling to Left: Unable to assess  Rolling to Right: Unable to assess  Supine to Sit: Moderate assistance  Sit to Supine: Moderate assistance  Scooting: Moderate assistance  Transfers  Sit to Stand: Moderate Assistance;Maximum Assistance  Stand to sit: Moderate Assistance;Maximum Assistance  Bed to Chair: Moderate assistance;Maximum assistance  Stand Pivot Transfers: Moderate Assistance;Maximum Assistance  Squat Pivot Transfers: Unable to assess  Lateral Transfers: Unable to assess  Car Transfer: Unable to assess  Comment: Patient able to transfer to bedside commode. Patient stood at walker for 1 minute to be cleaned up after using commode. Patient transfered to bedside chair and left in chair at conclusion of session. Ambulation  Ambulation?: Yes  WB Status: WBAT R LE  More Ambulation?: No  Ambulation 1  Surface: level tile  Device: No Device  Assistance:  Moderate assistance;Maximum assistance  Quality of Gait: Patient demonstrates minimal weightbearing through R LE. Gait Deviations: Slow Jami;Decreased step length;Decreased step height  Distance: 3 steps  Stairs/Curb  Stairs?: No  Neuromuscular Education  NDT Treatment: Gait ;Sitting;Standing  Balance  Posture: Fair  Sitting - Static: Fair;-  Sitting - Dynamic: Fair;-  Standing - Static: Poor  Standing - Dynamic: Poor  Exercises  Quad Sets: 10x  Heelslides: 10x AAROM R LE  Hip Abduction: 10x supine AAROM R LE  Ankle Pumps: 10x supine  Other exercises  Other exercises?: No                        G-Code     OutComes Score                                                     AM-PAC Score             Goals  Short term goals  Time Frame for Short term goals: LOS  Short term goal 1: Bed mobility with mod assist x 1  Short term goal 2: Transfers with max assist x 1  Short term goal 3: Sitting at EOB x 5 min with mod assist x 1  Patient Goals   Patient goals : none stated    Plan    Plan  Times per day: Twice a day  Current Treatment Recommendations: Strengthening, Transfer Training, Endurance Training, Neuromuscular Re-education, Patient/Caregiver Education & Training, Equipment Evaluation, Education, & procurement, ROM, Balance Training, Gait Training, Home Exercise Program, Safety Education & Training, Functional Mobility Training  Safety Devices  Type of devices:  All fall risk precautions in place, Patient at risk for falls, Left in chair, Call light within reach, Nurse notified, Gait belt, Chair alarm in place     Therapy Time   Individual Concurrent Group Co-treatment   Time In [address] No

## 2021-11-04 NOTE — PROGRESS NOTES
10/8/2020); and Hip fracture surgery (Right, 11/2/2021). Restrictions  Restrictions/Precautions  Restrictions/Precautions: Weight Bearing  Lower Extremity Weight Bearing Restrictions  Right Lower Extremity Weight Bearing: Weight Bearing As Tolerated  Subjective   General  Chart Reviewed: Yes  Response To Previous Treatment: Patient with no complaints from previous session. Family / Caregiver Present: No  Subjective  Subjective: Patient supine in bed upon arrival and willing to participate in therapy. Pain Screening  Patient Currently in Pain: Yes  Pain Assessment  Pain Assessment: 0-10  Pain Level:  (patient unable give accurate pain rating)  Pain Type: Surgical pain  Pain Location: Hip  Pain Orientation: Right  Pain Descriptors: Aching  Vital Signs  Patient Currently in Pain: Yes  Oxygen Therapy  O2 Device: None (Room air)       Orientation  Orientation  Overall Orientation Status: Impaired  Orientation Level: Oriented to person  Cognition      Objective   Bed mobility  Bridging: Moderate assistance  Rolling to Left: Unable to assess  Rolling to Right: Moderate assistance  Supine to Sit: Moderate assistance;2 Person assistance  Sit to Supine: Moderate assistance;2 Person assistance  Scooting: Moderate assistance;2 Person assistance  Comment: Patient attempted to scoot but unable to move farther back in bed. Transfers  Sit to Stand: Moderate Assistance;2 Person Assistance  Stand to sit: Moderate Assistance;2 Person Assistance  Bed to Chair: Unable to assess  Stand Pivot Transfers: Unable to assess  Squat Pivot Transfers: Unable to assess  Lateral Transfers: Moderate Assistance;2 Person Assistance  Car Transfer: Dependent/Total  Comment: Patient stood at walker 3 times for about 30\" to 1 minute each. Patient fatigues easily with decreased weightbearing on R LE. Ambulation  Ambulation?: No  WB Status: WBAT R LE  More Ambulation?: No  Ambulation 1  Surface: level tile  Device: No Device  Assistance:  Moderate assistance;Maximum assistance  Quality of Gait: Patient demonstrates minimal weightbearing through R LE.   Gait Deviations: Slow Jami;Decreased step length;Decreased step height  Distance: 3 steps  Stairs/Curb  Stairs?: No  Neuromuscular Education  NDT Treatment: Sitting;Standing  Balance  Posture: Fair  Sitting - Static: Fair  Sitting - Dynamic: Fair  Standing - Static: Poor  Standing - Dynamic: Poor  Exercises  Quad Sets: 10x  Heelslides: 10x AAROM R LE  Hip Abduction: 10x supine AAROM R LE  Knee Long Arc Quad: 10x  Ankle Pumps: 10x supine  Other exercises  Other exercises?: No                        G-Code     OutComes Score                                                     AM-PAC Score             Goals  Short term goals  Time Frame for Short term goals: LOS  Short term goal 1: Bed mobility with mod assist x 1  Short term goal 2: Transfers with max assist x 1  Short term goal 3: Sitting at EOB x 5 min with mod assist x 1  Patient Goals   Patient goals : none stated    Plan    Plan  Times per day: Twice a day  Current Treatment Recommendations: Strengthening, Transfer Training, Endurance Training, Neuromuscular Re-education, Patient/Caregiver Education & Training, Equipment Evaluation, Education, & procurement, ROM, Balance Training, Gait Training, Home Exercise Program, Safety Education & Training, Functional Mobility Training  Safety Devices  Type of devices: Patient at risk for falls, All fall risk precautions in place, Bed alarm in place, Call light within reach, Gait belt, Nurse notified, Left in bed     Therapy Time   Individual Concurrent Group Co-treatment   Time In 1248         Time Out 1318         Minutes 30             Co Treat with Betty Calvillo PTA

## 2021-11-05 ENCOUNTER — TELEPHONE (OUTPATIENT)
Dept: INTERNAL MEDICINE | Age: 86
End: 2021-11-05

## 2021-11-05 VITALS
HEART RATE: 74 BPM | HEIGHT: 64 IN | SYSTOLIC BLOOD PRESSURE: 173 MMHG | TEMPERATURE: 98.7 F | WEIGHT: 153.9 LBS | OXYGEN SATURATION: 95 % | DIASTOLIC BLOOD PRESSURE: 57 MMHG | RESPIRATION RATE: 18 BRPM | BODY MASS INDEX: 26.27 KG/M2

## 2021-11-05 LAB
ABSOLUTE EOS #: 0.24 K/UL (ref 0–0.44)
ABSOLUTE IMMATURE GRANULOCYTE: 0.1 K/UL (ref 0–0.3)
ABSOLUTE LYMPH #: 1.06 K/UL (ref 1.1–3.7)
ABSOLUTE MONO #: 1.27 K/UL (ref 0.1–1.2)
ANION GAP SERPL CALCULATED.3IONS-SCNC: 8 MMOL/L (ref 9–17)
BASOPHILS # BLD: 1 % (ref 0–2)
BASOPHILS ABSOLUTE: 0.07 K/UL (ref 0–0.2)
BUN BLDV-MCNC: 14 MG/DL (ref 8–23)
BUN/CREAT BLD: 34 (ref 9–20)
CALCIUM SERPL-MCNC: 8.1 MG/DL (ref 8.6–10.4)
CHLORIDE BLD-SCNC: 102 MMOL/L (ref 98–107)
CO2: 32 MMOL/L (ref 20–31)
CREAT SERPL-MCNC: 0.41 MG/DL (ref 0.5–0.9)
DIFFERENTIAL TYPE: ABNORMAL
EOSINOPHILS RELATIVE PERCENT: 2 % (ref 1–4)
GFR AFRICAN AMERICAN: >60 ML/MIN
GFR NON-AFRICAN AMERICAN: >60 ML/MIN
GFR SERPL CREATININE-BSD FRML MDRD: ABNORMAL ML/MIN/{1.73_M2}
GFR SERPL CREATININE-BSD FRML MDRD: ABNORMAL ML/MIN/{1.73_M2}
GLUCOSE BLD-MCNC: 118 MG/DL (ref 70–99)
HCT VFR BLD CALC: 30.8 % (ref 36.3–47.1)
HEMOGLOBIN: 9.7 G/DL (ref 11.9–15.1)
IMMATURE GRANULOCYTES: 1 %
LYMPHOCYTES # BLD: 9 % (ref 24–43)
MAGNESIUM: 1.6 MG/DL (ref 1.6–2.6)
MCH RBC QN AUTO: 29.9 PG (ref 25.2–33.5)
MCHC RBC AUTO-ENTMCNC: 31.5 G/DL (ref 25.2–33.5)
MCV RBC AUTO: 95.1 FL (ref 82.6–102.9)
MONOCYTES # BLD: 11 % (ref 3–12)
NRBC AUTOMATED: 0 PER 100 WBC
PDW BLD-RTO: 14.3 % (ref 11.8–14.4)
PLATELET # BLD: 253 K/UL (ref 138–453)
PLATELET ESTIMATE: ABNORMAL
PMV BLD AUTO: 10.7 FL (ref 8.1–13.5)
POTASSIUM SERPL-SCNC: 3.3 MMOL/L (ref 3.7–5.3)
RBC # BLD: 3.24 M/UL (ref 3.95–5.11)
RBC # BLD: ABNORMAL 10*6/UL
SEG NEUTROPHILS: 76 % (ref 36–65)
SEGMENTED NEUTROPHILS ABSOLUTE COUNT: 9.07 K/UL (ref 1.5–8.1)
SODIUM BLD-SCNC: 142 MMOL/L (ref 135–144)
WBC # BLD: 11.8 K/UL (ref 3.5–11.3)
WBC # BLD: ABNORMAL 10*3/UL

## 2021-11-05 PROCEDURE — 6370000000 HC RX 637 (ALT 250 FOR IP): Performed by: FAMILY MEDICINE

## 2021-11-05 PROCEDURE — 97110 THERAPEUTIC EXERCISES: CPT | Performed by: PHYSICAL THERAPY ASSISTANT

## 2021-11-05 PROCEDURE — 6360000002 HC RX W HCPCS: Performed by: NURSE PRACTITIONER

## 2021-11-05 PROCEDURE — 6370000000 HC RX 637 (ALT 250 FOR IP): Performed by: NURSE PRACTITIONER

## 2021-11-05 PROCEDURE — 2500000003 HC RX 250 WO HCPCS: Performed by: INTERNAL MEDICINE

## 2021-11-05 PROCEDURE — 94760 N-INVAS EAR/PLS OXIMETRY 1: CPT

## 2021-11-05 PROCEDURE — 6360000002 HC RX W HCPCS: Performed by: FAMILY MEDICINE

## 2021-11-05 PROCEDURE — 83735 ASSAY OF MAGNESIUM: CPT

## 2021-11-05 PROCEDURE — 36415 COLL VENOUS BLD VENIPUNCTURE: CPT

## 2021-11-05 PROCEDURE — 6360000002 HC RX W HCPCS: Performed by: INTERNAL MEDICINE

## 2021-11-05 PROCEDURE — 97535 SELF CARE MNGMENT TRAINING: CPT

## 2021-11-05 PROCEDURE — 80048 BASIC METABOLIC PNL TOTAL CA: CPT

## 2021-11-05 PROCEDURE — 85025 COMPLETE CBC W/AUTO DIFF WBC: CPT

## 2021-11-05 PROCEDURE — 6360000002 HC RX W HCPCS

## 2021-11-05 RX ORDER — POTASSIUM CHLORIDE 7.45 MG/ML
10 INJECTION INTRAVENOUS
Status: DISPENSED | OUTPATIENT
Start: 2021-11-05 | End: 2021-11-05

## 2021-11-05 RX ORDER — AMLODIPINE BESYLATE 5 MG/1
5 TABLET ORAL DAILY
Qty: 30 TABLET | Refills: 0
Start: 2021-11-05 | End: 2022-01-20 | Stop reason: SDUPTHER

## 2021-11-05 RX ORDER — MAGNESIUM SULFATE 1 G/100ML
1000 INJECTION INTRAVENOUS ONCE
Status: COMPLETED | OUTPATIENT
Start: 2021-11-05 | End: 2021-11-05

## 2021-11-05 RX ORDER — AMLODIPINE BESYLATE 5 MG/1
5 TABLET ORAL DAILY
Status: DISCONTINUED | OUTPATIENT
Start: 2021-11-05 | End: 2021-11-05 | Stop reason: HOSPADM

## 2021-11-05 ASSESSMENT — PAIN SCALES - PAIN ASSESSMENT IN ADVANCED DEMENTIA (PAINAD)
BREATHING: 0
BODYLANGUAGE: 0
FACIALEXPRESSION: 0
NEGVOCALIZATION: 0
BODYLANGUAGE: 0
NEGVOCALIZATION: 0
BREATHING: 0
FACIALEXPRESSION: 0
TOTALSCORE: 0
NEGVOCALIZATION: 0
NEGVOCALIZATION: 0
NEGVOCALIZATION: 1
BREATHING: 0
TOTALSCORE: 0
CONSOLABILITY: 1
BREATHING: 0
FACIALEXPRESSION: 0
CONSOLABILITY: 0
BREATHING: 0
BREATHING: 0
TOTALSCORE: 0
TOTALSCORE: 0
CONSOLABILITY: 0
TOTALSCORE: 0
TOTALSCORE: 0
FACIALEXPRESSION: 0
BODYLANGUAGE: 0
CONSOLABILITY: 0
FACIALEXPRESSION: 0
TOTALSCORE: 6
NEGVOCALIZATION: 0
FACIALEXPRESSION: 0
BODYLANGUAGE: 1
FACIALEXPRESSION: 2
NEGVOCALIZATION: 0
NEGVOCALIZATION: 0
CONSOLABILITY: 0
FACIALEXPRESSION: 0
CONSOLABILITY: 0
CONSOLABILITY: 0
FACIALEXPRESSION: 0
CONSOLABILITY: 0
BREATHING: 0
CONSOLABILITY: 0
BREATHING: 1
BREATHING: 0
BREATHING: 0
FACIALEXPRESSION: 0
NEGVOCALIZATION: 0
CONSOLABILITY: 0
NEGVOCALIZATION: 0
BODYLANGUAGE: 0
TOTALSCORE: 0
BODYLANGUAGE: 0

## 2021-11-05 ASSESSMENT — PAIN SCALES - GENERAL
PAINLEVEL_OUTOF10: 6
PAINLEVEL_OUTOF10: 0
PAINLEVEL_OUTOF10: 6

## 2021-11-05 ASSESSMENT — PAIN DESCRIPTION - ORIENTATION
ORIENTATION: RIGHT
ORIENTATION: RIGHT

## 2021-11-05 ASSESSMENT — PAIN SCALES - WONG BAKER: WONGBAKER_NUMERICALRESPONSE: 8

## 2021-11-05 ASSESSMENT — PAIN DESCRIPTION - PAIN TYPE
TYPE: SURGICAL PAIN
TYPE: SURGICAL PAIN

## 2021-11-05 ASSESSMENT — PAIN DESCRIPTION - LOCATION
LOCATION: HIP
LOCATION: HIP

## 2021-11-05 NOTE — PROGRESS NOTES
Occupational Therapy  Facility/Department: [facility]  PROGRESSIVE CARE  Daily Treatment Note  NAME: Nghia Shelton  : 3/21/1930  MRN: [id number]    Date of Service: 2021    Discharge Recommendations:  ECF with OT       Assessment   Performance deficits / Impairments: Decreased functional mobility ; Decreased ADL status; Decreased ROM; Decreased balance;Decreased safe awareness;Decreased cognition;Decreased endurance;Decreased high-level IADLs;Decreased coordination  Treatment Diagnosis: R femur fracture  Prognosis: Guarded  OT Education: Transfer Training  REQUIRES OT FOLLOW UP: Yes  Activity Tolerance  Activity Tolerance: Patient limited by pain; Patient Tolerated treatment well  Safety Devices  Safety Devices in place: Yes  Type of devices: Left in bed;Call light within reach;Nurse notified         Patient Diagnosis(es): The encounter diagnosis was Closed right hip fracture, initial encounter ([facility] [address] has a past medical history of Adenomatous polyp, Cataracts, bilateral, Combined forms of age-related cataract of both eyes, Corneal scar, right eye, Cystocele, Dementia ([address] Difficulty controlling anger, Dyslipidemia, GERD (gastroesophageal reflux disease), Goiter, Hiatal hernia, Hypertension, Hypokalemia, Hypothyroidism, Impaired fasting glucose, Malignant melanoma in situ ([address] Migraines, Murmur, functional, Osteopenia, Posterior vitreous detachment of both eyes, Primary open angle glaucoma (POAG) of both eyes, moderate stage, Sciatica, Skin cancer, Syncope and collapse, Tremor, 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 has a past surgical history that includes other surgical history (08); Cholecystectomy; Colonoscopy (06/08/10); Mohs surgery (10/07/09); Vein Surgery (09); Colonoscopy (02); Cystocele repair (99); Appendectomy; Dilation and curettage of uterus; other surgical history (); Hysterectomy ();  Upper gastrointestinal endoscopy (4/8/2015); Intracapsular cataract extraction (Left, 8/25/2020); Intracapsular cataract extraction (Right, 10/8/2020); and Hip fracture surgery (Right, 11/2/2021). Restrictions  Restrictions/Precautions  Restrictions/Precautions: Weight Bearing  Lower Extremity Weight Bearing Restrictions  Right Lower Extremity Weight Bearing: Weight Bearing As Tolerated  Subjective   General  Chart Reviewed: Yes  Patient assessed for rehabilitation services?: Yes  Response to previous treatment: Patient unable to report, no changes reported from family or staff  Family / Caregiver Present: No  Referring Practitioner: Nash Barriga  Diagnosis: displaced intertrochanteric fracture of right femur  Subjective  Subjective: Patient rec'd in bed, pleasant and cooperative 80 yr old female with recent fall  General Comment  Comments: Patient states needing to go to the bathroom, however has catheter in and denies having a BM  Pain Assessment  Pain Assessment: Faces  Churchill-Baker Pain Rating: Hurts whole lot  Pain Type: Surgical pain  Pain Location: Hip  Pain Orientation: Right  Vital Signs  Patient Currently in Pain: Yes   Orientation  Orientation  Overall Orientation Status: Impaired  Orientation Level: Disoriented to place; Disoriented to time;Oriented to person  Objective    ADL  Toileting: Unable to assess(comment)  Additional Comments: Unable to perform [facility] transfer this date   Balance  Sitting Balance: Contact guard assistance  Standing Balance: Maximum assistance  Standing Balance  Time: 2x1:00min  Toilet Transfers  Toilet Transfer: Unable to assess  Bed mobility  Supine to Sit: Moderate assistance;Maximum assistance  Sit to Supine: Moderate assistance;Maximum assistance  Scooting:  Moderate assistance  Transfers  Stand Pivot Transfers: Unable to assess  Sit to stand: Maximum assistance  Stand to sit: Maximum assistance  Transfer Comments: Attempted [facility] transfer, patient unable to move BLEs for [address]  Times per week: 2-3  Current Treatment Recommendations: Functional Mobility Training, Safety Education & Training, Self-Care / ADL, Equipment Evaluation, Education, & procurement    Goals  Short term goals  Time Frame for Short term goals: duration of hospital stay  Short term goal 1: Patient to complete [facility] transfer with max a x1 and mod a of 2nd person using a/e as needed  Short term goal 2: Patient to sit EOB and complete simple ADL tasks with max a x1 and mod a of 2nd person using a/e as needed - GOAL MET       Therapy Time   Individual Concurrent Group Co-treatment   Time In 1004         Time Out 1028         Minutes 24         Timed Code Treatment Minutes: [address], [place]

## 2021-11-05 NOTE — CARE COORDINATION
Son, Amalia Duque, unable to be reached at present, but informed daughter, Malena Felipe, with updated pickup time of 12:30 or so. . She states she will continue attempts to reach onofre.

## 2021-11-05 NOTE — DISCHARGE INSTR - DIET

## 2021-11-05 NOTE — PLAN OF CARE
activities at highest level will improve  Outcome: Ongoing     Problem: Health Behavior:  Goal: Identification of resources available to assist in meeting health care needs will improve  Description: Identification of resources available to assist in meeting health care needs will improve  Outcome: Ongoing     Problem: Nutritional:  Goal: Ability to attain and maintain optimal nutritional status will improve  Description: Ability to attain and maintain optimal nutritional status will improve  Outcome: Ongoing     Problem: Physical Regulation:  Goal: Will remain free from infection  Description: Will remain free from infection  Outcome: Ongoing  Goal: Postoperative complications will be avoided or minimized  Description: Postoperative complications will be avoided or minimized  Outcome: Ongoing  Goal: Diagnostic test results will improve  Description: Diagnostic test results will improve  Outcome: Ongoing     Problem: Respiratory:  Goal: Ability to maintain adequate ventilation will improve  Description: Ability to maintain adequate ventilation will improve  Outcome: Ongoing     Problem: Safety:  Goal: Ability to remain free from injury will improve  Description: Ability to remain free from injury will improve  Outcome: Ongoing     Problem: Self-Care:  Goal: Ability to meet self-care needs will improve  Description: Ability to meet self-care needs will improve  Outcome: Ongoing     Problem: Self-Concept:  Goal: Ability to maintain and perform role responsibilities to the fullest extent possible will improve  Description: Ability to maintain and perform role responsibilities to the fullest extent possible will improve  Outcome: Ongoing  Goal: Verbalizations of decreased anxiety will increase  Description: Verbalizations of decreased anxiety will increase  Outcome: Ongoing     Problem: Sensory:  Goal: Pain level will decrease  Description: Pain level will decrease  Outcome: Ongoing     Problem: Tissue Perfusion:  Goal: Peripheral tissue perfusion will improve  Description: Peripheral tissue perfusion will improve  Outcome: Ongoing  Goal: Risk of venous thrombosis will decrease  Description: Risk of venous thrombosis will decrease  Outcome: Ongoing     Problem: Urinary Elimination:  Goal: Ability to reestablish a normal urinary elimination pattern will improve - after catheter removal  Description: Ability to reestablish a normal urinary elimination pattern will improve  Outcome: Ongoing
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
## 2021-11-05 NOTE — PLAN OF CARE
Problem: Falls - Risk of:  Goal: Will remain free from falls  Description: Will remain free from falls  11/5/2021 1051 by Nenita Valdovinos RN  Outcome: Ongoing  11/5/2021 1051 by Nenita Valdovinos RN  Outcome: Ongoing  Goal: Absence of physical injury  Description: Absence of physical injury  11/5/2021 1051 by Nenita Valdovinos RN  Outcome: Ongoing  11/5/2021 1051 by Nenita Valdovinos RN  Outcome: Ongoing     Problem: Pain:  Goal: Pain level will decrease  Description: Pain level will decrease  Outcome: Ongoing  Goal: Control of acute pain  Description: Control of acute pain  Outcome: Ongoing  Goal: Control of chronic pain  Description: Control of chronic pain  Outcome: Ongoing     Problem: Discharge Planning:  Goal: Participates in care planning  Description: Participates in care planning  Outcome: Ongoing  Goal: Discharged to appropriate level of care  Description: Discharged to appropriate level of care  Outcome: Ongoing     Problem: Mobility - Impaired:  Goal: Mobility will improve to maximum level  Description: Mobility will improve to maximum level  Outcome: Ongoing     Problem: Skin Integrity:  Goal: Will show no infection signs and symptoms  Description: Will show no infection signs and symptoms  Outcome: Ongoing  Goal: Absence of new skin breakdown  Description: Absence of new skin breakdown  Outcome: Ongoing  Goal: Demonstration of wound healing without infection will improve  Description: Demonstration of wound healing without infection will improve  Outcome: Ongoing  Goal: Risk for impaired skin integrity will decrease  Description: Risk for impaired skin integrity will decrease  Outcome: Ongoing     Problem: Health Behavior:  Goal: Identification of resources available to assist in meeting health care needs will improve  Description: Identification of resources available to assist in meeting health care needs will improve  Outcome: Ongoing     Problem: Nutritional:  Goal: Ability to attain and maintain optimal nutritional status will improve  Description: Ability to attain and maintain optimal nutritional status will improve  Outcome: Ongoing     Problem: Physical Regulation:  Goal: Will remain free from infection  Description: Will remain free from infection  Outcome: Ongoing  Goal: Postoperative complications will be avoided or minimized  Description: Postoperative complications will be avoided or minimized  Outcome: Ongoing  Goal: Diagnostic test results will improve  Description: Diagnostic test results will improve  Outcome: Ongoing     Problem: Respiratory:  Goal: Ability to maintain adequate ventilation will improve  Description: Ability to maintain adequate ventilation will improve  Outcome: Ongoing     Problem: Safety:  Goal: Ability to remain free from injury will improve  Description: Ability to remain free from injury will improve  Outcome: Ongoing     Problem: Self-Care:  Goal: Ability to meet self-care needs will improve  Description: Ability to meet self-care needs will improve  Outcome: Ongoing     Problem: Self-Concept:  Goal: Ability to maintain and perform role responsibilities to the fullest extent possible will improve  Description: Ability to maintain and perform role responsibilities to the fullest extent possible will improve  Outcome: Ongoing  Goal: Verbalizations of decreased anxiety will increase  Description: Verbalizations of decreased anxiety will increase  Outcome: Ongoing     Problem: Sensory:  Goal: Pain level will decrease  Description: Pain level will decrease  Outcome: Ongoing     Problem: Tissue Perfusion:  Goal: Peripheral tissue perfusion will improve  Description: Peripheral tissue perfusion will improve  Outcome: Ongoing  Goal: Risk of venous thrombosis will decrease  Description: Risk of venous thrombosis will decrease  Outcome: Ongoing

## 2021-11-11 ENCOUNTER — OUTSIDE SERVICES (OUTPATIENT)
Dept: INTERNAL MEDICINE | Age: 86
End: 2021-11-11
Payer: MEDICARE

## 2021-11-11 DIAGNOSIS — F32.A ANXIETY AND DEPRESSION: ICD-10-CM

## 2021-11-11 DIAGNOSIS — E03.9 HYPOTHYROIDISM, UNSPECIFIED TYPE: ICD-10-CM

## 2021-11-11 DIAGNOSIS — R53.1 GENERALIZED WEAKNESS: ICD-10-CM

## 2021-11-11 DIAGNOSIS — R73.01 IMPAIRED FASTING GLUCOSE: ICD-10-CM

## 2021-11-11 DIAGNOSIS — S72.141D FRACTURE, INTERTROCHANTERIC, RIGHT FEMUR, CLOSED, WITH ROUTINE HEALING, SUBSEQUENT ENCOUNTER: Primary | ICD-10-CM

## 2021-11-11 DIAGNOSIS — E78.5 DYSLIPIDEMIA: ICD-10-CM

## 2021-11-11 DIAGNOSIS — F03.90 DEMENTIA WITHOUT BEHAVIORAL DISTURBANCE, UNSPECIFIED DEMENTIA TYPE: ICD-10-CM

## 2021-11-11 DIAGNOSIS — F41.9 ANXIETY AND DEPRESSION: ICD-10-CM

## 2021-11-11 DIAGNOSIS — I48.91 ATRIAL FIBRILLATION, UNSPECIFIED TYPE (HCC): ICD-10-CM

## 2021-11-11 DIAGNOSIS — I10 ESSENTIAL HYPERTENSION: ICD-10-CM

## 2021-11-11 PROCEDURE — 99309 SBSQ NF CARE MODERATE MDM 30: CPT | Performed by: NURSE PRACTITIONER

## 2021-11-23 PROCEDURE — 99305 1ST NF CARE MODERATE MDM 35: CPT | Performed by: INTERNAL MEDICINE

## 2021-11-29 ENCOUNTER — OUTSIDE SERVICES (OUTPATIENT)
Dept: INTERNAL MEDICINE | Age: 86
End: 2021-11-29
Payer: MEDICARE

## 2021-11-29 ENCOUNTER — TELEPHONE (OUTPATIENT)
Dept: INTERNAL MEDICINE | Age: 86
End: 2021-11-29
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
## 2021-11-29 DIAGNOSIS — M25.532 LEFT WRIST PAIN: Primary | ICD-10-CM

## 2021-11-29 PROCEDURE — 99308 SBSQ NF CARE LOW MDM 20: CPT | Performed by: NURSE PRACTITIONER

## 2021-11-29 ASSESSMENT — ENCOUNTER SYMPTOMS
DIARRHEA: 0
CHEST TIGHTNESS: 0
BLOOD IN STOOL: 0
RHINORRHEA: 0
EYE PAIN: 0
NAUSEA: 0
WHEEZING: 0
SINUS PRESSURE: 0
TROUBLE SWALLOWING: 0
VOMITING: 0
SHORTNESS OF BREATH: 0
ABDOMINAL PAIN: 0
FACIAL SWELLING: 0
COUGH: 0
CONSTIPATION: 0
SORE THROAT: 0
COLOR CHANGE: 0

## 2021-11-29 NOTE — PROGRESS NOTES
11/29/21  [facility]  3/21/1930    Patient Resident of [facility]    Chief Complaint  1. Left wrist pain    2. Fall, initial encounter        HPI:  80-year-old patient being seen at the request of the nursing staff as she had a fall yesterday. Patient denied any injuries however today on assessment the nurse noted her left wrist was swollen. Patient states it is fine. She does have limited range of motion. Is swollen. Patient states that she has her blood vessels causing the swelling. Patient does have some facial grimacing with range of motion. Denies any further injury. No further bruises swelling noted      No Known Allergies    Past Medical History:   Diagnosis Date    Adenomatous polyp 06/10    With recommendation for repeat 06/15. Also, diverticulosis was noted.  Cataracts, bilateral     Combined forms of age-related cataract of both eyes 10/1/2018    Corneal scar, right eye     Cystocele     history of     Dementia ([address]     Mini-Mental Status exam 27/30 6/14  declines meds at this point,  MMSE 29/30 on June 13, 2017  MMSE 26/30 4/2018    Difficulty controlling anger 9/16/2018    Dyslipidemia     GERD (gastroesophageal reflux disease)     egd  4/15 ok    Goiter     benign, history of     Hiatal hernia     Hypertension     Hypokalemia     Hypothyroidism     Impaired fasting glucose     Malignant melanoma in situ ([address]     R upper Back 8/17    Migraines     Murmur, functional     Grade 1/6 systolic, history of     Osteopenia     T -1.0 hip, 03/09, T -0.3 spine, 03/09. 1) Repeat DEXA scan 09/12 with T +0.15, T -1.2 at the hip but -1.8 at the mean femoral neck giving her a FRAX score of 4.3 at the hip.  Reclast 10/13 and given 2014,2015, and 1/4/2017, on calcium and vit d,  Redo Dexa 10/14 Frax 4.2% , Frax 4.8% 10 yr hip fracture risk on Dexa 1/17  On Reclast    Posterior vitreous detachment of both eyes     Primary open angle glaucoma (POAG) of both eyes, moderate stage 10/1/2018    Sciatica 12/15/2015    Skin cancer     History of multiple skin cancers. Following with Dr. 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  COLONOSCOPY  06/08/10    COLONOSCOPY  07/18/02    CYSTOCELE REPAIR  02/12/99    Vaginal prolapse, cystocele plus pelvic relaxation.  DILATION AND CURETTAGE OF UTERUS      with hysteroscopy    HIP FRACTURE SURGERY Right 11/2/2021    Right Hip Intertan performed by Baylee Colin MD at [address]    still has ovaries     INTRACAPSULAR CATARACT EXTRACTION Left 8/25/2020    Left Cataract Extraction w/ IOL Implant performed by Sivakumar Coats MD at [address] [place] [address] Right 10/8/2020    Right Cataract Extraction w/ IOL Implant performed by Sivakumar Coats MD at [facility]  10/07/09    SCC, left nasal sidewall.  OTHER SURGICAL HISTORY  11/11/08    Anterior repair.  OTHER SURGICAL HISTORY  1988    laser cone    UPPER GASTROINTESTINAL ENDOSCOPY  4/8/2015    hiatal hernia    VEIN SURGERY  06/08/09    Laser ablation of right greater saphenous vein. [facility]     Social History     Socioeconomic History    Marital status:      Spouse name: Nilesh Edgar    Number of children: 3    Years of education: Not on file    Highest education level: Not on file   Occupational History    Not on file   Tobacco Use    Smoking status: Never Smoker    Smokeless tobacco: Never Used   Vaping Use    Vaping Use: Never used   Substance and Sexual Activity    Alcohol use: Yes     Alcohol/week: 0.0 standard drinks     Comment: Infrequently.      Drug use: No    Sexual activity: Not on file   Other Topics Concern    Not on file   Social History Narrative    Not on file     Social Determinants of Health     Financial Resource Strain:     Difficulty of Paying Living Expenses: Not on file   Food Insecurity:     Worried About [address] in the Last Year: Not on file    Mikael of Food in the Last Year: Not on file   Transportation Needs:     Lack of Transportation (Medical): Not on file    Lack of Transportation (Non-Medical): Not on file   Physical Activity:     Days of Exercise per Week: Not on file    Minutes of Exercise per Session: Not on file   Stress:     Feeling of Stress : Not on file   Social Connections:     Frequency of Communication with Friends and Family: Not on file    Frequency of Social Gatherings with Friends and Family: Not on file    Attends Synagogue Services: Not on file    Active Member of [address] [place] [address] or Organizations: Not on file    Attends Club or Organization Meetings: Not on file    Marital Status: Not on file   Intimate Partner Violence:     Fear of Current or Ex-Partner: Not on file    Emotionally Abused: Not on file    Physically Abused: Not on file    Sexually Abused: Not on file   Housing Stability:     Unable to Pay for Housing in the Last Year: Not on file    Number of [place] in the Last Year: Not on file    Unstable Housing in the Last Year: Not on file       Review of Systems   Constitutional: Negative for activity change, appetite change, chills, fatigue, fever and unexpected weight change. Patient states fine however obvious swelling to left wrist along with facial grimacing with movement of left wrist   HENT: Negative for congestion, dental problem, ear discharge, ear pain, facial swelling, hearing loss, postnasal drip, rhinorrhea, sinus pressure, sore throat and trouble swallowing. Eyes: Negative for pain and visual disturbance. Respiratory: Negative for cough, chest tightness, shortness of breath and wheezing. Cardiovascular: Negative for chest pain, palpitations and leg swelling. Gastrointestinal: Negative for abdominal pain, blood in stool, constipation, diarrhea, nausea and vomiting. Endocrine: Negative for cold intolerance, heat intolerance and polyuria. Genitourinary: Negative for difficulty urinating. Musculoskeletal: Negative for arthralgias, gait problem, myalgias, neck pain and neck stiffness. Skin: Negative for color change, rash and wound. Neurological: Negative for dizziness, tremors, seizures, weakness, light-headedness, numbness and headaches. Psychiatric/Behavioral: Negative for confusion and hallucinations. The patient is not nervous/anxious. Physical Exam  Vitals and nursing note reviewed. Constitutional:       General: She is not in acute distress. Appearance: Normal appearance. She is well-developed. She is not diaphoretic. HENT:      Head: Normocephalic and atraumatic. Right Ear: External ear normal.      Left Ear: External ear normal.   Eyes:      General:         Right eye: No discharge. Left eye: No discharge. Neck:      Trachea: No tracheal deviation. Cardiovascular:      Rate and Rhythm: Normal rate and regular rhythm. Pulses: Normal pulses. Heart sounds: Normal heart sounds. No murmur heard. No friction rub. No gallop. Pulmonary:      Effort: Pulmonary effort is normal. No respiratory distress. Breath sounds: Normal breath sounds. No stridor. No wheezing, rhonchi or rales. Chest:      Chest wall: No tenderness. Abdominal:      General: Bowel sounds are normal. There is no distension. Palpations: Abdomen is soft. Tenderness: There is no abdominal tenderness. Musculoskeletal:         General: No swelling. Left wrist: Swelling, deformity, tenderness and bony tenderness present. No effusion, lacerations, snuff box tenderness or crepitus. Decreased range of motion. Normal pulse.    Skin:     General: Skin is warm

## 2021-12-05 NOTE — PROGRESS NOTES
11/11/21  Keanu Harm  3/21/1930    Patient Resident of [facility]    Chief Complaint  1. Fracture, intertrochanteric, right femur, closed, with routine healing, subsequent encounter    2. Generalized weakness    3. Dementia without behavioral disturbance, unspecified dementia type ([address]    4. Essential hypertension    5. Hypothyroidism, unspecified type    6. Impaired fasting glucose    7. Atrial fibrillation, unspecified type ([address]    8. Anxiety and depression    9. Dyslipidemia        HPI:  45-year-old patient being seen for post hospital follow-up. Patient was admitted to [facility] 10/30/2021 through 11/5/2021 after sustaining a fall and a right hip fracture. Underwent right hip arthroplasty without any complications per note. Patient pleasantly confused. No family present on exam.  She was subsequently sent to [facility] for rehabilitation. Is noted that during hospital stay her Norvasc was added for hypertension. Blood pressure stable at [facility]. She has had some progressive confusion. Chart does state dementia however unsure if this is her baseline. She has not been drinking as much also concerned if she has some confusion from dehydration. Denies any problems with urinating. Nursing staff state urine looks dark. No Known Allergies    Past Medical History:   Diagnosis Date    Adenomatous polyp 06/10    With recommendation for repeat 06/15. Also, diverticulosis was noted.      Cataracts, bilateral     Combined forms of age-related cataract of both eyes 10/1/2018    Corneal scar, right eye     Cystocele     history of     Dementia ([address]     Mini-Mental Status exam 27/30  6/14  declines meds at this point,  MMSE 29/30 on June 13, 2017  MMSE 26/30 4/2018    Difficulty controlling anger 9/16/2018    Dyslipidemia     GERD (gastroesophageal reflux disease)     egd  4/15 ok    Goiter     benign, history of     Hiatal hernia     Hypertension     Hypokalemia 06/08/09    Laser ablation of right greater saphenous vein. Medications as per [facility] Chart /reviewed     Social History     Socioeconomic History    Marital status:      Spouse name: Maury Chavez    Number of children: 3    Years of education: Not on file    Highest education level: Not on file   Occupational History    Not on file   Tobacco Use    Smoking status: Never Smoker    Smokeless tobacco: Never Used   Vaping Use    Vaping Use: Never used   Substance and Sexual Activity    Alcohol use: Yes     Alcohol/week: 0.0 standard drinks     Comment: Infrequently.  Drug use: No    Sexual activity: Not on file   Other Topics Concern    Not on file   Social History Narrative    Not on file     Social Determinants of Health     Financial Resource Strain:     Difficulty of Paying Living Expenses: Not on file   Food Insecurity:     Worried About Running Out of Food in the Last Year: Not on file    Mikael of Food in the Last Year: Not on file   Transportation Needs:     Lack of Transportation (Medical): Not on file    Lack of Transportation (Non-Medical):  Not on file   Physical Activity:     Days of Exercise per Week: Not on file    Minutes of Exercise per Session: Not on file   Stress:     Feeling of Stress : Not on file   Social Connections:     Frequency of Communication with Friends and Family: Not on file    Frequency of Social Gatherings with Friends and Family: Not on file    Attends Bahai Services: Not on file    Active Member of Clubs or Organizations: Not on file    Attends Club or Organization Meetings: Not on file    Marital Status: Not on file   Intimate Partner Violence:     Fear of Current or Ex-Partner: Not on file    Emotionally Abused: Not on file    Physically Abused: Not on file    Sexually Abused: Not on file   Housing Stability:     Unable to Pay for Housing in the Last Year: Not on file    Number of [place] in the Last Year: Not on file    Unstable Housing in the Last Year: Not on file       Review of Systems   Unable to perform ROS: Dementia       Physical Exam  Vitals and nursing note reviewed. Constitutional:       General: She is not in acute distress. Appearance: Normal appearance. She is well-developed. She is not diaphoretic. HENT:      Head: Normocephalic and atraumatic. Right Ear: External ear normal.      Left Ear: External ear normal.   Eyes:      General:         Right eye: No discharge. Left eye: No discharge. Neck:      Trachea: No tracheal deviation. Cardiovascular:      Rate and Rhythm: Normal rate and regular rhythm. Pulses: Normal pulses. Heart sounds: Normal heart sounds. No murmur heard. No friction rub. No gallop. Pulmonary:      Effort: Pulmonary effort is normal. No respiratory distress. Breath sounds: Normal breath sounds. No stridor. No wheezing, rhonchi or rales. Chest:      Chest wall: No tenderness. Abdominal:      General: Bowel sounds are normal. There is no distension. Palpations: Abdomen is soft. Tenderness: There is no abdominal tenderness. Musculoskeletal:         General: No swelling. Skin:     General: Skin is warm and dry. Capillary Refill: Capillary refill takes less than 2 seconds. Coloration: Skin is not pale. Findings: No rash. Comments: Incision without redness edema. Scant amount of bruising   Neurological:      General: No focal deficit present. Mental Status: She is alert. Mental status is at baseline. Cranial Nerves: No cranial nerve deficit. Sensory: No sensory deficit. Motor: Weakness present.       Coordination: Coordination normal.      Gait: Gait abnormal.      Comments: Alert to name only   Psychiatric:         Mood and Affect: Mood normal.         Behavior: Behavior normal.      Comments: Cooperative with exam         Vital Signs: Temperature 97.8 °F, blood pressure 108/61, pulse 95, respirations 16, SPO2 95% on room air    Per chart DNR CCA    Assessment:  1. Fracture, intertrochanteric, right femur, closed, with routine healing, subsequent encounter  Status post repair. Ongoing follow-up in place with orthopedics    2. Generalized weakness  Work with PT OT    3. Dementia without behavioral disturbance, unspecified dementia type ([facility]  Continues on Namenda. Patient with increased confusion today per nursing staff will have them obtain a UA CBC and BMP. Hold her Lasix x2 days as she appears a little dry    4. Essential hypertension  Stable. Previously on Cozaar and atenolol. Norvasc was added during hospitalization. Monitor for hypotension    5. Hypothyroidism, unspecified type  Stable on levothyroxine    6. Impaired fasting glucose  Stable. Monitor    7. Atrial fibrillation, unspecified type ([facility]  History of. Regular rate and rhythm on exam.  Continues on aspirin 81 daily    8. Anxiety and depression  Stable on Zyprexa    9. Dyslipidemia  Continue on atorvastatin        Plan:  As noted above. Hospital records reviewed. Obtain UA CBC BMP hold Lasix x2 days. Follow up for routine visit. Call sooner with concerns prior.     Electronically signed by FELICIA Garner CNP on 12/5/2021 at 3:22 PM

## 2021-12-08 ENCOUNTER — CARE COORDINATION (OUTPATIENT)
Dept: CASE MANAGEMENT | Age: 86
End: 2021-12-08

## 2021-12-08 NOTE — CARE COORDINATION
[facility] Transitions Initial Follow Up Call    Call within 2 business days of discharge: Yes    Patient: Shon Redmond Patient : 3/21/1930   MRN: <[id number]>  Reason for Admission: Closed R hip fx  Discharge Date: 21 RARS: Readmission Risk Score: 17.5      Last Discharge [facility]       Complaint Diagnosis Description Type Department Provider    10/30/21 Fall Closed right hip fracture, initial encounter [facility]) ED to Hosp-Admission (Discharged) (ADMITTED) Krystyna Marino. Ancelmo. Patient admitted after a fall to [facility] on 10/30. Patient underwent surgical intervention R Hip Intertan. She discharged to [address] [place] [address] for rehab until home 21. Called [facility] for discharge disposition. Patient discharged to [facility]. No further outreach d/t patient access to 24 hr nursing care. Did not request med list as patient is beyond 30 day risk for readmission.     Non-face-to-face services provided:      Care Transitions 24 Hour Call    Care Transitions Interventions         Follow Up  Future Appointments   Date Time Provider Tila Bojorquez   2022 10:15 AM Kishor Bhat MD [facility]   2022 11:30 AM MD BRITTON Rodriguez [facility]   2022 10:30 AM [facility]       Claire Avila RN

## 2021-12-10 RX ORDER — FUROSEMIDE 40 MG/1
40 TABLET ORAL DAILY
Qty: 90 TABLET | Refills: 3 | Status: SHIPPED | OUTPATIENT
Start: 2021-12-10 | End: 2022-01-04 | Stop reason: SDUPTHER

## 2021-12-13 RX ORDER — LEVOTHYROXINE SODIUM 0.12 MG/1
TABLET ORAL
Qty: 90 TABLET | Refills: 3 | Status: SHIPPED | OUTPATIENT
Start: 2021-12-13 | End: 2021-12-13 | Stop reason: SDUPTHER

## 2021-12-13 RX ORDER — LEVOTHYROXINE SODIUM 0.12 MG/1
TABLET ORAL
Qty: 90 TABLET | Refills: 3 | Status: SHIPPED | OUTPATIENT
Start: 2021-12-13 | End: 2021-12-15 | Stop reason: SDUPTHER

## 2021-12-13 NOTE — TELEPHONE ENCOUNTER
Pharmacy requesting a refill of the below medication which has been pended for you:     Requested Prescriptions     Pending Prescriptions Disp Refills    levothyroxine (SYNTHROID) 125 MCG tablet [Pharmacy Med Name: L-THYROXINE (SYNTHROID) TABS 125MCG] 90 tablet 3     Sig: TAKE 1 TABLET DAILY         Next Appointment Date: 1/5/22    No Known Allergies

## 2021-12-14 ENCOUNTER — OUTSIDE SERVICES (OUTPATIENT)
Dept: INTERNAL MEDICINE | Age: 86
End: 2021-12-14
Payer: MEDICARE

## 2021-12-14 DIAGNOSIS — I25.10 CORONARY ARTERIOSCLEROSIS: ICD-10-CM

## 2021-12-14 DIAGNOSIS — E03.9 HYPOTHYROIDISM, UNSPECIFIED TYPE: ICD-10-CM

## 2021-12-14 DIAGNOSIS — S72.141D CLOSED DISPLACED INTERTROCHANTERIC FRACTURE OF RIGHT FEMUR WITH ROUTINE HEALING, SUBSEQUENT ENCOUNTER: Primary | ICD-10-CM

## 2021-12-14 DIAGNOSIS — I10 ESSENTIAL HYPERTENSION: ICD-10-CM

## 2021-12-14 DIAGNOSIS — I48.91 ATRIAL FIBRILLATION, UNSPECIFIED TYPE (HCC): ICD-10-CM

## 2021-12-14 DIAGNOSIS — R73.01 IMPAIRED FASTING GLUCOSE: ICD-10-CM

## 2021-12-14 DIAGNOSIS — S72.001D CLOSED FRACTURE OF RIGHT HIP WITH ROUTINE HEALING, SUBSEQUENT ENCOUNTER: ICD-10-CM

## 2021-12-14 DIAGNOSIS — D64.9 ANEMIA, UNSPECIFIED TYPE: ICD-10-CM

## 2021-12-14 DIAGNOSIS — F03.91 DEMENTIA WITH BEHAVIORAL DISTURBANCE, UNSPECIFIED DEMENTIA TYPE: ICD-10-CM

## 2021-12-14 DIAGNOSIS — E78.5 DYSLIPIDEMIA: ICD-10-CM

## 2021-12-14 NOTE — PROGRESS NOTES
tablet 3    atorvastatin (LIPITOR) 40 MG tablet Take 1 tablet by mouth daily 90 tablet 3    aspirin 81 MG EC tablet Take 1 tablet by mouth daily      nitroGLYCERIN (NITROSTAT) 0.4 MG SL tablet Place 0.4 mg under the tongue every 5 minutes as needed for Chest pain up to max of 3 total doses. If no relief after 3 dose, call 911.  ammonium lactate (AMLACTIN) 12 % cream apply to affected area twice a day if needed 385 g 3    Multiple Vitamin (DAILY MULTIVITAMIN PO) Take 1 tablet by mouth daily        No current facility-administered medications for this visit. No Known Allergies    Health Maintenance   Topic Date Due    Flu vaccine (1) 09/01/2021    Lipid screen  08/05/2022    TSH testing  09/23/2022    Potassium monitoring  11/05/2022    Creatinine monitoring  11/05/2022    DTaP/Tdap/Td vaccine (3 - Td or Tdap) 07/22/2025    Shingles Vaccine  Completed    Pneumococcal 65+ years Vaccine  Completed    COVID-19 Vaccine  Completed    Hepatitis A vaccine  Aged Out    Hepatitis B vaccine  Aged Out    Hib vaccine  Aged Out    Meningococcal (ACWY) vaccine  Aged Out       Subjective:      Review of Systems   Constitutional: Negative for chills and fever. HENT: Negative for congestion and sore throat. Respiratory: Negative for chest tightness and shortness of breath. Cardiovascular: Negative for chest pain and leg swelling. Gastrointestinal: Negative for diarrhea, nausea and vomiting. Genitourinary: Negative for difficulty urinating and dysuria. Musculoskeletal: Positive for gait problem. Negative for myalgias. Neurological: Negative for dizziness and headaches. Psychiatric/Behavioral: Negative for confusion and decreased concentration. Due to patient's clinical status, ROS of symptoms was completed by discussion with long term care facility staff, as well as with input from patient. Objective: There were no vitals filed for this visit.   Physical Exam  Vitals and nursing note reviewed. Constitutional:       General: She is not in acute distress. Appearance: She is well-developed. HENT:      Head: Normocephalic and atraumatic. Right Ear: External ear normal.      Left Ear: External ear normal.   Eyes:      General: Lids are normal.      Extraocular Movements: Extraocular movements intact. Conjunctiva/sclera: Conjunctivae normal.   Neck:      Thyroid: No thyromegaly. Cardiovascular:      Rate and Rhythm: Normal rate and regular rhythm. Heart sounds: No murmur heard. Pulmonary:      Effort: Pulmonary effort is normal. No accessory muscle usage or respiratory distress. Breath sounds: Normal breath sounds. No wheezing, rhonchi or rales. Abdominal:      Palpations: Abdomen is soft. Tenderness: There is no abdominal tenderness. There is no guarding or rebound. Musculoskeletal:         General: Normal range of motion. Cervical back: Neck supple. Right lower leg: No edema. Left lower leg: No edema. Lymphadenopathy:      Cervical: No cervical adenopathy. Skin:     General: Skin is warm and dry. Nails: There is no clubbing. Neurological:      Mental Status: She is alert. Coordination: Coordination normal.   Psychiatric:         Mood and Affect: Mood normal.         Speech: Speech normal.         Behavior: Behavior normal.         Assessment/Plan:        1. Displaced intertrochanteric fracture of right femur, subseqent encounter for closed fracture ([address] stable  2. Closed right hip fracture, subseqent encounter ([address]  - Continue to follow with orthopedics    3. Essential hypertension,  stable  - Continue to monitor  - Basic Metabolic Panel; Future    4. Dyslipidemia,  stable  - Continue to monitor    5. Atrial fibrillation, unspecified type ([address] stable  6. Coronary arteriosclerosis, stable  - Continue to follow with cardiology    7.  Hypothyroidism, unspecified type, uncontrolled  - Recheck TSH since previous dose decrease    8. Dementia with behavioral disturbance, unspecified dementia type ([facility]  stable  - Continue to monitor    9. Impaired fasting glucose,  stable  - Continue to monitor    10. Anemia, unspecified type, stable  - Recheck CBC  - CBC Auto Differential; Future        Return in about 1 month (around 1/14/2022). Orders Placed This Encounter   Procedures    Basic Metabolic Panel     Standing Status:   Future     Standing Expiration Date:   12/14/2022    CBC Auto Differential     Standing Status:   Future     Standing Expiration Date:   12/14/2022    TSH With Reflex Ft4     Standing Status:   Future     Standing Expiration Date:   12/15/2022     No orders of the defined types were placed in this encounter.               Electronically signed by FELICIA Watters CNP on 12/15/2021 at 1:32 PM

## 2021-12-15 ENCOUNTER — TELEPHONE (OUTPATIENT)
Dept: INTERNAL MEDICINE | Age: 86
End: 2021-12-15

## 2021-12-15 RX ORDER — LEVOTHYROXINE SODIUM 0.12 MG/1
TABLET ORAL
Qty: 90 TABLET | Refills: 3 | Status: SHIPPED | OUTPATIENT
Start: 2021-12-15 | End: 2022-03-01 | Stop reason: SDUPTHER

## 2021-12-15 ASSESSMENT — ENCOUNTER SYMPTOMS
SORE THROAT: 0
NAUSEA: 0
DIARRHEA: 0
VOMITING: 0
CHEST TIGHTNESS: 0
SHORTNESS OF BREATH: 0

## 2021-12-15 NOTE — TELEPHONE ENCOUNTER
GP phones not working. Note and lab orders faxed to GP.     Also, faxed Face-to-Face note to Replaced by [facility] ([phone])

## 2021-12-15 NOTE — TELEPHONE ENCOUNTER
As follow up from visit yesterday, please add order for TSH - first would like to verify how long patient has been taking lower dose of levothyroxine.  If she has been taking this for less than 6 weeks, please postpone draw  CBC and BMP were also ordered yesterday, this was noted on order sheet at [facility] already [facility] is add on order)

## 2021-12-16 ENCOUNTER — HOSPITAL ENCOUNTER (OUTPATIENT)
Age: 86
Setting detail: SPECIMEN
Discharge: HOME | End: 2021-12-16
Payer: MEDICARE

## 2021-12-16 DIAGNOSIS — E03.9 HYPOTHYROIDISM, UNSPECIFIED TYPE: ICD-10-CM

## 2021-12-16 DIAGNOSIS — D64.9 ANEMIA, UNSPECIFIED TYPE: ICD-10-CM

## 2021-12-16 DIAGNOSIS — E87.6 HYPOKALEMIA: Primary | ICD-10-CM

## 2021-12-16 DIAGNOSIS — I10 ESSENTIAL HYPERTENSION: Primary | ICD-10-CM

## 2021-12-16 DIAGNOSIS — I10 ESSENTIAL HYPERTENSION: ICD-10-CM

## 2021-12-16 LAB
ABSOLUTE EOS #: 0.1 K/UL (ref 0–0.44)
ABSOLUTE IMMATURE GRANULOCYTE: 0.05 K/UL (ref 0–0.3)
ABSOLUTE LYMPH #: 0.93 K/UL (ref 1.1–3.7)
ABSOLUTE MONO #: 0.73 K/UL (ref 0.1–1.2)
ANION GAP SERPL CALCULATED.3IONS-SCNC: 10 MMOL/L (ref 9–17)
BASOPHILS # BLD: 1 % (ref 0–2)
BASOPHILS ABSOLUTE: 0.04 K/UL (ref 0–0.2)
BUN BLDV-MCNC: 15 MG/DL (ref 8–23)
BUN/CREAT BLD: 25 (ref 9–20)
CALCIUM SERPL-MCNC: 8.3 MG/DL (ref 8.6–10.4)
CHLORIDE BLD-SCNC: 101 MMOL/L (ref 98–107)
CO2: 30 MMOL/L (ref 20–31)
CREAT SERPL-MCNC: 0.61 MG/DL (ref 0.5–0.9)
DIFFERENTIAL TYPE: ABNORMAL
EOSINOPHILS RELATIVE PERCENT: 1 % (ref 1–4)
GFR AFRICAN AMERICAN: >60 ML/MIN
GFR NON-AFRICAN AMERICAN: >60 ML/MIN
GFR SERPL CREATININE-BSD FRML MDRD: ABNORMAL ML/MIN/{1.73_M2}
GFR SERPL CREATININE-BSD FRML MDRD: ABNORMAL ML/MIN/{1.73_M2}
GLUCOSE BLD-MCNC: 119 MG/DL (ref 70–99)
HCT VFR BLD CALC: 29.8 % (ref 36.3–47.1)
HEMOGLOBIN: 9.3 G/DL (ref 11.9–15.1)
IMMATURE GRANULOCYTES: 1 %
LYMPHOCYTES # BLD: 13 % (ref 24–43)
MCH RBC QN AUTO: 30.6 PG (ref 25.2–33.5)
MCHC RBC AUTO-ENTMCNC: 31.2 G/DL (ref 25.2–33.5)
MCV RBC AUTO: 98 FL (ref 82.6–102.9)
MONOCYTES # BLD: 10 % (ref 3–12)
NRBC AUTOMATED: 0 PER 100 WBC
PDW BLD-RTO: 16.2 % (ref 11.8–14.4)
PLATELET # BLD: 320 K/UL (ref 138–453)
PLATELET ESTIMATE: ABNORMAL
PMV BLD AUTO: 10.7 FL (ref 8.1–13.5)
POTASSIUM SERPL-SCNC: 3.2 MMOL/L (ref 3.7–5.3)
RBC # BLD: 3.04 M/UL (ref 3.95–5.11)
RBC # BLD: ABNORMAL 10*6/UL
SEG NEUTROPHILS: 74 % (ref 36–65)
SEGMENTED NEUTROPHILS ABSOLUTE COUNT: 5.6 K/UL (ref 1.5–8.1)
SODIUM BLD-SCNC: 141 MMOL/L (ref 135–144)
TSH SERPL DL<=0.05 MIU/L-ACNC: 7.85 MIU/L (ref 0.3–5)
WBC # BLD: 7.5 K/UL (ref 3.5–11.3)
WBC # BLD: ABNORMAL 10*3/UL

## 2021-12-16 PROCEDURE — 84443 ASSAY THYROID STIM HORMONE: CPT

## 2021-12-16 PROCEDURE — 85025 COMPLETE CBC W/AUTO DIFF WBC: CPT

## 2021-12-16 PROCEDURE — 84439 ASSAY OF FREE THYROXINE: CPT

## 2021-12-16 PROCEDURE — 80048 BASIC METABOLIC PNL TOTAL CA: CPT

## 2021-12-16 PROCEDURE — 36415 COLL VENOUS BLD VENIPUNCTURE: CPT

## 2021-12-16 RX ORDER — POTASSIUM CHLORIDE 750 MG/1
10 TABLET, EXTENDED RELEASE ORAL DAILY
Qty: 90 TABLET | Refills: 0 | Status: SHIPPED | OUTPATIENT
Start: 2021-12-16 | End: 2022-01-04 | Stop reason: SDUPTHER

## 2021-12-17 LAB — THYROXINE, FREE: 1.36 NG/DL (ref 0.93–1.7)

## 2021-12-20 ENCOUNTER — HOSPITAL ENCOUNTER (OUTPATIENT)
Age: 86
Setting detail: SPECIMEN
Discharge: HOME | End: 2021-12-20
Payer: MEDICARE

## 2021-12-20 DIAGNOSIS — R32 URINARY INCONTINENCE, UNSPECIFIED TYPE: ICD-10-CM

## 2021-12-20 DIAGNOSIS — R35.0 FREQUENT URINATION: ICD-10-CM

## 2021-12-20 DIAGNOSIS — R35.0 FREQUENT URINATION: Primary | ICD-10-CM

## 2021-12-20 DIAGNOSIS — E03.9 HYPOTHYROIDISM, UNSPECIFIED TYPE: Primary | ICD-10-CM

## 2021-12-20 LAB
-: ABNORMAL
AMORPHOUS: ABNORMAL
BACTERIA: ABNORMAL
BILIRUBIN URINE: NEGATIVE
CASTS UA: ABNORMAL /LPF (ref 0–2)
COLOR: ABNORMAL
COMMENT UA: ABNORMAL
CRYSTALS, UA: ABNORMAL /HPF
EPITHELIAL CELLS UA: ABNORMAL /HPF (ref 0–5)
GLUCOSE URINE: NEGATIVE
KETONES, URINE: NEGATIVE
LEUKOCYTE ESTERASE, URINE: ABNORMAL
MUCUS: ABNORMAL
NITRITE, URINE: NEGATIVE
OTHER OBSERVATIONS UA: ABNORMAL
PH UA: 7 (ref 5–6)
PROTEIN UA: NEGATIVE
RBC UA: ABNORMAL /HPF (ref 0–4)
RENAL EPITHELIAL, UA: ABNORMAL /HPF
SPECIFIC GRAVITY UA: 1.01 (ref 1.01–1.02)
TRICHOMONAS: ABNORMAL
TURBIDITY: ABNORMAL
URINE HGB: ABNORMAL
UROBILINOGEN, URINE: NORMAL
WBC UA: >100 /HPF (ref 0–4)
YEAST: ABNORMAL

## 2021-12-20 PROCEDURE — 87086 URINE CULTURE/COLONY COUNT: CPT

## 2021-12-20 PROCEDURE — 87186 SC STD MICRODIL/AGAR DIL: CPT

## 2021-12-20 PROCEDURE — 87088 URINE BACTERIA CULTURE: CPT

## 2021-12-20 PROCEDURE — 81001 URINALYSIS AUTO W/SCOPE: CPT

## 2021-12-21 ENCOUNTER — OUTSIDE SERVICES (OUTPATIENT)
Dept: INTERNAL MEDICINE | Age: 86
End: 2021-12-21
Payer: MEDICARE

## 2021-12-21 DIAGNOSIS — E66.3 OVERWEIGHT: ICD-10-CM

## 2021-12-21 DIAGNOSIS — E78.5 DYSLIPIDEMIA: ICD-10-CM

## 2021-12-21 DIAGNOSIS — R73.01 IMPAIRED FASTING GLUCOSE: ICD-10-CM

## 2021-12-21 DIAGNOSIS — I10 ESSENTIAL HYPERTENSION: ICD-10-CM

## 2021-12-21 DIAGNOSIS — E03.4 HYPOTHYROIDISM DUE TO ACQUIRED ATROPHY OF THYROID: ICD-10-CM

## 2021-12-21 DIAGNOSIS — I48.91 ATRIAL FIBRILLATION, UNSPECIFIED TYPE (HCC): ICD-10-CM

## 2021-12-21 DIAGNOSIS — F03.91 DEMENTIA WITH BEHAVIORAL DISTURBANCE, UNSPECIFIED DEMENTIA TYPE: ICD-10-CM

## 2021-12-21 DIAGNOSIS — S72.141D CLOSED DISPLACED INTERTROCHANTERIC FRACTURE OF RIGHT FEMUR WITH ROUTINE HEALING, SUBSEQUENT ENCOUNTER: Primary | ICD-10-CM

## 2021-12-21 NOTE — PROGRESS NOTES
DR. Brenda Ramirez - [facility] NEW PATIENT HISTORY & PHYSICAL EXAM    DATE OF SERVICE: 11/23/21    NURSING HOME: [facility]    CHRONIC/ACTIVE PROBLEM LIST:     Patient Active Problem List   Diagnosis    Dyslipidemia    Osteopenia    Impaired fasting glucose    Adenomatous polyp    Essential hypertension    Hypothyroidism    Sciatica    GERD (gastroesophageal reflux disease)    Visual disturbance    Malignant melanoma in situ ([address]    Difficulty controlling anger    Primary open angle glaucoma (POAG) of both eyes, moderate stage    PVD (posterior vitreous detachment), both eyes    Combined forms of age-related cataract of both eyes    Dementia with behavioral disturbance ([address]    Tremor    Essential tremor    Dizziness    Chronic cerebral ischemia    Acquired cerebral atrophy ([address]    Cerebral infarction ([address]    Anxiety    Atrial fibrillation ([address]    Displaced intertrochanteric fracture of right femur, initial encounter for closed fracture ([address]    Acute ST segment elevation myocardial infarction ([address]    Heart murmur    Migraine    Female bladder prolapse    Falls    Hiatal hernia    Hypercholesterolemia    Coronary arteriosclerosis    Cataract    Closed right hip fracture, initial encounter ([address]       CHIEF COMPLAINT: Here for rehabilitation and physical therapy    HISTORY OF CHIEF COMPLAINT: This 80 y.o.  female was admitted to the [id number] [place] [address] Road of [address] [place] [address] for rehabilitation and physical therapy following a recent hospitalization at [facility] for a right hip fracture. She had an open repair with internal fixation done by Dr. Wing Lucero. She is just starting with therapy now. She does have a history of elevated fasting glucose. She tries to control that with diet and exercise. She takes Synthroid for hypothyroidism. She does have a history of dementia with behavioral disturbance, and is taking Zyprexa.  There are no other complaints. ALLERGIES: No Known Allergies    MEDICATIONS: As noted on the [facility] MAR, referenced and incorporated herein. PAST MEDICAL HISTORY:   Past Medical History:   Diagnosis Date    Adenomatous polyp 06/10    With recommendation for repeat 06/15. Also, diverticulosis was noted.  Cataracts, bilateral     Combined forms of age-related cataract of both eyes 10/1/2018    Corneal scar, right eye     Cystocele     history of     Dementia ([facility]     Mini-Mental Status exam 27/30 6/14  declines meds at this point,  MMSE 29/30 on June 13, 2017  MMSE 26/30 4/2018    Difficulty controlling anger 9/16/2018    Dyslipidemia     GERD (gastroesophageal reflux disease)     egd  4/15 ok    Goiter     benign, history of     Hiatal hernia     Hypertension     Hypokalemia     Hypothyroidism     Impaired fasting glucose     Malignant melanoma in situ ([facility]     R upper Back 8/17    Migraines     Murmur, functional     Grade 1/6 systolic, history of     Osteopenia     T -1.0 hip, 03/09, T -0.3 spine, 03/09. 1) Repeat DEXA scan 09/12 with T +0.15, T -1.2 at the hip but -1.8 at the mean femoral neck giving her a FRAX score of 4.3 at the hip. Reclast 10/13 and given 2014,2015, and 1/4/2017, on calcium and vit d,  Redo Dexa 10/14 Frax 4.2% , Frax 4.8% 10 yr hip fracture risk on Dexa 1/17  On Reclast    Posterior vitreous detachment of both eyes     Primary open angle glaucoma (POAG) of both eyes, moderate stage 10/1/2018    Sciatica 12/15/2015    Skin cancer     History of multiple skin cancers. Following with Dr. Kahn--- invasive squamous cell Ca 8/17 L forearm    Syncope and collapse 1/21/2015    Tremor 12/23/2018    Trichiasis of left lower eyelid without entropion     Visual disturbance 10/26/2017       PAST SURGICAL HISTORY:   Past Surgical History:   Procedure Laterality Date    APPENDECTOMY      CHOLECYSTECTOMY      Remote.      COLONOSCOPY  06/08/10    COLONOSCOPY  07/18/02  CYSTOCELE REPAIR  02/12/99    Vaginal prolapse, cystocele plus pelvic relaxation.  DILATION AND CURETTAGE OF UTERUS      with hysteroscopy    HIP FRACTURE SURGERY Right 11/2/2021    Right Hip Intertan performed by Facundo Montoya MD at [address]    still has ovaries     INTRACAPSULAR CATARACT EXTRACTION Left 8/25/2020    Left Cataract Ext[facility] w/ [facility]OL [facility] 10/8/2020    Right Cataract Extraction w/ IOL Implant performed by Patricia Chavarria MD at [address] [place] [address]  10/07/09    SCC, left nasal sidewall.  OTHER SURGICAL HISTORY  11/11/08    Anterior repair.  OTHER SURGICAL HISTORY  1988    laser cone    UPPER GASTROINTESTINAL ENDOSCOPY  4/8/2015    hiatal hernia    VEIN SURGERY  06/08/09    Laser ablation of right greater saphenous vein. SOCIAL HISTORY:     Tobacco:   Social History     Tobacco Use   Smoking Status Never Smoker   Smokeless Tobacco Never Used     Alcohol:   Social History     Substance and Sexual Activity   Alcohol Use Yes    Alcohol/week: 0.0 standard drinks    Comment: Infrequently. Drugs:   Social History     Substance and Sexual Activity   Drug Use No       FAMILY HISTORY: family history includes Colon Cancer in her brother; Coronary Art Dis in her father. REVIEW OF SYSTEMS:     Please see history of chief complaint above; otherwise no new problems with respect to General, HEENT, Cardiovascular, Respiratory, Gastrointestinal, Genitourinary, Endocrinologic, Musculoskeletal, or Neuropsychiatric complaints. PHYSICAL EXAMINATION:    Vitals: Temp: 97.2 deg F. Pulse: 72. Resp: 16. BP: 122/59. General: This is a 80 y.o.  female who is alert and oriented to person and place and questionably oriented to time. She appears to be her stated age and does not appear to be in any acute distress.   Skin: Skin color, texture, turgor normal. dose reviewed. It is appropriate. No changes. 8. Overweight, stable  - We discussed weight loss  - She will continue to watch her diet and exercise          Patient's hospital record reviewed and medication list reconciled. Patient's electronic medical record reviewed and updated with hospital information. We reviewed all hospital progress notes, radiology reports, and laboratory reports. Continue current treatment. Nursing home record reviewed and updates summarized and entered into electronic record. See nursing home orders and MAR.         Electronically signed by Donte Meehan DO on 12/21/2021 at 1:21 AM  Internal Medicine

## 2021-12-22 VITALS — HEIGHT: 64 IN | WEIGHT: 143 LBS | BODY MASS INDEX: 24.41 KG/M2

## 2021-12-22 LAB
CULTURE: ABNORMAL
Lab: ABNORMAL
SPECIMEN DESCRIPTION: ABNORMAL

## 2021-12-23 ENCOUNTER — HOSPITAL ENCOUNTER (OUTPATIENT)
Age: 86
Setting detail: SPECIMEN
Discharge: HOME | End: 2021-12-23
Payer: MEDICARE

## 2021-12-23 DIAGNOSIS — D64.9 ANEMIA, UNSPECIFIED TYPE: ICD-10-CM

## 2021-12-23 DIAGNOSIS — E87.6 HYPOKALEMIA: ICD-10-CM

## 2021-12-23 DIAGNOSIS — I10 ESSENTIAL HYPERTENSION: ICD-10-CM

## 2021-12-23 LAB — POTASSIUM SERPL-SCNC: 3.5 MMOL/L (ref 3.7–5.3)

## 2021-12-23 PROCEDURE — 36415 COLL VENOUS BLD VENIPUNCTURE: CPT

## 2021-12-23 PROCEDURE — 84132 ASSAY OF SERUM POTASSIUM: CPT

## 2021-12-23 RX ORDER — NITROFURANTOIN 25; 75 MG/1; MG/1
100 CAPSULE ORAL 2 TIMES DAILY
Qty: 20 CAPSULE | Refills: 0 | Status: SHIPPED | OUTPATIENT
Start: 2021-12-23 | End: 2022-01-02

## 2021-12-23 RX ORDER — ATORVASTATIN CALCIUM 40 MG/1
TABLET, FILM COATED ORAL
Qty: 90 TABLET | Refills: 3 | OUTPATIENT
Start: 2021-12-23

## 2021-12-23 RX ORDER — TICAGRELOR 90 MG/1
TABLET ORAL
Qty: 180 TABLET | Refills: 3 | OUTPATIENT
Start: 2021-12-23

## 2021-12-23 NOTE — TELEPHONE ENCOUNTER
PCP Dr. Ruby Mendiola for refills please due to no cardiologist in clinic over the holidays. Thank you.

## 2021-12-23 NOTE — TELEPHONE ENCOUNTER
Attempted to reach demetrius baxter by phone, no answer. Faxed over for clarification if patient needs and pharmacy to [facility].

## 2021-12-27 DIAGNOSIS — S72.141A DISPLACED INTERTROCHANTERIC FRACTURE OF RIGHT FEMUR, INITIAL ENCOUNTER FOR CLOSED FRACTURE (HCC): Primary | ICD-10-CM

## 2022-01-03 ENCOUNTER — PATIENT MESSAGE (OUTPATIENT)
Dept: INTERNAL MEDICINE | Age: 87
End: 2022-01-03

## 2022-01-04 ENCOUNTER — HOSPITAL ENCOUNTER (OUTPATIENT)
Dept: GENERAL RADIOLOGY | Age: 87
Discharge: HOME | End: 2022-01-06
Payer: MEDICARE

## 2022-01-04 ENCOUNTER — OUTSIDE SERVICES (OUTPATIENT)
Dept: INTERNAL MEDICINE | Age: 87
End: 2022-01-04
Payer: MEDICARE

## 2022-01-04 ENCOUNTER — OFFICE VISIT (OUTPATIENT)
Dept: ORTHOPEDIC SURGERY | Age: 87
End: 2022-01-04
Payer: MEDICARE

## 2022-01-04 VITALS
SYSTOLIC BLOOD PRESSURE: 159 MMHG | WEIGHT: 143 LBS | BODY MASS INDEX: 24.41 KG/M2 | HEART RATE: 64 BPM | HEIGHT: 64 IN | DIASTOLIC BLOOD PRESSURE: 66 MMHG

## 2022-01-04 VITALS
TEMPERATURE: 98.2 F | DIASTOLIC BLOOD PRESSURE: 63 MMHG | SYSTOLIC BLOOD PRESSURE: 134 MMHG | OXYGEN SATURATION: 96 % | HEART RATE: 63 BPM | RESPIRATION RATE: 16 BRPM

## 2022-01-04 DIAGNOSIS — F41.9 ANXIETY: ICD-10-CM

## 2022-01-04 DIAGNOSIS — I10 ESSENTIAL HYPERTENSION: ICD-10-CM

## 2022-01-04 DIAGNOSIS — D36.9 ADENOMATOUS POLYP: ICD-10-CM

## 2022-01-04 DIAGNOSIS — E78.5 DYSLIPIDEMIA: ICD-10-CM

## 2022-01-04 DIAGNOSIS — K21.9 GASTROESOPHAGEAL REFLUX DISEASE WITHOUT ESOPHAGITIS: ICD-10-CM

## 2022-01-04 DIAGNOSIS — S72.001D CLOSED RIGHT HIP FRACTURE, WITH ROUTINE HEALING, SUBSEQUENT ENCOUNTER: ICD-10-CM

## 2022-01-04 DIAGNOSIS — I25.10 CORONARY ARTERIOSCLEROSIS: ICD-10-CM

## 2022-01-04 DIAGNOSIS — I63.9 CEREBRAL INFARCTION, UNSPECIFIED MECHANISM (HCC): ICD-10-CM

## 2022-01-04 DIAGNOSIS — R73.01 IMPAIRED FASTING GLUCOSE: Primary | ICD-10-CM

## 2022-01-04 DIAGNOSIS — I48.91 ATRIAL FIBRILLATION, UNSPECIFIED TYPE (HCC): ICD-10-CM

## 2022-01-04 DIAGNOSIS — F03.91 DEMENTIA WITH BEHAVIORAL DISTURBANCE, UNSPECIFIED DEMENTIA TYPE: ICD-10-CM

## 2022-01-04 DIAGNOSIS — E03.9 HYPOTHYROIDISM, UNSPECIFIED TYPE: ICD-10-CM

## 2022-01-04 DIAGNOSIS — E78.00 HYPERCHOLESTEROLEMIA: ICD-10-CM

## 2022-01-04 DIAGNOSIS — Z87.81 HISTORY OF FRACTURE OF RIGHT HIP: Primary | ICD-10-CM

## 2022-01-04 DIAGNOSIS — G25.0 ESSENTIAL TREMOR: ICD-10-CM

## 2022-01-04 DIAGNOSIS — S72.141A DISPLACED INTERTROCHANTERIC FRACTURE OF RIGHT FEMUR, INITIAL ENCOUNTER FOR CLOSED FRACTURE (HCC): ICD-10-CM

## 2022-01-04 DIAGNOSIS — D03.59 MELANOMA IN SITU OF TORSO EXCLUDING BREAST (HCC): ICD-10-CM

## 2022-01-04 DIAGNOSIS — G31.9 ACQUIRED CEREBRAL ATROPHY (HCC): ICD-10-CM

## 2022-01-04 PROCEDURE — 99214 OFFICE O/P EST MOD 30 MIN: CPT

## 2022-01-04 PROCEDURE — 73552 X-RAY EXAM OF FEMUR 2/>: CPT

## 2022-01-04 PROCEDURE — 99214 OFFICE O/P EST MOD 30 MIN: CPT | Performed by: PHYSICIAN ASSISTANT

## 2022-01-04 PROCEDURE — 99024 POSTOP FOLLOW-UP VISIT: CPT | Performed by: PHYSICIAN ASSISTANT

## 2022-01-04 RX ORDER — POTASSIUM CHLORIDE 750 MG/1
10 TABLET, EXTENDED RELEASE ORAL DAILY
Qty: 90 TABLET | Refills: 3 | Status: SHIPPED | OUTPATIENT
Start: 2022-01-04 | End: 2022-08-11 | Stop reason: DRUGHIGH

## 2022-01-04 RX ORDER — FUROSEMIDE 40 MG/1
40 TABLET ORAL DAILY
Qty: 90 TABLET | Refills: 3 | Status: SHIPPED | OUTPATIENT
Start: 2022-01-04 | End: 2022-07-12

## 2022-01-04 ASSESSMENT — ENCOUNTER SYMPTOMS
VOMITING: 0
DIARRHEA: 0
NAUSEA: 0
CHEST TIGHTNESS: 0
SORE THROAT: 0
SHORTNESS OF BREATH: 0

## 2022-01-04 NOTE — TELEPHONE ENCOUNTER
From: JILL Neves  To: Desiree Iyer  Sent: 1/3/2022 4:05 PM EST  Subject: Lasix&Potassium    Hi Sparkle,    It looks like the Lasix was sent in to  on 12/10/2021 and the Potassium was sent in to [facility] mail pharmacy ([address] [facility]) on 12/16/2021.

## 2022-01-04 NOTE — PROGRESS NOTES
[facility]      Sudheer Gallegos is a 80 y.o. female resident of [facility] who presents today for medical conditions/complaints as noted below. HPI:     HPI   Patient presents for evaluation and management of chronic medical conditions as noted below. Impaired fasting glucose has been well controlled with diet, BG has been 119-125 in previous months, unclear if these were fasting values. Hypertension is well controlled on amlodipine, atenolol, Lasix, and losartan. Dyslipidemia stable on atorvastatin    Most recent TSH slightly elevated at 7.85, FT4 WNL on 12/20/21. Repeat was ordered in 1 month. Follows with cardiology for atrial fibrillation and coronary arterial sclerosis. Stable on Brilinta without bleeding concerns. GERD has been stable without medication. Tremor has been stable. Her anxiety and behavioral disturbances due to dementia has been well controlled with Prozac and Zyprexa. Mild hypokalemia, continues on supplement    Continues to follow with orthopedics for right hip fracture. She is ambulatory with the use of a walker. She does still have some unsteadiness, and staff has been assisting her. History of colon polyps, most recent colonoscopy June 2010. Repeat recommended in 5 years. Not currently pursuing recheck due to patient age and comorbidities. History of melanoma 2017, no new skin lesions noted by staff.     Current Outpatient Medications   Medication Sig Dispense Refill    furosemide (LASIX) 40 MG tablet Take 1 tablet by mouth daily Take 40 mg by mouth daily 90 tablet 3    potassium chloride (KLOR-CON M) 10 MEQ extended release tablet Take 1 tablet by mouth daily 90 tablet 3    levothyroxine (SYNTHROID) 125 MCG tablet TAKE 1 TABLET DAILY 90 tablet 3    amLODIPine (NORVASC) 5 MG tablet Take 1 tablet by mouth daily 30 tablet 0    memantine (NAMENDA) 5 MG tablet Take 1 tablet by mouth daily 90 tablet 2    acetaminophen (TYLENOL) 650 MG extended release tablet Take 650 mg by mouth 2 times daily as needed for Pain      calcium-vitamin D (OSCAL 500/200 D-3) 500-200 MG-UNIT per tablet Take 1 tablet by mouth daily 90 tablet 3    FLUoxetine (PROZAC) 20 MG capsule TAKE 1 CAPSULE DAILY 90 capsule 3    losartan (COZAAR) 100 MG tablet Take 1 tablet by mouth daily 90 tablet 3    OLANZapine (ZYPREXA) 2.5 MG tablet Take 1 tablet by mouth nightly 90 tablet 3    BRILINTA 90 MG TABS tablet Take 1 tablet by mouth 2 times daily 60 tablet 0    atenolol (TENORMIN) 25 MG tablet Take 1 tablet by mouth daily 90 tablet 3    atorvastatin (LIPITOR) 40 MG tablet Take 1 tablet by mouth daily 90 tablet 3    aspirin 81 MG EC tablet Take 1 tablet by mouth daily      nitroGLYCERIN (NITROSTAT) 0.4 MG SL tablet Place 0.4 mg under the tongue every 5 minutes as needed for Chest pain up to max of 3 total doses. If no relief after 3 dose, call 911.  ammonium lactate (AMLACTIN) 12 % cream apply to affected area twice a day if needed 385 g 3    Multiple Vitamin (DAILY MULTIVITAMIN PO) Take 1 tablet by mouth daily        No current facility-administered medications for this visit. No Known Allergies    Health Maintenance   Topic Date Due    Flu vaccine (1) 09/01/2021    Depression Screen  03/09/2022    Lipid screen  08/05/2022    TSH testing  12/16/2022    Creatinine monitoring  12/16/2022    Potassium monitoring  12/23/2022    DTaP/Tdap/Td vaccine (3 - Td or Tdap) 07/22/2025    Shingles Vaccine  Completed    Pneumococcal 65+ years Vaccine  Completed    COVID-19 Vaccine  Completed    Hepatitis A vaccine  Aged Out    Hepatitis B vaccine  Aged Out    Hib vaccine  Aged Out    Meningococcal (ACWY) vaccine  Aged Out       Subjective:      Review of Systems   Constitutional: Negative for chills and fever. HENT: Negative for congestion and sore throat. Respiratory: Negative for chest tightness and shortness of breath.     Cardiovascular: Negative for chest pain and leg swelling. Gastrointestinal: Negative for diarrhea, nausea and vomiting. Genitourinary: Negative for difficulty urinating and dysuria. Musculoskeletal: Positive for gait problem. Negative for myalgias. Neurological: Negative for dizziness and headaches. Psychiatric/Behavioral: Negative for confusion and decreased concentration. Objective:     Vitals:    01/04/22 1330   BP: 134/63   Pulse: 63   Resp: 16   Temp: 98.2 °F (36.8 °C)   SpO2: 96%     Physical Exam  Vitals and nursing note reviewed. Constitutional:       General: She is not in acute distress. Appearance: She is well-developed. HENT:      Head: Normocephalic and atraumatic. Right Ear: External ear normal.      Left Ear: External ear normal.   Eyes:      General: Lids are normal.      Extraocular Movements: Extraocular movements intact. Conjunctiva/sclera: Conjunctivae normal.   Neck:      Thyroid: No thyromegaly. Cardiovascular:      Rate and Rhythm: Normal rate and regular rhythm. Heart sounds: No murmur heard. Pulmonary:      Effort: Pulmonary effort is normal. No accessory muscle usage or respiratory distress. Breath sounds: Normal breath sounds. No wheezing, rhonchi or rales. Abdominal:      Palpations: Abdomen is soft. Tenderness: There is no abdominal tenderness. There is no guarding or rebound. Musculoskeletal:         General: Normal range of motion. Cervical back: Neck supple. Right lower leg: No edema. Left lower leg: No edema. Skin:     General: Skin is warm and dry. Nails: There is no clubbing. Neurological:      Mental Status: She is alert. Coordination: Coordination normal.   Psychiatric:         Mood and Affect: Mood normal.         Speech: Speech normal.         Behavior: Behavior normal.         Assessment/Plan:        1. Impaired fasting glucose,  stable  - Continue to monitor    2.  Essential hypertension,  stable  - Continue to monitor    3. Hypercholesterolemia, stable  4. Dyslipidemia,  stable  - Continue to monitor    5. Hypothyroidism, unspecified type, stable  - Borderline TSH with normal FT4, recheck TSH in 1 month is pending    6. Atrial fibrillation, unspecified type ([facility] stable  7. Coronary arteriosclerosis, stable  - Continue to follow with cardiology    8. Gastroesophageal reflux disease without esophagitis,  stable  - Continue to monitor    9. Essential tremor,  stable  - Continue to monitor    10. Dementia with behavioral disturbance, unspecified dementia type ([facility] stable  - Continue to monitor    11. Anxiety,  stable  - Continue to monitor    12. Cerebral infarction, unspecified mechanism ([facility] stable  13. Acquired cerebral atrophy ([facility]  stable  - Continue to monitor    14. Closed right hip fracture, subsequent encounter ([facility] stable  - Continue to follow with orthopedics    15. Adenomatous polyp, stable  - As noted above    16. Melanoma in situ of torso excluding breast ([facility]  stable  - Continue to monitor          Return in about 3 months (around 4/4/2022). No orders of the defined types were placed in this encounter. No orders of the defined types were placed in this encounter.               Electronically signed by FELICIA Carr CNP on 1/4/2022 at 3:23 PM

## 2022-01-04 NOTE — TELEPHONE ENCOUNTER
Please review, per GP patient only has 7 days worth and per [facility] they never received refills from provider on call 12/16/21.     Last Appt:  9/23/2021  Next Appt:   1/25/2022  Med verified in [address] [place] [address] Rd

## 2022-01-04 NOTE — PROGRESS NOTES
Orthopaedic Progress Note      CHIEF COMPLAINT: History of right hip fracture    HISTORY OF PRESENT ILLNESS:       Ms. Donna Martinez  is a 80 y.o. female  who presents with a history of right intertrochanteric hip fracture treated operatively 2 months ago. She doing much better at this time. She is ambulating with a wheeled walker. She reports no pain symptoms whatsoever. She is here today in follow-up. Past Medical History:    Past Medical History:   Diagnosis Date    Adenomatous polyp 06/10    With recommendation for repeat 06/15. Also, diverticulosis was noted.  Cataracts, bilateral     Combined forms of age-related cataract of both eyes 10/1/2018    Corneal scar, right eye     Cystocele     history of     Dementia ([facility]     Mini-Mental Status exam 27/30 6/14  declines meds at this point,  MMSE 29/30 on June 13, 2017  MMSE 26/30 4/2018    Difficulty controlling anger 9/16/2018    Dyslipidemia     GERD (gastroesophageal reflux disease)     egd  4/15 ok    Goiter     benign, history of     Hiatal hernia     Hypertension     Hypokalemia     Hypothyroidism     Impaired fasting glucose     Malignant melanoma in situ ([facility]     R upper Back 8/17    Migraines     Murmur, functional     Grade 1/6 systolic, history of     Osteopenia     T -1.0 hip, 03/09, T -0.3 spine, 03/09. 1) Repeat DEXA scan 09/12 with T +0.15, T -1.2 at the hip but -1.8 at the mean femoral neck giving her a FRAX score of 4.3 at the hip. Reclast 10/13 and given 2014,2015, and 1/4/2017, on calcium and vit d,  Redo Dexa 10/14 Frax 4.2% , Frax 4.8% 10 yr hip fracture risk on Dexa 1/17  On Reclast    Posterior vitreous detachment of both eyes     Primary open angle glaucoma (POAG) of both eyes, moderate stage 10/1/2018    Sciatica 12/15/2015    Skin cancer     History of multiple skin cancers.  Following with Dr. Fe Aldridge--- invasive squamous cell Ca 8/17 L forearm    Syncope and collapse 1/21/2015    Tremor 12/23/2018    Trichiasis of left lower eyelid without entropion     Visual disturbance 10/26/2017       Past Surgical History:    Past Surgical History:   Procedure Laterality Date    APPENDECTOMY      CHOLECYSTECTOMY      Remote.  COLONOSCOPY  06/08/10    COLONOSCOPY  07/18/02    CYSTOCELE REPAIR  02/12/99    Vaginal prolapse, cystocele plus pelvic relaxation.  DILATION AND CURETTAGE OF UTERUS      with hysteroscopy    HIP FRACTURE SURGERY Right 11/2/2021    Right Hip Intertan performed by Prabha Garsia MD at [facility]    still has ovaries     INTRACAPSULAR CATARACT EXTRACTION Left 8/25/2020    Left Cataract Extraction w/ IOL Implant performed by Rossy Terry MD at [address] [place] [address] Right 10/8/2020    Right Cataract Extraction w/ IOL Implant performed by Rossy Terry MD at [address] [place] [address]  10/07/09    [facility], left nasal sidewall.  OTHER SURGICAL HISTORY  11/11/08    Anterior repair.  OTHER SURGICAL HISTORY  1988    laser cone    UPPER GASTROINTESTINAL ENDOSCOPY  4/8/2015    hiatal hernia    VEIN SURGERY  06/08/09    Laser ablation of right greater saphenous vein.           Current  Medications:  Current Outpatient Medications   Medication Sig Dispense Refill    potassium chloride (KLOR-CON M) 10 MEQ extended release tablet Take 1 tablet by mouth daily 90 tablet 0    levothyroxine (SYNTHROID) 125 MCG tablet TAKE 1 TABLET DAILY 90 tablet 3    furosemide (LASIX) 40 MG tablet Take 1 tablet by mouth daily Take 40 mg by mouth daily 90 tablet 3    amLODIPine (NORVASC) 5 MG tablet Take 1 tablet by mouth daily 30 tablet 0    memantine (NAMENDA) 5 MG tablet Take 1 tablet by mouth daily 90 tablet 2    acetaminophen (TYLENOL) 650 MG extended release tablet Take 650 mg by mouth 2 times daily as needed for Pain      calcium-vitamin D (OSCAL 500/200 D-3) 500-200 MG-UNIT per tablet Take 1 tablet by mouth daily 90 tablet 3    FLUoxetine (PROZAC) 20 MG capsule TAKE 1 CAPSULE DAILY 90 capsule 3    losartan (COZAAR) 100 MG tablet Take 1 tablet by mouth daily 90 tablet 3    OLANZapine (ZYPREXA) 2.5 MG tablet Take 1 tablet by mouth nightly 90 tablet 3    BRILINTA 90 MG TABS tablet Take 1 tablet by mouth 2 times daily 60 tablet 0    atenolol (TENORMIN) 25 MG tablet Take 1 tablet by mouth daily 90 tablet 3    atorvastatin (LIPITOR) 40 MG tablet Take 1 tablet by mouth daily 90 tablet 3    aspirin 81 MG EC tablet Take 1 tablet by mouth daily      nitroGLYCERIN (NITROSTAT) 0.4 MG SL tablet Place 0.4 mg under the tongue every 5 minutes as needed for Chest pain up to max of 3 total doses. If no relief after 3 dose, call 911.  ammonium lactate (AMLACTIN) 12 % cream apply to affected area twice a day if needed 385 g 3    Multiple Vitamin (DAILY MULTIVITAMIN PO) Take 1 tablet by mouth daily        No current facility-administered medications for this visit. Allergies:  Patient has no known allergies. Social History:   Social History     Tobacco Use   Smoking Status Never Smoker   Smokeless Tobacco Never Used     Social History     Substance and Sexual Activity   Alcohol Use Yes    Alcohol/week: 0.0 standard drinks    Comment: Infrequently. Social History     Substance and Sexual Activity   Drug Use No       Family History:  Family History   Problem Relation Age of Onset    Coronary Art Dis Father     Colon Cancer Brother     Diabetes Neg Hx     Cataracts Neg Hx     Glaucoma Neg Hx        REVIEW OF SYSTEMS:  Constitutional: Denies any fever, chills. Musculoskeletal: Positive for right intertrochanteric hip fracture treated on November 2, 2021. PHYSICAL EXAM:  [unfilled]  General appearance:  Alert and oriented x 3. No apparent distress, appears stated age, calm and cooperative. Musculoskeletal: Right hip was inspected all incisions are well-healed. She has no calf pain.   She neurovascular intact to the foot. Thresa Abed DATA:  CBC:   Lab Results   Component Value Date    WBC 7.5 12/16/2021    HGB 9.3 12/16/2021     12/16/2021     BMP:    Lab Results   Component Value Date     12/16/2021    K 3.5 12/23/2021     12/16/2021    CO2 30 12/16/2021    BUN 15 12/16/2021    CREATININE 0.61 12/16/2021    CALCIUM 8.3 12/16/2021    GLUCOSE 119 12/16/2021     PT/INR:    Lab Results   Component Value Date    PROTIME 15.4 11/01/2021    INR 1.3 11/01/2021     Troponin:  No results found for: TROPONINI  No results for input(s): LIPASE, AMYLASE in the last 72 hours. No results for input(s): AST, ALT, BILIDIR, BILITOT, ALKPHOS in the last 72 hours. Uric Acid:  No components found for: URIC  Urine Culture:  No components found for: CURINE    Radiology:   No results found. X-rays personally reviewed by me of 2 views right femur reveal healing right intertrochanteric hip fracture hardware in good position       Diagnosis Orders   1. History of fracture of right hip           PLAN:  Activities as tolerated, weightbearing as tolerated, PT/OT no restrictions. She is back here on an as-needed basis    No orders of the defined types were placed in this encounter.        Procedure:        Electronically signed by Andry Ahmadi PA-C on 1/4/2022 at 10:27 AM

## 2022-01-10 RX ORDER — ATENOLOL 25 MG/1
TABLET ORAL
Qty: 90 TABLET | Refills: 3 | Status: SHIPPED | OUTPATIENT
Start: 2022-01-10 | End: 2022-10-11 | Stop reason: ALTCHOICE

## 2022-01-10 NOTE — TELEPHONE ENCOUNTER
Medications pended below if agreeable.   Last Appt:  9/23/2021  Next Appt:   1/25/2022  Med verified in [employer]

## 2022-01-13 ENCOUNTER — HOSPITAL ENCOUNTER (OUTPATIENT)
Age: 87
Setting detail: SPECIMEN
Discharge: HOME | End: 2022-01-13
Payer: MEDICARE

## 2022-01-13 DIAGNOSIS — D64.9 ANEMIA, UNSPECIFIED TYPE: ICD-10-CM

## 2022-01-13 DIAGNOSIS — E87.6 HYPOKALEMIA: ICD-10-CM

## 2022-01-13 DIAGNOSIS — I10 ESSENTIAL HYPERTENSION: ICD-10-CM

## 2022-01-13 LAB
ABSOLUTE EOS #: 0.15 K/UL (ref 0–0.44)
ABSOLUTE IMMATURE GRANULOCYTE: 0.05 K/UL (ref 0–0.3)
ABSOLUTE LYMPH #: 1.28 K/UL (ref 1.1–3.7)
ABSOLUTE MONO #: 0.95 K/UL (ref 0.1–1.2)
BASOPHILS # BLD: 1 % (ref 0–2)
BASOPHILS ABSOLUTE: 0.07 K/UL (ref 0–0.2)
DIFFERENTIAL TYPE: ABNORMAL
EOSINOPHILS RELATIVE PERCENT: 2 % (ref 1–4)
HCT VFR BLD CALC: 32.7 % (ref 36.3–47.1)
HEMOGLOBIN: 10.1 G/DL (ref 11.9–15.1)
IMMATURE GRANULOCYTES: 1 %
LYMPHOCYTES # BLD: 15 % (ref 24–43)
MCH RBC QN AUTO: 30.5 PG (ref 25.2–33.5)
MCHC RBC AUTO-ENTMCNC: 30.9 G/DL (ref 25.2–33.5)
MCV RBC AUTO: 98.8 FL (ref 82.6–102.9)
MONOCYTES # BLD: 11 % (ref 3–12)
NRBC AUTOMATED: 0 PER 100 WBC
PDW BLD-RTO: 15.9 % (ref 11.8–14.4)
PLATELET # BLD: 276 K/UL (ref 138–453)
PLATELET ESTIMATE: ABNORMAL
PMV BLD AUTO: 11 FL (ref 8.1–13.5)
RBC # BLD: 3.31 M/UL (ref 3.95–5.11)
RBC # BLD: ABNORMAL 10*6/UL
SEG NEUTROPHILS: 70 % (ref 36–65)
SEGMENTED NEUTROPHILS ABSOLUTE COUNT: 5.83 K/UL (ref 1.5–8.1)
WBC # BLD: 8.3 K/UL (ref 3.5–11.3)
WBC # BLD: ABNORMAL 10*3/UL

## 2022-01-13 PROCEDURE — 36415 COLL VENOUS BLD VENIPUNCTURE: CPT

## 2022-01-13 PROCEDURE — 85025 COMPLETE CBC W/AUTO DIFF WBC: CPT

## 2022-01-17 ENCOUNTER — HOSPITAL ENCOUNTER (EMERGENCY)
Age: 87
Discharge: HOME | End: 2022-01-17
Attending: EMERGENCY MEDICINE
Payer: MEDICARE

## 2022-01-17 ENCOUNTER — APPOINTMENT (OUTPATIENT)
Dept: GENERAL RADIOLOGY | Age: 87
End: 2022-01-17
Payer: MEDICARE

## 2022-01-17 VITALS
OXYGEN SATURATION: 97 % | TEMPERATURE: 96.7 F | HEIGHT: 64 IN | BODY MASS INDEX: 23.9 KG/M2 | RESPIRATION RATE: 16 BRPM | DIASTOLIC BLOOD PRESSURE: 40 MMHG | SYSTOLIC BLOOD PRESSURE: 124 MMHG | WEIGHT: 140 LBS | HEART RATE: 60 BPM

## 2022-01-17 DIAGNOSIS — S70.11XA CONTUSION OF RIGHT THIGH, INITIAL ENCOUNTER: Primary | ICD-10-CM

## 2022-01-17 PROCEDURE — 73552 X-RAY EXAM OF FEMUR 2/>: CPT

## 2022-01-17 PROCEDURE — 99283 EMERGENCY DEPT VISIT LOW MDM: CPT

## 2022-01-17 ASSESSMENT — PAIN DESCRIPTION - DESCRIPTORS: DESCRIPTORS: OTHER (COMMENT)

## 2022-01-17 ASSESSMENT — PAIN DESCRIPTION - FREQUENCY: FREQUENCY: CONTINUOUS

## 2022-01-17 ASSESSMENT — PAIN DESCRIPTION - PAIN TYPE: TYPE: ACUTE PAIN

## 2022-01-17 ASSESSMENT — PAIN SCALES - GENERAL: PAINLEVEL_OUTOF10: 8

## 2022-01-17 ASSESSMENT — PAIN DESCRIPTION - ORIENTATION: ORIENTATION: RIGHT

## 2022-01-17 ASSESSMENT — PAIN DESCRIPTION - ONSET: ONSET: SUDDEN

## 2022-01-17 ASSESSMENT — PAIN DESCRIPTION - LOCATION: LOCATION: LEG

## 2022-01-17 ASSESSMENT — PAIN DESCRIPTION - PROGRESSION: CLINICAL_PROGRESSION: GRADUALLY WORSENING

## 2022-01-17 NOTE — ED PROVIDER NOTES
[facility] ED  EMERGENCY DEPARTMENT ENCOUNTER      Pt Name: Corrinne Moynahan  MRN: [id number]  [place] 3/21/1930  Date of evaluation: 1/17/2022  Provider: Chilango Stevenson MD    CHIEF COMPLAINT     Chief Complaint   Patient presents with    Leg Injury     right leg mid thigh, swelling and ecchymosis, pt's  fell landing on her 1-14-22         HISTORY OF PRESENT ILLNESS   (Location/Symptom, Timing/Onset, Context/Setting,Quality, Duration, Modifying Factors, Severity)  Note limiting factors. Corrinne Moynahan is a 80 y.o. female who presents to the emergency department from her extended care facility for evaluation of injury to the right thigh that she sustained 3 or 4 days ago when her  accidentally fell on her leg. Patient underwent ORIF of a right intertrochanteric fracture 2-1/2 months ago. The history is provided by the nursing home, medical records and the patient. Nursing Notes werereviewed. REVIEW OF SYSTEMS    (2-9 systems for level 4, 10 or more for level 5)     Review of Systems   Unable to perform ROS: Dementia       Except as noted above the remainder of the review of systems was reviewed and negative. PAST MEDICAL HISTORY     Past Medical History:   Diagnosis Date    Adenomatous polyp 06/10    With recommendation for repeat 06/15. Also, diverticulosis was noted.      Cataracts, bilateral     Combined forms of age-related cataract of both eyes 10/1/2018    Corneal scar, right eye     Cystocele     history of     Dementia ([facility]     Mini-Mental Status exam 27/30  6/14  declines meds at this point,  MMSE 29/30 on June 13, 2017  MMSE 26/30 4/2018    Difficulty controlling anger 9/16/2018    Dyslipidemia     GERD (gastroesophageal reflux disease)     egd  4/15 ok    Goiter     benign, history of     Hiatal hernia     Hypertension     Hypokalemia     Hypothyroidism     Impaired fasting glucose     Malignant melanoma in situ ([address]     R upper Back 8/17  Migraines     Murmur, functional     Grade 1/6 systolic, history of     Osteopenia     T -1.0 hip, 03/09, T -0.3 spine, 03/09. 1) Repeat DEXA scan 09/12 with T +0.15, T -1.2 at the hip but -1.8 at the mean femoral neck giving her a FRAX score of 4.3 at the hip. Reclast 10/13 and given 2014,2015, and 1/4/2017, on calcium and vit d,  Redo Dexa 10/14 Frax 4.2% , Frax 4.8% 10 yr hip fracture risk on Dexa 1/17  On Reclast    Posterior vitreous detachment of both eyes     Primary open angle glaucoma (POAG) of both eyes, moderate stage 10/1/2018    Sciatica 12/15/2015    Skin cancer     History of multiple skin cancers. Following with Dr. Osito Holm--- invasive squamous cell Ca 8/17 L forearm    Syncope and collapse 1/21/2015    Tremor 12/23/2018    Trichiasis of left lower eyelid without entropion     Visual disturbance 10/26/2017         SURGICALHISTORY       Past Surgical History:   Procedure Laterality Date    APPENDECTOMY      CHOLECYSTECTOMY      Remote.  COLONOSCOPY  06/08/10    COLONOSCOPY  07/18/02    CYSTOCELE REPAIR  02/12/99    Vaginal prolapse, cystocele plus pelvic relaxation.  DILATION AND CURETTAGE OF UTERUS      with hysteroscopy    HIP FRACTURE SURGERY Right 11/2/2021    Right Hip Intertan performed by Susy Kothari MD at [address] [place] [address]    still has ovaries     INTRACAPSULAR CATARACT EXTRACTION Left 8/25/2020    Left Cataract Extraction w/ IOL Implant performed by Juan M Marr MD at [place] [address] Right 10/8/2020    Right Cataract Extraction w/ IOL Implant performed by Juan M Marr MD at [address] [place] [address]  10/07/09    SCC, left nasal sidewall.  OTHER SURGICAL HISTORY  11/11/08    Anterior repair.  OTHER SURGICAL HISTORY  1988    laser cone    UPPER GASTROINTESTINAL ENDOSCOPY  4/8/2015    hiatal hernia    VEIN SURGERY  06/08/09    Laser ablation of right greater saphenous vein.           CURRENT MEDICATIONS       Previous Medications    ACETAMINOPHEN (TYLENOL) 650 MG EXTENDED RELEASE TABLET    Take 650 mg by mouth 2 times daily as needed for Pain    AMLODIPINE (NORVASC) 5 MG TABLET    Take 1 tablet by mouth daily    AMMONIUM LACTATE (AMLACTIN) 12 % CREAM    apply to affected area twice a day if needed    ASPIRIN 81 MG EC TABLET    Take 1 tablet by mouth daily    ATENOLOL (TENORMIN) 25 MG TABLET    TAKE 1 TABLET DAILY    ATORVASTATIN (LIPITOR) 40 MG TABLET    Take 1 tablet by mouth daily    BRILINTA 90 MG TABS TABLET    Take 1 tablet by mouth 2 times daily    CALCIUM-VITAMIN D (OSCAL 500/200 D-3) 500-200 MG-UNIT PER TABLET    Take 1 tablet by mouth daily    FLUOXETINE (PROZAC) 20 MG CAPSULE    TAKE 1 CAPSULE DAILY    FUROSEMIDE (LASIX) 40 MG TABLET    Take 1 tablet by mouth daily Take 40 mg by mouth daily    LEVOTHYROXINE (SYNTHROID) 125 MCG TABLET    TAKE 1 TABLET DAILY    LOSARTAN (COZAAR) 100 MG TABLET    Take 1 tablet by mouth daily    MEMANTINE (NAMENDA) 5 MG TABLET    Take 1 tablet by mouth daily    MULTIPLE VITAMIN (DAILY MULTIVITAMIN PO)    Take 1 tablet by mouth daily     NITROGLYCERIN (NITROSTAT) 0.4 MG SL TABLET    Place 0.4 mg under the tongue every 5 minutes as needed for Chest pain up to max of 3 total doses. If no relief after 3 dose, call 911. OLANZAPINE (ZYPREXA) 2.5 MG TABLET    Take 1 tablet by mouth nightly    POTASSIUM CHLORIDE (KLOR-CON M) 10 MEQ EXTENDED RELEASE TABLET    Take 1 tablet by mouth daily       ALLERGIES     Patient has no known allergies.     FAMILY HISTORY       Family History   Problem Relation Age of Onset    Coronary Art Dis Father     Colon Cancer Brother     Diabetes Neg Hx     Cataracts Neg Hx     Glaucoma Neg Hx           SOCIAL HISTORY       Social History     Socioeconomic History    Marital status:      Spouse name: Aquiles Williamson    Number of children: 3    Years of education: None    Highest education level: None   Occupational History    None   Tobacco Use    Smoking status: Never Smoker    Smokeless tobacco: Never Used   Vaping Use    Vaping Use: Never used   Substance and Sexual Activity    Alcohol use: Yes     Alcohol/week: 0.0 standard drinks     Comment: Infrequently.  Drug use: No    Sexual activity: None   Other Topics Concern    None   Social History Narrative    None     Social Determinants of Health     Financial Resource Strain:     Difficulty of Paying Living Expenses: Not on file   Food Insecurity:     Worried About Running Out of Food in the Last Year: Not on file    Mikael of Food in the Last Year: Not on file   Transportation Needs:     Lack of Transportation (Medical): Not on file    Lack of Transportation (Non-Medical): Not on file   Physical Activity:     Days of Exercise per Week: Not on file    Minutes of Exercise per Session: Not on file   Stress:     Feeling of Stress : Not on file   Social Connections:     Frequency of Communication with Friends and Family: Not on file    Frequency of Social Gatherings with Friends and Family: Not on file    Attends Taoist Services: Not on file    Active Member of [address] [place] [address] or Organizations: Not on file    Attends Club or Organization Meetings: Not on file    Marital Status: Not on file   Intimate Partner Violence:     Fear of Current or Ex-Partner: Not on file    Emotionally Abused: Not on file    Physically Abused: Not on file    Sexually Abused: Not on file   Housing Stability:     Unable to Pay for Housing in the Last Year: Not on file    Number of [place] in the Last Year: Not on file    Unstable Housing in the Last Year: Not on file       SCREENINGS             PHYSICAL EXAM    (up to 7 for level 4, 8 or more for level 5)     ED Triage Vitals [01/17/22 1345]   BP Temp Temp Source Pulse Resp SpO2 Height Weight   (!) 132/42 96.7 °F (35.9 °C) Tympanic 60 16 97 % 5' 4\" (1.626 m) 140 lb (63.5 kg)       Physical Exam  Vitals reviewed.    Constitutional: General: She is not in acute distress. Appearance: She is not ill-appearing. HENT:      Head: Normocephalic and atraumatic. Right Ear: External ear normal.      Left Ear: External ear normal.      Nose: Nose normal.   Eyes:      Extraocular Movements: Extraocular movements intact. Cardiovascular:      Rate and Rhythm: Normal rate and regular rhythm. Pulmonary:      Effort: Pulmonary effort is normal. No respiratory distress. Breath sounds: Normal breath sounds. Abdominal:      Palpations: Abdomen is soft. Tenderness: There is no abdominal tenderness. Musculoskeletal:      Cervical back: Neck supple. Comments: Patient has a large tense hematoma over the anterior aspect of the mid right thigh with underlying tenderness. There is no unusual shortening or abnormal rotation of the right leg. Neurovascular function is intact distally. Skin:     General: Skin is warm and dry. Coloration: Skin is not pale. Neurological:      Mental Status: She is alert. Mental status is at baseline. DIAGNOSTIC RESULTS     EKG: All EKG's are interpreted by the Emergency Department Physician who either signs orCo-signs this chart in the absence of a cardiologist.    RADIOLOGY:     Interpretation per the Radiologist below, ifavailable at the time of this note:    XR FEMUR RIGHT (MIN 2 VIEWS)   Preliminary Result   Stable radiographic appearance of fixation hardware in the right femur. Unchanged alignment of the healing subacute intratrochanteric fracture. ED BEDSIDE ULTRASOUND:   Performed by ED Physician - none    LABS:  Labs Reviewed - No data to display    All other labs were within normal range ornot returned as of this dictation.     EMERGENCY DEPARTMENT COURSE and DIFFERENTIAL DIAGNOSIS/MDM:   Vitals:    Vitals:    01/17/22 1345   BP: (!) 132/42   Pulse: 60   Resp: 16   Temp: 96.7 °F (35.9 °C)   TempSrc: Tympanic   SpO2: 97%   Weight: 140 lb (63.5 kg)   Height: 5' 4\" (1.626 m)            X-rays are negative for fracture. Patient is discharged with supportive care advised. The hematoma is likely partly also due to the fact that she is on 2 antiplatelet drugs. The nursing home is advised to wrap an Ace wrap over the hematoma and use ice packs. MDM    CONSULTS:  None    PROCEDURES:  Unlessotherwise noted below, none     Procedures    FINAL IMPRESSION      1. Contusion of right thigh, initial encounter          DISPOSITION/PLAN   DISPOSITION Decision To Discharge 01/17/2022 02:58:00 PM      PATIENT REFERRED TO:  Rodolfo Krishnamurthy MD  [address] [place]  [phone]            DISCHARGE MEDICATIONS:         Problem List:  Patient Active Problem List   Diagnosis Code    Dyslipidemia E78.5    Osteopenia M85.80    Impaired fasting glucose R73.01    Adenomatous polyp D36.9    Essential hypertension I10    Hypothyroidism E03.9    Sciatica M54.30    GERD (gastroesophageal reflux disease) K21.9    Visual disturbance H53.9    Malignant melanoma in situ ([address] D03.9    Difficulty controlling anger R45.4    Primary open angle glaucoma (POAG) of both eyes, moderate stage H40.1132    PVD (posterior vitreous detachment), both eyes H43.813    Combined forms of age-related cataract of both eyes H25.813    Dementia with behavioral disturbance (HCC) F03.91    Tremor R25.1    Essential tremor G25.0    Dizziness R42    Chronic cerebral ischemia I67.82    Acquired cerebral atrophy ([address] G31.9    Cerebral infarction ([address] I63.9    Anxiety F41.9    Atrial fibrillation ([address] I48.91    Displaced intertrochanteric fracture of right femur, initial encounter for closed fracture ([address] S72.141A    Acute ST segment elevation myocardial infarction ([address] I21.3    Heart murmur R01.1    Migraine G43.909    Female bladder prolapse N81.10    Falls W19. XXXA    Hiatal hernia K44.9    Hypercholesterolemia E78.00    Coronary arteriosclerosis I25.10    Cataract H26.9    Closed right hip fracture, initial encounter ([facility] S72.001A           Summation      Patient Course: Discharged    ED Medicationsadministered this visit:  Medications - No data to display    New Prescriptions from this visit:    New Prescriptions    No medications on file       Follow-up:  Arlene Means MD  [address] [place] [address]  [phone]              Final Impression:   1.  Contusion of right thigh, initial encounter               (Please note that portions of this note were completed with a voice recognitionprogram.  Efforts were made to edit the dictations but occasionally words are mis-transcribed.)    Dany Cabrera MD (electronically signed)  Attending Emergency Physician            Dany Cabrera MD  01/17/22 3240

## 2022-01-17 NOTE — ED NOTES
Writer called [facility], spoke with Dianna Norman, pt report given and transport back to facility requested. He will call back to ED after he finds out about transport.      Manda Luque RN  01/17/22 0738

## 2022-01-18 ENCOUNTER — HOSPITAL ENCOUNTER (OUTPATIENT)
Age: 87
Setting detail: SPECIMEN
Discharge: HOME | End: 2022-01-18
Payer: MEDICARE

## 2022-01-18 PROCEDURE — 87186 SC STD MICRODIL/AGAR DIL: CPT

## 2022-01-18 PROCEDURE — 87086 URINE CULTURE/COLONY COUNT: CPT

## 2022-01-18 PROCEDURE — 87088 URINE BACTERIA CULTURE: CPT

## 2022-01-18 PROCEDURE — 81001 URINALYSIS AUTO W/SCOPE: CPT

## 2022-01-19 LAB
-: ABNORMAL
AMORPHOUS: ABNORMAL
BACTERIA: ABNORMAL
BILIRUBIN URINE: NEGATIVE
CASTS UA: ABNORMAL /LPF (ref 0–2)
COLOR: ABNORMAL
COMMENT UA: ABNORMAL
CRYSTALS, UA: ABNORMAL /HPF
EPITHELIAL CELLS UA: ABNORMAL /HPF (ref 0–5)
GLUCOSE URINE: NEGATIVE
KETONES, URINE: NEGATIVE
LEUKOCYTE ESTERASE, URINE: ABNORMAL
MUCUS: ABNORMAL
NITRITE, URINE: POSITIVE
OTHER OBSERVATIONS UA: ABNORMAL
PH UA: 6 (ref 5–6)
PROTEIN UA: NEGATIVE
RBC UA: ABNORMAL /HPF (ref 0–4)
RENAL EPITHELIAL, UA: ABNORMAL /HPF
SPECIFIC GRAVITY UA: 1.02 (ref 1.01–1.02)
TRICHOMONAS: ABNORMAL
TURBIDITY: ABNORMAL
URINE HGB: ABNORMAL
UROBILINOGEN, URINE: NORMAL
WBC UA: ABNORMAL /HPF (ref 0–4)
YEAST: ABNORMAL

## 2022-01-20 ENCOUNTER — HOSPITAL ENCOUNTER (OUTPATIENT)
Age: 87
Setting detail: SPECIMEN
Discharge: HOME | End: 2022-01-20
Payer: MEDICARE

## 2022-01-20 DIAGNOSIS — E03.9 HYPOTHYROIDISM, UNSPECIFIED TYPE: ICD-10-CM

## 2022-01-20 LAB
CULTURE: ABNORMAL
Lab: ABNORMAL
SPECIMEN DESCRIPTION: ABNORMAL
THYROXINE, FREE: 1.11 NG/DL (ref 0.93–1.7)
TSH SERPL DL<=0.05 MIU/L-ACNC: 15.48 MIU/L (ref 0.3–5)

## 2022-01-20 PROCEDURE — 84439 ASSAY OF FREE THYROXINE: CPT

## 2022-01-20 PROCEDURE — 84443 ASSAY THYROID STIM HORMONE: CPT

## 2022-01-20 PROCEDURE — 36415 COLL VENOUS BLD VENIPUNCTURE: CPT

## 2022-01-20 RX ORDER — AMLODIPINE BESYLATE 5 MG/1
5 TABLET ORAL DAILY
Qty: 30 TABLET | Refills: 0 | Status: SHIPPED | OUTPATIENT
Start: 2022-01-20 | End: 2022-03-17 | Stop reason: SDUPTHER

## 2022-01-24 DIAGNOSIS — E03.9 HYPOTHYROIDISM, UNSPECIFIED TYPE: Primary | ICD-10-CM

## 2022-02-01 ENCOUNTER — OUTSIDE SERVICES (OUTPATIENT)
Dept: INTERNAL MEDICINE | Age: 87
End: 2022-02-01
Payer: MEDICARE

## 2022-02-01 VITALS
TEMPERATURE: 97.9 F | RESPIRATION RATE: 16 BRPM | SYSTOLIC BLOOD PRESSURE: 150 MMHG | DIASTOLIC BLOOD PRESSURE: 61 MMHG | HEART RATE: 55 BPM

## 2022-02-01 DIAGNOSIS — S70.11XD CONTUSION OF RIGHT THIGH, SUBSEQUENT ENCOUNTER: Primary | ICD-10-CM

## 2022-02-01 ASSESSMENT — ENCOUNTER SYMPTOMS
WHEEZING: 0
NAUSEA: 0
COUGH: 0
DIARRHEA: 0
RHINORRHEA: 0
VOMITING: 0

## 2022-02-01 NOTE — PROGRESS NOTES
[facility]      Inge Lu is a 80 y.o. female resident of [facility] who presents today for medical conditions/complaints as noted below. HPI:     HPI     Patient presents for evaluation and management of hematoma. Injury occurred when her  accidentally fell on her leg. Received hematoma to right anterior thigh. She is status post ORIF of right intertrochanteric fracture approximately 2 to 3 months ago. She was evaluated in the ER on January 17. X-rays were negative for fracture. Patient was discharged with supportive care. Patient is noted to be on 2 antiplatelet drugs, which was noted to likely play a role in the development of her rather large hematoma. [facility] staff was advised to wrap with an Ace wrap and use ice packs if needed. Patient and staff note that the hematoma has been improving. Portion on her medial thigh is no longer raised. Anterior thigh continues to be raised, but is not painful. No additional hematoma or bruising has been noted. Otherwise she is doing well without any acute concerns.       Current Outpatient Medications   Medication Sig Dispense Refill    amLODIPine (NORVASC) 5 MG tablet Take 1 tablet by mouth daily 30 tablet 0    atenolol (TENORMIN) 25 MG tablet TAKE 1 TABLET DAILY 90 tablet 3    furosemide (LASIX) 40 MG tablet Take 1 tablet by mouth daily Take 40 mg by mouth daily 90 tablet 3    potassium chloride (KLOR-CON M) 10 MEQ extended release tablet Take 1 tablet by mouth daily 90 tablet 3    levothyroxine (SYNTHROID) 125 MCG tablet TAKE 1 TABLET DAILY 90 tablet 3    memantine (NAMENDA) 5 MG tablet Take 1 tablet by mouth daily 90 tablet 2    acetaminophen (TYLENOL) 650 MG extended release tablet Take 650 mg by mouth 2 times daily as needed for Pain      calcium-vitamin D (OSCAL 500/200 D-3) 500-200 MG-UNIT per tablet Take 1 tablet by mouth daily 90 tablet 3    FLUoxetine (PROZAC) 20 MG capsule TAKE 1 CAPSULE DAILY 90 capsule 3    losartan (COZAAR) 100 MG tablet Take 1 tablet by mouth daily 90 tablet 3    OLANZapine (ZYPREXA) 2.5 MG tablet Take 1 tablet by mouth nightly 90 tablet 3    BRILINTA 90 MG TABS tablet Take 1 tablet by mouth 2 times daily 60 tablet 0    atorvastatin (LIPITOR) 40 MG tablet Take 1 tablet by mouth daily 90 tablet 3    aspirin 81 MG EC tablet Take 1 tablet by mouth daily      nitroGLYCERIN (NITROSTAT) 0.4 MG SL tablet Place 0.4 mg under the tongue every 5 minutes as needed for Chest pain up to max of 3 total doses. If no relief after 3 dose, call 911.  ammonium lactate (AMLACTIN) 12 % cream apply to affected area twice a day if needed 385 g 3    Multiple Vitamin (DAILY MULTIVITAMIN PO) Take 1 tablet by mouth daily        No current facility-administered medications for this visit. No Known Allergies    Health Maintenance   Topic Date Due    Flu vaccine (1) 09/01/2021    Depression Screen  03/09/2022    Lipid screen  08/05/2022    Creatinine monitoring  12/16/2022    Potassium monitoring  12/23/2022    TSH testing  01/20/2023    DTaP/Tdap/Td vaccine (3 - Td or Tdap) 07/22/2025    Shingles Vaccine  Completed    Pneumococcal 65+ years Vaccine  Completed    COVID-19 Vaccine  Completed    Hepatitis A vaccine  Aged Out    Hepatitis B vaccine  Aged Out    Hib vaccine  Aged Out    Meningococcal (ACWY) vaccine  Aged Out       Subjective:      Review of Systems   Constitutional: Negative for chills and fever. HENT: Negative for congestion and rhinorrhea. Respiratory: Negative for cough and wheezing. Cardiovascular: Negative for chest pain and leg swelling. Gastrointestinal: Negative for diarrhea, nausea and vomiting. Neurological: Negative for dizziness and weakness. Objective:     Vitals:    02/01/22 0921   BP: (!) 150/61   Pulse: 55   Resp: 16   Temp: 97.9 °F (36.6 °C)     Physical Exam  Constitutional:       General: She is not in acute distress.   HENT:      Head: Normocephalic and atraumatic. Right Ear: External ear normal.      Left Ear: External ear normal.   Eyes:      Extraocular Movements: Extraocular movements intact. Conjunctiva/sclera: Conjunctivae normal.   Cardiovascular:      Rate and Rhythm: Normal rate and regular rhythm. Pulmonary:      Effort: Pulmonary effort is normal. No respiratory distress. Breath sounds: Normal breath sounds. Skin:            Comments: Hematoma, raised approximately 2 cm as indicated above. Surrounding anterior thigh has ecchymosis present. Neurological:      General: No focal deficit present. Mental Status: She is alert. Mental status is at baseline. Psychiatric:         Mood and Affect: Mood normal.         Behavior: Behavior normal.         Assessment/Plan:        1. Contusion of right thigh, subsequent encounter, improving  - Continue supportive care and monitor for resolution. Return if symptoms worsen or fail to improve. No orders of the defined types were placed in this encounter. No orders of the defined types were placed in this encounter.               Electronically signed by FELICIA Harmon CNP on 2/1/2022 at 2:40 PM

## 2022-02-21 RX ORDER — MEMANTINE HYDROCHLORIDE 5 MG/1
5 TABLET ORAL DAILY
Qty: 90 TABLET | Refills: 3 | Status: SHIPPED | OUTPATIENT
Start: 2022-02-21

## 2022-02-24 ENCOUNTER — HOSPITAL ENCOUNTER (OUTPATIENT)
Age: 87
Setting detail: SPECIMEN
Discharge: HOME | End: 2022-02-24
Payer: MEDICARE

## 2022-02-24 DIAGNOSIS — E03.9 HYPOTHYROIDISM, UNSPECIFIED TYPE: ICD-10-CM

## 2022-02-24 LAB
THYROXINE, FREE: 1.21 NG/DL (ref 0.93–1.7)
TSH SERPL DL<=0.05 MIU/L-ACNC: 11.32 MIU/L (ref 0.3–5)
TSH SERPL DL<=0.05 MIU/L-ACNC: 11.32 MIU/L (ref 0.3–5)

## 2022-02-24 PROCEDURE — 36415 COLL VENOUS BLD VENIPUNCTURE: CPT

## 2022-02-24 PROCEDURE — 84443 ASSAY THYROID STIM HORMONE: CPT

## 2022-02-24 PROCEDURE — 84439 ASSAY OF FREE THYROXINE: CPT

## 2022-02-28 DIAGNOSIS — E03.9 HYPOTHYROIDISM, UNSPECIFIED TYPE: Primary | ICD-10-CM

## 2022-02-28 RX ORDER — OLANZAPINE 2.5 MG/1
TABLET ORAL
Qty: 90 TABLET | Refills: 3 | Status: ON HOLD | OUTPATIENT
Start: 2022-02-28 | End: 2022-09-09 | Stop reason: SDUPTHER

## 2022-03-01 ENCOUNTER — TELEPHONE (OUTPATIENT)
Dept: INTERNAL MEDICINE | Age: 87
End: 2022-03-01

## 2022-03-01 RX ORDER — LEVOTHYROXINE SODIUM 137 UG/1
TABLET ORAL
Qty: 90 TABLET | Refills: 0 | Status: SHIPPED | OUTPATIENT
Start: 2022-03-01 | End: 2022-05-31

## 2022-03-01 NOTE — TELEPHONE ENCOUNTER
I increased dose of levothyroxine to 137 mcg and sent it to [address]   She does have a thyroid function lab ordered for end of April, if they can do it in [place] that would be great

## 2022-03-01 NOTE — TELEPHONE ENCOUNTER
----- Message from Charli Flores MD sent at 2/28/2022  9:41 AM EST -----  Please call her and let her know that her thyroid levels are low, we need to confirm that she's taking the medicine daily first thing in the morning on an empty stomach, if she is, then we need to increase the dose

## 2022-03-01 NOTE — TELEPHONE ENCOUNTER
I spoke to Broderick Loya at [facility] and she stated patient is taking as prescribed and if we do increase dosage to please send medication into [address].

## 2022-03-02 ENCOUNTER — OFFICE VISIT (OUTPATIENT)
Dept: PODIATRY | Age: 87
End: 2022-03-02
Payer: MEDICARE

## 2022-03-02 VITALS — SYSTOLIC BLOOD PRESSURE: 82 MMHG | HEART RATE: 68 BPM | DIASTOLIC BLOOD PRESSURE: 60 MMHG

## 2022-03-02 DIAGNOSIS — I70.203 ATHEROSCLEROSIS OF NATIVE ARTERY OF BOTH LOWER EXTREMITIES, WITH UNSPECIFIED PRESENCE OF CLINICAL MANIFESTATION (HCC): Primary | ICD-10-CM

## 2022-03-02 DIAGNOSIS — B35.1 DERMATOPHYTOSIS OF NAIL: ICD-10-CM

## 2022-03-02 PROCEDURE — 99999 PR OFFICE/OUTPT VISIT,PROCEDURE ONLY: CPT | Performed by: PODIATRIST

## 2022-03-02 PROCEDURE — 11721 DEBRIDE NAIL 6 OR MORE: CPT | Performed by: PODIATRIST

## 2022-03-02 NOTE — PROGRESS NOTES
Subjective:  Chantale Archer is a 80 y.o. female who presents to the office today for routine foot care. Currently denies F/C/N/V. No Known Allergies    Past Medical History:   Diagnosis Date    Adenomatous polyp 06/10    With recommendation for repeat 06/15. Also, diverticulosis was noted.  Cataracts, bilateral     Combined forms of age-related cataract of both eyes 10/1/2018    Corneal scar, right eye     Cystocele     history of     Dementia ([facility] [address]     Mini-Mental Status exam 27/30 6/14  declines meds at this point,  MMSE 29/30 on June 13, 2017  MMSE 26/30 4/2018    Difficulty controlling anger 9/16/2018    Dyslipidemia     GERD (gastroesophageal reflux disease)     egd  4/15 ok    Goiter     benign, history of     Hiatal hernia     Hypertension     Hypokalemia     Hypothyroidism     Impaired fasting glucose     Malignant melanoma in situ ([facility] [address]     R upper Back 8/17    Migraines     Murmur, functional     Grade 1/6 systolic, history of     Osteopenia     T -1.0 hip, 03/09, T -0.3 spine, 03/09. 1) Repeat DEXA scan 09/12 with T +0.15, T -1.2 at the hip but -1.8 at the mean femoral neck giving her a FRAX score of 4.3 at the hip. Reclast 10/13 and given 2014,2015, and 1/4/2017, on calcium and vit d,  Redo Dexa 10/14 Frax 4.2% , Frax 4.8% 10 yr hip fracture risk on Dexa 1/17  On Reclast    Posterior vitreous detachment of both eyes     Primary open angle glaucoma (POAG) of both eyes, moderate stage 10/1/2018    Sciatica 12/15/2015    Skin cancer     History of multiple skin cancers. Following with Dr. Stephie Arce--- invasive squamous cell Ca 8/17 L forearm    Syncope and collapse 1/21/2015    Tremor 12/23/2018    Trichiasis of left lower eyelid without entropion     Visual disturbance 10/26/2017       Prior to Admission medications    Medication Sig Start Date End Date Taking?  Authorizing Provider   levothyroxine (SYNTHROID) 137 MCG tablet TAKE 1 TABLET DAILY 3/1/22  Yes Trina Lora MD OLANZapine (ZYPREXA) 2.5 MG tablet TAKE 1 TABLET NIGHTLY 2/28/22  Yes Marta Krishnamurthy MD   memantine [facility]) 5 MG tablet Take 1 tablet by mouth daily 2/21/22  Yes Marta Krishnamurthy MD   amLODIPine (NORVASC) 5 MG tablet Take 1 tablet by mouth daily 1/20/22  Yes Marta Krishnamutrhy MD   atenolol (TENORMIN) 25 MG tablet TAKE 1 TABLET DAILY 1/10/22  Yes Marta Krishnamurthy MD   furosemide (LASIX) 40 MG tablet Take 1 tablet by mouth daily Take 40 mg by mouth daily 1/4/22  Yes Marta Krishnamurthy MD   potassium chloride (KLOR-CON M) 10 MEQ extended release tablet Take 1 tablet by mouth daily 1/4/22  Yes Marta Krishnamurthy MD   acetaminophen (TYLENOL) 650 MG extended release tablet Take 650 mg by mouth 2 times daily as needed for Pain   Yes Historical Provider, MD   calcium-vitamin D (OSCAL 500/200 D-3) 500-200 MG-UNIT per tablet Take 1 tablet by mouth daily 7/23/21  Yes Marcus Bernal MD   FLUoxetine (PROZAC) 20 MG capsule TAKE 1 CAPSULE DAILY 6/3/21  Yes Marta Krishnamurthy MD   losartan (COZAAR) 100 MG tablet Take 1 tablet by mouth daily 4/6/21  Yes Marta Krishnamurthy MD   BRILINTA 90 MG TABS tablet Take 1 tablet by mouth 2 times daily 2/1/21  Yes Vimal Seals DO   atorvastatin (LIPITOR) 40 MG tablet Take 1 tablet by mouth daily 2/1/21  Yes Marta Krishnamurthy MD   aspirin 81 MG EC tablet Take 1 tablet by mouth daily 12/21/20  Yes Historical Provider, MD   nitroGLYCERIN (NITROSTAT) 0.4 MG SL tablet Place 0.4 mg under the tongue every 5 minutes as needed for Chest pain up to max of 3 total doses. If no relief after 3 dose, call 911.    Yes Historical Provider, MD   ammonium lactate (AMLACTIN) 12 % cream apply to affected area twice a day if needed 4/7/20  Yes Marta Krishnamurthy MD   Multiple Vitamin (DAILY MULTIVITAMIN PO) Take 1 tablet by mouth daily    Yes Historical Provider, MD   ibuprofen (ADVIL;MOTRIN) 600 MG tablet Take 1 tablet by mouth 3 times daily as needed for Pain (Take with food.) 12/27/20 12/27/20  Lakeshia Blackwell MD       Past Surgical History:   Procedure Laterality Date    APPENDECTOMY  CHOLECYSTECTOMY      Remote.  COLONOSCOPY  06/08/10    COLONOSCOPY  07/18/02    CYSTOCELE REPAIR  02/12/99    Vaginal prolapse, cystocele plus pelvic relaxation.  DILATION AND CURETTAGE OF UTERUS      with hysteroscopy    HIP FRACTURE SURGERY Right 11/2/2021    Right Hip Intertan performed by Aj Davila MD at [facility]    still has ovaries     INTRACAPSULAR CATARACT EXTRACTION Left 8/25/2020    Left Cataract Extraction w/ IOL Implant performed by Sivakumar Cary MD at [place] [address] Right 10/8/2020    Right Cataract Extraction w/ IOL Implant performed by Sivakumar Cary MD at [address] [place] [address]  10/07/09    SCC, left nasal sidewall.  OTHER SURGICAL HISTORY  11/11/08    Anterior repair.  OTHER SURGICAL HISTORY  1988    laser cone    UPPER GASTROINTESTINAL ENDOSCOPY  4/8/2015    hiatal hernia    VEIN SURGERY  06/08/09    Laser ablation of right greater saphenous vein. Family History   Problem Relation Age of Onset    Coronary Art Dis Father     Colon Cancer Brother     Diabetes Neg Hx     Cataracts Neg Hx     Glaucoma Neg Hx        Social History     Tobacco Use    Smoking status: Never Smoker    Smokeless tobacco: Never Used   Substance Use Topics    Alcohol use: Yes     Alcohol/week: 0.0 standard drinks     Comment: Infrequently. Review of Systems: All 12 systems reviewed and pertinent positives noted above. Lower Extremity Physical Examination:     Vitals:   Vitals:    03/02/22 1328   BP: 82/60   Pulse: 68     General: AAO x 3 in NAD.      Vascular: DP and PT pulses palpable 2/4, bilateral.  CFT <3 seconds, bilateral.  Hair growth present to the level of the digits, bilateral.  Edema absent, bilateral.  Varicosities absent, bilateral. Erythema absent, bilateral. Distal Rubor absent bilateral.  Temperature within normal limits bilateral. Hyperpigmentation absent bilateral. No atrophic skin.    Neurological: Sensation intact to light touch to level of digits, bilateral.  Protective sensation intact 10/10 sites via 5.07/10g Acampo-Mary Monofilament, bilateral.  negative Tinel's, bilateral.  negative Valleix sign, bilateral.  Vibratory intact bilateral.  Reflexes Decreased bilateral.  Paresthesias negative. Dysthesias negative. Sharp/dull intact bilateral.    Musculoskeletal: Muscle strength 5/5, bilateral.  Pain present upon palpation L hallux nail. Normal medial longitudinal arch, bilateral.  Ankle ROM within normal limits,bilateral.  1st MPJ ROM within normal limits, bilateral.  Dorsally contracted digits present. No other foot deformities. Integument: Warm, dry, supple, Bilateral.  Open lesion absent, Bilateral.  Interdigital maceration absent to web spaces absent, Bilateral.  Nails left 1, 2,3, 4,5 and right 1,2,3,4,5 thickened, dystrophic and crumbly, discolored with subungual debris. Fissures absent, Bilateral. Hyperkeratotic tissue is absent. Sub R 4th toe, and medial l hallux    Asessment: Patient is a 80 y.o. female with:    Diagnosis Orders   1. Atherosclerosis of native artery of both lower extremities, with unspecified presence of clinical manifestation ([address]     2. Dermatophytosis of nail         Plan: Patient examined and evaluated. Current condition and treatment options discussed in detail. All nails as mentioned above debrided in length and thickness. Patient advised OTC methods for treatment of the mycotic nails. Patient will follow up in 10 weeks.

## 2022-03-02 NOTE — PROGRESS NOTES
Foot Care Worksheet  PCP:    Last visit: 2/1/2022  Dalia Givens CNP, Nidia Weeks    Nail description:  Thick , Yellow , Crumbly , Marked limitation of ambulation     Pain resulting from thickened and dystrophy of nail plate Yes    Nails involved  Right   1, 2, 3, 4, 5  (T5-T9)  Left     1, 2, 3, 4, 5  (TA-T4)    Q7 1 Class A Finding - Non traumatic amputation of foot No    Q8 2 Class B Findings - Absent DP pulse No, Absent PT pulse No, Advanced tropic changes (3 required) Yes    Decrease hair growth Yes, Nail changes/thickening Yes, Pigmented changes/discoloration No, Skin texture (thin, shiny) No, Skin color (rubor/redness) No    Q9 1 Class B and 2 Class C Findings  Claudication No, Temperature change Yes, Paresthesia No, Burning No, Edema Yes

## 2022-03-11 ENCOUNTER — TELEPHONE (OUTPATIENT)
Dept: INTERNAL MEDICINE | Age: 87
End: 2022-03-11

## 2022-03-12 ENCOUNTER — HOSPITAL ENCOUNTER (OUTPATIENT)
Age: 87
Setting detail: SPECIMEN
Discharge: HOME | End: 2022-03-12
Payer: MEDICARE

## 2022-03-12 LAB
-: ABNORMAL
BACTERIA: ABNORMAL
BILIRUBIN URINE: NEGATIVE
EPITHELIAL CELLS UA: ABNORMAL /HPF (ref 0–5)
GLUCOSE URINE: NEGATIVE
KETONES, URINE: NEGATIVE
LEUKOCYTE ESTERASE, URINE: ABNORMAL
NITRITE, URINE: POSITIVE
OTHER OBSERVATIONS UA: ABNORMAL
PH UA: 6 (ref 5–6)
PROTEIN UA: NEGATIVE
RBC UA: ABNORMAL /HPF (ref 0–4)
SPECIFIC GRAVITY UA: 1.02 (ref 1.01–1.02)
URINE HGB: ABNORMAL
UROBILINOGEN, URINE: NORMAL
WBC UA: ABNORMAL /HPF (ref 0–4)

## 2022-03-12 PROCEDURE — 87086 URINE CULTURE/COLONY COUNT: CPT

## 2022-03-12 PROCEDURE — 87186 SC STD MICRODIL/AGAR DIL: CPT

## 2022-03-12 PROCEDURE — 87088 URINE BACTERIA CULTURE: CPT

## 2022-03-12 PROCEDURE — 81001 URINALYSIS AUTO W/SCOPE: CPT

## 2022-03-14 ENCOUNTER — TELEPHONE (OUTPATIENT)
Dept: INTERNAL MEDICINE | Age: 87
End: 2022-03-14

## 2022-03-14 LAB
CULTURE: ABNORMAL
SPECIMEN DESCRIPTION: ABNORMAL

## 2022-03-14 RX ORDER — NITROFURANTOIN 25; 75 MG/1; MG/1
100 CAPSULE ORAL 2 TIMES DAILY
Qty: 20 CAPSULE | Refills: 0 | Status: SHIPPED | OUTPATIENT
Start: 2022-03-14 | End: 2022-03-24

## 2022-03-14 RX ORDER — LOSARTAN POTASSIUM 100 MG/1
TABLET ORAL
Qty: 90 TABLET | Refills: 3 | Status: ON HOLD | OUTPATIENT
Start: 2022-03-14 | End: 2022-09-09 | Stop reason: SDUPTHER

## 2022-03-14 NOTE — TELEPHONE ENCOUNTER
I called and spoke with Reilly Roach at [facility], she stated that patient is still having urinary and confusion troubles and she would like a written order faxed over for her to be able to give antibiotics once they are received from pharmacy. It is [facility] out of [address]. Please advise.

## 2022-03-14 NOTE — TELEPHONE ENCOUNTER
----- Message from Charli Flores MD sent at 3/14/2022  9:26 AM EDT -----  Can we call over at [facility] and see how she feels. If she still has urinary symptoms or confusion, then I will send antibiotics to the pharmacy.

## 2022-03-17 RX ORDER — AMLODIPINE BESYLATE 5 MG/1
5 TABLET ORAL DAILY
Qty: 90 TABLET | Refills: 3 | Status: SHIPPED | OUTPATIENT
Start: 2022-03-17 | End: 2022-07-12

## 2022-03-29 ENCOUNTER — OUTSIDE SERVICES (OUTPATIENT)
Dept: INTERNAL MEDICINE | Age: 87
End: 2022-03-29
Payer: MEDICARE

## 2022-03-29 VITALS
RESPIRATION RATE: 16 BRPM | OXYGEN SATURATION: 99 % | DIASTOLIC BLOOD PRESSURE: 50 MMHG | SYSTOLIC BLOOD PRESSURE: 114 MMHG | HEART RATE: 59 BPM | TEMPERATURE: 97.6 F

## 2022-03-29 DIAGNOSIS — I10 ESSENTIAL HYPERTENSION: ICD-10-CM

## 2022-03-29 DIAGNOSIS — D36.9 ADENOMATOUS POLYP: ICD-10-CM

## 2022-03-29 DIAGNOSIS — K21.9 GASTROESOPHAGEAL REFLUX DISEASE WITHOUT ESOPHAGITIS: ICD-10-CM

## 2022-03-29 DIAGNOSIS — G25.0 ESSENTIAL TREMOR: ICD-10-CM

## 2022-03-29 DIAGNOSIS — E78.5 DYSLIPIDEMIA: ICD-10-CM

## 2022-03-29 DIAGNOSIS — R73.01 IMPAIRED FASTING GLUCOSE: Primary | ICD-10-CM

## 2022-03-29 DIAGNOSIS — F03.91 DEMENTIA WITH BEHAVIORAL DISTURBANCE, UNSPECIFIED DEMENTIA TYPE: ICD-10-CM

## 2022-03-29 DIAGNOSIS — I25.10 CORONARY ARTERIOSCLEROSIS: ICD-10-CM

## 2022-03-29 DIAGNOSIS — F41.9 ANXIETY: ICD-10-CM

## 2022-03-29 DIAGNOSIS — S72.001D CLOSED RIGHT HIP FRACTURE, WITH ROUTINE HEALING, SUBSEQUENT ENCOUNTER: ICD-10-CM

## 2022-03-29 DIAGNOSIS — R60.0 BILATERAL LOWER EXTREMITY EDEMA: ICD-10-CM

## 2022-03-29 DIAGNOSIS — I48.91 ATRIAL FIBRILLATION, UNSPECIFIED TYPE (HCC): ICD-10-CM

## 2022-03-29 DIAGNOSIS — E03.9 HYPOTHYROIDISM, UNSPECIFIED TYPE: ICD-10-CM

## 2022-03-29 ASSESSMENT — ENCOUNTER SYMPTOMS
DIARRHEA: 0
SORE THROAT: 0
CHEST TIGHTNESS: 0
NAUSEA: 0
SHORTNESS OF BREATH: 0
VOMITING: 0

## 2022-03-29 NOTE — PROGRESS NOTES
[facility]      Yazmin Lund is a 80 y.o. female resident of [facility] who presents today for medical conditions/complaints as noted below. HPI:     HPI  Patient presents for evaluation and management of chronic medical conditions as noted below. Impaired fasting glucose is well controlled with diet. Hypertension is well controlled on amlodipine, atenolol, Lasix, losartan. Denies chest pain or dyspnea. Dyslipidemia stable on atorvastatin. Most recent TSH elevated at 11.32 with normal free T4. Order was previously placed to recheck TSH in April. Patient remains asymptomatic. Follows with cardiology for atrial fibrillation and coronary arterial sclerosis. Stable on Brilinta without bleeding concerns. GERD is stable without medication. No recent behavioral disturbances. Continues on Prozac and Zyprexa. Follows with orthopedics following right hip fracture. Previously noted hematoma has been improving. Ambulation is also been improving with the use of a walker. History of colon polyps, most recent colonoscopy June 2010. Repeat recommended in 5 years. Not currently pursuing recheck due to patient age and comorbidities.     History of melanoma 2017, no new skin lesions noted by staff. She does have some bilateral lower extremity edema which is generally well controlled with compression stockings. However, she has been need of a new set of compression stockings.     Current Outpatient Medications   Medication Sig Dispense Refill    amLODIPine (NORVASC) 5 MG tablet Take 1 tablet by mouth daily 90 tablet 3    losartan (COZAAR) 100 MG tablet TAKE 1 TABLET DAILY 90 tablet 3    levothyroxine (SYNTHROID) 137 MCG tablet TAKE 1 TABLET DAILY 90 tablet 0    OLANZapine (ZYPREXA) 2.5 MG tablet TAKE 1 TABLET NIGHTLY 90 tablet 3    memantine (NAMENDA) 5 MG tablet Take 1 tablet by mouth daily 90 tablet 3    atenolol (TENORMIN) 25 MG tablet TAKE 1 TABLET DAILY 90 tablet 3    furosemide (LASIX) 40 MG tablet Take 1 tablet by mouth daily Take 40 mg by mouth daily 90 tablet 3    potassium chloride (KLOR-CON M) 10 MEQ extended release tablet Take 1 tablet by mouth daily 90 tablet 3    acetaminophen (TYLENOL) 650 MG extended release tablet Take 650 mg by mouth 2 times daily as needed for Pain      calcium-vitamin D (OSCAL 500/200 D-3) 500-200 MG-UNIT per tablet Take 1 tablet by mouth daily 90 tablet 3    FLUoxetine (PROZAC) 20 MG capsule TAKE 1 CAPSULE DAILY 90 capsule 3    BRILINTA 90 MG TABS tablet Take 1 tablet by mouth 2 times daily 60 tablet 0    atorvastatin (LIPITOR) 40 MG tablet Take 1 tablet by mouth daily 90 tablet 3    aspirin 81 MG EC tablet Take 1 tablet by mouth daily      nitroGLYCERIN (NITROSTAT) 0.4 MG SL tablet Place 0.4 mg under the tongue every 5 minutes as needed for Chest pain up to max of 3 total doses. If no relief after 3 dose, call 911.  ammonium lactate (AMLACTIN) 12 % cream apply to affected area twice a day if needed 385 g 3    Multiple Vitamin (DAILY MULTIVITAMIN PO) Take 1 tablet by mouth daily        No current facility-administered medications for this visit. No Known Allergies    Health Maintenance   Topic Date Due    Depression Screen  Never done    Flu vaccine (1) 09/01/2021    Lipid screen  08/05/2022    Creatinine monitoring  12/16/2022    Potassium monitoring  12/23/2022    TSH testing  02/24/2023    DTaP/Tdap/Td vaccine (3 - Td or Tdap) 07/22/2025    Shingles Vaccine  Completed    Pneumococcal 65+ years Vaccine  Completed    COVID-19 Vaccine  Completed    Hepatitis A vaccine  Aged Out    Hepatitis B vaccine  Aged Out    Hib vaccine  Aged Out    Meningococcal (ACWY) vaccine  Aged Out       Subjective:      Review of Systems   Constitutional: Negative for chills and fever. HENT: Negative for congestion and sore throat. Respiratory: Negative for chest tightness and shortness of breath.     Cardiovascular: Negative for chest pain and leg swelling. Gastrointestinal: Negative for diarrhea, nausea and vomiting. Genitourinary: Negative for difficulty urinating and dysuria. Musculoskeletal: Positive for gait problem. Negative for myalgias. Neurological: Negative for dizziness and headaches. Psychiatric/Behavioral: Negative for confusion and decreased concentration. Due to patient's clinical status, ROS of symptoms was completed by discussion with long term care facility staff, as well as with input from patient. Objective: There were no vitals filed for this visit. Physical Exam  Vitals and nursing note reviewed. Constitutional:       General: She is not in acute distress. Appearance: She is well-developed. HENT:      Head: Normocephalic and atraumatic. Right Ear: External ear normal.      Left Ear: External ear normal.   Eyes:      General: Lids are normal.      Extraocular Movements: Extraocular movements intact. Conjunctiva/sclera: Conjunctivae normal.   Neck:      Thyroid: No thyromegaly. Cardiovascular:      Rate and Rhythm: Normal rate and regular rhythm. Heart sounds: No murmur heard. Pulmonary:      Effort: Pulmonary effort is normal. No accessory muscle usage or respiratory distress. Breath sounds: Normal breath sounds. No wheezing, rhonchi or rales. Abdominal:      Palpations: Abdomen is soft. Tenderness: There is no abdominal tenderness. There is no guarding or rebound. Musculoskeletal:      Cervical back: Neck supple. Right lower leg: No edema. Left lower leg: No edema. Lymphadenopathy:      Cervical: No cervical adenopathy. Skin:     General: Skin is warm and dry. Nails: There is no clubbing. Neurological:      Mental Status: She is alert.       Coordination: Coordination normal.   Psychiatric:         Mood and Affect: Mood normal.         Speech: Speech normal.         Behavior: Behavior normal.         Assessment/Plan: 1. Impaired fasting glucose, stable  - Continue efforts at diet     2. Essential hypertension,  stable  - Continue current medications    3. Dyslipidemia,  stable  - Continue current medications    4. Hypothyroidism, unspecified type, stable  - Recheck TSH in 1 month    5. Atrial fibrillation, unspecified type ([email or web address] stable  6. Coronary arteriosclerosis, stable  - Continue to follow with cardiology    7. Gastroesophageal reflux disease without esophagitis, stable  - Continue to monitor    8. Essential tremor, stable  - Continue to monitor    9. Dementia with behavioral disturbance, unspecified dementia type ([facility] [[email or web address] stable  10. Anxiety,  stable  - Continue current medications    11. Closed right hip fracture, with routine healing, subsequent encounter, stable  - Continue to follow with orthopedics. 12. Adenomatous polyp, stable  - No longer screening due to age and comorbidities, remains asymptomatic. 13. Bilateral lower extremity edema, stable  - Continue compression stockings        Return in about 3 months (around 6/29/2022). No orders of the defined types were placed in this encounter. No orders of the defined types were placed in this encounter.               Electronically signed by FELICIA aHrris CNP on 3/29/2022 at 11:52 AM

## 2022-04-08 ENCOUNTER — OFFICE VISIT (OUTPATIENT)
Dept: CARDIOLOGY | Age: 87
End: 2022-04-08
Payer: MEDICARE

## 2022-04-08 ENCOUNTER — TELEPHONE (OUTPATIENT)
Dept: CARDIOLOGY | Age: 87
End: 2022-04-08

## 2022-04-08 VITALS
DIASTOLIC BLOOD PRESSURE: 68 MMHG | SYSTOLIC BLOOD PRESSURE: 120 MMHG | HEIGHT: 64 IN | HEART RATE: 60 BPM | BODY MASS INDEX: 24.59 KG/M2 | WEIGHT: 144 LBS

## 2022-04-08 DIAGNOSIS — I48.91 ATRIAL FIBRILLATION, UNSPECIFIED TYPE (HCC): Primary | ICD-10-CM

## 2022-04-08 DIAGNOSIS — I10 ESSENTIAL HYPERTENSION: ICD-10-CM

## 2022-04-08 DIAGNOSIS — I25.10 CORONARY ARTERY DISEASE INVOLVING NATIVE CORONARY ARTERY OF NATIVE HEART WITHOUT ANGINA PECTORIS: ICD-10-CM

## 2022-04-08 PROCEDURE — 93010 ELECTROCARDIOGRAM REPORT: CPT | Performed by: INTERNAL MEDICINE

## 2022-04-08 PROCEDURE — 99213 OFFICE O/P EST LOW 20 MIN: CPT | Performed by: INTERNAL MEDICINE

## 2022-04-08 PROCEDURE — 93005 ELECTROCARDIOGRAM TRACING: CPT | Performed by: INTERNAL MEDICINE

## 2022-04-08 ASSESSMENT — ENCOUNTER SYMPTOMS
CHEST TIGHTNESS: 0
EYE ITCHING: 0
SHORTNESS OF BREATH: 0
VOMITING: 0
ABDOMINAL PAIN: 0
ABDOMINAL DISTENTION: 0
BACK PAIN: 0
EYE DISCHARGE: 0

## 2022-04-08 NOTE — TELEPHONE ENCOUNTER
Dr. Beatriz Rubalcava note is on [employer]. See message sent. I did speak to Roberto Sainz [phone] by phone. We discussed pt's memory issues. Pt knew today at visit that she lived with her  at [facility] but got confused when discussing her PCP, referring back to Dr. Norah Lee from years ago. Daughter said she has had similar conversations with pt. Cardiac hernandez pt is stable.

## 2022-04-08 NOTE — TELEPHONE ENCOUNTER
Pt daughter returned call and later today or Monday is fine. Dane Gonzalez also stated a note in [address] would also be fine since she does use [employer].

## 2022-04-08 NOTE — PROGRESS NOTES
Today's Date: 4/8/2022  Patient's Name: Vira Coreas  Patient's age: 80 y.o., 3/21/1930    Subjective: The patient is a 80y.o. year old, , female is in the office for CAD. Had recent STEMI with ALLIE of RCA on 12/2020. Feels very good with no chest pain or SOB, no dizziness or syncope and no palpitations.      Past Medical History:   has a past medical history of Adenomatous polyp, Cataracts, bilateral, Combined forms of age-related cataract of both eyes, Corneal scar, right eye, Cystocele, Dementia ([address] Difficulty controlling anger, Dyslipidemia, GERD (gastroesophageal reflux disease), Goiter, Hiatal hernia, Hypertension, Hypokalemia, Hypothyroidism, Impaired fasting glucose, Malignant melanoma in situ ([address] Migraines, Murmur, functional, Osteopenia, Posterior vitreous detachment of both eyes, Primary open angle glaucoma (POAG) of both eyes, moderate stage, Sciatica, Skin cancer, Syncope and collapse, Tremor, 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 Past Surgical History:   has a past surgical history that includes other surgical history (11/11/08); Cholecystectomy; Colonoscopy (06/08/10); Mohs surgery (10/07/09); Vein Surgery (06/08/09); Colonoscopy (07/18/02); Cystocele repair (02/12/99); Appendectomy; Dilation and curettage of uterus; other surgical history (1988); Hysterectomy (1995); Upper gastrointestinal endoscopy (4/8/2015); Intracapsular cataract extraction (Left, 8/25/2020); Intracapsular cataract extraction (Right, 10/8/2020); and Hip fracture surgery (Right, 11/2/2021). Home Medications:  Prior to Admission medications    Medication Sig Start Date End Date Taking?  Authorizing Provider   amLODIPine (NORVASC) 5 MG tablet Take 1 tablet by mouth daily 3/17/22  Yes Rojas Huang MD   losartan (COZAAR) 100 MG tablet TAKE 1 TABLET DAILY 3/14/22  Yes Rojas Huang MD   levothyroxine (SYNTHROID) 137 MCG tablet TAKE 1 TABLET DAILY 3/1/22  Yes Rojas Huang MD OLANZapine (ZYPREXA) 2.5 MG tablet TAKE 1 TABLET NIGHTLY 2/28/22  Yes Teja Woodson MD   memantine [facility]) 5 MG tablet Take 1 tablet by mouth daily 2/21/22  Yes Teja Woodson MD   atenolol (TENORMIN) 25 MG tablet TAKE 1 TABLET DAILY 1/10/22  Yes Teja Woodson MD   furosemide (LASIX) 40 MG tablet Take 1 tablet by mouth daily Take 40 mg by mouth daily 1/4/22  Yes Teja Woodson MD   potassium chloride (KLOR-CON M) 10 MEQ extended release tablet Take 1 tablet by mouth daily 1/4/22  Yes Teja Woodson MD   acetaminophen (TYLENOL) 650 MG extended release tablet Take 650 mg by mouth 2 times daily as needed for Pain   Yes Historical Provider, MD   calcium-vitamin D (OSCAL 500/200 D-3) 500-200 MG-UNIT per tablet Take 1 tablet by mouth daily 7/23/21  Yes Maximo Yip MD   FLUoxetine (PROZAC) 20 MG capsule TAKE 1 CAPSULE DAILY 6/3/21  Yes Teja Woodson MD   BRILINTA 90 MG TABS tablet Take 1 tablet by mouth 2 times daily 2/1/21  Yes Vimal Seals DO   atorvastatin (LIPITOR) 40 MG tablet Take 1 tablet by mouth daily 2/1/21  Yes Teja Woodson MD   aspirin 81 MG EC tablet Take 1 tablet by mouth daily 12/21/20  Yes Historical Provider, MD   ammonium lactate (AMLACTIN) 12 % cream apply to affected area twice a day if needed 4/7/20  Yes Teja Woodson MD   Multiple Vitamin (DAILY MULTIVITAMIN PO) Take 1 tablet by mouth daily    Yes Historical Provider, MD   nitroGLYCERIN (NITROSTAT) 0.4 MG SL tablet Place 0.4 mg under the tongue every 5 minutes as needed for Chest pain up to max of 3 total doses. If no relief after 3 dose, call 911. Patient not taking: Reported on 4/8/2022    Historical Provider, MD   ibuprofen (ADVIL;MOTRIN) 600 MG tablet Take 1 tablet by mouth 3 times daily as needed for Pain (Take with food.) 12/27/20 12/27/20  Berlin Lee MD       Allergies:  Patient has no known allergies. Social History:   reports that she has never smoked. She has never used smokeless tobacco. She reports current alcohol use.  She reports that she does not use drugs.    Review of Systems:  Review of Systems   Constitutional: Negative for chills, fatigue and fever. HENT: Negative for congestion and dental problem. Eyes: Negative for discharge and itching. Respiratory: Negative for chest tightness and shortness of breath. Cardiovascular: Negative for chest pain and palpitations. Gastrointestinal: Negative for abdominal distention, abdominal pain and vomiting. Endocrine: Negative for cold intolerance and heat intolerance. Musculoskeletal: Negative for arthralgias and back pain. Neurological: Negative for syncope. Psychiatric/Behavioral: Negative for agitation and behavioral problems. Physical Exam:  /68 (Site: Left Upper Arm, Position: Sitting, Cuff Size: Medium Adult)   Pulse 60   Ht 5' 4\" (1.626 m)   Wt 144 lb (65.3 kg)   LMP  (LMP Unknown)   BMI 24.72 kg/m²    Physical Exam  Constitutional:       Appearance: Normal appearance. Cardiovascular:      Rate and Rhythm: Normal rate and regular rhythm. Pulses: Normal pulses. Heart sounds: Normal heart sounds. Pulmonary:      Effort: Pulmonary effort is normal. No respiratory distress. Breath sounds: Normal breath sounds. No stridor. Abdominal:      General: Abdomen is flat. Palpations: Abdomen is soft. Musculoskeletal:         General: No swelling or tenderness. Skin:     Capillary Refill: Capillary refill takes less than 2 seconds. Coloration: Skin is not jaundiced or pale. Neurological:      General: No focal deficit present. Mental Status: She is alert and oriented to person, place, and time. Psychiatric:         Mood and Affect: Mood normal.         Cardiac Data:      Labs:     CBC: No results for input(s): WBC, HGB, HCT, PLT in the last 72 hours. BMP:No results for input(s): NA, K, CO2, BUN, CREATININE, LABGLOM, GLUCOSE in the last 72 hours. PT/INR: No results for input(s): PROTIME, INR in the last 72 hours.   FASTING LIPID PANEL:  Lab Results Component Value Date    HDL 55 08/05/2021    TRIG 45 08/05/2021     LIVER PROFILE:No results for input(s): AST, ALT, LABALBU in the last 72 hours. IMPRESSION:    Patient Active Problem List   Diagnosis    Dyslipidemia    Osteopenia    Impaired fasting glucose    Adenomatous polyp    Essential hypertension    Hypothyroidism    Sciatica    GERD (gastroesophageal reflux disease)    Visual disturbance    Malignant melanoma in situ ([address]    Difficulty controlling anger    Primary open angle glaucoma (POAG) of both eyes, moderate stage    PVD (posterior vitreous detachment), both eyes    Combined forms of age-related cataract of both eyes    Dementia with behavioral disturbance ([address]    Tremor    Essential tremor    Dizziness    Chronic cerebral ischemia    Acquired cerebral atrophy ([address]    Cerebral infarction ([address]    Anxiety    Atrial fibrillation ([address]    Displaced intertrochanteric fracture of right femur, initial encounter for closed fracture ([address]    Acute ST segment elevation myocardial infarction ([address]    Heart murmur    Migraine    Female bladder prolapse    Falls    Hiatal hernia    Hypercholesterolemia    Coronary arteriosclerosis    Cataract    Closed right hip fracture, initial encounter ([address]       Assessment/Plan:  1. STEMI on 12/22020. ALLIE of proximal and distal RCA. Mild to moderate disease of other arteries. PReserved LV systolic function. Echo 11/2021 11/2021 EF 55%. Moderate TR. RVSP 83 mm Hg. 2. HPL. On Statin. LDL 35 on 8/2021. 3. PAF at time of STEMI. Has been in NSR. 4. Essential hypertension. Well controlled. 5. Hypothyroidism.  Followed by PCP           Bharathi Arizmendi MD, MD  [facility]           [phone]

## 2022-04-20 RX ORDER — TICAGRELOR 90 MG/1
90 TABLET ORAL 2 TIMES DAILY
Qty: 180 TABLET | Refills: 3 | Status: SHIPPED | OUTPATIENT
Start: 2022-04-20 | End: 2022-09-06

## 2022-04-20 NOTE — TELEPHONE ENCOUNTER
Pt daughter called to [facility]. She will need them in about 7 days.     [place] [phone]    Last Appt:  4/8/2022  Next Appt:   10/7/2022  Med verified in Epic

## 2022-04-26 ENCOUNTER — OUTSIDE SERVICES (OUTPATIENT)
Dept: INTERNAL MEDICINE | Age: 87
End: 2022-04-26
Payer: MEDICARE

## 2022-04-26 VITALS
SYSTOLIC BLOOD PRESSURE: 127 MMHG | HEART RATE: 58 BPM | TEMPERATURE: 97.5 F | DIASTOLIC BLOOD PRESSURE: 49 MMHG | OXYGEN SATURATION: 91 %

## 2022-04-26 DIAGNOSIS — F03.91 DEMENTIA WITH BEHAVIORAL DISTURBANCE, UNSPECIFIED DEMENTIA TYPE: ICD-10-CM

## 2022-04-26 DIAGNOSIS — S72.001D CLOSED RIGHT HIP FRACTURE, WITH ROUTINE HEALING, SUBSEQUENT ENCOUNTER: Primary | ICD-10-CM

## 2022-04-26 DIAGNOSIS — R53.1 GENERALIZED WEAKNESS: ICD-10-CM

## 2022-04-26 DIAGNOSIS — R26.9 GAIT DIFFICULTY: ICD-10-CM

## 2022-04-26 DIAGNOSIS — R60.0 BILATERAL LOWER EXTREMITY EDEMA: ICD-10-CM

## 2022-04-26 ASSESSMENT — ENCOUNTER SYMPTOMS
WHEEZING: 0
NAUSEA: 0
VOMITING: 0
RHINORRHEA: 0
COUGH: 0
DIARRHEA: 0

## 2022-04-26 NOTE — PROGRESS NOTES
[facility]      Consuelo Kelley is a 80 y.o. female resident of [facility] who presents today for medical conditions/complaints as noted below. HPI:     HPI     Patient presents for face-to-face for physical and Occupational Therapy. History of her right hip fracture requiring hospitalization in December 2021. While her symptoms have improved, she does continue to have some gait difficulty and weakness. She is unable to ambulate without the use of a walker outside of her room. However, she states she does not feel stronger and is able to ambulate within her room without the use of a walker. She denies any recent falls.       Current Outpatient Medications   Medication Sig Dispense Refill    BRILINTA 90 MG TABS tablet Take 1 tablet by mouth 2 times daily 180 tablet 3    amLODIPine (NORVASC) 5 MG tablet Take 1 tablet by mouth daily 90 tablet 3    losartan (COZAAR) 100 MG tablet TAKE 1 TABLET DAILY 90 tablet 3    levothyroxine (SYNTHROID) 137 MCG tablet TAKE 1 TABLET DAILY 90 tablet 0    OLANZapine (ZYPREXA) 2.5 MG tablet TAKE 1 TABLET NIGHTLY 90 tablet 3    memantine (NAMENDA) 5 MG tablet Take 1 tablet by mouth daily 90 tablet 3    atenolol (TENORMIN) 25 MG tablet TAKE 1 TABLET DAILY 90 tablet 3    furosemide (LASIX) 40 MG tablet Take 1 tablet by mouth daily Take 40 mg by mouth daily 90 tablet 3    potassium chloride (KLOR-CON M) 10 MEQ extended release tablet Take 1 tablet by mouth daily 90 tablet 3    acetaminophen (TYLENOL) 650 MG extended release tablet Take 650 mg by mouth 2 times daily as needed for Pain      calcium-vitamin D (OSCAL 500/200 D-3) 500-200 MG-UNIT per tablet Take 1 tablet by mouth daily 90 tablet 3    FLUoxetine (PROZAC) 20 MG capsule TAKE 1 CAPSULE DAILY 90 capsule 3    atorvastatin (LIPITOR) 40 MG tablet Take 1 tablet by mouth daily 90 tablet 3    aspirin 81 MG EC tablet Take 1 tablet by mouth daily      nitroGLYCERIN (NITROSTAT) 0.4 MG SL tablet Place 0.4 mg under the tongue every 5 minutes as needed for Chest pain up to max of 3 total doses. If no relief after 3 dose, call 911. (Patient not taking: Reported on 4/8/2022)      ammonium lactate (AMLACTIN) 12 % cream apply to affected area twice a day if needed 385 g 3    Multiple Vitamin (DAILY MULTIVITAMIN PO) Take 1 tablet by mouth daily        No current facility-administered medications for this visit. No Known Allergies    Health Maintenance   Topic Date Due    Depression Screen  03/09/2022    Lipids  08/05/2022    Flu vaccine (Season Ended) 09/01/2022    Creatinine  12/16/2022    Potassium  12/23/2022    TSH  02/24/2023    DTaP/Tdap/Td vaccine (3 - Td or Tdap) 07/22/2025    Shingles Vaccine  Completed    Pneumococcal 65+ years Vaccine  Completed    COVID-19 Vaccine  Completed    Hepatitis A vaccine  Aged Out    Hepatitis B vaccine  Aged Out    Hib vaccine  Aged Out    Meningococcal (ACWY) vaccine  Aged Out       Subjective:      Review of Systems   Constitutional: Negative for chills and fever. HENT: Negative for congestion and rhinorrhea. Respiratory: Negative for cough and wheezing. Cardiovascular: Positive for leg swelling. Negative for chest pain. Stable BLE edema   Gastrointestinal: Negative for diarrhea, nausea and vomiting. Neurological: Positive for weakness. Negative for dizziness. Due to patient's clinical status, ROS of symptoms was completed by discussion with long term care facility staff, as well as with input from patient. Objective:     Vitals:    04/26/22 1243   BP: (!) 127/49   Pulse: 58   Temp: 97.5 °F (36.4 °C)   SpO2: 91%     Physical Exam  Vitals and nursing note reviewed. Constitutional:       General: She is not in acute distress. Appearance: She is well-developed. Cardiovascular:      Rate and Rhythm: Normal rate and regular rhythm.    Pulmonary:      Effort: Pulmonary effort is normal.      Breath sounds: Normal breath sounds. Abdominal:      Palpations: Abdomen is soft. Tenderness: There is no abdominal tenderness. Lymphadenopathy:      Cervical: No cervical adenopathy. Neurological:      Mental Status: She is alert. Psychiatric:         Behavior: Behavior normal.         Assessment/Plan:        1. Closed right hip fracture, with routine healing, subsequent encounter  2. Gait difficulty  3. Generalized weakness  4. Dementia with behavioral disturbance, unspecified dementia type ([address]  5. Bilateral lower extremity edema  - PT/OT evaluate and treat        No follow-ups on file. No orders of the defined types were placed in this encounter. No orders of the defined types were placed in this encounter.               Electronically signed by FELICIA العراقي CNP on 4/26/2022 at 3:25 PM

## 2022-04-28 ENCOUNTER — HOSPITAL ENCOUNTER (OUTPATIENT)
Age: 87
Setting detail: SPECIMEN
Discharge: HOME | End: 2022-04-28
Payer: MEDICARE

## 2022-04-28 ENCOUNTER — TELEPHONE (OUTPATIENT)
Dept: INTERNAL MEDICINE | Age: 87
End: 2022-04-28

## 2022-04-28 DIAGNOSIS — E03.9 HYPOTHYROIDISM, UNSPECIFIED TYPE: ICD-10-CM

## 2022-04-28 LAB — TSH SERPL DL<=0.05 MIU/L-ACNC: 3.38 UIU/ML (ref 0.3–5)

## 2022-04-28 PROCEDURE — 36415 COLL VENOUS BLD VENIPUNCTURE: CPT

## 2022-04-28 PROCEDURE — 84443 ASSAY THYROID STIM HORMONE: CPT

## 2022-05-03 DIAGNOSIS — E03.9 HYPOTHYROIDISM, UNSPECIFIED TYPE: ICD-10-CM

## 2022-05-03 DIAGNOSIS — E78.5 DYSLIPIDEMIA: ICD-10-CM

## 2022-05-03 DIAGNOSIS — I10 ESSENTIAL HYPERTENSION: Primary | ICD-10-CM

## 2022-05-09 ENCOUNTER — TELEPHONE (OUTPATIENT)
Dept: INTERNAL MEDICINE | Age: 87
End: 2022-05-09
Payer: MEDICARE

## 2022-05-09 DIAGNOSIS — F03.91 DEMENTIA WITH BEHAVIORAL DISTURBANCE, UNSPECIFIED DEMENTIA TYPE: ICD-10-CM

## 2022-05-09 DIAGNOSIS — I48.91 ATRIAL FIBRILLATION, UNSPECIFIED TYPE (HCC): ICD-10-CM

## 2022-05-09 DIAGNOSIS — I10 ESSENTIAL HYPERTENSION: ICD-10-CM

## 2022-05-09 DIAGNOSIS — E03.9 HYPOTHYROIDISM, UNSPECIFIED TYPE: ICD-10-CM

## 2022-05-09 DIAGNOSIS — S72.001D CLOSED RIGHT HIP FRACTURE, WITH ROUTINE HEALING, SUBSEQUENT ENCOUNTER: Primary | ICD-10-CM

## 2022-05-09 DIAGNOSIS — M85.80 OSTEOPENIA, UNSPECIFIED LOCATION: ICD-10-CM

## 2022-05-09 DIAGNOSIS — R60.0 BILATERAL LOWER EXTREMITY EDEMA: ICD-10-CM

## 2022-05-09 PROCEDURE — G0180 MD CERTIFICATION HHA PATIENT: HCPCS | Performed by: NURSE PRACTITIONER

## 2022-05-09 NOTE — TELEPHONE ENCOUNTER
Zachary FRANCIS 2. forms completed- 04/25/22 to 06/23/22- [facility]

## 2022-05-10 ENCOUNTER — OUTSIDE SERVICES (OUTPATIENT)
Dept: INTERNAL MEDICINE | Age: 87
End: 2022-05-10
Payer: MEDICARE

## 2022-05-10 VITALS
SYSTOLIC BLOOD PRESSURE: 140 MMHG | TEMPERATURE: 97.6 F | RESPIRATION RATE: 16 BRPM | OXYGEN SATURATION: 96 % | HEART RATE: 74 BPM | DIASTOLIC BLOOD PRESSURE: 72 MMHG

## 2022-05-10 DIAGNOSIS — L30.9 DERMATITIS: Primary | ICD-10-CM

## 2022-05-10 RX ORDER — PREDNISONE 20 MG/1
20 TABLET ORAL DAILY
Qty: 5 TABLET | Refills: 0 | Status: SHIPPED | OUTPATIENT
Start: 2022-05-10 | End: 2022-05-15

## 2022-05-10 ASSESSMENT — ENCOUNTER SYMPTOMS
DIARRHEA: 0
VOMITING: 0
RHINORRHEA: 0
COUGH: 0
WHEEZING: 0
NAUSEA: 0

## 2022-05-10 NOTE — PROGRESS NOTES
[facility]      Latoya Pettit is a 80 y.o. female resident of [facility] who presents today for medical conditions/complaints as noted below. HPI:     HPI     Patient presents for evaluation and management of rash. This has been noted primarily on her trunk, is rather diffuse. Patient states this is very pruritic. Has had excoriations that have been bleeding on her back. No areas of warmth or exudate. Denies fever, chills, nausea, vomiting, diarrhea.   Symptoms started several days ago and gradually worsened      Current Outpatient Medications   Medication Sig Dispense Refill    predniSONE (DELTASONE) 20 MG tablet Take 1 tablet by mouth daily for 5 days 5 tablet 0    BRILINTA 90 MG TABS tablet Take 1 tablet by mouth 2 times daily 180 tablet 3    amLODIPine (NORVASC) 5 MG tablet Take 1 tablet by mouth daily 90 tablet 3    losartan (COZAAR) 100 MG tablet TAKE 1 TABLET DAILY 90 tablet 3    levothyroxine (SYNTHROID) 137 MCG tablet TAKE 1 TABLET DAILY 90 tablet 0    OLANZapine (ZYPREXA) 2.5 MG tablet TAKE 1 TABLET NIGHTLY 90 tablet 3    memantine (NAMENDA) 5 MG tablet Take 1 tablet by mouth daily 90 tablet 3    atenolol (TENORMIN) 25 MG tablet TAKE 1 TABLET DAILY 90 tablet 3    furosemide (LASIX) 40 MG tablet Take 1 tablet by mouth daily Take 40 mg by mouth daily 90 tablet 3    potassium chloride (KLOR-CON M) 10 MEQ extended release tablet Take 1 tablet by mouth daily 90 tablet 3    acetaminophen (TYLENOL) 650 MG extended release tablet Take 650 mg by mouth 2 times daily as needed for Pain      calcium-vitamin D (OSCAL 500/200 D-3) 500-200 MG-UNIT per tablet Take 1 tablet by mouth daily 90 tablet 3    FLUoxetine (PROZAC) 20 MG capsule TAKE 1 CAPSULE DAILY 90 capsule 3    atorvastatin (LIPITOR) 40 MG tablet Take 1 tablet by mouth daily 90 tablet 3    aspirin 81 MG EC tablet Take 1 tablet by mouth daily      nitroGLYCERIN (NITROSTAT) 0.4 MG SL tablet Place 0.4 mg under the tongue every 5 minutes as needed for Chest pain up to max of 3 total doses. If no relief after 3 dose, call 911.  ammonium lactate (AMLACTIN) 12 % cream apply to affected area twice a day if needed 385 g 3    Multiple Vitamin (DAILY MULTIVITAMIN PO) Take 1 tablet by mouth daily        No current facility-administered medications for this visit. No Known Allergies    Health Maintenance   Topic Date Due    Depression Screen  03/09/2022    Lipids  08/05/2022    Flu vaccine (Season Ended) 09/01/2022    Creatinine  12/16/2022    Potassium  12/23/2022    TSH  04/28/2023    DTaP/Tdap/Td vaccine (3 - Td or Tdap) 07/22/2025    Shingles vaccine  Completed    Pneumococcal 65+ years Vaccine  Completed    COVID-19 Vaccine  Completed    Hepatitis A vaccine  Aged Out    Hepatitis B vaccine  Aged Out    Hib vaccine  Aged Out    Meningococcal (ACWY) vaccine  Aged Out       Subjective:      Review of Systems   Constitutional: Negative for chills and fever. HENT: Negative for congestion and rhinorrhea. Respiratory: Negative for cough and wheezing. Cardiovascular: Negative for chest pain and leg swelling. Gastrointestinal: Negative for diarrhea, nausea and vomiting. Skin: Positive for rash. Neurological: Negative for dizziness and weakness. Due to patient's clinical status, ROS of symptoms was completed by discussion with long term care facility staff, as well as with input from patient. Objective:     Vitals:    05/10/22 1200   BP: (!) 140/72   Pulse: 74   Resp: 16   Temp: 97.6 °F (36.4 °C)   SpO2: 96%     Physical Exam  Constitutional:       General: She is not in acute distress. HENT:      Head: Normocephalic and atraumatic. Right Ear: External ear normal.      Left Ear: External ear normal.   Eyes:      Extraocular Movements: Extraocular movements intact. Conjunctiva/sclera: Conjunctivae normal.   Pulmonary:      Effort: No respiratory distress.    Skin:     Comments: Diffuse papular rash noted on trunk and back. Multiple excoriations noted on back. No warmth or exudate noted at site of any excoriations. Neurological:      General: No focal deficit present. Mental Status: She is alert. Mental status is at baseline. Psychiatric:         Mood and Affect: Mood normal.         Behavior: Behavior normal.         Assessment/Plan:        1. Dermatitis  - Start prednisone as noted below. Contact clinic if no improvement. Return if symptoms worsen or fail to improve. No orders of the defined types were placed in this encounter.     Orders Placed This Encounter   Medications    predniSONE (DELTASONE) 20 MG tablet     Sig: Take 1 tablet by mouth daily for 5 days     Dispense:  5 tablet     Refill:  0               Electronically signed by FELICIA Magaña CNP on 5/10/2022 at 1:35 PM

## 2022-05-17 ENCOUNTER — OUTSIDE SERVICES (OUTPATIENT)
Dept: INTERNAL MEDICINE | Age: 87
End: 2022-05-17
Payer: MEDICARE

## 2022-05-17 VITALS
RESPIRATION RATE: 14 BRPM | DIASTOLIC BLOOD PRESSURE: 62 MMHG | OXYGEN SATURATION: 98 % | SYSTOLIC BLOOD PRESSURE: 148 MMHG | HEART RATE: 56 BPM | TEMPERATURE: 97.5 F

## 2022-05-17 DIAGNOSIS — L30.9 DERMATITIS: Primary | ICD-10-CM

## 2022-05-17 NOTE — PROGRESS NOTES
[facility]      Olivier Cline is a 80 y.o. female resident of [facility] who presents today for medical conditions/complaints as noted below. HPI:     HPI     Patient presents for evaluation and management of rash. Symptoms initially started several weeks ago. Both patient and her  have been treated with permethrin cream for concern for scabies. Neither had any improvement in symptoms after treatment with permethrin. She was also evaluated last week after she had multiple excoriations from itching. Was started on prednisone. Rash had minimal improvement with prednisone. No aggravating or alleviating factors. Otherwise patient is doing well, denies fever, chills, nausea, vomiting, diarrhea.     Current Outpatient Medications   Medication Sig Dispense Refill    BRILINTA 90 MG TABS tablet Take 1 tablet by mouth 2 times daily 180 tablet 3    amLODIPine (NORVASC) 5 MG tablet Take 1 tablet by mouth daily 90 tablet 3    losartan (COZAAR) 100 MG tablet TAKE 1 TABLET DAILY 90 tablet 3    levothyroxine (SYNTHROID) 137 MCG tablet TAKE 1 TABLET DAILY 90 tablet 0    OLANZapine (ZYPREXA) 2.5 MG tablet TAKE 1 TABLET NIGHTLY 90 tablet 3    memantine (NAMENDA) 5 MG tablet Take 1 tablet by mouth daily 90 tablet 3    atenolol (TENORMIN) 25 MG tablet TAKE 1 TABLET DAILY 90 tablet 3    furosemide (LASIX) 40 MG tablet Take 1 tablet by mouth daily Take 40 mg by mouth daily 90 tablet 3    potassium chloride (KLOR-CON M) 10 MEQ extended release tablet Take 1 tablet by mouth daily 90 tablet 3    acetaminophen (TYLENOL) 650 MG extended release tablet Take 650 mg by mouth 2 times daily as needed for Pain      calcium-vitamin D (OSCAL 500/200 D-3) 500-200 MG-UNIT per tablet Take 1 tablet by mouth daily 90 tablet 3    FLUoxetine (PROZAC) 20 MG capsule TAKE 1 CAPSULE DAILY 90 capsule 3    atorvastatin (LIPITOR) 40 MG tablet Take 1 tablet by mouth daily 90 tablet 3    aspirin 81 MG EC tablet Take 1 tablet by mouth daily      nitroGLYCERIN (NITROSTAT) 0.4 MG SL tablet Place 0.4 mg under the tongue every 5 minutes as needed for Chest pain up to max of 3 total doses. If no relief after 3 dose, call 911.  ammonium lactate (AMLACTIN) 12 % cream apply to affected area twice a day if needed 385 g 3    Multiple Vitamin (DAILY MULTIVITAMIN PO) Take 1 tablet by mouth daily        No current facility-administered medications for this visit. No Known Allergies    Health Maintenance   Topic Date Due    Depression Screen  03/09/2022    Lipids  08/05/2022    Flu vaccine (Season Ended) 09/01/2022    DTaP/Tdap/Td vaccine (3 - Td or Tdap) 07/22/2025    Shingles vaccine  Completed    Pneumococcal 65+ years Vaccine  Completed    COVID-19 Vaccine  Completed    Hepatitis A vaccine  Aged Out    Hepatitis B vaccine  Aged Out    Hib vaccine  Aged Out    Meningococcal (ACWY) vaccine  Aged Out       Subjective:      Review of Systems   Constitutional: Negative for chills and fever. HENT: Negative for congestion and rhinorrhea. Respiratory: Negative for cough and wheezing. Cardiovascular: Negative for chest pain and leg swelling. Gastrointestinal: Negative for diarrhea, nausea and vomiting. Skin: Positive for rash. Neurological: Negative for dizziness and weakness. Objective:     Vitals:    05/17/22 0952   BP: (!) 148/62   Pulse: 56   Resp: 14   Temp: 97.5 °F (36.4 °C)   SpO2: 98%     Physical Exam  Constitutional:       General: She is not in acute distress. HENT:      Head: Normocephalic and atraumatic. Right Ear: External ear normal.      Left Ear: External ear normal.   Eyes:      Extraocular Movements: Extraocular movements intact. Conjunctiva/sclera: Conjunctivae normal.   Pulmonary:      Effort: No respiratory distress. Skin:     Comments: Diffuse macular rash noted on back.  Excoriations improved from previous exam.   Neurological:      General: No focal deficit present. Mental Status: She is alert. Mental status is at baseline. Psychiatric:         Mood and Affect: Mood normal.         Behavior: Behavior normal.         Assessment/Plan:        1. Dermatitis  - Refer to dermatology given multiple treatment efforts have failed        No follow-ups on file. No orders of the defined types were placed in this encounter. No orders of the defined types were placed in this encounter.               Electronically signed by FELICIA Jung CNP on 5/18/2022 at 10:09 AM

## 2022-05-18 ASSESSMENT — ENCOUNTER SYMPTOMS
DIARRHEA: 0
COUGH: 0
VOMITING: 0
WHEEZING: 0
NAUSEA: 0
RHINORRHEA: 0

## 2022-05-19 ENCOUNTER — TELEPHONE (OUTPATIENT)
Dept: INTERNAL MEDICINE | Age: 87
End: 2022-05-19
Payer: MEDICARE

## 2022-05-19 DIAGNOSIS — M50.30 DDD (DEGENERATIVE DISC DISEASE), CERVICAL: ICD-10-CM

## 2022-05-19 DIAGNOSIS — I25.10 ASCVD (ARTERIOSCLEROTIC CARDIOVASCULAR DISEASE): ICD-10-CM

## 2022-05-19 DIAGNOSIS — M16.0 PRIMARY OSTEOARTHRITIS OF BOTH HIPS: ICD-10-CM

## 2022-05-19 DIAGNOSIS — I10 PRIMARY HYPERTENSION: ICD-10-CM

## 2022-05-19 DIAGNOSIS — I48.20 CHRONIC ATRIAL FIBRILLATION (HCC): ICD-10-CM

## 2022-05-19 DIAGNOSIS — S72.141D CLOSED DISPLACED INTERTROCHANTERIC FRACTURE OF RIGHT FEMUR WITH ROUTINE HEALING: Primary | ICD-10-CM

## 2022-05-19 PROCEDURE — G0179 MD RECERTIFICATION HHA PT: HCPCS | Performed by: INTERNAL MEDICINE

## 2022-05-31 RX ORDER — LEVOTHYROXINE SODIUM 137 UG/1
TABLET ORAL
Qty: 90 TABLET | Refills: 3 | Status: SHIPPED | OUTPATIENT
Start: 2022-05-31 | End: 2022-09-19

## 2022-05-31 RX ORDER — FLUOXETINE HYDROCHLORIDE 20 MG/1
CAPSULE ORAL
Qty: 90 CAPSULE | Refills: 3 | Status: SHIPPED | OUTPATIENT
Start: 2022-05-31

## 2022-06-14 ENCOUNTER — OUTSIDE SERVICES (OUTPATIENT)
Dept: INTERNAL MEDICINE | Age: 87
End: 2022-06-14
Payer: MEDICARE

## 2022-06-14 VITALS
RESPIRATION RATE: 18 BRPM | TEMPERATURE: 97.9 F | SYSTOLIC BLOOD PRESSURE: 140 MMHG | OXYGEN SATURATION: 95 % | HEART RATE: 74 BPM | DIASTOLIC BLOOD PRESSURE: 82 MMHG

## 2022-06-14 DIAGNOSIS — R60.0 BILATERAL LOWER EXTREMITY EDEMA: Primary | ICD-10-CM

## 2022-06-14 ASSESSMENT — ENCOUNTER SYMPTOMS
VOMITING: 0
WHEEZING: 0
RHINORRHEA: 0
DIARRHEA: 0
SHORTNESS OF BREATH: 0
NAUSEA: 0

## 2022-06-14 NOTE — PROGRESS NOTES
[facility]      Ruby Roldan is a 80 y.o. female resident of [facility] who presents today for medical conditions/complaints as noted below. HPI:     HPI  Patient presents for evaluation and management of bilateral lower extremity edema. This is been a chronic issue for her that has been worsening over the past several weeks. She has tried compression stockings in the past, but patient states she did not get significant benefit from this. She denies any dyspnea. Oxygen saturation 95% on room air. She does follow with cardiology for atrial fibrillation. Right lower she is doing well are. Denies chest pain or dyspnea.     Current Outpatient Medications   Medication Sig Dispense Refill    levothyroxine (SYNTHROID) 137 MCG tablet TAKE 1 TABLET DAILY 90 tablet 3    FLUoxetine (PROZAC) 20 MG capsule TAKE 1 CAPSULE DAILY 90 capsule 3    BRILINTA 90 MG TABS tablet Take 1 tablet by mouth 2 times daily 180 tablet 3    amLODIPine (NORVASC) 5 MG tablet Take 1 tablet by mouth daily 90 tablet 3    losartan (COZAAR) 100 MG tablet TAKE 1 TABLET DAILY 90 tablet 3    OLANZapine (ZYPREXA) 2.5 MG tablet TAKE 1 TABLET NIGHTLY 90 tablet 3    memantine (NAMENDA) 5 MG tablet Take 1 tablet by mouth daily 90 tablet 3    atenolol (TENORMIN) 25 MG tablet TAKE 1 TABLET DAILY 90 tablet 3    furosemide (LASIX) 40 MG tablet Take 1 tablet by mouth daily Take 40 mg by mouth daily 90 tablet 3    potassium chloride (KLOR-CON M) 10 MEQ extended release tablet Take 1 tablet by mouth daily 90 tablet 3    acetaminophen (TYLENOL) 650 MG extended release tablet Take 650 mg by mouth 2 times daily as needed for Pain      calcium-vitamin D (OSCAL 500/200 D-3) 500-200 MG-UNIT per tablet Take 1 tablet by mouth daily 90 tablet 3    atorvastatin (LIPITOR) 40 MG tablet Take 1 tablet by mouth daily 90 tablet 3    aspirin 81 MG EC tablet Take 1 tablet by mouth daily      nitroGLYCERIN (NITROSTAT) 0.4 MG SL tablet Place 0.4 mg under the tongue every 5 minutes as needed for Chest pain up to max of 3 total doses. If no relief after 3 dose, call 911.  ammonium lactate (AMLACTIN) 12 % cream apply to affected area twice a day if needed 385 g 3    Multiple Vitamin (DAILY MULTIVITAMIN PO) Take 1 tablet by mouth daily        No current facility-administered medications for this visit. No Known Allergies    Health Maintenance   Topic Date Due    Depression Screen  03/09/2022    Lipids  08/05/2022    Flu vaccine (Season Ended) 09/01/2022    DTaP/Tdap/Td vaccine (3 - Td or Tdap) 07/22/2025    Shingles vaccine  Completed    Pneumococcal 65+ years Vaccine  Completed    COVID-19 Vaccine  Completed    Hepatitis A vaccine  Aged Out    Hepatitis B vaccine  Aged Out    Hib vaccine  Aged Out    Meningococcal (ACWY) vaccine  Aged Out       Subjective:      Review of Systems   Constitutional: Negative for chills and unexpected weight change. HENT: Negative for congestion and rhinorrhea. Respiratory: Negative for shortness of breath and wheezing. Cardiovascular: Positive for leg swelling. Negative for chest pain. Gastrointestinal: Negative for diarrhea, nausea and vomiting. Neurological: Negative for dizziness and weakness. Objective:     Vitals:    06/14/22 0956   BP: (!) 140/82   Pulse: 74   Resp: 18   Temp: 97.9 °F (36.6 °C)   SpO2: 95%     Physical Exam  Constitutional:       General: She is not in acute distress. HENT:      Head: Normocephalic and atraumatic. Right Ear: External ear normal.      Left Ear: External ear normal.   Eyes:      Extraocular Movements: Extraocular movements intact. Conjunctiva/sclera: Conjunctivae normal.   Cardiovascular:      Rate and Rhythm: Normal rate and regular rhythm. Heart sounds: Murmur heard. Systolic murmur is present with a grade of 2/6. Pulmonary:      Effort: No respiratory distress. Abdominal:      Palpations: Abdomen is soft.

## 2022-06-16 ENCOUNTER — HOSPITAL ENCOUNTER (OUTPATIENT)
Age: 87
Setting detail: SPECIMEN
Discharge: HOME | End: 2022-06-16
Payer: MEDICARE

## 2022-06-16 DIAGNOSIS — E78.5 DYSLIPIDEMIA: ICD-10-CM

## 2022-06-16 DIAGNOSIS — E03.9 HYPOTHYROIDISM, UNSPECIFIED TYPE: ICD-10-CM

## 2022-06-16 DIAGNOSIS — I10 ESSENTIAL HYPERTENSION: ICD-10-CM

## 2022-06-16 LAB
ABSOLUTE EOS #: 1.19 K/UL (ref 0–0.44)
ABSOLUTE IMMATURE GRANULOCYTE: 0.08 K/UL (ref 0–0.3)
ABSOLUTE LYMPH #: 1.43 K/UL (ref 1.1–3.7)
ABSOLUTE MONO #: 1.01 K/UL (ref 0.1–1.2)
ALBUMIN SERPL-MCNC: 3.4 G/DL (ref 3.5–5.2)
ALBUMIN/GLOBULIN RATIO: 1.1 (ref 1–2.5)
ALP BLD-CCNC: 94 U/L (ref 35–104)
ALT SERPL-CCNC: 7 U/L (ref 5–33)
ANION GAP SERPL CALCULATED.3IONS-SCNC: 9 MMOL/L (ref 9–17)
AST SERPL-CCNC: 21 U/L
BASOPHILS # BLD: 1 % (ref 0–2)
BASOPHILS ABSOLUTE: 0.06 K/UL (ref 0–0.2)
BILIRUB SERPL-MCNC: 0.51 MG/DL (ref 0.3–1.2)
BUN BLDV-MCNC: 25 MG/DL (ref 8–23)
BUN/CREAT BLD: 27 (ref 9–20)
CALCIUM SERPL-MCNC: 8.5 MG/DL (ref 8.6–10.4)
CHLORIDE BLD-SCNC: 106 MMOL/L (ref 98–107)
CHOLESTEROL/HDL RATIO: 1.9
CHOLESTEROL: 115 MG/DL
CO2: 28 MMOL/L (ref 20–31)
CREAT SERPL-MCNC: 0.93 MG/DL (ref 0.5–0.9)
EOSINOPHILS RELATIVE PERCENT: 13 % (ref 1–4)
GFR AFRICAN AMERICAN: >60 ML/MIN
GFR NON-AFRICAN AMERICAN: 56 ML/MIN
GFR SERPL CREATININE-BSD FRML MDRD: ABNORMAL ML/MIN/{1.73_M2}
GLUCOSE BLD-MCNC: 106 MG/DL (ref 70–99)
HCT VFR BLD CALC: 33 % (ref 36.3–47.1)
HDLC SERPL-MCNC: 62 MG/DL
HEMOGLOBIN: 10.5 G/DL (ref 11.9–15.1)
IMMATURE GRANULOCYTES: 1 %
LDL CHOLESTEROL: 41 MG/DL (ref 0–130)
LYMPHOCYTES # BLD: 16 % (ref 24–43)
MCH RBC QN AUTO: 29.9 PG (ref 25.2–33.5)
MCHC RBC AUTO-ENTMCNC: 31.8 G/DL (ref 25.2–33.5)
MCV RBC AUTO: 94 FL (ref 82.6–102.9)
MONOCYTES # BLD: 11 % (ref 3–12)
NRBC AUTOMATED: 0 PER 100 WBC
PDW BLD-RTO: 17.1 % (ref 11.8–14.4)
PLATELET # BLD: 252 K/UL (ref 138–453)
PMV BLD AUTO: 10.7 FL (ref 8.1–13.5)
POTASSIUM SERPL-SCNC: 3.5 MMOL/L (ref 3.7–5.3)
PRO-BNP: 1455 PG/ML
RBC # BLD: 3.51 M/UL (ref 3.95–5.11)
RBC # BLD: ABNORMAL 10*6/UL
SEG NEUTROPHILS: 58 % (ref 36–65)
SEGMENTED NEUTROPHILS ABSOLUTE COUNT: 5.17 K/UL (ref 1.5–8.1)
SODIUM BLD-SCNC: 143 MMOL/L (ref 135–144)
THYROXINE, FREE: 1.17 NG/DL (ref 0.93–1.7)
TOTAL PROTEIN: 6.6 G/DL (ref 6.4–8.3)
TRIGL SERPL-MCNC: 58 MG/DL
TSH SERPL DL<=0.05 MIU/L-ACNC: 5.65 UIU/ML (ref 0.3–5)
WBC # BLD: 8.9 K/UL (ref 3.5–11.3)

## 2022-06-16 PROCEDURE — 84443 ASSAY THYROID STIM HORMONE: CPT

## 2022-06-16 PROCEDURE — 83880 ASSAY OF NATRIURETIC PEPTIDE: CPT

## 2022-06-16 PROCEDURE — 36415 COLL VENOUS BLD VENIPUNCTURE: CPT

## 2022-06-16 PROCEDURE — 85025 COMPLETE CBC W/AUTO DIFF WBC: CPT

## 2022-06-16 PROCEDURE — 80061 LIPID PANEL: CPT

## 2022-06-16 PROCEDURE — 80053 COMPREHEN METABOLIC PANEL: CPT

## 2022-06-16 PROCEDURE — 84439 ASSAY OF FREE THYROXINE: CPT

## 2022-07-05 ENCOUNTER — OFFICE VISIT (OUTPATIENT)
Dept: INTERNAL MEDICINE | Age: 87
End: 2022-07-05
Payer: MEDICARE

## 2022-07-05 VITALS
SYSTOLIC BLOOD PRESSURE: 132 MMHG | BODY MASS INDEX: 24.65 KG/M2 | WEIGHT: 144.4 LBS | HEIGHT: 64 IN | RESPIRATION RATE: 16 BRPM | OXYGEN SATURATION: 92 % | DIASTOLIC BLOOD PRESSURE: 74 MMHG | HEART RATE: 52 BPM

## 2022-07-05 DIAGNOSIS — I10 PRIMARY HYPERTENSION: Primary | ICD-10-CM

## 2022-07-05 DIAGNOSIS — N18.30 STAGE 3 CHRONIC KIDNEY DISEASE, UNSPECIFIED WHETHER STAGE 3A OR 3B CKD (HCC): ICD-10-CM

## 2022-07-05 DIAGNOSIS — I50.9 HEART FAILURE, UNSPECIFIED HF CHRONICITY, UNSPECIFIED HEART FAILURE TYPE (HCC): ICD-10-CM

## 2022-07-05 DIAGNOSIS — D03.59 MELANOMA IN SITU OF TORSO EXCLUDING BREAST (HCC): ICD-10-CM

## 2022-07-05 DIAGNOSIS — E78.5 DYSLIPIDEMIA: ICD-10-CM

## 2022-07-05 DIAGNOSIS — E03.9 HYPOTHYROIDISM, UNSPECIFIED TYPE: ICD-10-CM

## 2022-07-05 PROCEDURE — 1123F ACP DISCUSS/DSCN MKR DOCD: CPT | Performed by: INTERNAL MEDICINE

## 2022-07-05 PROCEDURE — 99212 OFFICE O/P EST SF 10 MIN: CPT | Performed by: INTERNAL MEDICINE

## 2022-07-05 PROCEDURE — 99214 OFFICE O/P EST MOD 30 MIN: CPT | Performed by: INTERNAL MEDICINE

## 2022-07-05 SDOH — ECONOMIC STABILITY: FOOD INSECURITY: WITHIN THE PAST 12 MONTHS, THE FOOD YOU BOUGHT JUST DIDN'T LAST AND YOU DIDN'T HAVE MONEY TO GET MORE.: NEVER TRUE

## 2022-07-05 SDOH — ECONOMIC STABILITY: FOOD INSECURITY: WITHIN THE PAST 12 MONTHS, YOU WORRIED THAT YOUR FOOD WOULD RUN OUT BEFORE YOU GOT MONEY TO BUY MORE.: NEVER TRUE

## 2022-07-05 ASSESSMENT — PATIENT HEALTH QUESTIONNAIRE - PHQ9
SUM OF ALL RESPONSES TO PHQ QUESTIONS 1-9: 0
SUM OF ALL RESPONSES TO PHQ9 QUESTIONS 1 & 2: 0
SUM OF ALL RESPONSES TO PHQ QUESTIONS 1-9: 0
1. LITTLE INTEREST OR PLEASURE IN DOING THINGS: 0
2. FEELING DOWN, DEPRESSED OR HOPELESS: 0
SUM OF ALL RESPONSES TO PHQ QUESTIONS 1-9: 0
SUM OF ALL RESPONSES TO PHQ QUESTIONS 1-9: 0
DEPRESSION UNABLE TO ASSESS: FUNCTIONAL CAPACITY MOTIVATION LIMITS ACCURACY

## 2022-07-05 ASSESSMENT — SOCIAL DETERMINANTS OF HEALTH (SDOH): HOW HARD IS IT FOR YOU TO PAY FOR THE VERY BASICS LIKE FOOD, HOUSING, MEDICAL CARE, AND HEATING?: NOT VERY HARD

## 2022-07-05 NOTE — PROGRESS NOTES
[address] [facility]    Visit Date:  7/5/2022  Patient:  Evi Ireland  YOB: 1930    Assessment & Plan     Hypothyroidism: TSH slightly elevated, however she is on a decent dose of levothyroxine relative to her weight. Therefore, will reassess in a month. Hypertension: Blood pressure controlled today. Continue atenolol, losartan and amlodipine. Congestive heart failure: Continue Lasix, she has lower extremity edema, they can use twice a day temporarily if swelling worsens. Advised compression stockings and leg raising. Hyperlipidemia: Continue atorvastatin. We will continue to monitor. Diagnosis Orders   1. Primary hypertension  CBC with Auto Differential    Comprehensive Metabolic Panel   2. Hypothyroidism, unspecified type  TSH with Reflex   3. Dyslipidemia  Lipid Panel   4. Heart failure, unspecified HF chronicity, unspecified heart failure type (HCC)     5. Stage 3 chronic kidney disease, unspecified whether stage 3a or 3b CKD ([facility] [address]     6. Melanoma in situ of torso excluding breast [facility])         Chief Complaint  Other (2mth follow up)    History of Present Illness   Presents to follow-up. Appears to be doing okay. Mentions that she has swelling in both her feet, but denies shortness of breath, chest pain, palpitations at this time. She uses Lasix 40 mg daily, I advised that they can increase it temporarily to twice a day if the swelling worsens. However, I also counseled regarding keeping her legs raised, as well as using compression stockings. She says that she has been using compression stockings daily. Has a history of hypothyroidism, and has elevated TSH, however she is at [facility], and says that they give her her thyroid medicine every day. I advised that we can reassess in a month given that she is at a good dose of levothyroxine for her weight.   Has a history of hypertension, hyperlipidemia that are unchanged    Objective  /74 (Site: Left Upper Arm, Position: Sitting, Cuff Size: Medium Adult)   Pulse 52   Resp 16   Ht 5' 4\" (1.626 m)   Wt 144 lb 6.4 oz (65.5 kg)   LMP  (LMP Unknown)   SpO2 92%   BMI 24.79 kg/m²   Constitutional: No acute distress. Sits in chair comfortably  Eyes: Sclerae nonicteric. No lid lag or proptosis  HENT: External ears normal. No external lesions on the nose  Neck: No gross masses. Trachea visibly midline  Respiratory: Good air entry bilaterally. No wheezing or crackles  Cardiovascular: Normal S1-S2. No murmurs. No lower extremity edema  Gastrointestinal: No visible masses. No visible hernias  Skin: No abnormal rashes. No abnormal masses  Neurologic: Cranial nerves grossly intact  Psychiatric: Normal affect.  Alert and oriented    Medications  Current Outpatient Medications:     levothyroxine (SYNTHROID) 137 MCG tablet, TAKE 1 TABLET DAILY, Disp: 90 tablet, Rfl: 3    FLUoxetine (PROZAC) 20 MG capsule, TAKE 1 CAPSULE DAILY, Disp: 90 capsule, Rfl: 3    amLODIPine (NORVASC) 5 MG tablet, Take 1 tablet by mouth daily, Disp: 90 tablet, Rfl: 3    losartan (COZAAR) 100 MG tablet, TAKE 1 TABLET DAILY, Disp: 90 tablet, Rfl: 3    OLANZapine (ZYPREXA) 2.5 MG tablet, TAKE 1 TABLET NIGHTLY, Disp: 90 tablet, Rfl: 3    memantine (NAMENDA) 5 MG tablet, Take 1 tablet by mouth daily, Disp: 90 tablet, Rfl: 3    atenolol (TENORMIN) 25 MG tablet, TAKE 1 TABLET DAILY, Disp: 90 tablet, Rfl: 3    furosemide (LASIX) 40 MG tablet, Take 1 tablet by mouth daily Take 40 mg by mouth daily, Disp: 90 tablet, Rfl: 3    potassium chloride (KLOR-CON M) 10 MEQ extended release tablet, Take 1 tablet by mouth daily, Disp: 90 tablet, Rfl: 3    calcium-vitamin D (OSCAL 500/200 D-3) 500-200 MG-UNIT per tablet, Take 1 tablet by mouth daily, Disp: 90 tablet, Rfl: 3    atorvastatin (LIPITOR) 40 MG tablet, Take 1 tablet by mouth daily, Disp: 90 tablet, Rfl: 3    aspirin 81 MG EC tablet, Take 1 tablet by mouth daily, Disp: , Rfl:    nitroGLYCERIN (NITROSTAT) 0.4 MG SL tablet, Place 0.4 mg under the tongue every 5 minutes as needed for Chest pain up to max of 3 total doses. If no relief after 3 dose, call 911. , Disp: , Rfl:     ammonium lactate (AMLACTIN) 12 % cream, apply to affected area twice a day if needed, Disp: 385 g, Rfl: 3    Multiple Vitamin (DAILY MULTIVITAMIN PO), Take 1 tablet by mouth daily , Disp: , Rfl:     BRILINTA 90 MG TABS tablet, Take 1 tablet by mouth 2 times daily, Disp: 180 tablet, Rfl: 3    acetaminophen (TYLENOL) 650 MG extended release tablet, Take 650 mg by mouth 2 times daily as needed for Pain, Disp: , Rfl:     Allergies  Patient has no known allergies. Past Medical History:   Diagnosis Date    Adenomatous polyp 06/10    With recommendation for repeat 06/15. Also, diverticulosis was noted.  Cataracts, bilateral     Combined forms of age-related cataract of both eyes 10/1/2018    Corneal scar, right eye     Cystocele     history of     Dementia ([facility] [address]     Mini-Mental Status exam 27/30 6/14  declines meds at this point,  MMSE 29/30 on June 13, 2017  MMSE 26/30 4/2018    Difficulty controlling anger 9/16/2018    Dyslipidemia     GERD (gastroesophageal reflux disease)     egd  4/15 ok    Goiter     benign, history of     Hiatal hernia     Hypertension     Hypokalemia     Hypothyroidism     Impaired fasting glucose     Malignant melanoma in situ ([facility] [address]     R upper Back 8/17    Migraines     Murmur, functional     Grade 1/6 systolic, history of     Osteopenia     T -1.0 hip, 03/09, T -0.3 spine, 03/09. 1) Repeat DEXA scan 09/12 with T +0.15, T -1.2 at the hip but -1.8 at the mean femoral neck giving her a FRAX score of 4.3 at the hip.  Reclast 10/13 and given 2014,2015, and 1/4/2017, on calcium and vit d,  Redo Dexa 10/14 Frax 4.2% , Frax 4.8% 10 yr hip fracture risk on Dexa 1/17  On Reclast    Posterior vitreous detachment of both eyes     Primary open angle glaucoma (POAG) of both eyes, moderate stage 10/1/2018    Sciatica 12/15/2015    Skin cancer     History of multiple skin cancers. Following with Dr. Nancy Agustin--- invasive squamous cell Ca 8/17 L forearm    Syncope and collapse 1/21/2015    Tremor 12/23/2018    Trichiasis of left lower eyelid without entropion     Visual disturbance 10/26/2017     Past Surgical History:   Procedure Laterality Date    APPENDECTOMY      CHOLECYSTECTOMY      Remote.  COLONOSCOPY  06/08/10    COLONOSCOPY  07/18/02    CYSTOCELE REPAIR  02/12/99    Vaginal prolapse, cystocele plus pelvic relaxation.  DILATION AND CURETTAGE OF UTERUS      with hysteroscopy    HIP FRACTURE SURGERY Right 11/2/2021    Right Hip Intertan performed by Riana Ashton MD at [address] ([address])  1995    still has ovaries     INTRACAPSULAR CATARACT EXTRACTION Left 8/25/2020    Left Cataract Extraction w/ IOL Implant performed by Josef Sumner MD at [facility] [address] Right 10/8/2020    Right Cataract Extraction w/ IOL Implant performed by Josef Sumner MD at [address] [place] [address]  10/07/09    SCC, left nasal sidewall.  OTHER SURGICAL HISTORY  11/11/08    Anterior repair.  OTHER SURGICAL HISTORY  1988    laser cone    UPPER GASTROINTESTINAL ENDOSCOPY  4/8/2015    hiatal hernia    VEIN SURGERY  06/08/09    Laser ablation of right greater saphenous vein. Family History  This patient's family history includes Colon Cancer in her brother; Coronary Art Dis in her father. Social History  Betzy Franks  reports that she has never smoked. She has never used smokeless tobacco. She reports current alcohol use. She reports that she does not use drugs. Discussed use, benefit, and side effects of prescribed medications. All questions answered. Patient voiced understanding. Reviewed health maintenance.       Electronically signed Dannie CRISOSTOMO MD on 7/5/2022 at 11:38 AM    This note has been created using the [facility] health record, and dictated in part by [facility] dictation system. Despite the documenting physician's best efforts, there may be errors in spelling, grammar or syntax.

## 2022-07-06 ENCOUNTER — OFFICE VISIT (OUTPATIENT)
Dept: OPTOMETRY | Age: 87
End: 2022-07-06
Payer: COMMERCIAL

## 2022-07-06 DIAGNOSIS — H52.03 HYPEROPIA OF BOTH EYES WITH ASTIGMATISM AND PRESBYOPIA: Primary | ICD-10-CM

## 2022-07-06 DIAGNOSIS — H52.203 HYPEROPIA OF BOTH EYES WITH ASTIGMATISM AND PRESBYOPIA: Primary | ICD-10-CM

## 2022-07-06 DIAGNOSIS — H52.4 HYPEROPIA OF BOTH EYES WITH ASTIGMATISM AND PRESBYOPIA: Primary | ICD-10-CM

## 2022-07-06 DIAGNOSIS — H17.9 CORNEAL SCAR, RIGHT EYE: ICD-10-CM

## 2022-07-06 DIAGNOSIS — Z96.1 PSEUDOPHAKIA OF BOTH EYES: ICD-10-CM

## 2022-07-06 DIAGNOSIS — H35.30 ARMD (AGE RELATED MACULAR DEGENERATION): ICD-10-CM

## 2022-07-06 PROCEDURE — 92014 COMPRE OPH EXAM EST PT 1/>: CPT | Performed by: OPTOMETRIST

## 2022-07-06 PROCEDURE — 92015 DETERMINE REFRACTIVE STATE: CPT | Performed by: OPTOMETRIST

## 2022-07-06 ASSESSMENT — REFRACTION_WEARINGRX
OS_CYLINDER: -2.75
SPECS_TYPE: PAL
OS_ADD: +2.75
OS_AXIS: 068
OD_AXIS: 060
OD_ADD: +2.75
OD_CYLINDER: -1.00
OD_SPHERE: PLANO
OS_SPHERE: +1.50

## 2022-07-06 ASSESSMENT — REFRACTION_MANIFEST
OD_AXIS: 068
OS_CYLINDER: -2.75
OS_AXIS: 060
OD_SPHERE: PLANO
OD_CYLINDER: -0.75
OS_SPHERE: +1.75
OS_ADD: +2.75
OD_ADD: +2.75

## 2022-07-06 ASSESSMENT — TONOMETRY
OS_IOP_MMHG: 16
IOP_METHOD: NON-CONTACT AIR PUFF
OD_IOP_MMHG: 14

## 2022-07-06 ASSESSMENT — VISUAL ACUITY
OD_CC: 20/25
CORRECTION_TYPE: GLASSES
OS_CC: 20/30
OS_CC+: -2
METHOD: SNELLEN - LINEAR
OD_CC+: -2

## 2022-07-06 ASSESSMENT — KERATOMETRY
OD_AXISANGLE2_DEGREES: 091
OS_AXISANGLE2_DEGREES: 006
OD_K2POWER_DIOPTERS: 44.00
OD_K1POWER_DIOPTERS: 42.25
METHOD_AUTO_MANUAL: AUTOMATED
OD_AXISANGLE_DEGREES: 001
OS_K2POWER_DIOPTERS: 43.25
OS_K1POWER_DIOPTERS: 42.50
OS_AXISANGLE_DEGREES: 096

## 2022-07-06 ASSESSMENT — SLIT LAMP EXAM - LIDS
COMMENTS: BLEPHARITIS
COMMENTS: BLEPHARITIS

## 2022-07-06 ASSESSMENT — PACHYMETRY
OD_CT(UM): 507
OS_CT(UM): 511

## 2022-07-06 NOTE — PROGRESS NOTES
Mauro Metz presents today for   Chief Complaint   Patient presents with    Blurred Vision   . HPI     Blurred Vision     In both eyes. Vision is difficult to focus and blurred. Comments     Last Vision Exam: 12/09/20  Last Ophthalmology Exam: 2020 [facility]  Last Filled Glasses Rx: 12/09/20  Insurance: [employer]    Update: Update glasses, just here for a check up. Cataract sx 2020                Current Outpatient Medications   Medication Sig Dispense Refill    levothyroxine (SYNTHROID) 137 MCG tablet TAKE 1 TABLET DAILY 90 tablet 3    FLUoxetine (PROZAC) 20 MG capsule TAKE 1 CAPSULE DAILY 90 capsule 3    BRILINTA 90 MG TABS tablet Take 1 tablet by mouth 2 times daily 180 tablet 3    amLODIPine (NORVASC) 5 MG tablet Take 1 tablet by mouth daily 90 tablet 3    losartan (COZAAR) 100 MG tablet TAKE 1 TABLET DAILY 90 tablet 3    OLANZapine (ZYPREXA) 2.5 MG tablet TAKE 1 TABLET NIGHTLY 90 tablet 3    memantine (NAMENDA) 5 MG tablet Take 1 tablet by mouth daily 90 tablet 3    atenolol (TENORMIN) 25 MG tablet TAKE 1 TABLET DAILY 90 tablet 3    furosemide (LASIX) 40 MG tablet Take 1 tablet by mouth daily Take 40 mg by mouth daily 90 tablet 3    potassium chloride (KLOR-CON M) 10 MEQ extended release tablet Take 1 tablet by mouth daily 90 tablet 3    acetaminophen (TYLENOL) 650 MG extended release tablet Take 650 mg by mouth 2 times daily as needed for Pain      calcium-vitamin D (OSCAL 500/200 D-3) 500-200 MG-UNIT per tablet Take 1 tablet by mouth daily 90 tablet 3    atorvastatin (LIPITOR) 40 MG tablet Take 1 tablet by mouth daily 90 tablet 3    aspirin 81 MG EC tablet Take 1 tablet by mouth daily      nitroGLYCERIN (NITROSTAT) 0.4 MG SL tablet Place 0.4 mg under the tongue every 5 minutes as needed for Chest pain up to max of 3 total doses. If no relief after 3 dose, call 911.        ammonium lactate (AMLACTIN) 12 % cream apply to affected area twice a day if needed 385 g 3    Multiple Vitamin (DAILY MULTIVITAMIN PO) Take 1 tablet by mouth daily        No current facility-administered medications for this visit. Family History   Problem Relation Age of Onset    Coronary Art Dis Father     Colon Cancer Brother     Diabetes Neg Hx     Cataracts Neg Hx     Glaucoma Neg Hx      Social History     Socioeconomic History    Marital status:      Spouse name: Trang Living    Number of children: 3    Years of education: None    Highest education level: None   Occupational History    None   Tobacco Use    Smoking status: Never Smoker    Smokeless tobacco: Never Used   Vaping Use    Vaping Use: Never used   Substance and Sexual Activity    Alcohol use: Yes     Alcohol/week: 0.0 standard drinks     Comment: Infrequently.  Drug use: No    Sexual activity: None   Other Topics Concern    None   Social History Narrative    None     Social Determinants of Health     Financial Resource Strain: Low Risk     Difficulty of Paying Living Expenses: Not very hard   Food Insecurity: No Food Insecurity    Worried About Running Out of Food in the Last Year: Never true    Mikael of Food in the Last Year: Never true   Transportation Needs:     Lack of Transportation (Medical): Not on file    Lack of Transportation (Non-Medical):  Not on file   Physical Activity:     Days of Exercise per Week: Not on file    Minutes of Exercise per Session: Not on file   Stress:     Feeling of Stress : Not on file   Social Connections:     Frequency of Communication with Friends and Family: Not on file    Frequency of Social Gatherings with Friends and Family: Not on file    Attends Sabianist Services: Not on file    Active Member of Clubs or Organizations: Not on file    Attends Club or Organization Meetings: Not on file    Marital Status: Not on file   Intimate Partner Violence:     Fear of Current or Ex-Partner: Not on file    Emotionally Abused: Not on file    Physically Abused: Not on file    Sexually Abused: Not on file   Housing Stability:     Unable to Pay for Housing in the Last Year: Not on file    Number of Places Lived in the Last Year: Not on file    Unstable Housing in the Last Year: Not on file     Past Medical History:   Diagnosis Date    Adenomatous polyp 06/10    With recommendation for repeat 06/15. Also, diverticulosis was noted.  Cataracts, bilateral     Combined forms of age-related cataract of both eyes 10/1/2018    Corneal scar, right eye     Cystocele     history of     Dementia ([facility] [address]     Mini-Mental Status exam 27/30 6/14  declines meds at this point,  MMSE 29/30 on June 13, 2017  MMSE 26/30 4/2018    Difficulty controlling anger 9/16/2018    Dyslipidemia     GERD (gastroesophageal reflux disease)     egd  4/15 ok    Goiter     benign, history of     Hiatal hernia     Hypertension     Hypokalemia     Hypothyroidism     Impaired fasting glucose     Malignant melanoma in situ ([facility] [address]     R upper Back 8/17    Migraines     Murmur, functional     Grade 1/6 systolic, history of     Osteopenia     T -1.0 hip, 03/09, T -0.3 spine, 03/09. 1) Repeat DEXA scan 09/12 with T +0.15, T -1.2 at the hip but -1.8 at the mean femoral neck giving her a FRAX score of 4.3 at the hip. Reclast 10/13 and given 2014,2015, and 1/4/2017, on calcium and vit d,  Redo Dexa 10/14 Frax 4.2% , Frax 4.8% 10 yr hip fracture risk on Dexa 1/17  On Reclast    Posterior vitreous detachment of both eyes     Primary open angle glaucoma (POAG) of both eyes, moderate stage 10/1/2018    Sciatica 12/15/2015    Skin cancer     History of multiple skin cancers.  Following with Dr. Lynn Duarte--- invasive squamous cell Ca 8/17 L forearm    Syncope and collapse 1/21/2015    Tremor 12/23/2018    Trichiasis of left lower eyelid without entropion     Visual disturbance 10/26/2017           Main Ophthalmology Exam     External Exam       Right Left    External Normal Normal          Slit Lamp Exam Right Left    Lids/Lashes Blepharitis Blepharitis    Conjunctiva/Sclera White and quiet White and quiet    Cornea corneal scar central linear curved  Clear; healing well     Anterior Chamber Deep and quiet Deep and quiet    Iris Round and reactive Round and reactive    Lens toric Posterior chamber intraocular lens toric Posterior chamber intraocular lens    Vitreous Normal Normal          Fundus Exam       Right Left    Disc Normal Normal    C/D Ratio 0.35 0.25    Macula drusen ; rpe changes  Normal    Vessels Normal Normal    Periphery Normal Normal             <div id=\"MAIN_EXAM_REVIEWED\"></div>      Tonometry     Tonometry (Non-contact air puff, 10:48 AM)       Right Left    Pressure 14 16      Right / Left  IOPg 14.4 / 13.1  CH 11.9 / 8.3  WS 8.7 / 6.0                   Not recorded       Not recorded         Visual Acuity (Snellen - Linear)       Right Left    Dist cc 20/40 -2 20/30 -2    Near cc 20/25     Correction: Glasses          Pupils     Pupils       Pupils    Right PERRL    Left PERRL              Neuro/Psych     Neuro/Psych     Oriented x3: Yes    Mood/Affect: Normal              Keratometry     Keratometry (Automated)       K1 Axis K2 Axis    Right 42.25 091 44.00 001    Left 42.50 006 43.25 096                  Ophthalmology Exam     Wearing Rx       Sphere Cylinder Axis Add    Right [place] -1.00 060 +2.75    Left +1.50 -2.75 068 +2.75    Age: 2yrs    Type: PAL              Manifest Refraction     Manifest Refraction       Sphere Cylinder [place] Dist [facility] Add Near [facility]    Right [place] -0.75 068 20/40+ +2.75     Left +1.75 -2.75 060 20/25 +2.75 20/30ou          Manifest Refraction #2 (Auto)       Sphere Cylinder [place] Dist [place] Add Near [facility]    Right +0.25 -0.50 069       Left +1.75 -2.50 060                  Final Rx       Sphere Cylinder [place] Dist [facility] Add Near [facility]    Right [place] -0.75 068 20/40+ +2.75     Left +1.75 -2.75 060 20/25 +2.75 20/30ou    Type: Bifocal    Expiration Date: 7/6/2024            No orders of the defined types were placed in this encounter. IMPRESSION:  1. Hyperopia of both eyes with astigmatism and presbyopia    2. Pseudophakia of both eyes; toric lenses ou     3. Corneal scar, right eye    4. ARMD (age related macular degeneration)mild         PLAN:    1. New glasses recommended  2. 3.4. monitor on next visits       There are no Patient Instructions on file for this visit.    Return in about 2 years (around 7/6/2024) for complete eye exam.

## 2022-07-12 ENCOUNTER — OUTSIDE SERVICES (OUTPATIENT)
Dept: INTERNAL MEDICINE | Age: 87
End: 2022-07-12
Payer: MEDICARE

## 2022-07-12 VITALS
SYSTOLIC BLOOD PRESSURE: 115 MMHG | TEMPERATURE: 97.7 F | HEART RATE: 80 BPM | OXYGEN SATURATION: 96 % | RESPIRATION RATE: 16 BRPM | DIASTOLIC BLOOD PRESSURE: 62 MMHG

## 2022-07-12 DIAGNOSIS — L30.9 DERMATITIS: Primary | ICD-10-CM

## 2022-07-12 RX ORDER — AMLODIPINE BESYLATE 5 MG/1
5 TABLET ORAL DAILY
COMMUNITY

## 2022-07-12 RX ORDER — FUROSEMIDE 20 MG/1
20 TABLET ORAL DAILY
COMMUNITY
End: 2022-07-12 | Stop reason: CLARIF

## 2022-07-12 RX ORDER — AMLODIPINE BESYLATE 10 MG/1
10 TABLET ORAL DAILY
COMMUNITY
End: 2022-07-12 | Stop reason: CLARIF

## 2022-07-12 RX ORDER — HYDROXYZINE HYDROCHLORIDE 25 MG/1
25 TABLET, FILM COATED ORAL EVERY 8 HOURS PRN
Status: ON HOLD | COMMUNITY
End: 2022-09-06 | Stop reason: HOSPADM

## 2022-07-12 RX ORDER — TRIAMCINOLONE ACETONIDE 1 MG/G
CREAM TOPICAL
Status: ON HOLD | COMMUNITY
End: 2022-09-06 | Stop reason: HOSPADM

## 2022-07-12 RX ORDER — FUROSEMIDE 40 MG/1
40 TABLET ORAL DAILY
Status: ON HOLD | COMMUNITY
End: 2022-09-06 | Stop reason: HOSPADM

## 2022-07-12 NOTE — PROGRESS NOTES
[facility]      Prisca Boykin is a 80 y.o. female resident of [facility] who presents today for medical conditions/complaints as noted below. HPI:     HPI   Patient presents via virtual visit with assisted living staff member for evaluation and management of rash. Staff has noted macular rash on back, has been worsening over the past week. She has had multiple treatments for rash over the past several months. Both patient and her  have been treated with permethrin cream for concern for scabies. Neither had any improvement in symptoms after treatment with permethrin. Was later started on prednisone, with minimal improvement. She was subsequently referred to dermatology and was started on ammonium lactate, patient complains that this burns at times. Currently rash is somewhat pruritic. Otherwise she is doing well. Denies fever, chills, nausea, vomiting, or diarrhea.      Current Outpatient Medications   Medication Sig Dispense Refill    OXYGEN Inhale 3.5 L into the lungs to keep sat > 91%      Nutritional Supplements (NUTRITIONAL DRINK PO) Mighty shakes bid with meals      calcium carbonate-vitamin D3 (CALCIUM 600 + D) 600-400 MG-UNIT TABS per tab Take 1 tablet by mouth daily      amLODIPine (NORVASC) 5 MG tablet Take 5 mg by mouth daily      furosemide (LASIX) 40 MG tablet Take 40 mg by mouth daily      hydrOXYzine HCl (ATARAX) 25 MG tablet Take 25 mg by mouth every 8 hours as needed for Itching      triamcinolone (KENALOG) 0.1 % cream Apply topically to affected areas on back and arms 1-2 times daily as needed for itch until resolved      levothyroxine (SYNTHROID) 137 MCG tablet TAKE 1 TABLET DAILY 90 tablet 3    FLUoxetine (PROZAC) 20 MG capsule TAKE 1 CAPSULE DAILY 90 capsule 3    BRILINTA 90 MG TABS tablet Take 1 tablet by mouth 2 times daily 180 tablet 3    losartan (COZAAR) 100 MG tablet TAKE 1 TABLET DAILY 90 tablet 3    OLANZapine (ZYPREXA) 2.5 MG tablet TAKE 1 TABLET NIGHTLY 90 tablet 3    memantine (NAMENDA) 5 MG tablet Take 1 tablet by mouth daily 90 tablet 3    atenolol (TENORMIN) 25 MG tablet TAKE 1 TABLET DAILY 90 tablet 3    potassium chloride (KLOR-CON M) 10 MEQ extended release tablet Take 1 tablet by mouth daily 90 tablet 3    acetaminophen (TYLENOL) 650 MG extended release tablet Take 650 mg by mouth 2 times daily as needed for Pain      atorvastatin (LIPITOR) 40 MG tablet Take 1 tablet by mouth daily 90 tablet 3    aspirin 81 MG EC tablet Take 1 tablet by mouth daily      nitroGLYCERIN (NITROSTAT) 0.4 MG SL tablet Place 0.4 mg under the tongue every 5 minutes as needed for Chest pain up to max of 3 total doses. If no relief after 3 dose, call 911.       ammonium lactate (AMLACTIN) 12 % cream apply to affected area twice a day if needed 385 g 3    Multiple Vitamin (DAILY MULTIVITAMIN PO) Take 1 tablet by mouth daily        No current facility-administered medications for this visit. No Known Allergies    Health Maintenance   Topic Date Due    COVID-19 Vaccine (4 - Booster for Pfizer series) 02/27/2022    Flu vaccine (1) 09/01/2022    Lipids  06/16/2023    Depression Screen  07/05/2023    DTaP/Tdap/Td vaccine (3 - Td or Tdap) 07/22/2025    Shingles vaccine  Completed    Pneumococcal 65+ years Vaccine  Completed    Hepatitis A vaccine  Aged Out    Hepatitis B vaccine  Aged Out    Hib vaccine  Aged Out    Meningococcal (ACWY) vaccine  Aged Out       Subjective:      Review of Systems   Constitutional:  Negative for chills and fever. HENT:  Negative for congestion and rhinorrhea. Respiratory:  Negative for cough and wheezing. Cardiovascular:  Negative for chest pain and leg swelling. Gastrointestinal:  Negative for diarrhea, nausea and vomiting. Skin:  Positive for rash. Neurological:  Negative for dizziness and weakness.      Due to patient's clinical status, ROS of symptoms was completed by discussion with long term care facility staff, as well as with input from patient. Objective:     Vitals:    07/12/22 0621   BP: 115/62   Pulse: 80   Resp: 16   Temp: 97.7 °F (36.5 °C)   SpO2: 96%     Physical Exam  Constitutional:       General: She is not in acute distress. HENT:      Head: Normocephalic and atraumatic. Right Ear: External ear normal.      Left Ear: External ear normal.   Eyes:      Extraocular Movements: Extraocular movements intact. Conjunctiva/sclera: Conjunctivae normal.   Pulmonary:      Effort: No respiratory distress. Skin:     Comments: Macular rash noted on trunk, primarily on back. Several excoriations noted on back. Neurological:      General: No focal deficit present. Mental Status: She is alert. Mental status is at baseline. Psychiatric:         Mood and Affect: Mood normal.         Behavior: Behavior normal.       Assessment/Plan:        1. Dermatitis  - Advised emollients such as Eucerin lotion. If this is ineffective, discussed with staff that can consider petroleum jelly for several days and then resume Eucerin. No follow-ups on file. No orders of the defined types were placed in this encounter. No orders of the defined types were placed in this encounter. Electronically signed by FELICIA Morgan CNP on 7/15/2022 at 1:32 PM     Ana Reid, was evaluated through a synchronous (real-time) audio-video encounter. The patient (or guardian if applicable) is aware that this is a billable service, which includes applicable co-pays. This Virtual Visit was conducted with patient's (and/or legal guardian's) consent. The visit was conducted pursuant to the emergency declaration under the [address] [place] [address], [address] [place] [address] authority and the [employer] General Act. Patient identification was verified, and a caregiver was present when appropriate.    The patient was located at Home: [address] [facility]

## 2022-07-15 ASSESSMENT — ENCOUNTER SYMPTOMS
VOMITING: 0
WHEEZING: 0
NAUSEA: 0
RHINORRHEA: 0
COUGH: 0
DIARRHEA: 0

## 2022-07-27 ENCOUNTER — TELEPHONE (OUTPATIENT)
Dept: INTERNAL MEDICINE | Age: 87
End: 2022-07-27

## 2022-07-27 NOTE — TELEPHONE ENCOUNTER
Patients daughter send a message on mychart through Dar Banks (pt ) she did not mean to do this. Conversation was concerning Dar Santamaria. Patients daughter wants to set up to talk to Helen Gaspar about her moms meds. She feels she is taking too many of them. States she would like a phone call but is not urgent it would even be best if it was sometime next week. She lives out of town and isn't able to do a visit with her mom in office or out at nursing home.

## 2022-07-29 ENCOUNTER — TELEPHONE (OUTPATIENT)
Dept: INTERNAL MEDICINE | Age: 87
End: 2022-07-29

## 2022-08-11 ENCOUNTER — HOSPITAL ENCOUNTER (OUTPATIENT)
Age: 87
Setting detail: SPECIMEN
Discharge: HOME | End: 2022-08-11
Payer: MEDICARE

## 2022-08-11 DIAGNOSIS — N18.30 STAGE 3 CHRONIC KIDNEY DISEASE, UNSPECIFIED WHETHER STAGE 3A OR 3B CKD (HCC): ICD-10-CM

## 2022-08-11 DIAGNOSIS — I10 ESSENTIAL HYPERTENSION: ICD-10-CM

## 2022-08-11 DIAGNOSIS — E87.6 HYPOKALEMIA: Primary | ICD-10-CM

## 2022-08-11 LAB
ALBUMIN SERPL-MCNC: 3.6 G/DL (ref 3.5–5.2)
ALBUMIN/GLOBULIN RATIO: 1.2 (ref 1–2.5)
ALP BLD-CCNC: 101 U/L (ref 35–104)
ALT SERPL-CCNC: 8 U/L (ref 5–33)
ANION GAP SERPL CALCULATED.3IONS-SCNC: 8 MMOL/L (ref 9–17)
AST SERPL-CCNC: 19 U/L
BILIRUB SERPL-MCNC: 0.54 MG/DL (ref 0.3–1.2)
BUN BLDV-MCNC: 13 MG/DL (ref 8–23)
BUN/CREAT BLD: 17 (ref 9–20)
CALCIUM SERPL-MCNC: 8.6 MG/DL (ref 8.6–10.4)
CHLORIDE BLD-SCNC: 105 MMOL/L (ref 98–107)
CO2: 29 MMOL/L (ref 20–31)
CREAT SERPL-MCNC: 0.76 MG/DL (ref 0.5–0.9)
GFR AFRICAN AMERICAN: >60 ML/MIN
GFR NON-AFRICAN AMERICAN: >60 ML/MIN
GFR SERPL CREATININE-BSD FRML MDRD: ABNORMAL ML/MIN/{1.73_M2}
GLUCOSE BLD-MCNC: 115 MG/DL (ref 70–99)
POTASSIUM SERPL-SCNC: 3.4 MMOL/L (ref 3.7–5.3)
SODIUM BLD-SCNC: 142 MMOL/L (ref 135–144)
TOTAL PROTEIN: 6.5 G/DL (ref 6.4–8.3)
TSH SERPL DL<=0.05 MIU/L-ACNC: 16.36 UIU/ML (ref 0.3–5)

## 2022-08-11 PROCEDURE — 84443 ASSAY THYROID STIM HORMONE: CPT

## 2022-08-11 PROCEDURE — 80053 COMPREHEN METABOLIC PANEL: CPT

## 2022-08-11 PROCEDURE — 36415 COLL VENOUS BLD VENIPUNCTURE: CPT

## 2022-08-11 PROCEDURE — 84439 ASSAY OF FREE THYROXINE: CPT

## 2022-08-11 RX ORDER — POTASSIUM CHLORIDE 20 MEQ/1
20 TABLET, EXTENDED RELEASE ORAL DAILY
Status: ON HOLD | COMMUNITY
End: 2022-09-06 | Stop reason: HOSPADM

## 2022-08-12 LAB — THYROXINE, FREE: 1.06 NG/DL (ref 0.93–1.7)

## 2022-08-15 DIAGNOSIS — E03.9 HYPOTHYROIDISM, UNSPECIFIED TYPE: Primary | ICD-10-CM

## 2022-08-17 ENCOUNTER — HOSPITAL ENCOUNTER (OUTPATIENT)
Age: 87
Setting detail: SPECIMEN
Discharge: HOME | End: 2022-08-17
Payer: MEDICARE

## 2022-08-17 DIAGNOSIS — N18.30 STAGE 3 CHRONIC KIDNEY DISEASE, UNSPECIFIED WHETHER STAGE 3A OR 3B CKD (HCC): ICD-10-CM

## 2022-08-17 DIAGNOSIS — E87.6 HYPOKALEMIA: ICD-10-CM

## 2022-08-17 DIAGNOSIS — I10 ESSENTIAL HYPERTENSION: ICD-10-CM

## 2022-08-17 LAB — POTASSIUM SERPL-SCNC: 4 MMOL/L (ref 3.7–5.3)

## 2022-08-17 PROCEDURE — 84132 ASSAY OF SERUM POTASSIUM: CPT

## 2022-08-17 PROCEDURE — 36415 COLL VENOUS BLD VENIPUNCTURE: CPT

## 2022-08-26 ENCOUNTER — HOSPITAL ENCOUNTER (OUTPATIENT)
Age: 87
Setting detail: SPECIMEN
Discharge: HOME | End: 2022-08-26
Payer: MEDICARE

## 2022-08-26 LAB
BACTERIA: ABNORMAL
BILIRUBIN URINE: NEGATIVE
EPITHELIAL CELLS UA: ABNORMAL /HPF (ref 0–5)
GLUCOSE URINE: NEGATIVE
KETONES, URINE: NEGATIVE
LEUKOCYTE ESTERASE, URINE: NEGATIVE
NITRITE, URINE: NEGATIVE
PH UA: 6 (ref 5–6)
PROTEIN UA: NEGATIVE
RBC UA: ABNORMAL /HPF (ref 0–4)
SPECIFIC GRAVITY UA: 1.01 (ref 1.01–1.02)
URINE HGB: NEGATIVE
UROBILINOGEN, URINE: NORMAL
WBC UA: ABNORMAL /HPF (ref 0–4)

## 2022-08-26 PROCEDURE — 87086 URINE CULTURE/COLONY COUNT: CPT

## 2022-08-26 PROCEDURE — 81001 URINALYSIS AUTO W/SCOPE: CPT

## 2022-08-26 PROCEDURE — 87088 URINE BACTERIA CULTURE: CPT

## 2022-08-26 PROCEDURE — 87186 SC STD MICRODIL/AGAR DIL: CPT

## 2022-08-28 LAB
CULTURE: ABNORMAL
SPECIMEN DESCRIPTION: ABNORMAL

## 2022-09-03 ENCOUNTER — APPOINTMENT (OUTPATIENT)
Dept: CT IMAGING | Age: 87
DRG: 481 | End: 2022-09-03
Payer: MEDICARE

## 2022-09-03 ENCOUNTER — HOSPITAL ENCOUNTER (EMERGENCY)
Age: 87
Discharge: ANOTHER HOSPITAL | End: 2022-09-03
Attending: EMERGENCY MEDICINE
Payer: MEDICARE

## 2022-09-03 ENCOUNTER — APPOINTMENT (OUTPATIENT)
Dept: GENERAL RADIOLOGY | Age: 87
DRG: 481 | End: 2022-09-03
Payer: MEDICARE

## 2022-09-03 ENCOUNTER — APPOINTMENT (OUTPATIENT)
Dept: GENERAL RADIOLOGY | Age: 87
End: 2022-09-03
Payer: MEDICARE

## 2022-09-03 ENCOUNTER — APPOINTMENT (OUTPATIENT)
Dept: CT IMAGING | Age: 87
End: 2022-09-03
Payer: MEDICARE

## 2022-09-03 ENCOUNTER — HOSPITAL ENCOUNTER (INPATIENT)
Age: 87
LOS: 6 days | Discharge: NURSING, REHAB OR LONG-TERM CARE | DRG: 481 | End: 2022-09-09
Attending: EMERGENCY MEDICINE | Admitting: SURGERY
Payer: MEDICARE

## 2022-09-03 VITALS
HEART RATE: 78 BPM | TEMPERATURE: 97.6 F | OXYGEN SATURATION: 97 % | DIASTOLIC BLOOD PRESSURE: 82 MMHG | BODY MASS INDEX: 24.75 KG/M2 | WEIGHT: 145 LBS | SYSTOLIC BLOOD PRESSURE: 127 MMHG | RESPIRATION RATE: 16 BRPM | HEIGHT: 64 IN

## 2022-09-03 DIAGNOSIS — S72.142A CLOSED DISPLACED INTERTROCHANTERIC FRACTURE OF LEFT FEMUR, INITIAL ENCOUNTER (HCC): Primary | ICD-10-CM

## 2022-09-03 DIAGNOSIS — S72.002A CLOSED LEFT HIP FRACTURE, INITIAL ENCOUNTER (HCC): Primary | ICD-10-CM

## 2022-09-03 PROBLEM — S72.145B: Status: ACTIVE | Noted: 2022-09-03

## 2022-09-03 LAB
ABSOLUTE EOS #: 0.58 K/UL (ref 0–0.44)
ABSOLUTE IMMATURE GRANULOCYTE: 0.13 K/UL (ref 0–0.3)
ABSOLUTE LYMPH #: 0.89 K/UL (ref 1.1–3.7)
ABSOLUTE MONO #: 0.86 K/UL (ref 0.1–1.2)
ALBUMIN SERPL-MCNC: 4 G/DL (ref 3.5–5.2)
ALBUMIN/GLOBULIN RATIO: 1 (ref 1–2.5)
ALP BLD-CCNC: 115 U/L (ref 35–104)
ALT SERPL-CCNC: 11 U/L (ref 5–33)
ANION GAP SERPL CALCULATED.3IONS-SCNC: 9 MMOL/L (ref 9–17)
AST SERPL-CCNC: 27 U/L
BASOPHILS # BLD: 1 % (ref 0–2)
BASOPHILS ABSOLUTE: 0.09 K/UL (ref 0–0.2)
BILIRUB SERPL-MCNC: 0.6 MG/DL (ref 0.3–1.2)
BUN BLDV-MCNC: 22 MG/DL (ref 8–23)
BUN/CREAT BLD: 25 (ref 9–20)
CALCIUM SERPL-MCNC: 8.9 MG/DL (ref 8.6–10.4)
CHLORIDE BLD-SCNC: 103 MMOL/L (ref 98–107)
CO2: 26 MMOL/L (ref 20–31)
CREAT SERPL-MCNC: 0.88 MG/DL (ref 0.5–0.9)
EOSINOPHILS RELATIVE PERCENT: 6 % (ref 1–4)
GFR AFRICAN AMERICAN: >60 ML/MIN
GFR NON-AFRICAN AMERICAN: >60 ML/MIN
GFR SERPL CREATININE-BSD FRML MDRD: ABNORMAL ML/MIN/{1.73_M2}
GLUCOSE BLD-MCNC: 160 MG/DL (ref 70–99)
HCT VFR BLD CALC: 39.7 % (ref 36.3–47.1)
HEMOGLOBIN: 12.8 G/DL (ref 11.9–15.1)
IMMATURE GRANULOCYTES: 1 %
INR BLD: 1.1
LYMPHOCYTES # BLD: 9 % (ref 24–43)
MCH RBC QN AUTO: 30.5 PG (ref 25.2–33.5)
MCHC RBC AUTO-ENTMCNC: 32.2 G/DL (ref 25.2–33.5)
MCV RBC AUTO: 94.7 FL (ref 82.6–102.9)
MONOCYTES # BLD: 8 % (ref 3–12)
NRBC AUTOMATED: 0 PER 100 WBC
PDW BLD-RTO: 15.1 % (ref 11.8–14.4)
PLATELET # BLD: 277 K/UL (ref 138–453)
PMV BLD AUTO: 9.9 FL (ref 8.1–13.5)
POTASSIUM SERPL-SCNC: 4.3 MMOL/L (ref 3.7–5.3)
PROTHROMBIN TIME: 13.6 SEC (ref 11.5–14.2)
RBC # BLD: 4.19 M/UL (ref 3.95–5.11)
RBC # BLD: ABNORMAL 10*6/UL
SARS-COV-2, RAPID: NOT DETECTED
SEG NEUTROPHILS: 75 % (ref 36–65)
SEGMENTED NEUTROPHILS ABSOLUTE COUNT: 7.95 K/UL (ref 1.5–8.1)
SODIUM BLD-SCNC: 138 MMOL/L (ref 135–144)
SPECIMEN DESCRIPTION: NORMAL
TOTAL PROTEIN: 7.9 G/DL (ref 6.4–8.3)
WBC # BLD: 10.5 K/UL (ref 3.5–11.3)

## 2022-09-03 PROCEDURE — 82306 VITAMIN D 25 HYDROXY: CPT

## 2022-09-03 PROCEDURE — 99285 EMERGENCY DEPT VISIT HI MDM: CPT | Performed by: EMERGENCY MEDICINE

## 2022-09-03 PROCEDURE — 36415 COLL VENOUS BLD VENIPUNCTURE: CPT

## 2022-09-03 PROCEDURE — 96376 TX/PRO/DX INJ SAME DRUG ADON: CPT | Performed by: EMERGENCY MEDICINE

## 2022-09-03 PROCEDURE — 96374 THER/PROPH/DIAG INJ IV PUSH: CPT

## 2022-09-03 PROCEDURE — 96374 THER/PROPH/DIAG INJ IV PUSH: CPT | Performed by: EMERGENCY MEDICINE

## 2022-09-03 PROCEDURE — 85025 COMPLETE CBC W/AUTO DIFF WBC: CPT

## 2022-09-03 PROCEDURE — 85610 PROTHROMBIN TIME: CPT

## 2022-09-03 PROCEDURE — 6370000000 HC RX 637 (ALT 250 FOR IP)

## 2022-09-03 PROCEDURE — 6360000004 HC RX CONTRAST MEDICATION: Performed by: HEALTH CARE PROVIDER

## 2022-09-03 PROCEDURE — 71045 X-RAY EXAM CHEST 1 VIEW: CPT

## 2022-09-03 PROCEDURE — 3209999900 CT LUMBAR SPINE TRAUMA RECONSTRUCTION

## 2022-09-03 PROCEDURE — 6360000002 HC RX W HCPCS

## 2022-09-03 PROCEDURE — 51702 INSERT TEMP BLADDER CATH: CPT | Performed by: EMERGENCY MEDICINE

## 2022-09-03 PROCEDURE — 2580000003 HC RX 258

## 2022-09-03 PROCEDURE — 93005 ELECTROCARDIOGRAM TRACING: CPT | Performed by: EMERGENCY MEDICINE

## 2022-09-03 PROCEDURE — 84484 ASSAY OF TROPONIN QUANT: CPT

## 2022-09-03 PROCEDURE — 3209999900 CT THORACIC SPINE TRAUMA RECONSTRUCTION

## 2022-09-03 PROCEDURE — 99285 EMERGENCY DEPT VISIT HI MDM: CPT

## 2022-09-03 PROCEDURE — 6360000002 HC RX W HCPCS: Performed by: HEALTH CARE PROVIDER

## 2022-09-03 PROCEDURE — 73552 X-RAY EXAM OF FEMUR 2/>: CPT

## 2022-09-03 PROCEDURE — 6360000002 HC RX W HCPCS: Performed by: EMERGENCY MEDICINE

## 2022-09-03 PROCEDURE — 80053 COMPREHEN METABOLIC PANEL: CPT

## 2022-09-03 PROCEDURE — 96375 TX/PRO/DX INJ NEW DRUG ADDON: CPT | Performed by: EMERGENCY MEDICINE

## 2022-09-03 PROCEDURE — 2060000000 HC ICU INTERMEDIATE R&B

## 2022-09-03 PROCEDURE — 73502 X-RAY EXAM HIP UNI 2-3 VIEWS: CPT

## 2022-09-03 PROCEDURE — 71260 CT THORAX DX C+: CPT

## 2022-09-03 PROCEDURE — 72125 CT NECK SPINE W/O DYE: CPT

## 2022-09-03 PROCEDURE — 87635 SARS-COV-2 COVID-19 AMP PRB: CPT

## 2022-09-03 PROCEDURE — 70450 CT HEAD/BRAIN W/O DYE: CPT

## 2022-09-03 RX ORDER — SENNA PLUS 8.6 MG/1
1 TABLET ORAL DAILY PRN
Status: DISCONTINUED | OUTPATIENT
Start: 2022-09-03 | End: 2022-09-06

## 2022-09-03 RX ORDER — SODIUM CHLORIDE 9 MG/ML
INJECTION, SOLUTION INTRAVENOUS PRN
Status: DISCONTINUED | OUTPATIENT
Start: 2022-09-03 | End: 2022-09-06

## 2022-09-03 RX ORDER — MORPHINE SULFATE 2 MG/ML
2 INJECTION, SOLUTION INTRAMUSCULAR; INTRAVENOUS ONCE
Status: COMPLETED | OUTPATIENT
Start: 2022-09-03 | End: 2022-09-03

## 2022-09-03 RX ORDER — FENTANYL CITRATE 50 UG/ML
50 INJECTION, SOLUTION INTRAMUSCULAR; INTRAVENOUS ONCE
Status: COMPLETED | OUTPATIENT
Start: 2022-09-03 | End: 2022-09-03

## 2022-09-03 RX ORDER — SODIUM CHLORIDE 0.9 % (FLUSH) 0.9 %
5-40 SYRINGE (ML) INJECTION PRN
Status: DISCONTINUED | OUTPATIENT
Start: 2022-09-03 | End: 2022-09-10 | Stop reason: HOSPADM

## 2022-09-03 RX ORDER — ONDANSETRON 2 MG/ML
4 INJECTION INTRAMUSCULAR; INTRAVENOUS EVERY 6 HOURS PRN
Status: DISCONTINUED | OUTPATIENT
Start: 2022-09-03 | End: 2022-09-06

## 2022-09-03 RX ORDER — POLYETHYLENE GLYCOL 3350 17 G/17G
17 POWDER, FOR SOLUTION ORAL DAILY
Status: DISCONTINUED | OUTPATIENT
Start: 2022-09-03 | End: 2022-09-09

## 2022-09-03 RX ORDER — SODIUM CHLORIDE 0.9 % (FLUSH) 0.9 %
5-40 SYRINGE (ML) INJECTION EVERY 12 HOURS SCHEDULED
Status: DISCONTINUED | OUTPATIENT
Start: 2022-09-03 | End: 2022-09-06

## 2022-09-03 RX ORDER — FENTANYL CITRATE 50 UG/ML
50 INJECTION, SOLUTION INTRAMUSCULAR; INTRAVENOUS ONCE
Status: DISCONTINUED | OUTPATIENT
Start: 2022-09-03 | End: 2022-09-03

## 2022-09-03 RX ORDER — ONDANSETRON 2 MG/ML
INJECTION INTRAMUSCULAR; INTRAVENOUS
Status: COMPLETED
Start: 2022-09-03 | End: 2022-09-03

## 2022-09-03 RX ORDER — ONDANSETRON 4 MG/1
4 TABLET, ORALLY DISINTEGRATING ORAL EVERY 8 HOURS PRN
Status: DISCONTINUED | OUTPATIENT
Start: 2022-09-03 | End: 2022-09-06

## 2022-09-03 RX ORDER — ONDANSETRON 2 MG/ML
4 INJECTION INTRAMUSCULAR; INTRAVENOUS ONCE
Status: COMPLETED | OUTPATIENT
Start: 2022-09-03 | End: 2022-09-03

## 2022-09-03 RX ORDER — OXYCODONE HYDROCHLORIDE 5 MG/1
2.5 TABLET ORAL EVERY 6 HOURS PRN
Status: DISCONTINUED | OUTPATIENT
Start: 2022-09-03 | End: 2022-09-09

## 2022-09-03 RX ORDER — ACETAMINOPHEN 500 MG
1000 TABLET ORAL EVERY 8 HOURS SCHEDULED
Status: DISCONTINUED | OUTPATIENT
Start: 2022-09-03 | End: 2022-09-10 | Stop reason: HOSPADM

## 2022-09-03 RX ADMIN — FENTANYL CITRATE 50 MCG: 50 INJECTION, SOLUTION INTRAMUSCULAR; INTRAVENOUS at 18:36

## 2022-09-03 RX ADMIN — MORPHINE SULFATE 2 MG: 2 INJECTION, SOLUTION INTRAMUSCULAR; INTRAVENOUS at 10:42

## 2022-09-03 RX ADMIN — FENTANYL CITRATE 50 MCG: 50 INJECTION, SOLUTION INTRAMUSCULAR; INTRAVENOUS at 09:30

## 2022-09-03 RX ADMIN — IOPAMIDOL 75 ML: 755 INJECTION, SOLUTION INTRAVENOUS at 17:08

## 2022-09-03 RX ADMIN — ONDANSETRON 4 MG: 2 INJECTION INTRAMUSCULAR; INTRAVENOUS at 09:45

## 2022-09-03 RX ADMIN — SODIUM CHLORIDE, PRESERVATIVE FREE 10 ML: 5 INJECTION INTRAVENOUS at 23:04

## 2022-09-03 RX ADMIN — FENTANYL CITRATE 50 MCG: 50 INJECTION, SOLUTION INTRAMUSCULAR; INTRAVENOUS at 08:26

## 2022-09-03 RX ADMIN — MORPHINE SULFATE 2 MG: 2 INJECTION, SOLUTION INTRAMUSCULAR; INTRAVENOUS at 13:59

## 2022-09-03 RX ADMIN — ACETAMINOPHEN 1000 MG: 500 TABLET ORAL at 23:23

## 2022-09-03 SDOH — HEALTH STABILITY: PHYSICAL HEALTH: ON AVERAGE, HOW MANY MINUTES DO YOU ENGAGE IN EXERCISE AT THIS LEVEL?: 0 MIN

## 2022-09-03 SDOH — ECONOMIC STABILITY: INCOME INSECURITY: HOW HARD IS IT FOR YOU TO PAY FOR THE VERY BASICS LIKE FOOD, HOUSING, MEDICAL CARE, AND HEATING?: NOT HARD AT ALL

## 2022-09-03 SDOH — SOCIAL STABILITY: SOCIAL NETWORK: HOW OFTEN DO YOU GET TOGETHER WITH FRIENDS OR RELATIVES?: THREE TIMES A WEEK

## 2022-09-03 SDOH — SOCIAL STABILITY: SOCIAL NETWORK: IN A TYPICAL WEEK, HOW MANY TIMES DO YOU TALK ON THE PHONE WITH FAMILY, FRIENDS, OR NEIGHBORS?: THREE TIMES A WEEK

## 2022-09-03 SDOH — SOCIAL STABILITY: SOCIAL NETWORK: HOW OFTEN DO YOU ATTENT MEETINGS OF THE CLUB OR ORGANIZATION YOU BELONG TO?: NEVER

## 2022-09-03 SDOH — HEALTH STABILITY: PHYSICAL HEALTH: ON AVERAGE, HOW MANY DAYS PER WEEK DO YOU ENGAGE IN MODERATE TO STRENUOUS EXERCISE (LIKE A BRISK WALK)?: 0 DAYS

## 2022-09-03 SDOH — SOCIAL STABILITY: SOCIAL NETWORK: ARE YOU MARRIED, WIDOWED, DIVORCED, SEPARATED, NEVER MARRIED, OR LIVING WITH A PARTNER?: WIDOWED

## 2022-09-03 SDOH — HEALTH STABILITY: MENTAL HEALTH: HOW OFTEN DO YOU HAVE A DRINK CONTAINING ALCOHOL?: NEVER

## 2022-09-03 SDOH — SOCIAL STABILITY: SOCIAL NETWORK: HOW OFTEN DO YOU ATTEND CHURCH OR RELIGIOUS SERVICES?: MORE THAN 4 TIMES PER YEAR

## 2022-09-03 SDOH — SOCIAL STABILITY: SOCIAL INSECURITY
WITHIN THE LAST YEAR, HAVE TO BEEN RAPED OR FORCED TO HAVE ANY KIND OF SEXUAL ACTIVITY BY YOUR PARTNER OR EX-PARTNER?: NO

## 2022-09-03 SDOH — ECONOMIC STABILITY: TRANSPORTATION INSECURITY
IN THE PAST 12 MONTHS, HAS THE LACK OF TRANSPORTATION KEPT YOU FROM MEDICAL APPOINTMENTS OR FROM GETTING MEDICATIONS?: NO

## 2022-09-03 SDOH — SOCIAL STABILITY: SOCIAL INSECURITY: WITHIN THE LAST YEAR, HAVE YOU BEEN HUMILIATED OR EMOTIONALLY ABUSED IN OTHER WAYS BY YOUR PARTNER OR EX-PARTNER?: NO

## 2022-09-03 SDOH — ECONOMIC STABILITY: FOOD INSECURITY: WITHIN THE PAST 12 MONTHS, YOU WORRIED THAT YOUR FOOD WOULD RUN OUT BEFORE YOU GOT MONEY TO BUY MORE.: NEVER TRUE

## 2022-09-03 SDOH — SOCIAL STABILITY: SOCIAL INSECURITY
WITHIN THE LAST YEAR, HAVE YOU BEEN KICKED, HIT, SLAPPED, OR OTHERWISE PHYSICALLY HURT BY YOUR PARTNER OR EX-PARTNER?: NO

## 2022-09-03 SDOH — HEALTH STABILITY: MENTAL HEALTH
STRESS IS WHEN SOMEONE FEELS TENSE, NERVOUS, ANXIOUS, OR CAN'T SLEEP AT NIGHT BECAUSE THEIR MIND IS TROUBLED. HOW STRESSED ARE YOU?: NOT AT ALL

## 2022-09-03 SDOH — ECONOMIC STABILITY: INCOME INSECURITY: IN THE LAST 12 MONTHS, WAS THERE A TIME WHEN YOU WERE NOT ABLE TO PAY THE MORTGAGE OR RENT ON TIME?: NO

## 2022-09-03 SDOH — SOCIAL STABILITY: SOCIAL NETWORK
DO YOU BELONG TO ANY CLUBS OR ORGANIZATIONS SUCH AS CHURCH GROUPS UNIONS, FRATERNAL OR ATHLETIC GROUPS, OR SCHOOL GROUPS?: NO

## 2022-09-03 SDOH — ECONOMIC STABILITY: HOUSING INSECURITY
IN THE LAST 12 MONTHS, WAS THERE A TIME WHEN YOU DID NOT HAVE A STEADY PLACE TO SLEEP OR SLEPT IN A SHELTER (INCLUDING NOW)?: NO

## 2022-09-03 SDOH — SOCIAL STABILITY: SOCIAL INSECURITY: WITHIN THE LAST YEAR, HAVE YOU BEEN AFRAID OF YOUR PARTNER OR EX-PARTNER?: NO

## 2022-09-03 SDOH — ECONOMIC STABILITY: TRANSPORTATION INSECURITY
IN THE PAST 12 MONTHS, HAS LACK OF TRANSPORTATION KEPT YOU FROM MEETINGS, WORK, OR FROM GETTING THINGS NEEDED FOR DAILY LIVING?: NO

## 2022-09-03 SDOH — ECONOMIC STABILITY: FOOD INSECURITY: WITHIN THE PAST 12 MONTHS, THE FOOD YOU BOUGHT JUST DIDN'T LAST AND YOU DIDN'T HAVE MONEY TO GET MORE.: NEVER TRUE

## 2022-09-03 ASSESSMENT — ENCOUNTER SYMPTOMS
ABDOMINAL PAIN: 0
COUGH: 0
BLOOD IN STOOL: 0
NAUSEA: 0
EYE PAIN: 0
DIARRHEA: 0
CONSTIPATION: 0
SHORTNESS OF BREATH: 0
BACK PAIN: 0
VOMITING: 0

## 2022-09-03 ASSESSMENT — PAIN SCALES - GENERAL
PAINLEVEL_OUTOF10: 9
PAINLEVEL_OUTOF10: 10
PAINLEVEL_OUTOF10: 9
PAINLEVEL_OUTOF10: 10
PAINLEVEL_OUTOF10: 8

## 2022-09-03 ASSESSMENT — LIFESTYLE VARIABLES: HOW OFTEN DO YOU HAVE A DRINK CONTAINING ALCOHOL: NEVER

## 2022-09-03 ASSESSMENT — PAIN DESCRIPTION - ORIENTATION
ORIENTATION: LEFT

## 2022-09-03 ASSESSMENT — PAIN DESCRIPTION - LOCATION
LOCATION: HIP

## 2022-09-03 ASSESSMENT — PAIN DESCRIPTION - DESCRIPTORS: DESCRIPTORS: ACHING;SHARP;SHOOTING

## 2022-09-03 ASSESSMENT — PAIN DESCRIPTION - FREQUENCY
FREQUENCY: CONTINUOUS
FREQUENCY: CONTINUOUS

## 2022-09-03 ASSESSMENT — PAIN DESCRIPTION - ONSET: ONSET: ON-GOING

## 2022-09-03 ASSESSMENT — PAIN - FUNCTIONAL ASSESSMENT
PAIN_FUNCTIONAL_ASSESSMENT: PREVENTS OR INTERFERES SOME ACTIVE ACTIVITIES AND ADLS
PAIN_FUNCTIONAL_ASSESSMENT: 0-10

## 2022-09-03 ASSESSMENT — PAIN DESCRIPTION - PAIN TYPE
TYPE: ACUTE PAIN
TYPE: ACUTE PAIN

## 2022-09-03 NOTE — ED PROVIDER NOTES
[facility]     Emergency Department     Faculty Attestation    I performed a history and physical examination of the patient and discussed management with the resident. I have reviewed and agree with the residents findings including all diagnostic interpretations, and treatment plans as written at the time of my review. Any areas of disagreement are noted on the chart. I was personally present for the key portions of any procedures. I have documented in the chart those procedures where I was not present during the key portions. For Physician Assistant/ Nurse Practitioner cases/documentation I have personally evaluated this patient and have completed at least one if not all key elements of the E/M (history, physical exam, and MDM). Additional findings are as noted. Primary Care Physician: Grace Hess MD    History: This is a 80 y.o. female who presents to the Emergency Department with complaint of. The patient fell and was seen at outlying facility noted to have a closed left hip fracture. Patient is transferred for trauma and orthopedic evaluation. Physical:   oral temperature is 97.3 °F (36.3 °C). Her blood pressure is 170/74 (abnormal) and her pulse is 85. Her respiration is 14 and oxygen saturation is 92%. Patient is sleeping but easily arousable. She is awake alert nontoxic-appearing. She has tenderness in the left hip. Abdomen is soft nontender    Impression: Left hip fracture    Plan: Review the prior labs and imaging indicate the patient does have a hip fracture. Patient is negative CT scan of the head and cervical spine. We will obtain a trauma consult and orthopedic consultation for admission. (Please note that portions of this note were completed with a voice recognition program.  Efforts were made to edit the dictations but occasionally words are mis-transcribed.)    Cholo Lamar.  Ghazala Rogers MD, [address] [facility]  Attending Emergency Medicine Physician        Rakan Moore MD  09/03/22 2077

## 2022-09-03 NOTE — CONSULTS
Orthopaedic Surgery Consult  (Dr. Jose Miguel Clarke)      CC/Reason for consult:  Left hip pain s/p [facility]    HPI:      The patient is a 80 y.o. right hand-dominant female who presents the emergency department as a transfer from [facility] after falling from standing height earlier this morning and now complains of left hip pain. According to patient and charting, the patient stated she got up to urinate, tripped, and fell onto her left hip. Patient states she is unable to bear weight to the left lower extremity after she fell from standing height. Patient denies any loss of consciousness. Orthopedics was consulted after x-rays demonstrated a intertrochanteric fracture of the left side. Patient states that she has memory problems, so she is unable to recall much of a history. Currently she is AAOx3, and not oriented to time. GCS 15. Patient denies any numbness or tingling to left lower extremity. Patient denies any previous left hip pain prior to the fall. Patient ambulates at her nursing home with a rolling walker at baseline. Patient denies any frequent falls, but stated that she fell years ago and, and sustained a right intertrochanteric femur fracture which was treated with cephalomedullary nailing. Chemical anticoagulation includes Brilinta and aspirin. Past medical history includes CHF, dementia, HTN, and Hx of MI. Past orthopedic history includes a right IT fracture in 2021, treated with a right InterTAN cephalomedullary nail by Dr. Walt Javed in [place]. Past Medical History:    Past Medical History:   Diagnosis Date    Adenomatous polyp 06/10    With recommendation for repeat 06/15. Also, diverticulosis was noted.      Cataracts, bilateral     Combined forms of age-related cataract of both eyes 10/1/2018    Corneal scar, right eye     Cystocele     history of     Dementia ([facility] [address]     Mini-Mental Status exam 27/30  6/14  declines meds at this point,  MMSE 29/30 on June 13, 2017  MMSE 26/30 4/2018    Difficulty controlling anger 9/16/2018    Dyslipidemia     GERD (gastroesophageal reflux disease)     egd  4/15 ok    Goiter     benign, history of     Hiatal hernia     Hypertension     Hypokalemia     Hypothyroidism     Impaired fasting glucose     Malignant melanoma in situ ([address]     R upper Back 8/17    Migraines     Murmur, functional     Grade 1/6 systolic, history of     Osteopenia     T -1.0 hip, 03/09, T -0.3 spine, 03/09. 1) Repeat DEXA scan 09/12 with T +0.15, T -1.2 at the hip but -1.8 at the mean femoral neck giving her a FRAX score of 4.3 at the hip. Reclast 10/13 and given 2014,2015, and 1/4/2017, on calcium and vit d,  Redo Dexa 10/14 Frax 4.2% , Frax 4.8% 10 yr hip fracture risk on Dexa 1/17  On Reclast    Posterior vitreous detachment of both eyes     Primary open angle glaucoma (POAG) of both eyes, moderate stage 10/1/2018    Sciatica 12/15/2015    Skin cancer     History of multiple skin cancers. Following with Dr. Glenny Flores--- invasive squamous cell Ca 8/17 L forearm    Syncope and collapse 1/21/2015    Tremor 12/23/2018    Trichiasis of left lower eyelid without entropion     Visual disturbance 10/26/2017     Past Surgical History:    Past Surgical History:   Procedure Laterality Date    APPENDECTOMY      CHOLECYSTECTOMY      Remote. COLONOSCOPY  06/08/10    COLONOSCOPY  07/18/02    CYSTOCELE REPAIR  02/12/99    Vaginal prolapse, cystocele plus pelvic relaxation.      DILATION AND CURETTAGE OF UTERUS      with hysteroscopy    HIP FRACTURE SURGERY Right 11/2/2021    Right Hip Intertan performed by Miranda Sherman MD at [facility] ([address] [place] [address])  1995    still has ovaries     INTRACAPSULAR CATARACT EXTRACTION Left 8/25/2020    Left Cataract Extraction w/ IOL Implant performed by Liborio Bacon MD at [facility] Right 10/8/2020    Right Cataract Extraction w/ IOL Implant performed by Liborio Bacon MD at [address] [facility] [facility] (TYLENOL) 650 MG extended release tablet Take 650 mg by mouth 2 times daily as needed for Pain    Historical Provider, MD   atorvastatin (LIPITOR) 40 MG tablet Take 1 tablet by mouth daily 2/1/21   Isabel Larios MD   aspirin 81 MG EC tablet Take 1 tablet by mouth daily 12/21/20   Historical Provider, MD   nitroGLYCERIN (NITROSTAT) 0.4 MG SL tablet Place 0.4 mg under the tongue every 5 minutes as needed for Chest pain up to max of 3 total doses. If no relief after 3 dose, call 911. Historical Provider, MD   ibuprofen (ADVIL;MOTRIN) 600 MG tablet Take 1 tablet by mouth 3 times daily as needed for Pain (Take with food.) 12/27/20 12/27/20  Oumar Knight MD   ammonium lactate (AMLACTIN) 12 % cream apply to affected area twice a day if needed 4/7/20   Isabel Larios MD   Multiple Vitamin (DAILY MULTIVITAMIN PO) Take 1 tablet by mouth daily     Historical Provider, MD     Allergies:    Patient has no known allergies. Social History:   Social History     Socioeconomic History    Marital status:      Spouse name: Abdirashid Esparza    Number of children: 3   Tobacco Use    Smoking status: Never    Smokeless tobacco: Never   Vaping Use    Vaping Use: Never used   Substance and Sexual Activity    Alcohol use: Yes     Alcohol/week: 0.0 standard drinks     Comment: Infrequently. Drug use: No     Social Determinants of Health     Financial Resource Strain: Low Risk     Difficulty of Paying Living Expenses: Not very hard   Food Insecurity: No Food Insecurity    Worried About Running Out of Food in the Last Year: Never true    Ran Out of Food in the Last Year: Never true     Family History:  Family History   Problem Relation Age of Onset    Coronary Art Dis Father     Colon Cancer Brother     Diabetes Neg Hx     Cataracts Neg Hx     Glaucoma Neg Hx        ROS:   Constitutional: Negative for fever and chills. Respiratory: Negative for cough. Cardiovascular: Negative for chest pain.    Musculoskeletal: Positive for left hip pain.   Skin: Negative for itching and rash. Neurological: Negative for numbness, tingling, weakness. PE:  Blood pressure (!) 170/74, pulse 85, temperature 97.3 °F (36.3 °C), temperature source Oral, resp. rate 14, SpO2 92 %, not currently breastfeeding. Gen: Alert and oriented, NAD, cooperative, mildly confused. Head: Normocephalic, atraumatic. Cardiovascular: Rate regular. Respiratory: Chest symmetric, no accessory muscle use. Pelvis: Stable to anterior and lateral compression. RUE: Skin intact. No ecchymoses, abrasion, deformity, or lacerations. Non tender to palpation. No bony crepitus. Compartments soft and easily compressible. Full ROM at shoulder without pain. Full ROM at elbow with pain. Ulnar/median/AIN/PIN/radial motor intact out. Axillary/MCN/median/ulnar/radial nerves SILT. Radial pulse 2+ with BCR.    LUE: Skin intact. No ecchymoses, abrasion, deformity, or lacerations. Non tender to palpation. crepitus. Compartments soft and easily compressible. Limited ROM at shoulder elbow with pain, with 90 degrees of abduction acitvely. ROM at elbow  pain. Ulnar/median/AIN/PIN/radial motor intact. Axillary/MCN/median/ulnar/radial nerves SILT. Radial pulse 2+ with BCR. RLE: Skin intact. No ecchymoses, abrasions, deformity, or lacerations. Surgical incision well-healed and well demarcated with no overt signs of infection. Non tender to palpation. No bony crepitus. Compartments soft and easily compressible. EHL/FHL/TA/GS complex motor intact. Sural/saphenous/SPN/DPN/plantar nerve distribution SILT. Patient has no groin pain with log roll maneuver. Lachman 1a, knee appears stable to varus and valgus stress test at 0 and 30 degrees. DP and PT pulses 2+ with BCR. LLE: Skin intact. Leg shortened and externally rotated. Ecchymosis noted at the hip. Tenderness to palpation about the hip and proximal femur. Compartments soft and easily compressible.  EHL/FHL/TA/GS complex motor intact. Sural/saphenous/SPN/DPN/plantar nerve distribution SILT. Patient has groin pain with log roll maneuver. Lachman Ia, knee appears stable to varus and valgus stress test at 0 and 30 degrees. DP and PT pulses 2+ with BCR. Labs:  Recent Labs     09/03/22  0813   WBC 10.5   HGB 12.8   HCT 39.7      INR 1.1      K 4.3   BUN 22   CREATININE 0.88   GLUCOSE 160*        Imaging:   3 views of the left hip demonstrating a displaced intertrochanteric femur fracture, with reverse obliquity and subtrochanteric extension in varus. 3 views of the right femur demonstrating previous cephalomedullary nail of a intertrochanteric femur fracture, with no evidence of hardware complications. Assessment/Plan: 80 y.o. female who [address], being seen for:    -Left intertrochanteric femur fracture    -Plan for OR with Dr. Oniel Freidman tomorrow, 9/4, for cephalomedullary nail fixation of the left intertrochanteric femur fracture  -Will appreciate restratification assessment/note by primary and ancillary teams  -Will appreciate cardiac clearance prior to surgery  -NWB LLE  -Diet: NPO at midnight  -Ancef on-call the OR  -DVT ppx: Please hold Brilinta and aspirin today for surgical intervention tomorrow  -Consent/marked. Please see consent in media tab  -F/u VitD level  -Pain control per primary  -Ice and elevate extremity for pain and swelling  -Please contact ortho with any questions        Taiwo Torres,   Orthopedic Surgery Resident, PGY-2  [address] [address], [facility]      This note is created with the assistance of a speech recognition program. While intending to generate a document that actually reflects the content of the visit, the document can still have some errors including those of syntax and sound a like substitutions which may escape proof reading. In such instances, actual meaning can be extrapolated by contextual diversion.

## 2022-09-03 NOTE — ED PROVIDER NOTES
[facility]  eMERGENCY dEPARTMENT eNCOUnter      Pt Name: Jess Franks  MRN: [id number]  [place] 3/21/1930  Date of evaluation: 9/3/2022      CHIEF COMPLAINT       Chief Complaint   Patient presents with    Fall    Hip Pain         HISTORY OF PRESENT ILLNESS    Jess Franks is a 80 y.o. female who presents with chief complaint of left hip pain, patient says she got up to urinate tripped and fell landing on her left hip not able to walk since pain was 10 out of 10 if she tries to move it at all she received 2 doses fentanyl on route patient denies loss of consciousness but does take Brilinta  There is been no recent fevers chills cough shortness of breath  See has a prior history of atrial fibrillation  REVIEW OF SYSTEMS         Review of Systems   Constitutional:  Negative for chills and fever. HENT:  Negative for congestion and ear pain. Eyes:  Negative for pain and visual disturbance. Respiratory:  Negative for cough and shortness of breath. Cardiovascular:  Negative for chest pain, palpitations and leg swelling. Gastrointestinal:  Negative for abdominal pain, blood in stool, constipation, diarrhea, nausea and vomiting. Endocrine: Negative for polydipsia and polyuria. Genitourinary:  Negative for difficulty urinating, dysuria and frequency. Musculoskeletal:  Negative for back pain, joint swelling, myalgias, neck pain and neck stiffness. Left hip pain   Skin:  Negative for rash. Neurological:  Negative for dizziness, weakness and headaches. Hematological:  Negative for adenopathy. Does not bruise/bleed easily. Psychiatric/Behavioral:  Negative for confusion, self-injury and suicidal ideas.         PAST MEDICAL HISTORY    has a past medical history of Adenomatous polyp, Cataracts, bilateral, Combined forms of age-related cataract of both eyes, Corneal scar, right eye, Cystocele, Dementia ([facility] [address] Difficulty controlling anger, Dyslipidemia, GERD (gastroesophageal reflux disease), Goiter, Hiatal hernia, Hypertension, Hypokalemia, Hypothyroidism, Impaired fasting glucose, Malignant melanoma in situ ([facility] [place] [address] Migraines, Murmur, functional, Osteopenia, Posterior vitreous detachment of both eyes, Primary open angle glaucoma (POAG) of both eyes, moderate stage, Sciatica, Skin cancer, Syncope and collapse, Tremor, 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 SURGICAL HISTORY      has a past surgical history that includes other surgical history (11/11/08); Cholecystectomy; Colonoscopy (06/08/10); Mohs surgery (10/07/09); Vein Surgery (06/08/09); Colonoscopy (07/18/02); Cystocele repair (02/12/99); Appendectomy; Dilation and curettage of uterus; other surgical history (1988); Hysterectomy (1995); Upper gastrointestinal endoscopy (4/8/2015); Intracapsular cataract extraction (Left, 8/25/2020); Intracapsular cataract extraction (Right, 10/8/2020); and Hip fracture surgery (Right, 11/2/2021).     CURRENT MEDICATIONS       Previous Medications    ACETAMINOPHEN (TYLENOL) 650 MG EXTENDED RELEASE TABLET    Take 650 mg by mouth 2 times daily as needed for Pain    AMLODIPINE (NORVASC) 5 MG TABLET    Take 5 mg by mouth daily    AMMONIUM LACTATE (AMLACTIN) 12 % CREAM    apply to affected area twice a day if needed    ASPIRIN 81 MG EC TABLET    Take 1 tablet by mouth daily    ATENOLOL (TENORMIN) 25 MG TABLET    TAKE 1 TABLET DAILY    ATORVASTATIN (LIPITOR) 40 MG TABLET    Take 1 tablet by mouth daily    BRILINTA 90 MG TABS TABLET    Take 1 tablet by mouth 2 times daily    CALCIUM CARBONATE-VITAMIN D3 (CALCIUM 600 + D) 600-400 MG-UNIT TABS PER TAB    Take 1 tablet by mouth daily    FLUOXETINE (PROZAC) 20 MG CAPSULE    TAKE 1 CAPSULE DAILY    FUROSEMIDE (LASIX) 40 MG TABLET    Take 40 mg by mouth daily    HYDROXYZINE HCL (ATARAX) 25 MG TABLET    Take 25 mg by mouth every 8 hours as needed for Itching    LEVOTHYROXINE (SYNTHROID) 137 MCG TABLET    TAKE 1 TABLET DAILY round, and reactive to light. Cardiovascular:      Rate and Rhythm: Normal rate and regular rhythm. Pulmonary:      Effort: Pulmonary effort is normal.      Breath sounds: Normal breath sounds. Abdominal:      General: Bowel sounds are normal.      Palpations: Abdomen is soft. Musculoskeletal:         General: Tenderness and signs of injury present. Cervical back: Normal range of motion. Skin:     General: Skin is warm and dry. Neurological:      Mental Status: She is alert and oriented to person, place, and time. Psychiatric:         Behavior: Behavior normal.         DIFFERENTIAL DIAGNOSIS/ MDM:     Left hip that pain after fall rule out fracture with the fall and being on Brilinta we will do a CT of the head also with her age we will do a CT of the neck    DIAGNOSTIC RESULTS     EKG: All EKG's are interpreted by the Emergency Department Physician who either signs or Co-signs this chart in the absence of a cardiologist.  EKG shows normal sinus rhythm rate of 71 bpm [place] interval is 192 ms QRS duration 78 ms QT corrected 467 ms axis is -42 there is no acute ST or T wave changes seen      RADIOLOGY:   I directly visualized the following  images and reviewed the radiologist interpretations:       CT OF THE HEAD WITHOUT CONTRAST; CT OF THE CERVICAL SPINE WITHOUT CONTRAST   9/3/2022 9:48 am       TECHNIQUE:   CT of the head was performed without the administration of intravenous   contrast. Automated exposure control, iterative reconstruction, and/or weight   based adjustment of the mA/kV was utilized to reduce the radiation dose to as   low as reasonably achievable.; CT of the cervical spine was performed without   the administration of intravenous contrast. Multiplanar reformatted images   are provided for review. Automated exposure control, iterative   reconstruction, and/or weight based adjustment of the mA/kV was utilized to   reduce the radiation dose to as low as reasonably achievable. COMPARISON:   None. HISTORY:   ORDERING SYSTEM PROVIDED HISTORY: fall   TECHNOLOGIST PROVIDED HISTORY:       fall   Decision Support Exception - unselect if not a suspected or confirmed   emergency medical condition->Emergency Medical Condition (MA)   Reason for Exam: Unwitnessed fall today       FINDINGS:   HEAD:       BRAIN/VENTRICLES: Mild cerebral atrophy. There is periventricular and   subcortical white matter discrete and confluent low attenuation, nonspecific,   likely representing age-related chronic small vessel ischemic disease. Right   frontal a encephalomalacia unchanged. ORBITS: The visualized portion of the orbits demonstrate no acute abnormality. SINUSES: The visualized paranasal sinuses and mastoid air cells demonstrate   no acute abnormality. SOFT TISSUES/SKULL: No acute abnormality of the visualized skull or soft   tissues. CERVICAL SPINE:       BONES/ALIGNMENT: There is no evidence of an acute cervical spine fracture. No   traumatic malalignment. DEGENERATIVE CHANGES: Mild diffuse degenerative changes. SOFT TISSUES: There is no prevertebral soft tissue swelling. Impression   CT HEAD:       No CT evidence for acute intracranial abnormality. .       Cerebral atrophy. Right frontal encephalomalacia. No change. CT CERVICAL SPINE:       No acute fracture or subluxation of the cervical spine. Mild diffuse degenerative changes. ONE XRAY VIEW OF THE PELVIS AND TWO XRAY VIEWS LEFT HIP       9/3/2022 9:20 am       COMPARISON:   None. HISTORY:   ORDERING SYSTEM PROVIDED HISTORY: fall   TECHNOLOGIST PROVIDED HISTORY:   fall   Reason for Exam: fall, hip pain       FINDINGS:   There is a comminuted intertrochanteric fracture of the left proximal femur   with mild impaction. The bony pelvis is intact. There is degenerative   change of the lower lumbar spine. There is degenerative change of the SI   joints.   There is degenerative change of the hip joints. The surrounding   soft tissues are unremarkable. Impression   Comminuted intertrochanteric fracture of the proximal left femur with   impaction. Degenerative change of the lower lumbar spine, SI joints, and hip joints. EXAMINATION:   ONE XRAY VIEW OF THE CHEST       9/3/2022 9:20 am       COMPARISON:   Chest radiograph performed 11/02/2021. HISTORY:   ORDERING SYSTEM PROVIDED HISTORY: fall   TECHNOLOGIST PROVIDED HISTORY:   fall   Reason for Exam: fall, hip pain       FINDINGS:   There are trace effusions. There are bibasilar infiltrates. There is no   pneumothorax. The heart is enlarged. The upper abdomen unremarkable. The   extrathoracic soft tissues are unremarkable. Impression   Trace effusions with bibasilar infiltrates.        ED BEDSIDE ULTRASOUND:       LABS:  Labs Reviewed   CBC WITH AUTO DIFFERENTIAL - Abnormal; Notable for the following components:       Result Value    RDW 15.1 (*)     Seg Neutrophils 75 (*)     Lymphocytes 9 (*)     Eosinophils % 6 (*)     Immature Granulocytes 1 (*)     Absolute Lymph # 0.89 (*)     Absolute Eos # 0.58 (*)     All other components within normal limits   COMPREHENSIVE METABOLIC PANEL W/ REFLEX TO MG FOR LOW K - Abnormal; Notable for the following components:    Glucose 160 (*)     Bun/Cre Ratio 25 (*)     Alkaline Phosphatase 115 (*)     All other components within normal limits   COVID-19, RAPID   PROTIME-INR           EMERGENCY DEPARTMENT COURSE:   Vitals:    Vitals:    09/03/22 0701 09/03/22 0815 09/03/22 1007 09/03/22 1045   BP: (!) 184/70 (!) 159/71 (!) 179/72 (!) 153/60   Pulse: 75 73 77 78   Resp: 15 13 16 15   Temp: 97.6 °F (36.4 °C)      TempSrc: Tympanic      SpO2: 91% 95% (!) 88% 98%   Weight: 145 lb (65.8 kg)      Height: 5' 4\" (1.626 m)        -------------------------  BP: (!) 153/60, Temp: 97.6 °F (36.4 °C), Heart Rate: 78, Resp: 15        Re-evaluation Notes    Resting more comfortably    CRITICAL CARE:   IP CONSULT TO ORTHOPEDIC SURGERY        CONSULTS: Jaja the case with orthopedics given her cardiac history they will need a cardiac clearance we do not have a cardiologist here until Tuesday they suggested may be a larger facility more appropriate I discussed the case with Dr. Geovany Johnson at [facility]. ER so accept the patient there in transfer      PROCEDURES:  None    FINAL IMPRESSION      1. Closed left hip fracture, initial encounter ([facility] [address]          DISPOSITION/PLAN   DISPOSITION Decision To Transfer    Condition on Disposition    Transferred in stable condition    PATIENT REFERRED TO:  No follow-up provider specified. DISCHARGE MEDICATIONS:  New Prescriptions    No medications on file       (Please note that portions of this note were completed with a voice recognition program.  Efforts were made to edit the dictations but occasionally words are mis-transcribed.)    Farzana Barnes MD,, MD, F.A.A.E.M.   Attending Emergency Physician                           Farzana Barnes MD  09/03/22 0601

## 2022-09-03 NOTE — ED NOTES
80year old female transfer from [facility] ER  Patient states she was going down steps when she fell and fractured her left hip  Patient being transferred from [facility] for cardiology workup  Alert/oriented, call light given to patient, side rails up x2     Tyrone Dallas, ELZA  09/03/22 [id number]

## 2022-09-03 NOTE — H&P
TRAUMA HISTORY AND PHYSICAL EXAMINATION  (V 2.0)    PATIENT NAME: Nam Luevano  YOB: 1930  MEDICAL RECORD NO. [id number]   DATE: 9/3/2022  PRIMARY CARE PHYSICIAN: Braydon Hernadez MD  PATIENT EVALUATED AT THE REQUEST OF DR.:   Dr. Sania Ga     [] Trauma Priority     [x]Trauma Consult. IMPRESSION:     Patient Active Problem List   Diagnosis    Dyslipidemia    Osteopenia    Impaired fasting glucose    Adenomatous polyp    Essential hypertension    Hypothyroidism    Sciatica    GERD (gastroesophageal reflux disease)    Visual disturbance    Malignant melanoma in situ ([address]    Difficulty controlling anger    Primary open angle glaucoma (POAG) of both eyes, moderate stage    PVD (posterior vitreous detachment), both eyes    Combined forms of age-related cataract of both eyes    Dementia with behavioral disturbance ([address]    Tremor    Essential tremor    Dizziness    Chronic cerebral ischemia    Acquired cerebral atrophy ([address]    Cerebral infarction ([address]    Anxiety    Atrial fibrillation ([address]    Displaced intertrochanteric fracture of right femur, initial encounter for closed fracture ([address]    Acute ST segment elevation myocardial infarction ([address]    Heart murmur    Migraine    Female bladder prolapse    Falls    Hiatal hernia    Hypercholesterolemia    Coronary arteriosclerosis    Cataract    Closed right hip fracture, initial encounter ([address]    Heart failure, unspecified HF chronicity, unspecified heart failure type ([address]    Chronic renal disease, stage III ([address] [[id number]]    Pseudophakia of both eyes     Left intertrochanteric fracture of the femur    MEDICAL DECISION MAKING AND PLAN:     Intertrochanteric fx Left femur  Multimodal pain control  CT Head, Chest, Abdomen, Pelvis with Spine reconstruction. XR bilateral femur  Ortho Consult  Admit to med/surg vs stepdown pending scans.     CONSULT SERVICES    [] Neurosurgery     [x] Orthopedic Surgery    [] Cardiothoracic     [] Facial Trauma    [] Plastic Surgery (Burn)    [] Pediatric Surgery     [] Internal Medicine    [] Pulmonary Medicine    [] Other:        HISTORY:     SOURCE OF INFORMATION  Patient information was obtained from patient. History/Exam limitations: dementia. INJURY SUMMARY    Extremity -  fracture; closed and femur Left    GENERAL DATA  Age 80 y.o.  female   Patient information was obtained from patient. History/Exam limitations: dementia.   Patient presented to the Emergency Department by ambulance where the patient received none prior to arrival.  Injury Date: 9/3/2022   Approximate Injury Time: unknown        Transport mode:   []Ambulance      [] Helicopter     []Car       [] Other  Referring Hospital: none    INJURY LOCATION, (e.g., home, farm, industry, street)  Specific Details of Location (e.g., bedroom, kitchen, garage): Kitchen  Type of Residence (if occurred in home setting) (e.g., apartment, mobile home, single family home): Home    MECHANISM OF INJURY  []Motor Vehicle Collision  Specific vehicle type involved (e.g., sedan, minivan, SUV, pickup truck):   Collision with (e.g., type of vehicle, building, barn, ditch, tree):   []Single Vehicle Collision     []           []Fatality in Same Vehicle            []Passenger:      []Front Seat        []Rear Seat    []Unrestrained   []Lap Belt Only Restrained   [] Shoulder Belt Only Restrained  [] 3 Point Restrained    []Front Air Bag  []Side Air Bag  []Other Air Bag []Air Bag Not Deployed    []Ejected     []Rollover     []Extricated       CHILD:  []Booster Seat  []Infant Car Seat  [] Child Car Seat   []Motorcycle Collision Wearing Helmet     []Yes     []No    []Unknown  []Bicycle Collision Wearing Helmet     []Yes     []No    []Unknown  []Pedestrian Struck      []Fall    []From Standing     []From Height   Ft     []Down Stairs  []Assault  []Gunshot  Specify caliber / type of gun: ____________________________  []Stabbing  Specify weapon type, size: _____________________________  []Burn     []Flame   []Scald   []Electrical   []Chemical           []Contact   []Inhalation   []House fire  []Other ______________________________________________________  []Other protective devices used / worn ___________________________    HISTORY:     Patient reportedly with a history of dementia. Patient says that she was in a hurry walking through which she believes to be her kitchen but she is unsure when she lost her footing and fell. Patient denies hitting her head or any loss of consciousness. Patient denies any nausea or vomiting. Patient says that she is having pain in her left hip that is worse with ambulation. Patient does not remember how she got to the hospital today. Patient is denying any pain anywhere else including along the neck to the back of the chest the belly. Patient is not feeling shortness of breath at all. Patient denies any burning when she urinates. Patient reports that she is taking no medications at home however chart review does demonstrate that she is currently on Brilinta. Loss of Consciousness [x]No   []Yes Duration(min)    Total Fluids Given Prior To Arrival 0 cc    MEDICATIONS:   []  None     []  Information not available due to exam limitations documented above  Prior to Admission medications    Medication Sig Start Date End Date Taking? Authorizing Provider   potassium chloride (KLOR-CON M20) 20 MEQ extended release tablet Take 20 mEq by mouth in the morning.     Historical Provider, MD   OXYGEN Inhale 3.5 L into the lungs to keep sat > 91%    Historical Provider, MD   Nutritional Supplements (NUTRITIONAL DRINK PO) Mighty shakes bid with meals    Historical Provider, MD   calcium carbonate-vitamin D3 (CALCIUM 600 + D) 600-400 MG-UNIT TABS per tab Take 1 tablet by mouth daily    Historical Provider, MD   amLODIPine (NORVASC) 5 MG tablet Take 5 mg by mouth daily Historical Provider, MD   furosemide (LASIX) 40 MG tablet Take 40 mg by mouth daily    Historical Provider, MD   hydrOXYzine HCl (ATARAX) 25 MG tablet Take 25 mg by mouth every 8 hours as needed for Itching    Historical Provider, MD   triamcinolone (KENALOG) 0.1 % cream Apply topically to affected areas on back and arms 1-2 times daily as needed for itch until resolved    Historical Provider, MD   levothyroxine (SYNTHROID) 137 MCG tablet TAKE 1 TABLET DAILY 5/31/22   Santi Saxena MD   FLUoxetine (PROZAC) 20 MG capsule TAKE 1 CAPSULE DAILY 5/31/22   Santi Saxena MD   BRILINTA 90 MG TABS tablet Take 1 tablet by mouth 2 times daily 4/20/22   Daniel Dominguez MD   losartan (COZAAR) 100 MG tablet TAKE 1 TABLET DAILY 3/14/22   Santi Saxena MD   OLANZapine (ZYPREXA) 2.5 MG tablet TAKE 1 TABLET NIGHTLY 2/28/22   Santi Saxena MD   memantine [facility]) 5 MG tablet Take 1 tablet by mouth daily 2/21/22   Santi Saxena MD   atenolol (TENORMIN) 25 MG tablet TAKE 1 TABLET DAILY 1/10/22   Santi Saxena MD   acetaminophen (TYLENOL) 650 MG extended release tablet Take 650 mg by mouth 2 times daily as needed for Pain    Historical Provider, MD   atorvastatin (LIPITOR) 40 MG tablet Take 1 tablet by mouth daily 2/1/21   Santi Saxena MD   aspirin 81 MG EC tablet Take 1 tablet by mouth daily 12/21/20   Historical Provider, MD   nitroGLYCERIN (NITROSTAT) 0.4 MG SL tablet Place 0.4 mg under the tongue every 5 minutes as needed for Chest pain up to max of 3 total doses. If no relief after 3 dose, call 911.      Historical Provider, MD   ibuprofen (ADVIL;MOTRIN) 600 MG tablet Take 1 tablet by mouth 3 times daily as needed for Pain (Take with food.) 12/27/20 12/27/20  Latrice Monroe MD   ammonium lactate (AMLACTIN) 12 % cream apply to affected area twice a day if needed 4/7/20   Santi Saxena MD   Multiple Vitamin (DAILY MULTIVITAMIN PO) Take 1 tablet by mouth daily     Historical Provider, MD       ALLERGIES:   [x]  None    []   Information not available due to exam limitations documented above   Patient has no known allergies. PAST MEDICAL HISTORY: []  None   []   Information not available due to exam limitations documented above    has a past medical history of Adenomatous polyp, Cataracts, bilateral, Combined forms of age-related cataract of both eyes, Corneal scar, right eye, Cystocele, Dementia ([address] Difficulty controlling anger, Dyslipidemia, GERD (gastroesophageal reflux disease), Goiter, Hiatal hernia, Hypertension, Hypokalemia, Hypothyroidism, Impaired fasting glucose, Malignant melanoma in situ ([address] Migraines, Murmur, functional, Osteopenia, Posterior vitreous detachment of both eyes, Primary open angle glaucoma (POAG) of both eyes, moderate stage, Sciatica, Skin cancer, Syncope and collapse, Tremor, 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 has a past surgical history that includes other surgical history (11/11/08); Cholecystectomy; Colonoscopy (06/08/10); Mohs surgery (10/07/09); Vein Surgery (06/08/09); Colonoscopy (07/18/02); Cystocele repair (02/12/99); Appendectomy; Dilation and curettage of uterus; other surgical history (1988); Hysterectomy (1995); Upper gastrointestinal endoscopy (4/8/2015); Intracapsular cataract extraction (Left, 8/25/2020); Intracapsular cataract extraction (Right, 10/8/2020); and Hip fracture surgery (Right, 11/2/2021). FAMILY HISTORY   []   Information not available due to exam limitations documented above    family history includes Colon Cancer in her brother; Coronary Art Dis in her father. SOCIAL HISTORY  []   Information not available due to exam limitations documented above     reports that she has never smoked. She has never used smokeless tobacco.   reports current alcohol use. reports no history of drug use.     PERTINENT SYSTEMIC REVIEW:  []   Information not available due to exam limitations documented above  General: denies fevers/chills  HEENT: denies headache, blurred vision, ear pain, nasal discharge  Resp: denies SOB, cough, wheezing  Cardiac: denies chest pain, palpitations  GI: denies abdominal pain, nausea, and vomiting  : denies increased urgency and frequency, dysuria, and hematuria  Heme: denies history of bruising and bleeding   Endo: denies history of diabetes, thyroid disorder  Neuro: denies weakness, numbness/tingling     PHYSICAL EXAMINATION:   GLASCOW COMA SCALE  NEUROMUSCULAR BLOCKADE PRIOR TO ARRIVAL     [x]No        []Yes      Variable  Score   Variable  Score  Eye opening [x]Spontaneous 4 Verbal  [x]Oriented  5     []To voice  3   []Confused  4    []To pain  2   []Inapp words  3    []None  1   []Incomp words 2       []None  1   Motor   [x]Obeys  6    []Localizes pain 5    []Withdraws(pain) 4    []Flexion(pain) 3  []Extension(pain) 2    []None  1     GCS Total = 15    PHYSICAL EXAMINATION  VITAL SIGNS:   Vitals:    09/03/22 1526   BP:    Pulse:    Resp:    Temp: 97.3 °F (36.3 °C)   SpO2:        General Appearance: alert and oriented to person, place and time, well developed and well- nourished, in no acute distress  Skin: warm and dry, no rash or erythema  Head: normocephalic and atraumatic  Eyes: pupils equal, round, and reactive to light, extraocular eye movements intact, conjunctivae normal  ENT: tympanic membrane, external ear and ear canal normal bilaterally, nose without deformity, nasal mucosa and turbinates normal without polyps  Neck: supple and non-tender without mass, no thyromegaly or thyroid nodules, no cervical lymphadenopathy  Pulmonary/Chest: clear to auscultation bilaterally- no wheezes, rales or rhonchi, normal air movement, no respiratory distress  Cardiovascular: normal rate, regular rhythm, normal S1 and S2, no murmurs, rubs, clicks, or gallops, distal pulses intact, no carotid bruits  Abdomen: soft, non-tender, non-distended, normal bowel sounds, no masses or organomegaly  Extremities: no cyanosis, clubbing or edema  Musculoskeletal: Range of motion limited in the left hip secondary to pain. Pain present on palpation. No unilateral swelling of the left thigh however there is a significant hematoma on the right lateral aspect of the thigh approximately proximal third. Tenderness to palpation. Extremities are neurovascularly intact. No limited range of motion in the neck or arms. Neurologic: reflexes normal and symmetric, no cranial nerve deficit, gait, coordination and speech normal    FOCUSED ABDOMINAL SONOGRAM FOR TRAUMA (FAST): A good  quality examination was performed by Dr. Kody Hall and representative images were obtained.   [x] No free fluid in the abdomen or _______________________       RADIOLOGY  PLAIN FILMS  Ordered Findings  [x]CHEST []Normal   [] Preliminary findingsSee radiology report  [x]PELVIS  []Normal   [] Preliminary findingsSee radiology report  EXTREMITIES      []RUE []Normal   _____________________    []LUE  []Normal   _____________________    []RLE []Normal   _____________________    []LLE  []Normal   _____________________  []OTHER []Normal   _____________________    CT SCANS  Ordered  [x]HEAD  []Normal   [] Preliminary findings See radiology report  [x]CHEST  []Normal   [] Preliminary findingsSee radiology report  [x]ABD/PELVIS[]Normal  [] Preliminary findingsSee radiology report  []FACE []Normal   [] Preliminary findings: See Radiology Report  [x]C-SPINE  []Normal   [] Preliminary findingsSee radiology report   []T-SPINE  []Normal   [] Preliminary findingsSee radiology report  []L-SPINE  []Normal   [] Preliminary findingsSee radiology report        LABS  Labs Reviewed - No data to display        Benedict Jane MD  9/3/22, 4:18 PM

## 2022-09-03 NOTE — PROGRESS NOTES
Trauma Tertiary Survey    Admit Date: 9/3/2022  Hospital day 0    [facility]       Past Medical History:   Diagnosis Date    Adenomatous polyp 06/10    With recommendation for repeat 06/15. Also, diverticulosis was noted. Cataracts, bilateral     Combined forms of age-related cataract of both eyes 10/1/2018    Corneal scar, right eye     Cystocele     history of     Dementia ([facility]     Mini-Mental Status exam 27/30 6/14  declines meds at this point,  MMSE 29/30 on June 13, 2017  MMSE 26/30 4/2018    Difficulty controlling anger 9/16/2018    Dyslipidemia     GERD (gastroesophageal reflux disease)     egd  4/15 ok    Goiter     benign, history of     Hiatal hernia     Hypertension     Hypokalemia     Hypothyroidism     Impaired fasting glucose     Malignant melanoma in situ ([address]     R upper Back 8/17    Migraines     Murmur, functional     Grade 1/6 systolic, history of     Osteopenia     T -1.0 hip, 03/09, T -0.3 spine, 03/09. 1) Repeat DEXA scan 09/12 with T +0.15, T -1.2 at the hip but -1.8 at the mean femoral neck giving her a FRAX score of 4.3 at the hip. Reclast 10/13 and given 2014,2015, and 1/4/2017, on calcium and vit d,  Redo Dexa 10/14 Frax 4.2% , Frax 4.8% 10 yr hip fracture risk on Dexa 1/17  On Reclast    Posterior vitreous detachment of both eyes     Primary open angle glaucoma (POAG) of both eyes, moderate stage 10/1/2018    Sciatica 12/15/2015    Skin cancer     History of multiple skin cancers. Following with Dr. 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 .  Pain is moderate, worsens with movement, and some relief by rest.  There is not associated numbness, tingling, weakness.     Objective: Patient Vitals for the past 8 hrs:   BP Temp Temp src Pulse Resp SpO2   09/03/22 1526 -- 97.3 °F (36.3 °C) Oral -- -- --   09/03/22 1514 (!) 170/74 -- -- 85 14 92 %       No intake/output data recorded. No intake/output data recorded. Radiology:    PHYSICAL EXAM:   GCS:  4 - Opens eyes on own   6 - Follows simple motor commands  5 - Alert and oriented    Pupil size:  Left 3 mm Right 3 mm  Pupil reaction: Yes  Wiggles fingers: Left Yes Right Yes  Hand grasp:   Left normal   Right normal  Wiggles toes: Left Yes    Right Yes  Plantar flexion: Left normal  Right normal    General Appearance: alert and oriented to person, place and time, well developed and well- nourished, in no acute distress  Skin: warm and dry, no rash or erythema  Head: normocephalic and atraumatic  Eyes: pupils equal, round, and reactive to light, extraocular eye movements intact, conjunctivae normal  ENT: tympanic membrane, external ear and ear canal normal bilaterally, nose without deformity, nasal mucosa and turbinates normal without polyps  Neck: supple and non-tender without mass, no thyromegaly or thyroid nodules, no cervical lymphadenopathy  Pulmonary/Chest: clear to auscultation bilaterally- no wheezes, rales or rhonchi, normal air movement, no respiratory distress  Cardiovascular: normal rate, regular rhythm, normal S1 and S2, no murmurs, rubs, clicks, or gallops, distal pulses intact, no carotid bruits  Abdomen: soft, non-tender, non-distended, normal bowel sounds, no masses or organomegaly  Extremities: no cyanosis, clubbing or edema  Musculoskeletal: Range of motion limited in the left hip secondary to pain. Pain present on palpation. No unilateral swelling of the left thigh however there is a significant hematoma on the right lateral aspect of the thigh approximately proximal third. Tenderness to palpation. Extremities are neurovascularly intact. No limited range of motion in the neck or arms.   Neurologic: reflexes normal and symmetric, no cranial nerve deficit, gait, coordination and speech normal    Spine:     Spine Tenderness ROM   Cervical 0 /10 Normal   Thoracic 0 /10 Normal   Lumbar 0 /10 Normal     Musculoskeletal    Joint Tenderness Swelling ROM   Right shoulder absent absent normal   Left shoulder absent absent normal   Right elbow absent absent normal   Left elbow absent absent normal   Right wrist absent absent normal   Left wrist absent absent normal   Right hand grasp absent absent normal   Left hand grasp absent absent normal   Right hip absent absent normal   Left hip present absent abnormal - 2/2 movement   Right knee absent absent normal   Left knee absent absent normal   Right ankle absent absent normal   Left ankle absent absent normal   Right foot absent absent normal   Left foot absent absent normal           CONSULTS: Ortho    PROCEDURES: none    INJURIES:        Patient Active Problem List   Diagnosis    Dyslipidemia    Osteopenia    Impaired fasting glucose    Adenomatous polyp    Essential hypertension    Hypothyroidism    Sciatica    GERD (gastroesophageal reflux disease)    Visual disturbance    Malignant melanoma in situ (HCC)    Difficulty controlling anger    Primary open angle glaucoma (POAG) of both eyes, moderate stage    PVD (posterior vitreous detachment), both eyes    Combined forms of age-related cataract of both eyes    Dementia with behavioral disturbance (HCC)    Tremor    Essential tremor    Dizziness    Chronic cerebral ischemia    Acquired cerebral atrophy (HCC)    Cerebral infarction ([address]    Anxiety    Atrial fibrillation (HCC)    Displaced intertrochanteric fracture of right femur, initial encounter for closed fracture (HCC)    Acute ST segment elevation myocardial infarction ([address]    Heart murmur    Migraine    Female bladder prolapse    Falls    Hiatal hernia    Hypercholesterolemia    Coronary arteriosclerosis    Cataract    Closed right hip fracture, initial encounter ([address]    Heart failure, unspecified HF chronicity, unspecified heart failure type ([address]    Chronic renal disease, stage III ([address] [[id number]]    Pseudophakia of both eyes         Assessment/Plan:     Please see H&P plan. No new plans. Patient to be admitted to trauma.

## 2022-09-03 NOTE — ED NOTES
[facility] [id number] [facility]  09/03/22 3548

## 2022-09-03 NOTE — ED PROVIDER NOTES
[facility]  ED  Emergency Department Encounter  Emergency Medicine Resident     Pt Name: Evi Ireland  MRN: [id number]  [place] 3/21/1930  Date of evaluation: 9/3/22  PCP:  Ambrose Almanzar MD    [address] [place] [address]       Chief Complaint   Patient presents with    Other     Hip fx. here for cardiology clearance for surgery. Unable to get cardiology clearance until at least Tuesday at [place] [id number]  (Location/Symptom, Timing/Onset, Context/Setting, Quality, Duration, Modifying Factors,Severity.)      Evi Ireland is a 80 y.o. female who presents as a transfer from [facility] [place]. Patient is stated mechanicals fall this morning when getting out of bed in the bathroom. Sustained a comminuted intertrochanteric fracture of the proximal left femur with impaction. Was seen initially [place], unable to obtain cardiac clearance until this upcoming Tuesday, transferred to [facility] for further evaluation. Patient is otherwise denies any other complaints at this time aside from pain in the left hip. Confirms that the fall was mechanical, denies any lightheadedness or dizziness prior to fall. Denies any other recent fevers, chills, chest pain or shortness of breath, abdominal pain, nausea vomiting, diarrhea constipation, dysuria or hematuria.     PAST MEDICAL / SURGICAL / SOCIAL / FAMILY HISTORY      has a past medical history of Adenomatous polyp, Cataracts, bilateral, Combined forms of age-related cataract of both eyes, Corneal scar, right eye, Cystocele, Dementia ([address] Difficulty controlling anger, Dyslipidemia, GERD (gastroesophageal reflux disease), Goiter, Hiatal hernia, Hypertension, Hypokalemia, Hypothyroidism, Impaired fasting glucose, Malignant melanoma in situ ([address] Migraines, Murmur, functional, Osteopenia, Posterior vitreous detachment of both eyes, Primary open angle glaucoma (POAG) of both eyes, moderate stage, Sciatica, Skin cancer, Syncope and collapse, Tremor, 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 has a past surgical history that includes other surgical history (11/11/2008); Cholecystectomy; Colonoscopy (06/08/2010); Mohs surgery (10/07/2009); Vein Surgery (06/08/2009); Colonoscopy (07/18/2002); Cystocele repair (02/12/1999); Appendectomy; Dilation and curettage of uterus; other surgical history (01/01/1988); Hysterectomy (01/01/1995); Upper gastrointestinal endoscopy (04/08/2015); Intracapsular cataract extraction (Left, 08/25/2020); Intracapsular cataract extraction (Right, 10/08/2020); Hip fracture surgery (Right, 11/02/2021); and Femur fracture surgery (Left, 09/04/2022). Social History     Socioeconomic History    Marital status:      Spouse name: Quyen Albright    Number of children: 3    Years of education: Not on file    Highest education level: Not on file   Occupational History    Not on file   Tobacco Use    Smoking status: Never    Smokeless tobacco: Never   Vaping Use    Vaping Use: Never used   Substance and Sexual Activity    Alcohol use: Not Currently    Drug use: No    Sexual activity: Not Currently   Other Topics Concern    Not on file   Social History Narrative    Not on file     Social Determinants of Health     Financial Resource Strain: Low Risk     Difficulty of Paying Living Expenses: Not hard at all   Food Insecurity: No Food Insecurity    Worried About Running Out of Food in the Last Year: Never true    [address] in the Last Year: Never true   Transportation Needs: No Transportation Needs    Lack of Transportation (Medical): No    Lack of Transportation (Non-Medical): No   Physical Activity: Inactive    Days of Exercise per Week: 0 days    Minutes of Exercise per Session: 0 min   Stress: No Stress Concern Present    Feeling of Stress : Not at all   Social Connections: Moderately Isolated    Frequency of Communication with Friends and Family:  Three times a week    Frequency of Social Gatherings with Friends and Family: Three times a week    Attends [employer] Services: More than 4 times per year    Active Member of Clubs or Organizations: No    Attends Club or Organization Meetings: Never    Marital Status:    Intimate Partner Violence: Not At Risk    Fear of Current or Ex-Partner: No    Emotionally Abused: No    Physically Abused: No    Sexually Abused: No   Housing Stability: Unknown    Unable to Pay for Housing in the Last Year: No    Number of Places Lived in the Last Year: Not on file    Unstable Housing in the Last Year: No       Family History   Problem Relation Age of Onset    Coronary Art Dis Father     Colon Cancer Brother     Diabetes Neg Hx     Cataracts Neg Hx     Glaucoma Neg Hx        Allergies:  Patient has no known allergies. Home Medications:  Prior to Admission medications    Medication Sig Start Date End Date Taking? Authorizing Provider   vitamin D (ERGOCALCIFEROL) 1.25 MG (14409 UT) CAPS capsule Take 1 capsule by mouth once a week for 8 doses 9/4/22 10/24/22 Yes Adi Said, DO   potassium chloride (KLOR-CON M20) 20 MEQ extended release tablet Take 20 mEq by mouth in the morning.     Historical Provider, MD   OXYGEN Inhale 3.5 L into the lungs to keep sat > 91%    Historical Provider, MD   Nutritional Supplements (NUTRITIONAL DRINK PO) Mighty shakes bid with meals    Historical Provider, MD   calcium carbonate-vitamin D3 (CALCIUM 600 + D) 600-400 MG-UNIT TABS per tab Take 1 tablet by mouth daily    Historical Provider, MD   amLODIPine (NORVASC) 5 MG tablet Take 5 mg by mouth daily    Historical Provider, MD   furosemide (LASIX) 40 MG tablet Take 40 mg by mouth daily    Historical Provider, MD   hydrOXYzine HCl (ATARAX) 25 MG tablet Take 25 mg by mouth every 8 hours as needed for Itching    Historical Provider, MD   triamcinolone (KENALOG) 0.1 % cream Apply topically to affected areas on back and arms 1-2 times daily as needed for itch until resolved Historical Provider, MD   levothyroxine (SYNTHROID) 137 MCG tablet TAKE 1 TABLET DAILY 5/31/22   Silverio Mills MD   FLUoxetine (PROZAC) 20 MG capsule TAKE 1 CAPSULE DAILY 5/31/22   Silverio Mills MD   BRILINTA 90 MG TABS tablet Take 1 tablet by mouth 2 times daily 4/20/22   Yessica Dudley MD   losartan (COZAAR) 100 MG tablet TAKE 1 TABLET DAILY 3/14/22   Silverio Mills MD   OLANZapine (ZYPREXA) 2.5 MG tablet TAKE 1 TABLET NIGHTLY 2/28/22   Silverio Mills MD   memantine [facility]) 5 MG tablet Take 1 tablet by mouth daily 2/21/22   Silverio Mills MD   atenolol (TENORMIN) 25 MG tablet TAKE 1 TABLET DAILY 1/10/22   Silverio Mills MD   acetaminophen (TYLENOL) 650 MG extended release tablet Take 650 mg by mouth 2 times daily as needed for Pain    Historical Provider, MD   atorvastatin (LIPITOR) 40 MG tablet Take 1 tablet by mouth daily 2/1/21   Silverio Mills MD   aspirin 81 MG EC tablet Take 1 tablet by mouth daily 12/21/20   Historical Provider, MD   nitroGLYCERIN (NITROSTAT) 0.4 MG SL tablet Place 0.4 mg under the tongue every 5 minutes as needed for Chest pain up to max of 3 total doses. If no relief after 3 dose, call 911. Historical Provider, MD   ibuprofen (ADVIL;MOTRIN) 600 MG tablet Take 1 tablet by mouth 3 times daily as needed for Pain (Take with food.) 12/27/20 12/27/20  Pastor oDugherty MD   ammonium lactate (AMLACTIN) 12 % cream apply to affected area twice a day if needed 4/7/20   Silverio Mills MD   Multiple Vitamin (DAILY MULTIVITAMIN PO) Take 1 tablet by mouth daily     Historical Provider, MD       REVIEW OF SYSTEMS    (2-9 systems for level 4, 10 or more for level 5)      Review of Systems   Constitutional:  Negative for chills and fever. HENT:  Negative for ear pain, hearing loss and sore throat. Eyes:  Negative for visual disturbance. Respiratory:  Negative for shortness of breath. Cardiovascular:  Negative for chest pain. Gastrointestinal:  Negative for abdominal pain, constipation, diarrhea, nausea and vomiting. Genitourinary:  Negative for difficulty urinating and dysuria. Musculoskeletal:  Negative for myalgias. Pain in L hip   Neurological:  Negative for numbness. Psychiatric/Behavioral:  Negative for agitation and confusion. PHYSICAL EXAM   (up to 7 for level 4, 8 or more for level 5)     INITIAL VITALS:    temporal temperature is 97.2 °F (36.2 °C). Her blood pressure is 142/45 (abnormal) and her pulse is 80. Her respiration is 12 and oxygen saturation is 99%. Physical Exam  Vitals and nursing note reviewed. Constitutional:       General: She is not in acute distress. Appearance: She is well-developed. She is not diaphoretic. HENT:      Head: Normocephalic and atraumatic. Right Ear: External ear normal.      Left Ear: External ear normal.      Nose: Nose normal.      Mouth/Throat:      Mouth: Mucous membranes are moist.   Eyes:      Conjunctiva/sclera: Conjunctivae normal.   Neck:      Trachea: No tracheal deviation. Cardiovascular:      Rate and Rhythm: Normal rate and regular rhythm. Heart sounds: Normal heart sounds. No murmur heard. No friction rub. No gallop. Pulmonary:      Effort: Pulmonary effort is normal. No respiratory distress. Breath sounds: No wheezing, rhonchi or rales. Comments: No anterior chest wall tenderness or crepitus noted  Abdominal:      General: Bowel sounds are normal.      Palpations: Abdomen is soft. Tenderness: There is no abdominal tenderness. Comments: Abdomen soft, nontender nondistended, no areas of ecchymosis noted   Musculoskeletal:         General: Tenderness and signs of injury present. Normal range of motion. Cervical back: Normal range of motion and neck supple. No rigidity or tenderness. Right lower leg: No edema. Left lower leg: No edema.       Comments: Left leg is shortened, slight external rotation  +2DP/PT pulses bilaterally  Sensation intact bilaterally   Skin:     General: Skin is warm and dry.      Capillary Refill: Capillary refill takes less than 2 seconds. Neurological:      Mental Status: She is alert and oriented to person, place, and time. Motor: No abnormal muscle tone. Comments: Patient is oriented x3, however is intermittently confused (states that her  , and then several minutes later asked what the address to hospital was so she could tell him where to go)       DIFFERENTIAL  DIAGNOSIS     PLAN (LABS / IMAGING / EKG):  Orders Placed This Encounter   Procedures    CT CHEST ABDOMEN PELVIS W CONTRAST    CT THORACIC SPINE TRAUMA RECONSTRUCTION    CT LUMBAR SPINE TRAUMA RECONSTRUCTION    XR FEMUR LEFT (MIN 2 VIEWS)    XR FEMUR RIGHT (MIN 2 VIEWS)    XR HIP 2-3 VW W PELVIS LEFT    XR FEMUR LEFT (MIN 2 VIEWS)    FLUORO FOR SURGICAL PROCEDURES    Vitamin D 25 Hydroxy    Troponin    Troponin    Basic Metabolic Panel w/ Reflex to MG    CBC with Auto Differential    Comprehensive Metabolic Panel w/ Reflex to MG    Magnesium    Phosphorus    Protime-INR    TEG Global Hemostasis with Lysis    Diet NPO    Vital signs per unit routine    Notify physician    Notify patient's primary care physician of admission    Place intermittent pneumatic compression device    Telemetry monitoring - continuous duration    Up with assistance    Daily weights    Intake and output    Elevate Head of Bed     Monitor for signs/symptoms of urinary retention    Turn or assist with turn approximately every 2 hours if patient is unable to turn self.  Remind patient to turn if necessary    Maintain HOB at the lowest elevation consistent with medical plan of care    Assess skin per unit guidelines    Maintain heels off of bed at all times    Pad/offload medical devices    Use lift equipment for lifting patient    Vital signs per unit routine    Notify physician     Phase I - bedrest    Phase II - up with assistance    Phase I - warming device    Phase I - cardiac monitor    Phase I & II - check level of sensory block    Phase I & II - femoral nerve block    Phase I & II - neurological checks    Nursing communication - Discharge from PACU    Continuous Pulse Oximetry    DNR comfort care - arrest    Inpatient consult to Trauma Surgery    Inpatient consult to Orthopedic Surgery    Consult to Cardiology    OT eval and treat    PT evaluation and treat    Initiate Oxygen Therapy Protocol    Heated/ Humidified High Flow Nasal Cannula    Initiate PACU Oxygen Therapy Protocol    Speech language pathology evaluation    EKG 12 Lead    EKG 12 Lead    ECHO Complete 2D W Doppler W Color    TYPE AND SCREEN    PREPARE RBC (CROSSMATCH), 2 Units    Blood Bank Specimen    Type and Screen    TYPE AND SCREEN    Blood Bank Specimen    Discontinue IV    ADMIT TO INPATIENT    Transfer Patient    Fall precautions       MEDICATIONS ORDERED:  Orders Placed This Encounter   Medications    fentaNYL (SUBLIMAZE) injection 50 mcg    iopamidol (ISOVUE-370) 76 % injection 75 mL    DISCONTD: fentaNYL (SUBLIMAZE) injection 50 mcg    sodium chloride flush 0.9 % injection 5-40 mL    sodium chloride flush 0.9 % injection 5-40 mL    0.9 % sodium chloride infusion    acetaminophen (TYLENOL) tablet 1,000 mg    oxyCODONE (ROXICODONE) immediate release tablet 2.5 mg    OR Linked Order Group     ondansetron (ZOFRAN-ODT) disintegrating tablet 4 mg     ondansetron (ZOFRAN) injection 4 mg    polyethylene glycol (GLYCOLAX) packet 17 g    senna (SENOKOT) tablet 8.6 mg    ceFAZolin (ANCEF) 2000 mg in sterile water 20 mL IV syringe     Order Specific Question:   Antimicrobial Indications     Answer:   Surgical Prophylaxis    vitamin D (ERGOCALCIFEROL) 1.25 MG (04190 UT) CAPS capsule     Sig: Take 1 capsule by mouth once a week for 8 doses     Dispense:  8 capsule     Refill:  0    lactated ringers infusion    tranexamic acid-NaCl IVPB premix 1,000 mg    sodium chloride flush 0.9 % injection 5-40 mL    sodium chloride flush 0.9 % injection 5-40 mL    0.9 % sodium chloride infusion    fentaNYL (SUBLIMAZE) injection 25 mcg    morphine (PF) injection 2 mg    ondansetron (ZOFRAN) injection 4 mg    diphenhydrAMINE (BENADRYL) injection 12.5 mg    sodium chloride 0.9 % irrigation    DISCONTD: sterile water for irrigation       DDX: femur fx    DIAGNOSTIC RESULTS / EMERGENCY DEPARTMENT COURSE / MDM     LABS:  Labs Reviewed   VITAMIN D 25 HYDROXY - Abnormal; Notable for the following components:       Result Value    Vit D, 25-Hydroxy 17.9 (*)     All other components within normal limits   TROPONIN - Abnormal; Notable for the following components:    Troponin, High Sensitivity 20 (*)     All other components within normal limits   TROPONIN - Abnormal; Notable for the following components:    Troponin, High Sensitivity 21 (*)     All other components within normal limits   CBC WITH AUTO DIFFERENTIAL - Abnormal; Notable for the following components:    WBC 12.7 (*)     RBC 3.85 (*)     RDW 15.4 (*)     Seg Neutrophils 72 (*)     Lymphocytes 8 (*)     Eosinophils % 6 (*)     Immature Granulocytes 1 (*)     Segs Absolute 9.34 (*)     Absolute Lymph # 0.98 (*)     Absolute Mono # 1.46 (*)     Absolute Eos # 0.71 (*)     All other components within normal limits   COMPREHENSIVE METABOLIC PANEL W/ REFLEX TO MG FOR LOW K - Abnormal; Notable for the following components:    Glucose 123 (*)     Calcium 8.3 (*)     All other components within normal limits   TEG GLOBAL HEMOSTASIS WITH LYSIS - Abnormal; Notable for the following components:    Reaction Time TEG 3.7 (*)     LY30(Lysis) TEG 3.0 (*)     All other components within normal limits   MAGNESIUM   PHOSPHORUS   PROTIME-INR   TYPE AND SCREEN   TYPE AND SCREEN   PREPARE RBC (CROSSMATCH)   BLOOD BANK SPECIMEN   BLOOD BANK SPECIMEN         RADIOLOGY:  XR FEMUR LEFT (MIN 2 VIEWS)    Result Date: 9/3/2022  EXAMINATION: 7 XRAY VIEWS OF THE LEFT FEMUR 9/3/2022 4:45 pm COMPARISON: None.  HISTORY: ORDERING SYSTEM PROVIDED HISTORY: Trauma/Fracture TECHNOLOGIST PROVIDED HISTORY: AP/Lateral Trauma/Fracture FINDINGS: Visualized bony pelvis is intact. There is a fracture of the proximal left femur with mild impaction. There is degenerative change of the left SI joint and left hip joint. The surrounding soft tissues are unremarkable. Impacted fracture of the proximal left femur. XR FEMUR RIGHT (MIN 2 VIEWS)    Result Date: 9/3/2022  EXAMINATION: 4 XRAY VIEWS OF THE RIGHT FEMUR 9/3/2022 4:45 pm COMPARISON: Right femoral radiographs performed 01/17/2022. HISTORY: ORDERING SYSTEM PROVIDED HISTORY: large hematoma, fall TECHNOLOGIST PROVIDED HISTORY: large hematoma, fall FINDINGS: Visualized bony pelvis is intact. There is no acute osseous abnormality. There is hardware fixation of proximal right femur without hardware complication. The surrounding soft tissues are unremarkable. Hardware fixation of the proximal right femur without evidence for complication. FLUORO FOR SURGICAL PROCEDURES    Result Date: 9/4/2022  Radiology exam is complete. No Radiologist dictation. Please follow up with ordering provider. CT CHEST ABDOMEN PELVIS W CONTRAST    Result Date: 9/3/2022  EXAMINATION: CT OF THE CHEST, ABDOMEN, AND PELVIS WITH CONTRAST 9/3/2022 4:55 pm TECHNIQUE: CT of the chest, abdomen and pelvis was performed with the administration of intravenous contrast. Multiplanar reformatted images are provided for review. Automated exposure control, iterative reconstruction, and/or weight based adjustment of the mA/kV was utilized to reduce the radiation dose to as low as reasonably achievable.  COMPARISON: January 21, 2015 HISTORY: ORDERING SYSTEM PROVIDED HISTORY: Fall on [facility] TECHNOLOGIST PROVIDED HISTORY: Fall on [address] [place] [address] Exception - unselect if not a suspected or confirmed emergency medical condition->Emergency Medical Condition (MA) Reason for Exam: Fall on [address]: Chest: Mediastinum: Cardiomegaly calcific coronary artery disease. Calcification mitral annulus. Heart and great vessels otherwise normal.  No significant mediastinal or hilar lymphadenopathy. Lungs/pleura: Bibasilar airspace disease versus subsegmental atelectasis. Soft Tissues/Bones: Moderate kyphosis and ossification of the anterior longitudinal ligament. Abdomen/Pelvis: Organs: The abdominal wall appears normal. The liver, spleen, pancreas, and adrenals appear normal.  Gallbladder not identified. Kidneys appear normal. Bladder decompressed with a Michaels catheter. GI/Bowel: The stomach,small bowel, and colon appear normal. Colonic diverticulosis. Increased stool in the colon. Pelvis: Intramedullary portia and compression screw right hip. Remote fracture right hip. Acute comminuted basicervical fracture left hip involving the greater and lesser trochanter. Peritoneum/Retroperitoneum: The abdominal aorta and iliac arteries are normal in caliber. There is no pathologic adenopathy. Calcified plaque along the aorta and its branches. Bones/Soft Tissues: Fracture left hip as above. Bibasilar subsegmental atelectasis/airspace disease. Comminuted fracture left hip. Status post ORIF remote fracture right hip. CT LUMBAR SPINE TRAUMA RECONSTRUCTION    Result Date: 9/3/2022  EXAMINATION: CT OF THE LUMBAR SPINE WITHOUT CONTRAST  9/3/2022 TECHNIQUE: CT of the lumbar spine was performed without the administration of intravenous contrast. Multiplanar reformatted images are provided for review. Adjustment of mA and/or kV according to patient size was utilized. Automated exposure control, iterative reconstruction, and/or weight based adjustment of the mA/kV was utilized to reduce the radiation dose to as low as reasonably achievable.  COMPARISON: None HISTORY: ORDERING SYSTEM PROVIDED HISTORY: fall, trauma TECHNOLOGIST PROVIDED HISTORY: fall, trauma Reason for Exam: Fall on [address] FINDINGS: BONES/ALIGNMENT: There is normal alignment of the spine except slight anterior subluxation L4-5. The vertebral body heights are maintained. No osseous destructive lesion is seen. 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 Hypertrophic facet disease at L5 bilaterally. SOFT TISSUES/RETROPERITONEUM: No paraspinal mass is seen. Grade 1 spondylolisthesis L4-5. Spinal stenosis. No fracture. CT THORACIC SPINE TRAUMA RECONSTRUCTION    Result Date: 9/3/2022  EXAMINATION: CT OF THE THORACIC SPINE WITHOUT CONTRAST  9/3/2022 4:55 pm: TECHNIQUE: CT of the thoracic spine was performed without the administration of intravenous contrast. Multiplanar reformatted images are provided for review. Automated exposure control, iterative reconstruction, and/or weight based adjustment of the mA/kV was utilized to reduce the radiation dose to as low as reasonably achievable. COMPARISON: None. HISTORY: ORDERING SYSTEM PROVIDED HISTORY: trauma, fall TECHNOLOGIST PROVIDED HISTORY: trauma, fall Reason for Exam: Fall on [address] FINDINGS: BONES/ALIGNMENT: There is normal alignment of the spine except for moderate kyphosis. The vertebral body heights are maintained. No osseous destructive lesion is seen. DEGENERATIVE CHANGES: No gross spinal canal stenosis or bony neural foraminal narrowing of the thoracic spine. Ossification anterior longitudinal ligament. SOFT TISSUES: No paraspinal mass is seen. No acute fracture. Moderate kyphosis. Diffuse idiopathic skeletal hyperostosis. XR HIP 2-3 VW W PELVIS LEFT    Result Date: 9/4/2022  EXAMINATION: ONE XRAY VIEW OF THE PELVIS AND TWO XRAY VIEWS LEFT HIP 9/4/2022 3:44 pm COMPARISON: Left hip radiographs performed 09/03/2022. HISTORY: ORDERING SYSTEM PROVIDED HISTORY: POst op pacu TECHNOLOGIST PROVIDED HISTORY: POst op pacu FINDINGS: Bony pelvis is intact. There is hardware fixation of the proximal left femoral fracture without hardware complication. There is postsurgical change in the adjacent soft tissues.      Hardware fixation of the proximal left femoral fracture without complication. EKG    All EKG's are interpreted by the Emergency Department Physician who either signs or Co-signs this chart in the absence of a cardiologist.    EMERGENCY DEPARTMENT COURSE:     MDM/Plan: 80 y.o. female who presents with with fracture of left femur. The time initial examination patient was in no acute distress, vital signs are stable at this time. Visible deformity to the left leg, but no other obvious signs of traumatic injuries noted. Discussed with trauma team, will add on completion scans at this time, CT head and CT of the neck were recently negative. Plan for consultation with Ortho. Trauma to admit following completion scans. Pain control and monitoring in the meantime. PROCEDURES:  None    CONSULTS:  IP CONSULT TO TRAUMA SURGERY  IP CONSULT TO ORTHOPEDIC SURGERY  IP CONSULT TO CARDIOLOGY    CRITICAL CARE:  Please see attending note    FINAL IMPRESSION      1. Closed displaced intertrochanteric fracture of left femur, [facility]       DISPOSITION / [address] Admitted 09/03/2022 06:39:22 PM    PATIENTREFERRED TO:  No follow-up provider specified.     DISCHARGE MEDICATIONS:  Current Discharge Medication List        START taking these medications    Details   vitamin D (ERGOCALCIFEROL) 1.25 MG (26524 UT) CAPS capsule Take 1 capsule by mouth once a week for 8 doses  Qty: 8 capsule, Refills: 0             Reggiey DO Dorothy  EmergencyMedicine Resident    (Please note that portions of this note were completed with a voice recognition program.  Efforts were made to edit the dictations but occasionally words are mis-transcribed.)       Akanksha Whitfield DO  Resident  09/04/22 [id number]

## 2022-09-04 ENCOUNTER — ANESTHESIA (OUTPATIENT)
Dept: OPERATING ROOM | Age: 87
DRG: 481 | End: 2022-09-04
Payer: MEDICARE

## 2022-09-04 ENCOUNTER — APPOINTMENT (OUTPATIENT)
Dept: GENERAL RADIOLOGY | Age: 87
DRG: 481 | End: 2022-09-04
Payer: MEDICARE

## 2022-09-04 ENCOUNTER — ANESTHESIA EVENT (OUTPATIENT)
Dept: OPERATING ROOM | Age: 87
DRG: 481 | End: 2022-09-04
Payer: MEDICARE

## 2022-09-04 PROBLEM — S72.142A CLOSED DISPLACED INTERTROCHANTERIC FRACTURE OF LEFT FEMUR (HCC): Status: ACTIVE | Noted: 2021-10-30

## 2022-09-04 LAB
ABSOLUTE EOS #: 0.71 K/UL (ref 0–0.44)
ABSOLUTE IMMATURE GRANULOCYTE: 0.07 K/UL (ref 0–0.3)
ABSOLUTE LYMPH #: 0.98 K/UL (ref 1.1–3.7)
ABSOLUTE MONO #: 1.46 K/UL (ref 0.1–1.2)
ALBUMIN SERPL-MCNC: 3.5 G/DL (ref 3.5–5.2)
ALBUMIN/GLOBULIN RATIO: 1.1 (ref 1–2.5)
ALP BLD-CCNC: 88 U/L (ref 35–104)
ALT SERPL-CCNC: 9 U/L (ref 5–33)
ANION GAP SERPL CALCULATED.3IONS-SCNC: 10 MMOL/L (ref 9–17)
AST SERPL-CCNC: 25 U/L
BASOPHILS # BLD: 1 % (ref 0–2)
BASOPHILS ABSOLUTE: 0.09 K/UL (ref 0–0.2)
BILIRUB SERPL-MCNC: 0.9 MG/DL (ref 0.3–1.2)
BUN BLDV-MCNC: 18 MG/DL (ref 8–23)
CALCIUM SERPL-MCNC: 8.3 MG/DL (ref 8.6–10.4)
CHLORIDE BLD-SCNC: 104 MMOL/L (ref 98–107)
CO2: 27 MMOL/L (ref 20–31)
CREAT SERPL-MCNC: 0.69 MG/DL (ref 0.5–0.9)
EOSINOPHILS RELATIVE PERCENT: 6 % (ref 1–4)
FIBRINOGEN, FUNCTIONAL TEG: 23.7 MM (ref 15–32)
GFR AFRICAN AMERICAN: >60 ML/MIN
GFR NON-AFRICAN AMERICAN: >60 ML/MIN
GFR SERPL CREATININE-BSD FRML MDRD: ABNORMAL ML/MIN/{1.73_M2}
GLUCOSE BLD-MCNC: 123 MG/DL (ref 70–99)
HCT VFR BLD CALC: 32.5 % (ref 36.3–47.1)
HCT VFR BLD CALC: 37.1 % (ref 36.3–47.1)
HEMOGLOBIN: 10 G/DL (ref 11.9–15.1)
HEMOGLOBIN: 11.9 G/DL (ref 11.9–15.1)
IMMATURE GRANULOCYTES: 1 %
INR BLD: 1
LY30 (LYSIS) TEG: 3 % (ref 0–2.6)
LYMPHOCYTES # BLD: 8 % (ref 24–43)
MA(MAX CLOT) RAPID TEG: 63.3 MM (ref 52–70)
MAGNESIUM: 2.3 MG/DL (ref 1.6–2.6)
MCH RBC QN AUTO: 30.9 PG (ref 25.2–33.5)
MCHC RBC AUTO-ENTMCNC: 32.1 G/DL (ref 28.4–34.8)
MCV RBC AUTO: 96.4 FL (ref 82.6–102.9)
MONOCYTES # BLD: 12 % (ref 3–12)
NRBC AUTOMATED: 0 PER 100 WBC
PDW BLD-RTO: 15.4 % (ref 11.8–14.4)
PHOSPHORUS: 4.1 MG/DL (ref 2.6–4.5)
PLATELET # BLD: 235 K/UL (ref 138–453)
PMV BLD AUTO: 10.5 FL (ref 8.1–13.5)
POTASSIUM SERPL-SCNC: 4.6 MMOL/L (ref 3.7–5.3)
PROTHROMBIN TIME: 10.7 SEC (ref 9.1–12.3)
RBC # BLD: 3.85 M/UL (ref 3.95–5.11)
RBC # BLD: ABNORMAL 10*6/UL
REACTION TIME TEG: 3.7 MIN (ref 4.6–9.1)
SEG NEUTROPHILS: 72 % (ref 36–65)
SEGMENTED NEUTROPHILS ABSOLUTE COUNT: 9.34 K/UL (ref 1.5–8.1)
SODIUM BLD-SCNC: 141 MMOL/L (ref 135–144)
TOTAL PROTEIN: 6.8 G/DL (ref 6.4–8.3)
TROPONIN, HIGH SENSITIVITY: 20 NG/L (ref 0–14)
TROPONIN, HIGH SENSITIVITY: 21 NG/L (ref 0–14)
VITAMIN D 25-HYDROXY: 17.9 NG/ML
WBC # BLD: 12.7 K/UL (ref 3.5–11.3)

## 2022-09-04 PROCEDURE — 36415 COLL VENOUS BLD VENIPUNCTURE: CPT

## 2022-09-04 PROCEDURE — 85347 COAGULATION TIME ACTIVATED: CPT

## 2022-09-04 PROCEDURE — 85390 FIBRINOLYSINS SCREEN I&R: CPT

## 2022-09-04 PROCEDURE — 2500000003 HC RX 250 WO HCPCS

## 2022-09-04 PROCEDURE — 6370000000 HC RX 637 (ALT 250 FOR IP): Performed by: STUDENT IN AN ORGANIZED HEALTH CARE EDUCATION/TRAINING PROGRAM

## 2022-09-04 PROCEDURE — 2580000003 HC RX 258: Performed by: STUDENT IN AN ORGANIZED HEALTH CARE EDUCATION/TRAINING PROGRAM

## 2022-09-04 PROCEDURE — 2709999900 HC NON-CHARGEABLE SUPPLY: Performed by: STUDENT IN AN ORGANIZED HEALTH CARE EDUCATION/TRAINING PROGRAM

## 2022-09-04 PROCEDURE — 2580000003 HC RX 258

## 2022-09-04 PROCEDURE — 86920 COMPATIBILITY TEST SPIN: CPT

## 2022-09-04 PROCEDURE — 3209999900 FLUORO FOR SURGICAL PROCEDURES

## 2022-09-04 PROCEDURE — C1769 GUIDE WIRE: HCPCS | Performed by: STUDENT IN AN ORGANIZED HEALTH CARE EDUCATION/TRAINING PROGRAM

## 2022-09-04 PROCEDURE — 83735 ASSAY OF MAGNESIUM: CPT

## 2022-09-04 PROCEDURE — 0QS706Z REPOSITION LEFT UPPER FEMUR WITH INTRAMEDULLARY INTERNAL FIXATION DEVICE, OPEN APPROACH: ICD-10-PCS | Performed by: STUDENT IN AN ORGANIZED HEALTH CARE EDUCATION/TRAINING PROGRAM

## 2022-09-04 PROCEDURE — 7100000000 HC PACU RECOVERY - FIRST 15 MIN: Performed by: STUDENT IN AN ORGANIZED HEALTH CARE EDUCATION/TRAINING PROGRAM

## 2022-09-04 PROCEDURE — 7100000001 HC PACU RECOVERY - ADDTL 15 MIN: Performed by: STUDENT IN AN ORGANIZED HEALTH CARE EDUCATION/TRAINING PROGRAM

## 2022-09-04 PROCEDURE — 85610 PROTHROMBIN TIME: CPT

## 2022-09-04 PROCEDURE — 80053 COMPREHEN METABOLIC PANEL: CPT

## 2022-09-04 PROCEDURE — 3700000000 HC ANESTHESIA ATTENDED CARE: Performed by: STUDENT IN AN ORGANIZED HEALTH CARE EDUCATION/TRAINING PROGRAM

## 2022-09-04 PROCEDURE — 6360000002 HC RX W HCPCS: Performed by: STUDENT IN AN ORGANIZED HEALTH CARE EDUCATION/TRAINING PROGRAM

## 2022-09-04 PROCEDURE — 85014 HEMATOCRIT: CPT

## 2022-09-04 PROCEDURE — 93005 ELECTROCARDIOGRAM TRACING: CPT | Performed by: STUDENT IN AN ORGANIZED HEALTH CARE EDUCATION/TRAINING PROGRAM

## 2022-09-04 PROCEDURE — 86850 RBC ANTIBODY SCREEN: CPT

## 2022-09-04 PROCEDURE — 84484 ASSAY OF TROPONIN QUANT: CPT

## 2022-09-04 PROCEDURE — 85025 COMPLETE CBC W/AUTO DIFF WBC: CPT

## 2022-09-04 PROCEDURE — 3600000014 HC SURGERY LEVEL 4 ADDTL 15MIN: Performed by: STUDENT IN AN ORGANIZED HEALTH CARE EDUCATION/TRAINING PROGRAM

## 2022-09-04 PROCEDURE — 86900 BLOOD TYPING SEROLOGIC ABO: CPT

## 2022-09-04 PROCEDURE — 2060000000 HC ICU INTERMEDIATE R&B

## 2022-09-04 PROCEDURE — 2720000010 HC SURG SUPPLY STERILE: Performed by: STUDENT IN AN ORGANIZED HEALTH CARE EDUCATION/TRAINING PROGRAM

## 2022-09-04 PROCEDURE — 99231 SBSQ HOSP IP/OBS SF/LOW 25: CPT | Performed by: STUDENT IN AN ORGANIZED HEALTH CARE EDUCATION/TRAINING PROGRAM

## 2022-09-04 PROCEDURE — 85576 BLOOD PLATELET AGGREGATION: CPT

## 2022-09-04 PROCEDURE — 3700000001 HC ADD 15 MINUTES (ANESTHESIA): Performed by: STUDENT IN AN ORGANIZED HEALTH CARE EDUCATION/TRAINING PROGRAM

## 2022-09-04 PROCEDURE — 85384 FIBRINOGEN ACTIVITY: CPT

## 2022-09-04 PROCEDURE — 3600000004 HC SURGERY LEVEL 4 BASE: Performed by: STUDENT IN AN ORGANIZED HEALTH CARE EDUCATION/TRAINING PROGRAM

## 2022-09-04 PROCEDURE — 6360000002 HC RX W HCPCS

## 2022-09-04 PROCEDURE — 85018 HEMOGLOBIN: CPT

## 2022-09-04 PROCEDURE — 73502 X-RAY EXAM HIP UNI 2-3 VIEWS: CPT

## 2022-09-04 PROCEDURE — 27245 TREAT THIGH FRACTURE: CPT | Performed by: STUDENT IN AN ORGANIZED HEALTH CARE EDUCATION/TRAINING PROGRAM

## 2022-09-04 PROCEDURE — 86901 BLOOD TYPING SEROLOGIC RH(D): CPT

## 2022-09-04 PROCEDURE — 73552 X-RAY EXAM OF FEMUR 2/>: CPT

## 2022-09-04 PROCEDURE — 84100 ASSAY OF PHOSPHORUS: CPT

## 2022-09-04 PROCEDURE — C1713 ANCHOR/SCREW BN/BN,TIS/BN: HCPCS | Performed by: STUDENT IN AN ORGANIZED HEALTH CARE EDUCATION/TRAINING PROGRAM

## 2022-09-04 DEVICE — SCREW BNE L42MM DIA5MM NONSTERILE TIB LT GRN TI ST CANN LOK: Type: IMPLANTABLE DEVICE | Site: FEMUR | Status: FUNCTIONAL

## 2022-09-04 DEVICE — SCREW BNE L80MM DIA10.35MM G TI CANN PERF FOR PROX FEM: Type: IMPLANTABLE DEVICE | Site: FEMUR | Status: FUNCTIONAL

## 2022-09-04 DEVICE — NAIL IM L380MM DIA11MM 125DEG LNG GRN L PROX FEM TI: Type: IMPLANTABLE DEVICE | Site: FEMUR | Status: FUNCTIONAL

## 2022-09-04 DEVICE — SCREW BNE L44MM DIA5MM NONSTERILE TIB LT GRN TI ST CANN LOK: Type: IMPLANTABLE DEVICE | Site: FEMUR | Status: FUNCTIONAL

## 2022-09-04 RX ORDER — ONDANSETRON 2 MG/ML
4 INJECTION INTRAMUSCULAR; INTRAVENOUS
Status: DISCONTINUED | OUTPATIENT
Start: 2022-09-04 | End: 2022-09-04 | Stop reason: HOSPADM

## 2022-09-04 RX ORDER — MAGNESIUM HYDROXIDE 1200 MG/15ML
LIQUID ORAL CONTINUOUS PRN
Status: COMPLETED | OUTPATIENT
Start: 2022-09-04 | End: 2022-09-04

## 2022-09-04 RX ORDER — SODIUM CHLORIDE, SODIUM LACTATE, POTASSIUM CHLORIDE, CALCIUM CHLORIDE 600; 310; 30; 20 MG/100ML; MG/100ML; MG/100ML; MG/100ML
INJECTION, SOLUTION INTRAVENOUS CONTINUOUS PRN
Status: DISCONTINUED | OUTPATIENT
Start: 2022-09-04 | End: 2022-09-04 | Stop reason: SDUPTHER

## 2022-09-04 RX ORDER — MORPHINE SULFATE 2 MG/ML
2 INJECTION, SOLUTION INTRAMUSCULAR; INTRAVENOUS EVERY 5 MIN PRN
Status: DISCONTINUED | OUTPATIENT
Start: 2022-09-04 | End: 2022-09-04 | Stop reason: HOSPADM

## 2022-09-04 RX ORDER — TRANEXAMIC ACID 10 MG/ML
1000 INJECTION, SOLUTION INTRAVENOUS ONCE
Status: DISCONTINUED | OUTPATIENT
Start: 2022-09-04 | End: 2022-09-09

## 2022-09-04 RX ORDER — FENTANYL CITRATE 50 UG/ML
25 INJECTION, SOLUTION INTRAMUSCULAR; INTRAVENOUS EVERY 5 MIN PRN
Status: DISCONTINUED | OUTPATIENT
Start: 2022-09-04 | End: 2022-09-04 | Stop reason: HOSPADM

## 2022-09-04 RX ORDER — SODIUM CHLORIDE, SODIUM LACTATE, POTASSIUM CHLORIDE, CALCIUM CHLORIDE 600; 310; 30; 20 MG/100ML; MG/100ML; MG/100ML; MG/100ML
INJECTION, SOLUTION INTRAVENOUS CONTINUOUS
Status: DISCONTINUED | OUTPATIENT
Start: 2022-09-04 | End: 2022-09-05

## 2022-09-04 RX ORDER — LIDOCAINE HYDROCHLORIDE 10 MG/ML
INJECTION, SOLUTION EPIDURAL; INFILTRATION; INTRACAUDAL; PERINEURAL PRN
Status: DISCONTINUED | OUTPATIENT
Start: 2022-09-04 | End: 2022-09-04 | Stop reason: SDUPTHER

## 2022-09-04 RX ORDER — MAGNESIUM HYDROXIDE 1200 MG/15ML
LIQUID ORAL PRN
Status: DISCONTINUED | OUTPATIENT
Start: 2022-09-04 | End: 2022-09-04 | Stop reason: ALTCHOICE

## 2022-09-04 RX ORDER — SODIUM CHLORIDE 0.9 % (FLUSH) 0.9 %
5-40 SYRINGE (ML) INJECTION PRN
Status: DISCONTINUED | OUTPATIENT
Start: 2022-09-04 | End: 2022-09-04 | Stop reason: HOSPADM

## 2022-09-04 RX ORDER — FENTANYL CITRATE 50 UG/ML
INJECTION, SOLUTION INTRAMUSCULAR; INTRAVENOUS PRN
Status: DISCONTINUED | OUTPATIENT
Start: 2022-09-04 | End: 2022-09-04 | Stop reason: SDUPTHER

## 2022-09-04 RX ORDER — ERGOCALCIFEROL 1.25 MG/1
50000 CAPSULE ORAL WEEKLY
Qty: 8 CAPSULE | Refills: 0 | Status: SHIPPED | OUTPATIENT
Start: 2022-09-04 | End: 2022-10-11 | Stop reason: SDUPTHER

## 2022-09-04 RX ORDER — PROPOFOL 10 MG/ML
INJECTION, EMULSION INTRAVENOUS PRN
Status: DISCONTINUED | OUTPATIENT
Start: 2022-09-04 | End: 2022-09-04 | Stop reason: SDUPTHER

## 2022-09-04 RX ORDER — SODIUM CHLORIDE 9 MG/ML
INJECTION, SOLUTION INTRAVENOUS PRN
Status: DISCONTINUED | OUTPATIENT
Start: 2022-09-04 | End: 2022-09-04 | Stop reason: HOSPADM

## 2022-09-04 RX ORDER — SODIUM CHLORIDE 0.9 % (FLUSH) 0.9 %
5-40 SYRINGE (ML) INJECTION EVERY 12 HOURS SCHEDULED
Status: DISCONTINUED | OUTPATIENT
Start: 2022-09-04 | End: 2022-09-04 | Stop reason: HOSPADM

## 2022-09-04 RX ORDER — ROCURONIUM BROMIDE 10 MG/ML
INJECTION, SOLUTION INTRAVENOUS PRN
Status: DISCONTINUED | OUTPATIENT
Start: 2022-09-04 | End: 2022-09-04 | Stop reason: SDUPTHER

## 2022-09-04 RX ORDER — DIPHENHYDRAMINE HYDROCHLORIDE 50 MG/ML
12.5 INJECTION INTRAMUSCULAR; INTRAVENOUS
Status: DISCONTINUED | OUTPATIENT
Start: 2022-09-04 | End: 2022-09-04 | Stop reason: HOSPADM

## 2022-09-04 RX ORDER — ONDANSETRON 2 MG/ML
INJECTION INTRAMUSCULAR; INTRAVENOUS PRN
Status: DISCONTINUED | OUTPATIENT
Start: 2022-09-04 | End: 2022-09-04 | Stop reason: SDUPTHER

## 2022-09-04 RX ADMIN — ACETAMINOPHEN 1000 MG: 500 TABLET ORAL at 21:12

## 2022-09-04 RX ADMIN — SODIUM CHLORIDE, PRESERVATIVE FREE 10 ML: 5 INJECTION INTRAVENOUS at 21:13

## 2022-09-04 RX ADMIN — PHENYLEPHRINE HYDROCHLORIDE 25 MCG/MIN: 10 INJECTION INTRAVENOUS at 14:10

## 2022-09-04 RX ADMIN — PROPOFOL 70 MG: 10 INJECTION, EMULSION INTRAVENOUS at 13:15

## 2022-09-04 RX ADMIN — FENTANYL CITRATE 50 MCG: 50 INJECTION, SOLUTION INTRAMUSCULAR; INTRAVENOUS at 13:32

## 2022-09-04 RX ADMIN — FENTANYL CITRATE 50 MCG: 50 INJECTION, SOLUTION INTRAMUSCULAR; INTRAVENOUS at 14:20

## 2022-09-04 RX ADMIN — PHENYLEPHRINE HYDROCHLORIDE 100 MCG: 10 INJECTION INTRAVENOUS at 14:11

## 2022-09-04 RX ADMIN — ROCURONIUM BROMIDE 20 MG: 10 INJECTION INTRAVENOUS at 14:08

## 2022-09-04 RX ADMIN — SODIUM CHLORIDE, POTASSIUM CHLORIDE, SODIUM LACTATE AND CALCIUM CHLORIDE: 600; 310; 30; 20 INJECTION, SOLUTION INTRAVENOUS at 13:07

## 2022-09-04 RX ADMIN — FENTANYL CITRATE 50 MCG: 50 INJECTION, SOLUTION INTRAMUSCULAR; INTRAVENOUS at 13:15

## 2022-09-04 RX ADMIN — ONDANSETRON 4 MG: 2 INJECTION INTRAMUSCULAR; INTRAVENOUS at 14:46

## 2022-09-04 RX ADMIN — SODIUM CHLORIDE, PRESERVATIVE FREE 10 ML: 5 INJECTION INTRAVENOUS at 08:03

## 2022-09-04 RX ADMIN — ROCURONIUM BROMIDE 60 MG: 10 INJECTION INTRAVENOUS at 13:15

## 2022-09-04 RX ADMIN — PHENYLEPHRINE HYDROCHLORIDE 50 MCG: 10 INJECTION INTRAVENOUS at 14:42

## 2022-09-04 RX ADMIN — SUGAMMADEX 200 MG: 100 INJECTION, SOLUTION INTRAVENOUS at 14:57

## 2022-09-04 RX ADMIN — Medication 2000 MG: at 22:44

## 2022-09-04 RX ADMIN — LIDOCAINE HYDROCHLORIDE 50 MG: 10 INJECTION, SOLUTION EPIDURAL; INFILTRATION; INTRACAUDAL; PERINEURAL at 13:14

## 2022-09-04 RX ADMIN — SODIUM CHLORIDE, POTASSIUM CHLORIDE, SODIUM LACTATE AND CALCIUM CHLORIDE: 600; 310; 30; 20 INJECTION, SOLUTION INTRAVENOUS at 11:41

## 2022-09-04 RX ADMIN — OXYCODONE 2.5 MG: 5 TABLET ORAL at 21:12

## 2022-09-04 RX ADMIN — FENTANYL CITRATE 50 MCG: 50 INJECTION, SOLUTION INTRAMUSCULAR; INTRAVENOUS at 13:57

## 2022-09-04 RX ADMIN — PHENYLEPHRINE HYDROCHLORIDE 50 MCG: 10 INJECTION INTRAVENOUS at 14:29

## 2022-09-04 RX ADMIN — Medication 2 G: at 13:45

## 2022-09-04 ASSESSMENT — ENCOUNTER SYMPTOMS
SHORTNESS OF BREATH: 0
DIARRHEA: 0
SORE THROAT: 0
ABDOMINAL PAIN: 0
VOMITING: 0
CONSTIPATION: 0
NAUSEA: 0

## 2022-09-04 ASSESSMENT — PAIN SCALES - GENERAL
PAINLEVEL_OUTOF10: 1
PAINLEVEL_OUTOF10: 7

## 2022-09-04 NOTE — PROGRESS NOTES
Orthopedic Progress Note    Patient:  Livier Rae  YOB: 1930     80 y.o. female    Subjective:  Patient seen and examined at bedside this morning  No complaints or concerns per nursing or patient  Patient to have cardiac risk stratification in the morning prior to orthopedic surgery intervention. Elevated troponins from labs yesterday. No issue overnight  Pain controlled, resting comfortably  Denies fever, HA, CP, SOB, N/V, dysuria      Vitals reviewed, afebrile    Objective:   Vitals:    09/04/22 0430   BP: (!) 125/48   Pulse: 67   Resp: 14   Temp: 97.9 °F (36.6 °C)   SpO2: 90%     Gen: NAD, cooperative   Cardiovascular: Regular rate  Respiratory: Chest symmetric, no accessory muscle use    Orthopedic exam  LLE: Leg shortened and hip externally rotated. Tender to palpation about the hip. Compartments soft and easily compressible. EHL/FHL/TA/GS complex motor intact. Sural/saphenous/SPN/DPN/plantar nerve distribution SILT. DP and PT pulses 2+ with BCR. Recent Labs     09/03/22  0813   WBC 10.5   HGB 12.8   HCT 39.7      INR 1.1      K 4.3   BUN 22   CREATININE 0.88   GLUCOSE 160*      Meds:  Chemical AC: EPC  ABX: None  See rec for complete list    Impression/plan: 80 y.o. female who [address], being seen for:     -Left intertrochanteric femur fracture    -Plan for OR today with Dr. Laquita Darling for CMN of the left femur pending risk stratification by cardiology team  -WB status: NWB LLE  -Pain control per primary  -Type and crossed for surgery  -DVT ppx: per primary.  Please hold all chemical AC for surgical intervention today (9/4)  -NPO for surgical intervention  -Ancef OCTOR  -Ice (20 min, 1 hour off) for edema/pain control  -Encourage deep breathing and incentive spirometry use  -Please page ortho with any questions    Dionne Posada, DO  Orthopedic Surgery Resident, PGY-2  [address]

## 2022-09-04 NOTE — ANESTHESIA PRE PROCEDURE
Department of Anesthesiology  Preprocedure Note       Name:  Maxx Negrete   Age:  80 y.o.  :  3/21/1930                                          MRN:  [id number]         Date:  2022      Surgeon: Ruby Bermeo):  Sonia Kaufman DO    Procedure: Procedure(s):  LEFT TFNA SYNTHES, HANA TABLE    Department of Anesthesiology  Pre-Anesthesia Evaluation/Consultation         Name:  Maxx Negrete                                         Age:  80 y.o.   MRN:  [id number]             Medications  Current Facility-Administered Medications   Medication Dose Route Frequency Provider Last Rate Last Admin    lactated ringers infusion   IntraVENous Continuous Esa Meier DO        sodium chloride flush 0.9 % injection 5-40 mL  5-40 mL IntraVENous 2 times per day Anamaria Hernandez MD   10 mL at 22 0803    sodium chloride flush 0.9 % injection 5-40 mL  5-40 mL IntraVENous PRN Anamaria Hernandez MD        0.9 % sodium chloride infusion   IntraVENous PRN Anamaria Hernandez MD        acetaminophen (TYLENOL) tablet 1,000 mg  1,000 mg Oral 3 times per day Anamaria Hernandez MD   1,000 mg at 22 2323    oxyCODONE (ROXICODONE) immediate release tablet 2.5 mg  2.5 mg Oral Q6H PRN Anamaria Hernandez MD        ondansetron (ZOFRAN-ODT) disintegrating tablet 4 mg  4 mg Oral Q8H PRN Anamaria Hernandez MD        Or    ondansetron [facility]) injection 4 mg  4 mg IntraVENous Q6H PRN Anamaria Hernandez MD        polyethylene glycol [facility]) packet 17 g  17 g Oral Daily Anamaria Hernandez MD        senna (SENOKOT) tablet 8.6 mg  1 tablet Oral Daily PRN Anamaria Hernandez MD        ceFAZolin (ANCEF) 2000 mg in sterile water 20 mL IV syringe  2,000 mg IntraVENous On Call to Nas Molina Dr, DO         Facility-Administered Medications Ordered in Other Encounters   Medication Dose Route Frequency Provider Last Rate Last Admin    lactated ringers infusion   IntraVENous Continuous PRN FELICIA Chacko - CRNA   New Bag at 22 1141 Hiatal hernia     Hypertension     Hypokalemia     Hypothyroidism     Impaired fasting glucose     Malignant melanoma in situ (HCC)     R upper Back 8/17    Migraines     Murmur, functional     Grade 1/6 systolic, history of     Osteopenia     T -1.0 hip, 03/09, T -0.3 spine, 03/09. 1) Repeat DEXA scan 09/12 with T +0.15, T -1.2 at the hip but -1.8 at the mean femoral neck giving her a FRAX score of 4.3 at the hip. Reclast 10/13 and given 2014,2015, and 1/4/2017, on calcium and vit d,  Redo Dexa 10/14 Frax 4.2% , Frax 4.8% 10 yr hip fracture risk on Dexa 1/17  On Reclast    Posterior vitreous detachment of both eyes     Primary open angle glaucoma (POAG) of both eyes, moderate stage 10/1/2018    Sciatica 12/15/2015    Skin cancer     History of multiple skin cancers. Following with Dr. Ashia Christie--- invasive squamous cell Ca 8/17 L forearm    Syncope and collapse 1/21/2015    Tremor 12/23/2018    Trichiasis of left lower eyelid without entropion     Visual disturbance 10/26/2017     Past Surgical History:   Procedure Laterality Date    APPENDECTOMY      CHOLECYSTECTOMY      Remote.  COLONOSCOPY  06/08/10    COLONOSCOPY  07/18/02    CYSTOCELE REPAIR  02/12/99    Vaginal prolapse, cystocele plus pelvic relaxation.  DILATION AND CURETTAGE OF UTERUS      with hysteroscopy    HIP FRACTURE SURGERY Right 11/2/2021    Right Hip Intertan performed by Zhane Yo MD at [address] ([address])  1995    still has ovaries     INTRACAPSULAR CATARACT EXTRACTION Left 8/25/2020    Left Cataract Extraction w/ IOL Implant performed by Oksana Carrillo MD at [facility] Right 10/8/2020    Right Cataract Extraction w/ IOL Implant performed by Oksana Carrillo MD at [address] [place] [address]  10/07/09    SCC, left nasal sidewall.  OTHER SURGICAL HISTORY  11/11/08    Anterior repair.      OTHER SURGICAL HISTORY  1988    laser cone    UPPER 6.8 09/04/2022 07:38 AM    CALCIUM 8.3 09/04/2022 07:38 AM    BILITOT 0.9 09/04/2022 07:38 AM    ALKPHOS 88 09/04/2022 07:38 AM    AST 25 09/04/2022 07:38 AM    ALT 9 09/04/2022 07:38 AM       BMP    Lab Results   Component Value Date/Time     09/04/2022 07:38 AM    K 4.6 09/04/2022 07:38 AM     09/04/2022 07:38 AM    CO2 27 09/04/2022 07:38 AM    BUN 18 09/04/2022 07:38 AM    CREATININE 0.69 09/04/2022 07:38 AM    CALCIUM 8.3 09/04/2022 07:38 AM    GFRAA >60 09/04/2022 07:38 AM    LABGLOM >60 09/04/2022 07:38 AM    GLUCOSE 123 09/04/2022 07:38 AM       POC Testing  No results for input(s): POCGLU, POCNA, POCK, POCCL, POCBUN, POCHEMO, POCHCT in the last 72 hours. Coags    Lab Results   Component Value Date/Time    PROTIME 10.7 09/04/2022 07:38 AM    INR 1.0 09/04/2022 07:38 AM    APTT 31.5 10/31/2021 05:34 AM       HCG (If Applicable) No results found for: PREGTESTUR, PREGSERUM, HCG, HCGQUANT     ABGs No results found for: PHART, PO2ART, DFI8UAD, WAI5AQV, BEART, U3IRZBDM     Type & Screen (If Applicable)  No results found for: LABABO, [address]    Radiology (If Applicable)    Cardiac Testing (If Applicable) ER 33%,JXKV MR,severe pulm hypertension, intermediate risk    EKG (If Applicable) RAD          Medications prior to admission:   Prior to Admission medications    Medication Sig Start Date End Date Taking? Authorizing Provider   vitamin D (ERGOCALCIFEROL) 1.25 MG (36408 UT) CAPS capsule Take 1 capsule by mouth once a week for 8 doses 9/4/22 10/24/22 Yes Josie Faith, DO   potassium chloride (KLOR-CON M20) 20 MEQ extended release tablet Take 20 mEq by mouth in the morning.     Historical Provider, MD   OXYGEN Inhale 3.5 L into the lungs to keep sat > 91%    Historical Provider, MD   Nutritional Supplements (NUTRITIONAL DRINK PO) Mighty shakes bid with meals    Historical Provider, MD   calcium carbonate-vitamin D3 (CALCIUM 600 + D) 600-400 MG-UNIT TABS per tab Take 1 tablet by mouth daily Historical Provider, MD   amLODIPine (NORVASC) 5 MG tablet Take 5 mg by mouth daily    Historical Provider, MD   furosemide (LASIX) 40 MG tablet Take 40 mg by mouth daily    Historical Provider, MD   hydrOXYzine HCl (ATARAX) 25 MG tablet Take 25 mg by mouth every 8 hours as needed for Itching    Historical Provider, MD   triamcinolone (KENALOG) 0.1 % cream Apply topically to affected areas on back and arms 1-2 times daily as needed for itch until resolved    Historical Provider, MD   levothyroxine (SYNTHROID) 137 MCG tablet TAKE 1 TABLET DAILY 5/31/22   Bobby Vincent MD   FLUoxetine (PROZAC) 20 MG capsule TAKE 1 CAPSULE DAILY 5/31/22   Bobby Vincent MD   BRILINTA 90 MG TABS tablet Take 1 tablet by mouth 2 times daily 4/20/22   Ela Reid MD   losartan (COZAAR) 100 MG tablet TAKE 1 TABLET DAILY 3/14/22   Bobby Vincent MD   OLANZapine (ZYPREXA) 2.5 MG tablet TAKE 1 TABLET NIGHTLY 2/28/22   Bobby Vincent MD   memantine [facility]) 5 MG tablet Take 1 tablet by mouth daily 2/21/22   Bobby Vincent MD   atenolol (TENORMIN) 25 MG tablet TAKE 1 TABLET DAILY 1/10/22   Bobby Vincent MD   acetaminophen (TYLENOL) 650 MG extended release tablet Take 650 mg by mouth 2 times daily as needed for Pain    Historical Provider, MD   atorvastatin (LIPITOR) 40 MG tablet Take 1 tablet by mouth daily 2/1/21   Bobby Vincent MD   aspirin 81 MG EC tablet Take 1 tablet by mouth daily 12/21/20   Historical Provider, MD   nitroGLYCERIN (NITROSTAT) 0.4 MG SL tablet Place 0.4 mg under the tongue every 5 minutes as needed for Chest pain up to max of 3 total doses. If no relief after 3 dose, call 911.      Historical Provider, MD   ibuprofen (ADVIL;MOTRIN) 600 MG tablet Take 1 tablet by mouth 3 times daily as needed for Pain (Take with food.) 12/27/20 12/27/20  Celi Cordova MD   ammonium lactate (AMLACTIN) 12 % cream apply to affected area twice a day if needed 4/7/20   Bobby Vincent MD   Multiple Vitamin (DAILY MULTIVITAMIN PO) Take 1 tablet by mouth daily     Historical Provider, MD       Current medications:    Current Facility-Administered Medications   Medication Dose Route Frequency Provider Last Rate Last Admin    lactated ringers infusion   IntraVENous Continuous Nyasia Olsen DO        sodium chloride flush 0.9 % injection 5-40 mL  5-40 mL IntraVENous 2 times per day Kaci Slater MD   10 mL at 09/04/22 0803    sodium chloride flush 0.9 % injection 5-40 mL  5-40 mL IntraVENous PRN Kaci Slater MD        0.9 % sodium chloride infusion   IntraVENous PRN Kaci Slater MD        acetaminophen (TYLENOL) tablet 1,000 mg  1,000 mg Oral 3 times per day Kaci Slater MD   1,000 mg at 09/03/22 2323    oxyCODONE (ROXICODONE) immediate release tablet 2.5 mg  2.5 mg Oral Q6H PRN Kaci Slater MD        ondansetron (ZOFRAN-ODT) disintegrating tablet 4 mg  4 mg Oral Q8H PRN Kaci Slater MD        Or    ondansetron [facility]) injection 4 mg  4 mg IntraVENous Q6H PRN Kaci Slater MD        polyethylene glycol [facility]) packet 17 g  17 g Oral Daily Kaci Slater MD        senna (SENOKOT) tablet 8.6 mg  1 tablet Oral Daily PRN Kaci Slater MD        ceFAZolin (ANCEF) 2000 mg in sterile water 20 mL IV syringe  2,000 mg IntraVENous On Call to Nas Molina Dr, DO         Facility-Administered Medications Ordered in Other Encounters   Medication Dose Route Frequency Provider Last Rate Last Admin    lactated ringers infusion   IntraVENous Continuous PRN Raman Rosales, APRN - CRNA   New Bag at 09/04/22 1141       Allergies:  No Known Allergies    Problem List:    Patient Active Problem List   Diagnosis Code    Dyslipidemia E78.5    Osteopenia M85.80    Impaired fasting glucose R73.01    Adenomatous polyp D36.9    Essential hypertension I10    Hypothyroidism E03.9    Sciatica M54.30    GERD (gastroesophageal reflux disease) K21.9    Visual disturbance H53.9    Malignant melanoma in situ ([facility] D03.9    Difficulty controlling anger R45.4    Primary open angle glaucoma (POAG) of both eyes, moderate stage H40.1132    PVD (posterior vitreous detachment), both eyes H43.813    Combined forms of age-related cataract of both eyes H25.813    Dementia with behavioral disturbance (HCC) F03.91    Tremor R25.1    Essential tremor G25.0    Dizziness R42    Chronic cerebral ischemia I67.82    Acquired cerebral atrophy ([facility]) G31.9    Cerebral infarction ([facility]) I63.9    Anxiety F41.9    Atrial fibrillation ([facility]) I48.91    Displaced intertrochanteric fracture of right femur, initial encounter for closed fracture ([facility]) S72.141A    Acute ST segment elevation myocardial infarction ([facility]) I21.3    Heart murmur R01.1    Migraine G43.909    Female bladder prolapse N81.10    Falls W19. XXXA    Hiatal hernia K44.9    Hypercholesterolemia E78.00    Coronary arteriosclerosis I25.10    Cataract H26.9    Closed right hip fracture, initial encounter ([facility] S72.001A    Heart failure, unspecified HF chronicity, unspecified heart failure type ([address] I50.9    Chronic renal disease, stage III ([address] [[id number]] N18.30    Pseudophakia of both eyes Z96.1    Nondisp intertroch fx left femur, init for opn fx type I/2 ([address] S72.145B       Past Medical History:        Diagnosis Date    Adenomatous polyp 06/10    With recommendation for repeat 06/15. Also, diverticulosis was noted.      Cataracts, bilateral     Combined forms of age-related cataract of both eyes 10/1/2018    Corneal scar, right eye     Cystocele     history of     Dementia ([address]     Mini-Mental Status exam 27/30  6/14  declines meds at this point,  MMSE 29/30 on June 13, 2017  MMSE 26/30 4/2018    Difficulty controlling anger 9/16/2018    Dyslipidemia     GERD (gastroesophageal reflux disease)     egd  4/15 ok    Goiter     benign, history of     Hiatal hernia     Hypertension     Hypokalemia     Hypothyroidism     Impaired fasting glucose     Malignant melanoma in situ ([address]     R upper Back 8/17    Migraines     Murmur, functional     Grade 1/6 systolic, history of     Osteopenia     T -1.0 hip, 03/09, T -0.3 spine, 03/09. 1) Repeat DEXA scan 09/12 with T +0.15, T -1.2 at the hip but -1.8 at the mean femoral neck giving her a FRAX score of 4.3 at the hip. Reclast 10/13 and given 2014,2015, and 1/4/2017, on calcium and vit d,  Redo Dexa 10/14 Frax 4.2% , Frax 4.8% 10 yr hip fracture risk on Dexa 1/17  On Reclast    Posterior vitreous detachment of both eyes     Primary open angle glaucoma (POAG) of both eyes, moderate stage 10/1/2018    Sciatica 12/15/2015    Skin cancer     History of multiple skin cancers. Following with Dr. Herrera Rodney--- invasive squamous cell Ca 8/17 L forearm    Syncope and collapse 1/21/2015    Tremor 12/23/2018    Trichiasis of left lower eyelid without entropion     Visual disturbance 10/26/2017       Past Surgical History:        Procedure Laterality Date    APPENDECTOMY      CHOLECYSTECTOMY      Remote.  COLONOSCOPY  06/08/10    COLONOSCOPY  07/18/02    CYSTOCELE REPAIR  02/12/99    Vaginal prolapse, cystocele plus pelvic relaxation.  DILATION AND CURETTAGE OF UTERUS      with hysteroscopy    HIP FRACTURE SURGERY Right 11/2/2021    Right Hip Intertan performed by Canelo Rojas MD at [address] ([address])  1995    still has ovaries     INTRACAPSULAR CATARACT EXTRACTION Left 8/25/2020    Left Cataract Extraction w/ IOL Implant performed by Celeste Huber MD at [address] [facility] Right 10/8/2020    Right Cataract Extraction w/ IOL Implant performed by Celeste Huber MD at [address] [facility]  10/07/09    SCC, left nasal sidewall.  OTHER SURGICAL HISTORY  11/11/08    Anterior repair.  OTHER SURGICAL HISTORY  1988    laser cone    UPPER GASTROINTESTINAL ENDOSCOPY  4/8/2015    hiatal hernia    VEIN SURGERY  06/08/09    Laser ablation of right greater saphenous vein.         Social History: Social History     Tobacco Use    Smoking status: Never    Smokeless tobacco: Never   Substance Use Topics    Alcohol use: Not Currently                                Counseling given: Not Answered      Vital Signs (Current):   Vitals:    09/03/22 2240 09/04/22 0040 09/04/22 0430 09/04/22 0745   BP: (!) 175/50 (!) 146/55 (!) 125/48 (!) 169/56   Pulse: 82 73 67 73   Resp: 13 13 14 17   Temp: 97.5 °F (36.4 °C) 97.3 °F (36.3 °C) 97.9 °F (36.6 °C) 98.1 °F (36.7 °C)   TempSrc: Oral Axillary Axillary Oral   SpO2:  96% 90% 98%                                              BP Readings from Last 3 Encounters:   09/04/22 (!) 169/56   09/03/22 127/82   07/12/22 115/62       NPO Status:                                                                                 BMI:   Wt Readings from Last 3 Encounters:   09/03/22 145 lb (65.8 kg)   07/05/22 144 lb 6.4 oz (65.5 kg)   04/08/22 144 lb (65.3 kg)     There is no height or weight on file to calculate BMI.    CBC:   Lab Results   Component Value Date/Time    WBC 12.7 09/04/2022 07:38 AM    RBC 3.85 09/04/2022 07:38 AM    HGB 11.9 09/04/2022 07:38 AM    HCT 37.1 09/04/2022 07:38 AM    MCV 96.4 09/04/2022 07:38 AM    RDW 15.4 09/04/2022 07:38 AM     09/04/2022 07:38 AM       CMP:   Lab Results   Component Value Date/Time     09/04/2022 07:38 AM    K 4.6 09/04/2022 07:38 AM     09/04/2022 07:38 AM    CO2 27 09/04/2022 07:38 AM    BUN 18 09/04/2022 07:38 AM    CREATININE 0.69 09/04/2022 07:38 AM    GFRAA >60 09/04/2022 07:38 AM    LABGLOM >60 09/04/2022 07:38 AM    GLUCOSE 123 09/04/2022 07:38 AM    PROT 6.8 09/04/2022 07:38 AM    CALCIUM 8.3 09/04/2022 07:38 AM    BILITOT 0.9 09/04/2022 07:38 AM    ALKPHOS 88 09/04/2022 07:38 AM    AST 25 09/04/2022 07:38 AM    ALT 9 09/04/2022 07:38 AM       POC Tests: No results for input(s): POCGLU, POCNA, POCK, POCCL, POCBUN, POCHEMO, POCHCT in the last 72 hours.     Coags:   Lab Results   Component Value Date/Time PROTIME 10.7 09/04/2022 07:38 AM    INR 1.0 09/04/2022 07:38 AM    APTT 31.5 10/31/2021 05:34 AM       HCG (If Applicable): No results found for: PREGTESTUR, PREGSERUM, HCG, HCGQUANT     ABGs: No results found for: PHART, PO2ART, NBX4XMV, ABN0HGN, BEART, J7FIVWQT     Type & Screen (If Applicable):  No results found for: LABABO, LABRH    Drug/Infectious Status (If Applicable):  No results found for: HIV, HEPCAB    COVID-19 Screening (If Applicable):   Lab Results   Component Value Date/Time    COVID19 Not Detected 09/03/2022 10:06 AM    COVID19 Not Detected 08/20/2020 11:00 AM           Anesthesia Evaluation    Airway: Mallampati: III          Dental:          Pulmonary:                              Cardiovascular:    (+) hypertension:, past MI: < 1 month, CAD:, CABG/stent:, dysrhythmias: atrial fibrillation,                ROS comment: Elevated troponins NSTEMI     Neuro/Psych:   (+) CVA:, neuromuscular disease:, headaches: migraine headaches,              ROS comment: Dementia  POAG  tremor GI/Hepatic/Renal:   (+) hiatal hernia, GERD:, renal disease: CRI,           Endo/Other:    (+) hypothyroidism::., .                 Abdominal:             Vascular: Other Findings:           Anesthesia Plan      general     ASA 4       Induction: intravenous.                             Navjot Oneal MD   9/4/2022

## 2022-09-04 NOTE — FLOWSHEET NOTE
Cardiology assessing pt, aware of echo order,   trauma at the bedside, assessment done,  NPO  for OR if cleared by cardiology  Iv flushed , HL  O2 RA  DNRcca banded

## 2022-09-04 NOTE — PLAN OF CARE
Problem: Discharge Planning  Goal: Discharge to home or other facility with appropriate resources  Outcome: Progressing  Flowsheets  Taken 9/3/2022 2300  Discharge to home or other facility with appropriate resources: Identify barriers to discharge with patient and caregiver  Taken 9/3/2022 2252  Discharge to home or other facility with appropriate resources:   Identify barriers to discharge with patient and caregiver   Identify discharge learning needs (meds, wound care, etc)   Refer to discharge planning if patient needs post-hospital services based on physician order or complex needs related to functional status, cognitive ability or social support system   Arrange for needed discharge resources and transportation as appropriate   Arrange for interpreters to assist at discharge as needed     Problem: Pain  Goal: Verbalizes/displays adequate comfort level or baseline comfort level  Outcome: Progressing     Problem: Safety - Adult  Goal: Free from fall injury  Outcome: Progressing     Problem: ABCDS Injury Assessment  Goal: Absence of physical injury  Outcome: Progressing     Problem: Skin/Tissue Integrity  Goal: Absence of new skin breakdown  Description: 1. Monitor for areas of redness and/or skin breakdown  2. Assess vascular access sites hourly  3. Every 4-6 hours minimum:  Change oxygen saturation probe site  4. Every 4-6 hours:  If on nasal continuous positive airway pressure, respiratory therapy assess nares and determine need for appliance change or resting period.   Outcome: Progressing

## 2022-09-04 NOTE — DISCHARGE INSTRUCTIONS
Orthopedic Instructions:  - Weight bearing status: Weight bear as tolerated left lower extremity    - Keep dressing clean and dry. - Dress with plastic bag and rubber band to seal and repeat with second bag and rubber band when showering. \"Press & Seal\" wrap also works for sealing the rubber bag when showering.    - Starting 5 days after surgery, Ok for daily dressing changes until wound is dry. Then leave open to air, If wound is no longer leaking. Ok to shower but no soaks or baths. - Always work on toes motion (to non-injured toes) while in dressing to decrease swelling.    - Ice (20 minutes on and off 1 hour) and elevate above the level of the heart to reduce swelling and throbbing pain. - Drink plenty of fluids. - Take medications as prescribed. - Wean off narcotics (percocet/norco) as soon as possible. Do not take tylenol if still taking narcotics. - No alcoholic beverages or driving/operating machinery while on narcotics. - Call the office or come to Emergency Room if signs of infection appear (hot, swollen, red, draining pus, fever). - Follow up with Dr. Laquita Darling in his office on 9/19 at 2:15 pm. Call [phone] as needed      Discharge Instructions for Trauma         What to do after you leave the hospital:  General questions or concerns may be called to the trauma nurse line at [phone] and please leave a message. Trauma is a life-threatening condition. Your doctor will want to closely monitor you. Be sure to go to all of your appointments.

## 2022-09-04 NOTE — CONSULTS
Malignant melanoma in situ ([facility] [address] Migraines, Murmur, functional, Osteopenia, Posterior vitreous detachment of both eyes, Primary open angle glaucoma (POAG) of both eyes, moderate stage, Sciatica, Skin cancer, Syncope and collapse, Tremor, 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 Past Surgical History:   has a past surgical history that includes other surgical history (11/11/08); Cholecystectomy; Colonoscopy (06/08/10); Mohs surgery (10/07/09); Vein Surgery (06/08/09); Colonoscopy (07/18/02); Cystocele repair (02/12/99); Appendectomy; Dilation and curettage of uterus; other surgical history (1988); Hysterectomy (1995); Upper gastrointestinal endoscopy (4/8/2015); Intracapsular cataract extraction (Left, 8/25/2020); Intracapsular cataract extraction (Right, 10/8/2020); and Hip fracture surgery (Right, 11/2/2021). Home Medications:    Prior to Admission medications    Medication Sig Start Date End Date Taking? Authorizing Provider   vitamin D (ERGOCALCIFEROL) 1.25 MG (09004 UT) CAPS capsule Take 1 capsule by mouth once a week for 8 doses 9/4/22 10/24/22 Yes Sri Pates, DO   potassium chloride (KLOR-CON M20) 20 MEQ extended release tablet Take 20 mEq by mouth in the morning.     Historical Provider, MD   OXYGEN Inhale 3.5 L into the lungs to keep sat > 91%    Historical Provider, MD   Nutritional Supplements (NUTRITIONAL DRINK PO) Mighty shakes bid with meals    Historical Provider, MD   calcium carbonate-vitamin D3 (CALCIUM 600 + D) 600-400 MG-UNIT TABS per tab Take 1 tablet by mouth daily    Historical Provider, MD   amLODIPine (NORVASC) 5 MG tablet Take 5 mg by mouth daily    Historical Provider, MD   furosemide (LASIX) 40 MG tablet Take 40 mg by mouth daily    Historical Provider, MD   hydrOXYzine HCl (ATARAX) 25 MG tablet Take 25 mg by mouth every 8 hours as needed for Itching    Historical Provider, MD   triamcinolone (KENALOG) 0.1 % cream Apply topically to affected areas on back and arms 1-2 times daily as needed for itch until resolved    Historical Provider, MD   levothyroxine (SYNTHROID) 137 MCG tablet TAKE 1 TABLET DAILY 5/31/22   Gianluca Goodwin MD   FLUoxetine (PROZAC) 20 MG capsule TAKE 1 CAPSULE DAILY 5/31/22   Gianluca Goodwin MD   BRILINTA 90 MG TABS tablet Take 1 tablet by mouth 2 times daily 4/20/22   Leslie Lozano MD   losartan (COZAAR) 100 MG tablet TAKE 1 TABLET DAILY 3/14/22   Gianluca Goodwin MD   OLANZapine (ZYPREXA) 2.5 MG tablet TAKE 1 TABLET NIGHTLY 2/28/22   Gianluca Goodwin MD   memantine [facility]) 5 MG tablet Take 1 tablet by mouth daily 2/21/22   Gianluca Goodwin MD   atenolol (TENORMIN) 25 MG tablet TAKE 1 TABLET DAILY 1/10/22   Gianluca Goodwin MD   acetaminophen (TYLENOL) 650 MG extended release tablet Take 650 mg by mouth 2 times daily as needed for Pain    Historical Provider, MD   atorvastatin (LIPITOR) 40 MG tablet Take 1 tablet by mouth daily 2/1/21   Gianluca Goodwin MD   aspirin 81 MG EC tablet Take 1 tablet by mouth daily 12/21/20   Historical Provider, MD   nitroGLYCERIN (NITROSTAT) 0.4 MG SL tablet Place 0.4 mg under the tongue every 5 minutes as needed for Chest pain up to max of 3 total doses. If no relief after 3 dose, call 911.      Historical Provider, MD   ibuprofen (ADVIL;MOTRIN) 600 MG tablet Take 1 tablet by mouth 3 times daily as needed for Pain (Take with food.) 12/27/20 12/27/20  Krishan Camarena MD   ammonium lactate (AMLACTIN) 12 % cream apply to affected area twice a day if needed 4/7/20   Gianluca Goodwin MD   Multiple Vitamin (DAILY MULTIVITAMIN PO) Take 1 tablet by mouth daily     Historical Provider, MD      Current Facility-Administered Medications: sodium chloride flush 0.9 % injection 5-40 mL, 5-40 mL, IntraVENous, 2 times per day  sodium chloride flush 0.9 % injection 5-40 mL, 5-40 mL, IntraVENous, PRN  0.9 % sodium chloride infusion, , IntraVENous, PRN  acetaminophen (TYLENOL) tablet 1,000 mg, 1,000 mg, Oral, 3 times per day  oxyCODONE (ROXICODONE) immediate release tablet 2.5 mg, 2.5 mg, Oral, Q6H PRN  ondansetron (ZOFRAN-ODT) disintegrating tablet 4 mg, 4 mg, Oral, Q8H PRN **OR** ondansetron (ZOFRAN) injection 4 mg, 4 mg, IntraVENous, Q6H PRN  polyethylene glycol (GLYCOLAX) packet 17 g, 17 g, Oral, Daily  senna (SENOKOT) tablet 8.6 mg, 1 tablet, Oral, Daily PRN  ceFAZolin (ANCEF) 2000 mg in sterile water 20 mL IV syringe, 2,000 mg, IntraVENous, On Call to OR    Allergies:  Patient has no known allergies. Social History:   reports that she has never smoked. She has never used smokeless tobacco. She reports that she does not currently use alcohol. She reports that she does not use drugs. Family History: family history includes Colon Cancer in her brother; Coronary Art Dis in her father. No h/o sudden cardiac death. No for premature CAD    REVIEW OF SYSTEMS:    Constitutional: there has been no unanticipated weight loss. There's been No change in energy level, No change in activity level. Eyes: No visual changes or diplopia. No scleral icterus. ENT: No Headaches  Cardiovascular: As above  Respiratory: No previous pulmonary problems, No cough  Gastrointestinal: No abdominal pain. No change in bowel or bladder habits. Genitourinary: No dysuria, trouble voiding, or hematuria. Musculoskeletal:  No gait disturbance, No weakness or joint complaints. Integumentary: No rash or pruritis. Neurological: No headache, diplopia, change in muscle strength, numbness or tingling. No change in gait, balance, coordination, mood, affect, memory, mentation, behavior. Psychiatric: No anxiety, or depression. Endocrine: No temperature intolerance. No excessive thirst, fluid intake, or urination. No tremor. Hematologic/Lymphatic: No abnormal bruising or bleeding, blood clots or swollen lymph nodes. Allergic/Immunologic: No nasal congestion or hives.       PHYSICAL EXAM:      BP (!) 125/48   Pulse 67   Temp 97.9 °F (36.6 °C) (Axillary)   Resp 14   LMP  (LMP Unknown)   SpO2 90% Constitutional and General Appearance: alert, cooperative, no distress and appears stated age  [de-identified]: PERRL, no cervical lymphadenopathy. No masses palpable. Normal oral mucosa  Respiratory:  Normal excursion and expansion without use of accessory muscles  Resp Auscultation: Good respiratory effort. No for increased work of breathing. On auscultation: clear to auscultation bilaterally  Cardiovascular: The apical impulse is not displaced  Heart tones are crisp and normal. regular S1 and S2.  Jugular venous pulsation Normal  The carotid upstroke is normal in amplitude and contour without delay or bruit  Peripheral pulses are symmetrical and full   Abdomen:   No masses or tenderness  Bowel sounds present  Extremities:   No Cyanosis or Clubbing   Lower extremity edema: No   Skin: Warm and dry  Neurological:  Not performed     DATA:    Diagnostics:    EKG: NSR, no acute ST or T wave changes     ECHO: 11/01/2021  Normal left ventricular diameter. Left ventricular systolic function is normal. Left ventricular ejection  fraction 55 %. Left atrium is mildly dilated. Right atrial dilatation. Right ventricular dilatation with reduced systolic function. Aortic valve sclerosis without stenosis. Mild mitral valve thickening with annular calcification. Mild mitral regurgitation. Moderate tricuspid regurgitation. Estimated right ventricular systolic pressure is 83 mmHg. Severe pulmonary  hypertension. Left pleural effusion. IVC Increased diameter and impaired or no inspiratory variation indicating  elevated RA filling pressure (i.e. CVP) . Cardiac Angiography: 2020 [facility])  Cardiac catheterization showed critical 99% disease in the proximal RCA and 90% disease with haziness in the distal RCA and we performed PCI using a 2.5 x 12 mm drug-eluting Promus elite stent in distal RCA and a 2.75 x 16 mm drug-eluting Promus elite stent in the proximal RCA patient that.  The left coronary system system from showed mild-to-moderate disease. LV angiogram showed preserved LV systolic function. Labs:     CBC:   Recent Labs     09/03/22 0813   WBC 10.5   HGB 12.8   HCT 39.7        BMP:   Recent Labs     09/03/22 0813      K 4.3   CO2 26   BUN 22   CREATININE 0.88   LABGLOM >60   GLUCOSE 160*     BNP: No results for input(s): BNP in the last 72 hours. PT/INR:   Recent Labs     09/03/22 0813   PROTIME 13.6   INR 1.1     APTT:No results for input(s): APTT in the last 72 hours. CARDIAC ENZYMES:No results for input(s): CKTOTAL, CKMB, CKMBINDEX, TROPONINI in the last 72 hours.   FASTING LIPID PANEL:  Lab Results   Component Value Date/Time    HDL 62 06/16/2022 06:52 AM    TRIG 58 06/16/2022 06:52 AM     LIVER PROFILE:  Recent Labs     09/03/22 0813   AST 27   ALT 11   LABALBU 4.0       IMPRESSION:    Patient Active Problem List   Diagnosis    Dyslipidemia    Osteopenia    Impaired fasting glucose    Adenomatous polyp    Essential hypertension    Hypothyroidism    Sciatica    GERD (gastroesophageal reflux disease)    Visual disturbance    Malignant melanoma in situ ([address]    Difficulty controlling anger    Primary open angle glaucoma (POAG) of both eyes, moderate stage    PVD (posterior vitreous detachment), both eyes    Combined forms of age-related cataract of both eyes    Dementia with behavioral disturbance (HCC)    Tremor    Essential tremor    Dizziness    Chronic cerebral ischemia    Acquired cerebral atrophy ([address]    Cerebral infarction ([address]    Anxiety    Atrial fibrillation ([address]    Displaced intertrochanteric fracture of right femur, initial encounter for closed fracture ([address]    Acute ST segment elevation myocardial infarction ([address]    Heart murmur    Migraine    Female bladder prolapse    Falls    Hiatal hernia    Hypercholesterolemia    Coronary arteriosclerosis    Cataract    Closed right hip fracture, initial encounter ([address]    Heart failure, unspecified HF chronicity, unspecified heart failure type [facility])    Chronic renal disease, stage III ([facility] [address] [[id number]]    Pseudophakia of both eyes    Nondisp intertroch fx left femur, init for opn fx type I/2 ([facility] [address]     Left intertrochanteric femur fracture after [facility]  H/O STEMI in 2020 s/p PCI to RCA at [facility]   Preserved on recent 2D ECHO in 11/2021  RCRI - 3, Intermediate risk   Pulmonary hypertension   Moderate TR   HTN  HLD  Hypothyroidism     RECOMMENDATIONS:  The patient has an RCRI of 3, can proceed for surgery as an intermediate risk from Cardiology standpoint. There is no indication for a stat ECHO from Cardiology standpoint as she has ECHO done In 11/2021, discussed with Dr. Sonia Dyer. She is on ASA and Brilinta, currently held for surgery, resume post operatively when okay with Ortho and she can follow up with her primary Cardiologist for further recommendations regarding DAPT. Resume home antihypertensives as tolerated by HR and BP. Replace electrolytes to keep K>4 and Mg>2. We will continue to follow post operatively. Assess Pulmonary HTN by pulmonary is ideal.      Karen Lam MD  Fellow, [address] [place] [address]        I reviewed the patient history personally, and examined by me during the visit. I have reviewed the H&P/Consult / Progress note as completed, and made appropriate changes to the patient exam and treatment plans.       I have reviewed the case in details including physical exam and treatment plan with resident / fellow / NP    Patient treatment plan was explained to patient, correction in notes was made as appropriate, and discussed final arrangement based on  my evaluation and exam.    Additional Recommendations: As above      Jesusita Maldonado MD  [facility]

## 2022-09-04 NOTE — CARE COORDINATION
09/04/22 1728   Service Assessment   History Provided By Child/Family   Primary Caregiver Self   Accompanied By/Relationship son   Support Systems Family Members   Prior Functional Level Independent in ADLs/IADLs   Can patient return to prior living arrangement Unknown at present   [address] History   Lives With Alone   Type of Home Assisted living   Home Layout One level   Home Access Level entry   [address] Needs assistance   Ambulation Assistance Independent   Transfer Assistance Independent   Active  No   Discharge Planning   Type of [address] Prior To Admission [address]   DME Walker   Patient expects to be discharged to: Assisted living   [address] Discharge   Transition of [address] ([facility] Consult) Assisted Living   Condition of Participation: Discharge [address] for Transition of Care is related to the following treatment goals: comfort   The Patient and/or Patient Representative was provided with a Choice of Provider? Patient Representative   Name of the Patient Representative who was provided with the Choice of Provider and agrees with the Discharge Plan?  patient's sonKike Inks   The Patient and/Or Patient Representative agree with the Discharge Plan? Yes     Goal is for patient to return to [facility]. Patient's family is planning on hiring someone private pay to provide more care to patient if needed.  They are open to SNF if required

## 2022-09-04 NOTE — PROGRESS NOTES
PROGRESS NOTE          PATIENT NAME: Oswaldo Sauer  MEDICAL RECORD NO. [id number]  DATE: 2022  SURGEON: Dr. Delgado Amel: [employer] ALI, MD    HD: # 1    ASSESSMENT    Patient Active Problem List   Diagnosis    Dyslipidemia    Osteopenia    Impaired fasting glucose    Adenomatous polyp    Essential hypertension    Hypothyroidism    Sciatica    GERD (gastroesophageal reflux disease)    Visual disturbance    Malignant melanoma in situ ([address]    Difficulty controlling anger    Primary open angle glaucoma (POAG) of both eyes, moderate stage    PVD (posterior vitreous detachment), both eyes    Combined forms of age-related cataract of both eyes    Dementia with behavioral disturbance (HCC)    Tremor    Essential tremor    Dizziness    Chronic cerebral ischemia    Acquired cerebral atrophy ([address]    Cerebral infarction ([address]    Anxiety    Atrial fibrillation ([address]    Displaced intertrochanteric fracture of right femur, initial encounter for closed fracture ([address]    Acute ST segment elevation myocardial infarction ([address]    Heart murmur    Migraine    Female bladder prolapse    Falls    Hiatal hernia    Hypercholesterolemia    Coronary arteriosclerosis    Cataract    Closed right hip fracture, initial encounter ([address]    Heart failure, unspecified HF chronicity, unspecified heart failure type ([address]    Chronic renal disease, stage III ([address] [215105]    Pseudophakia of both eyes    Nondisp intertroch fx left femur, init for opn fx type I/2 [facility])       MEDICAL DECISION MAKING AND PLAN    N.p.o.  Maintenance IV fluids  Multimodal pain therapy  Cardiac clearance prior to surgery with orthopedics      Chief Complaint: \"My leg hurts\"    Angelica Craft is doing well this morning complains of some pain in her leg. No overnight events.     OBJECTIVE  VITALS: Temp: Temp: 97.9 °F (36.6 °C)Temp  Av.5 °F (36.4 °C)  Min: 97.3 °F (36.3 °C)  Max: 97.9 °F (06.0 °C) BP Systolic (15CIA), KFT:941 , Min:125 , OER:555   Diastolic (55ARP), MSR:10, Min:48, Max:82   Pulse Pulse  Av.9  Min: 67  Max: 85 Resp Resp  Av.8  Min: 12  Max: 16 Pulse ox SpO2  Av %  Min: 88 %  Max: 98 %  GENERAL: alert, no distress  NEURO: Alert, no focal deficits, follows commands   HEENT: Normocephalic atraumatic  : Michaels catheter in place  LUNGS: clear to ausculation, without wheezes, rales or rhonci  HEART: normal rate and regular rhythm  ABDOMEN: soft, non-tender, non-distended, bowel sounds present   EXTREMITY: no cyanosis, clubbing or edema    I/O last 3 completed shifts:  In: -   Out: 700 [Urine:700]    Drain/tube output: In: -   Out: 700 [Urine:700]    LAB:  CBC:   Recent Labs     22  0813   WBC 10.5   HGB 12.8   HCT 39.7   MCV 94.7        BMP:   Recent Labs     22  0813      K 4.3      CO2 26   BUN 22   CREATININE 0.88   GLUCOSE 160*     COAGS:   Recent Labs     22  0813   PROT 7.9   INR 1.1       RADIOLOGY:  CT CHEST ABDOMEN PELVIS W CONTRAST   Final Result   Bibasilar subsegmental atelectasis/airspace disease. Comminuted fracture left hip. Status post ORIF remote fracture right hip. CT THORACIC SPINE TRAUMA RECONSTRUCTION   Final Result   No acute fracture. Moderate kyphosis. Diffuse idiopathic skeletal   hyperostosis. CT LUMBAR SPINE TRAUMA RECONSTRUCTION   Final Result   Grade 1 spondylolisthesis L4-5. Spinal stenosis. No fracture. XR FEMUR LEFT (MIN 2 VIEWS)   Final Result   Impacted fracture of the proximal left femur. XR FEMUR RIGHT (MIN 2 VIEWS)   Final Result   Hardware fixation of the proximal right femur without evidence for   complication.                 Sharron Villalobos MD  22, 7:28 AM

## 2022-09-04 NOTE — ANESTHESIA POSTPROCEDURE EVALUATION
Department of Anesthesiology  Postprocedure Note    Patient: Miguel A Bruce  MRN: [id number]  YOB: 1930  Date of evaluation: 9/4/2022      Procedure Summary     Date: 09/04/22 Room / Location: [address]    Anesthesia Start: 9114 Anesthesia Stop: 8429    Procedure: LEFT TFNA NAIL INSERTION (Left: Leg Upper) Diagnosis:       Closed fracture of left femur, unspecified fracture morphology, unspecified portion of femur, initial encounter ([facility] [address]      (Closed fracture of left femur, unspecified fracture morphology, unspecified portion of femur, initial encounter ([facility] [address] [E31.87MJ])    Surgeons: Lonzell Kayser, DO Responsible Provider: Paola Gallo MD    Anesthesia Type: general ASA Status: 4          Anesthesia Type: No value filed.     Alena Phase I: Alena Score: 9    Alena Phase II:        Anesthesia Post Evaluation    Patient location during evaluation: PACU  Patient participation: complete - patient participated  Level of consciousness: sleepy but conscious  Pain score: 1  Nausea & Vomiting: no vomiting  Cardiovascular status: hypertensive  Respiratory status: face mask

## 2022-09-04 NOTE — PROGRESS NOTES
Trauma Tertiary Survey    Admit Date: 9/3/2022  Hospital day 0    Fall       Past Medical History:   Diagnosis Date    Adenomatous polyp 06/10    With recommendation for repeat 06/15. Also, diverticulosis was noted. Cataracts, bilateral     Combined forms of age-related cataract of both eyes 10/1/2018    Corneal scar, right eye     Cystocele     history of     Dementia ([facility]     Mini-Mental Status exam 27/30  6/14  declines meds at this point,  MMSE 29/30 on June 13, 2017  MMSE 26/30 4/2018    Difficulty controlling anger 9/16/2018    Dyslipidemia     GERD (gastroesophageal reflux disease)     egd  4/15 ok    Goiter     benign, history of     Hiatal hernia     Hypertension     Hypokalemia     Hypothyroidism     Impaired fasting glucose     Malignant melanoma in situ ([facility]     R upper Back 8/17    Migraines     Murmur, functional     Grade 1/6 systolic, history of     Osteopenia     T -1.0 hip, 03/09, T -0.3 spine, 03/09. 1) Repeat DEXA scan 09/12 with T +0.15, T -1.2 at the hip but -1.8 at the mean femoral neck giving her a FRAX score of 4.3 at the hip. Reclast 10/13 and given 2014,2015, and 1/4/2017, on calcium and vit d,  Redo Dexa 10/14 Frax 4.2% , Frax 4.8% 10 yr hip fracture risk on Dexa 1/17  On Reclast    Posterior vitreous detachment of both eyes     Primary open angle glaucoma (POAG) of both eyes, moderate stage 10/1/2018    Sciatica 12/15/2015    Skin cancer     History of multiple skin cancers.  Following with Dr. Kiley Vilchis--- invasive squamous cell Ca 8/17 L forearm    Syncope and collapse 1/21/2015    Tremor 12/23/2018    Trichiasis of left lower eyelid without entropion     Visual disturbance 10/26/2017       Scheduled Meds:   sodium chloride flush  5-40 mL IntraVENous 2 times per day    acetaminophen  1,000 mg Oral 3 times per day    polyethylene glycol  17 g Oral Daily    ceFAZolin  2,000 mg IntraVENous On Call to OR     Continuous Infusions:   lactated ringers      sodium chloride       PRN Meds:sodium chloride flush, sodium chloride, oxyCODONE, ondansetron **OR** ondansetron, senna    Subjective:     Patient has left leg pain post fall, left femur fracture. Objective:   Patient Vitals for the past 8 hrs:   BP Temp Temp src Pulse Resp SpO2   09/04/22 0745 (!) 169/56 98.1 °F (36.7 °C) Oral 73 17 98 %   09/04/22 0430 (!) 125/48 97.9 °F (36.6 °C) Axillary 67 14 90 %       I/O last 3 completed shifts:  In: -   Out: 700 [Urine:700]  I/O this shift:  In: -   Out: 350 [Urine:350]    Radiology: XR FEMUR LEFT (MIN 2 VIEWS)    Result Date: 9/3/2022  EXAMINATION: 7 XRAY VIEWS OF THE LEFT FEMUR 9/3/2022 4:45 pm COMPARISON: None. HISTORY: ORDERING SYSTEM PROVIDED HISTORY: Trauma/Fracture TECHNOLOGIST PROVIDED HISTORY: AP/Lateral Trauma/Fracture FINDINGS: Visualized bony pelvis is intact. There is a fracture of the proximal left femur with mild impaction. There is degenerative change of the left SI joint and left hip joint. The surrounding soft tissues are unremarkable. Impacted fracture of the proximal left femur. XR FEMUR RIGHT (MIN 2 VIEWS)    Result Date: 9/3/2022  EXAMINATION: 4 XRAY VIEWS OF THE RIGHT FEMUR 9/3/2022 4:45 pm COMPARISON: Right femoral radiographs performed 01/17/2022. HISTORY: ORDERING SYSTEM PROVIDED HISTORY: large hematoma, fall TECHNOLOGIST PROVIDED HISTORY: large hematoma, fall FINDINGS: Visualized bony pelvis is intact. There is no acute osseous abnormality. There is hardware fixation of proximal right femur without hardware complication. The surrounding soft tissues are unremarkable. Hardware fixation of the proximal right femur without evidence for complication.      CT HEAD WO CONTRAST    Result Date: 9/3/2022  EXAMINATION: CT OF THE HEAD WITHOUT CONTRAST; CT OF THE CERVICAL SPINE WITHOUT CONTRAST 9/3/2022 9:48 am TECHNIQUE: CT of the head was performed without the administration of intravenous contrast. Automated exposure control, iterative reconstruction, and/or weight based adjustment of the mA/kV was utilized to reduce the radiation dose to as low as reasonably achievable.; CT of the cervical spine was performed without the administration of intravenous contrast. Multiplanar reformatted images are provided for review. Automated exposure control, iterative reconstruction, and/or weight based adjustment of the mA/kV was utilized to reduce the radiation dose to as low as reasonably achievable. COMPARISON: None. HISTORY: ORDERING SYSTEM PROVIDED HISTORY: fall TECHNOLOGIST PROVIDED HISTORY: fall Decision Support Exception - unselect if not a suspected or confirmed emergency medical condition->Emergency Medical Condition (MA) Reason for Exam: Unwitnessed fall today FINDINGS: HEAD: BRAIN/VENTRICLES: Mild cerebral atrophy. There is periventricular and subcortical white matter discrete and confluent low attenuation, nonspecific, likely representing age-related chronic small vessel ischemic disease. Right frontal a encephalomalacia unchanged. ORBITS: The visualized portion of the orbits demonstrate no acute abnormality. SINUSES: The visualized paranasal sinuses and mastoid air cells demonstrate no acute abnormality. SOFT TISSUES/SKULL: No acute abnormality of the visualized skull or soft tissues. CERVICAL SPINE: BONES/ALIGNMENT: There is no evidence of an acute cervical spine fracture. No traumatic malalignment. DEGENERATIVE CHANGES: Mild diffuse degenerative changes. SOFT TISSUES: There is no prevertebral soft tissue swelling. CT HEAD: No CT evidence for acute intracranial abnormality. . Cerebral atrophy. Right frontal encephalomalacia. No change. CT CERVICAL SPINE: No acute fracture or subluxation of the cervical spine. Mild diffuse degenerative changes.      CT CERVICAL SPINE WO CONTRAST    Result Date: 9/3/2022  EXAMINATION: CT OF THE HEAD WITHOUT CONTRAST; CT OF THE CERVICAL SPINE WITHOUT CONTRAST 9/3/2022 9:48 am TECHNIQUE: CT of the head was performed without the administration of intravenous contrast. Automated exposure control, iterative reconstruction, and/or weight based adjustment of the mA/kV was utilized to reduce the radiation dose to as low as reasonably achievable.; CT of the cervical spine was performed without the administration of intravenous contrast. Multiplanar reformatted images are provided for review. Automated exposure control, iterative reconstruction, and/or weight based adjustment of the mA/kV was utilized to reduce the radiation dose to as low as reasonably achievable. COMPARISON: None. HISTORY: ORDERING SYSTEM PROVIDED HISTORY: fall TECHNOLOGIST PROVIDED HISTORY: fall Decision Support Exception - unselect if not a suspected or confirmed emergency medical condition->Emergency Medical Condition (MA) Reason for Exam: Unwitnessed fall today FINDINGS: HEAD: BRAIN/VENTRICLES: Mild cerebral atrophy. There is periventricular and subcortical white matter discrete and confluent low attenuation, nonspecific, likely representing age-related chronic small vessel ischemic disease. Right frontal a encephalomalacia unchanged. ORBITS: The visualized portion of the orbits demonstrate no acute abnormality. SINUSES: The visualized paranasal sinuses and mastoid air cells demonstrate no acute abnormality. SOFT TISSUES/SKULL: No acute abnormality of the visualized skull or soft tissues. CERVICAL SPINE: BONES/ALIGNMENT: There is no evidence of an acute cervical spine fracture. No traumatic malalignment. DEGENERATIVE CHANGES: Mild diffuse degenerative changes. SOFT TISSUES: There is no prevertebral soft tissue swelling. CT HEAD: No CT evidence for acute intracranial abnormality. . Cerebral atrophy. Right frontal encephalomalacia. No change. CT CERVICAL SPINE: No acute fracture or subluxation of the cervical spine. Mild diffuse degenerative changes.      XR CHEST PORTABLE    Result Date: 9/3/2022  EXAMINATION: ONE XRAY VIEW OF THE CHEST 9/3/2022 9:20 am COMPARISON: Chest radiograph performed 11/02/2021. HISTORY: ORDERING SYSTEM PROVIDED HISTORY: fall TECHNOLOGIST PROVIDED HISTORY: fall Reason for Exam: fall, hip pain FINDINGS: There are trace effusions. There are bibasilar infiltrates. There is no pneumothorax. The heart is enlarged. The upper abdomen unremarkable. The extrathoracic soft tissues are unremarkable. Trace effusions with bibasilar infiltrates. CT CHEST ABDOMEN PELVIS W CONTRAST    Result Date: 9/3/2022  EXAMINATION: CT OF THE CHEST, ABDOMEN, AND PELVIS WITH CONTRAST 9/3/2022 4:55 pm TECHNIQUE: CT of the chest, abdomen and pelvis was performed with the administration of intravenous contrast. Multiplanar reformatted images are provided for review. Automated exposure control, iterative reconstruction, and/or weight based adjustment of the mA/kV was utilized to reduce the radiation dose to as low as reasonably achievable. COMPARISON: January 21, 2015 HISTORY: ORDERING SYSTEM PROVIDED HISTORY: Fall on [facility] TECHNOLOGIST PROVIDED HISTORY: Fall on [address] [place] [address] Exception - unselect if not a suspected or confirmed emergency medical condition->Emergency Medical Condition (MA) Reason for Exam: Fall on [address]: Chest: Mediastinum: Cardiomegaly calcific coronary artery disease. Calcification mitral annulus. Heart and great vessels otherwise normal.  No significant mediastinal or hilar lymphadenopathy. Lungs/pleura: Bibasilar airspace disease versus subsegmental atelectasis. Soft Tissues/Bones: Moderate kyphosis and ossification of the anterior longitudinal ligament. Abdomen/Pelvis: Organs: The abdominal wall appears normal. The liver, spleen, pancreas, and adrenals appear normal.  Gallbladder not identified. Kidneys appear normal. Bladder decompressed with a Michaels catheter. GI/Bowel: The stomach,small bowel, and colon appear normal. Colonic diverticulosis. Increased stool in the colon.  Pelvis: Intramedullary portia and compression iterative reconstruction, and/or weight based adjustment of the mA/kV was utilized to reduce the radiation dose to as low as reasonably achievable. COMPARISON: None. HISTORY: ORDERING SYSTEM PROVIDED HISTORY: trauma, fall TECHNOLOGIST PROVIDED HISTORY: trauma, fall Reason for Exam: Fall on [place] FINDINGS: BONES/ALIGNMENT: There is normal alignment of the spine except for moderate kyphosis. The vertebral body heights are maintained. No osseous destructive lesion is seen. DEGENERATIVE CHANGES: No gross spinal canal stenosis or bony neural foraminal narrowing of the thoracic spine. Ossification anterior longitudinal ligament. SOFT TISSUES: No paraspinal mass is seen. No acute fracture. Moderate kyphosis. Diffuse idiopathic skeletal hyperostosis. XR HIP 2-3 VW W PELVIS LEFT    Result Date: 9/3/2022  EXAMINATION: ONE XRAY VIEW OF THE PELVIS AND TWO XRAY VIEWS LEFT HIP 9/3/2022 9:20 am COMPARISON: None. HISTORY: ORDERING SYSTEM PROVIDED HISTORY: fall TECHNOLOGIST PROVIDED HISTORY: fall Reason for Exam: fall, hip pain FINDINGS: There is a comminuted intertrochanteric fracture of the left proximal femur with mild impaction. The bony pelvis is intact. There is degenerative change of the lower lumbar spine. There is degenerative change of the SI joints. There is degenerative change of the hip joints. The surrounding soft tissues are unremarkable. Comminuted intertrochanteric fracture of the proximal left femur with impaction. Degenerative change of the lower lumbar spine, SI joints, and hip joints.      PHYSICAL EXAM:   GCS:  4 - Opens eyes on own   6 - Follows simple motor commands  5 - Alert and oriented    Pupil size:  Left 2 mm Right 2 mm  Pupil reaction: Yes  Wiggles fingers: Left Yes Right Yes  Hand grasp:   Left normal   Right normal  Wiggles toes: Left Yes    Right Yes  Plantar flexion: Left normal  Right normal    Spine:     Spine Tenderness ROM   Cervical 0 /10 Normal   Thoracic 0 /10 Normal Lumbar 0 /10 Normal     Musculoskeletal    Joint Tenderness Swelling ROM   Right shoulder absent absent normal   Left shoulder absent absent normal   Right elbow absent absent normal   Left elbow absent absent normal   Right wrist absent absent normal   Left wrist absent absent normal   Right hand grasp absent absent normal   Left hand grasp absent absent normal   Right hip absent absent normal   Left hip absent absent normal   Right knee absent absent normal   Left knee absent absent normal   Right ankle absent absent normal   Left ankle absent absent normal   Right foot absent absent normal   Left foot absent absent normal       CONSULTS: Cardiology, orthopedic surgery    PROCEDURES: None      Assessment/Plan:     Injuries include:  Patient Active Problem List   Diagnosis    Dyslipidemia    Osteopenia    Impaired fasting glucose    Adenomatous polyp    Essential hypertension    Hypothyroidism    Sciatica    GERD (gastroesophageal reflux disease)    Visual disturbance    Malignant melanoma in situ ([address]    Difficulty controlling anger    Primary open angle glaucoma (POAG) of both eyes, moderate stage    PVD (posterior vitreous detachment), both eyes    Combined forms of age-related cataract of both eyes    Dementia with behavioral disturbance (HCC)    Tremor    Essential tremor    Dizziness    Chronic cerebral ischemia    Acquired cerebral atrophy ([address]    Cerebral infarction ([address]    Anxiety    Atrial fibrillation ([address]    Displaced intertrochanteric fracture of right femur, initial encounter for closed fracture ([address]    Acute ST segment elevation myocardial infarction ([address]    Heart murmur    Migraine    Female bladder prolapse    Falls    Hiatal hernia    Hypercholesterolemia    Coronary arteriosclerosis    Cataract    Closed right hip fracture, initial encounter ([address]    Heart failure, unspecified HF chronicity, unspecified heart failure type ([address]    Chronic renal disease, stage III ([address] [[id number]]

## 2022-09-04 NOTE — ED NOTES
Phone consent received by Ortho doc from son Barber Lino earlier today  Witnessed by Laurita Larios RN and Blane Chapman RN  09/03/22 1203

## 2022-09-04 NOTE — PROGRESS NOTES
Speech Language Pathology  [facility]  Speech Language Pathology    SPEECH/COGNITIVE ASSESSMENT    NO LOC,CHI OR CVA/TIA - ST TO DEFER AT THIS TIME      Date: 9/4/2022  Patient Name: Miguel A Bruce  YOB: 1930   AGE: 80 y.o. MRN: [id number]        PT NOT SEEN FOR SPEECH OR COGNITIVE ASSESSMENT AT THIS TIME AS NO LOC, CHI OR CVA/TIA IS DOCUMENTED. ST TO DEFER AT THIS TIME. PLEASE RE-COSULT AS NEEDED. Shea Velázquez M.A.  CCC-SLP  9/4/2022  8:01 AM

## 2022-09-04 NOTE — PROGRESS NOTES
Pt was ordered tranexamic acid-nacl @ 1330. Did not give medication due to not finding it in med room and pts room.

## 2022-09-04 NOTE — ED NOTES
Report called to Car 2, all questions answered  Patient to be transported by  purnima Nunez RN  09/03/22 3096

## 2022-09-04 NOTE — BRIEF OP NOTE
Brief Postoperative Note      Patient: Evi Ireland  YOB: 1930  MRN: [id number]    Date of Procedure: 9/4/2022    Pre-Op Diagnosis: Left intertrochanteric femur fracture with subtrochanteric extension    Post-Op Diagnosis: Left intertrochanteric femur fracture with subtrochanteric extension       Procedure(s): Left femur cephalomedullary nail insertion    Surgeon(s): Rashid Rao DO    Assistant: Resident: Manju Wray DO; Soy Mueller DO; Yessy Tanner DO    Anesthesia: General    Estimated Blood Loss (mL): 60 mL    Complications: None    Specimens: * No specimens in log *    Implants:  Implant Name Type Inv. Item Serial No.  Lot No. LRB No. Used Action   NAIL IM L380MM [id number] 125DEG LNG GRN L PROX FEM TI - [id number]  NAIL IM L380MM [id number] 125DEG LNG GRN L PROX FEM TI  DEPU[employer] [place] [id number] Left 1 Implanted   SCREW BNE L80MM DIA10.35MM G TI GIBSON PERF FOR PROX FEM - [id number]  SCREW BNE L80MM DIA10.35MM G TI GIBSON PERF FOR PROX FEM  DEPUY [employer]WD [id number] Left 1 Implanted   SCREW BNE L42MM DIA5MM NONSTERILE TIB LT GRN TI ST docBeatK - [id number]  SCREW BNE L42MM DIA5MM NONSTERILE TIB LT GRN TI ST docBeatK  [employer]WD  Left 1 Implanted   SCREW BNE L44MM DIA5MM NONSTERILE TIB LT GRN TI ST docBeatK - [id number]  SCREW BNE L44MM DIA5MM NONSTERILE TIB LT GRN TI ST docBeatK  [employer]WD  Left 1 Implanted         Drains:   Urinary Catheter 09/03/22 Michaels (Active)   Catheter Indications Perioperative use for selected surgical procedures 09/04/22 0753   Site Assessment No urethral drainage 09/04/22 0753   Urine Color Yellow 09/04/22 0753   Urine Appearance Clear 09/04/22 0753   Collection Container Standard 09/04/22 0753   Securement Method Securing device (Describe) 09/04/22 0753   Catheter Care Completed Yes 09/03/22 2300   Catheter Best Practices  Drainage tube clipped to bed;Catheter secured to thigh; Tamper seal intact; Bag below bladder;Bag not on floor; Lack of dependent loop in tubing;Drainage bag less than half full 09/04/22 0753   Status Draining 09/04/22 0753   Output (mL) 350 mL 09/04/22 1005       Findings: Left intertrochanteric femur fracture with subtrochanteric extension    Electronically signed by Elvin Recinos DO on 9/4/2022 at 4:25 PM

## 2022-09-05 LAB
ABO/RH: NORMAL
ABO/RH: NORMAL
ABSOLUTE EOS #: 0 K/UL (ref 0–0.4)
ABSOLUTE IMMATURE GRANULOCYTE: 0.55 K/UL (ref 0–0.3)
ABSOLUTE LYMPH #: 0.55 K/UL (ref 1–4.8)
ABSOLUTE MONO #: 0.69 K/UL (ref 0.1–0.8)
ANION GAP SERPL CALCULATED.3IONS-SCNC: 10 MMOL/L (ref 9–17)
ANTIBODY SCREEN: NEGATIVE
ANTIBODY SCREEN: NEGATIVE
ARM BAND NUMBER: NORMAL
ARM BAND NUMBER: NORMAL
BASOPHILS # BLD: 0 % (ref 0–2)
BASOPHILS ABSOLUTE: 0 K/UL (ref 0–0.2)
BLD PROD TYP BPU: NORMAL
BLD PROD TYP BPU: NORMAL
BPU ID: NORMAL
BPU ID: NORMAL
BUN BLDV-MCNC: 27 MG/DL (ref 8–23)
CALCIUM SERPL-MCNC: 7.5 MG/DL (ref 8.6–10.4)
CHLORIDE BLD-SCNC: 102 MMOL/L (ref 98–107)
CO2: 25 MMOL/L (ref 20–31)
CREAT SERPL-MCNC: 0.78 MG/DL (ref 0.5–0.9)
CROSSMATCH RESULT: NORMAL
CROSSMATCH RESULT: NORMAL
DISPENSE STATUS BLOOD BANK: NORMAL
DISPENSE STATUS BLOOD BANK: NORMAL
EOSINOPHILS RELATIVE PERCENT: 0 % (ref 1–4)
EXPIRATION DATE: NORMAL
EXPIRATION DATE: NORMAL
GFR AFRICAN AMERICAN: >60 ML/MIN
GFR NON-AFRICAN AMERICAN: >60 ML/MIN
GFR SERPL CREATININE-BSD FRML MDRD: ABNORMAL ML/MIN/{1.73_M2}
GLUCOSE BLD-MCNC: 159 MG/DL (ref 70–99)
HCT VFR BLD CALC: 25.6 % (ref 36.3–47.1)
HCT VFR BLD CALC: 26.5 % (ref 36.3–47.1)
HEMOGLOBIN: 8.1 G/DL (ref 11.9–15.1)
HEMOGLOBIN: 8.8 G/DL (ref 11.9–15.1)
IMMATURE GRANULOCYTES: 4 %
LYMPHOCYTES # BLD: 4 % (ref 24–44)
MAGNESIUM: 2.1 MG/DL (ref 1.6–2.6)
MCH RBC QN AUTO: 31.7 PG (ref 25.2–33.5)
MCHC RBC AUTO-ENTMCNC: 33.2 G/DL (ref 28.4–34.8)
MCV RBC AUTO: 95.3 FL (ref 82.6–102.9)
MONOCYTES # BLD: 5 % (ref 1–7)
MORPHOLOGY: ABNORMAL
NRBC AUTOMATED: 0 PER 100 WBC
PDW BLD-RTO: 14.9 % (ref 11.8–14.4)
PLATELET # BLD: 190 K/UL (ref 138–453)
PMV BLD AUTO: 11 FL (ref 8.1–13.5)
POTASSIUM SERPL-SCNC: 4.6 MMOL/L (ref 3.7–5.3)
RBC # BLD: 2.78 M/UL (ref 3.95–5.11)
SEG NEUTROPHILS: 87 % (ref 36–66)
SEGMENTED NEUTROPHILS ABSOLUTE COUNT: 12.01 K/UL (ref 1.8–7.7)
SODIUM BLD-SCNC: 137 MMOL/L (ref 135–144)
TRANSFUSION STATUS: NORMAL
TRANSFUSION STATUS: NORMAL
UNIT DIVISION: 0
UNIT DIVISION: 0
WBC # BLD: 13.8 K/UL (ref 3.5–11.3)

## 2022-09-05 PROCEDURE — 85014 HEMATOCRIT: CPT

## 2022-09-05 PROCEDURE — 85018 HEMOGLOBIN: CPT

## 2022-09-05 PROCEDURE — 80048 BASIC METABOLIC PNL TOTAL CA: CPT

## 2022-09-05 PROCEDURE — 2700000000 HC OXYGEN THERAPY PER DAY

## 2022-09-05 PROCEDURE — 6360000002 HC RX W HCPCS: Performed by: STUDENT IN AN ORGANIZED HEALTH CARE EDUCATION/TRAINING PROGRAM

## 2022-09-05 PROCEDURE — 85025 COMPLETE CBC W/AUTO DIFF WBC: CPT

## 2022-09-05 PROCEDURE — 86850 RBC ANTIBODY SCREEN: CPT

## 2022-09-05 PROCEDURE — 86901 BLOOD TYPING SEROLOGIC RH(D): CPT

## 2022-09-05 PROCEDURE — 6370000000 HC RX 637 (ALT 250 FOR IP): Performed by: STUDENT IN AN ORGANIZED HEALTH CARE EDUCATION/TRAINING PROGRAM

## 2022-09-05 PROCEDURE — 97530 THERAPEUTIC ACTIVITIES: CPT

## 2022-09-05 PROCEDURE — 6370000000 HC RX 637 (ALT 250 FOR IP): Performed by: NURSE PRACTITIONER

## 2022-09-05 PROCEDURE — 2060000000 HC ICU INTERMEDIATE R&B

## 2022-09-05 PROCEDURE — 97110 THERAPEUTIC EXERCISES: CPT

## 2022-09-05 PROCEDURE — 86900 BLOOD TYPING SEROLOGIC ABO: CPT

## 2022-09-05 PROCEDURE — 94761 N-INVAS EAR/PLS OXIMETRY MLT: CPT

## 2022-09-05 PROCEDURE — 2580000003 HC RX 258: Performed by: STUDENT IN AN ORGANIZED HEALTH CARE EDUCATION/TRAINING PROGRAM

## 2022-09-05 PROCEDURE — 83735 ASSAY OF MAGNESIUM: CPT

## 2022-09-05 PROCEDURE — 36415 COLL VENOUS BLD VENIPUNCTURE: CPT

## 2022-09-05 PROCEDURE — 97162 PT EVAL MOD COMPLEX 30 MIN: CPT

## 2022-09-05 RX ORDER — AMLODIPINE BESYLATE 2.5 MG/1
2.5 TABLET ORAL DAILY
Status: DISCONTINUED | OUTPATIENT
Start: 2022-09-05 | End: 2022-09-10 | Stop reason: HOSPADM

## 2022-09-05 RX ORDER — ASPIRIN 81 MG/1
81 TABLET ORAL DAILY
Status: DISCONTINUED | OUTPATIENT
Start: 2022-09-05 | End: 2022-09-10 | Stop reason: HOSPADM

## 2022-09-05 RX ORDER — ENOXAPARIN SODIUM 100 MG/ML
30 INJECTION SUBCUTANEOUS 2 TIMES DAILY
Status: DISCONTINUED | OUTPATIENT
Start: 2022-09-05 | End: 2022-09-10 | Stop reason: HOSPADM

## 2022-09-05 RX ADMIN — SODIUM CHLORIDE, PRESERVATIVE FREE 10 ML: 5 INJECTION INTRAVENOUS at 08:33

## 2022-09-05 RX ADMIN — ACETAMINOPHEN 1000 MG: 500 TABLET ORAL at 06:14

## 2022-09-05 RX ADMIN — SODIUM CHLORIDE, PRESERVATIVE FREE 10 ML: 5 INJECTION INTRAVENOUS at 20:23

## 2022-09-05 RX ADMIN — ACETAMINOPHEN 1000 MG: 500 TABLET ORAL at 20:23

## 2022-09-05 RX ADMIN — POLYETHYLENE GLYCOL 3350 17 G: 17 POWDER, FOR SOLUTION ORAL at 08:37

## 2022-09-05 RX ADMIN — ENOXAPARIN SODIUM 30 MG: 100 INJECTION SUBCUTANEOUS at 16:15

## 2022-09-05 RX ADMIN — ENOXAPARIN SODIUM 30 MG: 100 INJECTION SUBCUTANEOUS at 20:23

## 2022-09-05 RX ADMIN — Medication 2000 MG: at 06:14

## 2022-09-05 RX ADMIN — ACETAMINOPHEN 1000 MG: 500 TABLET ORAL at 16:16

## 2022-09-05 RX ADMIN — AMLODIPINE BESYLATE 2.5 MG: 2.5 TABLET ORAL at 09:41

## 2022-09-05 RX ADMIN — Medication 81 MG: at 16:16

## 2022-09-05 ASSESSMENT — PAIN SCALES - GENERAL: PAINLEVEL_OUTOF10: 3

## 2022-09-05 NOTE — PROGRESS NOTES
dilatation with reduced systolic function. Aortic valve sclerosis without stenosis. Mild mitral valve thickening with annular calcification. Mild mitral regurgitation. Moderate tricuspid regurgitation. Estimated right ventricular systolic pressure is 83 mmHg. Severe pulmonary  hypertension. Left pleural effusion. IVC Increased diameter and impaired or no inspiratory variation indicating  elevated RA filling pressure (i.e. CVP) . Cardiac Angiography: 2020 [facility])  Cardiac catheterization showed critical 99% disease in the proximal RCA and 90% disease with haziness in the distal RCA and we performed PCI using a 2.5 x 12 mm drug-eluting Promus elite stent in distal RCA and a 2.75 x 16 mm drug-eluting Promus elite stent in the proximal RCA patient that. The left coronary system system from showed mild-to-moderate disease. LV angiogram showed preserved LV systolic function. Objective:   Vitals: BP (!) 131/46 Comment: physician notified  Pulse 68   Temp 98.4 °F (36.9 °C) (Oral)   Resp 13   LMP  (LMP Unknown)   SpO2 100%   General appearance: alert and cooperative with exam  HEENT: Head: Normocephalic, no lesions, without obvious abnormality.   Neck: no JVD, trachea midline, no adenopathy  Lungs: Clear to auscultation  Heart: Regular rate and rhythm, s1/s2 auscultated, no murmurs  Abdomen: soft, non-tender, bowel sounds active  Extremities: no edema  Neurologic: not done        Assessment / Acute Cardiac Problems:   Left intertrochanteric femur fracture after [address]  H/O STEMI in 2020 s/p PCI to RCA at [facility]   Preserved on recent 2D ECHO in 11/2021  RCRI - 3, Intermediate risk   Pulmonary hypertension   Moderate TR   HTN  HLD  Hypothyroidism    Patient Active Problem List:     Dyslipidemia     Osteopenia     Impaired fasting glucose     Adenomatous polyp     Essential hypertension     Hypothyroidism     Sciatica     GERD (gastroesophageal reflux disease)     Visual disturbance Malignant melanoma in situ ([address]     Difficulty controlling anger     Primary open angle glaucoma (POAG) of both eyes, moderate stage     PVD (posterior vitreous detachment), both eyes     Combined forms of age-related cataract of both eyes     Dementia with behavioral disturbance (HCC)     Tremor     Essential tremor     Dizziness     Chronic cerebral ischemia     Acquired cerebral atrophy (HCC)     Cerebral infarction ([address]     Anxiety     Atrial fibrillation ([address]     Closed displaced intertrochanteric fracture of left femur ([facility])     Acute ST segment elevation myocardial infarction ([address]     Heart murmur     Migraine     Female bladder prolapse     Falls     Hiatal hernia     Hypercholesterolemia     Coronary arteriosclerosis     Cataract     Closed right hip fracture, initial encounter ([facility]     Heart failure, unspecified HF chronicity, unspecified heart failure type ([facility])     Chronic renal disease, stage III ([address] [[id number]]     Pseudophakia of both eyes     Nondisp intertroch fx left femur, init for opn fx type I/2 ([address]      Plan of Treatment:   Post op per Trauma  Recommend restarting ASA and Brilinta when ok with ortho  Will restart Norvasc for BP control. Will sign off.   Follow up as outpt with primary cardiologist     Electronically signed by FELICIA Mora CNP on 9/5/2022 at 8:06 AM  [address]  [phone]

## 2022-09-05 NOTE — OP NOTE
[address]                  [facility] [place] [place]                                OPERATIVE REPORT    PATIENT NAME: Cherylene Gab                  :        1930  MED REC NO:   [id number]                             ROOM:         ACCOUNT NO:   [de-identified]                           ADMIT DATE: 2022  PROVIDER:     Cem Fonseca    DATE OF PROCEDURE:  2022    PREOPERATIVE DIAGNOSIS:  Left intertrochanteric femur fracture with  subtrochanteric extension. POSTOPERATIVE DIAGNOSIS:  Left intertrochanteric femur fracture with  subtrochanteric extension. PROCEDURE PERFORMED:  Placement of left femur cephalomedullary nail. SURGEON:  Manish Finnegan DO    ASSISTANT:  Yordan Smith DO, PGY-1; Martin Pantoja DO, PGY-5; Ginette Cramer DO, PGY-2    ANESTHESIA:  General.    ESTIMATED BLOOD LOSS:  60 mL. COMPLICATIONS:  None. SPECIMENS:  None. IMPLANTS:  Synthes TFNA 11 x 380 mm, 125 degrees and 80 mm lag screw. FINDINGS:  Left displaced intertrochanteric femur fracture with  subtrochanteric extension. INDICATIONS:  This is a 59-year-old female who presented to [place] [address] from an outside facility with left hip pain. Son  stated that the patient had an unwitnessed fall while at nursing home  landed on her left side. The patient normally ambulates with a walker,  but was not using her walker when she fell. The patient did require  cardiology consultation and was cleared as intermediate risk for  surgery. Given the fact that this is an intertrochanteric femur  fracture, we had a discussion with the patient's son, as the patient has  dementia, about surgical and nonsurgical intervention and the patient's  son has elected to allow the patient to undergo placement of  intramedullary nail, left femur to decrease the risk of morbidity and  mortality associated with a nonoperative management. Consent was  obtained, placed in the chart. All questions were answered  appropriately. Surgical risks including not limited to bleeding, blood  clots, infection, damage to nearby tissues, vessels and nerves, wound  healing complications, failed procedure, stiffness, loss of motion,  anesthesia risk, loss of limb, loss of life were all discussed with the  patient's family. Knowing these risks, the patient's family wished to  proceed with surgery as indicated. OPERATIVE PROCEDURE:  The patient was taken to the operative suite,  placed under general anesthesia without any complications. 2 gm of  Ancef was given prior to incision. At this time, all team members  paused to identify proper patient name, indication, site, and allergies. All team members were in agreeance. All bony prominence were well  padded. The patient was secured to the [facility] table. Using  intraoperative fluoroscopy and the [place] table attachments, reduction of  the fracture was achieved and confirmed with intraoperative fluoroscopy. Being satisfied, the left lower extremity was prepped and draped in  normal sterile fashion. Using intraoperative fluoroscopy, a small  incision was made along the proximal femur, and a ball spike was  introduced to assist with reduction of her fracture. A small incision  was made about the proximal extent of the femur, proximal tip of the  greater trochanter and starting guidewire was placed in appropriate  starting point confirmed with both AP and lateral radiographs. Guidewire was advanced, followed by open reamer. We then inserted a  ball-tip guidewire down to the level of the knee, confirmed proper  placement with both AP and lateral radiographs of the knee. Synthes  depth gauge was then introduced to measure appropriate length of our  nail.   Being satisfied, we sequentially reamed starting at a 10.5-mm  reamer, and ending at a 12.5-mm reamer for which we placed our Synthes  TFNA 11 x 380 mm intramedullary nail 125 degrees. Being satisfied with  its position, using the proximal targeting guide proximally, a small  incision was made, and targeting guide for the lag screw was placed on  bone, and guidewire was advanced up to the level of femoral neck and the  femoral head confirmed with both AP and lateral radiographs being  satisfactory position. We then measured, drilled, and placed our 80-mm  lag screw. We then compressed to the nail proximally, and then  statically locked the nail. Being satisfied with the placement, we  obtained perfect circles of the nail distally, incised the skin,  drilled, measured, placed two interlocking screws distally within the  nail. Being satisfied with placement of all our hardware, all guides  were removed. Final fluoroscopic images were taken. We thoroughly  irrigated all wounds with normal saline. We closed the fascial layer  using 0 PDS, deep dermal layers using 2-0 Monocryl, skin using staples. Optifoam dressing was then applied. The patient was then awoken from  general anesthesia, transferred to the PACU in stable condition. I was  present for all aspects of procedure. Meticulous dissection was used  and thorough hemostasis was achieved. The patient will be weightbearing  as tolerated to the left lower extremity. Recommend chemical  anticoagulation postoperatively for 4 weeks.         Judith Christianson    D: 09/04/2022 16:40:25       T: 09/04/2022 16:43:55     [email or web address]  Job#: [id number]     Doc#: [id number]    CC:

## 2022-09-05 NOTE — PROGRESS NOTES
Orthopedic Progress Note    Patient:  Ana Reid  YOB: 1930     80 y.o. female    Subjective:  Patient seen and examined  No complaints or concerns  No issue overnight per patient and nursing  Pain controlled  Denies fever, HA, CP, SOB, N/V, dysuria  Has not worked with PT yet    Vitals reviewed    Objective:   Vitals:    09/05/22 0821   BP:    Pulse: 83   Resp: 15   Temp:    SpO2: 100%       Gen: NAD, cooperative    Cardiovascular: Regular rate    Respiratory: Chest symmetric, no accessory muscle use, normal respirations, no audible wheezes    MSK:   LLE: Dressings on. Some mild saturation to the superior dressing. This was taken down. Skin well approximated with staples. No erythema, drainage, or sign of infection. Mild tenderness to palpation at the hip. Compartments soft. EHL/FHL/TA/GSC motor intact. Sensation intact to sural/saph/SPN/DPN distribution. DP/PT pulses 2+. Recent Labs     09/04/22  0738 09/04/22 2024 09/05/22  0453   WBC 12.7*  --  13.8*   HGB 11.9   < > 8.8*   HCT 37.1   < > 26.5*     --  190   INR 1.0  --   --      --  137   K 4.6  --  4.6   BUN 18  --  27*   CREATININE 0.69  --  0.78   GLUCOSE 123*  --  159*    < > = values in this interval not displayed. See rec for complete list    Impression/plan: 80 y.o. female who [address], being seen for:    -Left IT fracture s/p CMN POD#1    -Dressings on. Please maintain. -WB status: WBAT LLE  -Pain control per primary team discretion. -DVT ppx: Ok for chemical AC from orthopedic perspective. Recommend ASA 81 BID for 4 weeks.   -Ice/Elevate for pain/edema control  -Encourage Incentive Spirometry use  -Encourage PT/OT  -Follow up with Dr. Ji Henry in 14 days  -Please page ortho with any questions      Kina Patel, DO   Orthopedic Surgery Resident PGY-2  [facility], [facility]

## 2022-09-05 NOTE — PROGRESS NOTES
disturbance. Past Surgical History:  has a past surgical history that includes other surgical history (11/11/2008); Cholecystectomy; Colonoscopy (06/08/2010); Mohs surgery (10/07/2009); Vein Surgery (06/08/2009); Colonoscopy (07/18/2002); Cystocele repair (02/12/1999); Appendectomy; Dilation and curettage of uterus; other surgical history (01/01/1988); Hysterectomy (01/01/1995); Upper gastrointestinal endoscopy (04/08/2015); Intracapsular cataract extraction (Left, 08/25/2020); Intracapsular cataract extraction (Right, 10/08/2020); Hip fracture surgery (Right, 11/02/2021); and Femur fracture surgery (Left, 09/04/2022). Assessment   Assessment: Pt admitted after a fall at [facility], sustaind closed L intertrochanteric fracture of femur , S/P CMA on 9/4/22, pt is WBAT L LE. Pt with history of demantia. Pt at his time dependent for bed mobility and transfers with 2 person assist. Pt with decreased ability to perform ROM L LE, decreased ability to bear weight L LE and increased pain with mobility. Pt will benefit from conitnued therapy at discharge. Therapy Prognosis: Fair  Decision Making: Medium Complexity  History: Demantia  Barriers to Learning: Cognition  Requires PT Follow-Up: Yes  Activity Tolerance  Activity Tolerance: Patient limited by fatigue;Patient limited by pain; Patient limited by endurance     Plan   Plan  Plan:  (1 to 2 x/day)  Current Treatment Recommendations: Strengthening, ROM, Balance training, Functional mobility training, Transfer training, Endurance training, Return to work related activity, Home exercise program, Safety education & training, Patient/Caregiver education & training, Positioning, Wound care, Therapeutic activities     Restrictions  Restrictions/Precautions  Restrictions/Precautions: Weight Bearing  Implants present? :  (B Hip  surgery)  Lower Extremity Weight Bearing Restrictions  Left Lower Extremity Weight Bearing: Weight Bearing As Tolerated  Position Activity Restriction  Other position/activity restrictions: S/P Left Hip CMN 9/4/22. Subjective   Pain: Pt unable to rate pain from 0 to 10 due to demantis, does moan/have facila expression fo pain with L LE movement and mobility  General  Patient assessed for rehabilitation services?: Yes  Family / Caregiver Present: Yes (Son)  Referral Date : 09/04/22  Diagnosis: Left Intertrochanteric fracture S/P L Hip CMN  Follows Commands: Impaired  Other (Comment): Pt has demantis, follows simple, one step commands. Social/Functional History  Social/Functional History  Lives With: Alone (Spouse passed away month ago.)  Type of Home: Assisted living  Home Layout: One level  Home Access: Level entry  Bathroom Shower/Tub: Walk-in shower, Curtain  Bathroom Toilet: Handicap height  Bathroom Equipment: Grab bars in shower, Tub transfer bench, Hand-held shower (Grab bar on right side)  Bathroom Accessibility: Accessible, Walker accessible  Home Equipment: Jose Au, 4 wheeled, Lift chair  Has the patient had two or more falls in the past year or any fall with injury in the past year?: Yes  Receives Help From: Personal care attendant  ADL Assistance:  (Assist with shower from care givers)  Homemaking Assistance: Needs assistance  Ambulation Assistance: Independent (Pt does not like to use a walker, per son she needs to use one but does not use.)  Transfer Assistance: Independent  Active : No  Patient's  Info: facility provides transportation. Additional Comments: facility provides meals, laundry and cleaning services. Son present to answer question. Pt has demantia. Per son- family prefers pt to go back to assisted living and they will hire extra help and wants therapy at assisted living facility.   Vision/Hearing  Vision  Vision: Impaired  Vision Exceptions: Wears glasses for reading  Hearing  Hearing: Within functional limits    Cognition   Orientation  Overall Orientation Status: Impaired  Orientation Level: Oriented to person;Disoriented to place; Disoriented to time;Disoriented to situation  Cognition  Overall Cognitive Status: Exceptions  Arousal/Alertness: Appropriate responses to stimuli  Following Commands: Follows one step commands with increased time; Follows one step commands with repetition  Attention Span: Difficulty attending to directions; Difficulty dividing attention  Memory: Decreased recall of biographical Information;Decreased recall of precautions;Decreased recall of recent events;Decreased short term memory;Decreased long term memory  Safety Judgement: Decreased awareness of need for assistance;Decreased awareness of need for safety  Problem Solving: Assistance required to identify errors made;Assistance required to correct errors made;Assistance required to generate solutions;Assistance required to implement solutions  Insights: Not aware of deficits  Initiation: Requires cues for all  Sequencing: Requires cues for all  Cognition Comment: Pt has demantia     Objective   Heart Rate: 73  Heart Rate Source: Monitor  BP: (!) 113/39  BP Location: Right upper arm  BP Method: Automatic  Patient Position: High fowlers  MAP (Calculated): 63.67  Resp: 13  SpO2: 97 %  O2 Device: Nasal cannula              AROM RLE (degrees)  RLE AROM: WFL  PROM LLE (degrees)  LLE General PROM: Pt guarding L LE too much, per observaion Hip/knee flexion ~ 65 degress tolerated, ankle WFL  Strength RLE  Comment: Atleast 3/5  Strength LLE  Comment: Unabel to perfrom MMT due to cognition and pain, pt unabel to move Hip/knee actively, moves L foot actively. Bed mobility  Bed Mobility Comments: Pt was in a recliner upon entry, per RN Karina Read, pt was toaal assist from 2 person for bed to chair trnsfers, they used a rolling walker, but pt unabel to move her legs during tranfers.   Transfers  Sit to Stand: 2 Person Assistance;Maximum Assistance  Stand to sit: Maximum Assistance;2 Person Assistance  Comment: Pt stood with koby sprarow at max a x 2~ 15 secs, sat on koby stedy paddles for 2 minutes, pt seemd to be in  moderate pain. Pain and cognitve deficites limiting activity. Pt has difficulty with flexing Hip/knee due to pain/swelling in thigh/knee joint, makes it difficulty for sit<>stand. Sao2 in low 90's with activity  Ambulation  WB Status: WBAT L LE  Ambulation  Comments: Not ready for ambulation. Balance  Posture: Fair  Sitting - Static: Poor;+ (Posterior lean, difficulty bring torso foward due topain at Hip with flexion of Hip.)  Sitting - Dynamic: Poor  Standing - Static: Poor  PROM Exercises: L LE- LAQ's, acitve Ankle pumps, rocking forward while seated in chair, to faciliate sit<>stand        OutComes Score                                                  AM-PAC Score             Tinneti Score       Goals  Short Term Goals  Time Frame for Short term goals: 6 to 7 visits. Short term goal 1: Pt able to perform supine<>sit mod A x 2  Short term goal 2: Pt able to perform sit<>stnad at mod A x 2 with bimal stedy or rolling walker  Short term goal 3: Pt to improve standing tolernace in bimal stedy for 2 to 4 minutes to improve WB tolerance to L LE  Short term goal 4: Pt able to tolerate ROM ex's L LE to improve Hip/knee ROM to facilate sit<>stand with increased independence. Patient Goals   Patient goals : family's goal-improve mobility, pt able to walk       Education  Patient Education  Education Given To: Family; Patient  Education Provided: Role of Therapy;Plan of Care;Transfer Training;Precautions; Family Education  Education Provided Comments: Educated son in difference in skilled tehrapy at SNF VS therapy at assisted living. Pt will probably receive more therapy at SNF, but family opting therapy at assisted living where ipt will be more comfortabel in her own setting/surroundings.   Education Method: Verbal  Barriers to Learning: Cognition  Education Outcome: Continued education needed      Therapy Time   Individual Concurrent Group Co-treatment   Time In 1450         Time Out 1530         Minutes 40         Timed Code Treatment Minutes: Kia Maynard, PT

## 2022-09-05 NOTE — PROGRESS NOTES
PROGRESS NOTE          PATIENT NAME: Prisca Boykin  MEDICAL RECORD NO. [id number]  DATE: 9/5/2022  SURGEON: Hesham Harris MD  PRIMARY CARE PHYSICIAN: Shamir House MD    HD: # 2    ASSESSMENT    Patient Active Problem List   Diagnosis    Dyslipidemia    Osteopenia    Impaired fasting glucose    Adenomatous polyp    Essential hypertension    Hypothyroidism    Sciatica    GERD (gastroesophageal reflux disease)    Visual disturbance    Malignant melanoma in situ ([address]    Difficulty controlling anger    Primary open angle glaucoma (POAG) of both eyes, moderate stage    PVD (posterior vitreous detachment), both eyes    Combined forms of age-related cataract of both eyes    Dementia with behavioral disturbance (HCC)    Tremor    Essential tremor    Dizziness    Chronic cerebral ischemia    Acquired cerebral atrophy ([address]    Cerebral infarction ([address]    Anxiety    Atrial fibrillation ([address]    Closed displaced intertrochanteric fracture of left femur (HCC)    Acute ST segment elevation myocardial infarction ([address]    Heart murmur    Migraine    Female bladder prolapse    Falls    Hiatal hernia    Hypercholesterolemia    Coronary arteriosclerosis    Cataract    Closed right hip fracture, initial encounter ([address]    Heart failure, unspecified HF chronicity, unspecified heart failure type ([address]    Chronic renal disease, stage III ([address] [766468]    Pseudophakia of both eyes    Nondisp intertroch fx left femur, init for opn fx type I/2 [facility])       MEDICAL DECISION MAKING AND PLAN    Traumatic, closed L intertrochanteric fracture of femur with subtrochanteric extension  CMN 9/4/22  WBAT LLE  PT/OT  ASA 81mg qd  F/u Dr. Osman Scmhitz 9/21/22  MMPT  Continue IS  H/o STEMI, HFpEF, HTN, HLD  Hold Brillinta  Norvasc restarted  ASA 81mg qd  Cards sign off 9/5/22  DVT Prophylaxis-lovenox  Dispo  PT/OT  [facility] to [facility] vs. SNF      Chief Complaint: \"fall\"    SUBJECTIVE    Prisca Boykin is has moderately improved and is unchanged since yesterday. Sleeping well. Pain controlled. OBJECTIVE  VITALS: Temp: Temp: 98.2 °F (36.8 °C)Temp  Av.8 °F (36.6 °C)  Min: 96.8 °F (36 °C)  Max: 98.4 °F (77.4 °C) BP Systolic (59KZB), BEU:218 , Min:112 , UQ   Diastolic (79GWD), YPC:30, Min:39, Max:55   Pulse Pulse  Av.4  Min: 68  Max: 86 Resp Resp  Av.9  Min: 11  Max: 16 Pulse ox SpO2  Av.1 %  Min: 92 %  Max: 100 %  GENERAL: alert, no distress  NEURO: Confused (baseline), GCS 14, No FND  HEENT: normocpehalic, atraumatic   : deferred  LUNGS: clear to ausculation, without wheezes, rales or rhonci  HEART: normal rate and regular rhythm  ABDOMEN: soft, non-tender, non-distended, bowel sounds present in all 4 quadrants, and no guarding or peritoneal signs present  EXTREMITY: no cyanosis, clubbing or edema. Mild tenderness to palpation L hip. Dressing placed. No saturation. I/O last 3 completed shifts: In: 450 [P.O.:450]  Out: 1420 [Urine:1320; Blood:100]    Drain/tube output:   In: 950 [P.O.:950]  Out: 720 [Urine:620]    LAB:  CBC:   Recent Labs     22  0453   WBC 10.5 12.7*  --  13.8*   HGB 12.8 11.9 10.0* 8.8*   HCT 39.7 37.1 32.5* 26.5*   MCV 94.7 96.4  --  95.3    235  --  190     BMP:   Recent Labs     22    141 137   K 4.3 4.6 4.6    104 102   CO2 26 27 25   BUN 22 18 27*   CREATININE 0.88 0.69 0.78   GLUCOSE 160* 123* 159*     COAGS:   Recent Labs     2204/22  0738   PROT 7.9 6.8   INR 1.1 1.0       RADIOLOGY:  CXR: n/a      Zuly Perla MD  22, 2:02 PM

## 2022-09-05 NOTE — PROGRESS NOTES
POST OP NOTE    SUBJECTIVE  Pt s/p CMN. .     OBJECTIVE  VITALS:  BP (!) 112/39   Pulse 75   Temp 98.2 °F (36.8 °C) (Oral)   Resp 11   LMP  (LMP Unknown)   SpO2 98%         GENERAL: alert, no distress  NEURO: Confused (baseline), GCS 14, No FND  HEENT: normocpehalic, atraumatic   : deferred  LUNGS: clear to ausculation, without wheezes, rales or rhonci  HEART: normal rate and regular rhythm  ABDOMEN: soft, non-tender, non-distended, bowel sounds present in all 4 quadrants, and no guarding or peritoneal signs present  EXTREMITY: no cyanosis, clubbing or edema. Mild tenderness to palpation L hip. Dressing placed. No saturation. INCISION: CDI    ASSESSMENT  1.  POD# 0 s/p 1    PLAN  -See progress note      Nicklas Lefort, MD  Trauma/Surgery Service  9/5/2022 at 2:12 PM

## 2022-09-06 LAB
ABSOLUTE EOS #: 0.59 K/UL (ref 0–0.4)
ABSOLUTE IMMATURE GRANULOCYTE: 0 K/UL (ref 0–0.3)
ABSOLUTE LYMPH #: 2.79 K/UL (ref 1–4.8)
ABSOLUTE MONO #: 0.59 K/UL (ref 0.1–0.8)
ANION GAP SERPL CALCULATED.3IONS-SCNC: 5 MMOL/L (ref 9–17)
BASOPHILS # BLD: 0 % (ref 0–2)
BASOPHILS ABSOLUTE: 0 K/UL (ref 0–0.2)
BUN BLDV-MCNC: 29 MG/DL (ref 8–23)
CALCIUM SERPL-MCNC: 7.6 MG/DL (ref 8.6–10.4)
CHLORIDE BLD-SCNC: 103 MMOL/L (ref 98–107)
CO2: 29 MMOL/L (ref 20–31)
CREAT SERPL-MCNC: 0.67 MG/DL (ref 0.5–0.9)
EKG ATRIAL RATE: 70 BPM
EKG ATRIAL RATE: 71 BPM
EKG P AXIS: 37 DEGREES
EKG P AXIS: 85 DEGREES
EKG P-R INTERVAL: 180 MS
EKG P-R INTERVAL: 192 MS
EKG Q-T INTERVAL: 430 MS
EKG Q-T INTERVAL: 432 MS
EKG QRS DURATION: 78 MS
EKG QRS DURATION: 88 MS
EKG QTC CALCULATION (BAZETT): 466 MS
EKG QTC CALCULATION (BAZETT): 467 MS
EKG R AXIS: -167 DEGREES
EKG R AXIS: -42 DEGREES
EKG T AXIS: 33 DEGREES
EKG T AXIS: 41 DEGREES
EKG VENTRICULAR RATE: 70 BPM
EKG VENTRICULAR RATE: 71 BPM
EOSINOPHILS RELATIVE PERCENT: 4 % (ref 1–4)
GFR AFRICAN AMERICAN: >60 ML/MIN
GFR NON-AFRICAN AMERICAN: >60 ML/MIN
GFR SERPL CREATININE-BSD FRML MDRD: ABNORMAL ML/MIN/{1.73_M2}
GLUCOSE BLD-MCNC: 119 MG/DL (ref 70–99)
HCT VFR BLD CALC: 22.7 % (ref 36.3–47.1)
HEMOGLOBIN: 7.7 G/DL (ref 11.9–15.1)
IMMATURE GRANULOCYTES: 0 %
LYMPHOCYTES # BLD: 19 % (ref 24–44)
MAGNESIUM: 2.2 MG/DL (ref 1.6–2.6)
MCH RBC QN AUTO: 32.2 PG (ref 25.2–33.5)
MCHC RBC AUTO-ENTMCNC: 33.9 G/DL (ref 28.4–34.8)
MCV RBC AUTO: 95 FL (ref 82.6–102.9)
MONOCYTES # BLD: 4 % (ref 1–7)
MORPHOLOGY: ABNORMAL
NRBC AUTOMATED: 0 PER 100 WBC
PDW BLD-RTO: 15.1 % (ref 11.8–14.4)
PLATELET # BLD: 183 K/UL (ref 138–453)
PMV BLD AUTO: 11.7 FL (ref 8.1–13.5)
POTASSIUM SERPL-SCNC: 4.2 MMOL/L (ref 3.7–5.3)
RBC # BLD: 2.39 M/UL (ref 3.95–5.11)
SEG NEUTROPHILS: 73 % (ref 36–66)
SEGMENTED NEUTROPHILS ABSOLUTE COUNT: 10.73 K/UL (ref 1.8–7.7)
SODIUM BLD-SCNC: 137 MMOL/L (ref 135–144)
WBC # BLD: 14.7 K/UL (ref 3.5–11.3)

## 2022-09-06 PROCEDURE — 85025 COMPLETE CBC W/AUTO DIFF WBC: CPT

## 2022-09-06 PROCEDURE — 6370000000 HC RX 637 (ALT 250 FOR IP): Performed by: STUDENT IN AN ORGANIZED HEALTH CARE EDUCATION/TRAINING PROGRAM

## 2022-09-06 PROCEDURE — 6370000000 HC RX 637 (ALT 250 FOR IP): Performed by: NURSE PRACTITIONER

## 2022-09-06 PROCEDURE — 80048 BASIC METABOLIC PNL TOTAL CA: CPT

## 2022-09-06 PROCEDURE — 2700000000 HC OXYGEN THERAPY PER DAY

## 2022-09-06 PROCEDURE — 97110 THERAPEUTIC EXERCISES: CPT

## 2022-09-06 PROCEDURE — 1200000000 HC SEMI PRIVATE

## 2022-09-06 PROCEDURE — 2060000000 HC ICU INTERMEDIATE R&B

## 2022-09-06 PROCEDURE — 6360000002 HC RX W HCPCS: Performed by: STUDENT IN AN ORGANIZED HEALTH CARE EDUCATION/TRAINING PROGRAM

## 2022-09-06 PROCEDURE — 83735 ASSAY OF MAGNESIUM: CPT

## 2022-09-06 PROCEDURE — 94761 N-INVAS EAR/PLS OXIMETRY MLT: CPT

## 2022-09-06 PROCEDURE — 36415 COLL VENOUS BLD VENIPUNCTURE: CPT

## 2022-09-06 PROCEDURE — 97530 THERAPEUTIC ACTIVITIES: CPT

## 2022-09-06 RX ORDER — SENNA PLUS 8.6 MG/1
1 TABLET ORAL NIGHTLY
Status: DISCONTINUED | OUTPATIENT
Start: 2022-09-06 | End: 2022-09-09

## 2022-09-06 RX ORDER — ENOXAPARIN SODIUM 100 MG/ML
30 INJECTION SUBCUTANEOUS 2 TIMES DAILY
DISCHARGE
Start: 2022-09-06 | End: 2022-09-06 | Stop reason: SDUPTHER

## 2022-09-06 RX ORDER — ENOXAPARIN SODIUM 100 MG/ML
30 INJECTION SUBCUTANEOUS 2 TIMES DAILY
Qty: 16.8 ML | Refills: 0 | DISCHARGE
Start: 2022-09-06 | End: 2022-09-09 | Stop reason: HOSPADM

## 2022-09-06 RX ORDER — SENNA PLUS 8.6 MG/1
1 TABLET ORAL 2 TIMES DAILY
Qty: 60 TABLET | Refills: 0 | DISCHARGE
Start: 2022-09-06 | End: 2022-09-09 | Stop reason: HOSPADM

## 2022-09-06 RX ADMIN — OXYCODONE 2.5 MG: 5 TABLET ORAL at 20:00

## 2022-09-06 RX ADMIN — ACETAMINOPHEN 1000 MG: 500 TABLET ORAL at 22:03

## 2022-09-06 RX ADMIN — ENOXAPARIN SODIUM 30 MG: 100 INJECTION SUBCUTANEOUS at 20:00

## 2022-09-06 RX ADMIN — Medication 81 MG: at 08:22

## 2022-09-06 RX ADMIN — ACETAMINOPHEN 1000 MG: 500 TABLET ORAL at 06:43

## 2022-09-06 RX ADMIN — SENNOSIDES 8.6 MG: 8.6 TABLET, COATED ORAL at 20:00

## 2022-09-06 RX ADMIN — ENOXAPARIN SODIUM 30 MG: 100 INJECTION SUBCUTANEOUS at 08:21

## 2022-09-06 RX ADMIN — AMLODIPINE BESYLATE 2.5 MG: 2.5 TABLET ORAL at 08:22

## 2022-09-06 RX ADMIN — ACETAMINOPHEN 1000 MG: 500 TABLET ORAL at 13:57

## 2022-09-06 ASSESSMENT — PAIN SCALES - GENERAL
PAINLEVEL_OUTOF10: 0
PAINLEVEL_OUTOF10: 0
PAINLEVEL_OUTOF10: 10
PAINLEVEL_OUTOF10: 5

## 2022-09-06 ASSESSMENT — PAIN DESCRIPTION - LOCATION: LOCATION: HIP

## 2022-09-06 NOTE — PROGRESS NOTES
Orthopedic Progress Note    Patient:  Steve Ames  YOB: 1930     80 y.o. female    Subjective:  Patient seen and examined  No complaints or concerns  No issue overnight per patient and nursing  Pain controlled  Denies fever, HA, CP, SOB, N/V, dysuria  Has not worked with PT yet    Vitals reviewed    Objective:   Vitals:    09/06/22 0435   BP: 110/84   Pulse: 67   Resp: 12   Temp: 97.8 °F (36.6 °C)   SpO2: 90%       Gen: NAD, cooperative    Cardiovascular: Regular rate    Respiratory: Chest symmetric, no accessory muscle use, normal respirations, no audible wheezes    MSK:   LLE: Dressings on. Some mild saturation to the superior dressing. This was taken down. Skin well approximated with staples. No erythema, drainage, or sign of infection. Mild tenderness to palpation at the hip. Compartments soft. EHL/FHL/TA/GSC motor intact. Sensation intact to sural/saph/SPN/DPN distribution. DP/PT pulses 2+. Recent Labs     09/04/22  0738 09/04/22 2024 09/05/22  0453 09/05/22  1715   WBC 12.7*  --  13.8*  --    HGB 11.9   < > 8.8* 8.1*   HCT 37.1   < > 26.5* 25.6*     --  190  --    INR 1.0  --   --   --      --  137  --    K 4.6  --  4.6  --    BUN 18  --  27*  --    CREATININE 0.69  --  0.78  --    GLUCOSE 123*  --  159*  --     < > = values in this interval not displayed. See rec for complete list    Impression/plan: 80 y.o. female who [address], being seen for:    -Left IT fracture s/p CMN POD#2    -Dressings on. Please maintain. -WB status: WBAT LLE  -Pain control per primary team discretion. -DVT ppx: Ok for chemical AC from orthopedic perspective. Recommend ASA 81 BID for 4 weeks.   -Ice/Elevate for pain/edema control  -Encourage Incentive Spirometry use  -Encourage PT/OT  -Follow up with Dr. Beryle Hand in 14 days  -Please page ortho with any questions      Calixto Yee DO   Orthopedic Surgery Resident PGY-2  [address] \[facility] [place] [facility]\""

## 2022-09-06 NOTE — PROGRESS NOTES
[facility]  that family is requesting PMR consult for pt. They report she was very active prior to her fall and are interested in rehab prior to returning to her assisted living facility. Trauma Team notified of request.  PT/OT asked to work with pt first thing in am to have notes available.

## 2022-09-06 NOTE — PROGRESS NOTES
PROGRESS NOTE          PATIENT NAME: Evi Ireland  MEDICAL RECORD NO. [id number]  DATE: 9/6/2022  SURGEON: Grace Aggarwal MD  PRIMARY CARE PHYSICIAN: Ambrose Almanzar MD    HD: # 3    ASSESSMENT    Patient Active Problem List   Diagnosis    Dyslipidemia    Osteopenia    Impaired fasting glucose    Adenomatous polyp    Essential hypertension    Hypothyroidism    Sciatica    GERD (gastroesophageal reflux disease)    Visual disturbance    Malignant melanoma in situ ([address]    Difficulty controlling anger    Primary open angle glaucoma (POAG) of both eyes, moderate stage    PVD (posterior vitreous detachment), both eyes    Combined forms of age-related cataract of both eyes    Dementia with behavioral disturbance (HCC)    Tremor    Essential tremor    Dizziness    Chronic cerebral ischemia    Acquired cerebral atrophy ([address]    Cerebral infarction ([address]    Anxiety    Atrial fibrillation ([address]    Closed displaced intertrochanteric fracture of left femur (HCC)    Acute ST segment elevation myocardial infarction ([address]    Heart murmur    Migraine    Female bladder prolapse    Falls    Hiatal hernia    Hypercholesterolemia    Coronary arteriosclerosis    Cataract    Closed right hip fracture, initial encounter ([address]    Heart failure, unspecified HF chronicity, unspecified heart failure type ([address]    Chronic renal disease, stage III ([address] [[id number]]    Pseudophakia of both eyes    Nondisp intertroch fx left femur, init for opn fx type I/2 [facility])       MEDICAL DECISION MAKING AND PLAN    Traumatic, closed L intertrochanteric fracture of femur with subtrochanteric extension  Acute blood loss anemia  Acute pain  CMN 9/4/22  WBAT LLE  ASA 81mg qd  F/u Dr. Brittani Acuña 9/21/22  H/o STEMI, HFpEF, HTN, HLD  Hold Brillinta  Norvasc restarted  ASA 81mg qd  Cards sign off 9/5/22    Not doing well with PT- likely needs skilled facility as opposed to assisted living at this time.     Chief Complaint: \"fall\"    Josh Sick is has

## 2022-09-06 NOTE — PROGRESS NOTES
I was paged about the patient having numbness to her right hand. I came to bedside to assess- the patient stating she has no complaints. She is denying any paresthesias. She complains of left hip discomfort, for which she is currently POD 2. She otherwise states she feels fine. Vitals:    09/06/22 1515   BP: (!) 127/47   Pulse: 79   Resp: 17   Temp: 97.7 °F (36.5 °C)   SpO2: 96%     Examination shows no Cspine or shoulder tenderness. She has normal b/l UE ROM. Radial pulses are intact bilaterally. Capillary refill <2 seconds bilaterally. She has 5/5  strength, wrist flexion and extension. She can tell the difference b/w soft and sharp touch at distal fingers. Spoke with ELZA Mathews. We will monitor sx.

## 2022-09-06 NOTE — CARE COORDINATION
Transitional planning note: met with patient and son to discuss transitional planning. Patient and son are interested in acute rehab prior to returning to her assisted living facility. They report patient was previously very active and is very motivated to get therapy and return home. Message to trauma to request PM&R consult.  Patient and family would like referral to [facility]    [id number]: PM&R consult order placed per trauma

## 2022-09-06 NOTE — PROGRESS NOTES
Physician Progress Note      Anant Zavaleta  [facility] #:                  [id number]  :                       3/21/1930  ADMIT DATE:       9/3/2022 3:13 PM  [address] [place] DATE:  RESPONDING  PROVIDER #:        Angella MUJICA          QUERY TEXT:    Pt admitted with left femur fracture Pt noted to have hgb of  12.9 down to 7/7   on . If possible please clarify one of the following: The medical record reflects the following:  Risk Factors: age, femur fracture, surgery  Clinical Indicators: admitted with left femur fracture Pt noted to have hgb of    12.9 down to 7/7 on   Treatment: lab monitoring    Thank [address]  Email [email or web address] [phone]  office hours M-F 6am to 2:30pm  Options provided:  -- Acute blood loss anemia  -- Acute on chronic blood loss anemia  -- Precipitous drop in Hemoglobin and Hematocrit  -- Other - I will add my own diagnosis  -- Disagree - Not applicable / Not valid  -- Disagree - Clinically unable to determine / Unknown  -- Refer to Clinical Documentation Reviewer    PROVIDER RESPONSE TEXT:    This patient has acute blood loss anemia. Query created by:  Nilesh Graham on 2022 7:48 AM      Electronically signed by:  Ken Lew 2022 7:54 AM

## 2022-09-06 NOTE — PROGRESS NOTES
Physical Therapy  Facility/Department: [facility] CAR 2- STEPDOWN  Physical Therapy Initial Assessment    Name: Gerry Ruff  : 3/21/1930  MRN: [id number]  Date of Service: 2022    Discharge Recommendations:  Patient would benefit from continued therapy after discharge, Patient able to tolerate 3hrs of therapy a day          Patient Diagnosis(es): The encounter diagnosis was Closed displaced intertrochanteric fracture of left femur, initial encounter ([facility] [address] Past Medical History:  has a past medical history of Adenomatous polyp, Cataracts, bilateral, Combined forms of age-related cataract of both eyes, Corneal scar, right eye, Cystocele, Dementia ([address] Difficulty controlling anger, Dyslipidemia, GERD (gastroesophageal reflux disease), Goiter, Hiatal hernia, Hypertension, Hypokalemia, Hypothyroidism, Impaired fasting glucose, Malignant melanoma in situ ([address] Migraines, Murmur, functional, Osteopenia, Posterior vitreous detachment of both eyes, Primary open angle glaucoma (POAG) of both eyes, moderate stage, Sciatica, Skin cancer, Syncope and collapse, Tremor, 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 Past Surgical History:  has a past surgical history that includes other surgical history (2008); Cholecystectomy; Colonoscopy (2010); Mohs surgery (10/07/2009); Vein Surgery (2009); Colonoscopy (2002); Cystocele repair (1999); Appendectomy; Dilation and curettage of uterus; other surgical history (1988); Hysterectomy (1995); Upper gastrointestinal endoscopy (2015); Intracapsular cataract extraction (Left, 2020); Intracapsular cataract extraction (Right, 10/08/2020); Hip fracture surgery (Right, 2021); Femur fracture surgery (Left, 2022); and Femur fracture surgery (Left, 2022).     Assessment   Assessment: Pt demo progress this daet with fuctional mobility, requiring overall MOD A x2 for bed mobs and transfers, Stood x4mins in [employer] weight shifting , Pt very motivated and would benefit from instense therapy to address deficits . Therapy Prognosis: Fair  Requires PT Follow-Up: Yes  Activity Tolerance  Activity Tolerance: Patient limited by fatigue;Patient limited by pain; Patient limited by endurance     Plan   Plan  Plan: 6-7 times per week (1-2x/day)  Current Treatment Recommendations: Strengthening, ROM, Balance training, Functional mobility training, Transfer training, Endurance training, Return to work related activity, Home exercise program, Safety education & training, Patient/Caregiver education & training, Positioning, Wound care, Therapeutic activities  Safety Devices  Type of Devices: Left in chair, Patient at risk for falls, Chair alarm in place, Gait belt, Call light within reach, Nurse notified  Restraints  Restraints Initially in Place: No     Restrictions  Restrictions/Precautions  Restrictions/Precautions: Weight Bearing  Implants present? :  (B Hip  surgery)  Lower Extremity Weight Bearing Restrictions  Left Lower Extremity Weight Bearing: Weight Bearing As Tolerated  Position Activity Restriction  Other position/activity restrictions: S/P Left Hip CMN 9/4/22. Subjective   General  Family / Caregiver Present: Yes (Son in room t/o)  Follows Commands: Impaired  Other (Comment): Pt has demantis, follows simple, one step commands. Subjective  Subjective: RN and pt agreeabnle to PT. Pt supine in bed upon arrival , pleasant and cooperative t/o . denies pain at rest but complaing of soreness with Mobility . Cognition   Orientation  Overall Orientation Status: Impaired  Cognition  Overall Cognitive Status: Exceptions  Following Commands: Follows one step commands with increased time; Follows one step commands with repetition  Attention Span: Difficulty attending to directions; Difficulty dividing attention  Memory: Decreased recall of biographical Information;Decreased recall of precautions;Decreased recall of recent events;Decreased short term memory;Decreased long term memory  Safety Judgement: Decreased awareness of need for assistance;Decreased awareness of need for safety  Problem Solving: Assistance required to identify errors made;Assistance required to correct errors made;Assistance required to generate solutions;Assistance required to implement solutions  Insights: Decreased awareness of deficits  Initiation: Requires cues for all  Sequencing: Requires cues for all  Cognition Comment: Pt has demantia     Objective    Bed mobility  Supine to Sit: Moderate assistance;2 Person assistance  Sit to Supine: Moderate assistance;2 Person assistance  Scooting: Moderate assistance  Bed Mobility Comments: Pt demo improved effort this date . Max cueing for sequencing. Transfers  Sit to Stand: Moderate Assistance;2 Person Assistance  Stand to sit: Moderate Assistance;2 Person Assistance  Bed to Chair: Dependent/Total (SS)  Comment: Stood x~4mins ON SS weight shifting to facilitate gait training . Ambulation  WB Status: WBAT L LE     Balance  Posture: Fair  Sitting - Static: Fair  Sitting - Dynamic: Fair;-  Standing - Static: Poor;+  Standing - Dynamic: Poor;+  Comments: STood x4mins weight shifting , with WBAT on LLE. PROM Exercises: L LE- LAQ's, acitve Ankle pumps, rocking forward while seated in chair, to faciliate sit<>stand  A/AROM Exercises: LQQ x5 reps , ankles pumps x5 reps . marching x5 reps  Heel slides x5 reps . Constant tactile and Verbals cues for proper technique. AM-PAC Score  AM-PAC Inpatient Mobility Raw Score : 10 (09/06/22 1612)  -PAC Inpatient T-Scale Score : 32.29 (09/06/22 1612)  Mobility Inpatient CMS 0-100% Score: 76.75 (09/06/22 1612)  Mobility Inpatient CMS G-Code Modifier : CL (09/06/22 1612)   Goals  Short Term Goals  Time Frame for Short term goals: 6 to 7 visits.   Short term goal 1: Pt able to perform supine<>sit mod A x 2  Short term goal 2: Pt able to perform sit<>stnad at mod A x 2 with bimal stedy or rolling walker  Short term goal 3: Pt to improve standing tolernace in bimal stedy for 2 to 4 minutes to improve WB tolerance to L LE  Short term goal 4: Pt able to tolerate ROM ex's L LE to improve Hip/knee ROM to facilate sit<>stand with increased independence. Patient Goals   Patient goals : family's goal-improve mobility, pt able to walk       Education  Patient Education  Education Given To: Family; Patient  Education Provided: Role of Therapy;Plan of Care;Transfer Training;Precautions; Family Education;Home Exercise Program;IADL Safety  Education Provided Comments: /surroundings.   Education Method: Verbal  Barriers to Learning: Cognition  Education Outcome: Continued education needed      Therapy Time   Individual Concurrent Group Co-treatment   Time In 1528         Time Out 1606         Minutes 38         Timed Code Treatment Minutes: Cali Savaedra PTA

## 2022-09-06 NOTE — CARE COORDINATION
SBIRT deferred due to pt hx of dementia and unable to complete assessment.          Alcohol Screening and Brief Intervention        Deferred [x]    Completed on: 9/6/2022   BASIA Queen

## 2022-09-06 NOTE — DISCHARGE INSTR - COC
Continuity of Care Form    Patient Name: John Fuchs   :  3/21/1930  MRN:  [id number]    Admit date:  9/3/2022  Discharge date:  2022    Code Status Order: DNR-CCA   Advance Directives:     Admitting Physician:  Rajiv Kaur MD  PCP: Josie Fish MD    Discharging Nurse: [address] Unit/Room#:   Discharging Unit Phone Number: [phone]    Emergency Contact:   Extended Emergency Contact Information  Primary Emergency Contact: [facility] [address] [facility] Phone: [phone]  Relation: Child  Secondary Emergency Contact: Sparkle Roberts   [address] Phone: [phone]  Relation: Child    Past Surgical History:  Past Surgical History:   Procedure Laterality Date    APPENDECTOMY      CHOLECYSTECTOMY      Remote. COLONOSCOPY  2010    COLONOSCOPY  2002    CYSTOCELE REPAIR  1999    Vaginal prolapse, cystocele plus pelvic relaxation. DILATION AND CURETTAGE OF UTERUS      with hysteroscopy    FEMUR FRACTURE SURGERY Left 2022    LEFT TFNA NAIL INSERTION    HIP FRACTURE SURGERY Right 2021    Right Hip Intertan performed by Riana Ashton MD at [address] [place] [address] ([facility])  1995    still has ovaries     INTRACAPSULAR CATARACT EXTRACTION Left 2020    Left Cataract Extraction w/ IOL Implant performed by Josef Sumner MD at [place] [address] Right 10/08/2020    Right Cataract Extraction w/ IOL Implant performed by Josef Sumner MD at [place] [address]  10/07/2009    [facility], left nasal sidewall. OTHER SURGICAL HISTORY  2008    Anterior repair. OTHER SURGICAL HISTORY  1988    laser cone    UPPER GASTROINTESTINAL ENDOSCOPY  2015    hiatal hernia    VEIN SURGERY  2009    Laser ablation of right greater saphenous vein.         Immunization History:   Immunization History   Administered Date(s) Administered COVID-19, PFIZER PURPLE top, DILUTE for use, (age 15 y+), 30mcg/0.3mL 01/16/2021, 02/06/2021, 10/27/2021    DT (pediatric) 11/05/2004    Influenza Virus Vaccine 10/29/2010, 10/12/2011, 10/10/2012    Influenza, High Dose (Fluzone 65 yrs and older) 09/11/2013, 11/04/2014, 09/30/2015, 10/11/2016, 10/18/2017, 08/28/2018, 08/16/2020    Influenza, MDCK, Preservative free 10/04/2014    Influenza, Triv, inactivated, subunit, adjuvanted, IM (Fluad 65 yrs and older) 10/01/2019, 10/21/2019, 10/15/2020    Pneumococcal Conjugate 13-valent (Achzunb29) 05/07/2015    Pneumococcal Polysaccharide (Illqaqiae42) 10/28/1998, 09/03/2008, 10/04/2011, 04/16/2020    Tdap (Boostrix, Adacel) 07/22/2015    Zoster Live (Zostavax) 05/23/2008    Zoster Recombinant (Shingrix) 07/24/2018, 11/06/2018       Active Problems:  Patient Active Problem List   Diagnosis Code    Dyslipidemia E78.5    Osteopenia M85.80    Impaired fasting glucose R73.01    Adenomatous polyp D36.9    Essential hypertension I10    Hypothyroidism E03.9    Sciatica M54.30    GERD (gastroesophageal reflux disease) K21.9    Visual disturbance H53.9    Malignant melanoma in situ (HCC) D03.9    Difficulty controlling anger R45.4    Primary open angle glaucoma (POAG) of both eyes, moderate stage H40.1132    PVD (posterior vitreous detachment), both eyes H43.813    Combined forms of age-related cataract of both eyes H25.813    Dementia with behavioral disturbance (HCC) F03.91    Tremor R25.1    Essential tremor G25.0    Dizziness R42    Chronic cerebral ischemia I67.82    Acquired cerebral atrophy (HCC) G31.9    Cerebral infarction ([address] I63.9    Anxiety F41.9    Atrial fibrillation (HCC) I48.91    Closed displaced intertrochanteric fracture of left femur ([address] S72.142A    Acute ST segment elevation myocardial infarction ([address] I21.3    Heart murmur R01.1    Migraine G43.909    Female bladder prolapse N81.10    Falls W19. XXXA    Hiatal hernia K44.9    Hypercholesterolemia E78.00 Coronary arteriosclerosis I25.10    Cataract H26.9    Closed right hip fracture, initial encounter ([facility] S72.001A    Heart failure, unspecified HF chronicity, unspecified heart failure type ([facility] I50.9    Chronic renal disease, stage III ([facility] [[id number]] N18.30    Pseudophakia of both eyes Z96.1    Nondisp intertroch fx left femur, init for opn fx type I/2 ([facility] S72.145B       Isolation/Infection:   Isolation            No Isolation          Patient Infection Status       Infection Onset Added Last Indicated Last Indicated By Review Planned Expiration Resolved Resolved By    None active    Resolved    COVID-19 (Rule Out) 12/24/20 12/24/20 12/24/20 COVID-19 (Ordered)   12/24/20 Rule-Out Test Resulted    COVID-19 (Rule Out) 09/30/20 09/30/20 09/30/20 COVID-19 (Ordered)   09/30/20 Rule-Out Test Resulted    COVID-19 (Rule Out) 08/20/20 08/20/20 08/20/20 COVID-19 Ambulatory (Ordered)   08/22/20 Rule-Out Test Resulted            Nurse Assessment:  Last Vital Signs: /84   Pulse 67   Temp 97.8 °F (36.6 °C) (Oral)   Resp 12   LMP  (LMP Unknown)   SpO2 90%     Last documented pain score (0-10 scale): Pain Level: 3  Last Weight:   Wt Readings from Last 1 Encounters:   09/03/22 145 lb (65.8 kg)     Mental Status:  disoriented, alert, logical, and thought processes intact    IV Access:  - None    Nursing Mobility/ADLs:  Walking   Dependent  Transfer  Dependent  Bathing  Dependent  Dressing  Assisted  Toileting  Dependent  Feeding  Assisted  Med Admin  Assisted  Med Delivery   whole    Wound Care Documentation and Therapy:  Incision 09/04/22 Femoral Anterior; Left (Active)   Dressing Status Clean;Dry; Intact 09/05/22 2000   Dressing/Treatment Foam 09/05/22 2000   Drainage Amount Scant 09/04/22 1615   Odor None 09/04/22 1615   Number of days: 1       Incision 09/04/22 Hip Mid (Active)   Dressing Status Clean;Dry; Intact 09/05/22 2000   Incision Assessment Other (Comment) 09/05/22 2000   Number of days: 1       Incision 11/02/21 Hip Right;Lateral (Active)   Number of days: 307        Elimination:  Continence: Bowel: Yes  Bladder: No  Urinary Catheter: None   Colostomy/Ileostomy/Ileal Conduit: No       Date of Last BM: 09/09/2022    Intake/Output Summary (Last 24 hours) at 9/6/2022 0841  Last data filed at 9/5/2022 1600  Gross per 24 hour   Intake 500 ml   Output --   Net 500 ml     I/O last 3 completed shifts: In: 1150 [P.O.:1150]  Out: -     Safety Concerns:     History of Falls (last 30 days) and At Risk for Falls    Impairments/Disabilities:      Vision    Nutrition Therapy:  Current Nutrition Therapy:   - Oral Diet:  General  - Oral Nutrition Supplement:  Low Calorie High Protein  three times a day    Routes of Feeding: Oral  Liquids: Thin Liquids  Daily Fluid Restriction: no  Last Modified Barium Swallow with Video (Video Swallowing Test): not done    Treatments at the Time of Hospital Discharge:   Respiratory Treatments: n/a  Oxygen Therapy:  is not on home oxygen therapy.   Ventilator:    - No ventilator support    Rehab Therapies: Physical Therapy and Occupational Therapy  Weight Bearing Status/Restrictions: No weight bearing restrictions  Other Medical Equipment (for information only, NOT a DME order):  wheelchair and bedside commode  Other Treatments: bimal steady for transfer    Patient's personal belongings (please select all that are sent with patient):  Glasses, Dentures upper    RN SIGNATURE:  Electronically signed by Kely Oconnell RN on 9/9/22 at 2:22 PM EDT    CASE MANAGEMENT/SOCIAL WORK SECTION    Inpatient Status Date: ***    Readmission Risk Assessment Score:  Readmission Risk              Risk of Unplanned Readmission:  31           Discharging to Facility/ Agency   Name:   Address:  Phone:  Fax:    Dialysis Facility (if applicable)   Name:  Address:  Dialysis Schedule:  Phone:  Fax:    / signature: {Esignature:[id number]}    PHYSICIAN SECTION    Prognosis: Good    Condition at Discharge: Stable    Rehab Potential (if transferring to Rehab): Good    Recommended Labs or Other Treatments After Discharge: ***    Physician Certification: I certify the above information and transfer of Neyda Ortiz  is necessary for the continuing treatment of the diagnosis listed and that she requires Home Care for less 30 days.      Update Admission H&P: No change in H&P    PHYSICIAN SIGNATURE:  Electronically signed by FELICIA Del Valle CNP on 9/6/22 at 2:56 PM EDT

## 2022-09-06 NOTE — PROGRESS NOTES
Pt reported right index and second digit feeling numb during bedside report. Pt stated this was new for her. Writer repositioned pt without effect. MD notified and came to bedside to assess pt.

## 2022-09-07 PROCEDURE — 6370000000 HC RX 637 (ALT 250 FOR IP): Performed by: STUDENT IN AN ORGANIZED HEALTH CARE EDUCATION/TRAINING PROGRAM

## 2022-09-07 PROCEDURE — 97535 SELF CARE MNGMENT TRAINING: CPT

## 2022-09-07 PROCEDURE — 99222 1ST HOSP IP/OBS MODERATE 55: CPT | Performed by: PHYSICAL MEDICINE & REHABILITATION

## 2022-09-07 PROCEDURE — 6370000000 HC RX 637 (ALT 250 FOR IP): Performed by: NURSE PRACTITIONER

## 2022-09-07 PROCEDURE — 97166 OT EVAL MOD COMPLEX 45 MIN: CPT

## 2022-09-07 PROCEDURE — 1200000000 HC SEMI PRIVATE

## 2022-09-07 PROCEDURE — 6360000002 HC RX W HCPCS: Performed by: STUDENT IN AN ORGANIZED HEALTH CARE EDUCATION/TRAINING PROGRAM

## 2022-09-07 PROCEDURE — 97530 THERAPEUTIC ACTIVITIES: CPT

## 2022-09-07 PROCEDURE — 2060000000 HC ICU INTERMEDIATE R&B

## 2022-09-07 RX ADMIN — Medication 81 MG: at 08:12

## 2022-09-07 RX ADMIN — AMLODIPINE BESYLATE 2.5 MG: 2.5 TABLET ORAL at 08:12

## 2022-09-07 RX ADMIN — OXYCODONE 2.5 MG: 5 TABLET ORAL at 20:28

## 2022-09-07 RX ADMIN — POLYETHYLENE GLYCOL 3350 17 G: 17 POWDER, FOR SOLUTION ORAL at 09:54

## 2022-09-07 RX ADMIN — ACETAMINOPHEN 1000 MG: 500 TABLET ORAL at 06:09

## 2022-09-07 RX ADMIN — ENOXAPARIN SODIUM 30 MG: 100 INJECTION SUBCUTANEOUS at 09:00

## 2022-09-07 RX ADMIN — ACETAMINOPHEN 1000 MG: 500 TABLET ORAL at 20:29

## 2022-09-07 RX ADMIN — SENNOSIDES 8.6 MG: 8.6 TABLET, COATED ORAL at 20:29

## 2022-09-07 RX ADMIN — ENOXAPARIN SODIUM 30 MG: 100 INJECTION SUBCUTANEOUS at 20:29

## 2022-09-07 RX ADMIN — ACETAMINOPHEN 1000 MG: 500 TABLET ORAL at 14:00

## 2022-09-07 ASSESSMENT — PAIN SCALES - WONG BAKER
WONGBAKER_NUMERICALRESPONSE: 0
WONGBAKER_NUMERICALRESPONSE: 0

## 2022-09-07 ASSESSMENT — PAIN SCALES - GENERAL
PAINLEVEL_OUTOF10: 4
PAINLEVEL_OUTOF10: 0
PAINLEVEL_OUTOF10: 4
PAINLEVEL_OUTOF10: 0

## 2022-09-07 ASSESSMENT — PAIN DESCRIPTION - LOCATION: LOCATION: HIP

## 2022-09-07 NOTE — CONSULTS
Physical Medicine & Rehabilitation  Consult Note      Admitting Physician:   Onel Mcintosh MD    Primary Care Provider:   Mateus Cabral MD     Reason for Consult:  Acute Inpatient Rehabilitation    Chief Complaint: Fall    History of Present Illness:  Referring Provider is requesting an evaluation for appropriate placement upon discharge from acute care. History from chart review and patient/son. Neyda Ortiz is a 80 y.o. RHD female admitted to [facility] on 9/3/2022. Patient admitted from her Assisted Living apartment after mechanical fall from standing height. She had L hip pain. She denied hitting her head or LOC. She is on Brilinta. Initial GCS 15. Orthopedics consulted for L IT fracture. Dr. Jaz Minor performed L CMN fixation on 9/4/22. WBAT LLE. Recommending DVT prophylaxis x 4 weeks with ASA 81 mg BID then can resume daily home dose. Cardiology consulted for hx STEMI 2020 treated with PCI, on Brilinta and ASA. Recommending resuming Brilinta and ASA when approved by ortho. Restarted amlodipine for poor BP control.  For outpatient follow up with her primary cardiologist.    Review of Systems:  Constitutional: negative for anorexia, chills, fatigue, fevers, sweats and weight loss  Eyes: negative for redness and visual disturbance  Ears, nose, mouth, throat, and face: negative for earaches, sore throat and tinnitus  Respiratory: negative for cough and shortness of breath  Cardiovascular: negative for chest pain, dyspnea and palpitations  Gastrointestinal: negative for abdominal pain, change in bowel habits, constipation, nausea and vomiting  Genitourinary:negative for dysuria, frequency, hesitancy and urinary incontinence  Integument/breast: negative for pruritus and rash  Musculoskeletal: positive for stiff joints  Neurological: negative for dizziness, headaches   Behavioral/Psych: negative for decreased appetite, depression        Premorbid function:  Modified independent with rollator    Current function:  Restrictions/ Precautions-  Restrictions/Precautions  Restrictions/Precautions: Weight Bearing  Implants present? :  (B Hip  surgery)    PT:    Bed Mobility-  Bed mobility  Supine to Sit: Moderate assistance;2 Person assistance  Sit to Supine: Moderate assistance;2 Person assistance  Scooting: Moderate assistance  Bed Mobility Comments: Pt demo improved effort this date . Max cueing for sequencing. Bed Mobility Training  Bed Mobility Training: Yes  Overall Level of Assistance: Maximum assistance, Assist X2 (MAX assist x2 for bed mobility for BLE progression and trunk progression. Pt assisted with trunk and RLE with use of grab bars. MAX VC's for engagement)  Interventions: Manual cues, Safety awareness training, Verbal cues, Tactile cues, Visual cues  Supine to Sit: Maximum assistance, Additional time, Assist X2     Transfers-  Transfers  Sit to Stand: Moderate Assistance;2 Person Assistance  Stand to sit: Moderate Assistance;2 Person Assistance  Bed to Chair: Dependent/Total (SS)  Comment: Stood x~4mins ON SS weight shifting to facilitate gait training . Transfer Training  Transfer Training: Yes  Overall Level of Assistance: Moderate assistance, Assist X2, Additional time (MOD assist x2 for sit to stand transfers x3, difficulty achieving full erect posture.)  Interventions: Visual cues, Verbal cues, Safety awareness training, Manual cues  Sit to Stand: Moderate assistance, Assist X2  Stand to Sit: Moderate assistance, Assist X2, Additional time  Bed to Chair: Total assistance (TOTAL assist with use of bimal stedy)     Ambulation-  Ambulation  Comments: Not ready for ambulation.            OT:   ADLS-   ADL  Feeding: Modified independent , Setup  Grooming: Stand by assistance, Verbal cueing, Increased time to complete  UE Bathing: Stand by assistance, Verbal cueing, Increased time to complete  LE Bathing: Increased time to complete, Verbal cueing, Maximum assistance, Setup  UE Dressing: Minimal assistance, Increased time to complete, Verbal cueing, Setup  LE Dressing: Maximum assistance, Increased time to complete, Verbal cueing, Setup  Toileting: Maximum assistance, Increased time to complete, Setup  Toileting Skilled Clinical Factors: Pt required TOTAL assist for brief change and management due to extensive cleaning required. Otherwise MAX assist required for clothing management and hygiene when seated on toilet     SLP:      Past Medical History:        Diagnosis Date    Adenomatous polyp 06/10    With recommendation for repeat 06/15. Also, diverticulosis was noted. Cataracts, bilateral     Combined forms of age-related cataract of both eyes 10/1/2018    Corneal scar, right eye     Cystocele     history of     Dementia ([facility] [address]     Mini-Mental Status exam 27/30 6/14  declines meds at this point,  MMSE 29/30 on June 13, 2017  MMSE 26/30 4/2018    Difficulty controlling anger 9/16/2018    Dyslipidemia     GERD (gastroesophageal reflux disease)     egd  4/15 ok    Goiter     benign, history of     Hiatal hernia     Hypertension     Hypokalemia     Hypothyroidism     Impaired fasting glucose     Malignant melanoma in situ ([facility] [address]     R upper Back 8/17    Migraines     Murmur, functional     Grade 1/6 systolic, history of     Osteopenia     T -1.0 hip, 03/09, T -0.3 spine, 03/09. 1) Repeat DEXA scan 09/12 with T +0.15, T -1.2 at the hip but -1.8 at the mean femoral neck giving her a FRAX score of 4.3 at the hip. Reclast 10/13 and given 2014,2015, and 1/4/2017, on calcium and vit d,  Redo Dexa 10/14 Frax 4.2% , Frax 4.8% 10 yr hip fracture risk on Dexa 1/17  On Reclast    Posterior vitreous detachment of both eyes     Primary open angle glaucoma (POAG) of both eyes, moderate stage 10/1/2018    Sciatica 12/15/2015    Skin cancer     History of multiple skin cancers.  Following with Dr. Alexys Mendez--- invasive squamous cell Ca 8/17 L forearm    Syncope and collapse 1/21/2015    Tremor 12/23/2018 immediate release tablet 2.5 mg, 2.5 mg, Oral, Q6H PRN  polyethylene glycol (GLYCOLAX) packet 17 g, 17 g, Oral, Daily    Social History:  Lives With: Alone (Spouse passed away month ago.)  Type of Home: Assisted living  Home Layout: One level  Home Access: Level entry  Bathroom Shower/Tub: Walk-in shower, Curtain  Bathroom Toilet: Handicap height  Bathroom Equipment: Grab bars in shower, Tub transfer bench, Hand-held shower (Grab bar on right side)  Bathroom Accessibility: Accessible, Walker accessible  Home Equipment: Caye Orn, 4 wheeled, Lift chair  Has the patient had two or more falls in the past year or any fall with injury in the past year?: Yes  Receives Help From: Personal care attendant  ADL Assistance:  (Assist with shower from care givers)  Homemaking Assistance: Needs assistance  Ambulation Assistance: Independent (Pt does not like to use a walker, per son she needs to use one but does not use.)  Transfer Assistance: Independent  Active : No  Patient's  Info: facility provides transportation. Additional Comments: facility provides meals, laundry and cleaning services. Son present to answer question. Pt has demantia. Per son- family prefers pt to go back to assisted living and they will hire extra help and wants therapy at assisted living facility. Social History     Socioeconomic History    Marital status:       Spouse name: Maury Chavez    Number of children: 3    Years of education: None    Highest education level: None   Tobacco Use    Smoking status: Never    Smokeless tobacco: Never   Vaping Use    Vaping Use: Never used   Substance and Sexual Activity    Alcohol use: Not Currently    Drug use: No    Sexual activity: Not Currently     Social Determinants of Health     Financial Resource Strain: Low Risk     Difficulty of Paying Living Expenses: Not hard at all   Food Insecurity: No Food Insecurity    Worried About [address] in the Last Year: Never true    Ran Out of Food in the Last Year: Never true   Transportation Needs: No Transportation Needs    Lack of Transportation (Medical): No    Lack of Transportation (Non-Medical): No   Physical Activity: Inactive    Days of Exercise per Week: 0 days    Minutes of Exercise per Session: 0 min   Stress: No Stress Concern Present    Feeling of Stress : Not at all   Social Connections: Moderately Isolated    Frequency of Communication with Friends and Family: Three times a week    Frequency of Social Gatherings with Friends and Family: Three times a week    Attends [employer] Services: More than 4 times per year    Active Member of Clubs or Organizations: No    Attends Club or Organization Meetings: Never    Marital Status:    Intimate Partner Violence: Not At Risk    Fear of Current or Ex-Partner: No    Emotionally Abused: No    Physically Abused: No    Sexually Abused: No   Housing Stability: Unknown    Unable to Pay for Housing in the Last Year: No    Unstable Housing in the Last Year: No       Family History:       Problem Relation Age of Onset    Coronary Art Dis Father     Colon Cancer Brother     Diabetes Neg Hx     Cataracts Neg Hx     Glaucoma Neg Hx        Diagnostics:    CBC:   Recent Labs     09/05/22  0453 09/05/22  1715 09/06/22  0518   WBC 13.8*  --  14.7*   RBC 2.78*  --  2.39*   HGB 8.8* 8.1* 7.7*   HCT 26.5* 25.6* 22.7*   MCV 95.3  --  95.0   RDW 14.9*  --  15.1*     --  183     BMP:    Recent Labs     09/05/22  0453 09/06/22  0518   GLUCOSE 159* 119*   BUN 27* 29*   CREATININE 0.78 0.67   CALCIUM 7.5* 7.6*    137   K 4.6 4.2    103   CO2 25 29   ANIONGAP 10 5*   LABGLOM >60 >60   GFRAA >60 >60   GFR               HbA1c:   Lab Results   Component Value Date    LABA1C 5.8 12/30/2019     BNP: No results for input(s): BNP in the last 72 hours. PT/INR: No results for input(s): PROTIME, INR in the last 72 hours. APTT: No results for input(s): APTT in the last 72 hours.   CARDIAC ENZYMES: No results for input(s): CKMB, CKMBINDEX, TROPONINT, TROPHS, TROPII in the last 72 hours. Invalid input(s): CKTOTAL;3   FASTING LIPID PANEL:  Lab Results   Component Value Date    CHOL 115 06/16/2022    HDL 62 06/16/2022    TRIG 58 06/16/2022     LIVER PROFILE: No results for input(s): AST, ALT, ALB, BILIDIR, BILITOT, ALKPHOS in the last 72 hours. Radiology:    L FEMUR XRAY 9/2  FINDINGS:   Visualized bony pelvis is intact. There is a fracture of the proximal left   femur with mild impaction. There is degenerative change of the left SI joint   and left hip joint. The surrounding soft tissues are unremarkable. Impression:       Impacted fracture of the proximal left femur. CT CHEST ABDOMEN PELVIS 9/3  FINDINGS:     Chest:     Mediastinum: Cardiomegaly calcific coronary artery disease. Calcification   mitral annulus. Heart and great vessels otherwise normal.  No significant   mediastinal or hilar lymphadenopathy. Lungs/pleura: Bibasilar airspace disease versus subsegmental atelectasis. Soft Tissues/Bones: Moderate kyphosis and ossification of the anterior   longitudinal ligament. Abdomen/Pelvis:     Organs: The abdominal wall appears normal.     The liver, spleen, pancreas, and adrenals appear normal.  Gallbladder not   identified. Kidneys appear normal.     Bladder decompressed with a Michaels catheter. GI/Bowel: The stomach,small bowel, and colon appear normal. Colonic   diverticulosis. Increased stool in the colon. Pelvis: Intramedullary portia and compression screw right hip. Remote fracture   right hip. Acute comminuted basicervical fracture left hip involving the   greater and lesser trochanter. Peritoneum/Retroperitoneum: The abdominal aorta and iliac arteries are normal   in caliber. There is no pathologic adenopathy. Calcified plaque along the   aorta and its branches. Bones/Soft Tissues: Fracture left hip as above.        Impression:       Bibasilar subsegmental atelectasis/airspace disease. Comminuted fracture left hip. Status post ORIF remote fracture right hip. CT THORACIC SPINE 9/3/22  FINDINGS:   BONES/ALIGNMENT: There is normal alignment of the spine except for moderate   kyphosis. The vertebral body heights are maintained. No osseous destructive   lesion is seen. DEGENERATIVE CHANGES: No gross spinal canal stenosis or bony neural foraminal   narrowing of the thoracic spine. Ossification anterior longitudinal ligament. SOFT TISSUES: No paraspinal mass is seen. Impression:       No acute fracture. Moderate kyphosis. Diffuse idiopathic skeletal   hyperostosis. CT LUMBAR SPINE 9/3/2  FINDINGS:   BONES/ALIGNMENT: There is normal alignment of the spine except slight   anterior subluxation L4-5. The vertebral body heights are maintained. No   osseous destructive lesion is seen. 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 Hypertrophic facet disease at L5 bilaterally. SOFT TISSUES/RETROPERITONEUM: No paraspinal mass is seen. Impression:       Grade 1 spondylolisthesis L4-5. Spinal stenosis. No fracture. Physical Exam:  BP (!) 150/50   Pulse 76   Temp 96.8 °F (36 °C) (Axillary)   Resp 14   LMP  (LMP Unknown)   SpO2 91%     GEN: Well developed, well nourished, no acute distress. HEENT: NCAT, PERRL, EOMI, mucous membranes pink and moist.  RESP: Lungs clear to auscultation. No rales or rhonchi. Respirations WNL and unlabored. CV: Regular rate rhythm. No murmurs, rubs, or gallops. ABD: soft, non-distended, non-tender. BS+ and equal.  NEURO: A&O to person. Sensation intact to light touch. DTRs 2+  MSK: Functional ROM BUEs. Muscle tone and bulk are normal bilaterally. Strength 4+/5 key muscles BUEs. R hip flexion 3/5. R plantar and dorsiflexion 3/5.  L hip flexion 1/5, L knee extension 2/5, L plantar and dorsiflexion 3/5 - some difficulty with following commands at times  EXT: No calf tenderness to palpation or edema BLEs. SKIN: Warm dry and intact with good turgor. PSYCH: Mood WNL. Affect WNL. Appropriately interactive. Impression:  L hip femur fracture: s/p CMN 9/4 by Dr. Jackson Pulliam. WBAT  Acute blood loss anemia: Hb low   Dementia  HTN/HLD  Hypothyroidism  GERD  Essential tremor  Hx CVA with acquired atrophy  Hx STEMI, Heart failure with preserved EF: Holding Brilinta - need start date. On ASA BID for DVT prophylaxis x4 weeks per orthopedics then to resume 81 mg daily. Recommendations:    Diagnosis:  L hip fracture  Therapy: Has PT/OT needs  Medical Necessity: As above  Support: Supportive adult sons and aids/nurses to help with ADLs/mobility at [address]  Rehab Recommendation: The patient will benefit from acute inpatient rehabilitation once medically stable per primary and consulting services. Anticipate she will be able to tolerate 3 hours of therapy per day or 900 minutes per week in rehabilitation. The patient requires multidisciplinary rehabilitation treatment including medical management by a PM&R physician, 24 hour rehabilitation nursing, Physical/Occupational therapy, +/- speech therapy, rehabilitation social work, and nutrition services. Patient and family also require education in post-hospital precautions and home exercise routine, adaptive techniques and deficit compensation strategies, strengthening and conditioning, equipment prescription and instructions in use. She would benefit from medical management of her pain, cardiac history with possible resumption of Brilinta if approved by ortho, low hemoglobin down to 7.7 from 8.8 - would benefit from closer monitoring in acute rehab setting  DVT Prophylaxis: on Lovenox    It was my pleasure to evaluate Jeff Chavez today. Please call with questions.     Noble Romberg, MD        This note is created with the assistance of a speech recognition program.  While intending to generate a document that actually reflects the content of the visit, the document can still have some errors including those of syntax and sound a like substitutions which may escape proof reading. In such instances, actual meaning can be extrapolated by contextual diversion.

## 2022-09-07 NOTE — PLAN OF CARE
Problem: Discharge Planning  Goal: Discharge to home or other facility with appropriate resources  9/6/2022 2200 by Monik Schaeffer RN  Outcome: Progressing  Flowsheets (Taken 9/6/2022 1958)  Discharge to home or other facility with appropriate resources:   Identify barriers to discharge with patient and caregiver   Arrange for needed discharge resources and transportation as appropriate   Identify discharge learning needs (meds, wound care, etc)  9/6/2022 1046 by Yahir Dyson RN  Outcome: Progressing     Problem: Pain  Goal: Verbalizes/displays adequate comfort level or baseline comfort level  9/6/2022 2200 by Monik Schaeffer RN  Outcome: Progressing  9/6/2022 1046 by Yahir Dyson RN  Outcome: Progressing     Problem: Safety - Adult  Goal: Free from fall injury  9/6/2022 2200 by Monik Schaeffer RN  Outcome: Progressing  Flowsheets (Taken 9/6/2022 2147)  Free From Fall Injury: Instruct family/caregiver on patient safety  9/6/2022 1046 by Yahir Dyson RN  Outcome: Progressing     Problem: ABCDS Injury Assessment  Goal: Absence of physical injury  Outcome: Progressing  Flowsheets (Taken 9/6/2022 2147)  Absence of Physical Injury: Implement safety measures based on patient assessment     Problem: Skin/Tissue Integrity  Goal: Absence of new skin breakdown  Description: 1. Monitor for areas of redness and/or skin breakdown  2. Assess vascular access sites hourly  3. Every 4-6 hours minimum:  Change oxygen saturation probe site  4. Every 4-6 hours:  If on nasal continuous positive airway pressure, respiratory therapy assess nares and determine need for appliance change or resting period.   Outcome: Progressing

## 2022-09-07 NOTE — PROGRESS NOTES
Physical Therapy  Facility/Department: [facility] CAR 2- STEPDOWN  Daily treatment note    Name: Jaquelyn Schaumann  : 3/21/1930  MRN: [id number]  Date of Service: 2022    Discharge Recommendations:  Patient would benefit from continued therapy after discharge, Patient able to tolerate 3hrs of therapy a day   PT Equipment Recommendations  Equipment Needed: No      Patient Diagnosis(es): The encounter diagnosis was Closed displaced intertrochanteric fracture of left femur, initial encounter ([facility] [address] Past Medical History:  has a past medical history of Adenomatous polyp, Cataracts, bilateral, Combined forms of age-related cataract of both eyes, Corneal scar, right eye, Cystocele, Dementia ([address] Difficulty controlling anger, Dyslipidemia, GERD (gastroesophageal reflux disease), Goiter, Hiatal hernia, Hypertension, Hypokalemia, Hypothyroidism, Impaired fasting glucose, Malignant melanoma in situ ([address] Migraines, Murmur, functional, Osteopenia, Posterior vitreous detachment of both eyes, Primary open angle glaucoma (POAG) of both eyes, moderate stage, Sciatica, Skin cancer, Syncope and collapse, Tremor, 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 Past Surgical History:  has a past surgical history that includes other surgical history (2008); Cholecystectomy; Colonoscopy (2010); Mohs surgery (10/07/2009); Vein Surgery (2009); Colonoscopy (2002); Cystocele repair (1999); Appendectomy; Dilation and curettage of uterus; other surgical history (1988); Hysterectomy (1995); Upper gastrointestinal endoscopy (2015); Intracapsular cataract extraction (Left, 2020); Intracapsular cataract extraction (Right, 10/08/2020); Hip fracture surgery (Right, 2021); Femur fracture surgery (Left, 2022); and Femur fracture surgery (Left, 2022).     Assessment   Body Structures, Functions, Activity Limitations Requiring Skilled Therapeutic Intervention: Decreased endurance;Decreased ROM; Decreased strength;Decreased safe awareness;Decreased balance;Decreased cognition;Decreased coordination  Assessment: Pt continue to demo mild progress this date with fuctional mobility, requiring overall MOD A x2 for bed mobs and transfers, Stood x4mins in SS weight shifting , Pt remains motivated and would benefit from instense therapy to address deficits . Therapy Prognosis: Fair  Barriers to Learning: Cognition  Requires PT Follow-Up: Yes  Activity Tolerance  Activity Tolerance: Patient limited by fatigue;Patient limited by pain; Patient limited by endurance     Plan   Plan  Plan: 6-7 times per week  Current Treatment Recommendations: Strengthening, ROM, Balance training, Functional mobility training, Transfer training, Endurance training, Return to work related activity, Home exercise program, Safety education & training, Patient/Caregiver education & training, Positioning, Wound care, Therapeutic activities  Safety Devices  Type of Devices: Left in chair, Patient at risk for falls, Chair alarm in place, Gait belt, Call light within reach, Nurse notified  Restraints  Restraints Initially in Place: No     Restrictions  Restrictions/Precautions  Restrictions/Precautions: Weight Bearing  Implants present? :  (B Hip  surgery)  Lower Extremity Weight Bearing Restrictions  Left Lower Extremity Weight Bearing: Weight Bearing As Tolerated  Position Activity Restriction  Other position/activity restrictions: S/P Left Hip CMN 9/4/22. Subjective   General  Patient assessed for rehabilitation services?: Yes  Family / Caregiver Present: Yes (SON)  Diagnosis: Left Intertrochanteric fracture S/P L Hip CMN  Follows Commands: Impaired  Other (Comment): Pt has demantis, follows simple, one step commands. Subjective  Subjective: RN and pt agreeabnle to PT.  Pt supine in bed upon arrival , pleasant and cooperative t/o . denies pain at rest but complaing of soreness with Mobility Shay Brooks Cognition   Orientation  Overall Orientation Status: Impaired  Orientation Level: Oriented to person;Disoriented to place; Disoriented to time;Disoriented to situation  Cognition  Overall Cognitive Status: Exceptions  Arousal/Alertness: Appropriate responses to stimuli  Following Commands: Follows one step commands with increased time; Follows one step commands with repetition  Attention Span: Difficulty attending to directions; Difficulty dividing attention  Memory: Decreased recall of biographical Information;Decreased recall of precautions;Decreased recall of recent events;Decreased short term memory;Decreased long term memory  Safety Judgement: Decreased awareness of need for assistance;Decreased awareness of need for safety  Problem Solving: Assistance required to identify errors made;Assistance required to correct errors made;Assistance required to generate solutions;Assistance required to implement solutions  Insights: Decreased awareness of deficits  Initiation: Requires cues for all  Sequencing: Requires cues for all     Objective      Bed Mobility Training  Bed Mobility Training: Yes  Overall Level of Assistance: Maximum assistance;Assist X2 (MAX assist x2 for bed mobility for BLE progression and trunk progression. Pt assisted with trunk and RLE with use of grab bars. MAX VC's for engagement)  Interventions: Manual cues; Safety awareness training;Verbal cues; Tactile cues; Visual cues  Supine to Sit: Maximum assistance; Additional time;Assist X2  Balance  Sitting: Impaired (Pt required CGA while sitting to ensure balance. R lateral lean to relieve L hip pain. unilateral support at all times for pt to maintain balance. Pt sat EOB for approx 10 mins in preparation for transfer)  Sitting - Static: Fair (occasional)  Sitting - Dynamic: Fair (occasional)  Standing: Impaired  Standing - Static: Poor (MAX assist x2 to maintain upright support.  Pt able to bear weight through BLE's for approx 30 secs at a time)  Standing - Dynamic: Poor  Transfer Training  Transfer Training: Yes  Overall Level of Assistance: Moderate assistance;Assist X2;Additional time (MOD assist x2 for sit to stand transfers x3, difficulty achieving full erect posture.)  Interventions: Visual cues; Verbal cues; Safety awareness training;Manual cues  Sit to Stand: Moderate assistance;Assist X2  Stand to Sit: Moderate assistance;Assist X2;Additional time  Bed to Chair: Total assistance (TOTAL assist with use of bimal stedy)  Gait  Overall Level of Assistance:  (Unable to formally assess as pt has poor standing balance and unable to advance BLE's)  Bed mobility  Supine to Sit: Moderate assistance;2 Person assistance  Sit to Supine: Unable to assess (pt retired to chair)  Scooting: Moderate assistance  Bed Mobility Comments: Pt continue demo improved effort this date . Max cueing for sequencing. Transfers  Sit to Stand: Moderate Assistance;2 Person Assistance  Stand to sit: Moderate Assistance;2 Person Assistance  Bed to Chair: Dependent/Total (Transfer with SS)  Comment: STS x2 with RW for 1mins each . Macueing for petient to maintaing trunk extension with fair return. STS x2 with SS . Pt transfered to chair with SS. Ambulation  WB Status: WBAT L LE  Ambulation  Device: Rolling Walker  Assistance: Maximum assistance;2 Person assistance  Quality of Gait: Increase difficulty for pt to advance LLE. Gait Deviations: Shuffles  Distance: 2steps  Comments: limited due to pain and still demo significant fear of falling  More Ambulation?: No  Stairs/Curb  Stairs?: No     Balance  Posture: Fair  Sitting - Static: Fair  Sitting - Dynamic: Fair;-  Standing - Static: Poor;+  Standing - Dynamic: Poor;+  Comments: Standiing with RW and SS. Exercise Treatment: defer ther ex due to pain .       AM-PAC Score  AM-PAC Inpatient Mobility Raw Score : 10 (09/06/22 1612)  AM-PAC Inpatient T-Scale Score : 32.29 (09/06/22 1612)  Mobility Inpatient CMS 0-100% Score: 76.75 (09/06/22 1612)  Mobility Inpatient CMS G-Code Modifier : CL (09/06/22 1612)   Goals  Short Term Goals  Time Frame for Short term goals: 6 to 7 visits. Short term goal 1: Pt able to perform supine<>sit mod A x 2  Short term goal 2: Pt able to perform sit<>stnad at mod A x 2 with bimal stedy or rolling walker  Short term goal 3: Pt to improve standing tolernace in bimal stedy for 2 to 4 minutes to improve WB tolerance to L LE  Short term goal 4: Pt able to tolerate ROM ex's L LE to improve Hip/knee ROM to facilate sit<>stand with increased independence. Patient Goals   Patient goals : family's goal-improve mobility, pt able to walk       Education  Patient Education  Education Given To: Family; Patient  Education Provided: Role of Therapy;Plan of Care;Transfer Training;Precautions; Family Education;Home Exercise Program;IADL Safety  Education Method: Verbal  Barriers to Learning: Cognition  Education Outcome: Continued education needed      Therapy Time   Individual Concurrent Group Co-treatment   Time In 1006         Time Out 4717         Minutes 35         Timed Code Treatment Minutes: 23 Minutes (co-treat with OT)       Mahendra Kaur, PTA

## 2022-09-07 NOTE — CARE COORDINATION
Met with patient and son to discuss transitional planning. Patient was evaluated by PM&R today and was felt to be a good candidate for acute inpatient rehab. The patient and family would like referral to [facility]. Referral was faxed and call placed to [facility] [place] @ [phone] and left vm message notifying of referral and requested call back. Further advised that patient is ready for discharge.

## 2022-09-07 NOTE — PROGRESS NOTES
Occupational Therapy  Facility/Department: [facility] CAR 2- STEPDOWN  Occupational Therapy Initial Assessment    Name: Kimmie Oneal  : 3/21/1930  MRN: [id number]  Date of Service: 2022    Copied from Emergency Medicine: This is a 80 y.o. female who presents to the Emergency Department with complaint of. The patient fell and was seen at outlying facility noted to have a closed left hip fracture. Patient is transferred for trauma and orthopedic evaluation. Discharge Recommendations:  Patient would benefit from continued therapy after discharge    OT Equipment Recommendations  Equipment Needed: No       Patient Diagnosis(es): The encounter diagnosis was Closed displaced intertrochanteric fracture of left femur, initial encounter ([address] Past Medical History:  has a past medical history of Adenomatous polyp, Cataracts, bilateral, Combined forms of age-related cataract of both eyes, Corneal scar, right eye, Cystocele, Dementia ([address] Difficulty controlling anger, Dyslipidemia, GERD (gastroesophageal reflux disease), Goiter, Hiatal hernia, Hypertension, Hypokalemia, Hypothyroidism, Impaired fasting glucose, Malignant melanoma in situ ([address] Migraines, Murmur, functional, Osteopenia, Posterior vitreous detachment of both eyes, Primary open angle glaucoma (POAG) of both eyes, moderate stage, Sciatica, Skin cancer, Syncope and collapse, Tremor, 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 Past Surgical History:  has a past surgical history that includes other surgical history (2008); Cholecystectomy; Colonoscopy (2010); Mohs surgery (10/07/2009); Vein Surgery (2009); Colonoscopy (2002); Cystocele repair (1999); Appendectomy; Dilation and curettage of uterus; other surgical history (1988); Hysterectomy (1995); Upper gastrointestinal endoscopy (2015); Intracapsular cataract extraction (Left, 2020);  Intracapsular cataract extraction (Right, 10/08/2020); Hip fracture surgery (Right, 11/02/2021); Femur fracture surgery (Left, 09/04/2022); and Femur fracture surgery (Left, 9/4/2022). Assessment   Performance deficits / Impairments: Decreased functional mobility ; Decreased ADL status; Decreased safe awareness;Decreased cognition;Decreased balance;Decreased endurance;Decreased posture  Assessment: RN ok'd OT evaluation. Pt agreeable and reports high pain levels in LLE (HIP). Pt supine in bed on arrival requiring MAX assist to sit EOB with assist for trunk and BLE progression. Pt sat EOB with CGA for approx 10 mins in preparation for transfer. Pt stood x3 times at RW and bimal steady with MOD assist x2 with BUE support. Pt fearful of bearing weight through LLE despite education and encouragement from writer and son with fair carryover. Pt able to stand approx 30 seconds. Pt required TOTAL assist for brief change and clean up due to limited balance, endurance, and standing tolerance. Pt to return to sitting post session with chair alarm on, call light within reach and RN notified. Skilled OT services required for increased IND with ADL's, mobility, transfers, and safety awareness. Prognosis: Good  Decision Making: Medium Complexity  REQUIRES OT FOLLOW-UP: Yes  Activity Tolerance  Activity Tolerance: Patient Tolerated treatment well;Patient limited by pain; Patient limited by fatigue      Safety Devices  Type of Devices: Left in chair;Patient at risk for falls; Chair alarm in place;Gait belt;Call light within reach;Nurse notified  Restraints  Restraints Initially in Place: No    Plan   Plan  Times per Week: 4-6x/wk  Current Treatment Recommendations: Functional mobility training, Balance training, Endurance training, Positioning, Patient/Caregiver education & training, Safety education & training, Pain management, Cognitive reorientation, Self-Care / ADL     Restrictions  Restrictions/Precautions  Restrictions/Precautions: Weight Bearing  Lower Extremity Weight Bearing Restrictions  Left Lower Extremity Weight Bearing: Weight Bearing As Tolerated  Position Activity Restriction  Other position/activity restrictions: S/P Left Hip CMN 9/4/22. Subjective   General  Patient assessed for rehabilitation services?: Yes  Family / Caregiver Present: Yes (Son Gabriela present)  General Comment  Comments: RN ok'd OT evaluation. Pt agreeable. Pt states pain in L hip is \"high\" however unable to state specific number     Social/Functional History  Social/Functional History  Lives With: Alone (Spouse passed away month ago.)  Type of Home: Assisted living  Home Layout: One level  Home Access: Level entry  Bathroom Shower/Tub: Walk-in shower, Curtain  Bathroom Toilet: Handicap height  Bathroom Equipment: Grab bars in shower, Tub transfer bench, Hand-held shower (Grab bar on right side)  Bathroom Accessibility: Accessible, Walker accessible  Home Equipment: Theador Corpus, 4 wheeled, Lift chair (Pt typically does not use Rollator inside apartment however utilizes with inc distances)  Has the patient had two or more falls in the past year or any fall with injury in the past year?: Yes  Receives Help From: Personal care attendant  ADL Assistance: Needs assistance (Assist with shower from care givers)  Homemaking Assistance: Needs assistance  Ambulation Assistance: Independent  Transfer Assistance: Independent  Active : No  Patient's  Info: facility provides transportation. Additional Comments: facility provides meals, laundry and cleaning services. Son present to answer question. Pt has demantia. Per son- family prefers pt to go back to assisted living and they will hire extra help and wants therapy at assisted living facility. Objective   Bed Mobility Training  Bed Mobility Training: Yes  Overall Level of Assistance: Maximum assistance;Assist X2 (MAX assist x2 for bed mobility for BLE progression and trunk progression.  Pt assisted with trunk and RLE with use of grab bars. MAX VC's for engagement)  Interventions: Manual cues; Safety awareness training;Verbal cues; Tactile cues; Visual cues  Supine to Sit: Maximum assistance; Additional time;Assist X2    Balance  Sitting: Impaired (Pt required CGA while sitting to ensure balance. R lateral lean to relieve L hip pain. unilateral support at all times for pt to maintain balance. Pt sat EOB for approx 10 mins in preparation for transfer)  Sitting - Static: Fair (occasional)  Sitting - Dynamic: Fair (occasional)  Standing: Impaired  Standing - Static: Poor (MAX assist x2 to maintain upright support. Pt able to bear weight through BLE's for approx 30 secs at a time)  Standing - Dynamic: Poor    Transfer Training  Transfer Training: Yes  Overall Level of Assistance: Moderate assistance;Assist X2;Additional time (MOD assist x2 for sit to stand transfers x3, difficulty achieving full erect posture.)  Interventions: Visual cues; Verbal cues; Safety awareness training;Manual cues  Sit to Stand: Moderate assistance;Assist X2  Stand to Sit: Moderate assistance;Assist X2;Additional time  Bed to Chair: Total assistance (TOTAL assist with use of bimal stedy)    Gait  Overall Level of Assistance:  (Unable to formally assess as pt has poor standing balance and unable to advance BLE's)     AROM: Within functional limits  Strength: Within functional limits (BUE strength ~3/5, difficulty following commands. MARLINE hands 3+/5)  Coordination: Within functional limits  Tone: Normal  Sensation: Intact (Pt denies numbness/tingling)    ADL  Feeding: Modified independent ;Setup  Grooming: Stand by assistance;Verbal cueing; Increased time to complete  UE Bathing: Stand by assistance;Verbal cueing; Increased time to complete  LE Bathing: Increased time to complete;Verbal cueing;Maximum assistance;Setup  UE Dressing: Minimal assistance; Increased time to complete;Verbal cueing;Setup  LE Dressing: Maximum assistance; Increased time to complete;Verbal cueing;Setup  Toileting: Maximum assistance; Increased time to complete;Setup  Toileting Skilled Clinical Factors: Pt required TOTAL assist for brief change and management due to extensive cleaning required. Otherwise MAX assist required for clothing management and hygiene when seated on toilet      Vision  Vision: Impaired  Vision Exceptions: Wears glasses at all times  Hearing  Hearing: Within functional limits    Cognition  Following Commands: Follows one step commands with increased time; Follows one step commands with repetition  Attention Span: Difficulty attending to directions; Difficulty dividing attention  Safety Judgement: Decreased awareness of need for assistance;Decreased awareness of need for safety  Problem Solving: Assistance required to identify errors made;Assistance required to correct errors made;Assistance required to generate solutions;Assistance required to implement solutions  Insights: Decreased awareness of deficits  Initiation: Requires cues for all  Sequencing: Requires cues for all            Education Given To: Patient  Education Provided: Role of Therapy;Plan of Care  Education Provided Comments: POC, Role of OT, safety with transfers, WB status  Education Method: Verbal  Barriers to Learning: Cognition  Education Outcome: Verbalized understanding;Continued education needed  LUE AROM (degrees)  LUE AROM : Exceptions  LUE General AROM: Sh flexion ~90 degrees, WFL distally from Elbow  Left Hand AROM (degrees)  Left Hand AROM: WFL  RUE AROM (degrees)  RUE AROM : Exceptions  RUE General AROM: Sh flexion ~90 degrees, WFL distally from Elbow  Right Hand AROM (degrees)  Right Hand AROM: WFL        Hand Dominance  Hand Dominance: Right                AM-PAC Score   AM-[facility] Inpatient Daily Activity Raw Score: 16 (09/07/22 1138)  AM-PAC Inpatient ADL T-Scale Score : 35.96 (09/07/22 1138)  ADL Inpatient CMS 0-100% Score: 53.32 (09/07/22 1138)  ADL Inpatient CMS G-Code Modifier : CK (09/07/22 1138)         Goals  Short Term Goals  Time Frame for Short term goals: By discharge, pt will  Short Term Goal 1: demonstrate all UB ADL's with SBA and <2 VC's for greater independence with ADL's  Short Term Goal 2: demonstrate all aspects of bed mobility with MIN assist for greater participaiton with ADL tasks  Short Term Goal 3: demonstrate transfers with MIN assist in preparation for functional mobility  Short Term Goal 4: engage in static/dynamic standing for 5+ mins with MIN assist and LRD for participation with ADL tasks  Short Term Goal 5: Demonstrate LB ADL's with MOD assist and good safety awarness  Long Term Goals  Time Frame for Long term goals : NOTIFY OTR TO UPSATE GOALS AS PT PROGRESSES       Therapy Time   Individual Concurrent Group Co-treatment   Time In 1006         Time Out 1041         Minutes 35         Timed Code Treatment Minutes: 8 Minutes       Leonarda Cerna S/OT

## 2022-09-07 NOTE — PROGRESS NOTES
PROGRESS NOTE          PATIENT NAME: Oswaldo Sauer  MEDICAL RECORD NO. [id number]  DATE: 9/7/2022  SURGEON: Cassius Staton MD  PRIMARY CARE PHYSICIAN: Anamaria Braswell MD    HD: # 4    ASSESSMENT    Patient Active Problem List   Diagnosis    Dyslipidemia    Osteopenia    Impaired fasting glucose    Adenomatous polyp    Essential hypertension    Hypothyroidism    Sciatica    GERD (gastroesophageal reflux disease)    Visual disturbance    Malignant melanoma in situ ([address]    Difficulty controlling anger    Primary open angle glaucoma (POAG) of both eyes, moderate stage    PVD (posterior vitreous detachment), both eyes    Combined forms of age-related cataract of both eyes    Dementia with behavioral disturbance (HCC)    Tremor    Essential tremor    Dizziness    Chronic cerebral ischemia    Acquired cerebral atrophy ([address]    Cerebral infarction ([address]    Anxiety    Atrial fibrillation ([address]    Closed displaced intertrochanteric fracture of left femur (HCC)    Acute ST segment elevation myocardial infarction ([address]    Heart murmur    Migraine    Female bladder prolapse    Falls    Hiatal hernia    Hypercholesterolemia    Coronary arteriosclerosis    Cataract    Closed right hip fracture, initial encounter ([address]    Heart failure, unspecified HF chronicity, unspecified heart failure type ([address]    Chronic renal disease, stage III ([address] [906666]    Pseudophakia of both eyes    Nondisp intertroch fx left femur, init for opn fx type I/2 [facility])       MEDICAL DECISION MAKING AND PLAN    Traumatic, closed L intertrochanteric fracture of femur with subtrochanteric extension   POD #3 CMN  Acute blood loss anemia  Acute pain  CMN 9/4/22  WBAT LLE  ASA 81mg qd  F/u Dr. Lizzie Willingham 9/21/22  H/o STEMI, HFpEF, HTN, HLD  Hold Brillinta  Norvasc restarted  ASA 81mg qd  Cards sign off 9/5/22  PM&R consulted.  Family requesting eval and rehab before returning to assisted living facility    Chief Complaint: \"fall\"    Melda Crumb is has moderately improved. Patient slept well overnight. No acute events reported by nursing. Patient denies any needs at this time. OBJECTIVE  VITALS: Temp: Temp: 98.5 °F (36.9 °C)Temp  Av.1 °F (36.7 °C)  Min: 97.7 °F (36.5 °C)  Max: 98.6 °F (37 °C) BP Systolic (50EXW), DLO:321 , Min:118 , UHN:261   Diastolic (29FCD), UB, Min:45, Max:75   Pulse Pulse  Av  Min: 77  Max: 85 Resp Resp  Av  Min: 13  Max: 19 Pulse ox SpO2  Av.8 %  Min: 93 %  Max: 96 %  GENERAL: alert, no distress  NEURO: Confused (baseline), GCS 15, No FND  HEENT: normocephalic, atraumatic     LUNGS: clear to ausculation, without wheezes, rales or rhonci  HEART: normal rate and regular rhythm  ABDOMEN: soft, non-tender, non-distended, and no guarding or peritoneal signs present  EXTREMITY: no cyanosis, clubbing or edema. Mild tenderness to palpation L hip. Dressing placed. No saturation. I/O last 3 completed shifts: In: 240 [P.O.:240]  Out: 700 [Urine:700]    Drain/tube output:  In: 240 [P.O.:240]  Out: 700 [Urine:700]    LAB:  CBC:   Recent Labs     22  1715 22   WBC 12.7*  --  13.8*  --  14.7*   HGB 11.9   < > 8.8* 8.1* 7.7*   HCT 37.1   < > 26.5* 25.6* 22.7*   MCV 96.4  --  95.3  --  95.0     --  190  --  183    < > = values in this interval not displayed. BMP:   Recent Labs     22    137 137   K 4.6 4.6 4.2    102 103   CO2 27 25 29   BUN 18 27* 29*   CREATININE 0.69 0.78 0.67   GLUCOSE 123* 159* 119*     COAGS:   Recent Labs     22  0738   PROT 6.8   INR 1.0              Electronically signed by Cale Mueller DO on 2022 at 8:08 AM  Attestation signed by Delores Sheth MD    I personally evaluated the patient and directed the medical decision making with Resident/DORI after the physical/radiologic exam and laboratory values were reviewed and confirmed.       Delores Sheth, MD

## 2022-09-07 NOTE — PROGRESS NOTES
Maisha'emeka bowen from [facility], stating pt is requesting [facility] ARU and precert can be initiated. [facility] pending ref [id number]. Maricel notified.

## 2022-09-08 PROCEDURE — 94761 N-INVAS EAR/PLS OXIMETRY MLT: CPT

## 2022-09-08 PROCEDURE — 6360000002 HC RX W HCPCS: Performed by: STUDENT IN AN ORGANIZED HEALTH CARE EDUCATION/TRAINING PROGRAM

## 2022-09-08 PROCEDURE — 1200000000 HC SEMI PRIVATE

## 2022-09-08 PROCEDURE — 6370000000 HC RX 637 (ALT 250 FOR IP): Performed by: STUDENT IN AN ORGANIZED HEALTH CARE EDUCATION/TRAINING PROGRAM

## 2022-09-08 PROCEDURE — 97535 SELF CARE MNGMENT TRAINING: CPT

## 2022-09-08 PROCEDURE — 6370000000 HC RX 637 (ALT 250 FOR IP): Performed by: NURSE PRACTITIONER

## 2022-09-08 PROCEDURE — 6370000000 HC RX 637 (ALT 250 FOR IP)

## 2022-09-08 PROCEDURE — 97110 THERAPEUTIC EXERCISES: CPT

## 2022-09-08 PROCEDURE — 97530 THERAPEUTIC ACTIVITIES: CPT

## 2022-09-08 RX ADMIN — ACETAMINOPHEN 1000 MG: 500 TABLET ORAL at 22:11

## 2022-09-08 RX ADMIN — AMLODIPINE BESYLATE 2.5 MG: 2.5 TABLET ORAL at 08:44

## 2022-09-08 RX ADMIN — ACETAMINOPHEN 1000 MG: 500 TABLET ORAL at 15:29

## 2022-09-08 RX ADMIN — SENNOSIDES 8.6 MG: 8.6 TABLET, COATED ORAL at 20:08

## 2022-09-08 RX ADMIN — GLYCERIN 2 G: 2 SUPPOSITORY RECTAL at 16:33

## 2022-09-08 RX ADMIN — ENOXAPARIN SODIUM 30 MG: 100 INJECTION SUBCUTANEOUS at 08:44

## 2022-09-08 RX ADMIN — ENOXAPARIN SODIUM 30 MG: 100 INJECTION SUBCUTANEOUS at 20:08

## 2022-09-08 RX ADMIN — POLYETHYLENE GLYCOL 3350 17 G: 17 POWDER, FOR SOLUTION ORAL at 08:47

## 2022-09-08 RX ADMIN — Medication 81 MG: at 08:44

## 2022-09-08 RX ADMIN — ACETAMINOPHEN 1000 MG: 500 TABLET ORAL at 08:43

## 2022-09-08 ASSESSMENT — PAIN SCALES - WONG BAKER
WONGBAKER_NUMERICALRESPONSE: 0

## 2022-09-08 ASSESSMENT — PAIN SCALES - GENERAL
PAINLEVEL_OUTOF10: 2
PAINLEVEL_OUTOF10: 2
PAINLEVEL_OUTOF10: 0

## 2022-09-08 ASSESSMENT — PAIN DESCRIPTION - LOCATION
LOCATION: HIP
LOCATION: HIP

## 2022-09-08 ASSESSMENT — PAIN DESCRIPTION - ORIENTATION: ORIENTATION: LEFT

## 2022-09-08 NOTE — PLAN OF CARE
Problem: Discharge Planning  Goal: Discharge to home or other facility with appropriate resources  9/8/2022 0140 by Kelli Hein RN  Outcome: Progressing  Flowsheets (Taken 9/7/2022 2000)  Discharge to home or other facility with appropriate resources:   Identify barriers to discharge with patient and caregiver   Arrange for needed discharge resources and transportation as appropriate   Identify discharge learning needs (meds, wound care, etc)   Refer to discharge planning if patient needs post-hospital services based on physician order or complex needs related to functional status, cognitive ability or social support system  9/7/2022 1737 by Mary Menon RN  Outcome: Progressing     Problem: Pain  Goal: Verbalizes/displays adequate comfort level or baseline comfort level  9/8/2022 0140 by Kelli Hein RN  Outcome: Progressing  Flowsheets (Taken 9/7/2022 2028)  Verbalizes/displays adequate comfort level or baseline comfort level:   Encourage patient to monitor pain and request assistance   Assess pain using appropriate pain scale   Administer analgesics based on type and severity of pain and evaluate response  9/7/2022 1737 by Mary Menon RN  Outcome: Progressing     Problem: Safety - Adult  Goal: Free from fall injury  9/8/2022 0140 by Kelli Hein RN  Outcome: Progressing  [address] (Taken 9/7/2022 2000)  Free From Fall Injury:   Instruct family/caregiver on patient safety   Based on caregiver fall risk screen, instruct family/caregiver to ask for assistance with transferring infant if caregiver noted to have fall risk factors  9/7/2022 1737 by Mary Menon RN  Outcome: Progressing  Flowsheets (Taken 9/7/2022 0800)  Free From Fall Injury: Instruct family/caregiver on patient safety     Problem: ABCDS Injury Assessment  Goal: Absence of physical injury  Outcome: Progressing  Flowsheets (Taken 9/7/2022 2000)  Absence of Physical Injury: Implement safety measures based on patient assessment Problem: Skin/Tissue Integrity  Goal: Absence of new skin breakdown  Description: 1. Monitor for areas of redness and/or skin breakdown  2. Assess vascular access sites hourly  3. Every 4-6 hours minimum:  Change oxygen saturation probe site  4. Every 4-6 hours:  If on nasal continuous positive airway pressure, respiratory therapy assess nares and determine need for appliance change or resting period.   Outcome: Progressing

## 2022-09-08 NOTE — PROGRESS NOTES
Occupational Therapy  Facility/Department: [facility] CAR 2- STEPDOWN  Occupational Therapy Daily Progress Note    Name: Ana Reid  : 3/21/1930  MRN: [id number]  Date of Service: 2022    Discharge Recommendations:  Patient would benefit from continued therapy after discharge  OT Equipment Recommendations  Other: CTA       Patient Diagnosis(es): The encounter diagnosis was Closed displaced intertrochanteric fracture of left femur, initial encounter ([facility] [address] Past Medical History:  has a past medical history of Adenomatous polyp, Cataracts, bilateral, Combined forms of age-related cataract of both eyes, Corneal scar, right eye, Cystocele, Dementia ([address] Difficulty controlling anger, Dyslipidemia, GERD (gastroesophageal reflux disease), Goiter, Hiatal hernia, Hypertension, Hypokalemia, Hypothyroidism, Impaired fasting glucose, Malignant melanoma in situ ([address] Migraines, Murmur, functional, Osteopenia, Posterior vitreous detachment of both eyes, Primary open angle glaucoma (POAG) of both eyes, moderate stage, Sciatica, Skin cancer, Syncope and collapse, Tremor, 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 Past Surgical History:  has a past surgical history that includes other surgical history (2008); Cholecystectomy; Colonoscopy (2010); Mohs surgery (10/07/2009); Vein Surgery (2009); Colonoscopy (2002); Cystocele repair (1999); Appendectomy; Dilation and curettage of uterus; other surgical history (1988); Hysterectomy (1995); Upper gastrointestinal endoscopy (2015); Intracapsular cataract extraction (Left, 2020); Intracapsular cataract extraction (Right, 10/08/2020); Hip fracture surgery (Right, 2021); Femur fracture surgery (Left, 2022); and Femur fracture surgery (Left, 2022). Assessment   Performance deficits / Impairments: Decreased functional mobility ; Decreased ADL status; Decreased safe awareness;Decreased cognition;Decreased balance;Decreased endurance;Decreased posture  Prognosis: Good  Decision Making: Medium Complexity  REQUIRES OT FOLLOW-UP: Yes  Activity Tolerance  Activity Tolerance: Patient Tolerated treatment well;Patient limited by pain; Patient limited by fatigue        Plan   Plan  Times per Week: 4-6x/wk  Current Treatment Recommendations: Functional mobility training, Balance training, Endurance training, Positioning, Patient/Caregiver education & training, Safety education & training, Pain management, Cognitive reorientation, Self-Care / ADL     Restrictions  Restrictions/Precautions  Restrictions/Precautions: Weight Bearing  Implants present? :  (B Hip  surgery)  Lower Extremity Weight Bearing Restrictions  Left Lower Extremity Weight Bearing: Weight Bearing As Tolerated  Position Activity Restriction  Other position/activity restrictions: S/P Left Hip CMN 9/4/22. Subjective   General  Patient assessed for rehabilitation services?: Yes  Family / Caregiver Present: No  General Comment  Comments: RN ok'd OT tx. Pt agreeable. Pt states pain in L hip is \"high\" however unable to state specific number         Objective           Safety Devices  Type of Devices: Patient at risk for falls;Gait belt;Call light within reach;Nurse notified; Left in bed;Bed alarm in place  Restraints  Restraints Initially in Place: No  Bed Mobility Training  Bed Mobility Training: Yes  Overall Level of Assistance: Maximum assistance;Assist X2  Interventions: Manual cues; Safety awareness training;Verbal cues; Tactile cues; Visual cues  Supine to Sit: Maximum assistance; Additional time;Assist X2  Sit to Supine: Maximum assistance;Assist X2;Additional time  Scooting: Assist X2;Maximum assistance  Balance  Sitting: Impaired (CGA-SBA to ensure stability sitting EOB/toilet/BSC,)  Standing: Impaired Joseph Likes steady, ~8 minutes, with multiple sitting rest breaks.  Min A x 2, R lateral lean.)  Transfer Training  Transfer Training: Yes  Overall Level of Assistance: Assist X2;Additional time;Maximum assistance (EOB->stand->toilet->recliner->BSC->EOB->supine, mod-max A x2 in bimal steady.)  Interventions: Visual cues; Verbal cues; Safety awareness training;Manual cues  Sit to Stand: Moderate assistance;Assist X2;Maximum assistance  Stand to Sit: Moderate assistance;Assist X2;Additional time;Maximum assistance  Toilet Transfer: Maximum assistance;Assist X2        ADL  Grooming: Stand by assistance;Verbal cueing; Increased time to complete;Setup  Grooming Skilled Clinical Factors: Sitting in recliner chair, set up on tray table. SBA + verbal cues to initiate task to wash face. UE Bathing: Stand by assistance;Verbal cueing; Increased time to complete;Setup  UE Bathing Skilled Clinical Factors: Sitting in recliner chair, set up on table, SBA + many cues to stay on task. UE Dressing: Increased time to complete;Verbal cueing;Setup; Moderate assistance  UE Dressing Skilled Clinical Factors: Mod A to doff/don gown, difficulty to direct d/t becoming anxious about having a BM. LE Dressing: Maximum assistance; Increased time to complete;Verbal cueing;Setup  LE Dressing Skilled Clinical Factors: Max A to don B socks. Toileting: Maximum assistance; Increased time to complete;Setup  Toileting Skilled Clinical Factors: Agustina pAonte steady + mod-max A x 2 to transfer on and off toilet and BSC. Max A for brief management and BM hyg d/t B UE support needed to maintain balance in [facility]. Pt. becoming very anxious about having a BM, RN notified. Additional Comments: Pt. demo F/P tolerance to ADL activity. Plan was to stay up in recliner chair, however pt. transferred back to bed d/t high anxiety. Pt. stated that she felt better in bed, RN notified. Activity Tolerance  Activity Tolerance: Patient limited by fatigue;Patient limited by pain; Patient limited by endurance           Cognition  Overall Cognitive Status: Exceptions  Arousal/Alertness: Appropriate responses to stimuli  Following Commands: Follows one step commands with increased time; Follows one step commands with repetition  Attention Span: Difficulty attending to directions; Difficulty dividing attention  Memory: Decreased recall of biographical Information;Decreased recall of precautions;Decreased recall of recent events;Decreased short term memory;Decreased long term memory  Safety Judgement: Decreased awareness of need for assistance;Decreased awareness of need for safety  Problem Solving: Assistance required to identify errors made;Assistance required to correct errors made;Assistance required to generate solutions;Assistance required to implement solutions  Insights: Decreased awareness of deficits  Initiation: Requires cues for all  Sequencing: Requires cues for all  Cognition Comment: Pt has demantia  Orientation  Overall Orientation Status: Within Functional Limits  Orientation Level: Oriented to person;Disoriented to place; Disoriented to time;Disoriented to situation                  Education Given To: Patient  Education Provided: Role of Therapy;Plan of Care  Education Provided Comments: POC, Role of OT, safety with transfers, WB status, transfer training, importance of OOB activity. Education Method: Verbal  Barriers to Learning: Cognition, baseline dementia.   Education Outcome: Verbalized understanding;Continued education needed                                                                AM-PAC Score        AM-[facility] Inpatient Daily Activity Raw Score: 15 (09/08/22 1449)  AM-PAC Inpatient ADL T-Scale Score : 34.69 (09/08/22 1449)  ADL Inpatient CMS 0-100% Score: 56.46 (09/08/22 1449)  ADL Inpatient CMS G-Code Modifier : CK (09/08/22 1449)    Tinneti Score       Goals  Short Term Goals  Time Frame for Short term goals: By discharge, pt will  Short Term Goal 1: demonstrate all UB ADL's with SBA and <2 VC's for greater independence with ADL's  Short Term Goal 2: demonstrate all aspects of bed

## 2022-09-08 NOTE — PLAN OF CARE
Problem: Discharge Planning  Goal: Discharge to home or other facility with appropriate resources  9/8/2022 1031 by Brandt Jones RN  Outcome: Progressing  9/8/2022 0140 by Haja Call RN  Outcome: Progressing  Flowsheets (Taken 9/7/2022 2000)  Discharge to home or other facility with appropriate resources:   Identify barriers to discharge with patient and caregiver   Arrange for needed discharge resources and transportation as appropriate   Identify discharge learning needs (meds, wound care, etc)   Refer to discharge planning if patient needs post-hospital services based on physician order or complex needs related to functional status, cognitive ability or social support system     Problem: Pain  Goal: Verbalizes/displays adequate comfort level or baseline comfort level  9/8/2022 1031 by Brandt Jones RN  Outcome: Progressing  9/8/2022 0140 by Haja Call RN  Outcome: Progressing  Flowsheets (Taken 9/7/2022 2028)  Verbalizes/displays adequate comfort level or baseline comfort level:   Encourage patient to monitor pain and request assistance   Assess pain using appropriate pain scale   Administer analgesics based on type and severity of pain and evaluate response     Problem: Safety - Adult  Goal: Free from fall injury  9/8/2022 1031 by Brandt Jones RN  Outcome: Progressing  9/8/2022 0140 by Haja Call RN  Outcome: Progressing  Flowsheets (Taken 9/7/2022 2000)  Free From Fall Injury:   Instruct family/caregiver on patient safety   Based on caregiver fall risk screen, instruct family/caregiver to ask for assistance with transferring infant if caregiver noted to have fall risk factors     Problem: ABCDS Injury Assessment  Goal: Absence of physical injury  9/8/2022 1031 by Brandt Jones RN  Outcome: Progressing  9/8/2022 0140 by Haja Call RN  Outcome: Progressing  Flowsheets (Taken 9/7/2022 2000)  Absence of Physical Injury: Implement safety measures based on patient assessment     Problem: Skin/Tissue Integrity  Goal: Absence of new skin breakdown  Description: 1. Monitor for areas of redness and/or skin breakdown  2. Assess vascular access sites hourly  3. Every 4-6 hours minimum:  Change oxygen saturation probe site  4. Every 4-6 hours:  If on nasal continuous positive airway pressure, respiratory therapy assess nares and determine need for appliance change or resting period.   9/8/2022 1031 by Ana Jameson RN  Outcome: Progressing  9/8/2022 0140 by Audrey Yu RN  Outcome: Progressing

## 2022-09-08 NOTE — PROGRESS NOTES
Physical Therapy  Facility/Department: [facility] CAR 2- STEPDOWN  Daily Treatment note    Name: Cherri Ramey  : 3/21/1930  MRN: [id number]  Date of Service: 2022    Discharge Recommendations:  Patient would benefit from continued therapy after discharge, Patient able to tolerate 3hrs of therapy a day   PT Equipment Recommendations  Equipment Needed: No      Patient Diagnosis(es): The encounter diagnosis was Closed displaced intertrochanteric fracture of left femur, initial encounter ([facility] [address] Past Medical History:  has a past medical history of Adenomatous polyp, Cataracts, bilateral, Combined forms of age-related cataract of both eyes, Corneal scar, right eye, Cystocele, Dementia ([address] Difficulty controlling anger, Dyslipidemia, GERD (gastroesophageal reflux disease), Goiter, Hiatal hernia, Hypertension, Hypokalemia, Hypothyroidism, Impaired fasting glucose, Malignant melanoma in situ ([address] Migraines, Murmur, functional, Osteopenia, Posterior vitreous detachment of both eyes, Primary open angle glaucoma (POAG) of both eyes, moderate stage, Sciatica, Skin cancer, Syncope and collapse, Tremor, 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 Past Surgical History:  has a past surgical history that includes other surgical history (2008); Cholecystectomy; Colonoscopy (2010); Mohs surgery (10/07/2009); Vein Surgery (2009); Colonoscopy (2002); Cystocele repair (1999); Appendectomy; Dilation and curettage of uterus; other surgical history (1988); Hysterectomy (1995); Upper gastrointestinal endoscopy (2015); Intracapsular cataract extraction (Left, 2020); Intracapsular cataract extraction (Right, 10/08/2020); Hip fracture surgery (Right, 2021); Femur fracture surgery (Left, 2022); and Femur fracture surgery (Left, 2022).     Assessment   Body Structures, Functions, Activity Limitations Requiring Skilled Therapeutic Intervention: Decreased endurance;Decreased ROM; Decreased strength;Decreased safe awareness;Decreased balance;Decreased cognition;Decreased coordination  Assessment: Pt continue to demo mild progress this date with fuctional mobility, requiring overall MOD-MAX  A x2 for bed mobs and transfers, Stood x4mins in SS weight shifting , Pt remains motivated and would benefit from instense therapy to address deficits . Therapy Prognosis: Fair  History: Demantia  Activity Tolerance  Activity Tolerance: Patient limited by fatigue;Patient limited by pain; Patient limited by endurance     Plan   Plan  Plan: 6-7 times per week  Current Treatment Recommendations: Strengthening, ROM, Balance training, Functional mobility training, Transfer training, Endurance training, Return to work related activity, Home exercise program, Safety education & training, Patient/Caregiver education & training, Positioning, Wound care, Therapeutic activities  Safety Devices  Type of Devices: Patient at risk for falls, Gait belt, Call light within reach, Nurse notified, Left in bed, Bed alarm in place  Restraints  Restraints Initially in Place: No     Restrictions  Restrictions/Precautions  Restrictions/Precautions: Weight Bearing  Implants present? :  (B Hip  surgery)  Lower Extremity Weight Bearing Restrictions  Left Lower Extremity Weight Bearing: Weight Bearing As Tolerated  Position Activity Restriction  Other position/activity restrictions: S/P Left Hip CMN 9/4/22. Subjective   General  Family / Caregiver Present: No  Follows Commands: Impaired  General Comment  Comments: Pt kept repeating that she feels like she need to have BM. transfered pt to toilet seat and she sat for ~8 mins without success. Returend to recliner , she requested assit that she need to have BM, pt transfer to commode and just a little came out wit a lot of effort noted. RN notified. Pt return to supine as she was uncomfortable up in the recliner .  Pt reports she feels Hip/knee ROM to facilate sit<>stand with increased independence. Patient Goals   Patient goals : family's goal-improve mobility, pt able to walk       Education  Patient Education  Education Given To: Family; Patient  Education Provided: Role of Therapy;Plan of Care;Transfer Training;Precautions; Family Education;Home Exercise Program;IADL Safety  Education Method: Verbal  Barriers to Learning: Cognition  Education Outcome: Continued education needed      Therapy Time   Individual Concurrent Group Co-treatment   Time In 1010         Time Out 1106         Minutes 56         Timed Code Treatment Minutes: 38 Minutes (co-treat with OT)       Mary Harry, PTA

## 2022-09-08 NOTE — PROGRESS NOTES
PROGRESS NOTE          PATIENT NAME: Jaquelyn Schaumann  MEDICAL RECORD NO. [id number]  DATE: 9/8/2022  SURGEON: Sumit Yañez MD  PRIMARY CARE PHYSICIAN: May Healy MD    HD: # 5    ASSESSMENT    Patient Active Problem List   Diagnosis    Dyslipidemia    Osteopenia    Impaired fasting glucose    Adenomatous polyp    Essential hypertension    Hypothyroidism    Sciatica    GERD (gastroesophageal reflux disease)    Visual disturbance    Malignant melanoma in situ ([address]    Difficulty controlling anger    Primary open angle glaucoma (POAG) of both eyes, moderate stage    PVD (posterior vitreous detachment), both eyes    Combined forms of age-related cataract of both eyes    Dementia with behavioral disturbance (HCC)    Tremor    Essential tremor    Dizziness    Chronic cerebral ischemia    Acquired cerebral atrophy ([address]    Cerebral infarction ([address]    Anxiety    Atrial fibrillation ([address]    Closed displaced intertrochanteric fracture of left femur (HCC)    Acute ST segment elevation myocardial infarction ([address]    Heart murmur    Migraine    Female bladder prolapse    Falls    Hiatal hernia    Hypercholesterolemia    Coronary arteriosclerosis    Cataract    Closed right hip fracture, initial encounter ([address]    Heart failure, unspecified HF chronicity, unspecified heart failure type ([address]    Chronic renal disease, stage III ([address] [[id number]]    Pseudophakia of both eyes    Nondisp intertroch fx left femur, init for opn fx type I/2 [facility])       MEDICAL DECISION MAKING AND PLAN    Traumatic, closed L intertrochanteric fracture of femur with subtrochanteric extension   POD #4 CMN  Acute blood loss anemia   Continue to trend. 7.7 this morning from 8.1  Acute pain  CMN 9/4/22  WBAT LLE  ASA 81mg qd  F/u Dr. Sadi Whitt 9/21/22  H/o STEMI, HFpEF, HTN, HLD  Hold Brillinta  Norvasc restarted  ASA 81mg qd  Cards sign off 9/5/22  PM&R consulted. Patient appropriate for rehab. Request placed, authorization pending.      Chief Complaint: \"fall\"    SUBJECTIVE    Rodriguez walden has moderately improved. Patient slept well overnight. No acute events reported by nursing. Patient denies any needs at this time. OBJECTIVE  VITALS: Temp: Temp: 98.6 °F (37 °C)Temp  Av.1 °F (36.7 °C)  Min: 97.3 °F (36.3 °C)  Max: 99.3 °F (17.7 °C) BP Systolic (71KWE), JQK:227 , Min:130 , SLT:542   Diastolic (28BUQ), LHP:81, Min:49, Max:70   Pulse Pulse  Av  Min: 73  Max: 101 Resp Resp  Av.7  Min: 14  Max: 21 Pulse ox SpO2  Av.7 %  Min: 92 %  Max: 97 %  General:  Awake, NAD  HEENT:  normocephalic and atraumatic. No scleral icterus. PERR. EOMI  Neck: supple. No JVD. No thyromegaly. Lungs: clear to auscultation. No retractions  Cardiac: RRR without MGR  Abdomen: soft. Nontender. Nondistended, Normoactive Bowel Sounds  Extremities:  No clubbing, cyanosis, or edema x 4. Mild tenderness to palpation L hip. Dressing placed. No saturation. Vasculature: capillary refill < 3 seconds. Palpable LE pulses bilaterally. Skin:  warm and dry. Psych:  Alert and oriented x3. Affect appropriate  Lymph:  No supraclavicular adenopathy. Neurologic:  No focal deficit. No seizures. I/O last 3 completed shifts: In: 840 [P.O.:840]  Out:  [Urine:]    Drain/tube output: In: 840 [P.O.:840]  Out: 1350 [Urine:1350]    LAB:  CBC:   Recent Labs     22  1715 22  0518   WBC  --  14.7*   HGB 8.1* 7.7*   HCT 25.6* 22.7*   MCV  --  95.0   PLT  --  183     BMP:   Recent Labs     22  0518      K 4.2      CO2 29   BUN 29*   CREATININE 0.67   GLUCOSE 119*     COAGS:   No results for input(s): APTT, PROT, INR in the last 72 hours.

## 2022-09-09 VITALS
TEMPERATURE: 97.9 F | BODY MASS INDEX: 24.89 KG/M2 | RESPIRATION RATE: 19 BRPM | DIASTOLIC BLOOD PRESSURE: 77 MMHG | OXYGEN SATURATION: 96 % | HEIGHT: 64 IN | HEART RATE: 78 BPM | SYSTOLIC BLOOD PRESSURE: 89 MMHG

## 2022-09-09 PROCEDURE — 6370000000 HC RX 637 (ALT 250 FOR IP): Performed by: NURSE PRACTITIONER

## 2022-09-09 PROCEDURE — 97535 SELF CARE MNGMENT TRAINING: CPT

## 2022-09-09 PROCEDURE — 97530 THERAPEUTIC ACTIVITIES: CPT

## 2022-09-09 PROCEDURE — 6370000000 HC RX 637 (ALT 250 FOR IP): Performed by: STUDENT IN AN ORGANIZED HEALTH CARE EDUCATION/TRAINING PROGRAM

## 2022-09-09 PROCEDURE — 6360000002 HC RX W HCPCS: Performed by: STUDENT IN AN ORGANIZED HEALTH CARE EDUCATION/TRAINING PROGRAM

## 2022-09-09 PROCEDURE — 97110 THERAPEUTIC EXERCISES: CPT

## 2022-09-09 PROCEDURE — 99232 SBSQ HOSP IP/OBS MODERATE 35: CPT | Performed by: PHYSICAL MEDICINE & REHABILITATION

## 2022-09-09 RX ORDER — AMLODIPINE BESYLATE 2.5 MG/1
2.5 TABLET ORAL DAILY
OUTPATIENT
Start: 2022-09-09

## 2022-09-09 RX ORDER — ATORVASTATIN CALCIUM 40 MG/1
40 TABLET, FILM COATED ORAL DAILY
OUTPATIENT
Start: 2022-09-09

## 2022-09-09 RX ORDER — ENOXAPARIN SODIUM 100 MG/ML
30 INJECTION SUBCUTANEOUS 2 TIMES DAILY
OUTPATIENT
Start: 2022-09-09

## 2022-09-09 RX ORDER — MEMANTINE HYDROCHLORIDE 5 MG/1
5 TABLET ORAL DAILY
OUTPATIENT
Start: 2022-09-09

## 2022-09-09 RX ORDER — ASPIRIN 81 MG/1
81 TABLET ORAL DAILY
OUTPATIENT
Start: 2022-09-09

## 2022-09-09 RX ORDER — ATORVASTATIN CALCIUM 40 MG/1
40 TABLET, FILM COATED ORAL DAILY
Status: DISCONTINUED | OUTPATIENT
Start: 2022-09-09 | End: 2022-09-10 | Stop reason: HOSPADM

## 2022-09-09 RX ORDER — OLANZAPINE 2.5 MG/1
TABLET ORAL
Qty: 90 TABLET | Refills: 3 | Status: SHIPPED
Start: 2022-09-23 | End: 2022-10-11 | Stop reason: SDUPTHER

## 2022-09-09 RX ORDER — MEMANTINE HYDROCHLORIDE 5 MG/1
5 TABLET ORAL DAILY
Status: DISCONTINUED | OUTPATIENT
Start: 2022-09-09 | End: 2022-09-10 | Stop reason: HOSPADM

## 2022-09-09 RX ORDER — FLUOXETINE HYDROCHLORIDE 20 MG/1
20 CAPSULE ORAL DAILY
Status: DISCONTINUED | OUTPATIENT
Start: 2022-09-09 | End: 2022-09-10 | Stop reason: HOSPADM

## 2022-09-09 RX ORDER — POLYETHYLENE GLYCOL 3350 17 G/17G
17 POWDER, FOR SOLUTION ORAL DAILY
Status: CANCELLED | OUTPATIENT
Start: 2022-09-09

## 2022-09-09 RX ORDER — SENNA PLUS 8.6 MG/1
1 TABLET ORAL NIGHTLY
Status: CANCELLED | OUTPATIENT
Start: 2022-09-09

## 2022-09-09 RX ORDER — LEVOTHYROXINE SODIUM 137 UG/1
137 TABLET ORAL DAILY
OUTPATIENT
Start: 2022-09-09

## 2022-09-09 RX ORDER — LOSARTAN POTASSIUM 100 MG/1
TABLET ORAL
Qty: 90 TABLET | Refills: 3 | Status: SHIPPED
Start: 2022-09-23 | End: 2022-10-07

## 2022-09-09 RX ORDER — LEVOTHYROXINE SODIUM 137 UG/1
137 TABLET ORAL DAILY
Status: DISCONTINUED | OUTPATIENT
Start: 2022-09-09 | End: 2022-09-10 | Stop reason: HOSPADM

## 2022-09-09 RX ORDER — OLANZAPINE 5 MG/1
5 TABLET ORAL NIGHTLY
Status: DISCONTINUED | OUTPATIENT
Start: 2022-09-09 | End: 2022-09-10 | Stop reason: HOSPADM

## 2022-09-09 RX ORDER — FLUOXETINE HYDROCHLORIDE 20 MG/1
20 CAPSULE ORAL DAILY
OUTPATIENT
Start: 2022-09-09

## 2022-09-09 RX ORDER — ATENOLOL 25 MG/1
25 TABLET ORAL DAILY
Status: DISCONTINUED | OUTPATIENT
Start: 2022-09-09 | End: 2022-09-10 | Stop reason: HOSPADM

## 2022-09-09 RX ORDER — LOSARTAN POTASSIUM 50 MG/1
100 TABLET ORAL DAILY
Status: DISCONTINUED | OUTPATIENT
Start: 2022-09-09 | End: 2022-09-10 | Stop reason: HOSPADM

## 2022-09-09 RX ADMIN — ACETAMINOPHEN 1000 MG: 500 TABLET ORAL at 06:13

## 2022-09-09 RX ADMIN — DESMOPRESSIN ACETATE 40 MG: 0.2 TABLET ORAL at 13:46

## 2022-09-09 RX ADMIN — LEVOTHYROXINE SODIUM 137 MCG: 137 TABLET ORAL at 13:46

## 2022-09-09 RX ADMIN — Medication 81 MG: at 13:46

## 2022-09-09 RX ADMIN — AMLODIPINE BESYLATE 2.5 MG: 2.5 TABLET ORAL at 13:46

## 2022-09-09 RX ADMIN — ENOXAPARIN SODIUM 30 MG: 100 INJECTION SUBCUTANEOUS at 13:46

## 2022-09-09 RX ADMIN — MEMANTINE HYDROCHLORIDE 5 MG: 5 TABLET, FILM COATED ORAL at 13:46

## 2022-09-09 RX ADMIN — FLUOXETINE HYDROCHLORIDE 20 MG: 20 CAPSULE ORAL at 13:46

## 2022-09-09 RX ADMIN — ACETAMINOPHEN 1000 MG: 500 TABLET ORAL at 13:46

## 2022-09-09 NOTE — PLAN OF CARE
Problem: Discharge Planning  Goal: Discharge to home or other facility with appropriate resources  Outcome: Progressing  Flowsheets (Taken 9/8/2022 1600 by Wyatt Cevallos RN)  Discharge to home or other facility with appropriate resources: Identify barriers to discharge with patient and caregiver     Problem: Pain  Goal: Verbalizes/displays adequate comfort level or baseline comfort level  Outcome: Progressing     Problem: Safety - Adult  Goal: Free from fall injury  Outcome: Progressing     Problem: ABCDS Injury Assessment  Goal: Absence of physical injury  Outcome: Progressing     Problem: Skin/Tissue Integrity  Goal: Absence of new skin breakdown  Description: 1. Monitor for areas of redness and/or skin breakdown  2. Assess vascular access sites hourly  3. Every 4-6 hours minimum:  Change oxygen saturation probe site  4. Every 4-6 hours:  If on nasal continuous positive airway pressure, respiratory therapy assess nares and determine need for appliance change or resting period.   Outcome: Progressing

## 2022-09-09 NOTE — PROGRESS NOTES
Called [facility]etr[facility] to determine status of precert. Left vm with request for a return call. Rcv'd call from [facility] with denial for ARU d/t \"no medical complexity requiring a PMR physician 3 times/wk\". Notified Dr Tyler Kim via PS and requested if P2P is appropriate at this time. Notified CHICA Alex CM, per Dr Yamilka Buitrago is not approp at this time as pt is lower LOF and medical issues have stabilized.

## 2022-09-09 NOTE — PROGRESS NOTES
Physical Therapy  Facility/Department: [facility] CAR 2- STEPDOWN  Daily Treatment Note    Name: Nam Luevano  : 3/21/1930  MRN: [id number]  Date of Service: 2022    Discharge Recommendations:  Patient would benefit from continued therapy after discharge,   PT Equipment Recommendations  Equipment Needed: No      Patient Diagnosis(es): The encounter diagnosis was Closed displaced intertrochanteric fracture of left femur, initial encounter ([facility] [address] Past Medical History:  has a past medical history of Adenomatous polyp, Cataracts, bilateral, Combined forms of age-related cataract of both eyes, Corneal scar, right eye, Cystocele, Dementia ([address] Difficulty controlling anger, Dyslipidemia, GERD (gastroesophageal reflux disease), Goiter, Hiatal hernia, Hypertension, Hypokalemia, Hypothyroidism, Impaired fasting glucose, Malignant melanoma in situ ([address] Migraines, Murmur, functional, Osteopenia, Posterior vitreous detachment of both eyes, Primary open angle glaucoma (POAG) of both eyes, moderate stage, Sciatica, Skin cancer, Syncope and collapse, Tremor, 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 Past Surgical History:  has a past surgical history that includes other surgical history (2008); Cholecystectomy; Colonoscopy (2010); Mohs surgery (10/07/2009); Vein Surgery (2009); Colonoscopy (2002); Cystocele repair (1999); Appendectomy; Dilation and curettage of uterus; other surgical history (1988); Hysterectomy (1995); Upper gastrointestinal endoscopy (2015); Intracapsular cataract extraction (Left, 2020); Intracapsular cataract extraction (Right, 10/08/2020); Hip fracture surgery (Right, 2021); Femur fracture surgery (Left, 2022); and Femur fracture surgery (Left, 2022).     Assessment   Body Structures, Functions, Activity Limitations Requiring Skilled Therapeutic Intervention: Decreased endurance;Decreased ROM;Decreased strength;Decreased safe awareness;Decreased balance;Decreased cognition;Decreased coordination  Assessment: Pt continue to demo mild progress this date with functional mobility, requiring overall MOD-MAX  A x2 for bed mobs and transfers. Pt stood at [address] [place] [address] today with Mohinder-modA x 2 to maintain. Unsafe to attempt ambulation today. Pt  would benefit from intense therapy to address deficits . Therapy Prognosis: Fair  History: Dementia  Barriers to Learning: Cognition  Activity Tolerance  Activity Tolerance: Patient limited by fatigue;Patient limited by pain; Patient limited by endurance     Plan   Plan  Plan: 6-7 times per week  Current Treatment Recommendations: Strengthening, ROM, Balance training, Functional mobility training, Transfer training, Endurance training, Return to work related activity, Home exercise program, Safety education & training, Patient/Caregiver education & training, Positioning, Wound care, Therapeutic activities  Safety Devices  Type of Devices: Patient at risk for falls, Gait belt, Call light within reach, Nurse notified, Bed alarm in place, Left in chair, Chair alarm in place  Restraints  Restraints Initially in Place: No     Restrictions  Restrictions/Precautions  Restrictions/Precautions: Weight Bearing  Implants present? :  (B Hip  surgery)  Lower Extremity Weight Bearing Restrictions  Left Lower Extremity Weight Bearing: Weight Bearing As Tolerated  Position Activity Restriction  Other position/activity restrictions: S/P Left Hip CMN 9/4/22. Subjective   General  Chart Reviewed: Yes  Response To Previous Treatment: Patient with no complaints from previous session. Family / Caregiver Present: No  Subjective  Subjective: RN and pt agreeable to PT.  Pt supine in bed upon arrival , pleasant and cooperative t/o . denies pain at rest but c/o pain during mobility     Vision/Hearing  Vision  Vision: Impaired  Vision Exceptions: Wears glasses at all times  Hearing  Hearing: Within functional limits    Cognition   Orientation  Overall Orientation Status: Impaired  Orientation Level: Oriented to person;Disoriented to place; Disoriented to time;Disoriented to situation  Cognition  Overall Cognitive Status: Exceptions  Arousal/Alertness: Appropriate responses to stimuli  Following Commands: Follows one step commands with increased time; Follows one step commands with repetition  Attention Span: Difficulty attending to directions; Difficulty dividing attention  Memory: Decreased recall of biographical Information;Decreased recall of precautions;Decreased recall of recent events;Decreased short term memory;Decreased long term memory  Safety Judgement: Decreased awareness of need for assistance;Decreased awareness of need for safety  Problem Solving: Assistance required to identify errors made;Assistance required to correct errors made;Assistance required to generate solutions;Assistance required to implement solutions  Insights: Decreased awareness of deficits  Initiation: Requires cues for all  Sequencing: Requires cues for all  Cognition Comment: Pt has dementia     Objective   Bed mobility  Supine to Sit: Moderate assistance;2 Person assistance;Maximum assistance  Sit to Supine: Unable to assess (pt retired to recliner)  Scooting: Maximal assistance  Bed Mobility Comments: Assessed with HOB elevated and use of rail with B UEs. Increased time and effort. Sat EOB with R lateral lean with CGA to maintain and verbal cues to correct  Transfers  Sit to Stand: Moderate Assistance;Maximum Assistance;2 Person Assistance  Stand to sit: Moderate Assistance;2 Person Assistance;Maximum Assistance  Bed to Chair: Dependent/Total ([facility] to commode/chair)  Comment: Multiple sit<->stands t/o session. Sit<->stand x 2 trials at  and other sit<->stands at [address] [place] [address] with min-modA x 2 to maintain  Ambulation  Comments: unable to advance lift L LE and R LE during static standing so didn't attempt ambulation. Balance  Posture: Fair  Sitting - Static: Fair  Sitting - Dynamic: Fair;-  Standing - Static: Poor;+  Comments: Standiing with RW and SS. A/AROM Exercises: Total Hip Arthroplasty Exercise Program: Quad sets, glut sets, ankle pumps, heel slides, short arc quads, hip abduction slides. Reps:  x10 to L LE  Dynamic Standing Balance Exercises: weightshifting at [address] [place] [address] as pt is leaning heavily to R LE/RUE; attempted B LE marching during weightshifting; pt unable to lift B LEs,  verbal and tactiles cues t/o for postural awareness with min-modA x2 to maintain        OutComes Score     AM-PAC Score  AM-PAC Inpatient Mobility Raw Score : 9 (09/09/22 1031)  AM-PAC Inpatient T-Scale Score : 30.55 (09/09/22 1031)  Mobility Inpatient CMS 0-100% Score: 81.38 (09/09/22 1031)  Mobility Inpatient CMS G-Code Modifier : CM (09/09/22 1031)     Goals  Short Term Goals  Time Frame for Short term goals: 6 to 7 visits. Short term goal 1: Pt able to perform supine<>sit mod A x 2  Short term goal 2: Pt able to perform sit<>stnad at mod A x 2 with bimal stedy or rolling walker  Short term goal 3: Pt to improve standing tolernace in bimal stedy for 2 to 4 minutes to improve WB tolerance to L LE  Short term goal 4: Pt able to tolerate ROM ex's L LE to improve Hip/knee ROM to facilate sit<>stand with increased independence. Patient Goals   Patient goals : family's goal-improve mobility, pt able to walk       Education  Patient Education  Education Given To: Patient  Education Provided: Role of Therapy;Plan of Care;Precautions; Home Exercise Program;IADL Safety;Transfer Training  Education Method: Verbal  Barriers to Learning: Cognition  Education Outcome: Continued education needed      Therapy Time   Individual Concurrent Group Co-treatment   Time In 6639         Time Out 9736         Minutes 49         Timed Code Treatment Minutes: 32 Minutes (co treatment with [address])       Isabel Nguyen PTA

## 2022-09-09 NOTE — CARE COORDINATION
Notified by Joelle Tanner at [facility] that insurance has denied. Notified patient's sons, Onel Silva and John Miles. They would like patient to return to [facility]. They would like to know how much therapy she can receive there that would be covered by insurance and would also be able to hire someone privately. Spoke to patient's nurse, Zia Vincent, at [facility]. She will check and call CM back. If they are unable to provide the care that the patient needs, the family would like patient to go to [address] [place] [address] but they really don't want patient to go to SNF. Referral sent    [phone] notified by Arabella Chan at [facility] that they are able to accept patient. They have lift equipment to get her up. They will provide therapy 3 days a week of PT and 2 days a week of OT. Family can arrange private pay for therapy on other days. Notified patient's son, John Miles. He is agreeable. Transportation request sent to [address] [place] [address] for [employer]. \" Patient will need scripts. Notified Richard Fortune RN    [phone] patient scheduled for transportation at 8:30pm. Notified Ebonie Cordova RN at [facility]. Voicemail left for patient's son, John Miles. AVS faxed to [facility] at [phone] notified by Ganesh at [facility] that patient will be picked up by [employer] at 7:30pm. Notified Ebonie Cordova at [facility].  Notified patient and son, John Miles    Discharge [address] [facility]  Clinical Case Management Department  Written by: Paola Brannon RN    Patient Name: Gwendolyn Vigil  Attending Provider: Naseem Segundo MD  Admit Date: 9/3/2022  3:13 PM  MRN: [id number]  Account: [de-identified]                     : 3/21/1930  Discharge Date: 2022      Disposition: [facility]    Paola Brannon RN

## 2022-09-09 NOTE — PROGRESS NOTES
Comprehensive Nutrition Assessment    Type and Reason for Visit:  RD Nutrition Re-Screen/LOS    Nutrition Recommendations/Plan:   Continue current diet and ONS. Continue to monitor weight, labs and intake. Malnutrition Assessment:  Malnutrition Status:  No malnutrition (09/09/22 1150)        Nutrition Assessment:    Patient admitted following fall with fracture. PMH dementia. Noted confusion, unsure if she has been eating. Per RN, 26-50% of meals is normal for patient. Receiving ONS TID. Nutrition Related Findings:    last BM 9/9, no new labs, meds reviewed, include glycolax, senna. Wound Type: Surgical Incision       Current Nutrition Intake & Therapies:    Average Meal Intake: 26-50%  Average Supplements Intake: 26-50%  ADULT DIET; Regular  ADULT ORAL NUTRITION SUPPLEMENT; Breakfast, Lunch, Dinner; Standard High Calorie/High Protein Oral Supplement    Anthropometric Measures:  Height: 5' 4\" (162.6 cm)  Ideal Body Weight (IBW): 120 lbs (55 kg)       Current Body Weight: 145 lb (65.8 kg), 120.8 % IBW. Weight Source: Other (Comment) (estimated)  Current BMI (kg/m2): 24.9                          BMI Categories: Normal Weight (BMI 18.5-24. 9)    Estimated Daily Nutrient Needs:  Energy Requirements Based On: Kcal/kg  Weight Used for Energy Requirements: Current  Energy (kcal/day): 1800 kcal  Weight Used for Protein Requirements: Current  Protein (g/day): 70-75 g  Method Used for Fluid Requirements: 1 ml/kcal  Fluid (ml/day): 1700 ml or per MD    Nutrition Diagnosis:   No nutrition diagnosis at this time related to   as evidenced by      Nutrition Interventions:   Food and/or Nutrient Delivery: Continue Current Diet, Continue Oral Nutrition Supplement  Nutrition Education/Counseling: No recommendation at this time  Coordination of Nutrition Care: Continue to monitor while inpatient  Plan of Care discussed with: Patient    Goals:  Previous Goal Met: Progressing toward Goal(s)  Goals: PO intake 50% or greater, prior to discharge       Nutrition Monitoring and Evaluation:      Food/Nutrient Intake Outcomes: Food and Nutrient Intake, Supplement Intake  Physical Signs/Symptoms Outcomes: Biochemical Data, GI Status, Skin, Weight    Discharge Planning:    Continue Oral Nutrition Supplement     Gunnar Chaudhari, [address] [facility] [address]  Contact: [phone]

## 2022-09-09 NOTE — PROGRESS NOTES
Physical Medicine & Rehabilitation  Progress Note        Admitting Physician: Jolly Hirsch MD    Primary Care Provider: Fabby Arzola MD     Chief Complaint: fALL    Brief History: This is a follow up to the initial consult on MsAncelmo Vivar is a 80 y.o. right handed female who was admitted to [facility] on 9/3/2022 with Other (Hip fx. here for cardiology clearance for surgery. Unable to get cardiology clearance until at least Tuesday at [facility])    Patient admitted from her Assisted Living apartment after mechanical fall from standing height. She had L hip pain. She denied hitting her head or LOC. She is on Brilinta. Initial GCS 15. Orthopedics consulted for L IT fracture. Dr. Humberto Brennan performed L CMN fixation on 9/4/22. WBAT LLE. Recommending DVT prophylaxis x 4 weeks with ASA 81 mg BID then can resume daily home dose. Cardiology consulted for hx STEMI 2020 treated with PCI, on Brilinta and ASA. Recommending resuming Brilinta and ASA when approved by ortho. Restarted amlodipine for poor BP control. For outpatient follow up with her primary cardiologist.    Subjective: The patient is resting comfortably. The patient's mobility is improved. She is pleasantly confused. She denies pain. Discussed disposition plan with her son today - family working on SNF placement in a place familiar to patient after insurance denied acute rehab.        ROS:  Constitutional: negative for anorexia, chills, fatigue, fevers, sweats and weight loss  Eyes: negative for redness and visual disturbance  Ears, nose, mouth, throat, and face: negative for earaches, epistaxis, sore throat and tinnitus  Respiratory: negative for cough and shortness of breath  Cardiovascular: negative for chest pain, dyspnea and palpitations  Gastrointestinal: negative for abdominal pain, change in bowel habits, constipation, nausea and vomiting  Genitourinary:negative for dysuria, frequency, hesitancy and urinary incontinence  Integument/breast: negative for pruritus and rash  Musculoskeletal: positive for stiff joints  Neurological: negative for dizziness, headaches   Behavioral/Psych: negative for decreased appetite, depression     Rehabilitation:   Progressing in therapies. PT:    Bed Mobility-  Bed mobility  Supine to Sit: Moderate assistance;2 Person assistance  Sit to Supine: Unable to assess  Scooting: Moderate assistance  Bed Mobility Comments: Pt continue demo improved effort this date . Max cueing for sequencing. Bed Mobility Training  Bed Mobility Training: Yes  Overall Level of Assistance: Maximum assistance, Assist X2  Interventions: Manual cues, Safety awareness training, Verbal cues, Tactile cues, Visual cues  Supine to Sit: Maximum assistance, Additional time, Assist X2  Sit to Supine: Maximum assistance, Assist X2, Additional time  Scooting: Assist X2, Maximum assistance     Transfers-  Transfers  Sit to Stand: Moderate Assistance;2 Person Assistance;Maximum Assistance  Stand to sit: Moderate Assistance;2 Person Assistance;Maximum Assistance  Bed to Chair: Dependent/Total (SS to toilet seat and chair)  Comment: STS x4 from EOB and toilet seat, Max cuing for hand placement and sequencing . Transfer Training  Transfer Training: Yes  Overall Level of Assistance: Assist X2, Additional time, Maximum assistance (EOB->stand->toilet->recliner->BSC->EOB->supine, mod-max A x2 in bimal steady.)  Interventions: Visual cues, Verbal cues, Safety awareness training, Manual cues  Sit to Stand: Moderate assistance, Assist X2, Maximum assistance  Stand to Sit: Moderate assistance, Assist X2, Additional time, Maximum assistance  Bed to Chair: Total assistance (TOTAL assist with use of bimal stedy)  Toilet Transfer: Maximum assistance, Assist X2     Ambulation-  Ambulation  Device: Rolling Walker  Assistance: Maximum assistance;2 Person assistance  Quality of Gait: Increase difficulty for pt to advance LLE.   Gait rhythm. No murmurs, rubs, or gallops. ABD: soft, non-distended, non-tender. BS+ and equal.  NEURO: A&O to person. Sensation intact to light touch. MSK: Functional ROM BUEs. Muscle tone and bulk are normal bilaterally. Strength 4+/5 key muscles BUEs. R hip flexion 3/5. R plantar and dorsiflexion 3/5. L hip flexion 1/5, L knee extension 2/5, L plantar and dorsiflexion 3/5   EXT: No calf tenderness to palpation or edema BLEs. SKIN: Warm dry and intact with good turgor. PSYCH: Mood WNL. Affect WNL. Appropriately interactive. Current Medications:   Current Facility-Administered Medications: [Held by provider] atenolol (TENORMIN) tablet 25 mg, 25 mg, Oral, Daily  atorvastatin (LIPITOR) tablet 40 mg, 40 mg, Oral, Daily  FLUoxetine (PROZAC) capsule 20 mg, 20 mg, Oral, Daily  levothyroxine (SYNTHROID) tablet 137 mcg, 137 mcg, Oral, Daily  [Held by provider] losartan (COZAAR) tablet 100 mg, 100 mg, Oral, Daily  memantine (NAMENDA) tablet 5 mg, 5 mg, Oral, Daily  [Held by provider] OLANZapine (ZYPREXA) tablet 5 mg, 5 mg, Oral, Nightly  senna (SENOKOT) tablet 8.6 mg, 1 tablet, Oral, Nightly  amLODIPine (NORVASC) tablet 2.5 mg, 2.5 mg, Oral, Daily  enoxaparin Sodium (LOVENOX) injection 30 mg, 30 mg, SubCUTAneous, BID  aspirin EC tablet 81 mg, 81 mg, Oral, Daily  sodium chloride flush 0.9 % injection 5-40 mL, 5-40 mL, IntraVENous, PRN  acetaminophen (TYLENOL) tablet 1,000 mg, 1,000 mg, Oral, 3 times per day  polyethylene glycol (GLYCOLAX) packet 17 g, 17 g, Oral, Daily      Diagnostics:     CBC: No results for input(s): WBC, RBC, HGB, HCT, MCV, RDW, PLT in the last 72 hours. BMP:  No results for input(s): GLUCOSE, BUN, CREATININE, CALCIUM, NA, K, CL, CO2, ANIONGAP, LABGLOM, GFRAA, GFR in the last 72 hours. HbA1c:   Lab Results   Component Value Date    LABA1C 5.8 12/30/2019     BNP: No results for input(s): BNP in the last 72 hours. PT/INR: No results for input(s): PROTIME, INR in the last 72 hours.   APTT: No results for input(s): APTT in the last 72 hours. CARDIAC ENZYMES: No results for input(s): CKMB, CKMBINDEX, TROPONINT, TROPHS, TROPII in the last 72 hours. Invalid input(s): CKTOTAL;3   FASTING LIPID PANEL:  Lab Results   Component Value Date    CHOL 115 06/16/2022    HDL 62 06/16/2022    TRIG 58 06/16/2022     LIVER PROFILE: No results for input(s): AST, ALT, ALB, BILIDIR, BILITOT, ALKPHOS in the last 72 hours. Radiology:        Impression/Plan:    L hip femur fracture: s/p CMN 9/4 by Dr. Dave Sidhu. WBAT  Acute blood loss anemia: Hb low   Dementia  HTN/HLD  Hypothyroidism  GERD  Essential tremor  Hx CVA with acquired atrophy  Hx STEMI, Heart failure with preserved EF: Holding Brilinta - need start date. On ASA BID for DVT prophylaxis x4 weeks per orthopedics then to resume 81 mg daily. Recommendation:  Insurance denied acute rehab. Family working on SNF placement  DVT Prophylaxis: on Lovenox        Electronically signed by Piper Griffin MD on 9/9/2022 at 10:10 AM       This note is created with the assistance of a speech recognition program.  While intending to generate a document that actually reflects the content of the visit, the document can still have some errors including those of syntax and sound a like substitutions which may escape proof reading. In such instances, actual meaning can be extrapolated by contextual diversion.

## 2022-09-09 NOTE — PLAN OF CARE
Problem: Discharge Planning  Goal: Discharge to home or other facility with appropriate resources  9/9/2022 1942 by Juan Padilla RN  Outcome: Completed  9/9/2022 1942 by Juan Padilla RN  Outcome: Adequate for Discharge     Problem: Pain  Goal: Verbalizes/displays adequate comfort level or baseline comfort level  9/9/2022 1942 by Juan Padilla RN  Outcome: Completed  9/9/2022 1942 by Juan Padilla RN  Outcome: Adequate for Discharge     Problem: Safety - Adult  Goal: Free from fall injury  9/9/2022 1942 by Juan Padilla RN  Outcome: Completed  9/9/2022 1942 by Juan Padilla RN  Outcome: Adequate for Discharge     Problem: ABCDS Injury Assessment  Goal: Absence of physical injury  9/9/2022 1942 by Juan Padilla RN  Outcome: Completed  9/9/2022 1942 by Juan Padilla RN  Outcome: Adequate for Discharge     Problem: Skin/Tissue Integrity  Goal: Absence of new skin breakdown  Description: 1. Monitor for areas of redness and/or skin breakdown  2. Assess vascular access sites hourly  3. Every 4-6 hours minimum:  Change oxygen saturation probe site  4. Every 4-6 hours:  If on nasal continuous positive airway pressure, respiratory therapy assess nares and determine need for appliance change or resting period.   9/9/2022 1942 by Juan Padilla RN  Outcome: Completed  9/9/2022 1942 by Juan Padilla RN  Outcome: Adequate for Discharge

## 2022-09-09 NOTE — PROGRESS NOTES
PROGRESS NOTE          PATIENT NAME: Nghia Shelton  MEDICAL RECORD NO. [id number]  DATE: 2022  SURGEON: Sunshine Garza MD  PRIMARY CARE PHYSICIAN: Seamus Doran MD    HD: # 6    ASSESSMENT    Patient Active Problem List   Diagnosis    Dyslipidemia    Osteopenia    Impaired fasting glucose    Adenomatous polyp    Essential hypertension    Hypothyroidism    Sciatica    GERD (gastroesophageal reflux disease)    Visual disturbance    Malignant melanoma in situ ([address]    Difficulty controlling anger    Primary open angle glaucoma (POAG) of both eyes, moderate stage    PVD (posterior vitreous detachment), both eyes    Combined forms of age-related cataract of both eyes    Dementia with behavioral disturbance (HCC)    Tremor    Essential tremor    Dizziness    Chronic cerebral ischemia    Acquired cerebral atrophy ([address]    Cerebral infarction ([address]    Anxiety    Atrial fibrillation ([address]    Closed displaced intertrochanteric fracture of left femur (HCC)    Acute ST segment elevation myocardial infarction ([address]    Heart murmur    Migraine    Female bladder prolapse    Falls    Hiatal hernia    Hypercholesterolemia    Coronary arteriosclerosis    Cataract    Closed right hip fracture, initial encounter ([address]    Heart failure, unspecified HF chronicity, unspecified heart failure type ([address]    Chronic renal disease, stage III ([address] [[id number]]    Pseudophakia of both eyes    Nondisp intertroch fx left femur, init for opn fx type I/2 [facility])       MEDICAL DECISION MAKING AND PLAN    Traumatic, closed L intertrochanteric fracture of femur with subtrochanteric extension   CMN  Acute blood loss anemia  Acute pain  CMN 22  WBAT LLE  ASA 81mg qd  F/u Dr. Candelario [place] 22  H/o STEMI, HFpEF, HTN, HLD  PM&R plan    Chief Complaint: \"fall\"    SUBJECTIVE    Nghia Shelton slept well, comfortable      OBJECTIVE  VITALS: Temp: Temp: 98.2 °F (36.8 °C)Temp  Av.2 °F (36.8 °C)  Min: 97.8 °F (36.6 °C)  Max: 98.4 °F (62.2 °C) BP Systolic (24hrs), Av , Min:133 , GUX:837   Diastolic (92WTC), PDF:54, Min:51, Max:65   Pulse Pulse  Av.9  Min: 78  Max: 89 Resp Resp  Av.2  Min: 17  Max: 20 Pulse ox SpO2  Av.7 %  Min: 92 %  Max: 98 %  General:  Awake, NAD, slight confusion  HEENT:  normocephalic and atraumatic. No scleral icterus. PERR. EOMI  Extremities:  No clubbing, cyanosis, or edema x 4. Mild tenderness to palpation L hip. Dressing placed. No saturation. I/O last 3 completed shifts: In: 700 [P.O.:700]  Out: 2250 [Urine:2250]    Drain/tube output: In: 700 [P.O.:700]  Out: 1500 [Urine:1500]    LAB:  CBC:   No results for input(s): WBC, HGB, HCT, MCV, PLT in the last 72 hours. BMP:   No results for input(s): NA, K, CL, CO2, BUN, CREATININE, GLUCOSE in the last 72 hours. COAGS:   No results for input(s): APTT, PROT, INR in the last 72 hours.

## 2022-09-10 NOTE — PROGRESS NOTES
Patient discharged to [facility] living. Patient discharge instructions given to transport and report called to Nurse Judith Max. Patient IV access removed. All belongings given to son. Patient stated she had no questions or concerns.

## 2022-09-12 ENCOUNTER — TELEPHONE (OUTPATIENT)
Dept: INTERNAL MEDICINE | Age: 87
End: 2022-09-12

## 2022-09-12 ENCOUNTER — TELEPHONE (OUTPATIENT)
Dept: CARDIOLOGY | Age: 87
End: 2022-09-12

## 2022-09-12 ENCOUNTER — CARE COORDINATION (OUTPATIENT)
Dept: CASE MANAGEMENT | Age: 87
End: 2022-09-12

## 2022-09-12 DIAGNOSIS — S72.145B: Primary | ICD-10-CM

## 2022-09-12 PROCEDURE — 1111F DSCHRG MED/CURRENT MED MERGE: CPT | Performed by: INTERNAL MEDICINE

## 2022-09-12 RX ORDER — CLOBETASOL PROPIONATE 0.05 G/ML
SPRAY TOPICAL NIGHTLY PRN
COMMUNITY

## 2022-09-12 RX ORDER — CICLOPIROX 1 G/100ML
SHAMPOO TOPICAL WEEKLY
COMMUNITY

## 2022-09-12 NOTE — TELEPHONE ENCOUNTER
[facility] Transitions Initial Follow Up Call    Outreach made within 2 business days of discharge: Yes    Patient: Livier Rae Patient : 3/21/1930   MRN: [id number]  Reason for Admission: There are no discharge diagnoses documented for the most recent discharge. Discharge Date: 22       Spoke with: GP nurse Yoel Dyson)    Discharge department/facility: [place] [address] Interactive Patient Contact:  Was patient able to fill all prescriptions: Yes  Was patient instructed to bring all medications to the follow-up visit: NA- will follow up at facility [facility])  Is patient taking all medications as directed in the discharge summary?  Yes  Does patient understand their discharge instructions: Yes  Does patient have questions or concerns that need addressed prior to 7-14 day follow up office visit: no    Scheduled appointment with PCP within 7-14 days    Follow Up  Future Appointments   Date Time Provider Tila Bojorquez   2022  2:15 PM Derik Leiva DO [address] [address]   10/7/2022  9:45 AM MD VICKI Hardy [facility]   2023 10:00 AM MD BRITTON Galloway DP       Des Kennedy LPN

## 2022-09-12 NOTE — CARE COORDINATION
[facility] Transitions Initial Follow Up Call    Call within 2 business days of discharge: Yes    Patient: Evi Ireland Patient : 3/21/1930   MRN: <[id number]>  Reason for Admission: Nondisp intertroch fx left femur, init for opn fx type I/2  Discharge Date: 22 RARS: Readmission Risk Score: 13.2      Last Discharge [facility]       Date Complaint Diagnosis Description Type Department Provider    9/3/22 Other Closed displaced intertrochanteric fracture of left femur, initial encounter [facility]) ED to Hosp-Admission (Discharged) (ADMITTED) [facility] 2 Diego Garland MD; Mauri Mayo. .. 9/3/22 Fall; Hip Pain Closed left hip fracture, initial encounter [facility]) ED (TRANSFER) [address] [facility] ED Elle Shipman MD; Jorge Tse. .. Spoke with: [facility] Nurse at [facility] in [address] is doing very well, pain is tolerable she is up in chair eating and d rinking well no issues with bowel or bladder elimination. Nursing staff went over medications and taking all as directed. Will see in house CNP tomorrow has no concerns t present time. Facility: [facility]    Non-face-to-face services provided:  Obtained and reviewed discharge summary and/or continuity of care documents  Transitions of Care Initial Call    Was this an external facility discharge? No     Challenges to be reviewed by the provider   Additional needs identified to be addressed with provider: No  none             Method of communication with provider : none    Advance Care Planning:   Does patient have an Advance Directive: reviewed and current. LPN Care Coordinator contacted the patient by telephone to perform post hospital discharge assessment. Verified name and  with patient as identifiers. Provided introduction to self, and explanation of the LPN CC role. LPN CC reviewed discharge instructions, medical action plan and red flags with patient who verbalized understanding.  Patient given an opportunity to ask questions and does not have any further questions or concerns at this time. Were discharge instructions available to patient? Yes. Reviewed appropriate site of care based on symptoms and resources available to patient including: PCP  Specialist  Home health  Provider home visits. The patient agrees to contact the PCP office for questions related to their healthcare. Medication reconciliation was performed with patient, who verbalizes understanding of administration of home medications. Advised obtaining a 90-day supply of all daily and as-needed medications. Was patient discharged with a pulse oximeter? no    LPN CC provided contact information. Plan for follow-up call in 5-7 days based on severity of symptoms and risk factors.   Plan for next call: symptom management-pain control ambulation     Care Transitions 24 Hour Call    Do you have a copy of your discharge instructions?: Yes  Do you have all of your prescriptions and are they filled?: Yes  Have you been contacted by a [address]?: No  Have you scheduled your follow up appointment?: Yes  How are you going to get to your appointment?: Other  Do you feel like you have everything you need to keep you well at home?: Yes  Care Transitions Interventions         Follow Up  Future Appointments   Date Time Provider Tila Bojorquez   9/19/2022  2:15 PM Nito Madrigal DO [address] [address]   10/7/2022  9:45 AM MD VICKI Byrne   1/5/2023 10:00 AM MD BRITTON Brower DPP       Laura Farley LPN

## 2022-09-12 NOTE — TELEPHONE ENCOUNTER
Pt will need Face-to-Face visit at [facility], PT/OT services for [facility]. GP Lulu Pérez) notified that Xavier Ventura will see pt tomorrow. Received updated MARs from [facility]- reviewed, and Epic list confirmed.

## 2022-09-13 ENCOUNTER — OUTSIDE SERVICES (OUTPATIENT)
Dept: INTERNAL MEDICINE | Age: 87
End: 2022-09-13
Payer: MEDICARE

## 2022-09-13 VITALS
RESPIRATION RATE: 16 BRPM | OXYGEN SATURATION: 97 % | HEART RATE: 63 BPM | DIASTOLIC BLOOD PRESSURE: 46 MMHG | TEMPERATURE: 97.7 F | SYSTOLIC BLOOD PRESSURE: 125 MMHG

## 2022-09-13 DIAGNOSIS — I10 ESSENTIAL HYPERTENSION: ICD-10-CM

## 2022-09-13 DIAGNOSIS — J18.9 PNEUMONIA DUE TO INFECTIOUS ORGANISM, UNSPECIFIED LATERALITY, UNSPECIFIED PART OF LUNG: ICD-10-CM

## 2022-09-13 DIAGNOSIS — E78.00 HYPERCHOLESTEROLEMIA: ICD-10-CM

## 2022-09-13 DIAGNOSIS — S72.145B: ICD-10-CM

## 2022-09-13 DIAGNOSIS — I50.9 HEART FAILURE, UNSPECIFIED HF CHRONICITY, UNSPECIFIED HEART FAILURE TYPE (HCC): ICD-10-CM

## 2022-09-13 DIAGNOSIS — R05.9 COUGH: ICD-10-CM

## 2022-09-13 DIAGNOSIS — Z09 HOSPITAL DISCHARGE FOLLOW-UP: Primary | ICD-10-CM

## 2022-09-13 DIAGNOSIS — D62 ACUTE BLOOD LOSS ANEMIA: ICD-10-CM

## 2022-09-13 DIAGNOSIS — S72.142D CLOSED DISPLACED INTERTROCHANTERIC FRACTURE OF LEFT FEMUR WITH ROUTINE HEALING, SUBSEQUENT ENCOUNTER: ICD-10-CM

## 2022-09-13 DIAGNOSIS — K21.9 GASTROESOPHAGEAL REFLUX DISEASE WITHOUT ESOPHAGITIS: ICD-10-CM

## 2022-09-13 PROCEDURE — 99495 TRANSJ CARE MGMT MOD F2F 14D: CPT | Performed by: NURSE PRACTITIONER

## 2022-09-13 PROCEDURE — 1111F DSCHRG MED/CURRENT MED MERGE: CPT | Performed by: NURSE PRACTITIONER

## 2022-09-13 RX ORDER — LEVOFLOXACIN 250 MG/1
250 TABLET ORAL DAILY
Qty: 5 TABLET | Refills: 0 | Status: SHIPPED | OUTPATIENT
Start: 2022-09-13 | End: 2022-09-18

## 2022-09-13 NOTE — PROGRESS NOTES
Post-Discharge Transitional Care Follow Up      Miguel A Bruce   YOB: 1930    Date of Office Visit:  9/13/2022  Date of Hospital Admission: 9/3/22  Date of Hospital Discharge: 9/9/22  Readmission Risk Score (high >=14%. Medium >=10%):Readmission Risk Score: 13.2      Care management risk score Rising risk (score 2-5) and Complex Care (Scores >=6): No Risk Score On File     Non face to face  following discharge, date last encounter closed (first attempt may have been earlier): 09/12/2022     Call initiated 2 business days of discharge: Yes     Hospital discharge follow-up  -     [place] DISCHARGE MEDS RECONCILED W/ CURRENT OUTPATIENT MED LIST  -     [place] DISCHARGE MEDS RECONCILED W/ CURRENT OUTPATIENT MED LIST  Closed displaced intertrochanteric fracture of left femur with routine healing, subsequent encounter  -     External Referral To Physical Therapy  -     External Referral To Occupational Therapy  Nondisp intertroch fx left femur, init for opn fx type I/2 [facility])  -     External Referral To Physical Therapy  -     External Referral To Occupational Therapy  Acute blood loss anemia  Essential hypertension  -     Basic Metabolic Panel; Future  Heart failure, unspecified HF chronicity, unspecified heart failure type ([address]  -     Brain Natriuretic Peptide; Future  Cough  Pneumonia due to infectious organism, unspecified laterality, unspecified part of lung  -     levoFLOXacin (LEVAQUIN) 250 MG tablet; Take 1 tablet by mouth daily for 5 days, Disp-5 tablet, R-0Normal  Hypercholesterolemia  Gastroesophageal reflux disease without esophagitis      Medical Decision Making: moderate complexity  Return in about 1 month (around 10/13/2022). Subjective:   HPI    Inpatient course: Discharge summary reviewed- see chart. Patient presents for transitional care visit following hospitalization for intertrochanteric femur fracture.   Patient fell while at assisted living facility and was subsequently transferred to ER by EMS. Was noted to have fracture, and was subsequently transferred to [facility]. [facility]. Underwent surgical repair. Physical medicine rehabilitation recommended DVT prophylaxis for 4 weeks with aspirin 81 mg twice daily and then can resume daily home. Cardiology was consulted prior to procedure given her history of STEMI in 2020 treated with PCI. Was on Brilinta and aspirin at time of fall. Cardiology recommended resuming Brilinta and aspirin as soon as determined appropriate by orthopedics. Amlodipine was restarted. Her blood pressure has been well controlled since returning to facility. Was noted to be COVID positive at facility. Staff has noted cough. Has some edema, no dyspnea. Staff notes her prior dose of lasix was discontinued prior to returning to facility. She has otherwise been stable.      CrCl = 56    Interval history/Current status: stable    Patient Active Problem List   Diagnosis    Dyslipidemia    Osteopenia    Impaired fasting glucose    Adenomatous polyp    Essential hypertension    Hypothyroidism    Sciatica    GERD (gastroesophageal reflux disease)    Visual disturbance    Malignant melanoma in situ ([address]    Difficulty controlling anger    Primary open angle glaucoma (POAG) of both eyes, moderate stage    PVD (posterior vitreous detachment), both eyes    Combined forms of age-related cataract of both eyes    Dementia with behavioral disturbance (HCC)    Tremor    Essential tremor    Dizziness    Chronic cerebral ischemia    Acquired cerebral atrophy ([address]    Cerebral infarction ([address]    Anxiety    Atrial fibrillation ([address]    Closed displaced intertrochanteric fracture of left femur (HCC)    Acute ST segment elevation myocardial infarction ([address]    Heart murmur    Migraine    Female bladder prolapse    Falls    Hiatal hernia    Hypercholesterolemia    Coronary arteriosclerosis    Cataract    Heart failure, unspecified HF chronicity, unspecified heart failure type ([address] Chronic renal disease, stage III ([facility] [872800]    Pseudophakia of both eyes    Nondisp intertroch fx left femur, init for opn fx type I/2 ([facility]       Medications listed as ordered at the time of discharge from hospital     Medication List            Accurate as of September 13, 2022 11:59 PM. If you have any questions, ask your nurse or doctor.                 START taking these medications      levoFLOXacin 250 MG tablet  Commonly known as: Levaquin  Take 1 tablet by mouth daily for 5 days  Started by: FELICIA Garrett CNP            CHANGE how you take these medications      losartan 100 MG tablet  Commonly known as: COZAAR  Hold until seen by primary cardiologist.  Relative hypotension in patient  TAKE 1 TABLET DAILY  Start taking on: September 23, 2022  What changed: additional instructions     OLANZapine 2.5 MG tablet  Commonly known as: Murphy Tran until evaluated by primary care    TAKE 1 TABLET NIGHTLY  Start taking on: September 23, 2022  What changed: additional instructions     vitamin D 1.25 MG (43446 UT) Caps capsule  Commonly known as: ERGOCALCIFEROL  Take 1 capsule by mouth once a week for 8 doses  What changed: additional instructions            CONTINUE taking these medications      acetaminophen 650 MG extended release tablet  Commonly known as: TYLENOL     amLODIPine 5 MG tablet  Commonly known as: NORVASC     aspirin 81 MG EC tablet     atenolol 25 MG tablet  Commonly known as: TENORMIN  TAKE 1 TABLET DAILY     atorvastatin 40 MG tablet  Commonly known as: LIPITOR  Take 1 tablet by mouth daily     calcium carbonate-vitamin D3 600-400 MG-UNIT Tabs per tab  Commonly known as: CALTRATE     Clobetasol Propionate 0.05 % Liqd     FLUoxetine 20 MG capsule  Commonly known as: PROZAC  TAKE 1 CAPSULE DAILY     levothyroxine 137 MCG tablet  Commonly known as: SYNTHROID  TAKE 1 TABLET DAILY     Loprox 1 % Sham  Generic drug: Ciclopirox     memantine 5 MG tablet  Commonly known as: NAMENDA  Take 1 Comments: Slight rhonchi bilaterally  Neurological:      General: No focal deficit present. Mental Status: She is alert. Mental status is at baseline. Psychiatric:         Mood and Affect: Mood normal.         Behavior: Behavior normal.             Assessment and Plan:          1. Hospital discharge follow-up  - [place] DISCHARGE MEDS RECONCILED W/ CURRENT OUTPATIENT MED LIST    2. Closed displaced intertrochanteric fracture of left femur with routine healing, subsequent encounter, stable  3. Nondisp intertroch fx left femur, init for opn fx type I/2 ([facility] stable  - Face to face completed for PT/OT. Continue ASA BID for DVT prophylaxis x4 weeks per orthopedics then to resume 81 mg daily.   - External Referral To Physical Therapy  - External Referral To Occupational Therapy    4. Acute blood loss anemia, stable  - Recheck CBC    5. Essential hypertension,  stable  - Continue current medications  - Basic Metabolic Panel; Future    6. Heart failure, unspecified HF chronicity, unspecified heart failure type ([facility] stable  - Check BNP given she has had slight increase in edema  - Brain Natriuretic Peptide; Future    7. Cough, new problem  8. Pneumonia due to infectious organism, unspecified laterality, unspecified part of lung, new problem  - CXR notes pneumonia, will start levaquin as noted below. WBC only mildly elevated at 12.4, patient afebrile. Monitor for resolution of symptoms  - levoFLOXacin (LEVAQUIN) 250 MG tablet; Take 1 tablet by mouth daily for 5 days  Dispense: 5 tablet; Refill: 0    9. Hypercholesterolemia,  stable  - Continue current medications    10.  Gastroesophageal reflux disease without esophagitis,  stable  - Continue current medications      Electronically signed by FELICIA Pardo CNP on 9/15/2022 at 9:30 AM'

## 2022-09-15 ENCOUNTER — HOSPITAL ENCOUNTER (OUTPATIENT)
Age: 87
Setting detail: SPECIMEN
Discharge: HOME | End: 2022-09-15
Payer: MEDICARE

## 2022-09-15 ENCOUNTER — TELEPHONE (OUTPATIENT)
Dept: INTERNAL MEDICINE | Age: 87
End: 2022-09-15

## 2022-09-15 DIAGNOSIS — D64.9 ANEMIA, UNSPECIFIED TYPE: ICD-10-CM

## 2022-09-15 DIAGNOSIS — I50.9 HEART FAILURE, UNSPECIFIED HF CHRONICITY, UNSPECIFIED HEART FAILURE TYPE (HCC): ICD-10-CM

## 2022-09-15 DIAGNOSIS — D64.9 ANEMIA, UNSPECIFIED TYPE: Primary | ICD-10-CM

## 2022-09-15 DIAGNOSIS — E03.9 HYPOTHYROIDISM, UNSPECIFIED TYPE: ICD-10-CM

## 2022-09-15 DIAGNOSIS — I10 ESSENTIAL HYPERTENSION: ICD-10-CM

## 2022-09-15 PROBLEM — S72.001A CLOSED RIGHT HIP FRACTURE, INITIAL ENCOUNTER (HCC): Status: RESOLVED | Noted: 2021-10-30 | Resolved: 2022-09-15

## 2022-09-15 LAB
ABSOLUTE EOS #: 0 K/UL (ref 0–0.4)
ABSOLUTE IMMATURE GRANULOCYTE: 0.25 K/UL (ref 0–0.3)
ABSOLUTE LYMPH #: 1.98 K/UL (ref 1–4.8)
ABSOLUTE MONO #: 0.62 K/UL (ref 0.1–1.2)
ANION GAP SERPL CALCULATED.3IONS-SCNC: 8 MMOL/L (ref 9–17)
BASOPHILS # BLD: 0 % (ref 0–1)
BASOPHILS ABSOLUTE: 0 K/UL (ref 0–0.2)
BUN BLDV-MCNC: 13 MG/DL (ref 8–23)
BUN/CREAT BLD: 25 (ref 9–20)
CALCIUM SERPL-MCNC: 8.2 MG/DL (ref 8.6–10.4)
CHLORIDE BLD-SCNC: 102 MMOL/L (ref 98–107)
CO2: 28 MMOL/L (ref 20–31)
CREAT SERPL-MCNC: 0.51 MG/DL (ref 0.5–0.9)
EOSINOPHILS RELATIVE PERCENT: 0 % (ref 1–7)
GFR AFRICAN AMERICAN: >60 ML/MIN
GFR NON-AFRICAN AMERICAN: >60 ML/MIN
GFR SERPL CREATININE-BSD FRML MDRD: ABNORMAL ML/MIN/{1.73_M2}
GLUCOSE BLD-MCNC: 120 MG/DL (ref 70–99)
HCT VFR BLD CALC: 24.5 % (ref 36.3–47.1)
HEMOGLOBIN: 7.7 G/DL (ref 11.9–15.1)
IMMATURE GRANULOCYTES: 2 %
LYMPHOCYTES # BLD: 16 % (ref 16–46)
MCH RBC QN AUTO: 31.2 PG (ref 25.2–33.5)
MCHC RBC AUTO-ENTMCNC: 31.4 G/DL (ref 25.2–33.5)
MCV RBC AUTO: 99.2 FL (ref 82.6–102.9)
MONOCYTES # BLD: 5 % (ref 4–11)
MORPHOLOGY: ABNORMAL
MORPHOLOGY: ABNORMAL
NRBC AUTOMATED: 0.3 PER 100 WBC
PDW BLD-RTO: 18.4 % (ref 11.8–14.4)
PLATELET # BLD: 384 K/UL (ref 138–453)
PMV BLD AUTO: 9.6 FL (ref 8.1–13.5)
POTASSIUM SERPL-SCNC: 4.1 MMOL/L (ref 3.7–5.3)
PRO-BNP: 1649 PG/ML
RBC # BLD: 2.47 M/UL (ref 3.95–5.11)
SEG NEUTROPHILS: 77 % (ref 43–77)
SEGMENTED NEUTROPHILS ABSOLUTE COUNT: 9.55 K/UL (ref 1.5–8.1)
SODIUM BLD-SCNC: 138 MMOL/L (ref 135–144)
THYROXINE, FREE: 1.1 NG/DL (ref 0.93–1.7)
TSH SERPL DL<=0.05 MIU/L-ACNC: 30.86 UIU/ML (ref 0.3–5)
WBC # BLD: 12.4 K/UL (ref 3.5–11.3)

## 2022-09-15 PROCEDURE — 36415 COLL VENOUS BLD VENIPUNCTURE: CPT

## 2022-09-15 PROCEDURE — 84443 ASSAY THYROID STIM HORMONE: CPT

## 2022-09-15 PROCEDURE — 85025 COMPLETE CBC W/AUTO DIFF WBC: CPT

## 2022-09-15 PROCEDURE — 83880 ASSAY OF NATRIURETIC PEPTIDE: CPT

## 2022-09-15 PROCEDURE — 84439 ASSAY OF FREE THYROXINE: CPT

## 2022-09-15 PROCEDURE — 80048 BASIC METABOLIC PNL TOTAL CA: CPT

## 2022-09-15 ASSESSMENT — ENCOUNTER SYMPTOMS
VOMITING: 0
NAUSEA: 0
COUGH: 1
SORE THROAT: 0
DIARRHEA: 0
SHORTNESS OF BREATH: 0

## 2022-09-15 NOTE — TELEPHONE ENCOUNTER
As follow-up from visit 9/13/2022, please fax completed progress note to [facility] for face-to-face documentation, thanks

## 2022-09-19 ENCOUNTER — OFFICE VISIT (OUTPATIENT)
Dept: ORTHOPEDIC SURGERY | Age: 87
End: 2022-09-19

## 2022-09-19 VITALS — BODY MASS INDEX: 24.75 KG/M2 | HEIGHT: 64 IN | WEIGHT: 145 LBS

## 2022-09-19 DIAGNOSIS — R52 PAIN: Primary | ICD-10-CM

## 2022-09-19 DIAGNOSIS — S72.141A DISPLACED INTERTROCHANTERIC FRACTURE OF RIGHT FEMUR, INITIAL ENCOUNTER FOR CLOSED FRACTURE (HCC): ICD-10-CM

## 2022-09-19 DIAGNOSIS — E03.9 HYPOTHYROIDISM, UNSPECIFIED TYPE: Primary | ICD-10-CM

## 2022-09-19 PROCEDURE — 99024 POSTOP FOLLOW-UP VISIT: CPT | Performed by: STUDENT IN AN ORGANIZED HEALTH CARE EDUCATION/TRAINING PROGRAM

## 2022-09-19 RX ORDER — LEVOTHYROXINE SODIUM 0.15 MG/1
150 TABLET ORAL DAILY
Qty: 90 TABLET | Refills: 1 | Status: SHIPPED | OUTPATIENT
Start: 2022-09-19 | End: 2022-11-03 | Stop reason: DRUGHIGH

## 2022-09-19 NOTE — PROGRESS NOTES
[facility]  [id number] [facility]  Dept Phone: [phone]  Dept Fax: [phone]      Orthopaedic Trauma Clinic Follow Up      Subjective:   Date of Surgery: 09/04/22    Gwendolyn Vigil is a 80y.o. year old female who presents to the clinic today for routine follow up post placement left cephalomedullary nail. Patient is 16 days from surgery. Patient is at a care facility [place] currently. Patient is by herself. Patient states overall she is doing well. Patient would like her staples removed as she is having some pain at the site. Patient states she has been up with assistance to ambulate. Denies any numbness or tingling. Is concerned about some swelling in her feet. She does have history of heart failure. Review of Systems  Gen: no fever, chills, malaise  CV: no chest pain or palpitations  Resp: no cough or shortness of breath  GI: no nausea, vomiting, diarrhea, or constipation  Neuro: no seizures, vertigo, or headache  Msk: Left hip pain  10 remaining systems reviewed and negative    Objective : There were no vitals filed for this visit. Body mass index is 24.89 kg/m². General: No acute distress, resting comfortably in the clinic  Neuro: alert. oriented  Eyes: Extra-ocular muscles intact  Pulm: Respirations unlabored and regular. Skin: warm, well perfused  Psych:   Patient has good fund of knowledge and displays understanding of exam, diagnosis, and plan. MSK: Left lower extremity: Dressings removed, staples in place. No erythema, or signs of infection. No drainage. Appropriate postoperative tenderness to palpation about thigh. Leg warm and well-perfused. Dependent edema noted to bilateral lower extremities. Able to dorsiflex and plantarflex both ankles without pain. Sensation intact to light touch to entirety of left lower extremity.     Radiology:  History: 80-year-old female status post cephalomedullary nail left femur    Comparison: 09/04/22    Findings: 3 Views: AP pelvis, AP/lateral left hip in a skeletally mature patient showing orthopedic hardware to left femur without any signs of loosening or failure. Increased displacement noted to left greater trochanteric fragment. Lag screw has shortened. No new angulation appreciated. Impression: Stable orthopedic hardware      History: 60-year-old female status post cephalomedullary nail left femur    Comparison: 09/04/22    Findings: 2 Views: Left Femur (AP/Lateral) in a skeletally mature patient showing orthopedic hardware to left femur without any signs of loosening or failure. Increased displacement noted to left greater trochanteric fragment. Lag screw has shortened. Screws distally without loosening. No new angulation appreciated. Impression: Stable orthopedic hardware     Assessment:   80y.o. year old female with    Diagnosis Orders   1. Pain  XR FEMUR LEFT (MIN 2 VIEWS)    XR HIP 2-3 VW W PELVIS LEFT      2. Displaced intertrochanteric fracture of right femur, initial encounter for closed fracture [facility])            Plan:   Lengthy discussion had with patient about current clinical state. Patient may be weight-bear as tolerated left lower extremity. Staples removed today in clinic. We will plan on seeing patient back in 4 weeks or sooner if any acute issues arise. We will obtain 2 views left femur at that encounter. Okay for p.o. over-the-counter pain medication. All questions answered. Patient is amenable to this plan.          Orders Placed This Encounter   Procedures    XR FEMUR LEFT (MIN 2 VIEWS)     Standing Status:   Future     Number of Occurrences:   1     Standing Expiration Date:   9/19/2023    XR HIP 2-3 VW W PELVIS LEFT     Standing Status:   Future     Number of Occurrences:   1     Standing Expiration Date:   9/19/2023       Electronically signed by Jose Miguel Gilmore DO on 9/19/2022 at 2:33 PM    This note is created with the assistance of a speech

## 2022-09-20 ENCOUNTER — OUTSIDE SERVICES (OUTPATIENT)
Dept: INTERNAL MEDICINE | Age: 87
End: 2022-09-20
Payer: MEDICARE

## 2022-09-20 VITALS
RESPIRATION RATE: 16 BRPM | OXYGEN SATURATION: 96 % | TEMPERATURE: 98.1 F | HEART RATE: 66 BPM | SYSTOLIC BLOOD PRESSURE: 118 MMHG | DIASTOLIC BLOOD PRESSURE: 64 MMHG

## 2022-09-20 DIAGNOSIS — I48.91 ATRIAL FIBRILLATION, UNSPECIFIED TYPE (HCC): ICD-10-CM

## 2022-09-20 DIAGNOSIS — I10 ESSENTIAL HYPERTENSION: ICD-10-CM

## 2022-09-20 DIAGNOSIS — I50.9 HEART FAILURE, UNSPECIFIED HF CHRONICITY, UNSPECIFIED HEART FAILURE TYPE (HCC): ICD-10-CM

## 2022-09-20 DIAGNOSIS — M25.552 LEFT HIP PAIN: Primary | ICD-10-CM

## 2022-09-20 DIAGNOSIS — S72.142D CLOSED DISPLACED INTERTROCHANTERIC FRACTURE OF LEFT FEMUR WITH ROUTINE HEALING, SUBSEQUENT ENCOUNTER: ICD-10-CM

## 2022-09-20 DIAGNOSIS — E03.9 HYPOTHYROIDISM, UNSPECIFIED TYPE: ICD-10-CM

## 2022-09-20 RX ORDER — ACETAMINOPHEN 500 MG
1000 TABLET ORAL EVERY 8 HOURS PRN
Qty: 180 TABLET | Refills: 5 | Status: SHIPPED | OUTPATIENT
Start: 2022-09-20

## 2022-09-20 RX ORDER — FUROSEMIDE 20 MG/1
20 TABLET ORAL DAILY PRN
Qty: 30 TABLET | Refills: 0 | Status: ON HOLD | OUTPATIENT
Start: 2022-09-20 | End: 2022-10-16 | Stop reason: HOSPADM

## 2022-09-20 ASSESSMENT — ENCOUNTER SYMPTOMS
VOMITING: 0
RHINORRHEA: 0
NAUSEA: 0
SORE THROAT: 0
BACK PAIN: 0
SHORTNESS OF BREATH: 0
DIARRHEA: 0
CHEST TIGHTNESS: 0
WHEEZING: 0

## 2022-09-20 NOTE — PROGRESS NOTES
[facility]      Aurora Gonzalez is a 80 y.o. female resident of [facility] who presents today for medical conditions/complaints as noted below. HPI:     HPI    Patient presents for evaluation and management of chronic medical conditions as noted below. Following recent hospitalization for left hip fracture, patient's scheduled Lasix was discontinued. Staff has noted gradually worsening lower extremity edema. Patient denies any chest pain or dyspnea. Renal function was stable at most recent check on 9/15/2022, GFR >60, Cr 0.51. Sodium and potassium within normal limits. Discussed with staff the addition of a PRN Lasix order with careful monitoring of her blood pressure, which has been stable since her return to the facility. Advised follow-up with cardiology as scheduled. They also note that she has been having breakthrough pain with current dose of Tylenol. She has been on acetaminophen 650 mg twice daily as needed, and she notes this is sometimes not sufficient. TSH was recently noted abnormal at 30. 86. Levothyroxine dose was subsequently increased. Patient denies palpitations. Does have some stable fatigue. She did have a visit yesterday with orthopedics for staple remover. Staff would like site evaluated to verify no concern for infection.       Current Outpatient Medications   Medication Sig Dispense Refill    furosemide (LASIX) 20 MG tablet Take 1 tablet by mouth daily as needed (Lower extremity swelling) 30 tablet 0    acetaminophen (TYLENOL) 500 MG tablet Take 2 tablets by mouth every 8 hours as needed for Pain 180 tablet 5    levothyroxine (SYNTHROID) 150 MCG tablet Take 1 tablet by mouth daily 90 tablet 1    Clobetasol Propionate 0.05 % LIQD Apply topically nightly as needed to scalp      Ciclopirox 1 % SHAM Apply topically once a week (Tuesdays)      vitamin D (ERGOCALCIFEROL) 1.25 MG (63961 UT) CAPS capsule Take 1 capsule by mouth once a week for 8 doses (Patient taking differently: Take 50,000 Units by mouth once a week (Start 9/13/22)) 8 capsule 0    OXYGEN Inhale 3.5 L into the lungs to keep sat > 91%      Nutritional Supplements (NUTRITIONAL DRINK PO) Mighty shakes bid with meals      calcium carbonate-vitamin D3 (CALTRATE) 600-400 MG-UNIT TABS per tab Take 1 tablet by mouth daily      amLODIPine (NORVASC) 5 MG tablet Take 5 mg by mouth daily      FLUoxetine (PROZAC) 20 MG capsule TAKE 1 CAPSULE DAILY 90 capsule 3    memantine (NAMENDA) 5 MG tablet Take 1 tablet by mouth daily 90 tablet 3    atenolol (TENORMIN) 25 MG tablet TAKE 1 TABLET DAILY 90 tablet 3    atorvastatin (LIPITOR) 40 MG tablet Take 1 tablet by mouth daily 90 tablet 3    aspirin 81 MG EC tablet Take 1 tablet by mouth daily      [START ON 9/23/2022] losartan (COZAAR) 100 MG tablet Hold until seen by primary cardiologist.  Relative hypotension in patient  TAKE 1 TABLET DAILY (Patient taking differently: Hold until seen by primary cardiologist (9/23/22). Relative hypotension in patient  TAKE 1 TABLET DAILY) 90 tablet 3    [START ON 9/23/2022] OLANZapine (ZYPREXA) 2.5 MG tablet Hold until evaluated by primary care    TAKE 1 TABLET NIGHTLY (Patient taking differently: Hold until evaluated by primary care (9/23/22)    TAKE 1 TABLET NIGHTLY) 90 tablet 3     No current facility-administered medications for this visit. No Known Allergies    Health Maintenance   Topic Date Due    COVID-19 Vaccine (4 - Booster for Pfizer series) 02/27/2022    Flu vaccine (1) 09/01/2022    Lipids  06/16/2023    Depression Screen  07/05/2023    DTaP/Tdap/Td vaccine (3 - Td or Tdap) 07/22/2025    Shingles vaccine  Completed    Pneumococcal 65+ years Vaccine  Completed    Hepatitis A vaccine  Aged Out    Hepatitis B vaccine  Aged Out    Hib vaccine  Aged Out    Meningococcal (ACWY) vaccine  Aged Out       Subjective:      Review of Systems   Constitutional:  Negative for chills and fever.    HENT:  Negative for congestion, rhinorrhea and sore throat. Respiratory:  Negative for chest tightness, shortness of breath and wheezing. Cardiovascular:  Positive for leg swelling. Negative for chest pain. Gastrointestinal:  Negative for diarrhea, nausea and vomiting. Genitourinary:  Negative for difficulty urinating and dysuria. Musculoskeletal:  Negative for back pain and myalgias. Neurological:  Negative for dizziness, weakness and headaches. Psychiatric/Behavioral:  Negative for agitation and behavioral problems. Due to patient's clinical status, ROS of symptoms was completed by discussion with long term care facility staff, as well as with input from patient. Objective:     Vitals:    09/20/22 0913   BP: 118/64   Pulse: 66   Resp: 16   Temp: 98.1 °F (36.7 °C)   SpO2: 96%     Physical Exam  Constitutional:       General: She is not in acute distress. HENT:      Head: Normocephalic and atraumatic. Right Ear: External ear normal.      Left Ear: External ear normal.   Eyes:      Extraocular Movements: Extraocular movements intact. Conjunctiva/sclera: Conjunctivae normal.   Cardiovascular:      Rate and Rhythm: Normal rate and regular rhythm. Pulmonary:      Effort: Pulmonary effort is normal. No respiratory distress. Breath sounds: Normal breath sounds. Musculoskeletal:      Right lower leg: 3+ Edema present. Left lower leg: 3+ Edema present. Skin:     General: Skin is warm and dry. Comments: Surgical sites L hip well approximated with no signs of infection   Neurological:      General: No focal deficit present. Mental Status: She is alert. Mental status is at baseline. Psychiatric:         Mood and Affect: Mood normal.         Behavior: Behavior normal.       Assessment/Plan:        1. Left hip pain, uncontrolled  - Discontinue Tylenol 650 mg twice daily as needed. Start Tylenol 1000 mg every 8 hours as needed.     2. Heart failure, unspecified HF chronicity, unspecified heart failure type ([facility] stable  - Add lasx 20 mg QD PRN for edema with careful monitoring of blood pressure. Follow-up with cardiology as scheduled. 3. Essential hypertension,  stable  - Continue current medications. Monitor blood pressure as noted above. 4. Atrial fibrillation, unspecified type ([facility] stable  - Continue to follow with cardiology    5. Hypothyroidism, unspecified type, uncontrolled  - Recent levothyroxine dose changes as noted above. Recheck TSH with reflex in 6 weeks. 6. Closed displaced intertrochanteric fracture of left femur with routine healing, subsequent encounter, stable  - Incision site continues to be healing well. Continue to follow with orthopedics postoperatively. Return if symptoms worsen or fail to improve. No orders of the defined types were placed in this encounter.     Orders Placed This Encounter   Medications    furosemide (LASIX) 20 MG tablet     Sig: Take 1 tablet by mouth daily as needed (Lower extremity swelling)     Dispense:  30 tablet     Refill:  0    acetaminophen (TYLENOL) 500 MG tablet     Sig: Take 2 tablets by mouth every 8 hours as needed for Pain     Dispense:  180 tablet     Refill:  5               Electronically signed by FELICIA Castelan CNP on 9/20/2022 at 1:04 PM

## 2022-09-22 ENCOUNTER — HOSPITAL ENCOUNTER (OUTPATIENT)
Age: 87
Setting detail: SPECIMEN
Discharge: HOME | End: 2022-09-22
Payer: MEDICARE

## 2022-09-22 DIAGNOSIS — I50.9 HEART FAILURE, UNSPECIFIED HF CHRONICITY, UNSPECIFIED HEART FAILURE TYPE (HCC): ICD-10-CM

## 2022-09-22 DIAGNOSIS — D64.9 ANEMIA, UNSPECIFIED TYPE: ICD-10-CM

## 2022-09-22 DIAGNOSIS — I10 ESSENTIAL HYPERTENSION: ICD-10-CM

## 2022-09-22 DIAGNOSIS — D64.9 ANEMIA, UNSPECIFIED TYPE: Primary | ICD-10-CM

## 2022-09-22 LAB
HCT VFR BLD CALC: 27.7 % (ref 36.3–47.1)
HEMOGLOBIN: 8.8 G/DL (ref 11.9–15.1)

## 2022-09-22 PROCEDURE — 85018 HEMOGLOBIN: CPT

## 2022-09-22 PROCEDURE — 36415 COLL VENOUS BLD VENIPUNCTURE: CPT

## 2022-09-22 PROCEDURE — 85014 HEMATOCRIT: CPT

## 2022-09-23 NOTE — DISCHARGE SUMMARY
DISCHARGE SUMMARY:    PATIENT NAME:  Nidia Kim  YOB: 1930  MEDICAL RECORD NO. [id number]  DATE: 09/23/22  PRIMARY CARE PHYSICIAN: Vani Cadet MD  ADMIT DATE:  9/3/2022    DISCHARGE DATE:  9/9/2022  DISPOSITION:  assisted living   ADMITTING DIAGNOSIS:   Fall from standing    DIAGNOSIS:   Patient Active Problem List   Diagnosis    Dyslipidemia    Osteopenia    Impaired fasting glucose    Adenomatous polyp    Essential hypertension    Hypothyroidism    Sciatica    GERD (gastroesophageal reflux disease)    Visual disturbance    Malignant melanoma in situ (HCC)    Difficulty controlling anger    Primary open angle glaucoma (POAG) of both eyes, moderate stage    PVD (posterior vitreous detachment), both eyes    Combined forms of age-related cataract of both eyes    Dementia with behavioral disturbance ([facility])    Tremor    Essential tremor    Dizziness    Chronic cerebral ischemia    Acquired cerebral atrophy ([facility])    Cerebral infarction ([address]    Anxiety    Atrial fibrillation ([facility])    Closed displaced intertrochanteric fracture of left femur ([facility])    Acute ST segment elevation myocardial infarction ([address]    Heart murmur    Migraine    Female bladder prolapse    Falls    Hiatal hernia    Hypercholesterolemia    Coronary arteriosclerosis    Cataract    Heart failure, unspecified HF chronicity, unspecified heart failure type ([facility])    Chronic renal disease, stage III ([address] [450781]    Pseudophakia of both eyes    Nondisp intertroch fx left femur, init for opn fx type I/2 ([address]       CONSULTANTS:  ortho, rehab, cardiology    PROCEDURES:   Date: 09/04/22 Room / Location: [address]      Anesthesia Start: 1307 Anesthesia Stop: 1517     Procedure: LEFT TFNA NAIL INSERTION (Left: Leg Upper) Diagnosis:       Closed fracture of left femur, unspecified fracture morphology, unspecified portion of femur, initial encounter ([facility])      (Closed fracture of left femur, unspecified fracture morphology, unspecified portion of femur, initial encounter ([facility] Keiraell Oris)     Surgeons: Michael Wetzel DO Responsible Provider: Mariano Reyes MD     Anesthesia Type: general ASA Status: [address] COURSE:   Jennifer Prak is a 80 y.o. female who was admitted on 9/3/2022  Hospital Course:  80-year-old female who presented to [place] [address] from outside facility with left hip pain. Son is at bedside to assist with history. Patient sustained a unwitnessed fall onto her left side while at her nursing facility. Imaging performed in the ED demonstrated a left intertrochanteric femur fracture. Patient is amnestic to the event, but admits to having memory problems. Patient denies any numbness or tingling. On examination patient does not complain about any pain. Son states patient is supposed to ambulate with a walker, but during this injury, patient attempted to ambulate without her walker. Patient on Brilinta and aspirin. Patient has a history of CHF, dementia, hypertension, and a history of an MI. Patient did have to undergo a right cephalomedullary nail for a right intertrochanteric femur fracture in 2021. She underwent left TFNA on 9/4. Was evaluated for rehab but in the end returned to assisted living. She was participating in therapy, did well post op       Labs and imaging were followed daily. On day of discharge Jennifer Park  was tolerating a regular diet  had adequate analgeia on oral medications  had no signs of complication. She was deemed medically stable for discharged to Assisted Living        PHYSICAL EXAMINATION:        Discharge Vitals:  height is 5' 4\" (1.626 m). Her oral temperature is 97.9 °F (36.6 °C). Her blood pressure is 89/77 and her pulse is 78. Her respiration is 19 and oxygen saturation is 96%. Exam on day of discharge:  GEN: Well developed, well nourished, no acute distress.   HEENT: NCAT, PERRL, EOMI, mucous membranes pink and moist.  RESP: Lungs clear to auscultation. No rales or rhonchi. Respirations WNL and unlabored. CV: Regular rate rhythm. No murmurs, rubs, or gallops. ABD: soft, non-distended, non-tender. BS+ and equal.  NEURO: A&O to person. Sensation intact to light touch. MSK: Functional ROM BUEs. Muscle tone and bulk are normal bilaterally. Strength 4+/5 key muscles BUEs. R hip flexion 3/5. R plantar and dorsiflexion 3/5. L hip flexion 1/5, L knee extension 2/5, L plantar and dorsiflexion 3/5   EXT: No calf tenderness to palpation or edema BLEs. SKIN: Warm dry and intact with good turgor. PSYCH: Mood WNL. Affect WNL. Appropriately interactive. LABS:     Recent Labs     09/22/22  0654   HGB 8.8*   HCT 27.7*       DIAGNOSTIC TESTS:    No results found. DISCHARGE INSTRUCTIONS     Discharge Medications:        Medication List        START taking these medications      vitamin D 1.25 MG (15804 UT) Caps capsule  Commonly known as: ERGOCALCIFEROL  Take 1 capsule by mouth once a week for 8 doses            CHANGE how you take these medications      losartan 100 MG tablet  Commonly known as: COZAAR  Hold until seen by primary cardiologist.  Relative hypotension in patient  TAKE 1 TABLET DAILY  What changed: See the new instructions. OLANZapine 2.5 MG tablet  Commonly known as: ZYPREXA  Hold until evaluated by primary care    TAKE 1 TABLET NIGHTLY  What changed: See the new instructions.             CONTINUE taking these medications      amLODIPine 5 MG tablet  Commonly known as: NORVASC     aspirin 81 MG EC tablet     atenolol 25 MG tablet  Commonly known as: TENORMIN  TAKE 1 TABLET DAILY     atorvastatin 40 MG tablet  Commonly known as: LIPITOR  Take 1 tablet by mouth daily     calcium carbonate-vitamin D3 600-400 MG-UNIT Tabs per tab  Commonly known as: CALTRATE     FLUoxetine 20 MG capsule  Commonly known as: PROZAC  TAKE 1 CAPSULE DAILY     memantine 5 MG tablet  Commonly known as: NAMENDA  Take 1 tablet by mouth daily     NUTRITIONAL DRINK PO     OXYGEN            STOP taking these medications      ammonium lactate 12 % cream  Commonly known as: AMLACTIN     Brilinta 90 MG Tabs tablet  Generic drug: ticagrelor     DAILY MULTIVITAMIN PO     furosemide 40 MG tablet  Commonly known as: LASIX     hydrOXYzine HCl 25 MG tablet  Commonly known as: ATARAX     Klor-Con M20 20 MEQ extended release tablet  Generic drug: potassium chloride     levothyroxine 137 MCG tablet  Commonly known as: SYNTHROID     nitroGLYCERIN 0.4 MG SL tablet  Commonly known as: NITROSTAT     triamcinolone 0.1 % cream  Commonly known as: KENALOG               Where to Get Your Medications        These medications were sent to [facility] [address] [facility], [address] [place] [address]  [address], [address] [address]      Phone: [phone]   vitamin D 1.25 MG (99318 UT) Caps capsule       Information about where to get these medications is not yet available    Ask your nurse or doctor about these medications  losartan 100 MG tablet  OLANZapine 2.5 MG tablet       Diet: No diet orders on file diet as tolerated  Activity: As instructed WEIGHT BEARING STATUS: Weight bearing as tolerated  Wound Care: Daily and as needed.     DISPOSITION: Assisted Living    Follow-up:  Danny Flores MD  [address] [place] [address] [facility]          Reyes Hodgson, [address]  [phone]    Go on 9/19/2022  Post-op check at 2:15 pm    Cardiology    Schedule an appointment as soon as possible for a visit  follow up with your primary Cardiologist for post hospital visit        SIGNED:  FELICIA Quintero CNP   9/23/2022, 12:57 PM  Time Spent for discharge: 35 minutes

## 2022-10-05 ENCOUNTER — TELEPHONE (OUTPATIENT)
Dept: INTERNAL MEDICINE | Age: 87
End: 2022-10-05
Payer: MEDICARE

## 2022-10-05 DIAGNOSIS — F03.918 DEMENTIA WITH BEHAVIORAL DISTURBANCE: ICD-10-CM

## 2022-10-05 DIAGNOSIS — S72.142D CLOSED DISPLACED INTERTROCHANTERIC FRACTURE OF LEFT FEMUR WITH ROUTINE HEALING, SUBSEQUENT ENCOUNTER: Primary | ICD-10-CM
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
## 2022-10-05 DIAGNOSIS — E03.9 HYPOTHYROIDISM, UNSPECIFIED TYPE: ICD-10-CM

## 2022-10-05 DIAGNOSIS — N39.0 URINARY TRACT INFECTION WITHOUT HEMATURIA, SITE UNSPECIFIED: ICD-10-CM

## 2022-10-05 DIAGNOSIS — U07.1 INFECTION DUE TO 2019-NCOV: ICD-10-CM

## 2022-10-05 PROCEDURE — G0180 MD CERTIFICATION HHA PATIENT: HCPCS | Performed by: NURSE PRACTITIONER

## 2022-10-06 ENCOUNTER — HOSPITAL ENCOUNTER (OUTPATIENT)
Age: 87
Setting detail: SPECIMEN
Discharge: HOME | End: 2022-10-06
Payer: MEDICARE

## 2022-10-06 DIAGNOSIS — D64.9 ANEMIA, UNSPECIFIED TYPE: Primary | ICD-10-CM

## 2022-10-06 DIAGNOSIS — I10 ESSENTIAL HYPERTENSION: ICD-10-CM

## 2022-10-06 DIAGNOSIS — D64.9 ANEMIA, UNSPECIFIED TYPE: ICD-10-CM

## 2022-10-06 LAB
HCT VFR BLD CALC: 30.7 % (ref 36.3–47.1)
HEMOGLOBIN: 9.5 G/DL (ref 11.9–15.1)

## 2022-10-06 PROCEDURE — 36415 COLL VENOUS BLD VENIPUNCTURE: CPT

## 2022-10-06 PROCEDURE — 85014 HEMATOCRIT: CPT

## 2022-10-06 PROCEDURE — 85018 HEMOGLOBIN: CPT

## 2022-10-07 ENCOUNTER — OFFICE VISIT (OUTPATIENT)
Dept: CARDIOLOGY | Age: 87
End: 2022-10-07
Payer: MEDICARE

## 2022-10-07 VITALS — HEART RATE: 54 BPM | DIASTOLIC BLOOD PRESSURE: 58 MMHG | SYSTOLIC BLOOD PRESSURE: 120 MMHG

## 2022-10-07 DIAGNOSIS — I48.91 ATRIAL FIBRILLATION, UNSPECIFIED TYPE (HCC): Primary | ICD-10-CM

## 2022-10-07 DIAGNOSIS — I10 ESSENTIAL HYPERTENSION: ICD-10-CM

## 2022-10-07 DIAGNOSIS — Z98.62 S/P ANGIOPLASTY: ICD-10-CM

## 2022-10-07 DIAGNOSIS — I25.10 CORONARY ARTERY DISEASE INVOLVING NATIVE CORONARY ARTERY OF NATIVE HEART WITHOUT ANGINA PECTORIS: ICD-10-CM

## 2022-10-07 DIAGNOSIS — E78.5 DYSLIPIDEMIA: ICD-10-CM

## 2022-10-07 PROCEDURE — 99213 OFFICE O/P EST LOW 20 MIN: CPT | Performed by: INTERNAL MEDICINE

## 2022-10-07 PROCEDURE — 93005 ELECTROCARDIOGRAM TRACING: CPT | Performed by: INTERNAL MEDICINE

## 2022-10-07 PROCEDURE — 93010 ELECTROCARDIOGRAM REPORT: CPT | Performed by: INTERNAL MEDICINE

## 2022-10-07 PROCEDURE — 99214 OFFICE O/P EST MOD 30 MIN: CPT | Performed by: INTERNAL MEDICINE

## 2022-10-07 PROCEDURE — 1123F ACP DISCUSS/DSCN MKR DOCD: CPT | Performed by: INTERNAL MEDICINE

## 2022-10-07 ASSESSMENT — ENCOUNTER SYMPTOMS
EYE DISCHARGE: 0
ABDOMINAL DISTENTION: 0
VOMITING: 0
BACK PAIN: 0
CHEST TIGHTNESS: 0
SHORTNESS OF BREATH: 0
ABDOMINAL PAIN: 0
EYE ITCHING: 0

## 2022-10-07 NOTE — PROGRESS NOTES
Today's Date: 10/7/2022  Patient's Name: Aron Velázquez  Patient's age: 80 y.o., 3/21/1930    CC: follow up for CAD    HPI   The patient is a 80 y.o., , female is in the office for CAD. Had a mechanical fall, details unclear, she denies any dizziness prior or syncope. Resulted in hip fracture. Admitted and underwent TFNA. Then went to rehab. Currently in [place]. Trying to get active, using walker and wheelchair. No chest pain or SOB, no dizziness or syncope and no palpitations. Has been dealing with lower BP     Had fall around 9/22:  Hospital Course:  25-year-old female who presented to [place] [address] from outside facility with left hip pain. Son is at bedside to assist with history. Patient sustained a unwitnessed fall onto her left side while at her nursing facility. Imaging performed in the ED demonstrated a left intertrochanteric femur fracture. Patient is amnestic to the event, but admits to having memory problems. Patient denies any numbness or tingling. On examination patient does not complain about any pain. Son states patient is supposed to ambulate with a walker, but during this injury, patient attempted to ambulate without her walker. Patient on Brilinta and aspirin. Patient has a history of CHF, dementia, hypertension, and a history of an MI. Patient did have to undergo a right cephalomedullary nail for a right intertrochanteric femur fracture in 2021. She underwent left TFNA on 9/4. Was evaluated for rehab but in the end returned to assisted living.   She was participating in therapy, did well post op    Seen by our team on 9/22:    Left intertrochanteric femur fracture after [facility]  H/O STEMI in 2020 s/p PCI to RCA at [facility]   Preserved on recent 2D ECHO in 11/2021  RCRI - 3, Intermediate risk   Pulmonary hypertension   Moderate TR   HTN  HLD  Hypothyroidism     Post op per Trauma  Recommend restarting ASA and Brilinta when ok with ortho  Will restart Norvasc for BP control. Will sign off. Follow up as outpt with primary cardiologist     Past Medical History:   has a past medical history of Adenomatous polyp, Cataracts, bilateral, Closed right hip fracture, initial encounter ([facility] [address] Combined forms of age-related cataract of both eyes, Corneal scar, right eye, Cystocele, Dementia ([address] Difficulty controlling anger, Dyslipidemia, GERD (gastroesophageal reflux disease), Goiter, Hiatal hernia, Hypertension, Hypokalemia, Hypothyroidism, Impaired fasting glucose, Malignant melanoma in situ ([address] Migraines, Murmur, functional, Osteopenia, Posterior vitreous detachment of both eyes, Primary open angle glaucoma (POAG) of both eyes, moderate stage, Sciatica, Skin cancer, Syncope and collapse, Tremor, 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 Past Surgical History:   has a past surgical history that includes other surgical history (11/11/2008); Cholecystectomy; Colonoscopy (06/08/2010); Mohs surgery (10/07/2009); Vein Surgery (06/08/2009); Colonoscopy (07/18/2002); Cystocele repair (02/12/1999); Appendectomy; Dilation and curettage of uterus; other surgical history (01/01/1988); Hysterectomy (01/01/1995); Upper gastrointestinal endoscopy (04/08/2015); Intracapsular cataract extraction (Left, 08/25/2020); Intracapsular cataract extraction (Right, 10/08/2020); Hip fracture surgery (Right, 11/02/2021); Femur fracture surgery (Left, 09/04/2022); and Femur fracture surgery (Left, 9/4/2022). Home Medications:  Prior to Admission medications    Medication Sig Start Date End Date Taking?  Authorizing Provider   acetaminophen (TYLENOL) 500 MG tablet Take 2 tablets by mouth every 8 hours as needed for Pain 9/20/22  Yes FELICIA Hummel CNP   levothyroxine (SYNTHROID) 150 MCG tablet Take 1 tablet by mouth daily 9/19/22  Yes FELICIA Hummel CNP   Clobetasol Propionate 0.05 % LIQD Apply topically nightly as needed to scalp   Yes Historical Provider, MD   Ciclopirox 1 % SHAM Apply topically once a week (Tuesdays)   Yes Historical Provider, MD   OLANZapine (ZYPREXA) 2.5 MG tablet Hold until evaluated by primary care    TAKE 1 TABLET NIGHTLY  Patient taking differently: Hold until evaluated by primary care (9/23/22)    TAKE 1 TABLET NIGHTLY 9/23/22  Yes FELICIA Aguila - CNP   amLODIPine (NORVASC) 5 MG tablet Take 5 mg by mouth daily   Yes Historical Provider, MD   memantine (NAMENDA) 5 MG tablet Take 1 tablet by mouth daily 2/21/22  Yes Derrek Almonte MD   atenolol (TENORMIN) 25 MG tablet TAKE 1 TABLET DAILY 1/10/22  Yes Derrek Almonte MD   atorvastatin (LIPITOR) 40 MG tablet Take 1 tablet by mouth daily 2/1/21  Yes Derrek Almonte MD   aspirin 81 MG EC tablet Take 1 tablet by mouth daily 12/21/20  Yes Historical Provider, MD   furosemide (LASIX) 20 MG tablet Take 1 tablet by mouth daily as needed (Lower extremity swelling) 9/20/22   FELICIA Cintron - CNP   vitamin D (ERGOCALCIFEROL) 1.25 MG (26286 UT) CAPS capsule Take 1 capsule by mouth once a week for 8 doses  Patient taking differently: Take 50,000 Units by mouth once a week (Start 9/13/22/ stop on 11/01/22) 9/4/22 10/24/22  Vedimerlene Underwoodoner, DO   OXYGEN Inhale 3.5 L into the lungs to keep sat > 91%    Historical Provider, MD   Nutritional Supplements (NUTRITIONAL DRINK PO) Mighty shakes bid with meals    Historical Provider, MD   calcium carbonate-vitamin D3 (CALTRATE) 600-400 MG-UNIT TABS per tab Take 1 tablet by mouth daily    Historical Provider, MD   FLUoxetine (PROZAC) 20 MG capsule TAKE 1 CAPSULE DAILY  Patient not taking: Reported on 10/7/2022 5/31/22   Derrek Almonte MD   BRILINTA 90 MG TABS tablet Take 1 tablet by mouth 2 times daily 4/20/22 9/6/22  Denise Arboleda MD   nitroGLYCERIN (NITROSTAT) 0.4 MG SL tablet Place 0.4 mg under the tongue every 5 minutes as needed for Chest pain up to max of 3 total doses.  If no relief after 3 dose, call 911.   9/6/22 Historical Provider, MD   ibuprofen (ADVIL;MOTRIN) 600 MG tablet Take 1 tablet by mouth 3 times daily as needed for Pain (Take with food.) 12/27/20 12/27/20  America Chahal MD   Multiple Vitamin (DAILY MULTIVITAMIN PO) Take 1 tablet by mouth daily   9/6/22  Historical Provider, MD       Allergies:  Patient has no known allergies. Social History:   reports that she has never smoked. She has never used smokeless tobacco. She reports that she does not currently use alcohol. She reports that she does not use drugs. Review of Systems:  Review of Systems   Constitutional:  Negative for chills, fatigue and fever. HENT:  Negative for congestion and dental problem. Eyes:  Negative for discharge and itching. Respiratory:  Negative for chest tightness and shortness of breath. Cardiovascular:  Negative for chest pain and palpitations. Gastrointestinal:  Negative for abdominal distention, abdominal pain and vomiting. Endocrine: Negative for cold intolerance and heat intolerance. Musculoskeletal:  Negative for arthralgias and back pain. Neurological:  Negative for syncope. Psychiatric/Behavioral:  Negative for agitation and behavioral problems. Physical Exam:  BP (!) 120/58 (Site: Right Upper Arm, Position: Sitting)   Pulse 54   LMP  (LMP Unknown)    Physical Exam  Constitutional:       Appearance: Normal appearance. Cardiovascular:      Rate and Rhythm: Normal rate and regular rhythm. Pulses: Normal pulses. Heart sounds: Normal heart sounds. Pulmonary:      Effort: Pulmonary effort is normal. No respiratory distress. Breath sounds: Normal breath sounds. No stridor. Abdominal:      General: Abdomen is flat. Palpations: Abdomen is soft. Musculoskeletal:         General: No swelling or tenderness. Skin:     Capillary Refill: Capillary refill takes less than 2 seconds. Coloration: Skin is not jaundiced or pale. Neurological:      General: No focal deficit present. Mental Status: She is alert and oriented to person, place, and time. Psychiatric:         Mood and Affect: Mood normal.       Labs:   Lab Results   Component Value Date    CHOL 115 06/16/2022    TRIG 58 06/16/2022    HDL 62 06/16/2022    LDLCHOLESTEROL 41 06/16/2022    VLDL NOT REPORTED 08/05/2021    CHOLHDLRATIO 1.9 06/16/2022     Lab Results   Component Value Date     09/15/2022    K 4.1 09/15/2022     09/15/2022    CO2 28 09/15/2022    BUN 13 09/15/2022    CREATININE 0.51 09/15/2022    GLUCOSE 120 (H) 09/15/2022    CALCIUM 8.2 (L) 09/15/2022    PROT 6.8 09/04/2022    LABALBU 3.5 09/04/2022    BILITOT 0.9 09/04/2022    ALKPHOS 88 09/04/2022    AST 25 09/04/2022    ALT 9 09/04/2022    LABGLOM >60 09/15/2022    GFRAA >60 09/15/2022    GLOB 2.9 12/24/2020     Cardiac Data:  ECG:  Sinus  Bradycardia   Low voltage in precordial leads. -RSR(V1) -nondiagnostic.    -  Nonspecific T-abnormality. 12/22020. ALLIE of proximal and distal RCA. Mild to moderate disease of other arteries. PReserved LV systolic function. Echo 11/2021 11/2021 EF 55%. Moderate TR. RVSP 83 mm Hg. Assessment/Plan:  1. Hypotension  - keep off losartan  - bp improved    2. Bradycardia  - stop atenolol    3. Mechanical Fall with hip fracture s/p TFNA  - per ortho    4. CAD with STEMI on 12/22020. ALLIE of proximal and distal RCA. Mild to moderate disease of other arteries. PReserved LV systolic function. Echo 11/2021 11/2021 EF 55%. Moderate TR. RVSP 83 mm Hg.   - continue ASA  - no more brilinta due to falls      2. HPL. On Statin. 3. PAF at time of STEMI. Has been in [facility]. 4. Essential hypertension. Well controlled. 5. Hypothyroidism. Followed by PCP     The patient is to continue heart healthy diet, weight loss and exercise as tolerated. Patient's medications and side effects were discussed. Medication refills were provided if needed. Follow up appointment timing was discussed.  All questions and concerns were addressed to patient's satisfaction. The patient is to follow up in 6 months or sooner if necessary. Thank you for allowing me to participate in the care of this patient, please do not hesitate to call if you have any questions. Travon Segundo DO, [facility], [address], [address], [place] [address] [facility]  [facility]. [facility]  [phone]

## 2022-10-10 NOTE — PROGRESS NOTES
[facility]      Dell Jara is a 80 y.o. female resident of [facility] who presents today for medical conditions/complaints as noted below. HPI:     HPI  Patient presents for evaluation and management of chronic medical conditions as noted below. Impaired fasting glucose is well controlled with diet. Continues on amlodipine for hypertension, which is well controlled. Denies chest pain or dyspnea. Dyslipidemia stable on atorvastatin. Levothyroxine dose was previously adjusted, but has not yet been rechecked per previous orders. Follows with cardiology for atrial fibrillation and coronary arterial sclerosis. Stable on Brilinta without bleeding concerns. GERD remained stable without medication. Continues on Prozac and Zyprexa, no recent behavioral disturbances. GERD    History of colon polyps, most recent colonoscopy June 2010. Repeat recommended in 5 years. Not currently pursuing recheck due to patient age and comorbidities. History of melanoma 2017, no new skin lesions noted by staff.       Current Outpatient Medications   Medication Sig Dispense Refill    OLANZapine (ZYPREXA) 2.5 MG tablet Take 2.5 mg by mouth nightly      Cholecalciferol (VITAMIN D3) 1.25 MG (50000 UT) CAPS Take 1 capsule by mouth once a week X 8 doses  (Wednesdays)      furosemide (LASIX) 20 MG tablet Take 1 tablet by mouth daily as needed (Lower extremity swelling) 30 tablet 0    acetaminophen (TYLENOL) 500 MG tablet Take 2 tablets by mouth every 8 hours as needed for Pain 180 tablet 5    levothyroxine (SYNTHROID) 150 MCG tablet Take 1 tablet by mouth daily 90 tablet 1    Clobetasol Propionate 0.05 % LIQD Apply topically nightly as needed to scalp      Ciclopirox 1 % SHAM Apply topically once a week (Tuesdays)      OXYGEN Inhale 3.5 L into the lungs to keep sat > 91%      Nutritional Supplements (NUTRITIONAL DRINK PO) Mighty shakes bid with meals      calcium carbonate-vitamin D3 (CALTRATE) 600-400 MG-UNIT TABS per tab Take 1 tablet by mouth daily      amLODIPine (NORVASC) 5 MG tablet Take 5 mg by mouth daily      FLUoxetine (PROZAC) 20 MG capsule TAKE 1 CAPSULE DAILY 90 capsule 3    memantine (NAMENDA) 5 MG tablet Take 1 tablet by mouth daily 90 tablet 3    atorvastatin (LIPITOR) 40 MG tablet Take 1 tablet by mouth daily 90 tablet 3    aspirin 81 MG EC tablet Take 1 tablet by mouth daily       No current facility-administered medications for this visit. No Known Allergies    Health Maintenance   Topic Date Due    COVID-19 Vaccine (4 - Booster for Pfizer series) 02/27/2022    Flu vaccine (1) 08/01/2022    Lipids  06/16/2023    Depression Screen  07/05/2023    DTaP/Tdap/Td vaccine (3 - Td or Tdap) 07/22/2025    Shingles vaccine  Completed    Pneumococcal 65+ years Vaccine  Completed    Hepatitis A vaccine  Aged Out    Hib vaccine  Aged Out    Meningococcal (ACWY) vaccine  Aged Out       Subjective:      Review of Systems   Constitutional:  Negative for chills and fever. HENT:  Negative for congestion and sore throat. Respiratory:  Negative for chest tightness and shortness of breath. Cardiovascular:  Negative for chest pain and leg swelling. Gastrointestinal:  Negative for diarrhea, nausea and vomiting. Genitourinary:  Negative for difficulty urinating and dysuria. Musculoskeletal:  Negative for gait problem and myalgias. Neurological:  Negative for dizziness and headaches. Psychiatric/Behavioral:  Negative for agitation and behavioral problems. Due to patient's clinical status, ROS of symptoms was completed by discussion with long term care facility staff, as well as with input from patient. Objective:     Vitals:    10/10/22 1844   BP: 122/61   Pulse: 69   Resp: 16   Temp: 97.6 °F (36.4 °C)   SpO2: 90%     Physical Exam  Vitals reviewed. Constitutional:       General: She is not in acute distress. Appearance: Normal appearance.    HENT:      Head: Normocephalic and atraumatic. Right Ear: External ear normal.      Left Ear: External ear normal.      Nose: No rhinorrhea. Mouth/Throat:      Lips: No lesions. Eyes:      Extraocular Movements: Extraocular movements intact. Conjunctiva/sclera: Conjunctivae normal.   Neck:      Vascular: No JVD. Cardiovascular:      Rate and Rhythm: Normal rate and regular rhythm. Pulmonary:      Effort: Pulmonary effort is normal. No accessory muscle usage or respiratory distress. Breath sounds: Normal breath sounds. Musculoskeletal:      Cervical back: Normal range of motion. Skin:     General: Skin is warm and dry. Coloration: Skin is not cyanotic or jaundiced. Neurological:      General: No focal deficit present. Mental Status: She is alert. Mental status is at baseline. Psychiatric:         Attention and Perception: Attention and perception normal.         Mood and Affect: Mood and affect normal.         Speech: Speech normal.         Behavior: Behavior normal. Behavior is cooperative. Thought Content: Thought content normal.       Assessment/Plan:        1. Impaired fasting glucose, stable  - Continue to monitor    2. Essential hypertension,  stable  - Continue current medications    3. Dyslipidemia,  stable  - Continue current medications    4. Hypothyroidism, unspecified type, uncontrolled  - Recheck TSH. Continue levothyroxine    5. Atrial fibrillation, unspecified type ([address] stable  6. Coronary arteriosclerosis, stable  7. Heart failure, unspecified HF chronicity, unspecified heart failure type ([address] stable  - Continue to follow with cardiology    8. Gastroesophageal reflux disease without esophagitis, stable  - Continue to monitor    9. Dementia with behavioral disturbance,  stable  - Continue current medications    10. Adenomatous polyp, stable  - Monitor as noted above    11.  Melanoma in situ of torso excluding breast ([address] stable  - Monitor as noted above      Return in about 3 months (around 1/11/2023). No orders of the defined types were placed in this encounter. No orders of the defined types were placed in this encounter.               Electronically signed by Giles Essex, APRN - CNP on 10/13/2022 at 2:36 PM

## 2022-10-11 ENCOUNTER — OUTSIDE SERVICES (OUTPATIENT)
Dept: INTERNAL MEDICINE | Age: 87
End: 2022-10-11
Payer: MEDICARE

## 2022-10-11 VITALS
HEART RATE: 69 BPM | TEMPERATURE: 97.6 F | OXYGEN SATURATION: 90 % | DIASTOLIC BLOOD PRESSURE: 61 MMHG | SYSTOLIC BLOOD PRESSURE: 122 MMHG | RESPIRATION RATE: 16 BRPM

## 2022-10-11 DIAGNOSIS — F03.918 DEMENTIA WITH BEHAVIORAL DISTURBANCE: ICD-10-CM

## 2022-10-11 DIAGNOSIS — I10 ESSENTIAL HYPERTENSION: ICD-10-CM

## 2022-10-11 DIAGNOSIS — I25.10 CORONARY ARTERIOSCLEROSIS: ICD-10-CM

## 2022-10-11 DIAGNOSIS — E03.9 HYPOTHYROIDISM, UNSPECIFIED TYPE: ICD-10-CM

## 2022-10-11 DIAGNOSIS — R73.01 IMPAIRED FASTING GLUCOSE: Primary | ICD-10-CM

## 2022-10-11 DIAGNOSIS — I48.91 ATRIAL FIBRILLATION, UNSPECIFIED TYPE (HCC): ICD-10-CM

## 2022-10-11 DIAGNOSIS — D03.59 MELANOMA IN SITU OF TORSO EXCLUDING BREAST (HCC): ICD-10-CM

## 2022-10-11 DIAGNOSIS — D36.9 ADENOMATOUS POLYP: ICD-10-CM

## 2022-10-11 DIAGNOSIS — E78.5 DYSLIPIDEMIA: ICD-10-CM

## 2022-10-11 DIAGNOSIS — K21.9 GASTROESOPHAGEAL REFLUX DISEASE WITHOUT ESOPHAGITIS: ICD-10-CM

## 2022-10-11 DIAGNOSIS — I50.9 HEART FAILURE, UNSPECIFIED HF CHRONICITY, UNSPECIFIED HEART FAILURE TYPE (HCC): ICD-10-CM

## 2022-10-11 RX ORDER — OLANZAPINE 2.5 MG/1
2.5 TABLET ORAL NIGHTLY
COMMUNITY

## 2022-10-11 RX ORDER — CHOLECALCIFEROL (VITAMIN D3) 1250 MCG
1 CAPSULE ORAL WEEKLY
COMMUNITY

## 2022-10-13 ENCOUNTER — TELEPHONE (OUTPATIENT)
Dept: INTERNAL MEDICINE | Age: 87
End: 2022-10-13

## 2022-10-13 ENCOUNTER — HOSPITAL ENCOUNTER (EMERGENCY)
Age: 87
Discharge: ANOTHER HOSPITAL | End: 2022-10-14
Attending: EMERGENCY MEDICINE
Payer: MEDICARE

## 2022-10-13 ENCOUNTER — HOSPITAL ENCOUNTER (OUTPATIENT)
Age: 87
Setting detail: SPECIMEN
Discharge: HOME | End: 2022-10-13
Payer: MEDICARE

## 2022-10-13 ENCOUNTER — APPOINTMENT (OUTPATIENT)
Dept: CT IMAGING | Age: 87
End: 2022-10-13
Payer: MEDICARE

## 2022-10-13 DIAGNOSIS — D64.9 ANEMIA, UNSPECIFIED TYPE: ICD-10-CM

## 2022-10-13 DIAGNOSIS — S72.142D CLOSED DISPLACED INTERTROCHANTERIC FRACTURE OF LEFT FEMUR WITH ROUTINE HEALING, SUBSEQUENT ENCOUNTER: ICD-10-CM

## 2022-10-13 DIAGNOSIS — I10 ESSENTIAL HYPERTENSION: ICD-10-CM

## 2022-10-13 DIAGNOSIS — S72.141A DISPLACED INTERTROCHANTERIC FRACTURE OF RIGHT FEMUR, INITIAL ENCOUNTER FOR CLOSED FRACTURE (HCC): Primary | ICD-10-CM

## 2022-10-13 DIAGNOSIS — R09.02 HYPOXIA: ICD-10-CM

## 2022-10-13 DIAGNOSIS — D64.9 ANEMIA, UNSPECIFIED TYPE: Primary | ICD-10-CM

## 2022-10-13 DIAGNOSIS — J90 PLEURAL EFFUSION: Primary | ICD-10-CM

## 2022-10-13 LAB
ABSOLUTE EOS #: 0.23 K/UL (ref 0–0.44)
ABSOLUTE IMMATURE GRANULOCYTE: 0.04 K/UL (ref 0–0.3)
ABSOLUTE LYMPH #: 1.06 K/UL (ref 1.1–3.7)
ABSOLUTE MONO #: 0.83 K/UL (ref 0.1–1.2)
ANION GAP SERPL CALCULATED.3IONS-SCNC: 10 MMOL/L (ref 9–17)
BACTERIA: ABNORMAL
BASOPHILS # BLD: 1 % (ref 0–2)
BASOPHILS ABSOLUTE: 0.08 K/UL (ref 0–0.2)
BILIRUBIN URINE: NEGATIVE
BUN BLDV-MCNC: 12 MG/DL (ref 8–23)
BUN/CREAT BLD: 16 (ref 9–20)
CALCIUM SERPL-MCNC: 8.9 MG/DL (ref 8.6–10.4)
CHLORIDE BLD-SCNC: 105 MMOL/L (ref 98–107)
CO2: 27 MMOL/L (ref 20–31)
CREAT SERPL-MCNC: 0.77 MG/DL (ref 0.5–0.9)
D-DIMER QUANTITATIVE: 3.07 MG/L FEU (ref 0–0.59)
EOSINOPHILS RELATIVE PERCENT: 3 % (ref 1–4)
EPITHELIAL CELLS UA: ABNORMAL /HPF (ref 0–5)
GFR SERPL CREATININE-BSD FRML MDRD: >60 ML/MIN/1.73M2
GLUCOSE BLD-MCNC: 99 MG/DL (ref 70–99)
GLUCOSE URINE: NEGATIVE
HCT VFR BLD CALC: 33.7 % (ref 36.3–47.1)
HEMOGLOBIN: 10.5 G/DL (ref 11.9–15.1)
HEMOGLOBIN: 10.5 G/DL (ref 11.9–15.1)
IMMATURE GRANULOCYTES: 1 %
KETONES, URINE: NEGATIVE
LEUKOCYTE ESTERASE, URINE: NEGATIVE
LYMPHOCYTES # BLD: 13 % (ref 24–43)
MCH RBC QN AUTO: 30 PG (ref 25.2–33.5)
MCHC RBC AUTO-ENTMCNC: 31.2 G/DL (ref 25.2–33.5)
MCV RBC AUTO: 96.3 FL (ref 82.6–102.9)
MONOCYTES # BLD: 10 % (ref 3–12)
NITRITE, URINE: NEGATIVE
NRBC AUTOMATED: 0 PER 100 WBC
PDW BLD-RTO: 17.2 % (ref 11.8–14.4)
PH UA: 6 (ref 5–6)
PLATELET # BLD: 387 K/UL (ref 138–453)
PMV BLD AUTO: 9.6 FL (ref 8.1–13.5)
POTASSIUM SERPL-SCNC: 3.3 MMOL/L (ref 3.7–5.3)
PRO-BNP: 3464 PG/ML
PROTEIN UA: NEGATIVE
RBC # BLD: 3.5 M/UL (ref 3.95–5.11)
RBC # BLD: ABNORMAL 10*6/UL
RBC UA: ABNORMAL /HPF (ref 0–4)
SEG NEUTROPHILS: 72 % (ref 36–65)
SEGMENTED NEUTROPHILS ABSOLUTE COUNT: 5.79 K/UL (ref 1.5–8.1)
SODIUM BLD-SCNC: 142 MMOL/L (ref 135–144)
SPECIFIC GRAVITY UA: 1 (ref 1.01–1.02)
TROPONIN, HIGH SENSITIVITY: 22 NG/L (ref 0–14)
TROPONIN, HIGH SENSITIVITY: 23 NG/L (ref 0–14)
URINE HGB: ABNORMAL
UROBILINOGEN, URINE: NORMAL
WBC # BLD: 8 K/UL (ref 3.5–11.3)
WBC UA: ABNORMAL /HPF (ref 0–4)

## 2022-10-13 PROCEDURE — 81001 URINALYSIS AUTO W/SCOPE: CPT

## 2022-10-13 PROCEDURE — 99285 EMERGENCY DEPT VISIT HI MDM: CPT

## 2022-10-13 PROCEDURE — 80048 BASIC METABOLIC PNL TOTAL CA: CPT

## 2022-10-13 PROCEDURE — 6360000004 HC RX CONTRAST MEDICATION: Performed by: EMERGENCY MEDICINE

## 2022-10-13 PROCEDURE — 93005 ELECTROCARDIOGRAM TRACING: CPT | Performed by: EMERGENCY MEDICINE

## 2022-10-13 PROCEDURE — 85018 HEMOGLOBIN: CPT

## 2022-10-13 PROCEDURE — 84484 ASSAY OF TROPONIN QUANT: CPT

## 2022-10-13 PROCEDURE — 6360000002 HC RX W HCPCS: Performed by: EMERGENCY MEDICINE

## 2022-10-13 PROCEDURE — 85379 FIBRIN DEGRADATION QUANT: CPT

## 2022-10-13 PROCEDURE — 36415 COLL VENOUS BLD VENIPUNCTURE: CPT

## 2022-10-13 PROCEDURE — 83880 ASSAY OF NATRIURETIC PEPTIDE: CPT

## 2022-10-13 PROCEDURE — 96374 THER/PROPH/DIAG INJ IV PUSH: CPT

## 2022-10-13 PROCEDURE — 71260 CT THORAX DX C+: CPT | Performed by: EMERGENCY MEDICINE

## 2022-10-13 PROCEDURE — 2709999900 CT CHEST PULMONARY EMBOLISM W CONTRAST

## 2022-10-13 PROCEDURE — 85025 COMPLETE CBC W/AUTO DIFF WBC: CPT

## 2022-10-13 RX ORDER — FUROSEMIDE 10 MG/ML
40 INJECTION INTRAMUSCULAR; INTRAVENOUS ONCE
Status: COMPLETED | OUTPATIENT
Start: 2022-10-13 | End: 2022-10-13

## 2022-10-13 RX ADMIN — IOPAMIDOL 80 ML: 755 INJECTION, SOLUTION INTRAVENOUS at 16:27

## 2022-10-13 RX ADMIN — FUROSEMIDE 40 MG: 10 INJECTION, SOLUTION INTRAMUSCULAR; INTRAVENOUS at 18:55

## 2022-10-13 ASSESSMENT — ENCOUNTER SYMPTOMS
SHORTNESS OF BREATH: 0
VOMITING: 0
CHEST TIGHTNESS: 0
DIARRHEA: 0
NAUSEA: 0
SORE THROAT: 0

## 2022-10-13 ASSESSMENT — PAIN - FUNCTIONAL ASSESSMENT: PAIN_FUNCTIONAL_ASSESSMENT: NONE - DENIES PAIN

## 2022-10-13 NOTE — ED PROVIDER NOTES
[facility] ED  [address]  [place] [address]  Phone: [phone]  [facility]      Pt Name: Vee Turner  MRN: [id number]  [place] 3/21/1930  Date of evaluation: 10/13/2022    CHIEF COMPLAINT       Chief Complaint   Patient presents with    Shortness of Breath       HISTORY OF PRESENT ILLNESS    Vee Turner is a 80 y.o. female who presents to the emergency department by private auto from [facility] with shortness of breath and had complained of some chest pain earlier this morning. Patient with a history of dementia denies any chest pain. She does present hypoxic 83% but improves over time. Without oxygen 91% after evaluation. Denies any fevers or chills no cough. No other complaints.     REVIEW OF SYSTEMS       Constitutional: No fevers or chills   HEENT: No sore throat, rhinorrhea, or earache   Eyes: No blurry vision or double vision no drainage   Cardiovascular: Actionable chest pain no tachycardia  Respiratory: No wheezing positive shortness of breath no cough  Gastrointestinal: No nausea, vomiting, diarrhea, constipation, or abdominal pain   : No hematuria or dysuria   Musculoskeletal: No swelling or pain   Skin: No rash   Neurological: No focal neurologic complaints, paresthesias, weakness, or headache     PAST MEDICAL HISTORY    has a past medical history of Adenomatous polyp, Cataracts, bilateral, Closed right hip fracture, initial encounter ([address] Combined forms of age-related cataract of both eyes, Corneal scar, right eye, Cystocele, Dementia ([address] Difficulty controlling anger, Dyslipidemia, GERD (gastroesophageal reflux disease), Goiter, Hiatal hernia, Hypertension, Hypokalemia, Hypothyroidism, Impaired fasting glucose, Malignant melanoma in situ ([address] Migraines, Murmur, functional, Osteopenia, Posterior vitreous detachment of both eyes, Primary open angle glaucoma (POAG) of both eyes, moderate stage, Sciatica, Skin cancer, Syncope and collapse, Tremor, 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 SURGICAL HISTORY      has a past surgical history that includes other surgical history (11/11/2008); Cholecystectomy; Colonoscopy (06/08/2010); Mohs surgery (10/07/2009); Vein Surgery (06/08/2009); Colonoscopy (07/18/2002); Cystocele repair (02/12/1999); Appendectomy; Dilation and curettage of uterus; other surgical history (01/01/1988); Hysterectomy (01/01/1995); Upper gastrointestinal endoscopy (04/08/2015); Intracapsular cataract extraction (Left, 08/25/2020); Intracapsular cataract extraction (Right, 10/08/2020); Hip fracture surgery (Right, 11/02/2021); Femur fracture surgery (Left, 09/04/2022); and Femur fracture surgery (Left, 9/4/2022).     CURRENT MEDICATIONS       Previous Medications    ACETAMINOPHEN (TYLENOL) 500 MG TABLET    Take 2 tablets by mouth every 8 hours as needed for Pain    AMLODIPINE (NORVASC) 5 MG TABLET    Take 5 mg by mouth daily    ASPIRIN 81 MG EC TABLET    Take 1 tablet by mouth daily    ATORVASTATIN (LIPITOR) 40 MG TABLET    Take 1 tablet by mouth daily    CALCIUM CARBONATE-VITAMIN D3 (CALTRATE) 600-400 MG-UNIT TABS PER TAB    Take 1 tablet by mouth daily    CHOLECALCIFEROL (VITAMIN D3) 1.25 MG (92605 UT) CAPS    Take 1 capsule by mouth once a week X 8 doses  (Wednesdays)    CICLOPIROX 1 % SHAM    Apply topically once a week (Tuesdays)    CLOBETASOL PROPIONATE 0.05 % LIQD    Apply topically nightly as needed to scalp    FLUOXETINE (PROZAC) 20 MG CAPSULE    TAKE 1 CAPSULE DAILY    FUROSEMIDE (LASIX) 20 MG TABLET    Take 1 tablet by mouth daily as needed (Lower extremity swelling)    LEVOTHYROXINE (SYNTHROID) 150 MCG TABLET    Take 1 tablet by mouth daily    MEMANTINE (NAMENDA) 5 MG TABLET    Take 1 tablet by mouth daily    NUTRITIONAL SUPPLEMENTS (NUTRITIONAL DRINK PO)    Mighty shakes bid with meals    OLANZAPINE (ZYPREXA) 2.5 MG TABLET    Take 2.5 mg by mouth nightly    OXYGEN    Inhale 3.5 L into the lungs to keep sat > 91%       ALLERGIES     has No Known Allergies. FAMILY HISTORY     She indicated that her mother is . She indicated that her father is . She indicated that the status of her brother is unknown. She indicated that her other is alive. She indicated that the status of her neg hx is unknown.     family history includes Colon Cancer in her brother; Coronary Art Dis in her father. SOCIAL HISTORY      reports that she has never smoked. She has never used smokeless tobacco. She reports that she does not currently use alcohol. She reports that she does not use drugs. PHYSICAL EXAM       ED Triage Vitals [10/13/22 1451]   BP Temp Temp Source Heart Rate Resp SpO2 Height Weight   (!) 154/63 97.3 °F (36.3 °C) Tympanic 80 20 (!) 86 % 5' 4\" (1.626 m) --     Constitutional: Alert, delightfully confused, nontoxic, answering questions appropriately, acting properly for age, in no acute distress   HEENT: Extraocular muscles intact,   Neck: Trachea midline   Cardiovascular: Regular rhythm and rate no murmurs   Respiratory: Diminished to auscultation bilaterally no wheezes, rhonchi, rales, some crackles at the bases. No respiratory distress no tachypnea no retractions borderline hypoxia  Gastrointestinal: Soft, nontender, nondistended, no abdominal bruit or pulsatile mass diminished bowel sounds. No rebound, rigidity, or guarding. Musculoskeletal: No extremity pain or swelling no calf tenderness or asymmetry  Neurologic: Moving all 4 extremities without difficulty there are no gross focal neurologic deficits   Skin: Warm and dry   DIFFERENTIAL DIAGNOSIS/ MDM:     IV and labs. EKG. Chest imaging. DIAGNOSTIC RESULTS     EKG: All EKG's are interpreted by the Emergency Department Physician who either signs or Co-signs this chart in the absence of a cardiologist.    1501 junctional rhythm rate 77 TN undetectable QRS 78  no acute ST or T wave changes.     Not indicated unless otherwise documented above    LABS:  Results for orders placed or performed during the hospital encounter of 10/13/22   CBC with Auto Differential   Result Value Ref Range    WBC 8.0 3.5 - 11.3 k/uL    RBC 3.50 (L) 3.95 - 5.11 m/uL    Hemoglobin 10.5 (L) 11.9 - 15.1 g/dL    Hematocrit 33.7 (L) 36.3 - 47.1 %    MCV 96.3 82.6 - 102.9 fL    MCH 30.0 25.2 - 33.5 pg    MCHC 31.2 25.2 - 33.5 g/dL    RDW 17.2 (H) 11.8 - 14.4 %    Platelets 844 644 - 049 k/uL    MPV 9.6 8.1 - 13.5 fL    NRBC Automated 0.0 0.0 per 100 WBC    Seg Neutrophils 72 (H) 36 - 65 %    Lymphocytes 13 (L) 24 - 43 %    Monocytes 10 3 - 12 %    Eosinophils % 3 1 - 4 %    Basophils 1 0 - 2 %    Immature Granulocytes 1 (H) 0 %    Segs Absolute 5.79 1.50 - 8.10 k/uL    Absolute Lymph # 1.06 (L) 1.10 - 3.70 k/uL    Absolute Mono # 0.83 0.10 - 1.20 k/uL    Absolute Eos # 0.23 0.00 - 0.44 k/uL    Basophils Absolute 0.08 0.00 - 0.20 k/uL    Absolute Immature Granulocyte 0.04 0.00 - 0.30 k/uL    RBC Morphology ANISOCYTOSIS PRESENT    Basic Metabolic Panel   Result Value Ref Range    Glucose 99 70 - 99 mg/dL    BUN 12 8 - 23 mg/dL    Creatinine 0.77 0.50 - 0.90 mg/dL    Est, Glom Filt Rate >60 >60 mL/min/1.73m2    Bun/Cre Ratio 16 9 - 20    Calcium 8.9 8.6 - 10.4 mg/dL    Sodium 142 135 - 144 mmol/L    Potassium 3.3 (L) 3.7 - 5.3 mmol/L    Chloride 105 98 - 107 mmol/L    CO2 27 20 - 31 mmol/L    Anion Gap 10 9 - 17 mmol/L   Troponin   Result Value Ref Range    Troponin, High Sensitivity 23 (H) 0 - 14 ng/L   Brain Natriuretic Peptide   Result Value Ref Range    Pro-BNP 3,464 (H) <300 pg/mL   Urinalysis with Reflex to Culture    Specimen: Urine, straight catheter   Result Value Ref Range    Glucose, Ur NEGATIVE NEGATIVE    Bilirubin Urine NEGATIVE NEGATIVE    Ketones, Urine NEGATIVE NEGATIVE    Specific Gravity, UA 1.005 (L) 1.010 - 1.025    Urine Hgb 1+ (A) NEGATIVE    pH, UA 6.0 5.0 - 6.0    Protein, UA NEGATIVE NEGATIVE    Urobilinogen, Urine Normal Normal Nitrite, Urine NEGATIVE NEGATIVE    Leukocyte Esterase, Urine NEGATIVE NEGATIVE   D-Dimer, Quantitative   Result Value Ref Range    D-Dimer, Quant 3.07 (H) 0.00 - 0.59 mg/L FEU   Microscopic Urinalysis   Result Value Ref Range    WBC, UA 0 TO 4 0 - 4 /HPF    RBC, UA 0 TO 4 0 - 4 /HPF    Epithelial Cells UA 5 TO 10 0 - 5 /HPF    Bacteria, UA TRACE (A) None   Troponin   Result Value Ref Range    Troponin, High Sensitivity 22 (H) 0 - 14 ng/L   EKG 12 Lead   Result Value Ref Range    Ventricular Rate 77 BPM    Atrial Rate 416 BPM    QRS Duration 78 ms    Q-T Interval 430 ms    QTc Calculation (Bazett) 486 ms    R Axis -32 degrees    T Axis 55 degrees       Not indicated unless otherwise documented above    RADIOLOGY:   I reviewed the radiologist interpretations:    CT CHEST PULMONARY EMBOLISM W CONTRAST   Final Result   No evidence of pulmonary embolism. Moderate severe bilateral pleural effusion. Extensive consolidative changes   of bilateral lower lobes and mild consolidative changes of right middle lobe   and lingula likely suggestive of atelectasis. Findings suggestive of pulmonary edema. Not indicated unless otherwise documented above    EMERGENCY DEPARTMENT COURSE:     The patient was given the following medications:  Orders Placed This Encounter   Medications    iopamidol (ISOVUE-370) 76 % injection 100 mL    furosemide (LASIX) injection 40 mg          Vitals:   -------------------------  BP (!) 147/59   Pulse 74   Temp 97.3 °F (36.3 °C) (Tympanic)   Resp 16   Ht 5' 4\" (1.626 m)   Wt 173 lb 1.6 oz (78.5 kg)   LMP  (LMP Unknown)   SpO2 93%   BMI 29.71 kg/m²     6:10 PM CT shows bilateral pleural effusions. Patient resting comfortably no acute distress. We will give Lasix. Will discuss with hospitalist.  Initial troponin 23 urine negative nitrite.   Normal white blood cell count mild anemia electrolytes and kidney function unremarkable with exception of a slightly low potassium at 3. 3.  BNP was elevated at 3400 and D-dimer was high at 3.07 which prompted the CT of the chest.      6:45 PM discussed with Dr. Norma Toure who recommends transfer to [address] [place] [address]. R[place]. No pulmonary coverage and will not be able to perform thoracentesis in a timely manner going into the weekend. 7 PM discussed with Demarcus Glover patient's son who is agreeable to the patient being transferred to [address] [place] [address]. Ronel. Awaiting callback from access. 710 PM Discussed with Gabrielle Hall for Hospitalist agrees to admission awaiting bed assignment and transport. CRITICAL CARE:    None    CONSULTS:    None    PROCEDURES:    None      O[facility]RS Report if indicated             FINAL IMPRESSION      1. Pleural effusion    2. Hypoxia          DISPOSITION/PLAN   DISPOSITION Decision To Transfer 10/13/2022 06:30:56 PM        CONDITION ON DISPOSITION: STABLE       PATIENT REFERRED TO:  No follow-up provider specified.     DISCHARGE MEDICATIONS:  New Prescriptions    No medications on file       (Please note that portions of this note were completed with a voice recognition program.  Efforts were made to edit the dictations but occasionally words are mis-transcribed.)    Sebastian Song DO   Attending Emergency Physician     Sebastian Song DO  10/13/22 [id number]

## 2022-10-13 NOTE — TELEPHONE ENCOUNTER
Previously ordered TSH with reflex has not yet been completed, please contact [facility] to have these labs drawn next week. Patient may have been admitted when these were due.     Electronically signed by FELICIA Veras CNP on 10/13/2022 at 2:29 PM

## 2022-10-14 ENCOUNTER — HOSPITAL ENCOUNTER (INPATIENT)
Age: 87
LOS: 2 days | Discharge: NURSING, REHAB OR LONG-TERM CARE | DRG: 291 | End: 2022-10-16
Attending: INTERNAL MEDICINE | Admitting: STUDENT IN AN ORGANIZED HEALTH CARE EDUCATION/TRAINING PROGRAM
Payer: MEDICARE

## 2022-10-14 ENCOUNTER — APPOINTMENT (OUTPATIENT)
Dept: GENERAL RADIOLOGY | Age: 87
DRG: 291 | End: 2022-10-14
Attending: INTERNAL MEDICINE
Payer: MEDICARE

## 2022-10-14 ENCOUNTER — APPOINTMENT (OUTPATIENT)
Dept: ULTRASOUND IMAGING | Age: 87
DRG: 291 | End: 2022-10-14
Attending: INTERNAL MEDICINE
Payer: MEDICARE

## 2022-10-14 VITALS
HEART RATE: 67 BPM | HEIGHT: 64 IN | RESPIRATION RATE: 16 BRPM | BODY MASS INDEX: 29.55 KG/M2 | WEIGHT: 173.1 LBS | TEMPERATURE: 97.3 F | SYSTOLIC BLOOD PRESSURE: 139 MMHG | DIASTOLIC BLOOD PRESSURE: 53 MMHG | OXYGEN SATURATION: 99 %

## 2022-10-14 DIAGNOSIS — E87.6 HYPOKALEMIA: Primary | ICD-10-CM

## 2022-10-14 PROBLEM — I27.20 PULMONARY HYPERTENSION (HCC): Status: ACTIVE | Noted: 2022-01-01

## 2022-10-14 PROBLEM — I50.33 ACUTE ON CHRONIC DIASTOLIC HEART FAILURE (HCC): Status: ACTIVE | Noted: 2022-01-01

## 2022-10-14 PROBLEM — J90 BILATERAL PLEURAL EFFUSION: Status: ACTIVE | Noted: 2022-10-14

## 2022-10-14 PROBLEM — I48.0 PAROXYSMAL A-FIB (HCC): Status: ACTIVE | Noted: 2021-01-06

## 2022-10-14 PROBLEM — J96.01 ACUTE RESPIRATORY FAILURE WITH HYPOXIA (HCC): Status: ACTIVE | Noted: 2022-10-14

## 2022-10-14 LAB
ANION GAP SERPL CALCULATED.3IONS-SCNC: 12 MMOL/L (ref 9–17)
BUN BLDV-MCNC: 10 MG/DL (ref 8–23)
CALCIUM SERPL-MCNC: 8.4 MG/DL (ref 8.6–10.4)
CHLORIDE BLD-SCNC: 102 MMOL/L (ref 98–107)
CO2: 27 MMOL/L (ref 20–31)
CREAT SERPL-MCNC: 0.54 MG/DL (ref 0.5–0.9)
DIRECT EXAM: NORMAL
EKG ATRIAL RATE: 416 BPM
EKG Q-T INTERVAL: 430 MS
EKG QRS DURATION: 78 MS
EKG QTC CALCULATION (BAZETT): 486 MS
EKG R AXIS: -32 DEGREES
EKG T AXIS: 55 DEGREES
EKG VENTRICULAR RATE: 77 BPM
GFR SERPL CREATININE-BSD FRML MDRD: >60 ML/MIN/1.73M2
GLUCOSE BLD-MCNC: 109 MG/DL (ref 70–99)
GLUCOSE, FLUID: 136 MG/DL
HCT VFR BLD CALC: 33.8 % (ref 36.3–47.1)
HEMOGLOBIN: 10.3 G/DL (ref 11.9–15.1)
INR BLD: 1.1
LACTATE DEHYDROGENASE, FLUID: 76 U/L
LACTATE DEHYDROGENASE: 294 U/L (ref 135–214)
MCH RBC QN AUTO: 29.5 PG (ref 25.2–33.5)
MCHC RBC AUTO-ENTMCNC: 30.5 G/DL (ref 28.4–34.8)
MCV RBC AUTO: 96.8 FL (ref 82.6–102.9)
NRBC AUTOMATED: 0 PER 100 WBC
PDW BLD-RTO: 17.1 % (ref 11.8–14.4)
PH FLUID: 8
PLATELET # BLD: 350 K/UL (ref 138–453)
PMV BLD AUTO: 9.9 FL (ref 8.1–13.5)
POTASSIUM SERPL-SCNC: 3.7 MMOL/L (ref 3.7–5.3)
PRO-BNP: 2681 PG/ML
PROTHROMBIN TIME: 12.1 SEC (ref 9.1–12.3)
RBC # BLD: 3.49 M/UL (ref 3.95–5.11)
SODIUM BLD-SCNC: 141 MMOL/L (ref 135–144)
SPECIMEN DESCRIPTION: NORMAL
SPECIMEN TYPE: NORMAL
THYROXINE, FREE: 1.28 NG/DL (ref 0.93–1.7)
TOTAL PROTEIN, BODY FLUID: 1.6 G/DL
TOTAL PROTEIN: 6.7 G/DL (ref 6.4–8.3)
TROPONIN, HIGH SENSITIVITY: 19 NG/L (ref 0–14)
TROPONIN, HIGH SENSITIVITY: 20 NG/L (ref 0–14)
TROPONIN, HIGH SENSITIVITY: 21 NG/L (ref 0–14)
TROPONIN, HIGH SENSITIVITY: 22 NG/L (ref 0–14)
TROPONIN, HIGH SENSITIVITY: 24 NG/L (ref 0–14)
TSH SERPL DL<=0.05 MIU/L-ACNC: 9.63 UIU/ML (ref 0.3–5)
WBC # BLD: 7.6 K/UL (ref 3.5–11.3)

## 2022-10-14 PROCEDURE — 89051 BODY FLUID CELL COUNT: CPT

## 2022-10-14 PROCEDURE — 85610 PROTHROMBIN TIME: CPT

## 2022-10-14 PROCEDURE — 85027 COMPLETE CBC AUTOMATED: CPT

## 2022-10-14 PROCEDURE — 1200000000 HC SEMI PRIVATE

## 2022-10-14 PROCEDURE — 2709999900 HC NON-CHARGEABLE SUPPLY

## 2022-10-14 PROCEDURE — 80048 BASIC METABOLIC PNL TOTAL CA: CPT

## 2022-10-14 PROCEDURE — 83615 LACTATE (LD) (LDH) ENZYME: CPT

## 2022-10-14 PROCEDURE — 87075 CULTR BACTERIA EXCEPT BLOOD: CPT

## 2022-10-14 PROCEDURE — 87205 SMEAR GRAM STAIN: CPT

## 2022-10-14 PROCEDURE — 0W993ZZ DRAINAGE OF RIGHT PLEURAL CAVITY, PERCUTANEOUS APPROACH: ICD-10-PCS | Performed by: RADIOLOGY

## 2022-10-14 PROCEDURE — 36415 COLL VENOUS BLD VENIPUNCTURE: CPT

## 2022-10-14 PROCEDURE — 83880 ASSAY OF NATRIURETIC PEPTIDE: CPT

## 2022-10-14 PROCEDURE — 2580000003 HC RX 258: Performed by: NURSE PRACTITIONER

## 2022-10-14 PROCEDURE — 71045 X-RAY EXAM CHEST 1 VIEW: CPT

## 2022-10-14 PROCEDURE — 84155 ASSAY OF PROTEIN SERUM: CPT

## 2022-10-14 PROCEDURE — 6370000000 HC RX 637 (ALT 250 FOR IP): Performed by: STUDENT IN AN ORGANIZED HEALTH CARE EDUCATION/TRAINING PROGRAM

## 2022-10-14 PROCEDURE — 6360000002 HC RX W HCPCS: Performed by: NURSE PRACTITIONER

## 2022-10-14 PROCEDURE — 82945 GLUCOSE OTHER FLUID: CPT

## 2022-10-14 PROCEDURE — 6360000002 HC RX W HCPCS: Performed by: STUDENT IN AN ORGANIZED HEALTH CARE EDUCATION/TRAINING PROGRAM

## 2022-10-14 PROCEDURE — 99223 1ST HOSP IP/OBS HIGH 75: CPT | Performed by: STUDENT IN AN ORGANIZED HEALTH CARE EDUCATION/TRAINING PROGRAM

## 2022-10-14 PROCEDURE — 83986 ASSAY PH BODY FLUID NOS: CPT

## 2022-10-14 PROCEDURE — 84484 ASSAY OF TROPONIN QUANT: CPT

## 2022-10-14 PROCEDURE — 87070 CULTURE OTHR SPECIMN AEROBIC: CPT

## 2022-10-14 PROCEDURE — 2580000003 HC RX 258: Performed by: STUDENT IN AN ORGANIZED HEALTH CARE EDUCATION/TRAINING PROGRAM

## 2022-10-14 PROCEDURE — 84443 ASSAY THYROID STIM HORMONE: CPT

## 2022-10-14 PROCEDURE — 84157 ASSAY OF PROTEIN OTHER: CPT

## 2022-10-14 PROCEDURE — 84439 ASSAY OF FREE THYROXINE: CPT

## 2022-10-14 PROCEDURE — 99222 1ST HOSP IP/OBS MODERATE 55: CPT | Performed by: INTERNAL MEDICINE

## 2022-10-14 PROCEDURE — 2709999900 US THORACENTESIS

## 2022-10-14 RX ORDER — LEVOTHYROXINE SODIUM 0.07 MG/1
150 TABLET ORAL DAILY
Status: DISCONTINUED | OUTPATIENT
Start: 2022-10-14 | End: 2022-10-16 | Stop reason: HOSPADM

## 2022-10-14 RX ORDER — SODIUM CHLORIDE 0.9 % (FLUSH) 0.9 %
5-40 SYRINGE (ML) INJECTION PRN
Status: DISCONTINUED | OUTPATIENT
Start: 2022-10-14 | End: 2022-10-16 | Stop reason: HOSPADM

## 2022-10-14 RX ORDER — MEMANTINE HYDROCHLORIDE 5 MG/1
5 TABLET ORAL DAILY
Status: DISCONTINUED | OUTPATIENT
Start: 2022-10-14 | End: 2022-10-16 | Stop reason: HOSPADM

## 2022-10-14 RX ORDER — ACETAMINOPHEN 650 MG/1
650 SUPPOSITORY RECTAL EVERY 6 HOURS PRN
Status: DISCONTINUED | OUTPATIENT
Start: 2022-10-14 | End: 2022-10-16 | Stop reason: HOSPADM

## 2022-10-14 RX ORDER — SODIUM CHLORIDE 0.9 % (FLUSH) 0.9 %
10 SYRINGE (ML) INJECTION PRN
Status: DISCONTINUED | OUTPATIENT
Start: 2022-10-14 | End: 2022-10-16 | Stop reason: HOSPADM

## 2022-10-14 RX ORDER — OLANZAPINE 5 MG/1
2.5 TABLET ORAL NIGHTLY
Status: DISCONTINUED | OUTPATIENT
Start: 2022-10-14 | End: 2022-10-16 | Stop reason: HOSPADM

## 2022-10-14 RX ORDER — POLYETHYLENE GLYCOL 3350 17 G/17G
17 POWDER, FOR SOLUTION ORAL DAILY PRN
Status: DISCONTINUED | OUTPATIENT
Start: 2022-10-14 | End: 2022-10-16 | Stop reason: HOSPADM

## 2022-10-14 RX ORDER — ATORVASTATIN CALCIUM 40 MG/1
40 TABLET, FILM COATED ORAL DAILY
Status: DISCONTINUED | OUTPATIENT
Start: 2022-10-14 | End: 2022-10-16 | Stop reason: HOSPADM

## 2022-10-14 RX ORDER — SODIUM CHLORIDE 0.9 % (FLUSH) 0.9 %
5-40 SYRINGE (ML) INJECTION EVERY 12 HOURS SCHEDULED
Status: DISCONTINUED | OUTPATIENT
Start: 2022-10-14 | End: 2022-10-16 | Stop reason: HOSPADM

## 2022-10-14 RX ORDER — ASPIRIN 81 MG/1
81 TABLET ORAL DAILY
Status: DISCONTINUED | OUTPATIENT
Start: 2022-10-14 | End: 2022-10-16 | Stop reason: HOSPADM

## 2022-10-14 RX ORDER — SODIUM CHLORIDE 9 MG/ML
INJECTION, SOLUTION INTRAVENOUS PRN
Status: DISCONTINUED | OUTPATIENT
Start: 2022-10-14 | End: 2022-10-16 | Stop reason: HOSPADM

## 2022-10-14 RX ORDER — POTASSIUM CHLORIDE 7.45 MG/ML
10 INJECTION INTRAVENOUS PRN
Status: DISCONTINUED | OUTPATIENT
Start: 2022-10-14 | End: 2022-10-16 | Stop reason: HOSPADM

## 2022-10-14 RX ORDER — ACETAMINOPHEN 325 MG/1
650 TABLET ORAL EVERY 6 HOURS PRN
Status: DISCONTINUED | OUTPATIENT
Start: 2022-10-14 | End: 2022-10-16 | Stop reason: HOSPADM

## 2022-10-14 RX ORDER — ONDANSETRON 4 MG/1
4 TABLET, ORALLY DISINTEGRATING ORAL EVERY 8 HOURS PRN
Status: DISCONTINUED | OUTPATIENT
Start: 2022-10-14 | End: 2022-10-16 | Stop reason: HOSPADM

## 2022-10-14 RX ORDER — AMLODIPINE BESYLATE 5 MG/1
5 TABLET ORAL DAILY
Status: DISCONTINUED | OUTPATIENT
Start: 2022-10-14 | End: 2022-10-16 | Stop reason: HOSPADM

## 2022-10-14 RX ORDER — POTASSIUM CHLORIDE 20 MEQ/1
40 TABLET, EXTENDED RELEASE ORAL PRN
Status: DISCONTINUED | OUTPATIENT
Start: 2022-10-14 | End: 2022-10-16 | Stop reason: HOSPADM

## 2022-10-14 RX ORDER — FLUOXETINE HYDROCHLORIDE 20 MG/1
20 CAPSULE ORAL DAILY
Status: DISCONTINUED | OUTPATIENT
Start: 2022-10-14 | End: 2022-10-16 | Stop reason: HOSPADM

## 2022-10-14 RX ORDER — MAGNESIUM SULFATE 1 G/100ML
1000 INJECTION INTRAVENOUS PRN
Status: DISCONTINUED | OUTPATIENT
Start: 2022-10-14 | End: 2022-10-16 | Stop reason: HOSPADM

## 2022-10-14 RX ORDER — ENOXAPARIN SODIUM 100 MG/ML
40 INJECTION SUBCUTANEOUS DAILY
Status: DISCONTINUED | OUTPATIENT
Start: 2022-10-14 | End: 2022-10-16 | Stop reason: HOSPADM

## 2022-10-14 RX ORDER — ONDANSETRON 2 MG/ML
4 INJECTION INTRAMUSCULAR; INTRAVENOUS EVERY 6 HOURS PRN
Status: DISCONTINUED | OUTPATIENT
Start: 2022-10-14 | End: 2022-10-16 | Stop reason: HOSPADM

## 2022-10-14 RX ORDER — FUROSEMIDE 10 MG/ML
40 INJECTION INTRAMUSCULAR; INTRAVENOUS 2 TIMES DAILY
Status: DISCONTINUED | OUTPATIENT
Start: 2022-10-14 | End: 2022-10-16

## 2022-10-14 RX ADMIN — ENOXAPARIN SODIUM 40 MG: 100 INJECTION SUBCUTANEOUS at 09:03

## 2022-10-14 RX ADMIN — MEMANTINE HYDROCHLORIDE 5 MG: 5 TABLET, FILM COATED ORAL at 13:33

## 2022-10-14 RX ADMIN — Medication 81 MG: at 15:49

## 2022-10-14 RX ADMIN — OLANZAPINE 2.5 MG: 5 TABLET, FILM COATED ORAL at 20:51

## 2022-10-14 RX ADMIN — LEVOTHYROXINE SODIUM 150 MCG: 75 TABLET ORAL at 13:33

## 2022-10-14 RX ADMIN — SODIUM CHLORIDE, PRESERVATIVE FREE 10 ML: 5 INJECTION INTRAVENOUS at 20:51

## 2022-10-14 RX ADMIN — AMLODIPINE BESYLATE 5 MG: 5 TABLET ORAL at 13:33

## 2022-10-14 RX ADMIN — FLUOXETINE HYDROCHLORIDE 20 MG: 20 CAPSULE ORAL at 13:33

## 2022-10-14 RX ADMIN — FUROSEMIDE 40 MG: 10 INJECTION, SOLUTION INTRAMUSCULAR; INTRAVENOUS at 20:51

## 2022-10-14 RX ADMIN — FUROSEMIDE 40 MG: 10 INJECTION, SOLUTION INTRAMUSCULAR; INTRAVENOUS at 13:33

## 2022-10-14 RX ADMIN — DESMOPRESSIN ACETATE 40 MG: 0.2 TABLET ORAL at 13:33

## 2022-10-14 RX ADMIN — SODIUM CHLORIDE, PRESERVATIVE FREE 10 ML: 5 INJECTION INTRAVENOUS at 09:04

## 2022-10-14 ASSESSMENT — ENCOUNTER SYMPTOMS
CONSTIPATION: 0
SHORTNESS OF BREATH: 1
BACK PAIN: 0
COLOR CHANGE: 0
ABDOMINAL DISTENTION: 0
EYE DISCHARGE: 0
ABDOMINAL PAIN: 0

## 2022-10-14 NOTE — CONSULTS
Patient - Juliana See   MRN -  [id number]   Acct # - [de-identified]   - 3/21/1930        Date of evaluation -  10/14/2022    REASON FOR THE CONSULTATION:  Pulmonary hypertension   HISTORY OF PRESENT ILLNESS:    Juliana See is a 80y.o. year old female admitted for sob , chest pain . She was found to have low saturation . Cta no pe but pulmonary edema and pleural effusion . She is post rt thoracentesis and is feeling better . No cough no sputum   Chest pain is resolved   Doesnot have oxygen at home   Patient's son is at bedside . Immunization   Immunization History   Administered Date(s) Administered    COVID-19, PFIZER PURPLE top, DILUTE for use, (age 15 y+), 30mcg/0.3mL 2021, 2021, 10/27/2021    DT (pediatric) 2004    Influenza Virus Vaccine 10/29/2010, 10/12/2011, 10/10/2012    Influenza, High Dose (Fluzone 65 yrs and older) 2013, 2014, 2015, 10/11/2016, 10/18/2017, 2018, 2020    Influenza, MDCK, Preservative free 10/04/2014    Influenza, Triv, inactivated, subunit, adjuvanted, IM (Fluad 65 yrs and older) 10/01/2019, 10/21/2019, 10/15/2020    Pneumococcal Conjugate 13-valent ([id number]) 2015    Pneumococcal Polysaccharide ([id number]) 10/28/1998, 2008, 10/04/2011, 2020    Tdap (Boostrix, Adacel) 2015    Zoster Live (Zostavax) 2008    Zoster Recombinant (Shingrix) 2018, 2018        Pneumococcal Vaccine     [] Up to date    [] Indicated   [] Refused  [] Contraindicated       Influenza Vaccine   [] Up to date    [] Indicated   [] Refused  [] Contraindicated            Pulmonary Rehab     [] completed    [] Indicated   [] Refused  [] Contraindicated       PAST MEDICAL HISTORY:       Diagnosis Date    Adenomatous polyp 06/10    With recommendation for repeat 06/15. Also, diverticulosis was noted.      Cataracts, bilateral     Closed right hip fracture, initial encounter ([facility] [address] 10/30/2021    Combined forms of age-related cataract of both eyes 10/1/2018    Corneal scar, right eye     Cystocele     history of     Dementia ([facility] [address]     Mini-Mental Status exam 27/30  6/14  declines meds at this point,  MMSE 29/30 on June 13, 2017  MMSE 26/30 4/2018    Difficulty controlling anger 9/16/2018    Dyslipidemia     GERD (gastroesophageal reflux disease)     egd  4/15 ok    Goiter     benign, history of     Hiatal hernia     Hypertension     Hypokalemia     Hypothyroidism     Impaired fasting glucose     Malignant melanoma in situ ([facility] [address]     R upper Back 8/17    Migraines     Murmur, functional     Grade 1/6 systolic, history of     Osteopenia     T -1.0 hip, 03/09, T -0.3 spine, 03/09. 1) Repeat DEXA scan 09/12 with T +0.15, T -1.2 at the hip but -1.8 at the mean femoral neck giving her a FRAX score of 4.3 at the hip. Reclast 10/13 and given 2014,2015, and 1/4/2017, on calcium and vit d,  Redo Dexa 10/14 Frax 4.2% , Frax 4.8% 10 yr hip fracture risk on Dexa 1/17  On Reclast    Posterior vitreous detachment of both eyes     Primary open angle glaucoma (POAG) of both eyes, moderate stage 10/1/2018    Sciatica 12/15/2015    Skin cancer     History of multiple skin cancers. Following with Dr. Cody Whittaker--- invasive squamous cell Ca 8/17 L forearm    Syncope and collapse 1/21/2015    Tremor 12/23/2018    Trichiasis of left lower eyelid without entropion     Visual disturbance 10/26/2017         Family History:       Problem Relation Age of Onset    Coronary Art Dis Father     Colon Cancer Brother     Diabetes Neg Hx     Cataracts Neg Hx     Glaucoma Neg Hx        SURGICAL HISTORY:   Past Surgical History:   Procedure Laterality Date    APPENDECTOMY      CHOLECYSTECTOMY      Remote. COLONOSCOPY  06/08/2010    COLONOSCOPY  07/18/2002    CYSTOCELE REPAIR  02/12/1999    Vaginal prolapse, cystocele plus pelvic relaxation.      DILATION AND CURETTAGE OF UTERUS      with hysteroscopy    FEMUR FRACTURE SURGERY Left 09/04/2022    LEFT TFNA NAIL INSERTION    FEMUR FRACTURE SURGERY Left 9/4/2022    LEFT TFNA NAIL INSERTION performed by Christo Tanner DO at [address] Right 11/02/2021    Right Hip Intertan performed by Dulce Silva MD at [address] ([facility]  01/01/1995    still has ovaries     INTRACAPSULAR CATARACT EXTRACTION Left 08/25/2020    Left Cataract Extraction w/ IOL Implant performed by Onesimo Iglesias MD at [address] Right 10/08/2020    Right Cataract Extraction w/ IOL Implant performed by Onesimo Iglesias MD at [place] [address]  10/07/2009    SCC, left nasal sidewall. OTHER SURGICAL HISTORY  11/11/2008    Anterior repair. OTHER SURGICAL HISTORY  01/01/1988    laser cone    UPPER GASTROINTESTINAL ENDOSCOPY  04/08/2015    hiatal hernia    VEIN SURGERY  06/08/2009    Laser ablation of right greater saphenous vein. SOCIAL AND OCCUPATIONAL HEALTH:    Social History     Socioeconomic History    Marital status:      Spouse name: Annmarie Perdomo    Number of children: 3   Tobacco Use    Smoking status: Never    Smokeless tobacco: Never   Vaping Use    Vaping Use: Never used   Substance and Sexual Activity    Alcohol use: Not Currently    Drug use: No    Sexual activity: Not Currently     Social Determinants of Health     Financial Resource Strain: Low Risk     Difficulty of Paying Living Expenses: Not hard at all   Food Insecurity: No Food Insecurity    Worried About Running Out of Food in the Last Year: Never true    Ran Out of Food in the Last Year: Never true   Transportation Needs: No Transportation Needs    Lack of Transportation (Medical): No    Lack of Transportation (Non-Medical): No   Physical Activity: Inactive    Days of Exercise per Week: 0 days    Minutes of Exercise per Session: 0 min   Stress: No Stress Concern Present    Feeling of Stress : Not at all   Social Connections:  Moderately Isolated    Frequency of Communication with Friends and Family: Three times a week    Frequency of Social Gatherings with Friends and Family: Three times a week    Attends [employer] Services: More than 4 times per year    Active Member of Clubs or Organizations: No    Attends Club or Organization Meetings: Never    Marital Status:    Intimate Partner Violence: Not At Risk    Fear of Current or Ex-Partner: No    Emotionally Abused: No    Physically Abused: No    Sexually Abused: No   Housing Stability: Unknown    Unable to Pay for Housing in the Last Year: No    Unstable Housing in the Last Year: No        TOBACCO:   reports that she has never smoked. She has never used smokeless tobacco.  ETOH:   reports that she does not currently use alcohol. ALLERGIES:  No Known Allergies    Home Meds:   Prior to Admission medications    Medication Sig Start Date End Date Taking?  Authorizing Provider   OLANZapine (ZYPREXA) 2.5 MG tablet Take 2.5 mg by mouth nightly    Historical Provider, MD   Cholecalciferol (VITAMIN D3) 1.25 MG (68270 UT) CAPS Take 1 capsule by mouth once a week X 8 doses  (Wednesdays)    Historical Provider, MD   furosemide (LASIX) 20 MG tablet Take 1 tablet by mouth daily as needed (Lower extremity swelling) 9/20/22   FELICIA Gonsales CNP   acetaminophen (TYLENOL) 500 MG tablet Take 2 tablets by mouth every 8 hours as needed for Pain 9/20/22   FELICIA Gonsales CNP   levothyroxine (SYNTHROID) 150 MCG tablet Take 1 tablet by mouth daily 9/19/22   FELICIA Gonsales CNP   Clobetasol Propionate 0.05 % LIQD Apply topically nightly as needed to scalp    Historical Provider, MD   Ciclopirox 1 % SHAM Apply topically once a week (Tuesdays)    Historical Provider, MD   OXYGEN Inhale 3.5 L into the lungs to keep sat > 91%    Historical Provider, MD   Nutritional Supplements (NUTRITIONAL DRINK PO) Mighty shakes bid with meals    Historical Provider, MD   calcium carbonate-vitamin D3 (CALTRATE) 600-400 MG-UNIT TABS per tab Take 1 tablet by mouth daily    Historical Provider, MD   amLODIPine (NORVASC) 5 MG tablet Take 5 mg by mouth daily    Historical Provider, MD   FLUoxetine (PROZAC) 20 MG capsule TAKE 1 CAPSULE DAILY 5/31/22   Marco Woodard MD   BRILINTA 90 MG TABS tablet Take 1 tablet by mouth 2 times daily 4/20/22 9/6/22  Martha Kohli MD   memantine [facility]) 5 MG tablet Take 1 tablet by mouth daily 2/21/22   Marco Woodard MD   atorvastatin (LIPITOR) 40 MG tablet Take 1 tablet by mouth daily 2/1/21   Marco Woodard MD   aspirin 81 MG EC tablet Take 1 tablet by mouth daily 12/21/20   Historical Provider, MD   nitroGLYCERIN (NITROSTAT) 0.4 MG SL tablet Place 0.4 mg under the tongue every 5 minutes as needed for Chest pain up to max of 3 total doses.  If no relief after 3 dose, call 911.   9/6/22  Historical Provider, MD   ibuprofen (ADVIL;MOTRIN) 600 MG tablet Take 1 tablet by mouth 3 times daily as needed for Pain (Take with food.) 12/27/20 12/27/20  Arnol Gonzalez MD   Multiple Vitamin (DAILY MULTIVITAMIN PO) Take 1 tablet by mouth daily   9/6/22  Historical Provider, MD     CURRENT MEDS :  Scheduled Meds:   sodium chloride flush  5-40 mL IntraVENous 2 times per day    enoxaparin  40 mg SubCUTAneous Daily    furosemide  40 mg IntraVENous BID    sodium chloride flush  5-40 mL IntraVENous 2 times per day    amLODIPine  5 mg Oral Daily    aspirin  81 mg Oral Daily    atorvastatin  40 mg Oral Daily    FLUoxetine  20 mg Oral Daily    levothyroxine  150 mcg Oral Daily    memantine  5 mg Oral Daily    OLANZapine  2.5 mg Oral Nightly       Continuous Infusions:   sodium chloride      sodium chloride             REVIEW OF SYSTEMS:  CONSTITUTIONAL:  negative for  fevers, chills, sweats, fatigue, malaise, anorexia and weight loss  EYES:  negative for  double vision, blurred vision, dry eyes, eye discharge and redness  HEENT:  negative for  hearing loss, tinnitus, ear drainage, earaches and nasal congestion  RESPIRATORY:  See hpi  CARDIOVASCULAR:  negative for  chest pain, palpitations, orthopnea, PND  GASTROINTESTINAL:  negative for nausea, vomiting, change in bowel habits, diarrhea, constipation, abdominal pain, pruritus, abdominal mass and abdominal distention  GENITOURINARY:  negative for frequency, dysuria, nocturia, urinary incontinence and hesitancy  INTEGUMENT/BREAST:  negative for rash, skin lesion(s), dryness, skin color change, changes in lesion, pruritus and changes in hair  HEMATOLOGIC/LYMPHATIC:  negative for easy bruising, bleeding, lymphadenopathy, petechiae and swelling/edema  ALLERGIC/IMMUNOLOGIC:  negative for recurrent infections, urticaria and drug reactions  ENDOCRINE:  negative for heat intolerance, cold intolerance, tremor, weight changes and change in bowel habits  MUSCULOSKELETAL:  negative for  myalgias, arthralgias, pain, joint swelling, stiff joints and decreased range of motion  NEUROLOGICAL:  negative for headaches, dizziness, seizures, memory problems, speech problems, visual disturbance and coordination problems  BEHAVIOR/PSYCH:  negative for poor appetite, increased appetite, decreased sleep, increased sleep, decreased energy level, increased energy level and poor concentration   skin - no rash no dermatitis     Vitals:  /65   Pulse 77   Temp 98.4 °F (36.9 °C) (Tympanic)   Resp 18   Ht 5' 4\" (1.626 m)   Wt 170 lb 3.1 oz (77.2 kg)   LMP  (LMP Unknown)   SpO2 92%   BMI 29.21 kg/m²     PHYSICAL EXAM:  General Appearance:    Alert, cooperative, no distress, appears stated age   Head:    Normocephalic, without obvious abnormality, atraumatic      Eyes:    PERRL, conjunctiva/corneas clear, EOM's intact   Ears:    Normal TM's and external ear canals, both ears   Nose:   Nares normal, septum midline, mucosa normal, no drainage        or sinus tenderness   Throat:   Lips, mucosa, and tongue normal; teeth and gums normal   Neck:   Supple, symmetrical, trachea midline, no adenopathy;     thyroid:  no enlargement/tenderness/nodules;    Back:     Symmetric, no curvature, ROM normal, no CVA tenderness   Lungs:       Dull bases , no wheeze , insp rales rt post >left      Chest Wall:    No tenderness or deformity      Heart:    Regular rate and rhythm, S1 and S2 normal, no murmur, rub        or gallop no rvh                           Abdomen:                                                 Pulses:                              Skin:                  Lymph nodes:                    Neurologic:                  Soft, non-tender, bowel sounds active all four quadrants,     no masses, no organomegaly         2+ and symmetric all extremities     Skin color, texture, turgor normal, no rashes or lesions       Cervical, supraclavicular not enlarged or matted or tender      CNII-XII intact, normal strength 5/5 . Sensation grossly normal  and reflexes normal 2+  throughout     Clubbing No  Lower ext edema Yes1+   [] , 2 +  [] , 3+   []  Upper ext edema No             CBC:   Recent Labs     10/13/22  0648 10/13/22  1508 10/14/22  1055   WBC  --  8.0 7.6   HGB 10.5* 10.5* 10.3*   PLT  --  387 350     BMP:    Recent Labs     10/13/22  1508 10/14/22  1055    141   K 3.3* 3.7    102   CO2 27 27   BUN 12 10   CREATININE 0.77 0.54   GLUCOSE 99 109*     Hepatic: No results for input(s): AST, ALT, ALB, BILITOT, ALKPHOS in the last 72 hours. Amylase: No results found for: AMYLASE  Lipase: No results found for: LIPASE  Troponin: No results for input(s): TROPONINI in the last 72 hours. BNP: No results for input(s): BNP in the last 72 hours. Lipids: No results for input(s): CHOL, HDL in the last 72 hours.     Invalid input(s): LDLCALCU  ABGs: No results found for: PHART, PO2ART, [id number]  INR:   Recent Labs     10/14/22  1055   INR 1.1     Thyroid:   Lab Results   Component Value Date/Time    TSH 30.86 09/15/2022 06:29 AM     Urinalysis:   Recent Labs     10/13/22  1650   BACTERIA TRACE*   PHUR 6.0   PROTEINU NEGATIVE   RBCUA 0 TO 4   SPECGRAV 1.005*   BILIRUBINUR NEGATIVE   NITRU NEGATIVE   WBCUA 0 TO 4   LEUKOCYTESUR NEGATIVE   GLUCOSEU NEGATIVE       XR CHEST PORTABLE    Result Date: 10/14/2022  No evidence of pneumothorax status post right thoracentesis. XR CHEST PORTABLE    Result Date: 10/14/2022  Bilateral moderate effusions with right greater than left patchy opacifications within lung fields. CT CHEST PULMONARY EMBOLISM W CONTRAST    Result Date: 10/13/2022  No evidence of pulmonary embolism. Moderate severe bilateral pleural effusion. Extensive consolidative changes of bilateral lower lobes and mild consolidative changes of right middle lobe and lingula likely suggestive of atelectasis. Findings suggestive of pulmonary edema. US THORACENTESIS Which side should the procedure be performed? Radiologist Recommendation    Result Date: 10/14/2022  Successful ultrasound guided right thoracentesis. IMPRESSION:    Principal Problem:    Bilateral pleural effusion  Active Problems:    Acute on chronic diastolic heart failure (HCC)    Pulmonary hypertension (HCC)    Acute respiratory failure with hypoxia (HCC)    Dyslipidemia    Adenomatous polyp    Essential hypertension    Hypothyroidism    Dementia with behavioral disturbance    Paroxysmal A-fib (HCC)    Coronary artery disease involving native coronary artery without angina pectoris  Resolved Problems:    * No resolved hospital problems. *                      PLAN:      Pulmonary hypertension likely group 2 . Will follow repeat echo . Post rt thoracentesis - will follow diagnotic labs   Continue diuresis , optimize chf           I hope this updates you on my evaluation and clinical thinking. Thank you for allowing me to participate in his care.      Sincerely,      Electronically signed by Baldomero Johnson MD on 10/14/2022 at 4:39 PM

## 2022-10-14 NOTE — CARE COORDINATION
10/14/22 9907   Service Assessment   Patient Orientation Alert and Oriented;Person   Cognition Alert   History Provided By Child/Family; Patient  (son Kristy Guido)   Primary Caregiver Other (Comment)  (assisted living staff)   [address] [place] [address]   PCP Verified by CM Yes   Last Visit to PCP Within last 3 months   Prior Functional Level Assistance with the following:   Current Functional Level Assistance with the following:   Can patient return to prior living arrangement Yes   Ability to make needs known: Fair   Family able to assist with home care needs: Yes   Financial Resources Franc Paez   [facility]  ([address] [facility] [place])   Social/Functional History   Lives With Other (comment)  (assisted living)   Type of Home Assisted living  [facility]   Home Layout One level   Home Access Level entry   [address] [facility] bars in shower   Bathroom Accessibility Wheelchair accessible   [address] [facility],[address], [address] Help From Other (comment); Family  (assisted living staff and children)   ADL Assistance Needs assistance   Toileting Needs assistance   [address] Needs assistance   Homemaking Responsibilities No   Ambulation Assistance Needs assistance   Transfer Assistance Needs assistance   Active  No   Mode of Transportation Other  (medical transport)   Discharge Planning   Type of Residence Assisted living   Living Arrangements Other (Comment)  (lives in assisted living at [facility] in [place])   Current Services Prior To Admission Durable Medical Equipment   Current DME Prior to [address]   DME Ordered? No   Potential Assistance Purchasing Medications No   Type of Home Care Services PT;OT  (using PT/OT arranged at her assisted living)   Patient expects to be discharged to: Assisted living   One/Two Story Residence One story   History of falls?  1   Services At/After Discharge   [address] Provided? No   Mode of Transport at Discharge Other (see comment)  (will need transport back)   Condition of Participation: Discharge Planning   The Plan for Transition of Care is related to the following treatment goals: comfort       Met with patient and son Roro Giron to discuss transitional planning. Patient lives at [facility] assisted living in [place] and plans to return. She has a walker at home and is receiving PT/OT through her assisted living. [facility].  Patient and son agreeable to plan

## 2022-10-14 NOTE — BRIEF OP NOTE
Brief Postoperative Note for Thoracentesis    Isaiah Pastor  YOB: 1930  [id number]    Pre-operative Diagnosis: right Pleural effusion      Post-operative Diagnosis: Same    Procedure: Ultrasound guided Thoracentesis     Anesthesia: 1% Lidocaine     Surgeons/Assistants: Pricila Del Toro PA-C    Complications: none    EBL: Minimal    Specimens: were obtained    Ultrasound guided right thoracentesis performed. 800 ml clear yellow fluid obtained. Dressing applied.       Electronically signed by MATT Sanchez on 10/14/2022 at 3:15 PM

## 2022-10-14 NOTE — H&P
[facility]  Office: [address], DO, Sofya Morrow, DO, Torsten Durán, DO, [facility], DO, Aniyah Alejo MD, Sanjuanita Castañeda MD, Roxie Briggs MD, Jose Jackson MD,  Ollie Martinez MD, Maikel Esposito MD, Landon Skelton, DO, Eda Mcqueen MD,  Kayli Dietrich MD, Radha Vincent MD, Ryan Dotson, DO, Ezequiel Choi MD, Clive Evans MD, Katie Corea MD, Vern Dimas MD, Karla Hanson MD, Mirela Guerrero MD, Reinaldo Pruitt DO, Lillian Mike MD, Naomi Ely MD, Elvie Rodríguez, CNP,  Shivani Quinn, CNP, Maribell Cardoso, CNP, Bill Bruno, CNP,  Burnett Osgood, DNP, Marisela Henley, CNP, Ione Safe, CNP, Dianna Lux, CNP, John Rodriguez, CNP, Bonnie Toro, CNP, Dereck Mcgee PA-C, Nalini Jaime, CNS, Boyd Ledbetter, DNP, Mel Joseph, CNP, Bradly Peñaloza, CNP, Tish Mensah, CNP         [facility]    HISTORY AND PHYSICAL EXAMINATION            Date:   10/14/2022  Patient name:  Patsy William  Date of admission:  10/14/2022  2:11 AM  MRN:   [id number]  Account:  [de-identified]  YOB: 1930  PCP:    Zuleyka Shahid MD  Room:   [address] [place] [address]  Code Status:    DNR-CCA    Chief Complaint:     Shortness of breath and leg swelling    History Obtained From:     patient, electronic medical record                            History of Present Illness:     Patsy William is a 80 y.o. Non- / non  female who presents with neck swelling and shortness of breath   and is admitted to the hospital for the management of Bilateral pleural effusion. 80-year-old female with known medical history of coronary artery disease with stents, now on aspirin, hyperlipidemia, hypothyroidism on Synthyroid, and depression, dementia on Namenda presents to the hospital from outlying facility. [facility] and chest pain.   Patient has history of dementia. She was also noted to be hypoxic at 83% on room air. Patient was noted to have bilateral pleural effusions. D-dimer was elevated to 3, CT PE scan was done which revealed bilateral pleural effusions but no pulmonary embolism. Patient complains of leg swelling for last 3 weeks, states she followed up with her family doctor who recommended water pill, Lasix. Patient states that the swelling continued to increase and then she started feeling short of breath. She could not remember any more history. Patient hypertensive blood pressure 477 systolic, requiring 4 L oxygen via nasal cannula. X-ray shows bilateral effusion. Past Medical History:     Past Medical History:   Diagnosis Date    Adenomatous polyp 06/10    With recommendation for repeat 06/15. Also, diverticulosis was noted. Cataracts, bilateral     Closed right hip fracture, initial encounter ([facility] [address] 10/30/2021    Combined forms of age-related cataract of both eyes 10/1/2018    Corneal scar, right eye     Cystocele     history of     Dementia ([address]     Mini-Mental Status exam 27/30  6/14  declines meds at this point,  MMSE 29/30 on June 13, 2017  MMSE 26/30 4/2018    Difficulty controlling anger 9/16/2018    Dyslipidemia     GERD (gastroesophageal reflux disease)     egd  4/15 ok    Goiter     benign, history of     Hiatal hernia     Hypertension     Hypokalemia     Hypothyroidism     Impaired fasting glucose     Malignant melanoma in situ ([address]     R upper Back 8/17    Migraines     Murmur, functional     Grade 1/6 systolic, history of     Osteopenia     T -1.0 hip, 03/09, T -0.3 spine, 03/09. 1) Repeat DEXA scan 09/12 with T +0.15, T -1.2 at the hip but -1.8 at the mean femoral neck giving her a FRAX score of 4.3 at the hip.  Reclast 10/13 and given 2014,2015, and 1/4/2017, on calcium and vit d,  Redo Dexa 10/14 Frax 4.2% , Frax 4.8% 10 yr hip fracture risk on Dexa 1/17  On Reclast    Posterior vitreous detachment of both eyes Primary open angle glaucoma (POAG) of both eyes, moderate stage 10/1/2018    Sciatica 12/15/2015    Skin cancer     History of multiple skin cancers. Following with Dr. Lee Frias--- invasive squamous cell Ca 8/17 L forearm    Syncope and collapse 1/21/2015    Tremor 12/23/2018    Trichiasis of left lower eyelid without entropion     Visual disturbance 10/26/2017        Past Surgical History:     Past Surgical History:   Procedure Laterality Date    APPENDECTOMY      CHOLECYSTECTOMY      Remote. COLONOSCOPY  06/08/2010    COLONOSCOPY  07/18/2002    CYSTOCELE REPAIR  02/12/1999    Vaginal prolapse, cystocele plus pelvic relaxation. DILATION AND CURETTAGE OF UTERUS      with hysteroscopy    FEMUR FRACTURE SURGERY Left 09/04/2022    LEFT TFNA NAIL INSERTION    FEMUR FRACTURE SURGERY Left 9/4/2022    LEFT TFNA NAIL INSERTION performed by Amador Marsh DO at [address] Right 11/02/2021    Right Hip Intertan performed by Jerrod Bonilla MD at [facility] ([address] [place] [address])  01/01/1995    still has ovaries     INTRACAPSULAR CATARACT EXTRACTION Left 08/25/2020    Left Cataract Extraction w/ IOL Implant performed by Kenyatta Seaman MD at [address] [place] [address] Right 10/08/2020    Right Cataract Extraction w/ IOL Implant performed by eKnyatta Seaman MD at [place] [address]  10/07/2009    SCC, left nasal sidewall. OTHER SURGICAL HISTORY  11/11/2008    Anterior repair. OTHER SURGICAL HISTORY  01/01/1988    laser cone    UPPER GASTROINTESTINAL ENDOSCOPY  04/08/2015    hiatal hernia    VEIN SURGERY  06/08/2009    Laser ablation of right greater saphenous vein. Medications Prior to Admission:     Prior to Admission medications    Medication Sig Start Date End Date Taking?  Authorizing Provider   OLANZapine (ZYPREXA) 2.5 MG tablet Take 2.5 mg by mouth nightly    Historical Provider, MD   Cholecalciferol (VITAMIN D3) 1.25 MG (93223 UT) CAPS Take 1 capsule by mouth once a week X 8 doses  (Wednesdays)    Historical Provider, MD   furosemide (LASIX) 20 MG tablet Take 1 tablet by mouth daily as needed (Lower extremity swelling) 9/20/22   FELICIA Higgins CNP   acetaminophen (TYLENOL) 500 MG tablet Take 2 tablets by mouth every 8 hours as needed for Pain 9/20/22   FELICIA Higgins CNP   levothyroxine (SYNTHROID) 150 MCG tablet Take 1 tablet by mouth daily 9/19/22   FELICIA Higgins CNP   Clobetasol Propionate 0.05 % LIQD Apply topically nightly as needed to scalp    Historical Provider, MD   Ciclopirox 1 % SHAM Apply topically once a week (Tuesdays)    Historical Provider, MD   OXYGEN Inhale 3.5 L into the lungs to keep sat > 91%    Historical Provider, MD   Nutritional Supplements (NUTRITIONAL DRINK PO) Mighty shakes bid with meals    Historical Provider, MD   calcium carbonate-vitamin D3 (CALTRATE) 600-400 MG-UNIT TABS per tab Take 1 tablet by mouth daily    Historical Provider, MD   amLODIPine (NORVASC) 5 MG tablet Take 5 mg by mouth daily    Historical Provider, MD   FLUoxetine (PROZAC) 20 MG capsule TAKE 1 CAPSULE DAILY 5/31/22   Jack Yi MD   BRILINTA 90 MG TABS tablet Take 1 tablet by mouth 2 times daily 4/20/22 9/6/22  Surekha Rivas MD   [facility]) 5 MG tablet Take 1 tablet by mouth daily 2/21/22   Jack Yi MD   atorvastatin (LIPITOR) 40 MG tablet Take 1 tablet by mouth daily 2/1/21   Jack Yi MD   aspirin 81 MG EC tablet Take 1 tablet by mouth daily 12/21/20   Historical Provider, MD   nitroGLYCERIN (NITROSTAT) 0.4 MG SL tablet Place 0.4 mg under the tongue every 5 minutes as needed for Chest pain up to max of 3 total doses.  If no relief after 3 dose, call 911.   9/6/22  Historical Provider, MD   ibuprofen (ADVIL;MOTRIN) 600 MG tablet Take 1 tablet by mouth 3 times daily as needed for Pain (Take with food.) 12/27/20 12/27/20  Jaki Richey MD   Multiple Vitamin (DAILY MULTIVITAMIN PO) Take 1 tablet by mouth daily   22  Historical Provider, MD        Allergies:     Patient has no known allergies. Social History:     Tobacco:    reports that she has never smoked. She has never used smokeless tobacco.  Alcohol:      reports that she does not currently use alcohol. Drug Use:  reports no history of drug use. Family History:     Family History   Problem Relation Age of Onset    Coronary Art Dis Father     Colon Cancer Brother     Diabetes Neg Hx     Cataracts Neg Hx     Glaucoma Neg Hx        Review of Systems:     Positive and Negative as described in HPI. Review of Systems   Constitutional:  Positive for activity change. Negative for appetite change and fatigue. HENT:  Negative for congestion and dental problem. Eyes:  Negative for discharge. Respiratory:  Positive for shortness of breath. Cardiovascular:  Positive for leg swelling. Negative for chest pain and palpitations. Gastrointestinal:  Negative for abdominal distention, abdominal pain and constipation. Endocrine: Negative for cold intolerance and heat intolerance. Genitourinary:  Negative for difficulty urinating, flank pain and frequency. Musculoskeletal:  Negative for arthralgias and back pain. Skin:  Negative for color change and pallor. Neurological:  Negative for dizziness and facial asymmetry. Psychiatric/Behavioral:  Positive for decreased concentration. Negative for agitation and behavioral problems. Physical Exam:   /65   Pulse 77   Temp 98.4 °F (36.9 °C) (Tympanic)   Resp 18   Ht 5' 4\" (1.626 m)   Wt 170 lb 3.1 oz (77.2 kg)   LMP  (LMP Unknown)   SpO2 92%   BMI 29.21 kg/m²   Temp (24hrs), Av.3 °F (36.8 °C), Min:98.2 °F (36.8 °C), Max:98.4 °F (36.9 °C)    No results for input(s): POCGLU in the last 72 hours. No intake or output data in the 24 hours ending 10/14/22 1537    Physical Exam  Constitutional:       General: She is not in acute distress. Appearance: She is obese.  She is not ill-appearing. HENT:      Head: Normocephalic. Right Ear: External ear normal.      Left Ear: External ear normal.      Nose: Nose normal.      Mouth/Throat:      Mouth: Mucous membranes are moist.   Eyes:      Pupils: Pupils are equal, round, and reactive to light. Cardiovascular:      Rate and Rhythm: Normal rate and regular rhythm. Pulses: Normal pulses. Heart sounds: Murmur heard. Pulmonary:      Breath sounds: Rales present. No wheezing. Comments: Decreased breath sounds at the bases  Abdominal:      General: Abdomen is flat. Bowel sounds are normal. There is no distension. Tenderness: There is no abdominal tenderness. Musculoskeletal:         General: Swelling present. Normal range of motion. Cervical back: Normal range of motion. No rigidity. Right lower leg: Edema present. Left lower leg: Edema present. Comments: 3+ edema bilateral lower extremities   Skin:     General: Skin is warm. Coloration: Skin is not jaundiced. Findings: No bruising. Neurological:      Mental Status: She is alert and oriented to person, place, and time.       Comments: Baseline dementia but oriented to time place and person   Psychiatric:         Mood and Affect: Mood normal.       Investigations:      Laboratory Testing:  Recent Results (from the past 24 hour(s))   Urinalysis with Reflex to Culture    Collection Time: 10/13/22  4:50 PM    Specimen: Urine, straight catheter   Result Value Ref Range    Glucose, Ur NEGATIVE NEGATIVE    Bilirubin Urine NEGATIVE NEGATIVE    Ketones, Urine NEGATIVE NEGATIVE    Specific Gravity, UA 1.005 (L) 1.010 - 1.025    Urine Hgb 1+ (A) NEGATIVE    pH, UA 6.0 5.0 - 6.0    Protein, UA NEGATIVE NEGATIVE    Urobilinogen, Urine Normal Normal    Nitrite, Urine NEGATIVE NEGATIVE    Leukocyte Esterase, Urine NEGATIVE NEGATIVE   Microscopic Urinalysis    Collection Time: 10/13/22  4:50 PM   Result Value Ref Range    WBC, UA 0 TO 4 0 - 4 /HPF RBC, UA 0 TO 4 0 - 4 /HPF    Epithelial Cells UA 5 TO 10 0 - 5 /HPF    Bacteria, UA TRACE (A) None   Troponin    Collection Time: 10/13/22  6:25 PM   Result Value Ref Range    Troponin, High Sensitivity 22 (H) 0 - 14 ng/L   Troponin    Collection Time: 10/14/22  2:36 AM   Result Value Ref Range    Troponin, High Sensitivity 20 (H) 0 - 14 ng/L   Brain Natriuretic Peptide    Collection Time: 10/14/22  2:36 AM   Result Value Ref Range    Pro-BNP 2,681 (H) <300 pg/mL   CBC    Collection Time: 10/14/22 10:55 AM   Result Value Ref Range    WBC 7.6 3.5 - 11.3 k/uL    RBC 3.49 (L) 3.95 - 5.11 m/uL    Hemoglobin 10.3 (L) 11.9 - 15.1 g/dL    Hematocrit 33.8 (L) 36.3 - 47.1 %    MCV 96.8 82.6 - 102.9 fL    MCH 29.5 25.2 - 33.5 pg    MCHC 30.5 28.4 - 34.8 g/dL    RDW 17.1 (H) 11.8 - 14.4 %    Platelets 958 202 - 626 k/uL    MPV 9.9 8.1 - 13.5 fL    NRBC Automated 0.0 0.0 per 100 WBC   Protime-INR    Collection Time: 10/14/22 10:55 AM   Result Value Ref Range    Protime 12.1 9.1 - 12.3 sec    INR 1.1    Basic Metabolic Panel w/ Reflex to MG    Collection Time: 10/14/22 10:55 AM   Result Value Ref Range    Glucose 109 (H) 70 - 99 mg/dL    BUN 10 8 - 23 mg/dL    Creatinine 0.54 0.50 - 0.90 mg/dL    Est, Glom Filt Rate >60 >60 mL/min/1.73m2    Calcium 8.4 (L) 8.6 - 10.4 mg/dL    Sodium 141 135 - 144 mmol/L    Potassium 3.7 3.7 - 5.3 mmol/L    Chloride 102 98 - 107 mmol/L    CO2 27 20 - 31 mmol/L    Anion Gap 12 9 - 17 mmol/L   Lactate Dehydrogenase    Collection Time: 10/14/22 10:55 AM   Result Value Ref Range     (H) 135 - 214 U/L   Protein, Total    Collection Time: 10/14/22 10:55 AM   Result Value Ref Range    Total Protein 6.7 6.4 - 8.3 g/dL   Troponin    Collection Time: 10/14/22 10:55 AM   Result Value Ref Range    Troponin, High Sensitivity 19 (H) 0 - 14 ng/L       Imaging/Diagnostics:  XR CHEST PORTABLE    Result Date: 10/14/2022  Bilateral moderate effusions with right greater than left patchy opacifications within lung fields. CT CHEST PULMONARY EMBOLISM W CONTRAST    Result Date: 10/13/2022  No evidence of pulmonary embolism. Moderate severe bilateral pleural effusion. Extensive consolidative changes of bilateral lower lobes and mild consolidative changes of right middle lobe and lingula likely suggestive of atelectasis. Findings suggestive of pulmonary edema. Assessment :      Hospital Problems             Last Modified POA    * (Principal) Bilateral pleural effusion 10/14/2022 Yes    Acute on chronic diastolic heart failure ([address] 10/14/2022 Yes    Pulmonary hypertension ([address] 10/14/2022 Yes    Acute respiratory failure with hypoxia ([address] 10/14/2022 Yes    Dyslipidemia 10/14/2022 Yes    Adenomatous polyp 10/14/2022 Yes    Overview Signed 9/11/2013  9:26 AM by Riccardo Alan MD     With recommendation for repeat 06/15. Also, diverticulosis was noted. Essential hypertension 10/14/2022 Yes    Hypothyroidism 10/14/2022 Yes    Dementia with behavioral disturbance 10/14/2022 Yes    Overview Addendum 6/28/2019 10:09 AM by Vito Alvarez DO     Mini-Mental Status exam 27/30  6/14  declines meds at this point,  MMSE 29/30 on June 13, 2017  MMSE 26/30 4/2018         Paroxysmal A-fib ([address] 10/14/2022 Yes    Coronary artery disease involving native coronary artery without angina pectoris 10/14/2022 Yes       Plan:     Patient status inpatient in the Med/Surge    Bilateral pleural effusion likely secondary to diastolic heart failure-obtain echocardiogram to check diastolic function, Lasix 40 mg IV twice daily, thoracentesis today, 800 mL clear yellow fluid removed from right side, pulmonology consulted. Fluid restriction to 1500 mL, monitor intake and output. Acute respiratory failure-patient was desaturating to low 80s on room air, currently on 3.5 to 4 L of nasal cannula oxygen, continue diuresis and monitor for response.     Severe pulmonary hypertension- rvsp>80, consult pulm, obtain echo    Coronary artery disease with STEMI in 2020, drug-eluting stent to proximal and distal RCA. Continue aspirin, continue statin. Brilinta was discontinued due to falls    Hyperlipidemia-continue statin    Hypothyroidism on Synthyroid, recheck tsh as previously abnormal    History of paroxysmal A. fib-normal sinus rhythm, not on anticoagulation    Dementia on Namenda    Continue home Zyprexa and Prozac    Consultations:   IP CONSULT TO INTERVENTIONAL RADIOLOGY  IP CONSULT TO PULMONOLOGY     Patient is admitted as inpatient status because of co-morbidities listed above, severity of signs and symptoms as outlined, requirement for current medical therapies and most importantly because of direct risk to patient if care not provided in a hospital setting. Expected length of stay > 48 hours.     Angie Elliott MD  10/14/2022  3:37 PM    Copy sent to Dr. Marlon Bañuelos MD

## 2022-10-14 NOTE — PROGRESS NOTES
Patient here for ultrasound thoracentesis. Camila GUTIERREZ in room and consent signed. Ultrasound of right back done. Back prepped, draped and numbed with lidocaine. Needle in without difficulty and 800 ml of yellow fluid drained. Needle out and dressing on. Post scan has been completed. Specimen to be sent to lab. Patient for post CXR. Patient tolerated well.  Discharged to room

## 2022-10-15 LAB
ANION GAP SERPL CALCULATED.3IONS-SCNC: 11 MMOL/L (ref 9–17)
BUN BLDV-MCNC: 12 MG/DL (ref 8–23)
CALCIUM SERPL-MCNC: 8 MG/DL (ref 8.6–10.4)
CHLORIDE BLD-SCNC: 99 MMOL/L (ref 98–107)
CO2: 28 MMOL/L (ref 20–31)
CREAT SERPL-MCNC: 0.55 MG/DL (ref 0.5–0.9)
GFR SERPL CREATININE-BSD FRML MDRD: >60 ML/MIN/1.73M2
GLUCOSE BLD-MCNC: 97 MG/DL (ref 70–99)
INR BLD: 1.1
LV EF: 60 %
LVEF MODALITY: NORMAL
POTASSIUM SERPL-SCNC: 3.3 MMOL/L (ref 3.7–5.3)
PROTHROMBIN TIME: 11.3 SEC (ref 9.1–12.3)
SODIUM BLD-SCNC: 138 MMOL/L (ref 135–144)
TROPONIN, HIGH SENSITIVITY: 22 NG/L (ref 0–14)
TROPONIN, HIGH SENSITIVITY: 24 NG/L (ref 0–14)
TROPONIN, HIGH SENSITIVITY: 24 NG/L (ref 0–14)
TROPONIN, HIGH SENSITIVITY: 25 NG/L (ref 0–14)

## 2022-10-15 PROCEDURE — 84484 ASSAY OF TROPONIN QUANT: CPT

## 2022-10-15 PROCEDURE — 1200000000 HC SEMI PRIVATE

## 2022-10-15 PROCEDURE — 80048 BASIC METABOLIC PNL TOTAL CA: CPT

## 2022-10-15 PROCEDURE — 6370000000 HC RX 637 (ALT 250 FOR IP): Performed by: STUDENT IN AN ORGANIZED HEALTH CARE EDUCATION/TRAINING PROGRAM

## 2022-10-15 PROCEDURE — 93306 TTE W/DOPPLER COMPLETE: CPT

## 2022-10-15 PROCEDURE — 6370000000 HC RX 637 (ALT 250 FOR IP): Performed by: NURSE PRACTITIONER

## 2022-10-15 PROCEDURE — 6360000002 HC RX W HCPCS: Performed by: STUDENT IN AN ORGANIZED HEALTH CARE EDUCATION/TRAINING PROGRAM

## 2022-10-15 PROCEDURE — 36415 COLL VENOUS BLD VENIPUNCTURE: CPT

## 2022-10-15 PROCEDURE — 2580000003 HC RX 258: Performed by: STUDENT IN AN ORGANIZED HEALTH CARE EDUCATION/TRAINING PROGRAM

## 2022-10-15 PROCEDURE — 2580000003 HC RX 258: Performed by: NURSE PRACTITIONER

## 2022-10-15 PROCEDURE — 85610 PROTHROMBIN TIME: CPT

## 2022-10-15 PROCEDURE — 99232 SBSQ HOSP IP/OBS MODERATE 35: CPT | Performed by: STUDENT IN AN ORGANIZED HEALTH CARE EDUCATION/TRAINING PROGRAM

## 2022-10-15 PROCEDURE — 6360000002 HC RX W HCPCS: Performed by: NURSE PRACTITIONER

## 2022-10-15 RX ADMIN — SODIUM CHLORIDE, PRESERVATIVE FREE 10 ML: 5 INJECTION INTRAVENOUS at 08:25

## 2022-10-15 RX ADMIN — SODIUM CHLORIDE, PRESERVATIVE FREE 10 ML: 5 INJECTION INTRAVENOUS at 21:25

## 2022-10-15 RX ADMIN — MEMANTINE HYDROCHLORIDE 5 MG: 5 TABLET, FILM COATED ORAL at 08:17

## 2022-10-15 RX ADMIN — SODIUM CHLORIDE, PRESERVATIVE FREE 10 ML: 5 INJECTION INTRAVENOUS at 09:40

## 2022-10-15 RX ADMIN — ENOXAPARIN SODIUM 40 MG: 100 INJECTION SUBCUTANEOUS at 08:30

## 2022-10-15 RX ADMIN — LEVOTHYROXINE SODIUM 150 MCG: 75 TABLET ORAL at 08:17

## 2022-10-15 RX ADMIN — FUROSEMIDE 40 MG: 10 INJECTION, SOLUTION INTRAMUSCULAR; INTRAVENOUS at 17:37

## 2022-10-15 RX ADMIN — POTASSIUM CHLORIDE 40 MEQ: 20 TABLET, EXTENDED RELEASE ORAL at 10:18

## 2022-10-15 RX ADMIN — Medication 81 MG: at 08:18

## 2022-10-15 RX ADMIN — AMLODIPINE BESYLATE 5 MG: 5 TABLET ORAL at 08:16

## 2022-10-15 RX ADMIN — DESMOPRESSIN ACETATE 40 MG: 0.2 TABLET ORAL at 08:17

## 2022-10-15 RX ADMIN — FUROSEMIDE 40 MG: 10 INJECTION, SOLUTION INTRAMUSCULAR; INTRAVENOUS at 08:18

## 2022-10-15 RX ADMIN — FLUOXETINE HYDROCHLORIDE 20 MG: 20 CAPSULE ORAL at 08:17

## 2022-10-15 RX ADMIN — SODIUM CHLORIDE, PRESERVATIVE FREE 10 ML: 5 INJECTION INTRAVENOUS at 21:26

## 2022-10-15 RX ADMIN — OLANZAPINE 2.5 MG: 5 TABLET, FILM COATED ORAL at 21:24

## 2022-10-15 NOTE — PROGRESS NOTES
y.o.    - 3/21/1930      Room Number - 0330/0330-01   N -  [id number]   [facility]  Date of Admission -  10/14/2022  Hospital Day - 1  Admission history, hospital course to date   80y.o. year old female admitted for sob , chest pain . She was found to have low saturation . Cta no pe but pulmonary edema and pleural effusion . She is post rt thoracentesis and is feeling better . No cough no sputum   Chest pain is resolved   Does not have oxygen at home   Past 24 hours   Troponin labs c/w NSTEMI  S/P thoracentesis  No acute events reported in last 24 hours  All other systems reviewed   Objective    Vitals   height is 5' 4\" (1.626 m) and weight is 170 lb 3.1 oz (77.2 kg). Her oral temperature is 97.9 °F (36.6 °C). Her blood pressure is 137/70 and her pulse is 79. Her respiration is 15 and oxygen saturation is 93%. Body mass index is 29.21 kg/m². O2 Flow Rate (L/min): 2 L/min  I/O    Intake/Output Summary (Last 24 hours) at 10/15/2022 1031  Last data filed at 10/15/2022 0924  Gross per 24 hour   Intake 190 ml   Output 1500 ml   Net -1310 ml     I/O last 3 completed shifts:  In: -   Out: 600 [Urine:600]   Patient Vitals for the past 96 hrs (Last 3 readings):   Weight   10/14/22 0547 170 lb 3.1 oz (77.2 kg)   10/14/22 0230 169 lb 12.1 oz (77 kg)     Exam    General Appearance - awake, alert, oriented, in no acute distress. Pulse Oximetry 90s on NC O2  HEENT - normal  Neck - supple, symmetrical, trachea midline   Lungs - nonlabored negative, Percussion normal. Good diaphragmatic excursion.  Lungs clear to auscultation bilaterally, no wheezes, rales or rhonchi, aeration good  Cardiovascular - RRR  Abdomen - nondistended  Neurologic - no obvious focal motor or sensory deficits  Skin - no bruising or bleeding  Extremities - no cyanosis, clubbing or edema    Meds       sodium chloride flush  5-40 mL IntraVENous 2 times per day    enoxaparin  40 mg SubCUTAneous Daily    furosemide  40 mg IntraVENous BID sodium chloride flush  5-40 mL IntraVENous 2 times per day    amLODIPine  5 mg Oral Daily    aspirin  81 mg Oral Daily    atorvastatin  40 mg Oral Daily    FLUoxetine  20 mg Oral Daily    levothyroxine  150 mcg Oral Daily    memantine  5 mg Oral Daily    OLANZapine  2.5 mg Oral Nightly      sodium chloride      sodium chloride       sodium chloride flush, sodium chloride, potassium chloride **OR** potassium alternative oral replacement **OR** potassium chloride, magnesium sulfate, ondansetron **OR** ondansetron, polyethylene glycol, acetaminophen **OR** acetaminophen, sodium chloride flush, sodium chloride    Data   I reviewed lab and radiology data. Thora labs show low values consistent with transudate  Old echo results noted.

## 2022-10-15 NOTE — PROGRESS NOTES
[facility]  Office: [address], DO, Sofya Morrow, DO, Torsten Durán, DO, [facility], DO, Aniyah Alejo MD, Sanjuanita Castañeda MD, Roxie Briggs MD, Jose Jackson MD,  Ollie Martinez MD, Maikel Esposito MD, Landon Skelton, DO, Ead Mcqueen MD,  Kayli Dietrich MD, Radha Vincent MD, Ryan Dotson, DO, Ezequiel Choi MD, Clive Evans MD, Katie Corea MD, Vern Dimas MD, Karla Hanson MD, Mirela Guerrero MD, Reinaldo Pruitt, DO, Lillian Mike MD, Naomi Ely MD, Elvie Rodríguez, CNP,  Shivani Quinn, CNP, Maribell Cardoso, CNP, Bill Bruno, CNP,  Burnett Osgood, DNP, Marisela Henley, CNP, [place] Safe, CNP, Dianna Lux, CNP, John Rodriguez, CNP, Bonnie Toro, CNP, Dereck Mcgee PA-C, Nalini Jaime, CNS, Boyd Ledbetter, DNP, Mel Joseph, CNP, Bradly Peñaloza, CNP, [facility], [address] [place] [address]    Progress Note    10/15/2022    1:23 PM    Name:   Patsy William  MRN:     [id number]     Acct:      [de-identified]   Room:   [address] [place] [address] Day:  1  Admit Date:  10/14/2022  2:11 AM    PCP:   Andie CRISOSTOMO MD  Code Status:  DNR-CCA    Subjective:     C/C: Neck swelling and shortness of breath  Interval History Status: improved. Patient does not remember why she is in the hospital.  She gave me more history yesterday but today does not remember that she had leg swelling. Patient has known dementia. Patient is otherwise pleasant. Does not complain of any chest pain or shortness of breath. No cough. Patient is asking about discharge plan. Patient has good urine output. Brief History:     80-year-old female with known medical history of coronary artery disease with stents, now on aspirin, hyperlipidemia, hypothyroidism on Synthyroid, and depression, dementia on Namenda presents to the hospital from outlying facility.   Patient presented to [facility] facility with shortness of breath and chest pain, leg pain. Patient has history of dementia. She was also noted to be hypoxic at 83% on room air. Patient was noted to have bilateral pleural effusions. D-dimer was elevated to 3, CT PE scan was done which revealed bilateral pleural effusions but no pulmonary embolism. Patient underwent thoracentesis with 800 ml fluid removal on 10/14. Continue on IV diuresis. Review of Systems:     Constitutional:  negative for chills, fevers, sweats  Respiratory:  negative for cough, dyspnea on exertion, shortness of breath, wheezing  Cardiovascular:  negative for chest pain, chest pressure/discomfort, + lower extremity edema, no palpitations  Gastrointestinal:  negative for abdominal pain, constipation, diarrhea, nausea, vomiting  Neurological:  negative for dizziness, headache    Medications:      Allergies:  No Known Allergies    Current Meds:   Scheduled Meds:    sodium chloride flush  5-40 mL IntraVENous 2 times per day    enoxaparin  40 mg SubCUTAneous Daily    furosemide  40 mg IntraVENous BID    sodium chloride flush  5-40 mL IntraVENous 2 times per day    amLODIPine  5 mg Oral Daily    aspirin  81 mg Oral Daily    atorvastatin  40 mg Oral Daily    FLUoxetine  20 mg Oral Daily    levothyroxine  150 mcg Oral Daily    memantine  5 mg Oral Daily    OLANZapine  2.5 mg Oral Nightly     Continuous Infusions:    sodium chloride      sodium chloride       PRN Meds: sodium chloride flush, sodium chloride, potassium chloride **OR** potassium alternative oral replacement **OR** potassium chloride, magnesium sulfate, ondansetron **OR** ondansetron, polyethylene glycol, acetaminophen **OR** acetaminophen, sodium chloride flush, sodium chloride    Data:     Past Medical History:   has a past medical history of Adenomatous polyp, Cataracts, bilateral, Closed right hip fracture, initial encounter ([facility] [address] Combined forms of age-related cataract of both eyes, Corneal scar, right eye, Cystocele, Dementia ([facility] [address] Difficulty controlling anger, Dyslipidemia, GERD (gastroesophageal reflux disease), Goiter, Hiatal hernia, Hypertension, Hypokalemia, Hypothyroidism, Impaired fasting glucose, Malignant melanoma in situ ([address] Migraines, Murmur, functional, Osteopenia, Posterior vitreous detachment of both eyes, Primary open angle glaucoma (POAG) of both eyes, moderate stage, Sciatica, Skin cancer, Syncope and collapse, Tremor, 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 Social History:   reports that she has never smoked. She has never used smokeless tobacco. She reports that she does not currently use alcohol. She reports that she does not use drugs. Family History:   Family History   Problem Relation Age of Onset    Coronary Art Dis Father     Colon Cancer Brother     Diabetes Neg Hx     Cataracts Neg Hx     Glaucoma Neg Hx        Vitals:  BP (!) 118/50   Pulse 72   Temp 97.5 °F (36.4 °C) (Oral)   Resp 16   Ht 5' 4\" (1.626 m)   Wt 170 lb 3.1 oz (77.2 kg)   LMP  (LMP Unknown)   SpO2 97%   BMI 29.21 kg/m²   Temp (24hrs), Av °F (36.7 °C), Min:97.5 °F (36.4 °C), Max:98.4 °F (36.9 °C)    No results for input(s): POCGLU in the last 72 hours. I/O (24Hr):     Intake/Output Summary (Last 24 hours) at 10/15/2022 1323  Last data filed at 10/15/2022 1120  Gross per 24 hour   Intake 190 ml   Output 1800 ml   Net -1610 ml       Labs:  Hematology:  Recent Labs     10/13/22  0648 10/13/22  1508 10/14/22  1055 10/15/22  0646   WBC  --  8.0 7.6  --    RBC  --  3.50* 3.49*  --    HGB 10.5* 10.5* 10.3*  --    HCT  --  33.7* 33.8*  --    MCV  --  96.3 96.8  --    MCH  --  30.0 29.5  --    MCHC  --  31.2 30.5  --    RDW  --  17.2* 17.1*  --    PLT  --  387 350  --    MPV  --  9.6 9.9  --    INR  --   --  1.1 1.1   DDIMER  --  3.07*  --   --      Chemistry:  Recent Labs     10/13/22  1508 10/13/22  1825 10/14/22  0236 10/14/22  1055 10/14/22  1619 10/14/22  1925 10/14/22  2255 10/15/22  2211 10/15/22  1013     --   --  141  --   --   --  138  --    K 3.3*  --   --  3.7  --   --   --  3.3*  --      --   --  102  --   --   --  99  --    CO2 27  --   --  27  --   --   --  28  --    GLUCOSE 99  --   --  109*  --   --   --  97  --    BUN 12  --   --  10  --   --   --  12  --    CREATININE 0.77  --   --  0.54  --   --   --  0.55  --    ANIONGAP 10  --   --  12  --   --   --  11  --    LABGLOM >60  --   --  >60  --   --   --  >60  --    CALCIUM 8.9  --   --  8.4*  --   --   --  8.0*  --    PROBNP 3,464*  --  2,681*  --   --   --   --   --   --    TROPHS 23*   < > 20* 19*   < > 21* 24*  --  25*    < > = values in this interval not displayed. Recent Labs     10/14/22  1055   PROT 6.7   TSH 9.63*   *     ABG:No results found for: POCPH, PHART, PH, POCPCO2, FFD3RAA, PCO2, POCPO2, PO2ART, PO2, POCHCO3, WOH7XYU, HCO3, NBEA, PBEA, BEART, BE, THGBART, THB, RTM1FHF, PAXJ9JVP, I4RRPFHU, O2SAT, FIO2  Lab Results   Component Value Date/Time    SPECIAL NOT REPORTED 01/18/2022 04:30 PM     Lab Results   Component Value Date/Time    CULTURE NO GROWTH 12 HOURS 10/14/2022 03:29 PM       Radiology:  XR CHEST PORTABLE    Result Date: 10/14/2022  No evidence of pneumothorax status post right thoracentesis. XR CHEST PORTABLE    Result Date: 10/14/2022  Bilateral moderate effusions with right greater than left patchy opacifications within lung fields. CT CHEST PULMONARY EMBOLISM W CONTRAST    Result Date: 10/13/2022  No evidence of pulmonary embolism. Moderate severe bilateral pleural effusion. Extensive consolidative changes of bilateral lower lobes and mild consolidative changes of right middle lobe and lingula likely suggestive of atelectasis. Findings suggestive of pulmonary edema. US THORACENTESIS Which side should the procedure be performed? Radiologist Recommendation    Result Date: 10/14/2022  Successful ultrasound guided right thoracentesis.        Physical Examination:        General appearance:  alert, cooperative and no distress, obese  Mental Status:  oriented to person, place and time and normal affect  Lungs: crackles at bases, normal effort, on room air  Heart:  regular rate and rhythm, +murmur  Abdomen:  soft, nontender, nondistended, normal bowel sounds, no masses, hepatomegaly, splenomegaly  Extremities: 2+ edema, no redness, tenderness in the calves  Skin:  no gross lesions, rashes, induration    Assessment:        Hospital Problems             Last Modified POA    * (Principal) Bilateral pleural effusion 10/14/2022 Yes    Acute on chronic diastolic heart failure ([address] 10/14/2022 Yes    Pulmonary hypertension ([address] 10/14/2022 Yes    Acute respiratory failure with hypoxia ([address] 10/14/2022 Yes    Dyslipidemia 10/14/2022 Yes    Adenomatous polyp 10/14/2022 Yes    Overview Signed 9/11/2013  9:26 AM by Natalie Parish MD     With recommendation for repeat 06/15. Also, diverticulosis was noted. Essential hypertension 10/14/2022 Yes    Hypothyroidism 10/14/2022 Yes    Dementia with behavioral disturbance 10/14/2022 Yes    Overview Addendum 6/28/2019 10:09 AM by Tk Garcia,      Mini-Mental Status exam 27/30  6/14  declines meds at this point,  MMSE 29/30 on June 13, 2017  MMSE 26/30 4/2018         Paroxysmal A-fib ([address] 10/14/2022 Yes    Coronary artery disease involving native coronary artery without angina pectoris 10/14/2022 Yes       Plan:        Bilateral pleural effusion likely secondary to diastolic heart failure-pending echocardiogram to check diastolic function, continue IV Lasix 40 mg  twice daily, thoracentesis 10/14 with 800 mL clear yellow fluid removed from right side, pulmonology following, fluid is transudative. Fluid restriction to 1500 mL, monitor intake and output. Acute respiratory failure-patient responded well to diuresis, now saturating well on room air.     Severe pulmonary hypertension- rvsp>80, consult pulm, pending echo     Coronary artery disease with STEMI in 2020, drug-eluting stent to proximal and distal RCA. Continue aspirin, continue statin. Brilinta was discontinued due to falls     Hyperlipidemia-continue statin     Hypothyroidism on Synthyroid, recheck tsh as previously abnormal     History of paroxysmal A. fib-normal sinus rhythm, not on anticoagulation     Dementia on Namenda     Continue home Zyprexa and Prozac    PT OT evaluation  Discussed with RN    Anticipate discharge tomorrow if patient continues to diurese well.     Fab Concepcion MD  10/15/2022  1:23 PM

## 2022-10-15 NOTE — PLAN OF CARE
Problem: Discharge Planning  Goal: Discharge to home or other facility with appropriate resources  10/15/2022 0932 by Ginger Ellison RN  Outcome: Progressing  10/15/2022 0601 by Davon aFria RN  Outcome: Progressing     Problem: Skin/Tissue Integrity  Goal: Absence of new skin breakdown  Description: 1. Monitor for areas of redness and/or skin breakdown  2. Assess vascular access sites hourly  3. Every 4-6 hours minimum:  Change oxygen saturation probe site  4. Every 4-6 hours:  If on nasal continuous positive airway pressure, respiratory therapy assess nares and determine need for appliance change or resting period.   10/15/2022 0932 by Ginger Ellison RN  Outcome: Progressing  10/15/2022 0601 by Davon Faria RN  Outcome: Progressing     Problem: Safety - Adult  Goal: Free from fall injury  10/15/2022 0932 by Ginger Ellison RN  Outcome: Progressing  10/15/2022 0601 by Davon Faria RN  Outcome: Progressing     Problem: ABCDS Injury Assessment  Goal: Absence of physical injury  10/15/2022 0932 by Ginger Ellison RN  Outcome: Progressing  10/15/2022 0601 by Davon Faria RN  Outcome: Progressing

## 2022-10-15 NOTE — ACP (ADVANCE CARE PLANNING)
Advance Care Planning     Advance Care Planning Activator (Inpatient)  Conversation Note      Date of ACP Conversation: 10/15/2022     Conversation Conducted with: Patient with Decision Making Capacity    ACP Activator: Bam Coronel RN    Pt has ACP documents on file, is a DNR CCA    Health Care Decision Maker:     Current Designated Health Care Decision Maker:     Primary Decision MakeBertin Guerrero - Child - [phone]    Primary Decision Maker: Kalpesh Lucas - Child - [phone]    Primary Decision Maker: Olivier Stacy Child - [phone]    Primary Decision Maker: , - Other - [phone]  Click here to complete Healthcare Decision Makers including section of the Healthcare Decision Maker Relationship (ie \"Primary\")  Today we documented Decision Maker(s) consistent with ACP documents on file. Care Preferences    Ventilation: \"If you were in your present state of health and suddenly became very ill and were unable to breathe on your own, what would your preference be about the use of a ventilator (breathing machine) if it were available to you? \"      Would the patient desire the use of ventilator (breathing machine)?: No    \"If your health worsens and it becomes clear that your chance of recovery is unlikely, what would your preference be about the use of a ventilator (breathing machine) if it were available to you? \"     Would the patient desire the use of ventilator (breathing machine)?: No      Resuscitation  \"CPR works best to restart the heart when there is a sudden event, like a heart attack, in someone who is otherwise healthy. Unfortunately, CPR does not typically restart the heart for people who have serious health conditions or who are very sick. \"    \"In the event your heart stopped as a result of an underlying serious health condition, would you want attempts to be made to restart your heart (answer \"yes\" for attempt to resuscitate) or would you prefer a natural death (answer \"no\" for do not attempt to resuscitate)? \" no       [] Yes   [x] No   Educated Patient / Crawford Apgar regarding differences between Advance Directives and portable DNR orders.     Length of ACP Conversation in minutes:      Conversation Outcomes:  [] ACP discussion completed  [x] Existing advance directive reviewed with patient; no changes to patient's previously recorded wishes  [] New Advance Directive completed  [] Portable Do Not Rescitate prepared for Provider review and signature  [] POLST/POST/MOLST/MOST prepared for Provider review and signature      Follow-up plan:    [] Schedule follow-up conversation to continue planning  [] Referred individual to Provider for additional questions/concerns   [] Advised patient/agent/surrogate to review completed ACP document and update if needed with changes in condition, patient preferences or care setting    [] This note routed to one or more involved healthcare providers

## 2022-10-16 VITALS
WEIGHT: 170.19 LBS | DIASTOLIC BLOOD PRESSURE: 54 MMHG | BODY MASS INDEX: 29.06 KG/M2 | TEMPERATURE: 97.6 F | OXYGEN SATURATION: 96 % | SYSTOLIC BLOOD PRESSURE: 121 MMHG | HEART RATE: 76 BPM | HEIGHT: 64 IN | RESPIRATION RATE: 15 BRPM

## 2022-10-16 LAB
ANION GAP SERPL CALCULATED.3IONS-SCNC: 11 MMOL/L (ref 9–17)
BUN BLDV-MCNC: 14 MG/DL (ref 8–23)
CALCIUM SERPL-MCNC: 8.5 MG/DL (ref 8.6–10.4)
CHLORIDE BLD-SCNC: 100 MMOL/L (ref 98–107)
CO2: 29 MMOL/L (ref 20–31)
CREAT SERPL-MCNC: 0.59 MG/DL (ref 0.5–0.9)
GFR SERPL CREATININE-BSD FRML MDRD: >60 ML/MIN/1.73M2
GLUCOSE BLD-MCNC: 107 MG/DL (ref 70–99)
POTASSIUM SERPL-SCNC: 3.8 MMOL/L (ref 3.7–5.3)
SODIUM BLD-SCNC: 140 MMOL/L (ref 135–144)

## 2022-10-16 PROCEDURE — 36415 COLL VENOUS BLD VENIPUNCTURE: CPT

## 2022-10-16 PROCEDURE — 80048 BASIC METABOLIC PNL TOTAL CA: CPT

## 2022-10-16 PROCEDURE — 2580000003 HC RX 258: Performed by: NURSE PRACTITIONER

## 2022-10-16 PROCEDURE — 97162 PT EVAL MOD COMPLEX 30 MIN: CPT

## 2022-10-16 PROCEDURE — 97530 THERAPEUTIC ACTIVITIES: CPT

## 2022-10-16 PROCEDURE — 6360000002 HC RX W HCPCS: Performed by: NURSE PRACTITIONER

## 2022-10-16 PROCEDURE — 6370000000 HC RX 637 (ALT 250 FOR IP): Performed by: STUDENT IN AN ORGANIZED HEALTH CARE EDUCATION/TRAINING PROGRAM

## 2022-10-16 PROCEDURE — 97116 GAIT TRAINING THERAPY: CPT

## 2022-10-16 PROCEDURE — 99239 HOSP IP/OBS DSCHRG MGMT >30: CPT | Performed by: STUDENT IN AN ORGANIZED HEALTH CARE EDUCATION/TRAINING PROGRAM

## 2022-10-16 PROCEDURE — 2580000003 HC RX 258: Performed by: STUDENT IN AN ORGANIZED HEALTH CARE EDUCATION/TRAINING PROGRAM

## 2022-10-16 RX ORDER — FUROSEMIDE 40 MG/1
40 TABLET ORAL 2 TIMES DAILY
Qty: 60 TABLET | Refills: 3 | Status: SHIPPED | OUTPATIENT
Start: 2022-10-16

## 2022-10-16 RX ORDER — POTASSIUM CHLORIDE 20 MEQ/1
20 TABLET, EXTENDED RELEASE ORAL 2 TIMES DAILY
Qty: 180 TABLET | Refills: 1 | Status: SHIPPED | OUTPATIENT
Start: 2022-10-16

## 2022-10-16 RX ORDER — FUROSEMIDE 40 MG/1
40 TABLET ORAL 2 TIMES DAILY
Status: DISCONTINUED | OUTPATIENT
Start: 2022-10-16 | End: 2022-10-16 | Stop reason: HOSPADM

## 2022-10-16 RX ORDER — FUROSEMIDE 40 MG/1
40 TABLET ORAL 2 TIMES DAILY
Qty: 60 TABLET | Refills: 3 | Status: SHIPPED | OUTPATIENT
Start: 2022-10-16 | End: 2022-10-16 | Stop reason: SDUPTHER

## 2022-10-16 RX ORDER — POTASSIUM CHLORIDE 20 MEQ/1
20 TABLET, EXTENDED RELEASE ORAL 2 TIMES DAILY
Qty: 180 TABLET | Refills: 1 | Status: SHIPPED | OUTPATIENT
Start: 2022-10-16 | End: 2022-10-16 | Stop reason: SDUPTHER

## 2022-10-16 RX ADMIN — FUROSEMIDE 40 MG: 40 TABLET ORAL at 10:06

## 2022-10-16 RX ADMIN — ENOXAPARIN SODIUM 40 MG: 100 INJECTION SUBCUTANEOUS at 08:38

## 2022-10-16 RX ADMIN — DESMOPRESSIN ACETATE 40 MG: 0.2 TABLET ORAL at 08:30

## 2022-10-16 RX ADMIN — Medication 81 MG: at 08:30

## 2022-10-16 RX ADMIN — FLUOXETINE HYDROCHLORIDE 20 MG: 20 CAPSULE ORAL at 08:30

## 2022-10-16 RX ADMIN — SODIUM CHLORIDE, PRESERVATIVE FREE 10 ML: 5 INJECTION INTRAVENOUS at 10:09

## 2022-10-16 RX ADMIN — SODIUM CHLORIDE, PRESERVATIVE FREE 10 ML: 5 INJECTION INTRAVENOUS at 08:33

## 2022-10-16 RX ADMIN — AMLODIPINE BESYLATE 5 MG: 5 TABLET ORAL at 08:30

## 2022-10-16 RX ADMIN — LEVOTHYROXINE SODIUM 150 MCG: 75 TABLET ORAL at 08:30

## 2022-10-16 RX ADMIN — MEMANTINE HYDROCHLORIDE 5 MG: 5 TABLET, FILM COATED ORAL at 08:30

## 2022-10-16 NOTE — PROGRESS NOTES
Physical Therapy  Facility/Department: [facility] RENAL//MED SURG  Physical Therapy Initial Assessment    Name: Marcel Araujo  : 3/21/1930  MRN: [id number]  Date of Service: 10/16/2022  Copied from chart: \"80year-old female with known medical history of coronary artery disease with stents, now on aspirin, hyperlipidemia, hypothyroidism on Synthyroid, and depression, dementia on Namenda presents to the hospital from outlying facility. Patient presented to outlying facility with shortness of breath and chest pain, leg pain. Patient has history of dementia. She was also noted to be hypoxic at 83% on room air. Patient was noted to have bilateral pleural effusions. D-dimer was elevated to 3, CT PE scan was done which revealed bilateral pleural effusions but no pulmonary embolism. \"     Discharge Recommendations: Further therapy recommended at discharge. PT Equipment Recommendations  Equipment Needed: No (pt reports owning RW)      Patient Diagnosis(es): The encounter diagnosis was Hypokalemia. Past Medical History:  has a past medical history of Adenomatous polyp, Cataracts, bilateral, Closed right hip fracture, initial encounter ([address] Combined forms of age-related cataract of both eyes, Corneal scar, right eye, Cystocele, Dementia ([address] Difficulty controlling anger, Dyslipidemia, GERD (gastroesophageal reflux disease), Goiter, Hiatal hernia, Hypertension, Hypokalemia, Hypothyroidism, Impaired fasting glucose, Malignant melanoma in situ ([address] Migraines, Murmur, functional, Osteopenia, Posterior vitreous detachment of both eyes, Primary open angle glaucoma (POAG) of both eyes, moderate stage, Sciatica, Skin cancer, Syncope and collapse, Tremor, 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 Past Surgical History:  has a past surgical history that includes other surgical history (2008); Cholecystectomy; Colonoscopy (2010); Mohs surgery (10/07/2009);  Vein Surgery (06/08/2009); Colonoscopy (07/18/2002); Cystocele repair (02/12/1999); Appendectomy; Dilation and curettage of uterus; other surgical history (01/01/1988); Hysterectomy (01/01/1995); Upper gastrointestinal endoscopy (04/08/2015); Intracapsular cataract extraction (Left, 08/25/2020); Intracapsular cataract extraction (Right, 10/08/2020); Hip fracture surgery (Right, 11/02/2021); Femur fracture surgery (Left, 09/04/2022); and Femur fracture surgery (Left, 9/4/2022). Assessment   Body Structures, Functions, Activity Limitations Requiring Skilled Therapeutic Intervention: Decreased functional mobility ; Decreased endurance;Decreased strength;Decreased balance;Decreased posture; Increased pain  Assessment: The pt required Mod A for bed mobility, Min A for transfers and Min A to ambulate 83ft with RW. Recommend continued PT to progress toward prior level of independence.   Therapy Prognosis: Good  Decision Making: Medium Complexity  Requires PT Follow-Up: Yes  Activity Tolerance  Activity Tolerance: Patient limited by endurance     Plan   Physcial Therapy Plan  General Plan:  (5-6x/wk)  Current Treatment Recommendations: Strengthening, ROM, Balance training, Functional mobility training, Transfer training, Endurance training, Therapeutic activities, Gait training, Stair training, Equipment evaluation, education, & procurement, Patient/Caregiver education & training, Safety education & training, Home exercise program  Safety Devices  Type of Devices: Nurse notified, Gait belt, Left in chair, Call light within reach, Chair alarm in place  Restraints  Restraints Initially in Place: No     Restrictions  Restrictions/Precautions  Restrictions/Precautions: Fall Risk  Required Braces or Orthoses?: No  Position Activity Restriction  Other position/activity restrictions: up with assist     Subjective   General  Patient assessed for rehabilitation services?: Yes  Response To Previous Treatment: Not applicable  Family / Caregiver Present: No  Subjective  Subjective: RN and pt agreeable to PT. Pt supine in bed upon arrival, pleasant and cooperative throughout. Pt denies pain. Social/Functional History  Social/Functional History  Lives With: Other (comment) (assisted living)  Type of Home: Assisted living  Home Layout: One level  Home Access: Level entry  Bathroom Toilet: Standard  Bathroom Equipment: Grab bars in shower, Grab bars around toilet  Bathroom Accessibility: Wheelchair accessible  Home Equipment: Walker, [address] Help From: Other (comment), Family (assisted living staff and children)  ADL Assistance: Independent  Toileting: Independent  Homemaking Assistance: Needs assistance  Homemaking Responsibilities: No  Ambulation Assistance: Independent  Transfer Assistance: Independent  Active : No  Patient's  Info: facility provides transportation  Mode of Transportation: Other (medical transport)  Occupation: Retired  Additional Comments: Pt reports receiving PT 1x/wk. Information obtained from pt, however pt questionable historian due to orientation level  Vision/Hearing  Vision  Vision: Impaired  Vision Exceptions: Wears glasses at all times  Hearing  Hearing: Exceptions to [facility]  Hearing Exceptions: Hard of hearing/hearing concerns    Cognition   Orientation  Overall Orientation Status: Impaired  Orientation Level: Oriented to person;Disoriented to situation;Disoriented to place; Disoriented to time (pt reports \"[facility]\" and unable to state month/year and reason for admission)     Objective     Gross Assessment  Tone: Normal  Sensation: Intact (pt denies numbness and tingling)     Joint Mobility  Spine: kyphotic, forward head posture  ROM RLE: WFL  ROM LLE: WFL, pt c/o L hip pain with end range flexion  ROM RUE: WFL  ROM LUE: WFL  Strength RLE  Strength RLE: WFL  Comment: grossly 4/5  Strength LLE  Strength LLE: WFL  Comment: grossly 4/5 except L hip 3-/5 due to pain  Strength RUE  Strength RUE: WFL  Strength LUE  Strength LUE: WFL           Bed mobility  Supine to Sit: Moderate assistance (for trunk progression)  Sit to Supine:  (retired to bedside chair following ambulation)  Scooting: Moderate assistance  Bed Mobility Comments: HOB elevated ~30 degrees, increased time and effort required  Transfers  Sit to Stand: Minimal Assistance  Stand to Sit: Contact guard assistance  Comment: transfers performed 2x with RW from bed and recliner. Verbal cues for UE placement during transfers with fair return and poor carryover. Posterior lean noted upon standing  Ambulation  Surface: Level tile  Device: Rolling Walker  Assistance: Minimal assistance  Quality of Gait: pt c/o L hip pain with WB; instructed pt in step-to sequencing leading with LLE with good return.  Cues for increased step length and to maintain upright posture with RW in YVETTE with fair carryover  Gait Deviations: Slow Jami;Decreased step length;Decreased step height  Distance: 3ft to chair + 80ft in hallway with chair follow  Stairs/Curb  Stairs?: No     Balance  Posture: Fair  Sitting - Static: Good;-  Sitting - Dynamic: Fair  Standing - Static: Fair  Standing - Dynamic: Fair;-  Comments: standing balance assessed with RW     AM-PAC Score  AM-PAC Inpatient Mobility Raw Score : 14 (10/16/22 1458)  AM-PAC Inpatient T-Scale Score : 38.1 (10/16/22 1458)  Mobility Inpatient CMS 0-100% Score: 61.29 (10/16/22 1458)  Mobility Inpatient CMS G-Code Modifier : CL (10/16/22 1458)      Goals  Short Term Goals  Time Frame for Short Term Goals: 14 visits  Short Term Goal 1: Perform bed mobility and functional transfers independently  Short Term Goal 2: Ambulate 200ft with RW and supervision  Short Term Goal 3: Demo Fair+ dynamic standing balance to decrease risk of falls       Education  Patient Education  Education Given To: Patient  Education Provided: Role of Therapy;Plan of Care;Transfer Training;Equipment  Education Provided Comments: step-to gait, use of RW upon return to [facility] for improved safety  Education Method: Verbal  Barriers to Learning: Hearing;Cognition  Education Outcome: Verbalized understanding;Continued education needed;Demonstrated understanding      Therapy Time   Individual Concurrent Group Co-treatment   Time In 1043         Time Out 1124         Minutes 41         Timed Code Treatment Minutes: [address], PT

## 2022-10-16 NOTE — PROGRESS NOTES
[facility]  Office: [address], DO, Alyssia Conte, DO, Doolres Mayorga, DO, Cisco Gaming, DO, Charlie Stevenson MD, Selena Steward MD, Tiffanie Gaspar MD, Dennis Ruiz MD,  Jovan Alcantara MD, Mohan Cooper MD, Issac Siegel, DO, Jerome Robertson MD,  Jose Alfredo Vuong MD, Monie Khanna MD, Inocente Soulier, DO, Aaron Ram MD, Kalie Joyner MD, Chinedu Raygoza MD, Richard Rivera MD, Dion Ochoa MD, Sandra Timmons MD, Jorge Ryan, DO, Nghia Brennan MD, Juan Myers MD, Saleem Kinney, CNP,  Luis Carlos Reynolds, CNP, Cameron Mcguire, CNP, Richard Connors, CNP,  Tsering Gallegos, DNP, Zoraida Vu, CNP, Marita Banda, CNP, Herrera Edwards, CNP, Jeff Goodman, CNP, Quincy Price, CNP, Zeeshan Reddy PA-C, Jerrica Bueno, CNS, Ata Pump, DNP, Charles Paniagua, CNP, Mushtqa Lundberg, CNP, Denia Adairs, [address] [place] [address]    Progress Note    10/16/2022    2:47 PM    Name:   Oren Quispe  MRN:     [id number]     Acct:      [de-identified]   Room:   [address] [place] [address] Day:  2  Admit Date:  10/14/2022  2:11 AM    PCP:   Julio CRISOSTOMO MD  Code Status:  DNR-CCA    Subjective:     C/C: Neck swelling and shortness of breath  Interval History Status: improved. Patient feels better. No chest pain or shortness of breath  Leg swelling is significantly improved  Patient wants to go home. Blood pressure stable  Patient saturating well on room air    Brief History:     75-year-old female with known medical history of coronary artery disease with stents, now on aspirin, hyperlipidemia, hypothyroidism on Synthyroid, and depression, dementia on Namenda presents to the hospital from outlying facility. [facility] of breath and chest pain, leg pain. Patient has history of dementia. She was also noted to be hypoxic at 83% on room air.   Patient was noted to have bilateral pleural effusions. D-dimer was elevated to 3, CT PE scan was done which revealed bilateral pleural effusions but no pulmonary embolism. Patient underwent thoracentesis with 800 ml fluid removal on 10/14. Continue on IV diuresis. Patient had transudative fluid. Patient responded well to IV diuresis and was finally switched to Lasix 40 mg oral twice daily. Echo was repeated and it showed EF of 60%, mild TR and mild MR.  RVSP of 38. Review of Systems:     Constitutional:  negative for chills, fevers, sweats  Respiratory:  negative for cough, dyspnea on exertion, shortness of breath, wheezing  Cardiovascular:  negative for chest pain, chest pressure/discomfort, trace lower extremity edema, no palpitations  Gastrointestinal:  negative for abdominal pain, constipation, diarrhea, nausea, vomiting  Neurological:  negative for dizziness, headache    Medications:      Allergies:  No Known Allergies    Current Meds:   Scheduled Meds:    furosemide  40 mg Oral BID    sodium chloride flush  5-40 mL IntraVENous 2 times per day    enoxaparin  40 mg SubCUTAneous Daily    sodium chloride flush  5-40 mL IntraVENous 2 times per day    amLODIPine  5 mg Oral Daily    aspirin  81 mg Oral Daily    atorvastatin  40 mg Oral Daily    FLUoxetine  20 mg Oral Daily    levothyroxine  150 mcg Oral Daily    memantine  5 mg Oral Daily    OLANZapine  2.5 mg Oral Nightly     Continuous Infusions:    sodium chloride      sodium chloride       PRN Meds: sodium chloride flush, sodium chloride, potassium chloride **OR** potassium alternative oral replacement **OR** potassium chloride, magnesium sulfate, ondansetron **OR** ondansetron, polyethylene glycol, acetaminophen **OR** acetaminophen, sodium chloride flush, sodium chloride    Data:     Past Medical History:   has a past medical history of Adenomatous polyp, Cataracts, bilateral, Closed right hip fracture, initial encounter ([facility] [address] Combined forms of age-related cataract of both eyes, Corneal scar, right eye, Cystocele, Dementia ([address] Difficulty controlling anger, Dyslipidemia, GERD (gastroesophageal reflux disease), Goiter, Hiatal hernia, Hypertension, Hypokalemia, Hypothyroidism, Impaired fasting glucose, Malignant melanoma in situ ([address] Migraines, Murmur, functional, Osteopenia, Posterior vitreous detachment of both eyes, Primary open angle glaucoma (POAG) of both eyes, moderate stage, Sciatica, Skin cancer, Syncope and collapse, Tremor, 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 Social History:   reports that she has never smoked. She has never used smokeless tobacco. She reports that she does not currently use alcohol. She reports that she does not use drugs. Family History:   Family History   Problem Relation Age of Onset    Coronary Art Dis Father     Colon Cancer Brother     Diabetes Neg Hx     Cataracts Neg Hx     Glaucoma Neg Hx        Vitals:  BP (!) 121/54   Pulse 76   Temp 97.6 °F (36.4 °C) (Oral)   Resp 15   Ht 5' 4\" (1.626 m)   Wt 170 lb 3.1 oz (77.2 kg)   LMP  (LMP Unknown)   SpO2 96%   BMI 29.21 kg/m²   Temp (24hrs), Av.5 °F (36.4 °C), Min:97.3 °F (36.3 °C), Max:97.7 °F (36.5 °C)    No results for input(s): POCGLU in the last 72 hours. I/O (24Hr):     Intake/Output Summary (Last 24 hours) at 10/16/2022 1447  Last data filed at 10/16/2022 0844  Gross per 24 hour   Intake 570 ml   Output 1600 ml   Net -1030 ml         Labs:  Hematology:  Recent Labs     10/13/22  1508 10/14/22  1055 10/15/22  0646   WBC 8.0 7.6  --    RBC 3.50* 3.49*  --    HGB 10.5* 10.3*  --    HCT 33.7* 33.8*  --    MCV 96.3 96.8  --    MCH 30.0 29.5  --    MCHC 31.2 30.5  --    RDW 17.2* 17.1*  --     350  --    MPV 9.6 9.9  --    INR  --  1.1 1.1   DDIMER 3.07*  --   --        Chemistry:  Recent Labs     10/13/22  1508 10/13/22  1825 10/14/22  0236 10/14/22  1055 10/14/22  1619 10/15/22  0646 10/15/22  1013 10/15/22  1527 10/15/22  1752 10/15/22  2216 10/16/22  1151     --   --  141  --  138  --   --   --   --  140   K 3.3*  --   --  3.7  --  3.3*  --   --   --   --  3.8     --   --  102  --  99  --   --   --   --  100   CO2 27  --   --  27  --  28  --   --   --   --  29   GLUCOSE 99  --   --  109*  --  97  --   --   --   --  107*   BUN 12  --   --  10  --  12  --   --   --   --  14   CREATININE 0.77  --   --  0.54  --  0.55  --   --   --   --  0.59   ANIONGAP 10  --   --  12  --  11  --   --   --   --  11   LABGLOM >60  --   --  >60  --  >60  --   --   --   --  >60   CALCIUM 8.9  --   --  8.4*  --  8.0*  --   --   --   --  8.5*   PROBNP 3,464*  --  2,681*  --   --   --   --   --   --   --   --    TROPHS 23*   < > 20* 19*   < >  --    < > 24* 24* 22*  --     < > = values in this interval not displayed. Recent Labs     10/14/22  1055   PROT 6.7   TSH 9.63*   *       ABG:No results found for: POCPH, PHART, PH, POCPCO2, UCE2HCH, PCO2, POCPO2, PO2ART, PO2, POCHCO3, XUY6JWT, HCO3, NBEA, PBEA, BEART, BE, THGBART, THB, SSH4SZB, CVWJ9WZV, C5QCSRPJ, O2SAT, FIO2  Lab Results   Component Value Date/Time    SPECIAL NOT REPORTED 01/18/2022 04:30 PM     Lab Results   Component Value Date/Time    CULTURE NO GROWTH 2 DAYS 10/14/2022 03:29 PM       Radiology:  XR CHEST PORTABLE    Result Date: 10/14/2022  No evidence of pneumothorax status post right thoracentesis. XR CHEST PORTABLE    Result Date: 10/14/2022  Bilateral moderate effusions with right greater than left patchy opacifications within lung fields. CT CHEST PULMONARY EMBOLISM W CONTRAST    Result Date: 10/13/2022  No evidence of pulmonary embolism. Moderate severe bilateral pleural effusion. Extensive consolidative changes of bilateral lower lobes and mild consolidative changes of right middle lobe and lingula likely suggestive of atelectasis. Findings suggestive of pulmonary edema. US THORACENTESIS Which side should the procedure be performed?  Radiologist Recommendation    Result Date: 10/14/2022  Successful ultrasound guided right thoracentesis. Physical Examination:        General appearance:  alert, cooperative and no distress, obese  Mental Status:  oriented to person, place and time and normal affect  Lungs: Bilateral clear breath sounds, normal effort, on room air he  Heart:  regular rate and rhythm, +murmur  Abdomen:  soft, nontender, nondistended, normal bowel sounds, no masses, hepatomegaly, splenomegaly  Extremities: Trace edema, no redness, tenderness in the calves  Skin:  no gross lesions, rashes, induration    Assessment:        Hospital Problems             Last Modified POA    * (Principal) Bilateral pleural effusion 10/14/2022 Yes    Acute on chronic diastolic heart failure ([address] 10/14/2022 Yes    Pulmonary hypertension ([address] 10/14/2022 Yes    Acute respiratory failure with hypoxia ([address] 10/14/2022 Yes    Dyslipidemia 10/14/2022 Yes    Adenomatous polyp 10/14/2022 Yes    Overview Signed 9/11/2013  9:26 AM by Sukhdev Pedro MD     With recommendation for repeat 06/15. Also, diverticulosis was noted. Essential hypertension 10/14/2022 Yes    Hypothyroidism 10/14/2022 Yes    Dementia with behavioral disturbance 10/14/2022 Yes    Overview Addendum 6/28/2019 10:09 AM by Amalia Vigil,      Mini-Mental Status exam 27/30  6/14  declines meds at this point,  MMSE 29/30 on June 13, 2017  MMSE 26/30 4/2018         Paroxysmal A-fib ([address] 10/14/2022 Yes    Coronary artery disease involving native coronary artery without angina pectoris 10/14/2022 Yes     Plan:        Bilateral pleural effusion likely secondary to diastolic heart failure-echo shows preserved EF, mild MR and TR, switch Lasix to 40 mg oral twice daily, add potassium supplementation, thoracentesis showed transudative fluid, patient advised on fluid and salt restriction. Acute respiratory failure-patient responded well to diuresis, now saturating well on room air.     Severe pulmonary hypertension-echo shows RVSP of 38, pulm following     Coronary artery disease with STEMI in 2020, drug-eluting stent to proximal and distal RCA. Continue aspirin, continue statin. Brilinta was discontinued due to falls     Hyperlipidemia-continue statin     Hypothyroidism on Synthyroid, recheck tsh has improved to 9, advised follow up with pcp to further adjust synthyroid dose     History of paroxysmal A. fib-normal sinus rhythm, not on anticoagulation     Dementia on Namenda     Continue home Zyprexa and Prozac    PT OT evaluation  Discussed with RN    Plan for discharge today. Follow-up with cardiology outpatient. Gave update to patient's son in [facility] on phone.     Ivis Osuna MD  10/16/2022  2:47 PM

## 2022-10-16 NOTE — PLAN OF CARE
Problem: Discharge Planning  Goal: Discharge to home or other facility with appropriate resources  10/16/2022 1346 by Cherelle Gonzales RN  Outcome: Adequate for Discharge     Problem: Skin/Tissue Integrity  Goal: Absence of new skin breakdown  Description: 1. Monitor for areas of redness and/or skin breakdown  2. Assess vascular access sites hourly  3. Every 4-6 hours minimum:  Change oxygen saturation probe site  4. Every 4-6 hours:  If on nasal continuous positive airway pressure, respiratory therapy assess nares and determine need for appliance change or resting period.   10/16/2022 1346 by Cherelle Gonzales RN  Outcome: Adequate for Discharge     Problem: Safety - Adult  Goal: Free from fall injury  10/16/2022 1346 by Cherelle Gonzales RN  Outcome: Adequate for Discharge     Problem: ABCDS Injury Assessment  Goal: Absence of physical injury  10/16/2022 1346 by Cherelle Gonzales RN  Outcome: Adequate for Discharge

## 2022-10-16 NOTE — CARE COORDINATION
Attempted to set up transportation with [facility], they cannot transport according to [facility][1] because the patient does not have diagnosis to support the transport need. Discussed with pt and she does not have anyone to take her home, discussed with her AL facility and they do not have anyone to transport until tomorrow, the patient relates she does not want to stay another night and she wants to go by cab home, I PS Dr Derik Miranda notified her of the situation and she is comfortable with the pt transporting home via cab service, will set up [facility] transport when pt is ready for discharge.      1312 Call from [facility] they will be here to transport pt back to their facility between 330 and 4pm, Walker provided to pt and RN updated

## 2022-10-16 NOTE — DISCHARGE SUMMARY
[facility]  Office: [address], DO, Francisco Javier Peterson, DO, Kailyn Ann, DO, Anthony Downing Blood, DO, Mabel Zhang MD, Kiana Jackson MD, Enrrique Real MD, Nayely Najera MD,  Monse Wilkerson MD, Moisés Vega MD, Carlito Titus, DO, Akila Bradshaw MD,  Rigo Driscoll MD, Zuly Maher MD, Kathia Goldman, DO, Jay Mitchell MD, Chandler Lombard, MD, Allyson Gongora MD, Jeremie Bolden MD, Barbie Franco MD, Faizan Thomson MD, Pradeep Neves, DO, Laura Briones MD, Lorie Martínez MD, Danna Pryor, CNP,  Johnnie Carroll, CNP, Bhavik Valencia, CNP, Maria Luisa Cherry, CNP,  Ange Coleman, DNP, Cooper Clark, CNP, Yash Seaman, CNP, Vikas Jeff, CNP, Enrrique Lizama, CNP, Tam Forte, CNP, Jason Sevilla PA-C, Manisha Byrd, CNS, Eva Quintana, DNP, Shelbi Lawton, CNP, Cristi Burrell, CNP, Kenia Yoos,  [facility]    Discharge Summary     Patient ID: Dell Jara  :  3/21/1930   MRN: [id number]     ACCOUNT:  [de-identified]   Patient's PCP: Frederick Rowell MD  Admit Date: 10/14/2022   Discharge Date: 10/16/2022     Length of Stay: 2  Code Status:  DNR-CCA  Admitting Physician: Valdo Loya MD  Discharge Physician: Valdo Loya MD     Active Discharge Diagnoses:     Hospital Problem Lists:  Principal Problem:    Bilateral pleural effusion  Active Problems:    Acute on chronic diastolic heart failure (HCC)    Pulmonary hypertension (HCC)    Acute respiratory failure with hypoxia (HCC)    Dyslipidemia    Adenomatous polyp    Essential hypertension    Hypothyroidism    Dementia with behavioral disturbance    Paroxysmal A-fib ([facility] [address]    Coronary artery disease involving native coronary artery without angina pectoris  Resolved Problems:    * No resolved hospital problems. *      Admission Condition:  poor     Discharged Condition: fair    Hospital Stay:     Hospital Course:  Dell Jara is a 80 y.o. female who was admitted for the management of   Bilateral pleural effusion , presented to ER with leg swelling and sob. 68-year-old female with known medical history of coronary artery disease with stents, now on aspirin, hyperlipidemia, hypothyroidism on Synthyroid, and depression, dementia on Namenda presents to the hospital from outlying facility. Patient presented to outlying facility with shortness of breath and chest pain, leg pain. Patient has history of dementia. She was also noted to be hypoxic at 83% on room air. Patient was noted to have bilateral pleural effusions. D-dimer was elevated to 3, CT PE scan was done which revealed bilateral pleural effusions but no pulmonary embolism. Patient underwent thoracentesis with 800 ml fluid removal on 10/14. Continue on IV diuresis. Patient had transudative fluid. Patient responded well to IV diuresis and was finally switched to Lasix 40 mg oral twice daily. Echo was repeated and it showed EF of 60%, mild TR and mild MR.  RVSP of 38. Significant therapeutic interventions:   Bilateral pleural effusion likely secondary to diastolic heart failure-echo shows preserved EF, mild MR and TR, switch Lasix to 40 mg oral twice daily, add potassium supplementation, thoracentesis showed transudative fluid, patient advised on fluid and salt restriction. Acute respiratory failure-patient responded well to diuresis, now saturating well on room air. Severe pulmonary hypertension-echo shows RVSP of 38, pulm following     Coronary artery disease with STEMI in 2020, drug-eluting stent to proximal and distal RCA. Continue aspirin, continue statin.   Brilinta was discontinued due to falls     Hyperlipidemia-continue statin     Hypothyroidism on Synthyroid, recheck tsh has improved to 9, advised follow up with pcp to further adjust synthyroid dose     History of paroxysmal A. fib-normal sinus rhythm, not on anticoagulation     Dementia on Namenda     Continue home Zyprexa and Prozac       Significant Diagnostic Studies:   Labs / Micro:  CBC:   Lab Results   Component Value Date/Time    WBC 7.6 10/14/2022 10:55 AM    RBC 3.49 10/14/2022 10:55 AM    HGB 10.3 10/14/2022 10:55 AM    HCT 33.8 10/14/2022 10:55 AM    MCV 96.8 10/14/2022 10:55 AM    MCH 29.5 10/14/2022 10:55 AM    MCHC 30.5 10/14/2022 10:55 AM    RDW 17.1 10/14/2022 10:55 AM     10/14/2022 10:55 AM     BMP:    Lab Results   Component Value Date/Time    GLUCOSE 107 10/16/2022 11:51 AM     10/16/2022 11:51 AM    K 3.8 10/16/2022 11:51 AM     10/16/2022 11:51 AM    CO2 29 10/16/2022 11:51 AM    ANIONGAP 11 10/16/2022 11:51 AM    BUN 14 10/16/2022 11:51 AM    CREATININE 0.59 10/16/2022 11:51 AM    BUNCRER 16 10/13/2022 03:08 PM    CALCIUM 8.5 10/16/2022 11:51 AM    LABGLOM >60 10/16/2022 11:51 AM    GFRAA >60 09/15/2022 06:29 AM    GFR      09/15/2022 06:29 AM     HFP:    Lab Results   Component Value Date/Time    PROT 6.7 10/14/2022 10:55 AM     CMP:    Lab Results   Component Value Date/Time    GLUCOSE 107 10/16/2022 11:51 AM     10/16/2022 11:51 AM    K 3.8 10/16/2022 11:51 AM     10/16/2022 11:51 AM    CO2 29 10/16/2022 11:51 AM    BUN 14 10/16/2022 11:51 AM    CREATININE 0.59 10/16/2022 11:51 AM    ANIONGAP 11 10/16/2022 11:51 AM    ALKPHOS 88 09/04/2022 07:38 AM    ALT 9 09/04/2022 07:38 AM    AST 25 09/04/2022 07:38 AM    BILITOT 0.9 09/04/2022 07:38 AM    LABALBU 3.5 09/04/2022 07:38 AM    ALBUMIN 1.1 09/04/2022 07:38 AM    LABGLOM >60 10/16/2022 11:51 AM    GFRAA >60 09/15/2022 06:29 AM    GFR      09/15/2022 06:29 AM    PROT 6.7 10/14/2022 10:55 AM    CALCIUM 8.5 10/16/2022 11:51 AM        Radiology:  XR CHEST PORTABLE    Result Date: 10/14/2022  No evidence of pneumothorax status post right thoracentesis. XR CHEST PORTABLE    Result Date: 10/14/2022  Bilateral moderate effusions with right greater than left patchy opacifications within lung fields.      CT CHEST PULMONARY EMBOLISM W CONTRAST    Result Date: 10/13/2022  No evidence of pulmonary embolism. Moderate severe bilateral pleural effusion. Extensive consolidative changes of bilateral lower lobes and mild consolidative changes of right middle lobe and lingula likely suggestive of atelectasis. Findings suggestive of pulmonary edema. US THORACENTESIS Which side should the procedure be performed? Radiologist Recommendation    Result Date: 10/14/2022  Successful ultrasound guided right thoracentesis. Consultations:    Consults:     Final Specialist Recommendations/Findings:   IP CONSULT TO INTERVENTIONAL RADIOLOGY  IP CONSULT TO PULMONOLOGY      The patient was seen and examined on day of discharge and this discharge summary is in conjunction with any daily progress note from day of discharge. Discharge plan:     Disposition: Home<[facility] living>    Physician Follow Up:     Rebecca Rahman MD  [address] [facility] Tod Mosher  [phone]    Schedule an appointment as soon as possible for a visit in 3 day(s)      [facility]  [address] [place] [address]  [phone]  Schedule an appointment as soon as possible for a visit in 1 week(s)         Requiring Further Evaluation/Follow Up POST HOSPITALIZATION/Incidental Findings: follow up cardio, pcp    Diet: regular diet and fluid restriction to 1500-1800ml    Activity: As tolerated    Instructions to Patient: take lasix 40mg bid    Discharge Medications:      Medication List        START taking these medications      potassium chloride 20 MEQ extended release tablet  Commonly known as: KLOR-CON M  Take 1 tablet by mouth 2 times daily            CHANGE how you take these medications      furosemide 40 MG tablet  Commonly known as: LASIX  Take 1 tablet by mouth in the morning and 1 tablet in the evening.   What changed:   medication strength  how much to take  when to take this  reasons to take this            CONTINUE taking these medications      acetaminophen 500 MG tablet  Commonly known as: TYLENOL  Take 2 tablets by mouth every 8 hours as needed for Pain     amLODIPine 5 MG tablet  Commonly known as: NORVASC     aspirin 81 MG EC tablet     atorvastatin 40 MG tablet  Commonly known as: LIPITOR  Take 1 tablet by mouth daily     calcium carbonate-vitamin D3 600-400 MG-UNIT Tabs per tab  Commonly known as: CALTRATE     Ciclopirox 1 % Sham     Clobetasol Propionate 0.05 % Liqd     FLUoxetine 20 MG capsule  Commonly known as: PROZAC  TAKE 1 CAPSULE DAILY     levothyroxine 150 MCG tablet  Commonly known as: SYNTHROID  Take 1 tablet by mouth daily     memantine 5 MG tablet  Commonly known as: NAMENDA  Take 1 tablet by mouth daily     NUTRITIONAL DRINK PO     OLANZapine 2.5 MG tablet  Commonly known as: ZYPREXA     Vitamin D3 1.25 MG (04295 UT) Caps            STOP taking these medications      Brilinta 90 MG Tabs tablet  Generic drug: ticagrelor     DAILY MULTIVITAMIN PO     nitroGLYCERIN 0.4 MG SL tablet  Commonly known as: NITROSTAT     OXYGEN               Where to Get Your Medications        These medications were sent to [facility] [address], [address] [address]      Phone: [phone]   furosemide 40 MG tablet  potassium chloride 20 MEQ extended release tablet         Discharge Procedure Orders   Basic Metabolic Panel   Standing Status: Future Standing Exp. Date: 10/16/23   Order Comments: Follow up with pcp       Time Spent on discharge is  34 mins in patient examination, evaluation, counseling as well as medication reconciliation, prescriptions for required medications, discharge plan and follow up. Electronically signed by   Shelly Mccormack MD  10/16/2022  2:51 PM      Thank you Dr. Candis Del Angel MD for the opportunity to be involved in this patient's care.

## 2022-10-17 ENCOUNTER — TELEPHONE (OUTPATIENT)
Dept: ORTHOPEDIC SURGERY | Age: 87
End: 2022-10-17

## 2022-10-17 ENCOUNTER — CARE COORDINATION (OUTPATIENT)
Dept: CASE MANAGEMENT | Age: 87
End: 2022-10-17

## 2022-10-17 DIAGNOSIS — I50.33 ACUTE ON CHRONIC DIASTOLIC HEART FAILURE (HCC): Primary | ICD-10-CM

## 2022-10-17 LAB
LYMPHOCYTES, BODY FLUID: 8 %
NEUTROPHIL, FLUID: 11 %
OTHER CELLS FLUID: NORMAL %
RBC FLUID: <3000 /MM3
SPECIMEN TYPE: NORMAL
WBC FLUID: 297 /MM3

## 2022-10-17 PROCEDURE — 1111F DSCHRG MED/CURRENT MED MERGE: CPT | Performed by: INTERNAL MEDICINE

## 2022-10-17 NOTE — TELEPHONE ENCOUNTER
Patient is currently in [facility]. Was scheduled for a follow up appointment today. Facility took her to [address]. Staff at her facility stated they can not get her here for a follow up until October 31st. Facility urged to get patient in ASAP, as post-op visits are vital to patient healing.  Patient is going to be getting transportation for this Wednesday, October 19, 2022 at 1PM.

## 2022-10-17 NOTE — CARE COORDINATION
[facility] Care Transitions Initial Follow Up Call - CTN contacted nurse Marlene caring for patient at [address] [facility]    Call within 2 business days of discharge: Yes - one time initial CT call -   Patient resides at [address] [facility] - under care of nursing staff - not independent with her own care    Care Transition Nurse contacted the  [address] [facility]  by telephone to perform post hospital discharge assessment. Verified name and  with caregiver as identifiers. Provided introduction to self, and explanation of the Care Transition Nurse role. Patient: Oretha Schirmer   Patient : 3/21/1930   MRN: [id number]    Reason for Admission: Acute on Chronic CHF post op, Bilateral Pleural effusions - thoracentesis, hx PAF   Discharge Date: 10/16/22   RARS: Readmission Risk Score: 20.7      Last Discharge [address]       Date Complaint Diagnosis Description Type Department Provider    10/14/22  Hypokalemia Admission (Discharged) Jignesh Castrejon MD            Was this an external facility discharge? No   Challenges to be reviewed by the provider   Additional needs identified to be addressed with provider: No  none    (Has PCP visit tomorrow, 10/18 at assisted living + ortho appt today, 10/17)             Method of communication with provider: none. CTN contacted Qi Katz nurse caring for patient at [address] [facility]  Reviewed new medications & discharge instructions with patient's nurse Qi Katz. Nursing manages patient's care & medications. Reviewed new lasix 40mg BID + K ordered. Nurse reports that patient is doing much better now after diuresis & thoracentesis during hospitalization. Oxygen sat is checked twice daily. Patient ambulates with walker - Had right femur pinning last month - has post op appt today. Lasix was reportedly decreased & then not resumed post op. Leg edema is minimal & significantly improved according to her nurse.   Discussed 7913-1261 fluid restriction & low sodium diet which is recommended. Patient's PCP group rounds once weekly at [facility] - will be seen by provider tomorrow, 10/18 on weekly rounds. Recommendation also in AVS re: cardiology follow up - [place] nursing staff aware. One time call to assisted living. CT episode resolved. Plan for next call: no further care transition calls scheduled as patient is under total nursing care at [place] [address] Transition Nurse reviewed discharge instructions, medical action plan, and red flags with caregiver who verbalized understanding. The caregiver was given an opportunity to ask questions and does not have any further questions or concerns at this time. Were discharge instructions available to patient? Yes. Reviewed appropriate site of care based on symptoms and resources available to patient including: PCP  Specialist  CTN . The caregiver agrees to contact the PCP office for questions related to their healthcare. Advance Care Planning:   Does patient have an Advance Directive: reviewed and current. Medication reconciliation was performed with caregiver, who verbalizes understanding of administration of home medications.  Medications reviewed, 1111F entered: yes    Was patient discharged with a pulse oximeter? no - patient is checked twice daily at [facility]    Non-face-to-face services provided:  Scheduled appointment with PCP-10/18  Scheduled appointment with [facility] 10/17  Obtained and reviewed discharge summary and/or continuity of care documents  Communication with home health agencies or other community services the patient is currently using-spoke with [facility]  nurse caring for patient    Offered patient enrollment in the Remote Patient Monitoring (RPM) program for in-home monitoring: NA.    Care Transitions 24 Hour Call    Schedule Follow Up Appointment with PCP: Completed  Do you have a copy of your discharge instructions?: Yes  Do you have all of your prescriptions and are they filled?: Yes  Have you been contacted by a [address]?: No  Have you scheduled your follow up appointment?: Yes (Comment: post op 10/17, PCP visit at [facility] - 10/18)  Do you feel like you have everything you need to keep you well at home?: Yes (Comment: lives at [facility] - under nursing care)  Care Transitions Interventions         Follow Up  Future Appointments   Date Time Provider Tila Bojorquez   10/17/2022  2:15 PM Darleen Kim DO [address] [place] [address]   1/5/2023 10:00 AM MD BRITTON Terrell ELIDIAP   4/12/2023 11:00 AM FELICIA Valencia - [facility]       Care Transition Nurse provided contact information.     Plan for next call:  no further care transition calls scheduled as patient is under total nursing care  at [address], RN

## 2022-10-18 ENCOUNTER — OUTSIDE SERVICES (OUTPATIENT)
Dept: INTERNAL MEDICINE | Age: 87
End: 2022-10-18
Payer: MEDICARE

## 2022-10-18 VITALS
TEMPERATURE: 97.2 F | DIASTOLIC BLOOD PRESSURE: 65 MMHG | HEART RATE: 76 BPM | OXYGEN SATURATION: 97 % | RESPIRATION RATE: 16 BRPM | SYSTOLIC BLOOD PRESSURE: 140 MMHG

## 2022-10-18 DIAGNOSIS — I50.33 ACUTE ON CHRONIC DIASTOLIC HEART FAILURE (HCC): ICD-10-CM

## 2022-10-18 DIAGNOSIS — J90 BILATERAL PLEURAL EFFUSION: ICD-10-CM

## 2022-10-18 DIAGNOSIS — I25.10 CORONARY ARTERY DISEASE INVOLVING NATIVE CORONARY ARTERY OF NATIVE HEART WITHOUT ANGINA PECTORIS: ICD-10-CM

## 2022-10-18 DIAGNOSIS — I48.0 PAROXYSMAL A-FIB (HCC): ICD-10-CM

## 2022-10-18 DIAGNOSIS — F03.918 DEMENTIA WITH BEHAVIORAL DISTURBANCE: ICD-10-CM

## 2022-10-18 DIAGNOSIS — E03.9 HYPOTHYROIDISM, UNSPECIFIED TYPE: ICD-10-CM

## 2022-10-18 DIAGNOSIS — I10 ESSENTIAL HYPERTENSION: ICD-10-CM

## 2022-10-18 DIAGNOSIS — I27.20 PULMONARY HYPERTENSION (HCC): ICD-10-CM

## 2022-10-18 DIAGNOSIS — E78.5 DYSLIPIDEMIA: ICD-10-CM

## 2022-10-18 DIAGNOSIS — J90 PLEURAL EFFUSION, BILATERAL: ICD-10-CM

## 2022-10-18 DIAGNOSIS — Z09 HOSPITAL DISCHARGE FOLLOW-UP: Primary | ICD-10-CM

## 2022-10-18 DIAGNOSIS — J96.01 ACUTE RESPIRATORY FAILURE WITH HYPOXIA (HCC): ICD-10-CM

## 2022-10-18 PROCEDURE — 1111F DSCHRG MED/CURRENT MED MERGE: CPT | Performed by: NURSE PRACTITIONER

## 2022-10-18 PROCEDURE — 99495 TRANSJ CARE MGMT MOD F2F 14D: CPT | Performed by: NURSE PRACTITIONER

## 2022-10-18 RX ORDER — FUROSEMIDE 20 MG/1
20 TABLET ORAL DAILY PRN
COMMUNITY

## 2022-10-18 NOTE — PROGRESS NOTES
Post-Discharge Transitional Care Follow Up      Vee Turner   YOB: 1930    Date of Office Visit:  10/18/2022  Date of Hospital Admission: 10/14/22  Date of Hospital Discharge: 10/16/22  Readmission Risk Score (high >=14%. Medium >=10%):Readmission Risk Score: 20.7      Care management risk score Rising risk (score 2-5) and Complex Care (Scores >=6): No Risk Score On File     Non face to face  following discharge, date last encounter closed (first attempt may have been earlier): 10/17/2022     Call initiated 2 business days of discharge: Yes     Hospital discharge follow-up  -     MT DISCHARGE MEDS RECONCILED W/ CURRENT OUTPATIENT MED LIST  Pleural effusion, bilateral  -     XR CHEST STANDARD (2 VW); Future  -     Basic Metabolic Panel; Future  Bilateral pleural effusion  Acute respiratory failure with hypoxia (HCC)  Pulmonary hypertension (HCC)  Essential hypertension  Paroxysmal A-fib (HCC)  Acute on chronic diastolic heart failure (HCC)  Coronary artery disease involving native coronary artery of native heart without angina pectoris  Hypothyroidism, unspecified type  Dyslipidemia  Dementia with behavioral disturbance    Medical Decision Making: moderate complexity  No follow-ups on file. Subjective:   HPI    Inpatient course: Discharge summary reviewed- see chart. Patient presents via virtual visit with [facility] staff member for transitional care visit following hospitalization for bilateral pleural effusion. Initially presented to ER with bilateral lower extremity edema and dyspnea. Was noted to be hypoxic at 83% on room air. D-dimer was elevated, CT PE scan noted bilateral pleural effusions but no pulmonary embolism. Patient underwent thoracentesis with 800 mL fluid removal on 10/14. She responded well to IV diuresis and was switched to oral Lasix 40 mg twice daily prior to discharge. Patient saturating well on room air prior to discharge.   Brilinta was discontinued prior to discharge due to falls. Continues on statin for hyperlipidemia. TSH remains elevated, though improved from previous following dose adjustment, patient was advised to continue to follow with PCP for further dose adjustment. Paroxysmal atrial fibrillation was not problematic during her stay, patient was in normal sinus rhythm, not anticoagulated due to fall risk. She has been doing well since returning to the facility. Patient denies any edema or dyspnea. No behavioral concerns.       Interval history/Current status: stable    Patient Active Problem List   Diagnosis    Dyslipidemia    Osteopenia    Impaired fasting glucose    Adenomatous polyp    Essential hypertension    Hypothyroidism    Sciatica    GERD (gastroesophageal reflux disease)    Visual disturbance    Malignant melanoma in situ ([address]    Difficulty controlling anger    Primary open angle glaucoma (POAG) of both eyes, moderate stage    PVD (posterior vitreous detachment), both eyes    Combined forms of age-related cataract of both eyes    Dementia with behavioral disturbance    Tremor    Essential tremor    Dizziness    Chronic cerebral ischemia    Acquired cerebral atrophy (HCC)    Cerebral infarction ([address]    Anxiety    Paroxysmal A-fib ([address]    Closed displaced intertrochanteric fracture of left femur (HCC)    Acute ST segment elevation myocardial infarction ([address]    Heart murmur    Migraine    Female bladder prolapse    Falls    Hiatal hernia    Hypercholesterolemia    Coronary artery disease involving native coronary artery without angina pectoris    Cataract    Heart failure, unspecified HF chronicity, unspecified heart failure type (HCC)    Chronic renal disease, stage III ([address] [734904]    Pseudophakia of both eyes    Nondisp intertroch fx left femur, init for opn fx type I/2 (HCC)    Bilateral pleural effusion    Acute on chronic diastolic heart failure ([address]    Pulmonary hypertension ([address]    Acute respiratory failure with hypoxia [facility])       Medications listed as ordered at the time of discharge from hospital     Medication List            Accurate as of October 18, 2022 11:59 PM. If you have any questions, ask your nurse or doctor. CONTINUE taking these medications      acetaminophen 500 MG tablet  Commonly known as: TYLENOL  Take 2 tablets by mouth every 8 hours as needed for Pain     amLODIPine 5 MG tablet  Commonly known as: NORVASC     aspirin 81 MG EC tablet     atorvastatin 40 MG tablet  Commonly known as: LIPITOR  Take 1 tablet by mouth daily     calcium carbonate-vitamin D3 600-400 MG-UNIT Tabs per tab  Commonly known as: CALTRATE     Ciclopirox 1 % Sham     Clobetasol Propionate 0.05 % Liqd     FLUoxetine 20 MG capsule  Commonly known as: PROZAC  TAKE 1 CAPSULE DAILY     * furosemide 20 MG tablet  Commonly known as: LASIX     * furosemide 40 MG tablet  Commonly known as: LASIX  Take 1 tablet by mouth in the morning and 1 tablet in the evening. levothyroxine 150 MCG tablet  Commonly known as: SYNTHROID  Take 1 tablet by mouth daily     memantine 5 MG tablet  Commonly known as: NAMENDA  Take 1 tablet by mouth daily     NUTRITIONAL DRINK PO     OLANZapine 2.5 MG tablet  Commonly known as: ZYPREXA     potassium chloride 20 MEQ extended release tablet  Commonly known as: KLOR-CON M  Take 1 tablet by mouth 2 times daily     Vitamin D3 1.25 MG (02630 UT) Caps           * This list has 2 medication(s) that are the same as other medications prescribed for you. Read the directions carefully, and ask your doctor or other care provider to review them with you. Medications marked \"taking\" at this time  No outpatient medications have been marked as taking for the 10/18/22 encounter (Outside Services) with FELICIA Pinto CNP.         Medications patient taking as of now reconciled against medications ordered at time of hospital discharge: Yes    Review of Systems   Constitutional:  Negative for chills and fever. HENT:  Negative for congestion and rhinorrhea. Respiratory:  Negative for cough, shortness of breath and wheezing. Cardiovascular:  Negative for chest pain and leg swelling. Gastrointestinal:  Negative for diarrhea, nausea and vomiting. Skin:  Negative for pallor and rash. Neurological:  Negative for dizziness and weakness. Psychiatric/Behavioral:  Negative for agitation and behavioral problems. Due to patient's clinical status, ROS of symptoms was completed by discussion with long term care facility staff, as well as with input from patient. Objective:    BP (!) 140/65   Pulse 76   Temp 97.2 °F (36.2 °C)   Resp 16   LMP  (LMP Unknown)   SpO2 97%   Physical Exam  Constitutional:       General: She is not in acute distress. Appearance: Normal appearance. HENT:      Head: Normocephalic and atraumatic. Right Ear: External ear normal.      Left Ear: External ear normal.      Nose: No rhinorrhea. Mouth/Throat:      Lips: No lesions. Eyes:      Conjunctiva/sclera: Conjunctivae normal.   Neck:      Vascular: No JVD. Pulmonary:      Effort: Pulmonary effort is normal. No accessory muscle usage or respiratory distress. Musculoskeletal:      Cervical back: Normal range of motion. Skin:     Coloration: Skin is not cyanotic or jaundiced. Neurological:      Mental Status: She is alert. Mental status is at baseline. Psychiatric:         Attention and Perception: Attention and perception normal.         Mood and Affect: Mood and affect normal.         Speech: Speech normal.         Behavior: Behavior is cooperative. Thought Content: Thought content normal.       Assessment and Plan:      1. Hospital discharge follow-up  - DE DISCHARGE MEDS RECONCILED W/ CURRENT OUTPATIENT MED LIST    2. Pleural effusion, bilateral, resolved  3. Bilateral pleural effusion, resolved  - Repeat CXR and BMP  - XR CHEST STANDARD (2 VW);  Future  - Basic Metabolic Panel; Future    4. Acute respiratory failure with hypoxia ([facility] resolved  - Monitor for any symptoms of recurrence    5. Pulmonary hypertension ([address] stable  6. Essential hypertension, stable  7. Paroxysmal A-fib ([facility] stable  8. Acute on chronic diastolic heart failure ([facility] [address] stable  9. Coronary artery disease involving native coronary artery of native heart without angina pectoris, stable  - Continue to follow with cardiology, verified patient has an upcoming follow up scheduled    10. Hypothyroidism, unspecified type, improving  - Recheck TSH as per previous orders    11. Dyslipidemia,  stable  - Continue current medications    12. Dementia with behavioral disturbance, stable  - Behavior stable, continue current medications      An electronic signature was used to authenticate this note. --FELICIA Decker CNP, was evaluated through a synchronous (real-time) audio-video encounter. The patient (or guardian if applicable) is aware that this is a billable service, which includes applicable co-pays. This Virtual Visit was conducted with patient's (and/or legal guardian's) consent. The visit was conducted pursuant to the emergency declaration under the [facility], [address] [place] [address] [facility] authority and the [employer] General Act. Patient identification was verified, and a caregiver was present when appropriate. The patient was located at Other: THE [place], [place] . Provider was located at [facility] [address] [place] [address],  [address]. Total time spent for this encounter: Not billed by time    --FELICIA Decker CNP on 10/24/2022 at 12:51 PM    An electronic signature was used to authenticate this note.

## 2022-10-19 ENCOUNTER — OFFICE VISIT (OUTPATIENT)
Dept: ORTHOPEDIC SURGERY | Age: 87
End: 2022-10-19

## 2022-10-19 VITALS — BODY MASS INDEX: 29.02 KG/M2 | WEIGHT: 170 LBS | HEIGHT: 64 IN

## 2022-10-19 DIAGNOSIS — S72.142D CLOSED DISPLACED INTERTROCHANTERIC FRACTURE OF LEFT FEMUR WITH ROUTINE HEALING, SUBSEQUENT ENCOUNTER: Primary | ICD-10-CM

## 2022-10-19 PROCEDURE — 99024 POSTOP FOLLOW-UP VISIT: CPT | Performed by: STUDENT IN AN ORGANIZED HEALTH CARE EDUCATION/TRAINING PROGRAM

## 2022-10-19 NOTE — PROGRESS NOTES
[facility]  [address] [address] [facility]  Dept Phone: [phone]  Dept Fax: [phone]      Orthopaedic Trauma Clinic Follow Up      Subjective:   Date of Surgery: 09/04/2022    Toi Kohli is a 80y.o. year old female who presents to the clinic today for routine follow up status post placement cephalomedullary nail. Patient is 6 weeks status post operative intervention. Patient currently resides in a nursing home. Staff at the nursing home accompanied patient today. They state that patient does multiple physical therapy sessions per week and that even the patient's family is paid for additional sessions. They are concerned that the patient has continued residual weakness in the left leg and is not ambulatory at her previous baseline status. Prior to this fall, patient did ambulate with a walker. They get the patient up during the encounter to demonstrate her ambulatory status. Patient states overall she is doing well, although patient is somewhat demented, and unsure of why she is even here and forgets that she broke her hip. Review of Systems  Gen: no fever, chills, malaise  CV: no chest pain or palpitations  Resp: no cough or shortness of breath  GI: no nausea, vomiting, diarrhea, or constipation  Neuro: no seizures, vertigo, or headache  Msk: see hpi  10 remaining systems reviewed and negative    Objective : There were no vitals filed for this visit. Body mass index is 29.18 kg/m². General: No acute distress, resting comfortably in the clinic  Neuro: alert. Eyes: Extra-ocular muscles intact  Pulm: Respirations unlabored and regular. Skin: warm, well perfused  MSK: Left lower extremity: Leg warm well perfused. Patient expresses normal sensation light touch to entire left lower extremity. Patient ambulates with significant weakness noted to left leg compared to right as she favors her right leg. Negative logroll.   Patient does have plus 4 out of 5 strength with hip flexion of left leg compared to right. Radiology:  History: 51-year-old female status post cephalomedullary nail left femur    Comparison: 09/19/22     Findings: 2 Views: Left Femur (AP/Lateral) in a skeletally mature patient showing orthopedic hardware to left femur without any signs of loosening or failure. Alignment similar to prior films. No new increase in angulation or displacement. Interval callus formation noted. Impression: Stable orthopedic hardware, healing left intertrochanteric femur fracture       Assessment:   80y.o. year old female with status post intramedullary nail left femur date of surgery 09/04/2022  Plan:   Lengthy discussion had with patient as well as the caregivers from her facility about current clinical state. Discussed that patient is only 6 weeks from surgery and is normal for her to be weak especially given her age to the left lower extremity. Recommend continued physical therapy. Recommend lifelong walker use. Weight-bear as tolerated left lower extremity. We will plan on seeing patient back in 6 weeks or sooner if any acute issues arise. All questions answered. Physical therapy instructions provided to the nursing home. Electronically signed by Mag Briones DO on 10/19/2022 at 1:27 PM    This note is created with the assistance of a speech recognition program.  While intending to generate a document that actually reflects the content of the visit, the document can still have some errors including those of syntax and sound a like substitutions which may escape proof reading.   In such instances, actual meaning can be extrapolated by contextual diversion

## 2022-10-20 ENCOUNTER — HOSPITAL ENCOUNTER (OUTPATIENT)
Age: 87
Setting detail: SPECIMEN
Discharge: HOME | End: 2022-10-20
Payer: MEDICARE

## 2022-10-20 DIAGNOSIS — E03.9 HYPOTHYROIDISM, UNSPECIFIED TYPE: ICD-10-CM

## 2022-10-20 LAB
ANION GAP SERPL CALCULATED.3IONS-SCNC: 10 MMOL/L (ref 9–17)
BUN BLDV-MCNC: 16 MG/DL (ref 8–23)
BUN/CREAT BLD: 23 (ref 9–20)
CALCIUM SERPL-MCNC: 9.1 MG/DL (ref 8.6–10.4)
CHLORIDE BLD-SCNC: 102 MMOL/L (ref 98–107)
CO2: 30 MMOL/L (ref 20–31)
CREAT SERPL-MCNC: 0.69 MG/DL (ref 0.5–0.9)
CULTURE: ABNORMAL
DIRECT EXAM: ABNORMAL
GFR SERPL CREATININE-BSD FRML MDRD: >60 ML/MIN/1.73M2
GLUCOSE BLD-MCNC: 110 MG/DL (ref 70–99)
POTASSIUM SERPL-SCNC: 4 MMOL/L (ref 3.7–5.3)
SODIUM BLD-SCNC: 142 MMOL/L (ref 135–144)
SPECIMEN DESCRIPTION: ABNORMAL
TSH SERPL DL<=0.05 MIU/L-ACNC: 2.01 UIU/ML (ref 0.3–5)

## 2022-10-20 PROCEDURE — 36415 COLL VENOUS BLD VENIPUNCTURE: CPT

## 2022-10-20 PROCEDURE — 84443 ASSAY THYROID STIM HORMONE: CPT

## 2022-10-20 PROCEDURE — 80048 BASIC METABOLIC PNL TOTAL CA: CPT

## 2022-10-21 NOTE — RESULT ENCOUNTER NOTE
TSH WNL.  Recheck TSH with reflex in 8 weeks given labile TSH  Please fax to [facility]  Electronically signed by FELICIA Veras CNP on 10/21/2022 at 9:29 AM

## 2022-10-24 ENCOUNTER — TELEPHONE (OUTPATIENT)
Dept: INTERNAL MEDICINE | Age: 87
End: 2022-10-24

## 2022-10-24 ASSESSMENT — ENCOUNTER SYMPTOMS
VOMITING: 0
RHINORRHEA: 0
SHORTNESS OF BREATH: 0
NAUSEA: 0
COUGH: 0
WHEEZING: 0
DIARRHEA: 0

## 2022-10-24 NOTE — TELEPHONE ENCOUNTER
As follow up from virtual visit, please fax orders and completed progress note 10/18/22 to [facility] for order documentation, thanks    Electronically signed by FELICIA Arce CNP on 10/24/2022 at 12:58 PM

## 2022-10-27 ENCOUNTER — HOSPITAL ENCOUNTER (OUTPATIENT)
Age: 87
Setting detail: SPECIMEN
Discharge: HOME | End: 2022-10-27
Payer: MEDICARE

## 2022-10-27 DIAGNOSIS — I10 ESSENTIAL HYPERTENSION: ICD-10-CM

## 2022-10-27 DIAGNOSIS — D64.9 ANEMIA, UNSPECIFIED TYPE: ICD-10-CM

## 2022-10-27 LAB — HEMOGLOBIN: 12.5 G/DL (ref 11.9–15.1)

## 2022-10-27 PROCEDURE — 36415 COLL VENOUS BLD VENIPUNCTURE: CPT

## 2022-10-27 PROCEDURE — 85018 HEMOGLOBIN: CPT

## 2022-10-31 ENCOUNTER — TELEPHONE (OUTPATIENT)
Dept: INTERNAL MEDICINE | Age: 87
End: 2022-10-31

## 2022-10-31 DIAGNOSIS — E03.9 HYPOTHYROIDISM, UNSPECIFIED TYPE: Primary | ICD-10-CM

## 2022-10-31 NOTE — TELEPHONE ENCOUNTER
Due to high variability in TSH (was 9.63 on 10/14/22 and 2.01 on 10/20/22), please have [facility] recheck TSH this week

## 2022-11-03 ENCOUNTER — HOSPITAL ENCOUNTER (OUTPATIENT)
Age: 87
Setting detail: SPECIMEN
Discharge: HOME | End: 2022-11-03
Payer: MEDICARE

## 2022-11-03 DIAGNOSIS — E03.9 HYPOTHYROIDISM, UNSPECIFIED TYPE: Primary | ICD-10-CM

## 2022-11-03 DIAGNOSIS — E03.9 HYPOTHYROIDISM, UNSPECIFIED TYPE: ICD-10-CM

## 2022-11-03 LAB — TSH SERPL DL<=0.05 MIU/L-ACNC: 0.34 UIU/ML (ref 0.3–5)

## 2022-11-03 PROCEDURE — 36415 COLL VENOUS BLD VENIPUNCTURE: CPT

## 2022-11-03 PROCEDURE — 84443 ASSAY THYROID STIM HORMONE: CPT

## 2022-11-03 RX ORDER — LEVOTHYROXINE SODIUM 137 UG/1
137 TABLET ORAL DAILY
Qty: 90 TABLET | Refills: 1 | Status: SHIPPED | OUTPATIENT
Start: 2022-11-03

## 2022-11-03 NOTE — RESULT ENCOUNTER NOTE
TSH borderline low. Discontinue levothyroxine 150 mcg. Start levothyroxine to 137 mcg QD.  Recheck TSH 6 weeks    Electronically signed by FELICIA Decker CNP on 11/3/2022 at 8:40 AM

## 2022-11-30 ENCOUNTER — OFFICE VISIT (OUTPATIENT)
Dept: ORTHOPEDIC SURGERY | Age: 87
End: 2022-11-30

## 2022-11-30 VITALS — WEIGHT: 170 LBS | HEIGHT: 64 IN | BODY MASS INDEX: 29.02 KG/M2

## 2022-11-30 DIAGNOSIS — S72.142D CLOSED DISPLACED INTERTROCHANTERIC FRACTURE OF LEFT FEMUR WITH ROUTINE HEALING, SUBSEQUENT ENCOUNTER: Primary | ICD-10-CM

## 2022-11-30 PROCEDURE — 99024 POSTOP FOLLOW-UP VISIT: CPT | Performed by: STUDENT IN AN ORGANIZED HEALTH CARE EDUCATION/TRAINING PROGRAM

## 2022-11-30 NOTE — PROGRESS NOTES
[facility]  [address] [facility] [address] [facility]  Dept Phone: [phone]  Dept Fax: [phone]      Orthopaedic Trauma Clinic Follow Up      Subjective:   Date of Surgery: 9/4/2022    Casimiro Cantrell is a 80y.o. year old female who presents to the clinic today for routine follow up status post placement cephalomedullary nail left femur. Patient currently still resides in her skilled nursing facility. Patient is here with one of the facility workers. Patient states that she has now moved into the facility. Patient states she is doing very well and the staff member agrees that she has been ambulating with a walker with physical therapy without any issues. Patient denies any new injuries or falls. She denies any hip pain. Much improved from prior encounter. Denies any numbness or tingling. Review of Systems  Gen: no fever, chills, malaise  CV: no chest pain or palpitations  Resp: no cough or shortness of breath  GI: no nausea, vomiting, diarrhea, or constipation  Neuro: no seizures, vertigo, or headache  Msk: None  10 remaining systems reviewed and negative    Objective : There were no vitals filed for this visit. Body mass index is 29.18 kg/m². General: No acute distress, resting comfortably in the clinic  Neuro: alert. oriented  Eyes: Extra-ocular muscles intact  Pulm: Respirations unlabored and regular. Skin: warm, well perfused  Psych:   Patient has good fund of knowledge and displays understanding of exam, diagnosis, and plan. MSK: Left lower extremity: Patient assisted up to watch ambulation. Mild limp (patient states this is only because she has not ambulated yet today but normally she does not have this gait, and the  agrees). Leg warm and well-perfused. EHL/FHL/TA/GSC motor intact. Patient expresses normal sensation light touch L3-S1 distribution.     Radiology:  Imaging studies from today were independently reviewed and read as listed below. Any relevant images obtained prior to today's visit were also independently interpreted. History: 66-year-old female status post cephalomedullary nail placement left femur    Comparison: 10/19/2022    Findings: 2 views of the left femur (AP/lateral) in a skeletally mature patient showing redemonstration of the left intertrochanteric femur fracture with cephalomedullary nail fixation. No signs of hardware failure or loosening. Interval callus formation and bony consolidation compared to prior films. No new acute osseous normalities. Impression: Healing left intertrochanteric femur fracture     Assessment:   80y.o. year old female status post cephalomedullary nail fixation left femur    Plan:   Lengthy discussion had with patient about current clinical state. Weight-bear as tolerated left lower extremity. No restrictions in terms of activities. Continue weightbearing with walker at all times. Physical therapy instructions updated and sent with patient back to the facility. We will plan on seeing patient back in 4 months or sooner if any acute issues arise. All questions answered. Patient is amenable to this plan    Follow up:No follow-ups on file. No orders of the defined types were placed in this encounter. Orders Placed This Encounter   Procedures    XR FEMUR LEFT (MIN 2 VIEWS)     Standing Status:   Future     Number of Occurrences:   1     Standing Expiration Date:   11/30/2023     Order Specific Question:   Reason for exam:     Answer:   monitor healing       Electronically signed by Teto Self DO on 11/30/2022 at 3:12 PM    This note is created with the assistance of a speech recognition program.  While intending to generate a document that actually reflects the content of the visit, the document can still have some errors including those of syntax and sound a like substitutions which may escape proof reading.   In such instances, actual meaning can be extrapolated by contextual diversion

## 2022-12-15 ENCOUNTER — HOSPITAL ENCOUNTER (OUTPATIENT)
Age: 87
Setting detail: SPECIMEN
Discharge: HOME | End: 2022-12-15
Payer: MEDICARE

## 2022-12-15 DIAGNOSIS — E03.9 HYPOTHYROIDISM, UNSPECIFIED TYPE: ICD-10-CM

## 2022-12-15 LAB — TSH SERPL DL<=0.05 MIU/L-ACNC: 0.73 UIU/ML (ref 0.3–5)

## 2022-12-15 PROCEDURE — 36415 COLL VENOUS BLD VENIPUNCTURE: CPT

## 2022-12-15 PROCEDURE — 84443 ASSAY THYROID STIM HORMONE: CPT

## 2022-12-29 DIAGNOSIS — I10 ESSENTIAL HYPERTENSION: ICD-10-CM

## 2022-12-29 RX ORDER — POTASSIUM CHLORIDE 750 MG/1
TABLET, EXTENDED RELEASE ORAL
Qty: 90 TABLET | Refills: 3 | OUTPATIENT
Start: 2022-12-29

## 2023-01-04 RX ORDER — ATENOLOL 25 MG/1
TABLET ORAL
Qty: 90 TABLET | Refills: 3 | OUTPATIENT
Start: 2023-01-04

## 2023-01-05 ENCOUNTER — TELEPHONE (OUTPATIENT)
Dept: INTERNAL MEDICINE | Age: 88
End: 2023-01-05

## 2023-01-05 ENCOUNTER — HOSPITAL ENCOUNTER (OUTPATIENT)
Age: 88
Setting detail: SPECIMEN
Discharge: HOME | End: 2023-01-05
Payer: MEDICARE

## 2023-01-05 ENCOUNTER — OFFICE VISIT (OUTPATIENT)
Dept: INTERNAL MEDICINE | Age: 88
End: 2023-01-05
Payer: MEDICARE

## 2023-01-05 VITALS
DIASTOLIC BLOOD PRESSURE: 78 MMHG | RESPIRATION RATE: 16 BRPM | OXYGEN SATURATION: 96 % | SYSTOLIC BLOOD PRESSURE: 134 MMHG | WEIGHT: 169 LBS | HEIGHT: 64 IN | BODY MASS INDEX: 28.85 KG/M2 | HEART RATE: 60 BPM

## 2023-01-05 DIAGNOSIS — F03.90 DEMENTIA, UNSPECIFIED DEMENTIA SEVERITY, UNSPECIFIED DEMENTIA TYPE, UNSPECIFIED WHETHER BEHAVIORAL, PSYCHOTIC, OR MOOD DISTURBANCE OR ANXIETY (HCC): ICD-10-CM

## 2023-01-05 DIAGNOSIS — E78.5 DYSLIPIDEMIA: ICD-10-CM

## 2023-01-05 DIAGNOSIS — Z00.00 MEDICARE ANNUAL WELLNESS VISIT, SUBSEQUENT: Primary | ICD-10-CM

## 2023-01-05 DIAGNOSIS — I50.33 ACUTE ON CHRONIC DIASTOLIC HEART FAILURE (HCC): ICD-10-CM

## 2023-01-05 DIAGNOSIS — E03.9 HYPOTHYROIDISM, UNSPECIFIED TYPE: ICD-10-CM

## 2023-01-05 DIAGNOSIS — D03.59 MELANOMA IN SITU OF TORSO EXCLUDING BREAST (HCC): ICD-10-CM

## 2023-01-05 DIAGNOSIS — I48.0 PAROXYSMAL A-FIB (HCC): ICD-10-CM

## 2023-01-05 DIAGNOSIS — I10 ESSENTIAL HYPERTENSION: ICD-10-CM

## 2023-01-05 DIAGNOSIS — N18.30 STAGE 3 CHRONIC KIDNEY DISEASE, UNSPECIFIED WHETHER STAGE 3A OR 3B CKD (HCC): ICD-10-CM

## 2023-01-05 DIAGNOSIS — I10 PRIMARY HYPERTENSION: ICD-10-CM

## 2023-01-05 LAB
ABSOLUTE EOS #: 0.31 K/UL (ref 0–0.44)
ABSOLUTE IMMATURE GRANULOCYTE: 0.05 K/UL (ref 0–0.3)
ABSOLUTE LYMPH #: 1.39 K/UL (ref 1.1–3.7)
ABSOLUTE MONO #: 0.91 K/UL (ref 0.1–1.2)
ALBUMIN SERPL-MCNC: 3.4 G/DL (ref 3.5–5.2)
ALBUMIN/GLOBULIN RATIO: 1.1 (ref 1–2.5)
ALP BLD-CCNC: 119 U/L (ref 35–104)
ALT SERPL-CCNC: <5 U/L (ref 5–33)
ANION GAP SERPL CALCULATED.3IONS-SCNC: 10 MMOL/L (ref 9–17)
AST SERPL-CCNC: 13 U/L
BASOPHILS # BLD: 1 % (ref 0–2)
BASOPHILS ABSOLUTE: 0.06 K/UL (ref 0–0.2)
BILIRUB SERPL-MCNC: 0.6 MG/DL (ref 0.3–1.2)
BUN BLDV-MCNC: 15 MG/DL (ref 8–23)
BUN/CREAT BLD: 25 (ref 9–20)
CALCIUM SERPL-MCNC: 8.7 MG/DL (ref 8.6–10.4)
CHLORIDE BLD-SCNC: 103 MMOL/L (ref 98–107)
CHOLESTEROL/HDL RATIO: 2.3
CHOLESTEROL: 121 MG/DL
CO2: 28 MMOL/L (ref 20–31)
CREAT SERPL-MCNC: 0.61 MG/DL (ref 0.5–0.9)
EOSINOPHILS RELATIVE PERCENT: 4 % (ref 1–4)
GFR SERPL CREATININE-BSD FRML MDRD: >60 ML/MIN/1.73M2
GLUCOSE BLD-MCNC: 133 MG/DL (ref 70–99)
HCT VFR BLD CALC: 37 % (ref 36.3–47.1)
HDLC SERPL-MCNC: 53 MG/DL
HEMOGLOBIN: 11.4 G/DL (ref 11.9–15.1)
IMMATURE GRANULOCYTES: 1 %
LDL CHOLESTEROL: 56 MG/DL (ref 0–130)
LYMPHOCYTES # BLD: 17 % (ref 24–43)
MCH RBC QN AUTO: 28.7 PG (ref 25.2–33.5)
MCHC RBC AUTO-ENTMCNC: 30.8 G/DL (ref 25.2–33.5)
MCV RBC AUTO: 93.2 FL (ref 82.6–102.9)
MONOCYTES # BLD: 11 % (ref 3–12)
NRBC AUTOMATED: 0 PER 100 WBC
PDW BLD-RTO: 17 % (ref 11.8–14.4)
PLATELET # BLD: 254 K/UL (ref 138–453)
PMV BLD AUTO: 10.3 FL (ref 8.1–13.5)
POTASSIUM SERPL-SCNC: 3.8 MMOL/L (ref 3.7–5.3)
RBC # BLD: 3.97 M/UL (ref 3.95–5.11)
RBC # BLD: ABNORMAL 10*6/UL
SEG NEUTROPHILS: 66 % (ref 36–65)
SEGMENTED NEUTROPHILS ABSOLUTE COUNT: 5.31 K/UL (ref 1.5–8.1)
SODIUM BLD-SCNC: 141 MMOL/L (ref 135–144)
TOTAL PROTEIN: 6.4 G/DL (ref 6.4–8.3)
TRIGL SERPL-MCNC: 62 MG/DL
TSH SERPL DL<=0.05 MIU/L-ACNC: 1.57 UIU/ML (ref 0.3–5)
WBC # BLD: 8 K/UL (ref 3.5–11.3)

## 2023-01-05 PROCEDURE — 85025 COMPLETE CBC W/AUTO DIFF WBC: CPT

## 2023-01-05 PROCEDURE — 80061 LIPID PANEL: CPT

## 2023-01-05 PROCEDURE — 36415 COLL VENOUS BLD VENIPUNCTURE: CPT

## 2023-01-05 PROCEDURE — 80053 COMPREHEN METABOLIC PANEL: CPT

## 2023-01-05 PROCEDURE — 99212 OFFICE O/P EST SF 10 MIN: CPT | Performed by: INTERNAL MEDICINE

## 2023-01-05 PROCEDURE — 84443 ASSAY THYROID STIM HORMONE: CPT

## 2023-01-05 RX ORDER — LOSARTAN POTASSIUM 100 MG/1
100 TABLET ORAL DAILY
COMMUNITY
Start: 2022-12-09

## 2023-01-05 ASSESSMENT — PATIENT HEALTH QUESTIONNAIRE - PHQ9
2. FEELING DOWN, DEPRESSED OR HOPELESS: 0
SUM OF ALL RESPONSES TO PHQ QUESTIONS 1-9: 0
1. LITTLE INTEREST OR PLEASURE IN DOING THINGS: 0
SUM OF ALL RESPONSES TO PHQ9 QUESTIONS 1 & 2: 0
SUM OF ALL RESPONSES TO PHQ QUESTIONS 1-9: 0
SUM OF ALL RESPONSES TO PHQ QUESTIONS 1-9: 0
DEPRESSION UNABLE TO ASSESS: FUNCTIONAL CAPACITY MOTIVATION LIMITS ACCURACY
SUM OF ALL RESPONSES TO PHQ QUESTIONS 1-9: 0

## 2023-01-05 ASSESSMENT — LIFESTYLE VARIABLES
HOW MANY STANDARD DRINKS CONTAINING ALCOHOL DO YOU HAVE ON A TYPICAL DAY: PATIENT DOES NOT DRINK
HOW OFTEN DO YOU HAVE A DRINK CONTAINING ALCOHOL: NEVER

## 2023-01-05 NOTE — TELEPHONE ENCOUNTER
Please see pt at GP Tuesday as follow up from Dr. Nataliia Sanders in office visit on 1/05/23. Pt is no longer able to come to office for appts. GP to contact pt's family re: switching PCP to you.

## 2023-01-05 NOTE — PROGRESS NOTES
[facility]    Visit Date:  1/5/2023  Patient:  Nancy Herrmann  YOB: 1930    Assessment & Plan     Alzheimer's disease: Appears to be worse than before. Continue Namenda. Will order vitamin B12 levels to assess. Hypothyroidism: We will continue levothyroxine. We will continue to monitor TFTs    Hypertension: Blood pressure controlled today. Continue atenolol, losartan and amlodipine. Congestive heart failure: Compensated at this time. Continue Lasix    Hyperlipidemia: Continue atorvastatin. We will continue to monitor. Diagnosis Orders   1. Medicare annual wellness visit, subsequent        2. Essential hypertension        3. Acute on chronic diastolic heart failure (HCC)        4. Paroxysmal A-fib ([facility]        5. Melanoma in situ of torso excluding breast ([facility]        6. Stage 3 chronic kidney disease, unspecified whether stage 3a or 3b CKD ([facility]        7. Dementia, unspecified dementia severity, unspecified dementia type, unspecified whether behavioral, psychotic, or mood disturbance or anxiety ([facility]        8. Hypothyroidism, unspecified type        9. Dyslipidemia            Chief Complaint  Medicare AWV    History of Present Illness   She presents to follow-up. She resides at [facility] at this time. She has a history of Alzheimer's disease and does not seem to remember who I was despite having been her doctor for a while now, says that it is the first time meeting me, and when I further questioned whether we met she denied it again, even though I had an encounter with her around 6 months ago. Other than that, she says that she feels fine. She claims that she is able to manage her ADLs at [facility]. Says that she dresses on her own, bathes herself. Says that she takes her thyroid pill every day.     Has a history of hypothyroidism, hypertension, CHF, hyperlipidemia that are unchanged    Objective  /78 (Site: Left Upper Arm, Position: Sitting, Cuff Size: Medium Adult)   Pulse 60   Resp 16   Ht 5' 4\" (1.626 m)   Wt 169 lb (76.7 kg)   LMP  (LMP Unknown)   SpO2 96%   BMI 29.01 kg/m²   Constitutional: No acute distress. Sits in chair comfortably  Eyes: Sclerae nonicteric. No lid lag or proptosis  HENT: External ears normal. No external lesions on the nose  Neck: No gross masses. Trachea visibly midline  Respiratory: Good air entry bilaterally. No wheezing or crackles  Cardiovascular: Normal S1-S2. No murmurs. No lower extremity edema  Gastrointestinal: No visible masses. No visible hernias  Skin: No abnormal rashes. No abnormal masses  Neurologic: Cranial nerves grossly intact  Psychiatric: Normal affect.  Alert and oriented    Medications  Current Outpatient Medications:     levothyroxine (SYNTHROID) 137 MCG tablet, Take 1 tablet by mouth daily, Disp: 90 tablet, Rfl: 1    potassium chloride (KLOR-CON M) 20 MEQ extended release tablet, Take 1 tablet by mouth 2 times daily, Disp: 180 tablet, Rfl: 1    OLANZapine (ZYPREXA) 2.5 MG tablet, Take 2.5 mg by mouth nightly, Disp: , Rfl:     acetaminophen (TYLENOL) 500 MG tablet, Take 2 tablets by mouth every 8 hours as needed for Pain, Disp: 180 tablet, Rfl: 5    Clobetasol Propionate 0.05 % LIQD, Apply topically nightly as needed to scalp, Disp: , Rfl:     Ciclopirox 1 % SHAM, Apply topically once a week (Tuesdays), Disp: , Rfl:     Nutritional Supplements (NUTRITIONAL DRINK PO), Mighty shakes bid with meals, Disp: , Rfl:     calcium carbonate-vitamin D3 (CALTRATE) 600-400 MG-UNIT TABS per tab, Take 1 tablet by mouth daily, Disp: , Rfl:     amLODIPine (NORVASC) 5 MG tablet, Take 5 mg by mouth daily, Disp: , Rfl:     FLUoxetine (PROZAC) 20 MG capsule, TAKE 1 CAPSULE DAILY, Disp: 90 capsule, Rfl: 3    memantine (NAMENDA) 5 MG tablet, Take 1 tablet by mouth daily, Disp: 90 tablet, Rfl: 3    atorvastatin (LIPITOR) 40 MG tablet, Take 1 tablet by mouth daily, Disp: 90 tablet, Rfl: 3    aspirin 81 MG EC tablet, Take 1 tablet by mouth daily, Disp: , Rfl:     losartan (COZAAR) 100 MG tablet, Take 100 mg by mouth daily, Disp: , Rfl:     furosemide (LASIX) 20 MG tablet, Take 20 mg by mouth daily as needed (lower exremity edema) (Patient not taking: Reported on 1/5/2023), Disp: , Rfl:     furosemide (LASIX) 40 MG tablet, Take 1 tablet by mouth in the morning and 1 tablet in the evening. (Patient not taking: Reported on 1/5/2023), Disp: 60 tablet, Rfl: 3    Cholecalciferol (VITAMIN D3) 1.25 MG (79120 UT) CAPS, Take 1 capsule by mouth once a week X 8 doses  (Wednesdays), Disp: , Rfl:     Allergies  Patient has no known allergies. Past Medical History:   Diagnosis Date    Adenomatous polyp 06/10    With recommendation for repeat 06/15. Also, diverticulosis was noted. Cataracts, bilateral     [facility] right [facility] 10/30/2021    Combined forms of age-related cataract of both eyes 10/1/2018    Corneal scar, right eye     Cystocele     history of     Dementia ([facility]     Mini-Mental Status exam 27/30  6/14  declines meds at this point,  MMSE 29/30 on June 13, 2017  MMSE 26/30 4/2018    Difficulty controlling anger 9/16/2018    Dyslipidemia     GERD (gastroesophageal reflux disease)     egd  4/15 ok    Goiter     benign, history of     Hiatal hernia     Hypertension     Hypokalemia     Hypothyroidism     Impaired fasting glucose     Malignant melanoma in situ ([facility]     R upper Back 8/17    Migraines     Murmur, functional     Grade 1/6 systolic, history of     Osteopenia     T -1.0 hip, 03/09, T -0.3 spine, 03/09. 1) Repeat DEXA scan 09/12 with T +0.15, T -1.2 at the hip but -1.8 at the mean femoral neck giving her a FRAX score of 4.3 at the hip.  Reclast 10/13 and given 2014,2015, and 1/4/2017, on calcium and vit d,  Redo Dexa 10/14 Frax 4.2% , Frax 4.8% 10 yr hip fracture risk on Dexa 1/17  On Reclast    Posterior vitreous detachment of both eyes     Primary open angle glaucoma (POAG) of both eyes, moderate stage 10/1/2018    Sciatica 12/15/2015    Skin cancer     History of multiple skin cancers. Following with Dr. Sandy Ward--- invasive squamous cell Ca 8/17 L forearm    Syncope and collapse 1/21/2015    Tremor 12/23/2018    Trichiasis of left lower eyelid without entropion     Visual disturbance 10/26/2017     Past Surgical History:   Procedure Laterality Date    APPENDECTOMY      CHOLECYSTECTOMY      Remote. COLONOSCOPY  06/08/2010    COLONOSCOPY  07/18/2002    CYSTOCELE REPAIR  02/12/1999    Vaginal prolapse, cystocele plus pelvic relaxation. DILATION AND CURETTAGE OF UTERUS      with hysteroscopy    FEMUR FRACTURE SURGERY Left 09/04/2022    LEFT TFNA NAIL INSERTION    FEMUR FRACTURE SURGERY Left 9/4/2022    LEFT TFNA NAIL INSERTION performed by Daniel Khanna DO at [address] Right 11/02/2021    Right Hip Intertan performed by Baylee Colin MD at [address] [facility] ([facility])  01/01/1995    still has ovaries     INTRACAPSULAR CATARACT EXTRACTION Left 08/25/2020    Left Cataract Extraction w/ IOL Implant performed by Sivakumar Coats MD at [facility] Right 10/08/2020    Right Cataract Extraction w/ IOL Implant performed by Sivakumar Coats MD at [place] [id number]  10/07/2009    SCC, left nasal sidewall. OTHER SURGICAL HISTORY  11/11/2008    Anterior repair. OTHER SURGICAL HISTORY  01/01/1988    laser cone    UPPER GASTROINTESTINAL ENDOSCOPY  04/08/2015    hiatal hernia    VEIN SURGERY  06/08/2009    Laser ablation of right greater saphenous vein. Family History  This patient's family history includes Colon Cancer in her brother; Coronary Art Dis in her father. Social History  Jacques Melendrez  reports that she has never smoked. She has never used smokeless tobacco. She reports that she does not currently use alcohol. She reports that she does not use drugs.     Discussed use, benefit, and side effects of prescribed medications. All questions answered. Patient voiced understanding. Reviewed health maintenance. Electronically signed Khushbu Boyle MD on 1/5/2023 at 3:40 PM    This note has been created using the Epic electronic health record, and dictated in part by [employer] dictation system. Despite the documenting physician's best efforts, there may be errors in spelling, grammar or syntax.

## 2023-01-05 NOTE — PATIENT INSTRUCTIONS
Personalized Preventive Plan for Steve Ames - 1/5/2023  Medicare offers a range of preventive health benefits. Some of the tests and screenings are paid in full while other may be subject to a deductible, co-insurance, and/or copay. Some of these benefits include a comprehensive review of your medical history including lifestyle, illnesses that may run in your family, and various assessments and screenings as appropriate. After reviewing your medical record and screening and assessments performed today your provider may have ordered immunizations, labs, imaging, and/or referrals for you. A list of these orders (if applicable) as well as your Preventive Care list are included within your After Visit Summary for your review. Other Preventive Recommendations:    A preventive eye exam performed by an eye specialist is recommended every 1-2 years to screen for glaucoma; cataracts, macular degeneration, and other eye disorders. A preventive dental visit is recommended every 6 months. Try to get at least 150 minutes of exercise per week or 10,000 steps per day on a pedometer . Order or download the FREE \"Exercise & Physical Activity: Your Everyday Guide\" from The "[employer] Data on Aging. Call 8-177.935.5366 or search The "[employer] on Aging online. You need 7176-9454 mg of calcium and 1294-1973 IU of vitamin D per day. It is possible to meet your calcium requirement with diet alone, but a vitamin D supplement is usually necessary to meet this goal.  When exposed to the sun, use a sunscreen that protects against both UVA and UVB radiation with an SPF of 30 or greater. Reapply every 2 to 3 hours or after sweating, drying off with a towel, or swimming. Always wear a seat belt when traveling in a car. Always wear a helmet when riding a bicycle or motorcycle. Learning About Being Active as an Older Adult  Why is being active important as you get older?      Being active is one of the best things you can do for your health. And it's never too late to start. Being active--or getting active, if you aren't already--has definite benefits. It can:  Give you more energy,  Keep your mind sharp. Improve balance to reduce your risk of falls. Help you manage chronic illness with fewer medicines. No matter how old you are, how fit you are, or what health problems you have, there is a form of activity that will work for you. And the more physical activity you can do, the better your overall health will be. What kinds of activity can help you stay healthy? Being more active will make your daily activities easier. Physical activity includes planned exercise and things you do in daily life. There are four types of activity:  Aerobic. Doing aerobic activity makes your heart and lungs strong. Includes walking, dancing, and gardening. Aim for at least 2½ hours spread throughout the week. It improves your energy and can help you sleep better. Muscle-strengthening. This type of activity can help maintain muscle and strengthen bones. Includes climbing stairs, using resistance bands, and lifting or carrying heavy loads. Aim for at least twice a week. It can help protect the knees and other joints. Stretching. Stretching gives you better range of motion in joints and muscles. Includes upper arm stretches, calf stretches, and gentle yoga. Aim for at least twice a week, preferably after your muscles are warmed up from other activities. It can help you function better in daily life. Balancing. This helps you stay coordinated and have good posture. Includes heel-to-toe walking, valeri chi, and certain types of yoga. Aim for at least 3 days a week. It can reduce your risk of falling. Even if you have a hard time meeting the recommendations, it's better to be more active than less active.  All activity done in each category counts toward your weekly total. You'd be surprised how daily things like carrying groceries, keeping up with grandchildren, and taking the stairs can add up. What keeps you from being active? If you've had a hard time being more active, you're not alone. Maybe you remember being able to do more. Or maybe you've never thought of yourself as being active. It's frustrating when you can't do the things you want. Being more active can help. What's holding you back? Getting started. Have a goal, but break it into easy tasks. Small steps build into big accomplishments. Staying motivated. If you feel like skipping your activity, remember your goal. Maybe you want to move better and stay independent. Every activity gets you one step closer. Not feeling your best.  Start with 5 minutes of an activity you enjoy. Prove to yourself you can do it. As you get comfortable, increase your time. You may not be where you want to be. But you're in the process of getting there. Everyone starts somewhere. How can you find safe ways to stay active? Talk with your doctor about any physical challenges you're facing. Make a plan with your doctor if you have a health problem or aren't sure how to get started with activity. If you're already active, ask your doctor if there is anything you should change to stay safe as your body and health change. If you tend to feel dizzy after you take medicine, avoid activity at that time. Try being active before you take your medicine. This will reduce your risk of falls. If you plan to be active at home, make sure to clear your space before you get started. Remove things like TV cords, coffee tables, and throw rugs. It's safest to have plenty of space to move freely. The key to getting more active is to take it slow and steady. Try to improve only a little bit at a time. Pick just one area to improve on at first. And if an activity hurts, stop and talk to your doctor. Where can you learn more?   Go to http://www.morales.com/ and enter P600 to learn more about \"Learning About Being Active as an Older Adult. \"  Current as of: October 10, 2022               Content Version: 13.5  © 2006-2022 [employer]. Care instructions adapted under license by [facility] ([facility]). If you have questions about a medical condition or this instruction, always ask your healthcare professional. [facility] [address] any warranty or liability for your use of this information. Learning About Dental Care for Older Adults  Dental care for older adults: Overview  Dental care for older people is much the same as for younger adults. But older adults do have concerns that younger adults do not. Older adults may have problems with gum disease and decay on the roots of their teeth. They may need missing teeth replaced or broken fillings fixed. Or they may have dentures that need to be cared for. Some older adults may have trouble holding a toothbrush. You can help remind the person you are caring for to brush and floss their teeth or to clean their dentures. In some cases, you may need to do the brushing and other dental care tasks. People who have trouble using their hands or who have dementia may need this extra help. How can you help with dental care? Normal dental care  To keep the teeth and gums healthy:  Brush the teeth with fluoride toothpaste twice a day--in the morning and at night--and floss at least once a day. Plaque can quickly build up on the teeth of older adults. Watch for the signs of gum disease. These signs include gums that bleed after brushing or after eating hard foods, such as apples. See a dentist regularly. Many experts recommend checkups every 6 months. Keep the dentist up to date on any new medications the person is taking. Encourage a balanced diet that includes whole grains, vegetables, and fruits, and that is low in saturated fat and sodium. Encourage the person you're caring for not to use tobacco products.  They can affect dental and general health. Many older adults have a fixed income and feel that they can't afford dental care. But most towns and cities have programs in which dentists help older adults by lowering fees. Contact your area's public health offices or  for information about dental care in your area. Using a toothbrush  Older adults with arthritis sometimes have trouble brushing their teeth because they can't easily hold the toothbrush. Their hands and fingers may be stiff, painful, or weak. If this is the case, you can: Offer an electric toothbrush. Enlarge the handle of a non-electric toothbrush by wrapping a sponge, an elastic bandage, or adhesive tape around it. Push the toothbrush handle through a ball made of rubber or soft foam.  Make the handle longer and thicker by taping Popsicle sticks or tongue depressors to it. You may also be able to buy special toothbrushes, toothpaste dispensers, and floss holders. Your doctor may recommend a soft-bristle toothbrush if the person you care for bleeds easily. Bleeding can happen because of a health problem or from certain medicines. A toothpaste for sensitive teeth may help if the person you care for has sensitive teeth. How do you brush and floss someone's teeth? If the person you are caring for has a hard time cleaning their teeth on their own, you may need to brush and floss their teeth for them. It may be easiest to have the person sit and face away from you, and to sit or stand behind them. That way you can steady their head against your arm as you reach around to floss and brush their teeth. Choose a place that has good lighting and is comfortable for both of you. Before you begin, gather your supplies. You will need gloves, floss, a toothbrush, and a container to hold water if you are not near a sink. Wash and dry your hands well and put on gloves. Start by flossing:  Gently work a piece of floss between each of the teeth toward the gums.  A plastic flossing tool may make this easier, and they are available at most [facility]. Curve the floss around each tooth into a U-shape and gently slide it under the gum line. Move the floss firmly up and down several times to scrape off the plaque. After you've finished flossing, throw away the used floss and begin brushing:  Wet the brush and apply toothpaste. Place the brush at a 45-degree angle where the teeth meet the gums. Press firmly, and move the brush in small circles over the surface of the teeth. Be careful not to brush too hard. Vigorous brushing can make the gums pull away from the teeth and can scratch the tooth enamel. Brush all surfaces of the teeth, on the tongue side and on the cheek side. Pay special attention to the front teeth and all surfaces of the back teeth. Brush chewing surfaces with short back-and-forth strokes. After you've finished, help the person rinse the remaining toothpaste from their mouth. Where can you learn more? Go to http://www.woods.com/ and enter F944 to learn more about \"Learning About Dental Care for Older Adults. \"  Current as of: June 16, 2022               Content Version: 13.5  © 2006-2022 [employer]. Care instructions adapted under license by [facility] ([facility]). If you have questions about a medical condition or this instruction, always ask your healthcare professional. [place] [address] any warranty or liability for your use of this information. Learning About Vision Tests  What are vision tests? The four most common vision tests are visual acuity tests, refraction, visual field tests, and color vision tests. Visual acuity (sharpness) tests  These tests are used: To see if you need glasses or contact lenses. To monitor an eye problem. To check an eye injury. Visual acuity tests are done as part of routine exams.  You may also have this test when you get your 's license or apply for some types of jobs.  Visual field tests  These tests are used: To check for vision loss in any area of your range of vision. To screen for certain eye diseases. To look for nerve damage after a stroke, head injury, or other problem that could reduce blood flow to the brain. Refraction and color tests  A refraction test is done to find the right prescription for glasses and contact lenses. A color vision test is done to check for color blindness. Color vision is often tested as part of a routine exam. You may also have this test when you apply for a job where recognizing different colors is important, such as , electronics, or the [employer]. How are vision tests done? Visual acuity test   You cover one eye at a time. You read aloud from a wall chart across the room. You read aloud from a small card that you hold in your hand. Refraction   You look into a special device. The device puts lenses of different strengths in front of each eye to see how strong your glasses or contact lenses need to be. Visual field tests   Your doctor may have you look through special machines. Or your doctor may simply have you stare straight ahead while they move a finger into and out of your field of vision. Color vision test   You look at pieces of printed test patterns in various colors. You say what number or symbol you see. Your doctor may have you trace the number or symbol using a pointer. How do these tests feel? There is very little chance of having a problem from this test. If dilating drops are used for a vision test, they may make the eyes sting and cause a medicine taste in the mouth. Follow-up care is a key part of your treatment and safety. Be sure to make and go to all appointments, and call your doctor if you are having problems. It's also a good idea to know your test results and keep a list of the medicines you take. Where can you learn more?   Go to http://www.morlaes.com/ and enter F090 to learn more about \"Learning About Vision Tests. \"  Current as of: October 12, 2022               Content Version: 13.5  © 2006-2022 [employer], [employer]. Care instructions adapted under license by [facility] ([facility]). If you have questions about a medical condition or this instruction, always ask your healthcare professional. [address] any warranty or liability for your use of this information. Learning About Activities of Daily Living  What are activities of daily living? Activities of daily living (ADLs) are the basic self-care tasks you do every day. As you age, and if you have health problems, you may find that it's harder to do these things for yourself. That's when you may need some help. Your doctor uses ADLs to measure how much help you need. Knowing what you can and can't do for yourself is an important first step to getting help. And when you have the help you need, you can stay as independent as possible. Your doctor will want to know if you are able to do tasks such as: Take a bath or shower without help. Go to the bathroom by yourself. Dress and undress without help. Shave, comb your hair, and brush teeth on your own. Get in and out of bed or a chair without help. Feed yourself without help. If you are having trouble doing basic self-care tasks, talk with your doctor. You may want to bring a caregiver or family member who can help the doctor understand your needs and abilities. How will a doctor assess your ADLs? Asking about ADLs is part of a routine health checkup your doctor will likely do as you age. Your health check might be done in a doctor's office, in your home, or at a hospital. The goal is to find out if you are having any problems that could make your health problems worse or that make it unsafe for you to be on your own. To measure your ADLs, your doctor will ask how hard it is for you to do routine tasks.  He or she may also want to know if you have changed the way you do a task because of a health problem. He or she may watch how you:  Walk back and forth. Keep your balance while you stand or walk. Move from sitting to standing or from a bed to a chair. Button or unbutton a shirt or sweater. Remove and put on your shoes. It's normal to feel a little worried or anxious if you find you can't do all the things you used to be able to do. Talking with your doctor about ADLs isn't a test that you either pass or fail. It's just a way to get more information about your health and safety. Follow-up care is a key part of your treatment and safety. Be sure to make and go to all appointments, and call your doctor if you are having problems. It's also a good idea to know your test results and keep a list of the medicines you take. Current as of: October 6, 2021               Content Version: 13.5  © 2006-2022 [employer] Care instructions adapted under license by [facility] ([facility]). If you have questions about a medical condition or this instruction, always ask your healthcare professional. [place] [address] any warranty or liability for your use of this information. Advance Directives: Care Instructions  Overview  An advance directive is a legal way to state your wishes at the end of your life. It tells your family and your doctor what to do if you can't say what you want. There are two main types of advance directives. You can change them any time your wishes change. Living will. This form tells your family and your doctor your wishes about life support and other treatment. The form is also called a declaration. Medical power of . This form lets you name a person to make treatment decisions for you when you can't speak for yourself. This person is called a health care agent (health care proxy, health care surrogate). The form is also called a durable power of  for health care.   If you do not have an advance directive, decisions about your medical care may be made by a family member, or by a doctor or a  who doesn't know you. It may help to think of an advance directive as a gift to the people who care for you. If you have one, they won't have to make tough decisions by themselves. For more information, including forms for your state, see the [address] website (www.caringinfo.org/planning/advance-directives/). Follow-up care is a key part of your treatment and safety. Be sure to make and go to all appointments, and call your doctor if you are having problems. It's also a good idea to know your test results and keep a list of the medicines you take. What should you include in an advance directive? Many states have a unique advance directive form. (It may ask you to address specific issues.) Or you might use a universal form that's approved by many states. If your form doesn't tell you what to address, it may be hard to know what to include in your advance directive. Use the questions below to help you get started. Who do you want to make decisions about your medical care if you are not able to? What life-support measures do you want if you have a serious illness that gets worse over time or can't be cured? What are you most afraid of that might happen? (Maybe you're afraid of having pain, losing your independence, or being kept alive by machines.)  Where would you prefer to die? (Your home? A hospital? A nursing home?)  Do you want to donate your organs when you die? Do you want certain Scientologist practices performed before you die? When should you call for help? Be sure to contact your doctor if you have any questions. Where can you learn more? Go to http://www.morales.com/ and enter R264 to learn more about \"Advance Directives: Care Instructions. \"  Current as of: June 16, 2022               Content Version: 13.5  © 6724-2384 Healthwise, [employer].    Care instructions adapted under license by [facility] [address] If you have questions about a medical condition or this instruction, always ask your healthcare professional. [place] [address] any warranty or liability for your use of this information. Personalized Preventive Plan for Kelly Luo - 1/5/2023  Medicare offers a range of preventive health benefits. Some of the tests and screenings are paid in full while other may be subject to a deductible, co-insurance, and/or copay. Some of these benefits include a comprehensive review of your medical history including lifestyle, illnesses that may run in your family, and various assessments and screenings as appropriate. After reviewing your medical record and screening and assessments performed today your provider may have ordered immunizations, labs, imaging, and/or referrals for you. A list of these orders (if applicable) as well as your Preventive Care list are included within your After Visit Summary for your review. Other Preventive Recommendations:    A preventive eye exam performed by an eye specialist is recommended every 1-2 years to screen for glaucoma; cataracts, macular degeneration, and other eye disorders. A preventive dental visit is recommended every 6 months. Try to get at least 150 minutes of exercise per week or 10,000 steps per day on a pedometer . Order or download the FREE \"Exercise & Physical Activity: Your Everyday Guide\" from [employer]. Call 3-521.243.7075 or search The [employer] online. You need 6865-3628 mg of calcium and 8322-8797 IU of vitamin D per day. It is possible to meet your calcium requirement with diet alone, but a vitamin D supplement is usually necessary to meet this goal.  When exposed to the sun, use a sunscreen that protects against both UVA and UVB radiation with an SPF of 30 or greater.  Reapply every 2 to 3 hours or after sweating, drying off with a towel, or swimming. Always wear a seat belt when traveling in a car. Always wear a helmet when riding a bicycle or motorcycle.

## 2023-01-05 NOTE — PROGRESS NOTES
Medicare Annual Wellness Visit    Daksha Nunez is here for Medicare AW    Assessment & Plan    Recommendations for Preventive Services Due: see orders and patient instructions/AVS.  Recommended screening schedule for the next 5-10 years is provided to the patient in written form: see Patient Instructions/AVS.     No follow-ups on file.     Subjective       Patient's complete Health Risk Assessment and screening values have been reviewed and are found in Flowsheets. The following problems were reviewed today and where indicated follow up appointments were made and/or referrals ordered.    Positive Risk Factor Screenings with Interventions:       Cognitive:   Words recalled: 2 Words Recalled   Clock Drawing Test (CDT): (!) Abnormal   Total Score: (!) 2   Total Score Interpretation: Abnormal Mini-Cog      Interventions:  See AVS for additional education material    Depression:  Depression Unable to Assess: Functional capacity motivation limits accuracy  PHQ-2 Score: 0  PHQ-9 Total Score: 0    Interpretation:   1-4 = minimal  5-9 = mild  10-14 = moderate  15-19 = moderately severe  20-27 = severe              Weight and Activity:  Physical Activity: Inactive    Days of Exercise per Week: 0 days    Minutes of Exercise per Session: 0 min     On average, how many days per week do you engage in moderate to strenuous exercise (like a brisk walk)?: 0 days  Have you lost any weight without trying in the past 3 months?: No  Body mass index: (!) 29.01      Inactivity Interventions:      Dentist Screen:  Have you seen the dentist within the past year?: (!) No    Intervention:       Vision Screen:  Do you have difficulty driving, watching TV, or doing any of your daily activities because of your eyesight?: No  Have you had an eye exam within the past year?: (!) No  No results found.    Interventions:        ADL's:   Patient reports needing help with:  Select all that apply: (!) Bathing, Toileting  Select all that apply: (!)  Laundry, Housekeeping, Banking/Finances, Food Preparation, Transportation, Taking Medications    Interventions:                      Objective   Vitals:    01/05/23 1016   BP: 134/78   Site: Left Upper Arm   Position: Sitting   Cuff Size: Medium Adult   Pulse: 60   Resp: 16   SpO2: 96%   Weight: 169 lb (76.7 kg)   Height: 5' 4\" (1.626 m)      Body mass index is 29.01 kg/m². No Known Allergies  Prior to Visit Medications    Medication Sig Taking?  Authorizing Provider   levothyroxine (SYNTHROID) 137 MCG tablet Take 1 tablet by mouth daily Yes Lathan Ganser, APRN - CNP   potassium chloride (KLOR-CON M) 20 MEQ extended release tablet Take 1 tablet by mouth 2 times daily Yes Veronika Jamison MD   OLANZapine (ZYPREXA) 2.5 MG tablet Take 2.5 mg by mouth nightly Yes Historical Provider, MD   acetaminophen (TYLENOL) 500 MG tablet Take 2 tablets by mouth every 8 hours as needed for Pain Yes Lathan Ganser, APRN - CNP   Clobetasol Propionate 0.05 % LIQD Apply topically nightly as needed to scalp Yes Historical Provider, MD   Ciclopirox 1 % SHAM Apply topically once a week (Tuesdays) Yes Historical Provider, MD   Nutritional Supplements (NUTRITIONAL DRINK PO) Mighty shakes bid with meals Yes Historical Provider, MD   calcium carbonate-vitamin D3 (CALTRATE) 600-400 MG-UNIT TABS per tab Take 1 tablet by mouth daily Yes Historical Provider, MD   amLODIPine (NORVASC) 5 MG tablet Take 5 mg by mouth daily Yes Historical Provider, MD   FLUoxetine (PROZAC) 20 MG capsule TAKE 1 CAPSULE DAILY Yes Santi Saxena MD   memantine (NAMENDA) 5 MG tablet Take 1 tablet by mouth daily Yes Santi Saxena MD   atorvastatin (LIPITOR) 40 MG tablet Take 1 tablet by mouth daily Yes Santi Saxena MD   aspirin 81 MG EC tablet Take 1 tablet by mouth daily Yes Historical Provider, MD   losartan (COZAAR) 100 MG tablet Take 100 mg by mouth daily  Historical Provider, MD   furosemide (LASIX) 20 MG tablet Take 20 mg by mouth daily as needed (lower exremity edema)  Patient not taking: Reported on 1/5/2023  Historical Provider, MD   furosemide (LASIX) 40 MG tablet Take 1 tablet by mouth in the morning and 1 tablet in the evening. Patient not taking: Reported on 1/5/2023  Brigitte An MD   Cholecalciferol (VITAMIN D3) 1.25 MG (93778 UT) CAPS Take 1 capsule by mouth once a week X 8 doses  (Wednesdays)  Historical Provider, MD   BRILINTA 90 MG TABS tablet Take 1 tablet by mouth 2 times daily  Ela Reid MD   nitroGLYCERIN (NITROSTAT) 0.4 MG SL tablet Place 0.4 mg under the tongue every 5 minutes as needed for Chest pain up to max of 3 total doses. If no relief after 3 dose, call 911. Historical Provider, MD   ibuprofen (ADVIL;MOTRIN) 600 MG tablet Take 1 tablet by mouth 3 times daily as needed for Pain (Take with food.)  Celi Cordova MD   Multiple Vitamin (DAILY MULTIVITAMIN PO) Take 1 tablet by mouth daily   Historical Provider, MD Grant (Including outside providers/suppliers regularly involved in providing care):   Patient Care Team:  Bobby Vincent MD as PCP - General (Internal Medicine)  Bobby Vincent MD as PCP - REHABILITATION [facility] Empaneled Provider     Reviewed and updated this visit:  Tobacco  Allergies  Meds  Problems  Med Hx  Surg Hx  Soc Hx  Fam Hx          I, Camila Runner, LPN, 8/9/9849, performed the documented evaluation under the direct supervision of the attending physician.

## 2023-01-09 NOTE — TELEPHONE ENCOUNTER
Noted, I have added her to my [facility] schedule for early April.

## 2023-01-10 ENCOUNTER — OUTSIDE SERVICES (OUTPATIENT)
Dept: INTERNAL MEDICINE | Age: 88
End: 2023-01-10

## 2023-01-10 VITALS
HEART RATE: 75 BPM | DIASTOLIC BLOOD PRESSURE: 62 MMHG | SYSTOLIC BLOOD PRESSURE: 113 MMHG | TEMPERATURE: 98.2 F | RESPIRATION RATE: 16 BRPM

## 2023-01-10 DIAGNOSIS — R26.9 GAIT DISTURBANCE: ICD-10-CM

## 2023-01-10 DIAGNOSIS — R53.1 GENERALIZED WEAKNESS: Primary | ICD-10-CM

## 2023-01-10 DIAGNOSIS — S72.142D CLOSED DISPLACED INTERTROCHANTERIC FRACTURE OF LEFT FEMUR WITH ROUTINE HEALING, SUBSEQUENT ENCOUNTER: ICD-10-CM

## 2023-01-10 DIAGNOSIS — S72.145B: ICD-10-CM

## 2023-01-10 DIAGNOSIS — F03.90 DEMENTIA, UNSPECIFIED DEMENTIA SEVERITY, UNSPECIFIED DEMENTIA TYPE, UNSPECIFIED WHETHER BEHAVIORAL, PSYCHOTIC, OR MOOD DISTURBANCE OR ANXIETY (HCC): ICD-10-CM

## 2023-01-10 NOTE — PROGRESS NOTES
[facility]      Gisell Chong is a 80 y.o. female resident of [facility] who presents today for medical conditions/complaints as noted below. HPI:     HPI  Patient presents for face-to-face for physical therapy. Assisted living facility has been on lockdown due to Francine, and patient has experienced some deconditioning. Staff has noticed increased generalized weakness, and patient has been requiring increased assistance with activities of daily living. History of recurrent falls. Also has known history of left femur fracture in 2022, for which she has previously undergone physical therapy. Dementia has been relatively stable per staff. Otherwise she has been doing well, denies fever, chills, nausea, vomiting, diarrhea. Current Outpatient Medications   Medication Sig Dispense Refill    levothyroxine (SYNTHROID) 137 MCG tablet Take 1 tablet by mouth daily 90 tablet 1    furosemide (LASIX) 40 MG tablet Take 1 tablet by mouth in the morning and 1 tablet in the evening.  60 tablet 3    potassium chloride (KLOR-CON M) 20 MEQ extended release tablet Take 1 tablet by mouth 2 times daily 180 tablet 1    OLANZapine (ZYPREXA) 2.5 MG tablet Take 2.5 mg by mouth nightly      acetaminophen (TYLENOL) 500 MG tablet Take 2 tablets by mouth every 8 hours as needed for Pain 180 tablet 5    Clobetasol Propionate 0.05 % LIQD Apply topically nightly as needed to scalp      Ciclopirox 1 % SHAM Apply topically once a week (Tuesdays)      Nutritional Supplements (NUTRITIONAL DRINK PO) Mighty shakes bid with meals      calcium carbonate-vitamin D3 (CALTRATE) 600-400 MG-UNIT TABS per tab Take 1 tablet by mouth daily      amLODIPine (NORVASC) 5 MG tablet Take 5 mg by mouth daily      FLUoxetine (PROZAC) 20 MG capsule TAKE 1 CAPSULE DAILY 90 capsule 3    memantine (NAMENDA) 5 MG tablet Take 1 tablet by mouth daily 90 tablet 3    atorvastatin (LIPITOR) 40 MG tablet Take 1 tablet by mouth daily 90 tablet 3 aspirin 81 MG EC tablet Take 1 tablet by mouth daily       No current facility-administered medications for this visit. No Known Allergies    Health Maintenance   Topic Date Due    COVID-19 Vaccine (4 - Booster for Pfizer series) 12/22/2021    Lipids  01/05/2024    Depression Screen  01/05/2024    Annual Wellness Visit (AWV)  01/06/2024    DTaP/Tdap/Td vaccine (3 - Td or Tdap) 07/22/2025    Flu vaccine  Completed    Shingles vaccine  Completed    Pneumococcal 65+ years Vaccine  Completed    Hepatitis A vaccine  Aged Out    Hib vaccine  Aged Out    Meningococcal (ACWY) vaccine  Aged Out       Subjective:      Review of Systems   Constitutional:  Negative for chills and fever. HENT:  Negative for congestion and rhinorrhea. Respiratory:  Negative for cough and wheezing. Cardiovascular:  Negative for chest pain and leg swelling. Gastrointestinal:  Negative for diarrhea, nausea and vomiting. Musculoskeletal:  Positive for gait problem. Neurological:  Positive for weakness. Negative for dizziness. Due to patient's clinical status, ROS of symptoms was completed by discussion with long term care facility staff, as well as with input from patient. Objective:     Vitals:    01/09/23 2331   BP: 113/62   Pulse: 75   Resp: 16   Temp: 98.2 °F (36.8 °C)     Physical Exam  Vitals reviewed. Constitutional:       General: She is not in acute distress. Appearance: She is not ill-appearing. HENT:      Head: Normocephalic and atraumatic. Right Ear: External ear normal.      Left Ear: External ear normal.   Eyes:      Extraocular Movements: Extraocular movements intact. Conjunctiva/sclera: Conjunctivae normal.   Cardiovascular:      Rate and Rhythm: Normal rate and regular rhythm. Pulmonary:      Effort: Pulmonary effort is normal. No respiratory distress. Breath sounds: Normal breath sounds. Skin:     Coloration: Skin is not jaundiced or pale.    Neurological:      General: No focal deficit present. Mental Status: She is alert. Mental status is at baseline. Psychiatric:         Mood and Affect: Mood normal.         Behavior: Behavior normal.       Assessment/Plan:        1. Generalized weakness  2. Gait disturbance  3. Nondisp intertroch fx left femur, init for opn fx type I/2 ([facility] [address]  4. Closed displaced intertrochanteric fracture of left femur with routine healing, subsequent encounter  5.  Dementia, unspecified dementia severity, unspecified dementia type, unspecified whether behavioral, psychotic, or mood disturbance or anxiety [facility])  - PT/OT evaluate and treat        Electronically signed by Giles Essex, APRN - CNP on 1/13/2023 at 8:15 AM

## 2023-01-12 ENCOUNTER — HOSPITAL ENCOUNTER (OUTPATIENT)
Age: 88
Setting detail: SPECIMEN
Discharge: HOME | End: 2023-01-12
Payer: MEDICARE

## 2023-01-12 LAB
FERRITIN: 80 NG/ML (ref 13–150)
FOLATE: 10.4 NG/ML
VITAMIN B-12: 293 PG/ML (ref 232–1245)

## 2023-01-12 PROCEDURE — 82728 ASSAY OF FERRITIN: CPT

## 2023-01-12 PROCEDURE — 36415 COLL VENOUS BLD VENIPUNCTURE: CPT

## 2023-01-12 PROCEDURE — 82607 VITAMIN B-12: CPT

## 2023-01-12 PROCEDURE — 82746 ASSAY OF FOLIC ACID SERUM: CPT

## 2023-01-13 ENCOUNTER — TELEPHONE (OUTPATIENT)
Dept: INTERNAL MEDICINE | Age: 88
End: 2023-01-13

## 2023-01-13 ASSESSMENT — ENCOUNTER SYMPTOMS
RHINORRHEA: 0
COUGH: 0
WHEEZING: 0
DIARRHEA: 0
VOMITING: 0
NAUSEA: 0

## 2023-01-13 NOTE — TELEPHONE ENCOUNTER
Please fax completed progress note from 1/10/2023 to [facility] for face-to-face documentation, thanks

## 2023-01-16 RX ORDER — MEMANTINE HYDROCHLORIDE 5 MG/1
TABLET ORAL
Qty: 90 TABLET | Refills: 3 | Status: SHIPPED | OUTPATIENT
Start: 2023-01-16

## 2023-01-16 NOTE — TELEPHONE ENCOUNTER
Pharmacy faxed requesting a refill of the below medication which has been pended for you:     Requested Prescriptions     Pending Prescriptions Disp Refills    memantine (NAMENDA) 5 MG tablet [Pharmacy Med Name: MEMANTINE HCL TABS 5MG] 90 tablet 3     Sig: TAKE 1 TABLET DAILY       Last Appointment Date: 1/5/2023  Next Appointment Date: Visit date not found    No Known Allergies

## 2023-01-19 ENCOUNTER — HOSPITAL ENCOUNTER (OUTPATIENT)
Age: 88
Discharge: HOME | End: 2023-01-19
Payer: MEDICARE

## 2023-01-19 ENCOUNTER — HOSPITAL ENCOUNTER (OUTPATIENT)
Dept: GENERAL RADIOLOGY | Age: 88
Discharge: HOME | End: 2023-01-21
Payer: MEDICARE

## 2023-01-19 DIAGNOSIS — R06.02 SOB (SHORTNESS OF BREATH): ICD-10-CM

## 2023-01-19 DIAGNOSIS — R63.5 WEIGHT GAIN: ICD-10-CM

## 2023-01-19 DIAGNOSIS — R06.02 SOB (SHORTNESS OF BREATH): Primary | ICD-10-CM

## 2023-01-19 DIAGNOSIS — J90 PLEURAL EFFUSION, BILATERAL: ICD-10-CM

## 2023-01-19 DIAGNOSIS — R06.2 WHEEZING: ICD-10-CM

## 2023-01-19 LAB
ANION GAP SERPL CALCULATED.3IONS-SCNC: 11 MMOL/L (ref 9–17)
BUN BLDV-MCNC: 15 MG/DL (ref 8–23)
BUN/CREAT BLD: 20 (ref 9–20)
CALCIUM SERPL-MCNC: 9 MG/DL (ref 8.6–10.4)
CHLORIDE BLD-SCNC: 101 MMOL/L (ref 98–107)
CO2: 30 MMOL/L (ref 20–31)
CREAT SERPL-MCNC: 0.76 MG/DL (ref 0.5–0.9)
GFR SERPL CREATININE-BSD FRML MDRD: >60 ML/MIN/1.73M2
GLUCOSE BLD-MCNC: 119 MG/DL (ref 70–99)
POTASSIUM SERPL-SCNC: 4.3 MMOL/L (ref 3.7–5.3)
SODIUM BLD-SCNC: 142 MMOL/L (ref 135–144)

## 2023-01-19 PROCEDURE — 36415 COLL VENOUS BLD VENIPUNCTURE: CPT

## 2023-01-19 PROCEDURE — 80048 BASIC METABOLIC PNL TOTAL CA: CPT

## 2023-01-19 PROCEDURE — 71045 X-RAY EXAM CHEST 1 VIEW: CPT

## 2023-02-06 RX ORDER — AMLODIPINE BESYLATE 5 MG/1
TABLET ORAL
Qty: 90 TABLET | Refills: 3 | Status: SHIPPED | OUTPATIENT
Start: 2023-02-06

## 2023-02-06 NOTE — TELEPHONE ENCOUNTER
Pharmacy  called requesting a refill of the below medication which has been pended for you:     Requested Prescriptions     Pending Prescriptions Disp Refills    amLODIPine (NORVASC) 5 MG tablet [Pharmacy Med Name: AMLODIPINE BESYLATE TABS 5MG] 90 tablet 3     Sig: TAKE 1 TABLET DAILY       Last Appointment Date: 1/5/2023  Next Appointment Date: Visit date not found    No Known Allergies

## 2023-02-07 ENCOUNTER — TELEPHONE (OUTPATIENT)
Dept: INTERNAL MEDICINE | Age: 88
End: 2023-02-07
Payer: MEDICARE

## 2023-02-07 DIAGNOSIS — S72.145B: ICD-10-CM

## 2023-02-07 DIAGNOSIS — E03.9 HYPOTHYROIDISM, UNSPECIFIED TYPE: ICD-10-CM

## 2023-02-07 DIAGNOSIS — I50.9 HEART FAILURE, UNSPECIFIED HF CHRONICITY, UNSPECIFIED HEART FAILURE TYPE (HCC): ICD-10-CM

## 2023-02-07 DIAGNOSIS — F03.918 DEMENTIA WITH BEHAVIORAL DISTURBANCE: Primary | ICD-10-CM

## 2023-02-07 DIAGNOSIS — I48.0 PAROXYSMAL A-FIB (HCC): ICD-10-CM

## 2023-02-07 DIAGNOSIS — N18.30 STAGE 3 CHRONIC KIDNEY DISEASE, UNSPECIFIED WHETHER STAGE 3A OR 3B CKD (HCC): ICD-10-CM

## 2023-02-07 PROCEDURE — G0180 MD CERTIFICATION HHA PATIENT: HCPCS | Performed by: NURSE PRACTITIONER

## 2023-02-09 ENCOUNTER — HOSPITAL ENCOUNTER (OUTPATIENT)
Age: 88
Setting detail: SPECIMEN
Discharge: HOME | End: 2023-02-09
Payer: MEDICARE

## 2023-02-09 ENCOUNTER — PATIENT MESSAGE (OUTPATIENT)
Dept: INTERNAL MEDICINE | Age: 88
End: 2023-02-09

## 2023-02-09 DIAGNOSIS — R63.5 WEIGHT GAIN: ICD-10-CM

## 2023-02-09 DIAGNOSIS — I50.9 HEART FAILURE, UNSPECIFIED HF CHRONICITY, UNSPECIFIED HEART FAILURE TYPE (HCC): ICD-10-CM

## 2023-02-09 LAB
ANION GAP SERPL CALCULATED.3IONS-SCNC: 11 MMOL/L (ref 9–17)
BNP SERPL-MCNC: 1285 PG/ML
BUN SERPL-MCNC: 11 MG/DL (ref 8–23)
BUN/CREAT BLD: 17 (ref 9–20)
CALCIUM SERPL-MCNC: 8.5 MG/DL (ref 8.6–10.4)
CHLORIDE SERPL-SCNC: 104 MMOL/L (ref 98–107)
CO2 SERPL-SCNC: 26 MMOL/L (ref 20–31)
CREAT SERPL-MCNC: 0.66 MG/DL (ref 0.5–0.9)
GFR SERPL CREATININE-BSD FRML MDRD: >60 ML/MIN/1.73M2
GLUCOSE SERPL-MCNC: 124 MG/DL (ref 70–99)
POTASSIUM SERPL-SCNC: 3.6 MMOL/L (ref 3.7–5.3)
SODIUM SERPL-SCNC: 141 MMOL/L (ref 135–144)

## 2023-02-09 PROCEDURE — 80048 BASIC METABOLIC PNL TOTAL CA: CPT

## 2023-02-09 PROCEDURE — 83880 ASSAY OF NATRIURETIC PEPTIDE: CPT

## 2023-02-09 PROCEDURE — 36415 COLL VENOUS BLD VENIPUNCTURE: CPT

## 2023-02-09 NOTE — TELEPHONE ENCOUNTER
From: Jennifer Park  To: Lula Camryn  Sent: 2/9/2023 9:01 AM EST  Subject: Dementia anxiety    Hi Irl Leisa,  I spoke with Juan David Bains about [facility] asking if mom should be taking atenolol. She said she is not and it was decided in the hospital. Thats fine -just wanted you to know. We also spoke of Moms dementia and anxiety in the evenings as well as depression. She is  Mentioning being depressed more. I think she is realizing my Dad is gone. We dont want her medicated but I wondered if there was something that might help. I know shes been on Prozac but that was to try to calm her down with my Dad. She was on him all the time. Could there be something else that might be more beneficial with her new circumstances?   Thank you,  Sparkle

## 2023-02-13 ENCOUNTER — APPOINTMENT (OUTPATIENT)
Dept: GENERAL RADIOLOGY | Age: 88
DRG: 291 | End: 2023-02-13
Payer: MEDICARE

## 2023-02-13 ENCOUNTER — HOSPITAL ENCOUNTER (EMERGENCY)
Age: 88
Discharge: NURSING, REHAB OR LONG-TERM CARE | DRG: 291 | End: 2023-02-13
Attending: EMERGENCY MEDICINE
Payer: MEDICARE

## 2023-02-13 VITALS
OXYGEN SATURATION: 96 % | BODY MASS INDEX: 29.88 KG/M2 | SYSTOLIC BLOOD PRESSURE: 157 MMHG | HEART RATE: 76 BPM | DIASTOLIC BLOOD PRESSURE: 74 MMHG | RESPIRATION RATE: 18 BRPM | HEIGHT: 64 IN | TEMPERATURE: 96.8 F | WEIGHT: 175 LBS

## 2023-02-13 DIAGNOSIS — R09.89 CHOKING EPISODE: Primary | ICD-10-CM

## 2023-02-13 DIAGNOSIS — I50.9 ACUTE CONGESTIVE HEART FAILURE, UNSPECIFIED HEART FAILURE TYPE (HCC): ICD-10-CM

## 2023-02-13 LAB
ABSOLUTE EOS #: 0.19 K/UL (ref 0–0.44)
ABSOLUTE IMMATURE GRANULOCYTE: 0.05 K/UL (ref 0–0.3)
ABSOLUTE LYMPH #: 1.64 K/UL (ref 1.1–3.7)
ABSOLUTE MONO #: 0.88 K/UL (ref 0.1–1.2)
ANION GAP SERPL CALCULATED.3IONS-SCNC: 11 MMOL/L (ref 9–17)
BASOPHILS # BLD: 1 % (ref 0–2)
BASOPHILS ABSOLUTE: 0.09 K/UL (ref 0–0.2)
BNP SERPL-MCNC: 1488 PG/ML
BUN SERPL-MCNC: 12 MG/DL (ref 8–23)
BUN/CREAT BLD: 16 (ref 9–20)
CALCIUM SERPL-MCNC: 9.2 MG/DL (ref 8.6–10.4)
CHLORIDE SERPL-SCNC: 103 MMOL/L (ref 98–107)
CO2 SERPL-SCNC: 28 MMOL/L (ref 20–31)
CREAT SERPL-MCNC: 0.76 MG/DL (ref 0.5–0.9)
EOSINOPHILS RELATIVE PERCENT: 2 % (ref 1–4)
GFR SERPL CREATININE-BSD FRML MDRD: >60 ML/MIN/1.73M2
GLUCOSE SERPL-MCNC: 125 MG/DL (ref 70–99)
HCT VFR BLD AUTO: 38.1 % (ref 36.3–47.1)
HGB BLD-MCNC: 12.1 G/DL (ref 11.9–15.1)
IMMATURE GRANULOCYTES: 1 %
LYMPHOCYTES # BLD: 21 % (ref 24–43)
MCH RBC QN AUTO: 30 PG (ref 25.2–33.5)
MCHC RBC AUTO-ENTMCNC: 31.8 G/DL (ref 25.2–33.5)
MCV RBC AUTO: 94.3 FL (ref 82.6–102.9)
MONOCYTES # BLD: 11 % (ref 3–12)
NRBC AUTOMATED: 0 PER 100 WBC
PDW BLD-RTO: 15.7 % (ref 11.8–14.4)
PLATELET # BLD AUTO: 294 K/UL (ref 138–453)
PMV BLD AUTO: 10.2 FL (ref 8.1–13.5)
POTASSIUM SERPL-SCNC: 3.2 MMOL/L (ref 3.7–5.3)
RBC # BLD: 4.04 M/UL (ref 3.95–5.11)
RBC # BLD: ABNORMAL 10*6/UL
SEG NEUTROPHILS: 64 % (ref 36–65)
SEGMENTED NEUTROPHILS ABSOLUTE COUNT: 5.07 K/UL (ref 1.5–8.1)
SODIUM SERPL-SCNC: 142 MMOL/L (ref 135–144)
TROPONIN I SERPL DL<=0.01 NG/ML-MCNC: 17 NG/L (ref 0–14)
WBC # BLD AUTO: 7.9 K/UL (ref 3.5–11.3)

## 2023-02-13 PROCEDURE — 80048 BASIC METABOLIC PNL TOTAL CA: CPT

## 2023-02-13 PROCEDURE — 84484 ASSAY OF TROPONIN QUANT: CPT

## 2023-02-13 PROCEDURE — 83880 ASSAY OF NATRIURETIC PEPTIDE: CPT

## 2023-02-13 PROCEDURE — 99284 EMERGENCY DEPT VISIT MOD MDM: CPT

## 2023-02-13 PROCEDURE — 85025 COMPLETE CBC W/AUTO DIFF WBC: CPT

## 2023-02-13 PROCEDURE — 71045 X-RAY EXAM CHEST 1 VIEW: CPT

## 2023-02-13 ASSESSMENT — PAIN - FUNCTIONAL ASSESSMENT
PAIN_FUNCTIONAL_ASSESSMENT: NONE - DENIES PAIN
PAIN_FUNCTIONAL_ASSESSMENT: NONE - DENIES PAIN

## 2023-02-13 NOTE — ED PROVIDER NOTES
[address]      Pt Name: John Fuchs  MRN: [id number]  [place] 3/21/1930  Date of evaluation: 2/13/2023      CHIEF COMPLAINT       Chief Complaint   Patient presents with    Cough     Choked at lunch, concern for aspiration         HISTORY OF PRESENT ILLNESS      The patient was sent to the emergency department for a choking episode. She was at lunch and choked on food. There is concern for aspiration. In addition, the [facility] staff note that the patient has gained 17 pounds in the past week or so. She is on Lasix 40 mg twice a day. 1 day she received an extra 20 mg of Lasix. The patient reports no chest pain. She says she feels much better than she did 1/2-hour ago and is not short of breath at this time. She does not feel like she is choking or has an airway foreign body. She denies throat pain. REVIEW OF SYSTEMS       All systems reviewed and negative unless noted in HPI. The patient denies fever or constitutional symptoms. Denies vision change. Denies any sore throat or rhinorrhea. Denies any neck pain or stiffness. Denies chest pain. Recent choking episode as noted in HPI. Choked on her food. No nausea,  vomiting or diarrhea. Denies any dysuria. Denies urinary frequency or hematuria. Denies musculoskeletal injury or pain. Denies any weakness, numbness or focal neurologic deficit. Chronic edema in legs with 17 pound weight gain noted by nursing staff. No recent psychiatric issues. No easy bruising or bleeding. Denies any polyuria, polydypsia or history of immunocompromise.        PAST MEDICAL HISTORY    has a past medical history of Adenomatous polyp, Cataracts, bilateral, Closed right hip fracture, initial encounter ([facility] [address] Combined forms of age-related cataract of both eyes, Corneal scar, right eye, Cystocele, Dementia ([address] [address] Difficulty controlling anger, Dyslipidemia, GERD (gastroesophageal reflux disease), Goiter, Hiatal hernia, Hypertension, Hypokalemia, Hypothyroidism, Impaired fasting glucose, Malignant melanoma in situ ([facility] [address] Migraines, Murmur, functional, Osteopenia, Posterior vitreous detachment of both eyes, Primary open angle glaucoma (POAG) of both eyes, moderate stage, Sciatica, Skin cancer, Syncope and collapse, Tremor, 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 SURGICAL HISTORY      has a past surgical history that includes other surgical history (11/11/2008); Cholecystectomy; Colonoscopy (06/08/2010); Mohs surgery (10/07/2009); Vein Surgery (06/08/2009); Colonoscopy (07/18/2002); Cystocele repair (02/12/1999); Appendectomy; Dilation and curettage of uterus; other surgical history (01/01/1988); Hysterectomy (01/01/1995); Upper gastrointestinal endoscopy (04/08/2015); Intracapsular cataract extraction (Left, 08/25/2020); Intracapsular cataract extraction (Right, 10/08/2020); Hip fracture surgery (Right, 11/02/2021); Femur fracture surgery (Left, 09/04/2022); and Femur fracture surgery (Left, 9/4/2022). CURRENT MEDICATIONS       Previous Medications    ACETAMINOPHEN (TYLENOL) 500 MG TABLET    Take 2 tablets by mouth every 8 hours as needed for Pain    AMLODIPINE (NORVASC) 5 MG TABLET    TAKE 1 TABLET DAILY    ASPIRIN 81 MG EC TABLET    Take 1 tablet by mouth daily    ATORVASTATIN (LIPITOR) 40 MG TABLET    Take 1 tablet by mouth daily    CALCIUM CARBONATE-VITAMIN D3 (CALTRATE) 600-400 MG-UNIT TABS PER TAB    Take 1 tablet by mouth daily    CICLOPIROX 1 % SHAM    Apply topically once a week (Tuesdays)    CLOBETASOL PROPIONATE 0.05 % LIQD    Apply topically nightly as needed to scalp    FLUOXETINE (PROZAC) 20 MG CAPSULE    TAKE 1 CAPSULE DAILY    FUROSEMIDE (LASIX) 40 MG TABLET    Take 1 tablet by mouth in the morning and 1 tablet in the evening.     LEVOTHYROXINE (SYNTHROID) 137 MCG TABLET    Take 1 tablet by mouth daily    MEMANTINE (NAMENDA) 5 MG TABLET    TAKE 1 TABLET DAILY NUTRITIONAL SUPPLEMENTS (NUTRITIONAL DRINK PO)    Mighty shakes bid with meals    OLANZAPINE (ZYPREXA) 2.5 MG TABLET    Take 2.5 mg by mouth nightly    POTASSIUM CHLORIDE (KLOR-CON M) 20 MEQ EXTENDED RELEASE TABLET    Take 1 tablet by mouth 2 times daily       ALLERGIES     has No Known Allergies. FAMILY HISTORY     She indicated that her mother is . She indicated that her father is . She indicated that the status of her brother is unknown. She indicated that her other is alive. She indicated that the status of her neg hx is unknown.     family history includes Colon Cancer in her brother; Coronary Art Dis in her father. SOCIAL HISTORY      reports that she has never smoked. She has never used smokeless tobacco. She reports that she does not currently use alcohol. She reports that she does not use drugs. PHYSICAL EXAM     INITIAL VITALS:  height is 5' 4\" (1.626 m) and weight is 175 lb (79.4 kg). Her tympanic temperature is 96.8 °F (36 °C). Her blood pressure is 150/90 (abnormal) and her pulse is 91. Her respiration is 18 and oxygen saturation is 97%. The patient is alert and oriented, in no apparent distress. Phonating normally and managing secretions well. HEENT is atraumatic. Pupils are PERRL at 4 mm with normal extraocular motion. Mucous membranes moist.  Posterior pharynx unremarkable. Neck is supple with no lymphadenopathy. No JVD. No meningismus. Heart sounds regular rate and rhythm with no gallops, murmurs, or rubs. Lungs clear, no wheezes, rales or rhonchi. Abdomen: soft, nontender with no pain to palpation. No pulsatile mass. Normal bowel sounds are noted. No rebound or guarding. Musculoskeletal exam shows no evidence of trauma. Normal distal pulses in all extremities. Skin: Compression stockings in place. 2-3+ edema in legs bilaterally. Neurological exam reveals cranial nerves 2 through 12 grossly intact.   Patient has equal  and normal deep tendon reflexes. Psychiatric: no hallucinations or suicidal ideation. Lymphatics.:  No lymphadenopathy. DIFFERENTIAL DIAGNOSIS/ MDM:     Differential diagnosis considered: Choking episode, aspiration, airway foreign body, CHF, ACS    Chronic Conditions affecting care (DM,HTN,CA, etc): Hypertension CHF    Social Determinants of Health affecting care (unable to care for self, lives alone, unemployed, homeless,etc): Resides in nursing home    History source(s) (patient,spouse,parent,family,friend,EMS,etc): Patient and ECF staff. Review of external sources (ECF,Hospital records,EMS report, radiology reports, etc): Previous hospital records, ECF records    Tests considered but not ordered: None      Independent interpretation of tests (eg.  X-ray, CAT scan, Doppler studies, EKG):      Discussion of x-ray results with radiology: No     Consults: None      Consideration for admission/observation (even if discharged): Not applicable      Prescription considerations: None      Sepsis considered: Not applicable      Critical Care note written: Not applicable        DIAGNOSTIC RESULTS         RADIOLOGY:   I reviewed the radiologist interpretations:  XR CHEST PORTABLE   Final Result   Suboptimal positioning. Similar appearance of vascular congestion and   probable small pleural effusions. XR CHEST PORTABLE (Final result)  Result time 02/13/23 14:30:30  Final result by Andrew Murrieta MD (02/13/23 14:30:30)                Impression:    Suboptimal positioning. Similar appearance of vascular congestion and   probable small pleural effusions. Narrative:    EXAMINATION:   ONE XRAY VIEW OF THE CHEST     2/13/2023 2:17 pm     COMPARISON:   01/19/2023, 10/14/2022     HISTORY:   ORDERING SYSTEM PROVIDED HISTORY: chest pain   TECHNOLOGIST PROVIDED HISTORY:   chest pain   Reason for Exam: C/o chest pain     FINDINGS:   Shallow inflation and kyphotic positioning.   The lung apices are partially obscured. Mild vascular congestion is similar in appearance. No focal or   evidence for pneumothorax. Similar appearance of blunting costophrenic   angles. The cardiac and mediastinal contours appear unchanged. No new   osseous abnormality identified.                      LABS:  Results for orders placed or performed during the hospital encounter of 02/13/23   CBC with Auto Differential   Result Value Ref Range    WBC 7.9 3.5 - 11.3 k/uL    RBC 4.04 3.95 - 5.11 m/uL    Hemoglobin 12.1 11.9 - 15.1 g/dL    Hematocrit 38.1 36.3 - 47.1 %    MCV 94.3 82.6 - 102.9 fL    MCH 30.0 25.2 - 33.5 pg    MCHC 31.8 25.2 - 33.5 g/dL    RDW 15.7 (H) 11.8 - 14.4 %    Platelets 386 160 - 493 k/uL    MPV 10.2 8.1 - 13.5 fL    NRBC Automated 0.0 0.0 per 100 WBC    Seg Neutrophils 64 36 - 65 %    Lymphocytes 21 (L) 24 - 43 %    Monocytes 11 3 - 12 %    Eosinophils % 2 1 - 4 %    Basophils 1 0 - 2 %    Immature Granulocytes 1 (H) 0 %    Segs Absolute 5.07 1.50 - 8.10 k/uL    Absolute Lymph # 1.64 1.10 - 3.70 k/uL    Absolute Mono # 0.88 0.10 - 1.20 k/uL    Absolute Eos # 0.19 0.00 - 0.44 k/uL    Basophils Absolute 0.09 0.00 - 0.20 k/uL    Absolute Immature Granulocyte 0.05 0.00 - 0.30 k/uL    RBC Morphology ANISOCYTOSIS PRESENT    Basic Metabolic Panel   Result Value Ref Range    Glucose 125 (H) 70 - 99 mg/dL    BUN 12 8 - 23 mg/dL    Creatinine 0.76 0.50 - 0.90 mg/dL    Est, Glom Filt Rate >60 >60 mL/min/1.73m2    Bun/Cre Ratio 16 9 - 20    Calcium 9.2 8.6 - 10.4 mg/dL    Sodium 142 135 - 144 mmol/L    Potassium 3.2 (L) 3.7 - 5.3 mmol/L    Chloride 103 98 - 107 mmol/L    CO2 28 20 - 31 mmol/L    Anion Gap 11 9 - 17 mmol/L   Brain Natriuretic Peptide   Result Value Ref Range    Pro-BNP 1,488 (H) <300 pg/mL   Troponin   Result Value Ref Range    Troponin, High Sensitivity 17 (H) 0 - 14 ng/L         EMERGENCY DEPARTMENT COURSE:   Vitals:    Vitals:    02/13/23 1326   BP: (!) 150/90   Pulse: 91   Resp: 18   Temp: 96.8 °F (36 °C) TempSrc: Tympanic   SpO2: 97%   Weight: 175 lb (79.4 kg)   Height: 5' 4\" (1.626 m)     -------------------------  BP: (!) 150/90, Temp: 96.8 °F (36 °C), Heart Rate: 91, Resp: 18      Re-evaluation Notes    The patient already takes Lasix. She appears to have some mild heart failure. We will have her increase her dosing for the next 5 days. There is no evidence of aspiration or airway issue. .  If she develops fever or cough, she should return. The patient is discharged in good condition. FINAL IMPRESSION      1. Choking episode    2.  Acute congestive heart failure, unspecified heart failure type [facility])          DISPOSITION/PLAN   DISPOSITION Decision To Discharge 02/13/2023 02:44:55 PM      Condition on Disposition    good    PATIENT REFERRED TO:  FELICIA Olvera - CNP  [address] [facility] [address]  [phone]    In 1 week      DISCHARGE MEDICATIONS:  New Prescriptions    No medications on file       (Please note that portions of this note were completed with a voice recognition program.  Efforts were made to edit the dictations but occasionally words are mis-transcribed.)    Shauna Bonner MD,, MD   Attending Emergency Physician          Rosi Altman MD  02/13/23 2388

## 2023-02-13 NOTE — DISCHARGE INSTRUCTIONS
Increase Lasix to 80 mg in the morning and 40 mg at night for the next 5 days. Return for fever, cough, difficulty breathing, or if worse in any way. Please understand that at this time there is no evidence for a more serious underlying process, but that early in the process of an illness or injury, an emergency department workup can be falsely reassuring. You should contact your family doctor within the next 48 hours for a follow up appointment    Tari Szymanski!!!    From [facility] ([facility]) and [facility]    On behalf of the Emergency Department staff at [facility]), I would like to thank you for giving us the opportunity to address your health care needs and concerns. We hope that during your visit, our service was delivered in a professional and caring manner. Please keep [facility] ([facility]) in mind as we walk with you down the path to your own personal wellness. Please expect an automated text message or email from us so we can ask a few questions about your health and progress. Based on your answers, a clinician may call you back to offer help and instructions. Please understand that early in the process of an illness or injury, an emergency department workup can be falsely reassuring. If you notice any worsening, changing or persistent symptoms please call your family doctor or return to the ER immediately. Tell us how we did during your visit at [email or web address]   and let us know about your experience

## 2023-02-13 NOTE — PROGRESS NOTES
[facility]      Jennifer Park is a 80 y.o. female resident of [facility]. HPI:     HPI  Patient presents for evaluation and management of medical conditions as noted below. Had recent ER visit for choking episode. Staff has concerns for dysphagia. Has not had any recurrence of choking incidents since returning from ER. Was also noted to have weight gain of 17 pounds in 1 week, which was also evaluated in ER. BNP elevated at 1488, increase in BLE edema. CXR did note small pleural effusions. ER physician ordered increased dose of lasix for the next 5 days. Her edema has been improving, she denies any chest pain or dyspnea today. Oxygen saturation has been stable on RA. Per staff, patient has been more depressed since her  passed away. Dementia and anxiety has been worse in the evenings. Prozac has been ineffective    CKD stable, GFR >60, Cr 0.76      Current Outpatient Medications   Medication Sig Dispense Refill    hydrOXYzine HCl (ATARAX) 25 MG tablet Take 1 tablet by mouth nightly as needed for Anxiety 30 tablet 0    furosemide (LASIX) 40 MG tablet 80 mg every am, and 40 mg every night x 5 days- STARTING 2/14/23 through 2/18/23      amLODIPine (NORVASC) 5 MG tablet TAKE 1 TABLET DAILY 90 tablet 3    memantine (NAMENDA) 5 MG tablet TAKE 1 TABLET DAILY 90 tablet 3    levothyroxine (SYNTHROID) 137 MCG tablet Take 1 tablet by mouth daily 90 tablet 1    furosemide (LASIX) 40 MG tablet Take 1 tablet by mouth in the morning and 1 tablet in the evening.  (Patient taking differently: Take 40 mg by mouth 2 times daily SEE 2/14/23 DOSING FOR 5 DAYS) 60 tablet 3    potassium chloride (KLOR-CON M) 20 MEQ extended release tablet Take 1 tablet by mouth 2 times daily 180 tablet 1    OLANZapine (ZYPREXA) 2.5 MG tablet Take 2.5 mg by mouth nightly      acetaminophen (TYLENOL) 500 MG tablet Take 2 tablets by mouth every 8 hours as needed for Pain 180 tablet 5    Ciclopirox 1 % SHAM Apply topically once a week on Tuesdays- AKA Loprox Shampoo      Nutritional Supplements (NUTRITIONAL DRINK PO) Mighty shakes bid with meals      calcium carbonate-vitamin D3 (CALTRATE) 600-400 MG-UNIT TABS per tab Take 1 tablet by mouth daily      FLUoxetine (PROZAC) 20 MG capsule TAKE 1 CAPSULE DAILY 90 capsule 3    atorvastatin (LIPITOR) 40 MG tablet Take 1 tablet by mouth daily 90 tablet 3    aspirin 81 MG EC tablet Take 1 tablet by mouth daily       No current facility-administered medications for this visit.     No Known Allergies    Health Maintenance   Topic Date Due    Lipids  01/05/2024    Depression Screen  01/05/2024    Annual Wellness Visit (AWV)  01/06/2024    DTaP/Tdap/Td vaccine (3 - Td or Tdap) 07/22/2025    Flu vaccine  Completed    Shingles vaccine  Completed    Pneumococcal 65+ years Vaccine  Completed    COVID-19 Vaccine  Completed    Hepatitis A vaccine  Aged Out    Hib vaccine  Aged Out    Meningococcal (ACWY) vaccine  Aged Out       Subjective:      Review of Systems   Constitutional:  Negative for chills and fever.   HENT:  Negative for congestion and sore throat.    Respiratory:  Negative for chest tightness and shortness of breath.    Cardiovascular:  Positive for leg swelling. Negative for chest pain.   Gastrointestinal:  Negative for diarrhea, nausea and vomiting.   Genitourinary:  Negative for difficulty urinating and dysuria.   Musculoskeletal:  Negative for gait problem and myalgias.   Neurological:  Negative for dizziness and headaches.   Psychiatric/Behavioral:  Negative for agitation and behavioral problems.      Due to patient's clinical status, ROS of symptoms was completed by discussion with long term care facility staff, as well as with input from patient.      Objective:     Vitals:    02/13/23 2250 02/14/23 0800   BP: 109/64 (!) 143/86   Pulse: 72 86   Resp: 16 18   Temp: 98.3 °F (36.8 °C) 97.5 °F (36.4 °C)   SpO2:  96%     Physical Exam  Vitals reviewed.   Constitutional:        General: She is not in acute distress. Appearance: She is not ill-appearing. HENT:      Head: Normocephalic and atraumatic. Right Ear: External ear normal.      Left Ear: External ear normal.   Eyes:      Extraocular Movements: Extraocular movements intact. Conjunctiva/sclera: Conjunctivae normal.   Cardiovascular:      Rate and Rhythm: Normal rate. Rhythm irregular. Pulmonary:      Effort: Pulmonary effort is normal. No respiratory distress. Breath sounds: Normal breath sounds. Musculoskeletal:      Comments: 2+ BLE edema, patient and staff report this is improved from previous   Skin:     General: Skin is warm and dry. Neurological:      General: No focal deficit present. Mental Status: She is alert. Mental status is at baseline. Psychiatric:         Mood and Affect: Mood normal.         Behavior: Behavior normal.       Assessment/Plan:        1. Anxiety, worsening  - Start hydroxyzine as noted below. Consider alternative to prozac if no improvement  - hydrOXYzine HCl (ATARAX) 25 MG tablet; Take 1 tablet by mouth nightly as needed for Anxiety  Dispense: 30 tablet; Refill: 0    2. Depression, unspecified depression type, worsening  - Patient reports she is doing well today with no concerns of depression. However, given she is a poor historian, will consider alternative to prozac if symptoms persist per staff. 3. Dementia with behavioral disturbance, stable  - No recent behavioral disturbances, continue namenda    4. Heart failure, unspecified HF chronicity, unspecified heart failure type ([facility] [address] stable  - Symptoms improving on lasix, will continue as per ER physician orders    5. Dysphagia, unspecified type, new problem  6. Choking episode, new problem  - Swallow study/speech therapy referral ordered    7. Stage 3 chronic kidney disease, unspecified whether stage 3a or 3b CKD ([facility] [address]  stable  - Continue to monitor    8. Bilateral pleural effusion, stable  - Continue to monitor. Will repeat CXR if patient becomes symptomatic or if any decrease in oxygen saturation <92%      No follow-ups on file. No orders of the defined types were placed in this encounter.     Orders Placed This Encounter   Medications    DISCONTD: hydrOXYzine HCl (ATARAX) 25 MG tablet     Sig: Take 1 tablet by mouth nightly     Dispense:  30 tablet     Refill:  0    hydrOXYzine HCl (ATARAX) 25 MG tablet     Sig: Take 1 tablet by mouth nightly as needed for Anxiety     Dispense:  30 tablet     Refill:  0               Electronically signed by FELICIA Rosales CNP on 2/15/2023 at 10:30 AM

## 2023-02-14 ENCOUNTER — OUTSIDE SERVICES (OUTPATIENT)
Dept: INTERNAL MEDICINE | Age: 88
End: 2023-02-14
Payer: MEDICARE

## 2023-02-14 ENCOUNTER — TELEPHONE (OUTPATIENT)
Dept: INTERNAL MEDICINE | Age: 88
End: 2023-02-14

## 2023-02-14 VITALS
RESPIRATION RATE: 18 BRPM | OXYGEN SATURATION: 96 % | TEMPERATURE: 97.5 F | HEART RATE: 86 BPM | DIASTOLIC BLOOD PRESSURE: 86 MMHG | SYSTOLIC BLOOD PRESSURE: 143 MMHG

## 2023-02-14 DIAGNOSIS — I50.9 HEART FAILURE, UNSPECIFIED HF CHRONICITY, UNSPECIFIED HEART FAILURE TYPE (HCC): ICD-10-CM

## 2023-02-14 DIAGNOSIS — F41.9 ANXIETY: Primary | ICD-10-CM

## 2023-02-14 DIAGNOSIS — J90 BILATERAL PLEURAL EFFUSION: ICD-10-CM

## 2023-02-14 DIAGNOSIS — R09.89 CHOKING EPISODE: ICD-10-CM

## 2023-02-14 DIAGNOSIS — R09.89 CHOKING EPISODE: Primary | ICD-10-CM

## 2023-02-14 DIAGNOSIS — F32.A DEPRESSION, UNSPECIFIED DEPRESSION TYPE: ICD-10-CM

## 2023-02-14 DIAGNOSIS — N18.30 STAGE 3 CHRONIC KIDNEY DISEASE, UNSPECIFIED WHETHER STAGE 3A OR 3B CKD (HCC): ICD-10-CM

## 2023-02-14 DIAGNOSIS — F03.918 DEMENTIA WITH BEHAVIORAL DISTURBANCE: ICD-10-CM

## 2023-02-14 DIAGNOSIS — R13.10 DYSPHAGIA, UNSPECIFIED TYPE: ICD-10-CM

## 2023-02-14 PROCEDURE — 99348 HOME/RES VST EST LOW MDM 30: CPT | Performed by: NURSE PRACTITIONER

## 2023-02-14 RX ORDER — FUROSEMIDE 40 MG/1
TABLET ORAL
Status: ON HOLD | COMMUNITY
End: 2023-02-18 | Stop reason: HOSPADM

## 2023-02-14 RX ORDER — HYDROXYZINE HYDROCHLORIDE 25 MG/1
25 TABLET, FILM COATED ORAL NIGHTLY
Qty: 30 TABLET | Refills: 0 | Status: SHIPPED | OUTPATIENT
Start: 2023-02-14 | End: 2023-02-14 | Stop reason: CLARIF

## 2023-02-14 RX ORDER — HYDROXYZINE HYDROCHLORIDE 25 MG/1
25 TABLET, FILM COATED ORAL NIGHTLY PRN
Qty: 30 TABLET | Refills: 0 | Status: SHIPPED | OUTPATIENT
Start: 2023-02-14 | End: 2023-03-16

## 2023-02-15 ASSESSMENT — ENCOUNTER SYMPTOMS
CHEST TIGHTNESS: 0
DIARRHEA: 0
SORE THROAT: 0
VOMITING: 0
SHORTNESS OF BREATH: 0
NAUSEA: 0

## 2023-02-16 ENCOUNTER — HOSPITAL ENCOUNTER (INPATIENT)
Age: 88
LOS: 2 days | Discharge: NURSING, REHAB OR LONG-TERM CARE | DRG: 291 | End: 2023-02-18
Attending: EMERGENCY MEDICINE | Admitting: INTERNAL MEDICINE
Payer: MEDICARE

## 2023-02-16 ENCOUNTER — APPOINTMENT (OUTPATIENT)
Dept: GENERAL RADIOLOGY | Age: 88
DRG: 291 | End: 2023-02-16
Payer: MEDICARE

## 2023-02-16 DIAGNOSIS — I50.9 DECOMPENSATED HEART FAILURE (HCC): ICD-10-CM

## 2023-02-16 DIAGNOSIS — I48.91 NEW ONSET ATRIAL FIBRILLATION (HCC): Primary | ICD-10-CM

## 2023-02-16 LAB
ABSOLUTE EOS #: 0.15 K/UL (ref 0–0.44)
ABSOLUTE IMMATURE GRANULOCYTE: 0.04 K/UL (ref 0–0.3)
ABSOLUTE LYMPH #: 1.26 K/UL (ref 1.1–3.7)
ABSOLUTE MONO #: 0.87 K/UL (ref 0.1–1.2)
ALBUMIN SERPL-MCNC: 3.8 G/DL (ref 3.5–5.2)
ALBUMIN/GLOBULIN RATIO: 1.2 (ref 1–2.5)
ALP SERPL-CCNC: 135 U/L (ref 35–104)
ALT SERPL-CCNC: 7 U/L (ref 5–33)
ANION GAP SERPL CALCULATED.3IONS-SCNC: 9 MMOL/L (ref 9–17)
AST SERPL-CCNC: 15 U/L
BASOPHILS # BLD: 1 % (ref 0–2)
BASOPHILS ABSOLUTE: 0.06 K/UL (ref 0–0.2)
BILIRUB SERPL-MCNC: 0.5 MG/DL (ref 0.3–1.2)
BNP SERPL-MCNC: 1518 PG/ML
BUN SERPL-MCNC: 13 MG/DL (ref 8–23)
BUN/CREAT BLD: 16 (ref 9–20)
CALCIUM SERPL-MCNC: 8.7 MG/DL (ref 8.6–10.4)
CHLORIDE SERPL-SCNC: 104 MMOL/L (ref 98–107)
CO2 SERPL-SCNC: 27 MMOL/L (ref 20–31)
CREAT SERPL-MCNC: 0.83 MG/DL (ref 0.5–0.9)
EOSINOPHILS RELATIVE PERCENT: 2 % (ref 1–4)
GFR SERPL CREATININE-BSD FRML MDRD: >60 ML/MIN/1.73M2
GLUCOSE SERPL-MCNC: 147 MG/DL (ref 70–99)
HCT VFR BLD AUTO: 37.5 % (ref 36.3–47.1)
HGB BLD-MCNC: 11.7 G/DL (ref 11.9–15.1)
IMMATURE GRANULOCYTES: 1 %
LYMPHOCYTES # BLD: 16 % (ref 24–43)
MAGNESIUM SERPL-MCNC: 2 MG/DL (ref 1.6–2.6)
MCH RBC QN AUTO: 29.8 PG (ref 25.2–33.5)
MCHC RBC AUTO-ENTMCNC: 31.2 G/DL (ref 25.2–33.5)
MCV RBC AUTO: 95.4 FL (ref 82.6–102.9)
MONOCYTES # BLD: 11 % (ref 3–12)
NRBC AUTOMATED: 0 PER 100 WBC
PDW BLD-RTO: 15.5 % (ref 11.8–14.4)
PLATELET # BLD AUTO: 282 K/UL (ref 138–453)
PMV BLD AUTO: 10.1 FL (ref 8.1–13.5)
POTASSIUM SERPL-SCNC: 3.3 MMOL/L (ref 3.7–5.3)
POTASSIUM SERPL-SCNC: 3.4 MMOL/L (ref 3.7–5.3)
PROT SERPL-MCNC: 6.9 G/DL (ref 6.4–8.3)
RBC # BLD: 3.93 M/UL (ref 3.95–5.11)
RBC # BLD: ABNORMAL 10*6/UL
SARS-COV-2 RDRP RESP QL NAA+PROBE: NOT DETECTED
SEG NEUTROPHILS: 69 % (ref 36–65)
SEGMENTED NEUTROPHILS ABSOLUTE COUNT: 5.77 K/UL (ref 1.5–8.1)
SODIUM SERPL-SCNC: 140 MMOL/L (ref 135–144)
SPECIMEN DESCRIPTION: NORMAL
TROPONIN I SERPL DL<=0.01 NG/ML-MCNC: 16 NG/L (ref 0–14)
TROPONIN I SERPL DL<=0.01 NG/ML-MCNC: 16 NG/L (ref 0–14)
TSH SERPL-ACNC: 3.33 UIU/ML (ref 0.3–5)
WBC # BLD AUTO: 8.2 K/UL (ref 3.5–11.3)

## 2023-02-16 PROCEDURE — 87635 SARS-COV-2 COVID-19 AMP PRB: CPT

## 2023-02-16 PROCEDURE — 94760 N-INVAS EAR/PLS OXIMETRY 1: CPT

## 2023-02-16 PROCEDURE — 85025 COMPLETE CBC W/AUTO DIFF WBC: CPT

## 2023-02-16 PROCEDURE — 2060000000 HC ICU INTERMEDIATE R&B

## 2023-02-16 PROCEDURE — 6360000002 HC RX W HCPCS: Performed by: EMERGENCY MEDICINE

## 2023-02-16 PROCEDURE — 93005 ELECTROCARDIOGRAM TRACING: CPT | Performed by: EMERGENCY MEDICINE

## 2023-02-16 PROCEDURE — 99285 EMERGENCY DEPT VISIT HI MDM: CPT

## 2023-02-16 PROCEDURE — 83880 ASSAY OF NATRIURETIC PEPTIDE: CPT

## 2023-02-16 PROCEDURE — 71045 X-RAY EXAM CHEST 1 VIEW: CPT

## 2023-02-16 PROCEDURE — 80053 COMPREHEN METABOLIC PANEL: CPT

## 2023-02-16 PROCEDURE — 84484 ASSAY OF TROPONIN QUANT: CPT

## 2023-02-16 PROCEDURE — 6370000000 HC RX 637 (ALT 250 FOR IP): Performed by: INTERNAL MEDICINE

## 2023-02-16 PROCEDURE — 2580000003 HC RX 258: Performed by: INTERNAL MEDICINE

## 2023-02-16 PROCEDURE — 99222 1ST HOSP IP/OBS MODERATE 55: CPT | Performed by: INTERNAL MEDICINE

## 2023-02-16 PROCEDURE — 84443 ASSAY THYROID STIM HORMONE: CPT

## 2023-02-16 PROCEDURE — 84132 ASSAY OF SERUM POTASSIUM: CPT

## 2023-02-16 PROCEDURE — 36415 COLL VENOUS BLD VENIPUNCTURE: CPT

## 2023-02-16 PROCEDURE — 83735 ASSAY OF MAGNESIUM: CPT

## 2023-02-16 PROCEDURE — 6360000002 HC RX W HCPCS: Performed by: INTERNAL MEDICINE

## 2023-02-16 RX ORDER — OLANZAPINE 2.5 MG/1
2.5 TABLET ORAL NIGHTLY
Status: DISCONTINUED | OUTPATIENT
Start: 2023-02-16 | End: 2023-02-18 | Stop reason: HOSPADM

## 2023-02-16 RX ORDER — POTASSIUM CHLORIDE 20 MEQ/1
60 TABLET, EXTENDED RELEASE ORAL ONCE
Status: COMPLETED | OUTPATIENT
Start: 2023-02-16 | End: 2023-02-16

## 2023-02-16 RX ORDER — FUROSEMIDE 40 MG/1
40 TABLET ORAL EVERY EVENING
Status: CANCELLED | OUTPATIENT
Start: 2023-02-16

## 2023-02-16 RX ORDER — AMLODIPINE BESYLATE 5 MG/1
5 TABLET ORAL DAILY
Status: DISCONTINUED | OUTPATIENT
Start: 2023-02-17 | End: 2023-02-18 | Stop reason: HOSPADM

## 2023-02-16 RX ORDER — FUROSEMIDE 40 MG/1
80 TABLET ORAL EVERY MORNING
Status: CANCELLED | OUTPATIENT
Start: 2023-02-17

## 2023-02-16 RX ORDER — SODIUM CHLORIDE 9 MG/ML
INJECTION, SOLUTION INTRAVENOUS PRN
Status: DISCONTINUED | OUTPATIENT
Start: 2023-02-16 | End: 2023-02-18 | Stop reason: HOSPADM

## 2023-02-16 RX ORDER — HYDROXYZINE HYDROCHLORIDE 25 MG/1
25 TABLET, FILM COATED ORAL NIGHTLY PRN
Status: DISCONTINUED | OUTPATIENT
Start: 2023-02-16 | End: 2023-02-18 | Stop reason: HOSPADM

## 2023-02-16 RX ORDER — ENOXAPARIN SODIUM 100 MG/ML
1 INJECTION SUBCUTANEOUS ONCE
Status: COMPLETED | OUTPATIENT
Start: 2023-02-16 | End: 2023-02-16

## 2023-02-16 RX ORDER — SODIUM CHLORIDE 0.9 % (FLUSH) 0.9 %
10 SYRINGE (ML) INJECTION EVERY 12 HOURS SCHEDULED
Status: DISCONTINUED | OUTPATIENT
Start: 2023-02-16 | End: 2023-02-18 | Stop reason: HOSPADM

## 2023-02-16 RX ORDER — ENOXAPARIN SODIUM 100 MG/ML
1 INJECTION SUBCUTANEOUS 2 TIMES DAILY
Status: DISCONTINUED | OUTPATIENT
Start: 2023-02-17 | End: 2023-02-18 | Stop reason: HOSPADM

## 2023-02-16 RX ORDER — ONDANSETRON 2 MG/ML
4 INJECTION INTRAMUSCULAR; INTRAVENOUS EVERY 6 HOURS PRN
Status: DISCONTINUED | OUTPATIENT
Start: 2023-02-16 | End: 2023-02-18 | Stop reason: HOSPADM

## 2023-02-16 RX ORDER — SODIUM CHLORIDE FOR INHALATION 0.9 %
3 VIAL, NEBULIZER (ML) INHALATION
Status: DISCONTINUED | OUTPATIENT
Start: 2023-02-16 | End: 2023-02-18 | Stop reason: HOSPADM

## 2023-02-16 RX ORDER — ALBUTEROL SULFATE 2.5 MG/3ML
2.5 SOLUTION RESPIRATORY (INHALATION) EVERY 6 HOURS PRN
Status: DISCONTINUED | OUTPATIENT
Start: 2023-02-16 | End: 2023-02-18 | Stop reason: HOSPADM

## 2023-02-16 RX ORDER — ATORVASTATIN CALCIUM 40 MG/1
40 TABLET, FILM COATED ORAL NIGHTLY
Status: DISCONTINUED | OUTPATIENT
Start: 2023-02-16 | End: 2023-02-18 | Stop reason: HOSPADM

## 2023-02-16 RX ORDER — FLUOXETINE HYDROCHLORIDE 20 MG/1
20 CAPSULE ORAL DAILY
Status: DISCONTINUED | OUTPATIENT
Start: 2023-02-17 | End: 2023-02-18 | Stop reason: HOSPADM

## 2023-02-16 RX ORDER — FUROSEMIDE 10 MG/ML
40 INJECTION INTRAMUSCULAR; INTRAVENOUS 2 TIMES DAILY
Status: DISCONTINUED | OUTPATIENT
Start: 2023-02-16 | End: 2023-02-18 | Stop reason: HOSPADM

## 2023-02-16 RX ORDER — ALBUTEROL SULFATE 2.5 MG/3ML
2.5 SOLUTION RESPIRATORY (INHALATION)
Status: DISCONTINUED | OUTPATIENT
Start: 2023-02-16 | End: 2023-02-16

## 2023-02-16 RX ORDER — ASPIRIN 81 MG/1
81 TABLET ORAL DAILY
Status: DISCONTINUED | OUTPATIENT
Start: 2023-02-17 | End: 2023-02-18 | Stop reason: HOSPADM

## 2023-02-16 RX ORDER — MEMANTINE HYDROCHLORIDE 5 MG/1
5 TABLET ORAL DAILY
Status: DISCONTINUED | OUTPATIENT
Start: 2023-02-17 | End: 2023-02-18 | Stop reason: HOSPADM

## 2023-02-16 RX ORDER — SODIUM CHLORIDE 0.9 % (FLUSH) 0.9 %
10 SYRINGE (ML) INJECTION PRN
Status: DISCONTINUED | OUTPATIENT
Start: 2023-02-16 | End: 2023-02-18 | Stop reason: HOSPADM

## 2023-02-16 RX ORDER — IPRATROPIUM BROMIDE AND ALBUTEROL SULFATE 2.5; .5 MG/3ML; MG/3ML
1 SOLUTION RESPIRATORY (INHALATION)
Status: DISCONTINUED | OUTPATIENT
Start: 2023-02-16 | End: 2023-02-16

## 2023-02-16 RX ORDER — POTASSIUM CHLORIDE 20 MEQ/1
20 TABLET, EXTENDED RELEASE ORAL 2 TIMES DAILY
Status: DISCONTINUED | OUTPATIENT
Start: 2023-02-16 | End: 2023-02-18 | Stop reason: HOSPADM

## 2023-02-16 RX ORDER — FUROSEMIDE 10 MG/ML
40 INJECTION INTRAMUSCULAR; INTRAVENOUS ONCE
Status: COMPLETED | OUTPATIENT
Start: 2023-02-16 | End: 2023-02-16

## 2023-02-16 RX ORDER — ACETAMINOPHEN 325 MG/1
650 TABLET ORAL EVERY 4 HOURS PRN
Status: DISCONTINUED | OUTPATIENT
Start: 2023-02-16 | End: 2023-02-18 | Stop reason: HOSPADM

## 2023-02-16 RX ADMIN — ENOXAPARIN SODIUM 80 MG: 100 INJECTION SUBCUTANEOUS at 14:11

## 2023-02-16 RX ADMIN — POTASSIUM CHLORIDE 60 MEQ: 1500 TABLET, EXTENDED RELEASE ORAL at 18:23

## 2023-02-16 RX ADMIN — POTASSIUM CHLORIDE 20 MEQ: 1500 TABLET, EXTENDED RELEASE ORAL at 21:07

## 2023-02-16 RX ADMIN — FUROSEMIDE 40 MG: 10 INJECTION, SOLUTION INTRAMUSCULAR; INTRAVENOUS at 14:11

## 2023-02-16 RX ADMIN — FUROSEMIDE 40 MG: 10 INJECTION, SOLUTION INTRAMUSCULAR; INTRAVENOUS at 18:23

## 2023-02-16 RX ADMIN — SODIUM CHLORIDE, PRESERVATIVE FREE 10 ML: 5 INJECTION INTRAVENOUS at 18:23

## 2023-02-16 RX ADMIN — ATORVASTATIN CALCIUM 40 MG: 40 TABLET, FILM COATED ORAL at 21:07

## 2023-02-16 RX ADMIN — OLANZAPINE 2.5 MG: 2.5 TABLET, FILM COATED ORAL at 21:07

## 2023-02-16 RX ADMIN — SODIUM CHLORIDE, PRESERVATIVE FREE 10 ML: 5 INJECTION INTRAVENOUS at 21:07

## 2023-02-16 ASSESSMENT — LIFESTYLE VARIABLES
HOW MANY STANDARD DRINKS CONTAINING ALCOHOL DO YOU HAVE ON A TYPICAL DAY: PATIENT DOES NOT DRINK
HOW OFTEN DO YOU HAVE A DRINK CONTAINING ALCOHOL: NEVER
HOW OFTEN DO YOU HAVE A DRINK CONTAINING ALCOHOL: NEVER

## 2023-02-16 ASSESSMENT — ENCOUNTER SYMPTOMS
SHORTNESS OF BREATH: 0
SORE THROAT: 0
VOMITING: 0
DIARRHEA: 0

## 2023-02-16 NOTE — ED PROVIDER NOTES
[facility] ED  [address]  Phone: [phone]        [facility]  EMERGENCY DEPARTMENT ENCOUNTER      Pt Name: Nancy Herrmann  MRN: [id number]  [place]am 3/21/1930  Date of evaluation: 2/16/2023  Provider: Madeline Esparza DO    CHIEF COMPLAINT       Chief Complaint   Patient presents with    Edema     ECF staff concern over increasing edema, report even pt's bra is not fitting due to edema. Pt denies SOB, CP. ECF reveiwed new findings with on-call provider and request for re-eval to ER         HISTORY OF PRESENT ILLNESS   (Location/Symptom, Timing/Onset,Context/Setting, Quality, Duration, Modifying Factors, Severity)  Note limiting factors. Nancy Herrmann is a 80 y.o. female who presents to the emergency department for the evaluation of worsening edema. Patient has increased lower extremity swelling and now seems to be more like anasarca as her bra did not fit on appropriately. She denies chest pain or shortness of breath. She was seen here recently and her Lasix was increased for similar symptoms. She does have history of heart failure. She denies cough or illness. Nursing Notes were reviewed. REVIEW OF SYSTEMS    (2-9systems for level 4, 10 or more for level 5)     Review of Systems   Reason unable to perform ROS: leg swelling, body swallowing. Constitutional:  Negative for fever. HENT:  Negative for sore throat. Respiratory:  Negative for shortness of breath. Cardiovascular:  Negative for chest pain. Gastrointestinal:  Negative for diarrhea and vomiting. Genitourinary:  Negative for dysuria. Skin:  Negative for rash. Neurological:  Negative for weakness. All other systems reviewed and are negative. Except asnoted above the remainder of the review of systems was reviewed and negative.        PAST MEDICAL HISTORY     Past Medical History:   Diagnosis Date    Adenomatous polyp 06/10    With recommendation for repeat 06/15. Also, diverticulosis was noted. Cataracts, bilateral     Closed right hip fracture, initial encounter ([facility] [address] 10/30/2021    Combined forms of age-related cataract of both eyes 10/1/2018    Corneal scar, right eye     Cystocele     history of     Dementia ([facility] [address]     Mini-Mental Status exam 27/30 6/14  declines meds at this point,  MMSE 29/30 on June 13, 2017  MMSE 26/30 4/2018    Difficulty controlling anger 9/16/2018    Dyslipidemia     GERD (gastroesophageal reflux disease)     egd  4/15 ok    Goiter     benign, history of     Hiatal hernia     Hypertension     Hypokalemia     Hypothyroidism     Impaired fasting glucose     Malignant melanoma in situ ([facility] [address]     R upper Back 8/17    Migraines     Murmur, functional     Grade 1/6 systolic, history of     Osteopenia     T -1.0 hip, 03/09, T -0.3 spine, 03/09. 1) Repeat DEXA scan 09/12 with T +0.15, T -1.2 at the hip but -1.8 at the mean femoral neck giving her a FRAX score of 4.3 at the hip. Reclast 10/13 and given 2014,2015, and 1/4/2017, on calcium and vit d,  Redo Dexa 10/14 Frax 4.2% , Frax 4.8% 10 yr hip fracture risk on Dexa 1/17  On Reclast    Posterior vitreous detachment of both eyes     Primary open angle glaucoma (POAG) of both eyes, moderate stage 10/1/2018    Sciatica 12/15/2015    Skin cancer     History of multiple skin cancers. Following with Dr. Aida iWlson--- invasive squamous cell Ca 8/17 L forearm    Syncope and collapse 1/21/2015    Tremor 12/23/2018    Trichiasis of left lower eyelid without entropion     Visual disturbance 10/26/2017         SURGICAL HISTORY       Past Surgical History:   Procedure Laterality Date    APPENDECTOMY      CHOLECYSTECTOMY      Remote. COLONOSCOPY  06/08/2010    COLONOSCOPY  07/18/2002    CYSTOCELE REPAIR  02/12/1999    Vaginal prolapse, cystocele plus pelvic relaxation.      DILATION AND CURETTAGE OF UTERUS      with hysteroscopy    FEMUR FRACTURE SURGERY Left 09/04/2022    LEFT TFNA NAIL INSERTION FEMUR FRACTURE SURGERY Left 9/4/2022    LEFT TFNA NAIL INSERTION performed by Frederic Fonseca DO at [facility] OR    HIP FRACTURE SURGERY Right 11/02/2021    Right Hip Intertan performed by Vikas Omalley MD at [facility] OR    HYSTERECTOMY (CERVIX STATUS UNKNOWN)  01/01/1995    still has ovaries     INTRACAPSULAR CATARACT EXTRACTION Left 08/25/2020    Left Cataract Extraction w/ IOL Implant performed by Morro Jin MD at [facility] OR    INTRACAPSULAR CATARACT EXTRACTION Right 10/08/2020    Right Cataract Extraction w/ IOL Implant performed by Morro Jin MD at [facility] OR    MOHS SURGERY  10/07/2009    SCC, left nasal sidewall.    OTHER SURGICAL HISTORY  11/11/2008    Anterior repair.     OTHER SURGICAL HISTORY  01/01/1988    laser cone    UPPER GASTROINTESTINAL ENDOSCOPY  04/08/2015    hiatal hernia    VEIN SURGERY  06/08/2009    Laser ablation of right greater saphenous vein.          CURRENT MEDICATIONS     Previous Medications    ACETAMINOPHEN (TYLENOL) 500 MG TABLET    Take 2 tablets by mouth every 8 hours as needed for Pain    AMLODIPINE (NORVASC) 5 MG TABLET    TAKE 1 TABLET DAILY    ASPIRIN 81 MG EC TABLET    Take 1 tablet by mouth daily    ATORVASTATIN (LIPITOR) 40 MG TABLET    Take 1 tablet by mouth daily    CALCIUM CARBONATE-VITAMIN D3 (CALTRATE) 600-400 MG-UNIT TABS PER TAB    Take 1 tablet by mouth daily    CICLOPIROX 1 % SHAM    Apply topically once a week on Tuesdays- AKA Loprox Shampoo    FLUOXETINE (PROZAC) 20 MG CAPSULE    TAKE 1 CAPSULE DAILY    FUROSEMIDE (LASIX) 40 MG TABLET    Take 1 tablet by mouth in the morning and 1 tablet in the evening.    FUROSEMIDE (LASIX) 40 MG TABLET    80 mg every am, and 40 mg every night x 5 days- STARTING 2/14/23 through 2/18/23    HYDROXYZINE HCL (ATARAX) 25 MG TABLET    Take 1 tablet by mouth nightly as needed for Anxiety    LEVOTHYROXINE (SYNTHROID) 137 MCG TABLET    Take 1 tablet by mouth daily    MEMANTINE (NAMENDA) 5 MG TABLET    TAKE 1 TABLET DAILY     NUTRITIONAL SUPPLEMENTS (NUTRITIONAL DRINK PO)    Mighty shakes bid with meals    OLANZAPINE (ZYPREXA) 2.5 MG TABLET    Take 2.5 mg by mouth nightly    POTASSIUM CHLORIDE (KLOR-CON M) 20 MEQ EXTENDED RELEASE TABLET    Take 1 tablet by mouth 2 times daily       ALLERGIES     Patient has no known allergies. FAMILY HISTORY       Family History   Problem Relation Age of Onset    Coronary Art Dis Father     Colon Cancer Brother     Diabetes Neg Hx     Cataracts Neg Hx     Glaucoma Neg Hx           SOCIAL HISTORY       Social History     Socioeconomic History    Marital status:      Spouse name: Mateus Chun    Number of children: 3    Years of education: None    Highest education level: None   Tobacco Use    Smoking status: Never    Smokeless tobacco: Never   Vaping Use    Vaping Use: Never used   Substance and Sexual Activity    Alcohol use: Not Currently    Drug use: No    Sexual activity: Not Currently     Social Determinants of Health     Financial Resource Strain: Low Risk     Difficulty of Paying Living Expenses: Not hard at all   Food Insecurity: No Food Insecurity    Worried About [address] [place] [address] [employer] in the Last Year: Never true    Ran Out of Food in the Last Year: Never true   Transportation Needs: No Transportation Needs    Lack of Transportation (Medical): No    Lack of Transportation (Non-Medical): No   Physical Activity: Inactive    Days of Exercise per Week: 0 days    Minutes of Exercise per Session: 0 min   Stress: No Stress Concern Present    Feeling of Stress : Not at all   Social Connections: Moderately Isolated    Frequency of Communication with Friends and Family: Three times a week    Frequency of Social Gatherings with Friends and Family: Three times a week    Attends Baptism Services: More than 4 times per year    Active Member of Clubs or Organizations: No    Attends Club or Organization Meetings: Never    Marital Status:     Intimate Partner Violence: Not At Risk    Fear of Current or Ex-Partner: No    Emotionally Abused: No    Physically Abused: No    Sexually Abused: No   Housing Stability: Unknown    Unable to Pay for Housing in the Last Year: No    Unstable Housing in the Last Year: No       SCREENINGS    [place] Coma Scale  Eye Opening: Spontaneous  Best Verbal Response: Oriented  Best Motor Response: Obeys commands  [place] Coma Scale Score: 15        PHYSICAL EXAM    (up to 7 for level 4, 8 or more for level 5)     ED Triage Vitals [02/16/23 1235]   BP Temp Temp Source Pulse Resp SpO2 Height Weight   -- 97.8 °F (36.6 °C) Tympanic -- -- -- -- --       Physical Exam  Vitals and nursing note reviewed. Constitutional:       General: She is not in acute distress. Appearance: Normal appearance. She is not ill-appearing or toxic-appearing. HENT:      Head: Normocephalic and atraumatic. Nose: Nose normal. No congestion. Mouth/Throat:      Mouth: Mucous membranes are moist.   Eyes:      General:         Right eye: No discharge. Left eye: No discharge. Conjunctiva/sclera: Conjunctivae normal.   Cardiovascular:      Rate and Rhythm: Normal rate. Rhythm irregular. Pulses: Normal pulses. Heart sounds: Normal heart sounds. No murmur heard. Pulmonary:      Effort: Pulmonary effort is normal. No respiratory distress. Breath sounds: Normal breath sounds. No wheezing. Abdominal:      General: Abdomen is flat. There is no distension. Palpations: Abdomen is soft. There is no pulsatile mass. Tenderness: There is no abdominal tenderness. There is no guarding or rebound. Comments: No pulsatile mass   Musculoskeletal:         General: No deformity or signs of injury. Normal range of motion. Cervical back: Normal range of motion. Skin:     General: Skin is warm and dry. Capillary Refill: Capillary refill takes less than 2 seconds. Findings: No rash. Neurological:      General: No focal deficit present.       Mental Status: She is alert. Mental status is at baseline. Motor: No weakness. Comments: Speaking normally. No facial asymmetry. Moving all 4 extremities. Normal gait. Psychiatric:         Mood and Affect: Mood normal.       EMERGENCY DEPARTMENT COURSE and DIFFERENTIAL DIAGNOSIS/MDM:   Vitals:    Vitals:    02/16/23 1235   Temp: 97.8 °F (36.6 °C)   TempSrc: Tympanic       Patient presents to the emergency department with the complaint described above vitals were grossly normal and she is nontoxic and resting comfortably in no distress. She does have an irregularly irregular rhythm on EKG. I reviewed the medical record and I do not see evidence of previous atrial fibrillation and she is not on blood thinner. I am going to obtain some routine blood work, will check troponin and BNP and will get x-ray. Will compare to previous. DIAGNOSTIC RESULTS     LABS:  Labs Reviewed   CBC WITH AUTO DIFFERENTIAL - Abnormal; Notable for the following components:       Result Value    RBC 3.93 (*)     Hemoglobin 11.7 (*)     RDW 15.5 (*)     Seg Neutrophils 69 (*)     Lymphocytes 16 (*)     Immature Granulocytes 1 (*)     All other components within normal limits   COMPREHENSIVE METABOLIC PANEL - Abnormal; Notable for the following components:    Glucose 147 (*)     Potassium 3.3 (*)     Alkaline Phosphatase 135 (*)     All other components within normal limits   TROPONIN - Abnormal; Notable for the following components:    Troponin, High Sensitivity 16 (*)     All other components within normal limits   BRAIN NATRIURETIC PEPTIDE - Abnormal; Notable for the following components:    Pro-BNP 1,518 (*)     All other components within normal limits   COVID-19, RAPID   MAGNESIUM       All other labs were within normal range or not returned as of this dictation. RADIOLOGY:  XR CHEST PORTABLE   Final Result   Mild vascular congestion, basilar subsegmental atelectasis and probable small   effusions.                ED Course as of 02/16/23 1405   Thu Feb 16, 2023   1303 Twelve lead EKG interpreted by myself:  A 12 lead EKG done at 1257, interpreted by myself, showed a irregular rhythm at a rate of 73bpm.  The MI interval was not calculated. The QRS complex was normal.  There was no ST segment elevation or depression, T wave inversion not present. QRS progression through precordial leads was grossly normal.  Interpretation: Atrial fibrillation, no ST segment changes and no pattern consistent with acute ischemia or infarct [TS]   1317 Patient does have evidence of atrial fibrillation on EKG. She is not anticoagulated and this may be new for her. [TS]   1344 CBC grossly unchanged and unremarkable compared to previous. CMP grossly unchanged/unremarkable. Troponin normal.  BNP mildly elevated. [TS]   1344 Mild vascular congestion noted with some signs of CHF [TS]   1405 Patient will be admitted for further evaluation and treatment [TS]      ED Course User Index  [TS] Fanny Maradiaga, DO     Critical Care: The high probability of sudden, clinically significant deterioration in the patient's condition required the highest level of my preparedness to intervene urgently. The services I provided to this patient were to treat and/or prevent clinically significant deterioration. Services included the following: chart data review, reviewing nursing notes and/or old charts, documentation time, consultant collaboration regarding findings and treatment options, medication orders and management, direct patient care, vital sign assessments and ordering, interpreting and reviewing diagnostic studies/lab tests. Aggregate critical care time includes only time during which I was engaged in work directly related to the patient's care, as described above, whether at the bedside or elsewhere in the Emergency Department.   It did not include time spent performing other reported procedures or the services of residents, students, nurses or physician assistants. Critical Care:   35 minutes    PROCEDURES:  Unless otherwise noted below, none     Procedures    FINAL IMPRESSION      1. New onset atrial fibrillation ([facility] [address]    2. Decompensated heart failure ([facility] [address]          DISPOSITION/PLAN   DISPOSITION Admitted 02/16/2023 01:55:07 PM      PATIENT REFERRED TO:  No follow-up provider specified.     DISCHARGE MEDICATIONS:  New Prescriptions    No medications on file          (Please note that portions of this note were completed with a voice recognition program.  Efforts were made to edit the dictations but occasionally words are mis-transcribed.)    Eliz Norton DO,(electronically signed)  Board Certified Emergency Physician          Eliz Norton DO  02/16/23 [id number]

## 2023-02-16 NOTE — PROGRESS NOTES
Meagan Preston, [facility]. New onset atrial fibrillation (HCC) [I48.91]  Decompensated heart failure ([address] [I50.9] . Vitals:    02/16/23 1615   BP: (!) 142/67   Pulse: 88   Resp: 16   Temp: 98.1 °F (36.7 °C)   SpO2: 93%   . Patients home meds are   Prior to Admission medications    Medication Sig Start Date End Date Taking? Authorizing Provider   furosemide (LASIX) 40 MG tablet 80 mg every am, and 40 mg every night x 5 days- STARTING 2/14/23 through 2/18/23    Historical Provider, MD   hydrOXYzine HCl (ATARAX) 25 MG tablet Take 1 tablet by mouth nightly as needed for Anxiety 2/14/23 3/16/23  FELICIA Burns CNP   amLODIPine (NORVASC) 5 MG tablet TAKE 1 TABLET DAILY 2/6/23   FELICIA Burns CNP   memantine (NAMENDA) 5 MG tablet TAKE 1 TABLET DAILY 1/16/23   FELICIA Burns CNP   levothyroxine (SYNTHROID) 137 MCG tablet Take 1 tablet by mouth daily 11/3/22   FELICIA Burns CNP   furosemide (LASIX) 40 MG tablet Take 1 tablet by mouth in the morning and 1 tablet in the evening.   Patient taking differently: Take 40 mg by mouth 2 times daily SEE 2/14/23 DOSING FOR 5 DAYS 10/16/22   Christofer Arellano MD   potassium chloride (KLOR-CON M) 20 MEQ extended release tablet Take 1 tablet by mouth 2 times daily 10/16/22   Christofer Arellano MD   OLANZapine (ZYPREXA) 2.5 MG tablet Take 2.5 mg by mouth nightly    Historical Provider, MD   acetaminophen (TYLENOL) 500 MG tablet Take 2 tablets by mouth every 8 hours as needed for Pain 9/20/22   FELICIA Burns CNP   Ciclopirox 1 % SHAM Apply topically once a week on Tuesdays- AKA Loprox Shampoo    Historical Provider, MD   Nutritional Supplements (NUTRITIONAL DRINK PO) Mighty shakes bid with meals    Historical Provider, MD   calcium carbonate-vitamin D3 (CALTRATE) 600-400 MG-UNIT TABS per tab Take 1 tablet by mouth daily    Historical Provider, MD   FLUoxetine (PROZAC) 20 MG capsule TAKE 1 CAPSULE DAILY 5/31/22   Radha Staples MD BRILINTA 90 MG TABS tablet Take 1 tablet by mouth 2 times daily 4/20/22 9/6/22  Sergio Valenzuela MD   atorvastatin (LIPITOR) 40 MG tablet Take 1 tablet by mouth daily 2/1/21   Christianne Haider MD   aspirin 81 MG EC tablet Take 1 tablet by mouth daily 12/21/20   Historical Provider, MD   nitroGLYCERIN (NITROSTAT) 0.4 MG SL tablet Place 0.4 mg under the tongue every 5 minutes as needed for Chest pain up to max of 3 total doses.  If no relief after 3 dose, call 911.   9/6/22  Historical Provider, MD   ibuprofen (ADVIL;MOTRIN) 600 MG tablet Take 1 tablet by mouth 3 times daily as needed for Pain (Take with food.) 12/27/20 12/27/20  Tasha Peña MD   Multiple Vitamin (DAILY MULTIVITAMIN PO) Take 1 tablet by mouth daily   9/6/22  Historical Provider, MD   .  Recent Surgical History: None = 0     Assessment     Peak Flow (asthma only)    Predicted: 312  Personal Best: 172    % Predicted 55%  Peak Flow : 50-59% = 3    FEV1/FVC    FEV1 Predicted 2.17      FEV1 0.6    FEV1 % Predicted 28%  FVC 0.8  IS volume 5093-1121 ml    RR 16  Breath Sounds: clear      Bronchodilator assessment at level  1  Hyperinflation assessment at level 1  Secretion Management assessment at level  1    [x]    Bronchodilator Assessment  BRONCHODILATOR ASSESSMENT SCORE  Score 0 1 2 3 4 5   Breath Sounds   []  Patient Baseline [x]  No Wheeze good aeration []  Faint, scattered wheezing, good aeration []  Expiratory Wheezing and or moderately diminished []  Insp/Exp wheeze and/or very diminished []  Insp/Exp and/ or marked distress   Respiratory Rate   []  Patient Baseline [x]  Less than 20 []  Less than 20 []  20-25 []  Greater than 25 []  Greater than 25   Peak flow % of Pred or PB []  NA   []  Greater than 90%  []  81-90% []  71-80% [x]  Less than or equal to 70%  or unable to perform []  Unable due to Respiratory Distress   Dyspnea re []  Patient Baseline [x]  No SOB []  No SOB []  SOB on exertion []  SOB min activity []  At rest/acute   e FEV% Predicted       []  NA []  Above 69%  []  Unable []  Above 60-69%  []  Unable []  Above 50-59%  []  Unable []  Above 35-49%  []  Unable [x]  Less than 35%  []  Unable                 [x]  Hyperinflation Assessment  Score 1 2 3   CXR and Breath Sounds   [x]  Clear []  No atelectasis  Basilar aeration []  Atelectasis or absent basilar breath sounds   Incentive Spirometry Volume  (Per IBW)   [x]  Greater than or equal to 15ml/Kg []  less than 15ml/Kg []  less than 15ml/Kg   Surgery within last 2 weeks [x]  None or general   []  Abdominal or thoracic surgery  []  Abdominal or thoracic   Chronic Pulmonary Historyre [x]  No []  Yes []  Yes     [x]  Secretion Management Assessment  Score 1 2 3   Bilateral Breath Sounds   [x]  Occasional Rhonchi []  Scattered Rhonchi []  Course Rhonchi and/or poor aeration   Sputum    [x]  Small amount of thin secretions []  Moderate amount of viscous secretions []  Copius, Viscious Yellow/ Secretions   CXR as reported by physician [x]  clear  []  Unavailable []  Infiltrates and/or consolidation  []  Unavailable []  Mucus Plugging and or lobar consolidation  []  Unavailable   Cough [x]  Strong, productive cough []  Weak productive cough []  No cough or weak non-productive cough   Tonny Bello RCP  5:50 PM                            FEMALE                                  MALE                            FEV1 Predicted Normal Values                        FEV1 Predicted Normal Values          Age                                     Height in Feet and Inches       Age                                     Height in Feet and Inches       4' 11\" 5' 1\" 5' 3\" 5' 5\" 5' 7\" 5' 9\" 5' 11\" 6' 1\"  4' 11\" 5' 1\" 5' 3\" 5' 5\" 5' 7\" 5' 9\" 5' 11\" 6' 1\"   42 - 45 2.49 2.66 2.84 3.03 3.22 3.42 3.62 3.83 42 - 45 2.82 3.03 3.26 3.49 3.72 3.96 4.22 4.47   46 - 49 2.40 2.57 2.76 2.94 3.14 3.33 3.54 3.75 46 - 49 2.70 2.92 3.14 3.37 3.61 3.85 4.10 4.36   50 - 53 2.31 2.48 2.66 2.85 3.04 3.24 3.45 3.66 50 - 53 2.58 2. 80 3.02 3.25 3.49 3.73 3.98 4.24   54 - 57 2.21 2.38 2.57 2.75 2.95 3.14 3.35 3.56 54 - 57 2.46 2.67 2.89 3.12 3.36 3.60 3.85 4.11   58 - 61 2.10 2.28 2.46 2.65 2.84 3.04 3.24 3.45 58 - 61 2.32 2.54 2.76 2.99 3.23 3.47 3.72 3.98   62 - 65 1.99 2.17 2.35 2.54 2.73 2.93 3.13 3.34 62 - 65 2.19 2.40 2.62 2.85 3.09 3.33 3.58 3.84   66 - 69 1.88 2.05 2.23 2.42 2.61 2.81 3.02 3.23 66 - 69 2.04 2.26 2.48 2.71 2.95 3.19 3.44 3.70   70+ 1.82 1.99 2.17 2.36 2.55 2.75 2.95 3.16 70+ 1.97 2.19 2.41 2.64 2.87 3.12 3.37 3.62             Predicted Peak Expiratory Flow Rate                                       Height (in)  Female       Height (in) Male           Age 64 63 56 61 58 73 78 74 Age            21 344 357 372 387 402 417 432 446  60 62 64 66 68 70 72 74 76   25 337 352 366 381 396 411 426 441 25 447 476 505 533 562 [phone]   30 329 344 359 374 389 404 419 434 30 437 466 494 523 [phone] 667   35 322 337 351 366 381 396 411 426 35 426 455 484 512 541 [phone] 657   40 314 329 344 359 374 389 404 419 40 416 445 473 502 531 559 588 617 647   45 307 322 336 351 366 381 396 411 45 405 434 463 491 520 549 577 606 636   50 299 314 329 344 359 374 389 404 50 395 424 452 481 510 538 567 596 625   55 292 307 321 336 351 366 381 396 55 384 413 442 470 499 528 556 585 615   60 284 299 314 329 344 359 374 389 60 374 403 431 460 489 517 546 575 605   65 277 292 306 321 336 351 366 381 65 363 392 421 449 478 507 535 564 594   70 269 284 299 314 329 344 359 374 70 353 382 410 439 468 496 525 554 583   75 261 274 289 305 319 334 348 364 75 344 372 400 429 458 487 515 544 573   80 253 266 282 296 312 327 342 356 80 335 362 390 419 448 476 [phone]

## 2023-02-16 NOTE — H&P
HOSPITALIST ADMISSION H&P      REASON FOR ADMISSION:  Atrial fibrillation--newly diagnosed--CHF likely  ESTIMATED LENGTH OF STAY:   > 2 midnights--2-3 days    ATTENDING/ADMITTING PHYSICIAN: Attila rFeed MD MD  PCP: Lathan Ganser, APRN - CNP    HISTORY OF PRESENT ILLNESS:      The patient is a 80 y.o. female patient of Lathan Ganser, APRN - CNP who presents with BLE edema--anasarca---in ER found to be in atrial fibrillation--newly diagnosed---likely CHF with decompensation due to atrial fibrillation ---known history of ASCVD--STEMI--2020---ALLIE stent RCA. Troponins 16--17. Severe dementia--unreliable historian and cannot provide meaningful history. Pulmonary HTN--history---but does not appear to be present currently. HTN    Dyslipidemia    K = 3.3 ---> potassium replacement    IFG--BG = 147---random     See below for additional PMH. Patient qeio-xdgpvbmpls-vikgyfel-available records reviewed, including, but not limited to, ER reports--labs--imaging--office records--EKGs--prior 2D ECHO--personal notes. Past Medical History:   Diagnosis Date    Adenomatous polyp 06/10    With recommendation for repeat 06/15. Also, diverticulosis was noted.      Cataracts, bilateral     Closed right hip fracture, initial encounter ([facility] 10/30/2021    Combined forms of age-related cataract of both eyes 10/1/2018    Corneal scar, right eye     Cystocele     history of     Dementia ([address]     Mini-Mental Status exam 27/30  6/14  declines meds at this point,  MMSE 29/30 on June 13, 2017  MMSE 26/30 4/2018    Difficulty controlling anger 9/16/2018    Dyslipidemia     GERD (gastroesophageal reflux disease)     egd  4/15 ok    Goiter     benign, history of     Hiatal hernia     Hypertension     Hypokalemia     Hypothyroidism     Impaired fasting glucose     Malignant melanoma in situ ([address]     R upper Back 8/17    Migraines     Murmur, functional     Grade 1/6 systolic, history of     Osteopenia     T -1.0 hip, 03/09, T -0.3 spine, 03/09. 1) Repeat DEXA scan 09/12 with T +0.15, T -1.2 at the hip but -1.8 at the mean femoral neck giving her a FRAX score of 4.3 at the hip. Reclast 10/13 and given 2014,2015, and 1/4/2017, on calcium and vit d,  Redo Dexa 10/14 Frax 4.2% , Frax 4.8% 10 yr hip fracture risk on Dexa 1/17  On Reclast    Posterior vitreous detachment of both eyes     Primary open angle glaucoma (POAG) of both eyes, moderate stage 10/1/2018    Sciatica 12/15/2015    Skin cancer     History of multiple skin cancers. Following with Dr. Jammie Rea--- invasive squamous cell Ca 8/17 L forearm    Syncope and collapse 1/21/2015    Tremor 12/23/2018    Trichiasis of left lower eyelid without entropion     Visual disturbance 10/26/2017           Past Surgical History:   Procedure Laterality Date    APPENDECTOMY      CHOLECYSTECTOMY      Remote. COLONOSCOPY  06/08/2010    COLONOSCOPY  07/18/2002    CYSTOCELE REPAIR  02/12/1999    Vaginal prolapse, cystocele plus pelvic relaxation. DILATION AND CURETTAGE OF UTERUS      with hysteroscopy    FEMUR FRACTURE SURGERY Left 09/04/2022    LEFT TFNA NAIL INSERTION    FEMUR FRACTURE SURGERY Left 9/4/2022    LEFT TFNA NAIL INSERTION performed by Evelin Padilla DO at [address] Right 11/02/2021    Right Hip Intertan performed by Nga Mcgraw MD at [address] [facility] ([facility])  01/01/1995    still has ovaries     INTRACAPSULAR CATARACT EXTRACTION Left 08/25/2020    Left Cataract Extraction w/ IOL Implant performed by Eric Naik MD at [facility] Right 10/08/2020    Right Cataract Extraction w/ IOL Implant performed by Eric Naik MD at [place] [address]  10/07/2009    SCC, left nasal sidewall. OTHER SURGICAL HISTORY  11/11/2008    Anterior repair.      OTHER SURGICAL HISTORY  01/01/1988    laser cone    UPPER GASTROINTESTINAL ENDOSCOPY  04/08/2015    hiatal hernia    VEIN SURGERY 06/08/2009    Laser ablation of right greater saphenous vein. Medications Prior to Admission:    Medications Prior to Admission: furosemide (LASIX) 40 MG tablet, 80 mg every am, and 40 mg every night x 5 days- STARTING 2/14/23 through 2/18/23  hydrOXYzine HCl (ATARAX) 25 MG tablet, Take 1 tablet by mouth nightly as needed for Anxiety  amLODIPine (NORVASC) 5 MG tablet, TAKE 1 TABLET DAILY  memantine (NAMENDA) 5 MG tablet, TAKE 1 TABLET DAILY  levothyroxine (SYNTHROID) 137 MCG tablet, Take 1 tablet by mouth daily  furosemide (LASIX) 40 MG tablet, Take 1 tablet by mouth in the morning and 1 tablet in the evening. (Patient taking differently: Take 40 mg by mouth 2 times daily SEE 2/14/23 DOSING FOR 5 DAYS)  potassium chloride (KLOR-CON M) 20 MEQ extended release tablet, Take 1 tablet by mouth 2 times daily  OLANZapine (ZYPREXA) 2.5 MG tablet, Take 2.5 mg by mouth nightly  acetaminophen (TYLENOL) 500 MG tablet, Take 2 tablets by mouth every 8 hours as needed for Pain  Ciclopirox 1 % SHAM, Apply topically once a week on Tuesdays- AKA Loprox Shampoo  Nutritional Supplements (NUTRITIONAL DRINK PO), Mighty shakes bid with meals  calcium carbonate-vitamin D3 (CALTRATE) 600-400 MG-UNIT TABS per tab, Take 1 tablet by mouth daily  FLUoxetine (PROZAC) 20 MG capsule, TAKE 1 CAPSULE DAILY  atorvastatin (LIPITOR) 40 MG tablet, Take 1 tablet by mouth daily  aspirin 81 MG EC tablet, Take 1 tablet by mouth daily    Allergies:    Patient has no known allergies. Social History:    reports that she has never smoked. She has never used smokeless tobacco. She reports that she does not currently use alcohol. She reports that she does not use drugs. Family History:   family history includes Colon Cancer in her brother; Coronary Art Dis in her father.     REVIEW OF SYSTEMS:  See HPI and problem list; otherwise no other new complaints with respect to HEENT, neck, pulmonary, coronary, GI, , endocrine, musculoskeletal, immune system/connective tissue disease, hematologic, neuropsych, skin, lymphatics, or malignancies. PHYSICAL EXAM:  Vitals:  BP (!) 142/67   Pulse 88   Temp 98.1 °F (36.7 °C) (Tympanic)   Resp 16   Ht 5' 4\" (1.626 m)   Wt 176 lb 8 oz (80.1 kg)   LMP  (LMP Unknown)   SpO2 93%   BMI 30.30 kg/m²     HEENT: Normocephalic and Atraumatic  Neck: Supple, No Masses, Tenderness, Nodularity, and No Lymphadenopathy  Chest/Lungs: Distant Breath Sounds  Cardiac: Irregularly Irregular---II/VI PHILIP  GI/Abdomen:  Bowel Sounds Present and Soft, Non-tender, without Guarding or Rebound Tenderness  : Not examined  EXT/Skin: No Cyanosis, No Clubbing, and Edema Present--pitting edema above knees  Neuro:  alert--generalized weakness---dementia = baseline--very confused [for example, stated recently sold home--remote and wondered where her   was]        LABS:    CBC with Differential:    Lab Results   Component Value Date/Time    WBC 8.2 2023 12:52 PM    RBC 3.93 2023 12:52 PM    HGB 11.7 2023 12:52 PM    HCT 37.5 2023 12:52 PM     2023 12:52 PM    MCV 95.4 2023 12:52 PM    MCH 29.8 2023 12:52 PM    MCHC 31.2 2023 12:52 PM    RDW 15.5 2023 12:52 PM    LYMPHOPCT 16 2023 12:52 PM    MONOPCT 11 2023 12:52 PM    BASOPCT 1 2023 12:52 PM    MONOSABS 0.87 2023 12:52 PM    LYMPHSABS 1.26 2023 12:52 PM    EOSABS 0.15 2023 12:52 PM    BASOSABS 0.06 2023 12:52 PM    DIFFTYPE NOT REPORTED 2022 06:37 AM     BMP:    Lab Results   Component Value Date/Time     2023 12:52 PM    K 3.3 2023 12:52 PM     2023 12:52 PM    CO2 27 2023 12:52 PM    BUN 13 2023 12:52 PM    LABALBU 3.8 2023 12:52 PM    CREATININE 0.83 2023 12:52 PM    CALCIUM 8.7 2023 12:52 PM    GFRAA >60 09/15/2022 06:29 AM    LABGLOM >60 2023 12:52 PM    GLUCOSE 147 2023 12:52 PM     Magnesium: Lab Results   Component Value Date/Time    MG 2.0 02/16/2023 12:52 PM       ASSESSMENT:      Patient Active Problem List   Diagnosis    Dyslipidemia    Osteopenia    Impaired fasting glucose    Adenomatous polyp    Essential hypertension    Hypothyroidism    Sciatica    GERD (gastroesophageal reflux disease)    Visual disturbance    Malignant melanoma in situ ([address]    Difficulty controlling anger    Primary open angle glaucoma (POAG) of both eyes, moderate stage    PVD (posterior vitreous detachment), both eyes    Combined forms of age-related cataract of both eyes    Dementia with behavioral disturbance    Tremor    Essential tremor    Dizziness    Chronic cerebral ischemia    Acquired cerebral atrophy (HCC)    Cerebral infarction ([address]    Anxiety    Paroxysmal A-fib ([address]    Closed displaced intertrochanteric fracture of left femur (HCC)    Acute ST segment elevation myocardial infarction ([address]    Heart murmur    Migraine    Female bladder prolapse    Falls    Hiatal hernia    Hypercholesterolemia    Coronary artery disease involving native coronary artery without angina pectoris    Cataract    Heart failure, unspecified HF chronicity, unspecified heart failure type ([facility])    Chronic renal disease, stage III ([address] [108747]    Pseudophakia of both eyes    Nondisp intertroch fx left femur, init for opn fx type I/2 ([address]    Bilateral pleural effusion    Acute on chronic diastolic heart failure ([address]    Pulmonary hypertension ([address]    Acute respiratory failure with hypoxia ([address]    New onset atrial fibrillation ([address]     Flora Richey.                      89    ORI Kohler--demetrius baxter; [facility]--[facility];  sp [facility]]                  DNR-CCA        LOVENOX        Anti-infectives:   --------    Atrial fibrillation---newly diagnosed---2.16.2023  BLE edema---POA---2.16.2023          EKG---2.16.2023---atrial fibrillation---73--LAD---RSR' V1 only    CHF--type not specified         2D ECHO---2.17.2023----pending          CXR--2.16.2023---mild vascular congestion---basilar                         subsegmental atelectasis-----probable small pleural                        Effusions         ProBNP--1518--2.16.2023  ASCVD         2D ECHO---10.16.2022---LA moderately dilated--NLVSF--                           NRVSF--MAC--thickened MV leaflets--mild MR--                           MICHELLE but appears to open well pg 16 mm Hg  mg 9                            mm Hg--mild TR---RVSP ~ 38 mm Hg--normal AR                            2.4 cm--IVC normal diameter and inspiratory                            collapse--LVEF ~ 60%         EKG---11.2.2021---postoperatively---SR----64         EKG----10.30.2021---NSR---64---LAD         2D ECHO--11.1.2021--RAUL--NLVSF---MICHELLE without stenosis-                        MAC MV thickening--mild MR--moderate TR---RVSP ~                    83 mm Hg---severe pulmonary hypertension---increased                    IVC diameter and impaired or no inspiratory variation---                   LVEF ~ 55%          STEMI--12.2020          ALLIE---RCA---12.2020          2D ECHO---12.2020--mild-to-moderate TR--moderate MR--                   RVSP ~ 46 mm Hg--LVEF ~ 45-50%               2D ECHO--1.6.2015--NLVSF--LAE--MAC--mild MR--                             MICHELLE--mild-to-moderate TR--RVSP ~ 30 mm Hg--                             Grade 2 DD--LVEF ~ 65%           MI ruled out-----1.22.2015--1.6.2015    Hypertension                     II/VI PHILIP  Dyslipidemia  Cerebrovascular disease         CT head---10.30.2021---no MBS--moderate atrophy and                               chronic small vessel ischemic disease---remote                           cortical  infarctions         DUS--CV--1.22.2015--no hemodynamically significant                              stenosis    Hypothyroidism  Impaired fasting glucose  GERD   Dementia--severe   Anger management issues  PMH:   osteopenia, adenomatous polyp, SCC--nose, bilateral               cataracts, trichiasis left lower eyelid, bilateral posterior               vitreous detachment, HH, goiter, I/VI PHILIP, right eye               corneal scar, migraine headaches, UTI--1.5.2014,                leukocytosis--1.6.2015, hypokalemia--1.5.2014, syncope--              collapse--1.21.2015, malignant melanoma--right upper               back--2017, osteopenia, tremor---2019, right hip fracture---                10.30.2021---traumatic osteopenic fracture---acute                comminuted and moderately displaced IT right femur               fracture with free-floating greater trochanteric and lesser               trochanter fragments, hypocalcemia, hypokalemia,              hypomagnesemia,  pulmonary hypertension   PSH:   cholecystectomy, colonoscopy--2010--2002, Mohs--2009,               laser ablation right GSV--2009, cystocele repair--1999,               appendectomy, D&C-uterus, hysterectomy--1988,               anterior repair--2008, laser conization--1988, EGD--2015,               cataract extraction--IOL--OU--2020, cephalomedullary                nail---right hip fracture--11.2.2021    Allergies:  Flagyl--rash     Code Status DNR-CCA  OARRS---2.16.203--[-]      PLAN:      Atrial fibrillation--newly diagnosed---rate control---ACS regimen---Cardiology---2D ECHO    IFG--diabetic orders--admission  3. Home medications reviewed  4. Potassium replacement  5. TSH  6.       See orders     Note that over 50 minutes was spent in evaluation of the patient, review of the chart and pertinent records, discussion with family/staff, etc    Lefty Carlos MD MD  5:28 PM  2/16/2023

## 2023-02-16 NOTE — PLAN OF CARE
Problem: Discharge Planning  Goal: Discharge to home or other facility with appropriate resources  Outcome: Progressing  Flowsheets (Taken 2/16/2023 5593)  Discharge to home or other facility with appropriate resources:   Identify barriers to discharge with patient and caregiver   Arrange for needed discharge resources and transportation as appropriate   Identify discharge learning needs (meds, wound care, etc)     Problem: Safety - Adult  Goal: Free from fall injury  Outcome: Progressing     Problem: ABCDS Injury Assessment  Goal: Absence of physical injury  Outcome: Progressing     Problem: Skin/Tissue Integrity  Goal: Absence of new skin breakdown  Description: 1.  Monitor for areas of redness and/or skin breakdown  2.  Assess vascular access sites hourly  3.  Every 4-6 hours minimum:  Change oxygen saturation probe site  4.  Every 4-6 hours:  If on nasal continuous positive airway pressure, respiratory therapy assess nares and determine need for appliance change or resting period.  Outcome: Progressing

## 2023-02-17 ENCOUNTER — APPOINTMENT (OUTPATIENT)
Dept: GENERAL RADIOLOGY | Age: 88
DRG: 291 | End: 2023-02-17
Payer: MEDICARE

## 2023-02-17 LAB
ABSOLUTE EOS #: 0.17 K/UL (ref 0–0.44)
ABSOLUTE IMMATURE GRANULOCYTE: 0.04 K/UL (ref 0–0.3)
ABSOLUTE LYMPH #: 1.6 K/UL (ref 1.1–3.7)
ABSOLUTE MONO #: 0.88 K/UL (ref 0.1–1.2)
ALBUMIN SERPL-MCNC: 3.4 G/DL (ref 3.5–5.2)
ALBUMIN/GLOBULIN RATIO: 1.1 (ref 1–2.5)
ALP SERPL-CCNC: 119 U/L (ref 35–104)
ALT SERPL-CCNC: <5 U/L (ref 5–33)
ANION GAP SERPL CALCULATED.3IONS-SCNC: 8 MMOL/L (ref 9–17)
AST SERPL-CCNC: 12 U/L
BASOPHILS # BLD: 1 % (ref 0–2)
BASOPHILS ABSOLUTE: 0.06 K/UL (ref 0–0.2)
BILIRUB SERPL-MCNC: 0.6 MG/DL (ref 0.3–1.2)
BUN SERPL-MCNC: 13 MG/DL (ref 8–23)
BUN/CREAT BLD: 21 (ref 9–20)
CALCIUM SERPL-MCNC: 8.3 MG/DL (ref 8.6–10.4)
CHLORIDE SERPL-SCNC: 106 MMOL/L (ref 98–107)
CO2 SERPL-SCNC: 27 MMOL/L (ref 20–31)
CREAT SERPL-MCNC: 0.62 MG/DL (ref 0.5–0.9)
EKG ATRIAL RATE: 92 BPM
EKG ATRIAL RATE: 93 BPM
EKG Q-T INTERVAL: 402 MS
EKG Q-T INTERVAL: 432 MS
EKG QRS DURATION: 78 MS
EKG QRS DURATION: 84 MS
EKG QTC CALCULATION (BAZETT): 475 MS
EKG QTC CALCULATION (BAZETT): 478 MS
EKG R AXIS: -30 DEGREES
EKG R AXIS: -32 DEGREES
EKG T AXIS: 64 DEGREES
EKG T AXIS: 81 DEGREES
EKG VENTRICULAR RATE: 73 BPM
EKG VENTRICULAR RATE: 85 BPM
EOSINOPHILS RELATIVE PERCENT: 2 % (ref 1–4)
GFR SERPL CREATININE-BSD FRML MDRD: >60 ML/MIN/1.73M2
GLUCOSE SERPL-MCNC: 126 MG/DL (ref 70–99)
HCT VFR BLD AUTO: 36 % (ref 36.3–47.1)
HGB BLD-MCNC: 11.3 G/DL (ref 11.9–15.1)
IMMATURE GRANULOCYTES: 1 %
LV EF: 55 %
LVEF MODALITY: NORMAL
LYMPHOCYTES # BLD: 19 % (ref 24–43)
MCH RBC QN AUTO: 29.4 PG (ref 25.2–33.5)
MCHC RBC AUTO-ENTMCNC: 31.4 G/DL (ref 25.2–33.5)
MCV RBC AUTO: 93.8 FL (ref 82.6–102.9)
MONOCYTES # BLD: 11 % (ref 3–12)
NRBC AUTOMATED: 0 PER 100 WBC
PDW BLD-RTO: 15.8 % (ref 11.8–14.4)
PLATELET # BLD AUTO: 276 K/UL (ref 138–453)
PMV BLD AUTO: 10.4 FL (ref 8.1–13.5)
POTASSIUM SERPL-SCNC: 3.9 MMOL/L (ref 3.7–5.3)
PROT SERPL-MCNC: 6.6 G/DL (ref 6.4–8.3)
RBC # BLD: 3.84 M/UL (ref 3.95–5.11)
RBC # BLD: ABNORMAL 10*6/UL
SEG NEUTROPHILS: 66 % (ref 36–65)
SEGMENTED NEUTROPHILS ABSOLUTE COUNT: 5.6 K/UL (ref 1.5–8.1)
SODIUM SERPL-SCNC: 141 MMOL/L (ref 135–144)
TROPONIN I SERPL DL<=0.01 NG/ML-MCNC: 18 NG/L (ref 0–14)
WBC # BLD AUTO: 8.4 K/UL (ref 3.5–11.3)

## 2023-02-17 PROCEDURE — 2580000003 HC RX 258: Performed by: INTERNAL MEDICINE

## 2023-02-17 PROCEDURE — 97161 PT EVAL LOW COMPLEX 20 MIN: CPT

## 2023-02-17 PROCEDURE — 80053 COMPREHEN METABOLIC PANEL: CPT

## 2023-02-17 PROCEDURE — 36415 COLL VENOUS BLD VENIPUNCTURE: CPT

## 2023-02-17 PROCEDURE — 93005 ELECTROCARDIOGRAM TRACING: CPT | Performed by: INTERNAL MEDICINE

## 2023-02-17 PROCEDURE — 93306 TTE W/DOPPLER COMPLETE: CPT

## 2023-02-17 PROCEDURE — 99231 SBSQ HOSP IP/OBS SF/LOW 25: CPT | Performed by: INTERNAL MEDICINE

## 2023-02-17 PROCEDURE — 84484 ASSAY OF TROPONIN QUANT: CPT

## 2023-02-17 PROCEDURE — 6370000000 HC RX 637 (ALT 250 FOR IP): Performed by: INTERNAL MEDICINE

## 2023-02-17 PROCEDURE — 99221 1ST HOSP IP/OBS SF/LOW 40: CPT | Performed by: INTERNAL MEDICINE

## 2023-02-17 PROCEDURE — 85025 COMPLETE CBC W/AUTO DIFF WBC: CPT

## 2023-02-17 PROCEDURE — 2060000000 HC ICU INTERMEDIATE R&B

## 2023-02-17 PROCEDURE — 71045 X-RAY EXAM CHEST 1 VIEW: CPT

## 2023-02-17 PROCEDURE — 6360000002 HC RX W HCPCS: Performed by: INTERNAL MEDICINE

## 2023-02-17 RX ADMIN — FUROSEMIDE 40 MG: 10 INJECTION, SOLUTION INTRAMUSCULAR; INTRAVENOUS at 08:11

## 2023-02-17 RX ADMIN — FLUOXETINE 20 MG: 20 CAPSULE ORAL at 08:10

## 2023-02-17 RX ADMIN — FUROSEMIDE 40 MG: 10 INJECTION, SOLUTION INTRAMUSCULAR; INTRAVENOUS at 17:05

## 2023-02-17 RX ADMIN — ATORVASTATIN CALCIUM 40 MG: 40 TABLET, FILM COATED ORAL at 20:03

## 2023-02-17 RX ADMIN — ENOXAPARIN SODIUM 80 MG: 100 INJECTION SUBCUTANEOUS at 13:02

## 2023-02-17 RX ADMIN — AMLODIPINE BESYLATE 5 MG: 5 TABLET ORAL at 08:10

## 2023-02-17 RX ADMIN — SODIUM CHLORIDE, PRESERVATIVE FREE 10 ML: 5 INJECTION INTRAVENOUS at 08:13

## 2023-02-17 RX ADMIN — OLANZAPINE 2.5 MG: 2.5 TABLET, FILM COATED ORAL at 20:02

## 2023-02-17 RX ADMIN — SODIUM CHLORIDE, PRESERVATIVE FREE 10 ML: 5 INJECTION INTRAVENOUS at 20:11

## 2023-02-17 RX ADMIN — LEVOTHYROXINE SODIUM 137 MCG: 0.11 TABLET ORAL at 06:08

## 2023-02-17 RX ADMIN — POTASSIUM CHLORIDE 20 MEQ: 1500 TABLET, EXTENDED RELEASE ORAL at 20:03

## 2023-02-17 RX ADMIN — MEMANTINE 5 MG: 5 TABLET ORAL at 08:11

## 2023-02-17 RX ADMIN — ASPIRIN 81 MG: 81 TABLET, COATED ORAL at 08:10

## 2023-02-17 RX ADMIN — ENOXAPARIN SODIUM 80 MG: 100 INJECTION SUBCUTANEOUS at 20:03

## 2023-02-17 RX ADMIN — POTASSIUM CHLORIDE 20 MEQ: 1500 TABLET, EXTENDED RELEASE ORAL at 08:10

## 2023-02-17 NOTE — CARE COORDINATION
Case Management Assessment  Initial Evaluation    Date/Time of Evaluation: 2/17/2023 11:37 AM  Assessment Completed by: Jeremiah Oliveira RN    If patient is discharged prior to next notation, then this note serves as note for discharge by case management. Patient Name: Oswaldo Sauer                   YOB: 1930  Diagnosis: New onset atrial fibrillation ([facility] [I48.91]  Decompensated heart failure ([address] [I50.9]                   Date / Time: 2/16/2023 12:37 PM    Patient Admission Status: Inpatient   Readmission Risk (Low < 19, Mod (19-27), High > 27): Readmission Risk Score: 17.4    Current PCP: FELICIA Norris CNP  PCP verified by CM? Yes    Chart Reviewed: Yes      History Provided by: Patient  Patient Orientation: Alert and Oriented    Patient Cognition: Alert    Hospitalization in the last 30 days (Readmission):  No    If yes, Readmission Assessment in CM Navigator will be completed. Advance Directives:      Code Status: DNR-CCA   Patient's Primary Decision Maker is: Named in [address] [place] [address]    Primary Decision Maker: Sparkle Roberts - Child - [phone]    Primary Decision Maker: Morena Iglesias - Child - [phone]    Primary Decision Maker: Tere De La Paz Child - [phone]    Primary Decision Maker: Lynnette Cortez - Other - [phone]    Discharge Planning:    Patient lives with: Alone Type of Home: Assisted living  Primary Care Giver: Other (Comment) ([address] [place] [address] assisted living)  Patient Support Systems include: Family Members   Current Financial resources: Medicare  Current community resources: Assisted Living  Current services prior to admission: None            Current DME:              Type of Home Care services:  PT, OT    ADLS  Prior functional level: Independent in ADLs/IADLs  Current functional level:  Independent in ADLs/IADLs        Family can provide assistance at DC: No  Would you like Case Management to discuss the discharge plan with any other family members/significant others, and if so, who? No  Plans to Return to Present Housing: Yes  Other Identified Issues/Barriers to RETURNING to current housing: yes  Potential Assistance needed at discharge: Extended Care Facility            Potential DME:    Patient expects to discharge to: Assisted living  Plan for transportation at discharge:      Financial    Payor: Hrarison Haas / Plan: Krzysztof Dyer PPO / Product Type: Medicare /     Does insurance require precert for SNF: Yes    Potential assistance Purchasing Medications: No  Meds-to-Beds request:        [address] / [address], [address] [place] [address] Dr Zeus Hanson [phone] - F [phone] [facility]  [facility] [address] Jolanta Mckeon[address] [facility] [address]  Phone: [phone] Fax: [phone]      Notes:    Factors facilitating achievement of predicted outcomes: Family support, Pleasant, and Has needed Durable Medical Equipment at home    Barriers to discharge: Cognitive deficit, Impulsivity, and Limited safety awareness    Additional Case Management Notes: Pt currently resides at [facility] assisted living. Pt also receives home health services through [facility]. Pt denies any other discharge needs at present time. The Plan for Transition of Care is related to the following treatment goals of New onset atrial fibrillation ([address] [I48.91]  Decompensated heart failure ([address] [D29.2]    IF APPLICABLE: The Patient and/or patient representative Karlee Johnson and her family were provided with a choice of provider and agrees with the discharge plan. Freedom of choice list with basic dialogue that supports the patient's individualized plan of care/goals and shares the quality data associated with the providers was provided to:     Patient Representative Name:       The Patient and/or Patient Representative Agree with the Discharge Plan?       Kelsey Iglesias RN  Case Management Department

## 2023-02-17 NOTE — FLOWSHEET NOTE
rounding in PCU. Assessment: Patient was accompanied by her son Kelvin Jeffrey and was alert with an optimistic demeanor. Patient and son were very talkative and shared about their family history and their gratitude for a blessed life together. Patient lives in an assisted living facility and has been an active [employer] member throughout her life. Intervention: Engaged in conversation and active listening. Prayed with Patient. Outcome: Patient expressed appreciation for visit and offer of continued prayer. Plan: Chaplains are available on site or on call 24/7 for spiritual and emotional support.     Electronically signed by Marshall White on 2/17/2023 at 3:01 PM

## 2023-02-17 NOTE — PROGRESS NOTES
SW attempted to meet with patient to complete assessment. Patient unavailable at this time. SW discussed with nurse and discharge planner. Patient resident at [facility]. Patient uses a walker as DMEs. SW will continue to monitor needs and assist as appropriate.        Electronically signed by BASIA Peraza on 2/17/2023 at 12:41 PM

## 2023-02-17 NOTE — PROGRESS NOTES
Physical Therapy  Facility/Department: [facility]  PROGRESSIVE CARE  Physical Therapy Initial Assessment    Name: Vickey Lanes  : 3/21/1930  MRN: [id number]  Date of Service: 2023    Discharge Recommendations:  Continue to assess pending progress, Long Term Care with PT          Patient Diagnosis(es): The primary encounter diagnosis was New onset atrial fibrillation ([address] A diagnosis of Decompensated heart failure (HCC) was also pertinent to this visit. Past Medical History:  has a past medical history of Adenomatous polyp, Cataracts, bilateral, Closed right hip fracture, initial encounter ([address] Combined forms of age-related cataract of both eyes, Corneal scar, right eye, Cystocele, Dementia ([address] Difficulty controlling anger, Dyslipidemia, GERD (gastroesophageal reflux disease), Goiter, Hiatal hernia, Hypertension, Hypokalemia, Hypothyroidism, Impaired fasting glucose, Malignant melanoma in situ ([address] Migraines, Murmur, functional, Osteopenia, Posterior vitreous detachment of both eyes, Primary open angle glaucoma (POAG) of both eyes, moderate stage, Sciatica, Skin cancer, Syncope and collapse, Tremor, 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 Past Surgical History:  has a past surgical history that includes other surgical history (2008); Cholecystectomy; Colonoscopy (2010); Mohs surgery (10/07/2009); Vein Surgery (2009); Colonoscopy (2002); Cystocele repair (1999); Appendectomy; Dilation and curettage of uterus; other surgical history (1988); Hysterectomy (1995); Upper gastrointestinal endoscopy (2015); Intracapsular cataract extraction (Left, 2020); Intracapsular cataract extraction (Right, 10/08/2020); Hip fracture surgery (Right, 2021); Femur fracture surgery (Left, 2022); and Femur fracture surgery (Left, 2022).     Assessment   Body Structures, Functions, Activity Limitations Requiring Skilled Therapeutic Intervention: Decreased functional mobility ; Decreased ADL status; Decreased ROM; Decreased endurance;Decreased safe awareness;Decreased strength;Decreased balance;Decreased posture;Decreased cognition  Therapy Prognosis: Fair  Decision Making: Low Complexity  Activity Tolerance  Activity Tolerance: Patient tolerated evaluation without incident     Plan   Physcial Therapy Plan  General Plan: 5-7 times per week  Current Treatment Recommendations: Strengthening, ROM, Balance training, Functional mobility training, Transfer training, Gait training, Neuromuscular re-education, Patient/Caregiver education & training, Safety education & training, Home exercise program, Return to work related activity  Safety Devices  Type of Devices: Call light within reach, Nurse notified, All fall risk precautions in place, Left in bed, Bed alarm in place     Restrictions        Subjective   General  Chart Reviewed: Yes  Patient assessed for rehabilitation services?: Yes         Social/Functional History  Social/Functional History  Type of Home: Facility  Home Layout: One level  Home Access: Level entry  Bathroom Shower/Tub: Walk-in shower  Bathroom Toilet: Handicap height  Home Equipment: Walker, standard  ADL Assistance: Needs assistance  Homemaking Assistance: Needs assistance  Homemaking Responsibilities: No  Ambulation Assistance: Needs assistance  Transfer Assistance: Needs assistance  Active : No  Vision/Hearing  Vision  Vision: Within Functional Limits  Hearing  Hearing: Within functional limits    Cognition   Orientation  Overall Orientation Status: Impaired     Objective   Heart Rate: 75  Heart Rate Source: Monitor  BP: (!) 143/92  BP Location: Right upper arm  BP Method: Automatic  Patient Position: Semi fowlers  MAP (Calculated): 109  Resp: 16  SpO2: 91 %  O2 Device: None (Room air)     Observation/Palpation  Posture: Fair      AROM RLE (degrees)  RLE AROM: WFL  AROM LLE (degrees)  LLE AROM : WFL  Strength RLE  Comment: grossly 4/5  Strength LLE  Comment: grossly 4/5  Bed mobility  Supine to Sit: Minimal assistance  Sit to Supine: Minimal assistance  Scooting: Minimal assistance  Transfers  Sit to Stand:  Moderate Assistance  Stand to Sit: Moderate Assistance  Ambulation  Surface: Level tile  Device: Rolling Walker  Assistance: Contact guard assistance  Quality of Gait: trunk flexed  Gait Deviations: Slow Jami  Distance: 10ft  Balance  Posture: Fair  Sitting - Static: Fair;-  Sitting - Dynamic: Poor  Standing - Static: Poor  Standing - Dynamic: Poor  Goals  Short Term Goals  Time Frame for Short Term Goals: LOS  Short Term Goal 1: Bed mobility mod i  Short Term Goal 2: transfer with CGA  Short Term Goal 3: Amb x 20ft with RW and CGA  Patient Goals   Patient Goals : Return Home    Therapy Time   Individual Concurrent Group Co-treatment   Time In 0925         Time Out 0936         Minutes [address] K[address] PT

## 2023-02-17 NOTE — PROGRESS NOTES
Pt becoming increasingly upset, stating \"I will not stay in bed, and I do not want to go for a walk. I need to keep moving to take care of myself and my family like I have been. \" This RN attempted to console and reorient pt, but pt kept interrupting this RN.

## 2023-02-17 NOTE — PROGRESS NOTES
Hospitalist Progress Note    Patient:  Daksha Nunez     YOB: 1930    MRN: [id number]   Admit date: 2/16/2023     Acct: [id number]     PCP: FELICIA Robertson - CNP    CC--Interval History:     Atrial fibrillation--recurrent---prior event listed in 2021---remains in atrial fibrillation---seen by Cardiology----medical management--rate control and aspirin only---deemed not a candidate for anticoagulation.    BLE edema---improved, but still present---will need cont'd diuretics    MI ruled out---2.17.2023    2D ECHO completed---severe pulmonary HTN--LA-RA dilatation--EF ~ 55%    CHF--likely acute-on-chronic diastolic    Dementia---severe--especially STM    See note below     All other ROS negative except noted in HPI    Diet:  ADULT DIET; Regular; Low Fat/Low Chol/High Fiber/2 gm Na    Medications:  Scheduled Meds:   sodium chloride flush  10 mL IntraVENous 2 times per day    amLODIPine  5 mg Oral Daily    aspirin  81 mg Oral Daily    atorvastatin  40 mg Oral Nightly    FLUoxetine  20 mg Oral Daily    levothyroxine  137 mcg Oral Daily    memantine  5 mg Oral Daily    OLANZapine  2.5 mg Oral Nightly    potassium chloride  20 mEq Oral BID    enoxaparin  1 mg/kg SubCUTAneous BID    furosemide  40 mg IntraVENous BID     Continuous Infusions:   sodium chloride       PRN Meds:sodium chloride flush, sodium chloride, ondansetron, acetaminophen, sodium chloride nebulizer, hydrOXYzine HCl, albuterol    Objective:  Labs:  CBC with Differential:    Lab Results   Component Value Date/Time    WBC 8.4 02/17/2023 05:22 AM    RBC 3.84 02/17/2023 05:22 AM    HGB 11.3 02/17/2023 05:22 AM    HCT 36.0 02/17/2023 05:22 AM     02/17/2023 05:22 AM    MCV 93.8 02/17/2023 05:22 AM    MCH 29.4 02/17/2023 05:22 AM    MCHC 31.4 02/17/2023 05:22 AM    RDW 15.8 02/17/2023 05:22 AM    LYMPHOPCT 19 02/17/2023 05:22 AM    MONOPCT 11 02/17/2023 05:22 AM    BASOPCT 1 02/17/2023 05:22 AM    MONOSABS 0.88 02/17/2023 05:22 AM     LYMPHSABS 1.60 02/17/2023 05:22 AM    EOSABS 0.17 02/17/2023 05:22 AM    BASOSABS 0.06 02/17/2023 05:22 AM    DIFFTYPE NOT REPORTED 01/13/2022 06:37 AM     BMP:    Lab Results   Component Value Date/Time     02/17/2023 05:22 AM    K 3.9 02/17/2023 05:22 AM     02/17/2023 05:22 AM    CO2 27 02/17/2023 05:22 AM    BUN 13 02/17/2023 05:22 AM    LABALBU 3.4 02/17/2023 05:22 AM    CREATININE 0.62 02/17/2023 05:22 AM    CALCIUM 8.3 02/17/2023 05:22 AM    GFRAA >60 09/15/2022 06:29 AM    LABGLOM >60 02/17/2023 05:22 AM    GLUCOSE 126 02/17/2023 05:22 AM           Physical Exam:  Vitals: BP (!) 116/53   Pulse 80   Temp 97.9 °F (36.6 °C) (Tympanic)   Resp 16   Ht 5' 4\" (1.626 m)   Wt 169 lb 11.2 oz (77 kg)   LMP  (LMP Unknown)   SpO2 94%   BMI 29.13 kg/m²   24 hour intake/output:  Intake/Output Summary (Last 24 hours) at 2/17/2023 1217  Last data filed at 2/17/2023 1000  Gross per 24 hour   Intake 250 ml   Output 3400 ml   Net -3150 ml     Last 3 weights: Wt Readings from Last 3 Encounters:   02/17/23 169 lb 11.2 oz (77 kg)   02/13/23 175 lb (79.4 kg)   01/05/23 169 lb (76.7 kg)     HEENT: Normocephalic and Atraumatic  Neck: Supple, No Masses, Tenderness, Nodularity, and No Lymphadenopathy  Chest/Lungs: Clear to Auscultation without Rales, Rhonchi, or Wheezes and Distant Breath Sounds  Cardiac: Irregularly Irregular---III/VI PHILIP  GI/Abdomen: Bowel Sounds Present and Soft, Non-tender, without Guarding or Rebound Tenderness  : Not examined  EXT/Skin: No Cyanosis, No Clubbing, and Edema Present  Neuro:  alert---generalized weakness---known gait balance instability      Assessment:    Principal Problem:    New onset atrial fibrillation (HCC)  Resolved Problems:    * No resolved hospital problems.  *    Jaime Ableshana                      89    ORI Sierra--demetrius baxter; [facility]--[facility];  saman Omalley  Orthopedics]                  DNR-CCA        LOVENOX---ASPIRIN---BRILINTA     Anti-infectives: --------    Atrial fibrillation---recurrent---2.16.2023---                        prior reference to an event in 2021  BLE edema---POA---2.16.2023        MI ruled out----2.17.2023        EKG---2.16.2023---atrial fibrillation---73--LAD---RSR' V1 only    CHF--likely acute-on-chronic diastolic--2.16.2023         2D ECHO---2.17.2023----LA moderately dilated--NLVSF--RA                          dilatation--RV dilatation--reduced RVSF--MAC--                         MICHELLE without stenosis---moderate TR--RVSP ~ 67                          mm Hg--severe pulmonary HTN--normal AR 2.9                          cm--IVC increased diameter and impaired or no                          inspiratory collapse---cannot determine diastolic                          function----LVEF ~ 55%          CXR--2.16.2023---mild vascular congestion---basilar                         subsegmental atelectasis-----probable small pleural                         effusions         ProBNP--2.16.2023---1518         Troponin--2.16.2023---16--16--18  Severe pulmonary hypertension   ASCVD         2D ECHO---10.16.2022---LA moderately dilated--NLVSF--                           NRVSF--MAC--thickened MV leaflets--mild MR--                           MICHELLE but appears to open well pg 16 mm Hg  mg 9                            mm Hg--mild TR---RVSP ~ 38 mm Hg--normal AR                            2.4 cm--IVC normal diameter and inspiratory                            collapse--LVEF ~ 60%         EKG---11.2.2021---postoperatively---SR----64         EKG----10.30.2021---NSR---64---LAD         2D ECHO--11.1.2021--RAUL--NLVSF---MICHELLE without stenosis-                        MAC MV thickening--mild MR--moderate TR---RVSP ~                    83 mm Hg---severe pulmonary hypertension---increased                    IVC diameter and impaired or no inspiratory variation---                   LVEF ~ 55%          STEMI--12.22.2020          ALLIE---RCA---12.2020          2D ECHO---12.2020--mild-to-moderate TR--moderate MR--                   RVSP ~ 46 mm Hg--LVEF ~ 45-50%               2D ECHO--1.6.2015--NLVSF--LAE--MAC--mild MR--                             MICHELLE--mild-to-moderate TR--RVSP ~ 30 mm Hg--                             Grade 2 DD--LVEF ~ 65%           MI ruled out-----1.22.2015--1.6.2015    Hypertension                     II/VI PHILIP  Dyslipidemia  Cerebrovascular disease         CT head---10.30.2021---no MBS--moderate atrophy and                               chronic small vessel ischemic disease---remote                           cortical  infarctions         DUS--CV--1.22.2015--no hemodynamically significant                              stenosis    Hypothyroidism  Impaired fasting glucose  GERD   Dementia--severe   Anger management issues  PMH:   osteopenia, adenomatous polyp, SCC--nose, bilateral               cataracts, trichiasis left lower eyelid, bilateral posterior               vitreous detachment, HH, goiter, I/VI PHILIP, right eye               corneal scar, migraine headaches, UTI--1.5.2014,                leukocytosis--1.6.2015, hypokalemia--1.5.2014, syncope--              collapse--1.21.2015, malignant melanoma--right upper               back--2017, osteopenia, tremor---2019, right hip fracture---                10.30.2021---traumatic osteopenic fracture---acute                comminuted and moderately displaced IT right femur               fracture with free-floating greater trochanteric and lesser               trochanter fragments, hypocalcemia, hypokalemia,              hypomagnesemia,  pulmonary hypertension   PSH:   cholecystectomy, colonoscopy--2010--2002, Mohs--2009,               laser ablation right GSV--2009, cystocele repair--1999,               appendectomy, D&C-uterus, hysterectomy--1988,               anterior repair--2008, laser conization--1988, EGD--2015,               cataract extraction--IOL--OU--2020, cephalomedullary nail---right hip fracture--11.2.2021      Allergies:  Flagyl--rash         Plan:     CHF---seen by Cardiology--medical management--rate control---no anticoagulation---ASA only     Medications reviewed     IV diuretics      See orders    Electronically signed by Alcon Phillips MD on 2/17/2023 at 12:17 PM    Hospitalist

## 2023-02-17 NOTE — CONSULTS
[facility] Cardiology Cardiology    Consult                        Today's Date: 2/17/2023  Patient Name: Zaina Chaudhry  Date of admission: 2/16/2023 12:37 PM  Patient's age: 80 y.o., 3/21/1930  Admission Dx: New onset atrial fibrillation ([address] [I48.91]  Decompensated heart failure ([address] [I50.9]    Reason for Consult: Shortness of breath    Requesting Physician: Atul Smith MD    CHIEF COMPLAINT: Shortness of breath    History Obtained From:  patient    HISTORY OF PRESENT ILLNESS:      The patient is a 80 y.o.  female who is admitted to the hospital for shortness of breath  The patient is a poor historian due to dementia. He was brought in due to lower extremity edema and shortness of breath to the ER. No chest pain no dizziness no syncope. She was found to be in atrial fibrillation. Rate controlled. Past Medical History:   has a past medical history of Adenomatous polyp, Cataracts, bilateral, Closed right hip fracture, initial encounter ([address] Combined forms of age-related cataract of both eyes, Corneal scar, right eye, Cystocele, Dementia ([address] Difficulty controlling anger, Dyslipidemia, GERD (gastroesophageal reflux disease), Goiter, Hiatal hernia, Hypertension, Hypokalemia, Hypothyroidism, Impaired fasting glucose, Malignant melanoma in situ ([address] Migraines, Murmur, functional, Osteopenia, Posterior vitreous detachment of both eyes, Primary open angle glaucoma (POAG) of both eyes, moderate stage, Sciatica, Skin cancer, Syncope and collapse, Tremor, 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 Past Surgical History:   has a past surgical history that includes other surgical history (11/11/2008); Cholecystectomy; Colonoscopy (06/08/2010); Mohs surgery (10/07/2009); Vein Surgery (06/08/2009); Colonoscopy (07/18/2002); Cystocele repair (02/12/1999); Appendectomy; Dilation and curettage of uterus; other surgical history (01/01/1988);  Hysterectomy (01/01/1995); Upper gastrointestinal endoscopy (04/08/2015); Intracapsular cataract extraction (Left, 08/25/2020); Intracapsular cataract extraction (Right, 10/08/2020); Hip fracture surgery (Right, 11/02/2021); Femur fracture surgery (Left, 09/04/2022); and Femur fracture surgery (Left, 9/4/2022). Home Medications:    Prior to Admission medications    Medication Sig Start Date End Date Taking? Authorizing Provider   furosemide (LASIX) 40 MG tablet 80 mg every am, and 40 mg every night x 5 days- STARTING 2/14/23 through 2/18/23    Historical Provider, MD   hydrOXYzine HCl (ATARAX) 25 MG tablet Take 1 tablet by mouth nightly as needed for Anxiety 2/14/23 3/16/23  FELICIA Frost CNP   amLODIPine (NORVASC) 5 MG tablet TAKE 1 TABLET DAILY 2/6/23   FELICIA Frost CNP   memantine (NAMENDA) 5 MG tablet TAKE 1 TABLET DAILY 1/16/23   FELICIA Frost CNP   levothyroxine (SYNTHROID) 137 MCG tablet Take 1 tablet by mouth daily 11/3/22   FELICIA Frost CNP   furosemide (LASIX) 40 MG tablet Take 1 tablet by mouth in the morning and 1 tablet in the evening.   Patient taking differently: Take 40 mg by mouth 2 times daily SEE 2/14/23 DOSING FOR 5 DAYS 10/16/22   Corazon Garza MD   potassium chloride (KLOR-CON M) 20 MEQ extended release tablet Take 1 tablet by mouth 2 times daily 10/16/22   Corazon Garza MD   OLANZapine (ZYPREXA) 2.5 MG tablet Take 2.5 mg by mouth nightly    Historical Provider, MD   acetaminophen (TYLENOL) 500 MG tablet Take 2 tablets by mouth every 8 hours as needed for Pain 9/20/22   FELICIA Frost CNP   Ciclopirox 1 % SHAM Apply topically once a week on Tuesdays- AKA Loprox Shampoo    Historical Provider, MD   Nutritional Supplements (NUTRITIONAL DRINK PO) Mighty shakes bid with meals    Historical Provider, MD   calcium carbonate-vitamin D3 (CALTRATE) 600-400 MG-UNIT TABS per tab Take 1 tablet by mouth daily    Historical Provider, MD   FLUoxetine (PROZAC) 20 MG capsule TAKE 1 CAPSULE DAILY 5/31/22   Saintclair Berliner, MD   BRILINTA 90 MG TABS tablet Take 1 tablet by mouth 2 times daily 4/20/22 9/6/22  Dimple Steve MD   atorvastatin (LIPITOR) 40 MG tablet Take 1 tablet by mouth daily 2/1/21   Saintclair Berliner, MD   aspirin 81 MG EC tablet Take 1 tablet by mouth daily 12/21/20   Historical Provider, MD   nitroGLYCERIN (NITROSTAT) 0.4 MG SL tablet Place 0.4 mg under the tongue every 5 minutes as needed for Chest pain up to max of 3 total doses. If no relief after 3 dose, call 911.   9/6/22  Historical Provider, MD   ibuprofen (ADVIL;MOTRIN) 600 MG tablet Take 1 tablet by mouth 3 times daily as needed for Pain (Take with food.) 12/27/20 12/27/20  Rick Stacy MD   Multiple Vitamin (DAILY MULTIVITAMIN PO) Take 1 tablet by mouth daily   9/6/22  Historical Provider, MD       Allergies:  Patient has no known allergies. Social History:   reports that she has never smoked. She has never used smokeless tobacco. She reports that she does not currently use alcohol. She reports that she does not use drugs. Family History: family history includes Colon Cancer in her brother; Coronary Art Dis in her father. No h/o sudden cardiac death. No for premature CAD    REVIEW OF SYSTEMS:    Constitutional: there has been no unanticipated weight loss. There's been Yes change in energy level, Yes change in activity level. Eyes: No visual changes or diplopia. No scleral icterus. ENT: No Headaches, hearing loss or vertigo. No mouth sores or sore throat. Cardiovascular: No chest pain, Yes dyspnea on exertion, No palpitations or No loss of consciousness. No cough, hemoptysis, No pleuritic pain, or phlebitis. Respiratory: No cough or wheezing, no sputum production. No hematemesis. Gastrointestinal: No abdominal pain, appetite loss, blood in stools. No change in bowel or bladder habits. Genitourinary: No dysuria, trouble voiding, or hematuria.   Musculoskeletal:  No gait disturbance, No weakness or joint complaints. Integumentary: No rash or pruritis. Neurological: No headache, diplopia, change in muscle strength, numbness or tingling. No change in gait, balance, coordination, mood, affect, memory, mentation, behavior. Psychiatric: No anxiety, or depression. Endocrine: No temperature intolerance. No excessive thirst, fluid intake, or urination. No tremor. Hematologic/Lymphatic: No abnormal bruising or bleeding, blood clots or swollen lymph nodes. Allergic/Immunologic: No nasal congestion or hives. PHYSICAL EXAM:      BP (!) 143/92   Pulse 75   Temp 98.3 °F (36.8 °C) (Tympanic)   Resp 16   Ht 5' 4\" (1.626 m)   Wt 169 lb 11.2 oz (77 kg)   LMP  (LMP Unknown)   SpO2 91%   BMI 29.13 kg/m²    Constitutional and General Appearance: alert, cooperative, no distress, and appears stated age  HEENT: PERRL, no cervical lymphadenopathy. No masses palpable. Normal oral mucosa  Respiratory:  Normal excursion and expansion without use of accessory muscles  Resp Auscultation: Good respiratory effort. No for increased work of breathing. On auscultation: clear to auscultation bilaterally  Cardiovascular: The apical impulse is not displaced  Heart tones are crisp and normal. irregular S1 and S2.  Jugular venous pulsation Normal  The carotid upstroke is normal in amplitude and contour without delay or bruit  Peripheral pulses are symmetrical and full  Abdomen:  No masses or tenderness  Bowel sounds present  Extremities:   No Cyanosis or Clubbing   Lower extremity edema: Yes  Skin: Warm and dry  Neurological:  Alert and oriented. Moves all extremities well  No abnormalities of mood, affect, memory, mentation, or behavior are noted    DATA:    Diagnostics:    EKG: Atrial fibrillation with nonspecific ST changes seen. Rate controlled. Janice Edmonds ECHO:   10/2022  Global left ventricular systolic function is normal. Estimated ejection fraction is 60 %. Mild mitral regurgitation. Mild tricuspid regurgitation.   Estimated right ventricular systolic pressure is 38 mmHg. Labs:     CBC:   Recent Labs     02/16/23  1252 02/17/23 0522   WBC 8.2 8.4   HGB 11.7* 11.3*   HCT 37.5 36.0*    276     BMP:   Recent Labs     02/16/23  1252 02/16/23  1909 02/17/23 0522     --  141   K 3.3* 3.4* 3.9   CO2 27  --  27   BUN 13  --  13   CREATININE 0.83  --  0.62   LABGLOM >60  --  >60   GLUCOSE 147*  --  126*     BNP: No results for input(s): BNP in the last 72 hours. PT/INR: No results for input(s): PROTIME, INR in the last 72 hours. APTT:No results for input(s): APTT in the last 72 hours. CARDIAC ENZYMES:No results for input(s): CKTOTAL, CKMB, CKMBINDEX, TROPONINI in the last 72 hours. FASTING LIPID PANEL:  Lab Results   Component Value Date/Time    HDL 53 01/05/2023 06:28 AM    TRIG 62 01/05/2023 06:28 AM     LIVER PROFILE:  Recent Labs     02/16/23  1252 02/17/23 0522   AST 15 12   ALT 7 <5*   LABALBU 3.8 3.4*       IMPRESSION/RECOMMENDATIONS:  1. Acute CHF with preserved EF. Continue IV diuresis with close follow-up on intake output and chart and basic metabolic profile. Also consider adding compression stockings for lower extremity edema. Also amlodipine could be contributing to this. 2. New onset atrial fibrillation. Rate controlled. Continue current medications. He had bradycardia before on beta-blocker and will avoid. Not a candidate for anticoagulation due to fall risk and dementia. Considering his age and comorbidities will consider on the rate control strategy. 3. STEMI on 12/22020. ALLIE of proximal and distal RCA. Mild to moderate disease of other arteries. PReserved LV systolic function. Echo 11/2021 11/2021 EF 55%. Moderate TR. RVSP 83 mm Hg. Discussed with patient and Nurse.     Perry Hopson MD, MD  [facility] Cardiology Consult           [phone]

## 2023-02-18 VITALS
HEART RATE: 70 BPM | BODY MASS INDEX: 28.48 KG/M2 | RESPIRATION RATE: 18 BRPM | SYSTOLIC BLOOD PRESSURE: 110 MMHG | DIASTOLIC BLOOD PRESSURE: 78 MMHG | WEIGHT: 166.8 LBS | OXYGEN SATURATION: 97 % | HEIGHT: 64 IN | TEMPERATURE: 97.5 F

## 2023-02-18 LAB
ABSOLUTE EOS #: 0.16 K/UL (ref 0–0.44)
ABSOLUTE IMMATURE GRANULOCYTE: 0.04 K/UL (ref 0–0.3)
ABSOLUTE LYMPH #: 1.5 K/UL (ref 1.1–3.7)
ABSOLUTE MONO #: 0.9 K/UL (ref 0.1–1.2)
ANION GAP SERPL CALCULATED.3IONS-SCNC: 9 MMOL/L (ref 9–17)
BASOPHILS # BLD: 1 % (ref 0–2)
BASOPHILS ABSOLUTE: 0.06 K/UL (ref 0–0.2)
BUN SERPL-MCNC: 17 MG/DL (ref 8–23)
BUN/CREAT BLD: 26 (ref 9–20)
CALCIUM SERPL-MCNC: 8.6 MG/DL (ref 8.6–10.4)
CHLORIDE SERPL-SCNC: 108 MMOL/L (ref 98–107)
CO2 SERPL-SCNC: 28 MMOL/L (ref 20–31)
CREAT SERPL-MCNC: 0.66 MG/DL (ref 0.5–0.9)
EOSINOPHILS RELATIVE PERCENT: 2 % (ref 1–4)
GFR SERPL CREATININE-BSD FRML MDRD: >60 ML/MIN/1.73M2
GLUCOSE SERPL-MCNC: 124 MG/DL (ref 70–99)
HCT VFR BLD AUTO: 37.9 % (ref 36.3–47.1)
HGB BLD-MCNC: 11.7 G/DL (ref 11.9–15.1)
IMMATURE GRANULOCYTES: 1 %
LYMPHOCYTES # BLD: 19 % (ref 24–43)
MCH RBC QN AUTO: 29 PG (ref 25.2–33.5)
MCHC RBC AUTO-ENTMCNC: 30.9 G/DL (ref 25.2–33.5)
MCV RBC AUTO: 94 FL (ref 82.6–102.9)
MONOCYTES # BLD: 11 % (ref 3–12)
NRBC AUTOMATED: 0 PER 100 WBC
PDW BLD-RTO: 15.7 % (ref 11.8–14.4)
PLATELET # BLD AUTO: 286 K/UL (ref 138–453)
PMV BLD AUTO: 10.6 FL (ref 8.1–13.5)
POTASSIUM SERPL-SCNC: 4 MMOL/L (ref 3.7–5.3)
RBC # BLD: 4.03 M/UL (ref 3.95–5.11)
RBC # BLD: ABNORMAL 10*6/UL
SEG NEUTROPHILS: 66 % (ref 36–65)
SEGMENTED NEUTROPHILS ABSOLUTE COUNT: 5.33 K/UL (ref 1.5–8.1)
SODIUM SERPL-SCNC: 145 MMOL/L (ref 135–144)
WBC # BLD AUTO: 8 K/UL (ref 3.5–11.3)

## 2023-02-18 PROCEDURE — 6360000002 HC RX W HCPCS: Performed by: INTERNAL MEDICINE

## 2023-02-18 PROCEDURE — 85025 COMPLETE CBC W/AUTO DIFF WBC: CPT

## 2023-02-18 PROCEDURE — 97110 THERAPEUTIC EXERCISES: CPT | Performed by: PHYSICAL THERAPY ASSISTANT

## 2023-02-18 PROCEDURE — 2580000003 HC RX 258: Performed by: INTERNAL MEDICINE

## 2023-02-18 PROCEDURE — 6370000000 HC RX 637 (ALT 250 FOR IP): Performed by: INTERNAL MEDICINE

## 2023-02-18 PROCEDURE — 36415 COLL VENOUS BLD VENIPUNCTURE: CPT

## 2023-02-18 PROCEDURE — 80048 BASIC METABOLIC PNL TOTAL CA: CPT

## 2023-02-18 PROCEDURE — 99239 HOSP IP/OBS DSCHRG MGMT >30: CPT | Performed by: FAMILY MEDICINE

## 2023-02-18 RX ORDER — FUROSEMIDE 40 MG/1
40 TABLET ORAL 2 TIMES DAILY
Qty: 60 TABLET | Refills: 3 | DISCHARGE
Start: 2023-02-18 | End: 2023-03-20

## 2023-02-18 RX ADMIN — LEVOTHYROXINE SODIUM 137 MCG: 0.11 TABLET ORAL at 05:29

## 2023-02-18 RX ADMIN — MEMANTINE 5 MG: 5 TABLET ORAL at 08:33

## 2023-02-18 RX ADMIN — ASPIRIN 81 MG: 81 TABLET, COATED ORAL at 08:33

## 2023-02-18 RX ADMIN — FLUOXETINE 20 MG: 20 CAPSULE ORAL at 08:33

## 2023-02-18 RX ADMIN — AMLODIPINE BESYLATE 5 MG: 5 TABLET ORAL at 08:33

## 2023-02-18 RX ADMIN — ENOXAPARIN SODIUM 80 MG: 100 INJECTION SUBCUTANEOUS at 08:33

## 2023-02-18 RX ADMIN — POTASSIUM CHLORIDE 20 MEQ: 1500 TABLET, EXTENDED RELEASE ORAL at 08:33

## 2023-02-18 RX ADMIN — FUROSEMIDE 40 MG: 10 INJECTION, SOLUTION INTRAMUSCULAR; INTRAVENOUS at 08:33

## 2023-02-18 RX ADMIN — SODIUM CHLORIDE, PRESERVATIVE FREE 10 ML: 5 INJECTION INTRAVENOUS at 08:32

## 2023-02-18 NOTE — PLAN OF CARE
Problem: Discharge Planning  Goal: Discharge to home or other facility with appropriate resources  2/18/2023 1039 by Nanci RN  Outcome: Progressing  Flowsheets (Taken 2/18/2023 0129)  Discharge to home or other facility with appropriate resources: Identify barriers to discharge with patient and caregiver  2/17/2023 2053 by Nidia Rodriguez RN  Outcome: Maria Alejandra Heads (Taken 2/17/2023 1938)  Discharge to home or other facility with appropriate resources: Identify barriers to discharge with patient and caregiver     Problem: Safety - Adult  Goal: Free from fall injury  2/18/2023 1039 by Nanci RN  Outcome: Progressing  2/17/2023 2053 by Nidia Rodriguez RN  Outcome: Progressing     Problem: ABCDS Injury Assessment  Goal: Absence of physical injury  2/18/2023 1039 by Nanci RN  Outcome: Progressing  2/17/2023 2053 by Nidia Rodriguez RN  Outcome: Progressing     Problem: Skin/Tissue Integrity  Goal: Absence of new skin breakdown  Description: 1. Monitor for areas of redness and/or skin breakdown  2. Assess vascular access sites hourly  3. Every 4-6 hours minimum:  Change oxygen saturation probe site  4. Every 4-6 hours:  If on nasal continuous positive airway pressure, respiratory therapy assess nares and determine need for appliance change or resting period.   2/18/2023 1039 by Nanci RN  Outcome: Progressing  2/17/2023 2053 by Nidia Rodriguez RN  Outcome: Progressing     Problem: Respiratory - Adult  Goal: Achieves optimal ventilation and oxygenation  2/18/2023 1039 by Nanci RN  Outcome: Progressing  2/17/2023 2053 by Nidia Rodriguez RN  Outcome: Progressing     Problem: Cardiovascular - Adult  Goal: Maintains optimal cardiac output and hemodynamic stability  2/18/2023 1039 by Nanci RN  Outcome: Progressing  Flowsheets (Taken 2/18/2023 8225)  Maintains optimal cardiac output and hemodynamic stability:   Monitor blood pressure and heart rate   Monitor urine output and notify Licensed Independent Practitioner for values outside of normal range  2/17/2023 2053 by Jose Silvestre RN  Outcome: Progressing  Goal: Absence of cardiac dysrhythmias or at baseline  2/18/2023 1039 by Carlo Rick RN  Outcome: Progressing  Flowsheets (Taken 2/18/2023 9815)  Absence of cardiac dysrhythmias or at baseline: Monitor cardiac rate and rhythm  2/17/2023 2053 by oJse Silvestre RN  Outcome: Progressing     Problem: Musculoskeletal - Adult  Goal: Return ADL status to a safe level of function  2/18/2023 1039 by Carlo Rick RN  Outcome: Progressing  Flowsheets (Taken 2/18/2023 9507)  Return ADL Status to a Safe Level of Function: Administer medication as ordered  2/17/2023 2053 by Jose Silvestre RN  Outcome: Progressing     Problem: Infection - Adult  Goal: Absence of infection at discharge  2/18/2023 1039 by Carlo Rick RN  Outcome: Progressing  Flowsheets (Taken 2/18/2023 5077)  Absence of infection at discharge: Assess and monitor for signs and symptoms of infection  2/17/2023 2053 by Jose Silvestre RN  Outcome: Progressing     Problem: Neurosensory - Adult  Goal: Achieves maximal functionality and self care  2/18/2023 1039 by Carlo Rick RN  Outcome: Progressing  2/17/2023 2053 by Jose Silvestre RN  Outcome: Progressing

## 2023-02-18 NOTE — PROGRESS NOTES
Physical Therapy  Facility/Department: [facility]  PROGRESSIVE CARE  Daily Treatment Note  NAME: Nancy Herrmann  : 3/21/1930  MRN: [id number]    Date of Service: 2023    Discharge Recommendations:  Continue to assess pending progress, Long Term Care with PT        Patient Diagnosis(es): The primary encounter diagnosis was New onset atrial fibrillation ([address] A diagnosis of Decompensated heart failure (HCC) was also pertinent to this visit. Assessment   Activity Tolerance: Patient tolerated treatment well     Plan    Physcial Therapy Plan  Current Treatment Recommendations: Strengthening;ROM;Balance training;Functional mobility training;Transfer training;Gait training;Neuromuscular re-education;Patient/Caregiver education & training; Safety education & training;Home exercise program;Return to work related activity     Restrictions        Subjective    Subjective  Subjective: Patine tin bed at initiation fos ession. Agreeable to therapy at this time. Pain: No pain at this time. Orientation  Overall Orientation Status: Impaired  Orientation Level: Oriented to person;Disoriented to time;Disoriented to situation     Objective   Vitals     Bed Mobility Training  Bed Mobility Training: Yes  Overall Level of Assistance: Minimum assistance  Supine to Sit: Minimum assistance  Scooting: Minimum assistance  Gait Training  Gait Training: Yes  Gait  Overall Level of Assistance: Minimum assistance  Speed/Jami: Shuffled; Slow  Step Length: Left shortened;Right shortened  Gait Abnormalities: Shuffling gait  Distance (ft): 5 Feet  Assistive Device: Gait belt;Walker     PT Exercises  Exercise Treatment: Supine: Ankle pumps, quad sets, heel slides, hip abd. Sitting: Hip flexion, HR,TR. x10 ea  Resistive Exercises: Sitting: LAQ, HS curls x10 ea with manual resistance. Safety Devices  Type of Devices: Call light within reach;Nurse notified; All fall risk precautions in place;Gait belt;Left in chair;Chair alarm in place; Telesitter in use       Goals  Short Term Goals  Time Frame for Short Term Goals: LOS  Short Term Goal 1: Bed mobility mod i  Short Term Goal 2: transfer with CGA  Short Term Goal 3: Amb x 20ft with RW and CGA  Patient Goals   Patient Goals : Return Home    Education  Patient Education  Education Given To: Patient  Education Provided: Transfer Training  Education Method: Demonstration;Verbal  Barriers to Learning: Cognition  Education Outcome: Verbalized understanding;Continued education needed    Therapy Time   Individual Concurrent Group Co-treatment   Time In [phone]         Time Out 0812         [facility] [place], [place]

## 2023-02-18 NOTE — DISCHARGE SUMMARY
Hospitalist Discharge Summary    Prisca Head  :  3/21/1930  MRN:  [id number]    Admit date:  2023  Discharge date:  23    Admitting Physician:  Jesusita Asher MD    Discharge Diagnoses:   Patient Active Problem List   Diagnosis    Dyslipidemia    Osteopenia    Impaired fasting glucose    Adenomatous polyp    Essential hypertension    Hypothyroidism    Sciatica    GERD (gastroesophageal reflux disease)    Visual disturbance    Malignant melanoma in situ ([address]    Difficulty controlling anger    Primary open angle glaucoma (POAG) of both eyes, moderate stage    PVD (posterior vitreous detachment), both eyes    Combined forms of age-related cataract of both eyes    Dementia with behavioral disturbance    Tremor    Essential tremor    Dizziness    Chronic cerebral ischemia    Acquired cerebral atrophy ([address]    Cerebral infarction ([address]    Anxiety    Paroxysmal A-fib ([address]    Closed displaced intertrochanteric fracture of left femur (HCC)    Acute ST segment elevation myocardial infarction ([address]    Heart murmur    Migraine    Female bladder prolapse    Falls    Hiatal hernia    Hypercholesterolemia    Coronary artery disease involving native coronary artery without angina pectoris    Cataract    Heart failure, unspecified HF chronicity, unspecified heart failure type (HCC)    Chronic renal disease, stage III ([address] []    Pseudophakia of both eyes    Nondisp intertroch fx left femur, init for opn fx type I/2 ([address]    Bilateral pleural effusion    Acute on chronic diastolic heart failure (HCC)    Pulmonary hypertension ([address]    Acute respiratory failure with hypoxia ([address]    New onset atrial fibrillation ([address]        Admission Condition:  fair      Discharged Condition:  good    Hospital Course/Treatments   admitted with extreme sob, pt was see In ER and was found to hv a fib with rvr, pt was placed on lovenox and pt rate was controled, pt is 80 yr old and decided that pt may not be candidate for anticoagulation due to frequent fall history, pt will be d/c to rehab    Discharge Medications:         Medication List        CHANGE how you take these medications      furosemide 40 MG tablet  Commonly known as: Lasix  Take 1 tablet by mouth 2 times daily  What changed:   when to take this  Another medication with the same name was removed. Continue taking this medication, and follow the directions you see here.             CONTINUE taking these medications      acetaminophen 500 MG tablet  Commonly known as: TYLENOL  Take 2 tablets by mouth every 8 hours as needed for Pain     amLODIPine 5 MG tablet  Commonly known as: NORVASC  TAKE 1 TABLET DAILY     aspirin 81 MG EC tablet     atorvastatin 40 MG tablet  Commonly known as: LIPITOR  Take 1 tablet by mouth daily     calcium carbonate-vitamin D3 600-400 MG-UNIT Tabs per tab  Commonly known as: CALTRATE     Ciclopirox 1 % Sham     FLUoxetine 20 MG capsule  Commonly known as: PROZAC  TAKE 1 CAPSULE DAILY     hydrOXYzine HCl 25 MG tablet  Commonly known as: ATARAX  Take 1 tablet by mouth nightly as needed for Anxiety     levothyroxine 137 MCG tablet  Commonly known as: SYNTHROID  Take 1 tablet by mouth daily     memantine 5 MG tablet  Commonly known as: NAMENDA  TAKE 1 TABLET DAILY     NUTRITIONAL DRINK PO     OLANZapine 2.5 MG tablet  Commonly known as: ZYPREXA     potassium chloride 20 MEQ extended release tablet  Commonly known as: KLOR-CON M  Take 1 tablet by mouth 2 times daily            STOP taking these medications      Brilinta 90 MG Tabs tablet  Generic drug: ticagrelor     DAILY MULTIVITAMIN PO     nitroGLYCERIN 0.4 MG SL tablet  Commonly known as: NITROSTAT               Where to Get Your Medications        Information about where to get these medications is not yet available    Ask your nurse or doctor about these medications  furosemide 40 MG tablet         Consults:  IP CONSULT TO CARDIOLOGY    Significant Diagnostic Studies:  XR CHEST PORTABLE    Result Date: 2/17/2023  EXAMINATION: ONE X-RAY VIEW OF THE CHEST 2/17/2023 6:07 am COMPARISON: 02/16/2023 HISTORY: ORDERING SYSTEM PROVIDED HISTORY: vascular congestion, pleural effusions TECHNOLOGIST PROVIDED HISTORY: vascular congestion, pleural effusions Reason for Exam: Vascular congestion, pleural effusions FINDINGS: Diffuse mild interstitial prominence, similar. New mild patchy airspace opacity to right lower lung zone. Stable left basilar likely scarring versus atelectasis. No pneumothoraces. Cardiac and mediastinal silhouettes are stable. Stable appearance to bony structures. Diffuse mild interstitial prominence, similar to prior exam.  This may reflect mild interstitial edema or atypical pneumonia, likely superimposed on chronic interstitial changes. New mild patchy airspace opacity to right lower lung zone may relate to atelectasis or developing pneumonia. Stable left basilar likely scarring versus atelectasis. XR CHEST PORTABLE    Result Date: 2/16/2023  EXAMINATION: ONE XRAY VIEW OF THE CHEST 2/16/2023 1:00 pm COMPARISON: 02/13/2023, 01/18/2023 HISTORY: ORDERING SYSTEM PROVIDED HISTORY: heart failure TECHNOLOGIST PROVIDED HISTORY: heart failure Reason for Exam: Heart failure FINDINGS: The cardiomediastinal silhouette is unchanged in appearance. Mild vascular congestion with septal thickening, basilar opacities and small effusions. The osseous structures are unchanged in appearance. Mild vascular congestion, basilar subsegmental atelectasis and probable small effusions. Disposition:   long term care facility    Discharge Instructions: Activity: activity as tolerated  Diet:  cardiac diet    Follow up with FELICIA Burns CNP in 2 weeks.     Signed:  Arin Ruiz MD  2/18/2023, 1:25 PM    Time spent in discharge of this pt is more than 30 minutes in examination,evaluvation,  counseling and review of medication and discharge plan

## 2023-02-18 NOTE — PLAN OF CARE
Problem: Discharge Planning  Goal: Discharge to home or other facility with appropriate resources  2/17/2023 2053 by Elizabeth Schmitz RN  Outcome: Argelia Miller (Taken 2/17/2023 1938)  Discharge to home or other facility with appropriate resources: Identify barriers to discharge with patient and caregiver  2/17/2023 1108 by Elli Williamson RN  Outcome: Progressing     Problem: Safety - Adult  Goal: Free from fall injury  2/17/2023 2053 by Elizabeth Schmitz RN  Outcome: Progressing  2/17/2023 1108 by Elli Williamson RN  Outcome: Progressing     Problem: ABCDS Injury Assessment  Goal: Absence of physical injury  2/17/2023 2053 by Elizabeth Schmitz RN  Outcome: Progressing  2/17/2023 1108 by Elli Williamson RN  Outcome: Progressing     Problem: Skin/Tissue Integrity  Goal: Absence of new skin breakdown  Description: 1. Monitor for areas of redness and/or skin breakdown  2. Assess vascular access sites hourly  3. Every 4-6 hours minimum:  Change oxygen saturation probe site  4. Every 4-6 hours:  If on nasal continuous positive airway pressure, respiratory therapy assess nares and determine need for appliance change or resting period.   2/17/2023 2053 by Elizabeth Schmitz RN  Outcome: Progressing  2/17/2023 1108 by Elli Williamson RN  Outcome: Progressing     Problem: Respiratory - Adult  Goal: Achieves optimal ventilation and oxygenation  Outcome: Progressing     Problem: Cardiovascular - Adult  Goal: Maintains optimal cardiac output and hemodynamic stability  Outcome: Progressing  Goal: Absence of cardiac dysrhythmias or at baseline  Outcome: Progressing     Problem: Musculoskeletal - Adult  Goal: Return ADL status to a safe level of function  Outcome: Progressing     Problem: Infection - Adult  Goal: Absence of infection at discharge  Outcome: Progressing     Problem: Neurosensory - Adult  Goal: Achieves maximal functionality and self care  Outcome: Progressing

## 2023-02-20 ENCOUNTER — CARE COORDINATION (OUTPATIENT)
Dept: CASE MANAGEMENT | Age: 88
End: 2023-02-20

## 2023-02-20 ENCOUNTER — TELEPHONE (OUTPATIENT)
Dept: INTERNAL MEDICINE | Age: 88
End: 2023-02-20

## 2023-02-20 DIAGNOSIS — R60.9 EDEMA, UNSPECIFIED TYPE: ICD-10-CM

## 2023-02-20 DIAGNOSIS — I48.91 NEW ONSET ATRIAL FIBRILLATION (HCC): Primary | ICD-10-CM

## 2023-02-20 PROCEDURE — 1111F DSCHRG MED/CURRENT MED MERGE: CPT | Performed by: NURSE PRACTITIONER

## 2023-02-20 NOTE — CARE COORDINATION
[address] [place] Care Transitions Initial Follow Up Call    Call within 2 business days of discharge: No    Patient Current Location: [facility] Coordinator contacted the caregiver by telephone to perform post hospital discharge assessment. Verified name and  with caregiver as identifiers. Provided introduction to self, and explanation of the LPN Care Coordinator role. Patient: Vee Turner Patient : 3/21/1930   MRN: [id number]  Reason for Admission:    New onset atrial fibrillation [facility]) EDEMA  Discharge Date: 23 RARS: Readmission Risk Score: 16.2      Last Discharge  Street       Date Complaint Diagnosis Description Type Department Provider    23 [place] New onset atrial fibrillation ([facility] [address] . .. ED to Hosp-Admission (Discharged) (ADMITTED) Queenie Guerra MD; Roly Lima. .. Was this an external facility discharge? No Discharge Facility: [place] [address] [place]    Challenges to be reviewed by the provider   Additional needs identified to be addressed with provider: No  none               Method of communication with provider: none. Attempted to contact patient for 24 hour initial transitional call. Unable to reach patient. Caller left a Hipaa compliant message introducing self, role, nature of the call and contact information for a return call. Unable to reach pt's nurse at [facility].  1100: call back. Unable to reach pt's nurse  [phone] unable to reach nurse. RETURN call from [address] [facility] nurse. [address] reports Catalina Frost is doing well. She has no chest pain or dyspnea. 2+ pitting edema in BLE. Appetite is good. Drinks supplement with dinner. Bowel and Bladder WNL. Toileted by staff. Ambulates with a walker. 1111F medication reconciliation completed. Takes all medications as directed. Remains on lasix 40mg daily. No new med orders at discharge. Reviewed appt with PCP for discharge follow up. PCP comes to [facility] tomorrow and will see pt then. Appt to be scheduled with Cardiology Dr Daisy Ruiz.  No new issues or concerns. Final call. Patient is aware of when to contact MD with any new or worsening symptoms. Advised to contact PCP 24/7 with any health concerns for early outpatient intervention in an effort to avoid hospitalization. Report any worsening symptoms to PCP and/or Call 911 and/or GO TO EMERGENCY ROOM if symptoms are severe. Expresses understanding. [facility] Care Coordinator reviewed discharge instructions with caregiver who verbalized understanding. The caregiver was given an opportunity to ask questions and does not have any further questions or concerns at this time. Were discharge instructions available to patient? Yes. Reviewed appropriate site of care based on symptoms and resources available to patient including: PCP  Specialist  When to call [address] The caregiver agrees to contact the PCP office for questions related to their healthcare. Advance Care Planning:   Does patient have an Advance Directive: reviewed and current. Medication reconciliation was performed with family, who verbalizes understanding of administration of home medications. Medications reviewed, 1111F entered: yes    Was patient discharged with a pulse oximeter? no    Non-face-to-face services provided:  Obtained and reviewed discharge summary and/or continuity of care documents    Offered patient enrollment in the Remote Patient Monitoring (RPM) program for in-home monitoring: Patient is not eligible for RPM program.    Care Transitions 24 Hour Call    Do you have all of your prescriptions and are they filled?: Yes  Care Transitions Interventions         Discussed follow-up appointments. If no appointment was previously scheduled, appointment scheduling offered: Yes. Is follow up appointment scheduled within 7 days of discharge? Yes.     Follow Up  Future Appointments   Date Time Provider Tila oBjorquez   2/23/2023  1:30 PM [facility] FLUORO ROOM KELI GOLDSTEIN [place]   2/23/2023  1:30 PM Noe Bazan YODIT Neri [place]   2/23/2023  2:00 PM YODIT [facility] SPEECH [place]   3/29/2023  1:00 PM Olga Ponce [facility] [address]   4/12/2023 11:00 AM [facility]ELICIA Atkins [facility]       [facility] Care Coordinator provided contact information. No further follow-up call indicated based on severity of symptoms and risk factors.   Plan for next call:  [address], [address] [facility] Care Transitions Nurse   [phone]

## 2023-02-20 NOTE — TELEPHONE ENCOUNTER
Discharged 2/18/22 from [address] [place] [address] MARs. Noted that Lasix was not on their readmission orders. Attempted to call GP nurse, but no answer. Faxed a request that she call our office to confirm pt is getting Lasix 40 mg bid, as ordered upon discharge.

## 2023-02-20 NOTE — TELEPHONE ENCOUNTER
GP nurse Mark Maloney)- returned call; confirmed pt is taking Lasix 40 mg bid. Med added to their order sheet. Care Transitions Initial Follow Up Call    Outreach made within 2 business days of discharge: Yes    Patient: Gisell Chong Patient : 3/21/1930   MRN: [id number]  Reason for Admission: There are no discharge diagnoses documented for the most recent discharge. Discharge Date: 23       Spoke with: [facility] nurse Mark Domingo    Discharge department/facility: [facility]    TCM Interactive Patient Contact:  Was patient able to fill all prescriptions: Yes  Was patient instructed to bring all medications to the follow-up visit: NA- pt will be seen at [facility]  Is patient taking all medications as directed in the discharge summary? Yes  Does patient understand their discharge instructions: NA- nurse at [place] [address] understands orders  Does patient have questions or concerns that need addressed prior to 7-14 day follow up office visit: yes - 23 (at [facility]).     Scheduled appointment with PCP within 7-14 days    Follow Up  Future Appointments   Date Time Provider Tila Bojorquez   2023  1:30 PM MD FLUORO ROOM [facility] RAD [facility] [place]   2023  1:30 PM Saúl Mejia, SLP [facility] SPEECH [place]   2023  2:00 PM YODIT Pineda [facility] SPEECH [place]   3/29/2023  1:00 PM Raphael Sierra DO [facility] [address]   2023 11:00 AM FELICIA Jhaveri - CNP DCARDIO RODO [place] LPN

## 2023-02-21 ENCOUNTER — OUTSIDE SERVICES (OUTPATIENT)
Dept: INTERNAL MEDICINE | Age: 88
End: 2023-02-21

## 2023-02-21 VITALS — DIASTOLIC BLOOD PRESSURE: 73 MMHG | HEART RATE: 102 BPM | SYSTOLIC BLOOD PRESSURE: 138 MMHG | TEMPERATURE: 97.5 F

## 2023-02-21 DIAGNOSIS — Z09 HOSPITAL DISCHARGE FOLLOW-UP: Primary | ICD-10-CM

## 2023-02-21 DIAGNOSIS — N18.30 STAGE 3 CHRONIC KIDNEY DISEASE, UNSPECIFIED WHETHER STAGE 3A OR 3B CKD (HCC): ICD-10-CM

## 2023-02-21 DIAGNOSIS — I48.0 PAROXYSMAL A-FIB (HCC): ICD-10-CM

## 2023-02-21 DIAGNOSIS — I10 ESSENTIAL HYPERTENSION: ICD-10-CM

## 2023-02-21 DIAGNOSIS — E03.9 HYPOTHYROIDISM, UNSPECIFIED TYPE: ICD-10-CM

## 2023-02-21 DIAGNOSIS — I48.91 NEW ONSET ATRIAL FIBRILLATION (HCC): ICD-10-CM

## 2023-02-21 DIAGNOSIS — F03.918 DEMENTIA WITH BEHAVIORAL DISTURBANCE: ICD-10-CM

## 2023-02-21 DIAGNOSIS — E78.5 DYSLIPIDEMIA: ICD-10-CM

## 2023-02-21 DIAGNOSIS — I25.10 CORONARY ARTERY DISEASE INVOLVING NATIVE CORONARY ARTERY OF NATIVE HEART WITHOUT ANGINA PECTORIS: ICD-10-CM

## 2023-02-21 DIAGNOSIS — I27.20 PULMONARY HYPERTENSION (HCC): ICD-10-CM

## 2023-02-21 DIAGNOSIS — I50.9 HEART FAILURE, UNSPECIFIED HF CHRONICITY, UNSPECIFIED HEART FAILURE TYPE (HCC): ICD-10-CM

## 2023-02-21 NOTE — PROGRESS NOTES
Post-Discharge Transitional Care Follow Up      Sandra Maynard   YOB: 1930    Date of Office Visit:  2/21/2023  Date of Hospital Admission: 2/16/23  Date of Hospital Discharge: 2/18/23  Readmission Risk Score (high >=14%. Medium >=10%):Readmission Risk Score: 16.2      Care management risk score Rising risk (score 2-5) and Complex Care (Scores >=6): No Risk Score On File     Non face to face  following discharge, date last encounter closed (first attempt may have been earlier): 02/20/2023     Call initiated 2 business days of discharge: No     Hospital discharge follow-up  -     AL DISCHARGE MEDS RECONCILED W/ CURRENT OUTPATIENT MED LIST  New onset atrial fibrillation ([address]  Heart failure, unspecified HF chronicity, unspecified heart failure type ([address]  -     Basic Metabolic Panel; Future  Paroxysmal A-fib (HCC)  Pulmonary hypertension (HCC)  Coronary artery disease involving native coronary artery of native heart without angina pectoris  Essential hypertension  Stage 3 chronic kidney disease, unspecified whether stage 3a or 3b CKD (HCC)  Dyslipidemia  Dementia with behavioral disturbance  Hypothyroidism, unspecified type  Gastroesophageal reflux disease without esophagitis  Bilateral pleural effusion    Medical Decision Making: moderate complexity  No follow-ups on file. Subjective:   HPI    Inpatient course: Discharge summary reviewed- see chart. Patient presents for transition of care visit following hospitalization for new onset atrial fibrillation. Patient had progressive weight gain and edema. Initially evaluated 02/13/23 in ER, noted to have CHF exacerbation. Lasix dose was increased, but symptoms continued to worsen. Patient developed dyspnea and was transferred to ER, was noted to be in A fib with RVR, subsequently admitted. Determined to not be a candidate for anticoagulation due to fall history. Remains rate controlled. Has been stable since returning to facility.  Edema continues to gradually improve on lasix. Denies chest pain or dyspnea. Patient has not reported any palpitations. Currently has cardiology follow up scheduled in April, discussed scheduling this sooner with staff given recent hospitalization. No memory or behavioral concerns since returning to facility, continues on zyprexa without concerns for sedation. Hypothyroidism is well controlled on current dose of levothyroxine. Hypertension is well controlled on amlodipine. Continues on atorvastatin for dyslipidemia.         Interval history/Current status: stable    Patient Active Problem List   Diagnosis    Dyslipidemia    Osteopenia    Impaired fasting glucose    Adenomatous polyp    Essential hypertension    Hypothyroidism    Sciatica    GERD (gastroesophageal reflux disease)    Visual disturbance    Malignant melanoma in situ ([address]    Difficulty controlling anger    Primary open angle glaucoma (POAG) of both eyes, moderate stage    PVD (posterior vitreous detachment), both eyes    Combined forms of age-related cataract of both eyes    Dementia with behavioral disturbance    Tremor    Essential tremor    Dizziness    Chronic cerebral ischemia    Acquired cerebral atrophy (HCC)    Cerebral infarction ([address]    Anxiety    Paroxysmal A-fib ([address]    Closed displaced intertrochanteric fracture of left femur (HCC)    Acute ST segment elevation myocardial infarction ([address]    Heart murmur    Migraine    Female bladder prolapse    Falls    Hiatal hernia    Hypercholesterolemia    Coronary artery disease involving native coronary artery without angina pectoris    Cataract    Heart failure, unspecified HF chronicity, unspecified heart failure type (HCC)    Chronic renal disease, stage III ([address] [011201]    Pseudophakia of both eyes    Nondisp intertroch fx left femur, init for opn fx type I/2 (HCC)    Bilateral pleural effusion    Acute on chronic diastolic heart failure (HCC)    Pulmonary hypertension (HCC)    Acute respiratory failure with hypoxia [facility])    New onset atrial fibrillation ([facility] [address]       Medications listed as ordered at the time of discharge from hospital     Medication List            Accurate as of February 21, 2023 11:59 PM. If you have any questions, ask your nurse or doctor. CONTINUE taking these medications      acetaminophen 500 MG tablet  Commonly known as: TYLENOL  Take 2 tablets by mouth every 8 hours as needed for Pain     amLODIPine 5 MG tablet  Commonly known as: NORVASC  TAKE 1 TABLET DAILY     aspirin 81 MG EC tablet     atorvastatin 40 MG tablet  Commonly known as: LIPITOR  Take 1 tablet by mouth daily     calcium carbonate-vitamin D3 600-400 MG-UNIT Tabs per tab  Commonly known as: CALTRATE     Ciclopirox 1 % Sham     FLUoxetine 20 MG capsule  Commonly known as: PROZAC  TAKE 1 CAPSULE DAILY     furosemide 40 MG tablet  Commonly known as: Lasix  Take 1 tablet by mouth 2 times daily     hydrOXYzine HCl 25 MG tablet  Commonly known as: ATARAX  Take 1 tablet by mouth nightly as needed for Anxiety     levothyroxine 137 MCG tablet  Commonly known as: SYNTHROID  Take 1 tablet by mouth daily     memantine 5 MG tablet  Commonly known as: NAMENDA  TAKE 1 TABLET DAILY     NUTRITIONAL DRINK PO     OLANZapine 2.5 MG tablet  Commonly known as: ZYPREXA     potassium chloride 20 MEQ extended release tablet  Commonly known as: KLOR-CON M  Take 1 tablet by mouth 2 times daily               Medications marked \"taking\" at this time  No outpatient medications have been marked as taking for the 2/21/23 encounter (Outside Services) with FELICIA Laura CNP. Medications patient taking as of now reconciled against medications ordered at time of hospital discharge: Yes    Review of Systems   Constitutional:  Negative for chills and fever. HENT:  Negative for congestion and sore throat. Respiratory:  Negative for chest tightness and shortness of breath. Cardiovascular:  Negative for chest pain. BLE edema improving   Gastrointestinal:  Negative for diarrhea, nausea and vomiting. Genitourinary:  Negative for difficulty urinating and dysuria. Musculoskeletal:  Negative for gait problem and myalgias. Neurological:  Negative for dizziness and headaches. Psychiatric/Behavioral:  Negative for agitation and behavioral problems. Due to patient's clinical status, ROS of symptoms was completed by discussion with long term care facility staff, as well as with input from patient. Objective:    /73   Pulse (!) 102   Temp 97.5 °F (36.4 °C)   LMP  (LMP Unknown)   Physical Exam  Vitals reviewed. Constitutional:       General: She is not in acute distress. Appearance: She is not ill-appearing. HENT:      Head: Normocephalic and atraumatic. Right Ear: External ear normal.      Left Ear: External ear normal.   Eyes:      Extraocular Movements: Extraocular movements intact. Conjunctiva/sclera: Conjunctivae normal.   Cardiovascular:      Rate and Rhythm: Normal rate and regular rhythm. Pulmonary:      Effort: Pulmonary effort is normal. No respiratory distress. Breath sounds: Normal breath sounds. Neurological:      General: No focal deficit present. Mental Status: She is alert. Mental status is at baseline. Psychiatric:         Mood and Affect: Mood normal.         Behavior: Behavior normal.       Assessment and Plan:      1. Hospital discharge follow-up  - ID DISCHARGE MEDS RECONCILED W/ CURRENT OUTPATIENT MED LIST    2. New onset atrial fibrillation ([address] stable  3. Heart failure, unspecified HF chronicity, unspecified heart failure type ([address] stable  4. Paroxysmal A-fib ([address] stable  5. Pulmonary hypertension ([address] stable  6. Coronary artery disease involving native coronary artery of native heart without angina pectoris, stable  - Continue to follow with cardiology  - Basic Metabolic Panel; Future    7.  Essential hypertension,  stable  - Continue current medications    8. Stage 3 chronic kidney disease, unspecified whether stage 3a or 3b CKD ([facility] [address] stable  - Monitor BMP    9. Dyslipidemia,  stable  - Continue current medications    10. Dementia with behavioral disturbance,  stable  - Continue current medications    11.  Hypothyroidism, unspecified type,  stable  - Continue current medications      Electronically signed by FELICIA Baum CNP on 2/22/2023 at 1:55 PM

## 2023-02-22 ASSESSMENT — ENCOUNTER SYMPTOMS
SORE THROAT: 0
SHORTNESS OF BREATH: 0
VOMITING: 0
DIARRHEA: 0
CHEST TIGHTNESS: 0
NAUSEA: 0

## 2023-02-23 ENCOUNTER — APPOINTMENT (OUTPATIENT)
Dept: SPEECH THERAPY | Age: 88
End: 2023-02-23
Payer: MEDICARE

## 2023-02-23 ENCOUNTER — HOSPITAL ENCOUNTER (OUTPATIENT)
Dept: GENERAL RADIOLOGY | Age: 88
Discharge: HOME | End: 2023-02-25
Payer: MEDICARE

## 2023-02-23 ENCOUNTER — HOSPITAL ENCOUNTER (OUTPATIENT)
Dept: SPEECH THERAPY | Age: 88
Setting detail: THERAPIES SERIES
Discharge: HOME | End: 2023-02-23
Payer: MEDICARE

## 2023-02-23 DIAGNOSIS — R09.89 CHOKING EPISODE: ICD-10-CM

## 2023-02-23 DIAGNOSIS — R09.89 CHOKING EPISODE: Primary | ICD-10-CM

## 2023-02-23 DIAGNOSIS — R13.10 DYSPHAGIA, UNSPECIFIED TYPE: ICD-10-CM

## 2023-02-23 PROCEDURE — 2500000003 HC RX 250 WO HCPCS: Performed by: NURSE PRACTITIONER

## 2023-02-23 PROCEDURE — 92611 MOTION FLUOROSCOPY/SWALLOW: CPT

## 2023-02-23 PROCEDURE — 74230 X-RAY XM SWLNG FUNCJ C+: CPT

## 2023-02-23 RX ADMIN — BARIUM SULFATE 140 ML: 980 POWDER, FOR SUSPENSION ORAL at 13:45

## 2023-02-23 NOTE — PROGRESS NOTES
SPEECH THERAPY  MODIFIED BARIUM SWALLOW STUDY    [x] Outpatient [facility]  [] Inpatient- [facility]    Patient: Elzbieta Larsen  YOB: 1930  Gender: female  MRN:  [id number]  Referring Physician: Adrianna Chacon - Cnp  [address] [facility],  [address]  Diagnosis:  Choking episode  Dysphagia, unspecified type     Date of Study: 2/23/23    History of Present Illness/Injury: Patient is a 80 y.o. resident of [facility]. She experienced a choking episode on 2/13/23. Per aide's report, patient choked on a piece of steak. No previous history of dysphagia. Past Medical History:   Diagnosis Date    Adenomatous polyp 06/10    With recommendation for repeat 06/15. Also, diverticulosis was noted. Cataracts, bilateral     Closed right hip fracture, initial encounter ([facility] [address] 10/30/2021    Combined forms of age-related cataract of both eyes 10/1/2018    Corneal scar, right eye     Cystocele     history of     Dementia ([address]     Mini-Mental Status exam 27/30  6/14  declines meds at this point,  MMSE 29/30 on June 13, 2017  MMSE 26/30 4/2018    Difficulty controlling anger 9/16/2018    Dyslipidemia     GERD (gastroesophageal reflux disease)     egd  4/15 ok    Goiter     benign, history of     Hiatal hernia     Hypertension     Hypokalemia     Hypothyroidism     Impaired fasting glucose     Malignant melanoma in situ ([facility] [address]     R upper Back 8/17    Migraines     Murmur, functional     Grade 1/6 systolic, history of     Osteopenia     T -1.0 hip, 03/09, T -0.3 spine, 03/09. 1) Repeat DEXA scan 09/12 with T +0.15, T -1.2 at the hip but -1.8 at the mean femoral neck giving her a FRAX score of 4.3 at the hip.  Reclast 10/13 and given 2014,2015, and 1/4/2017, on calcium and vit d,  Redo Dexa 10/14 Frax 4.2% , Frax 4.8% 10 yr hip fracture risk on Dexa 1/17  On Reclast    Posterior vitreous detachment of both eyes     Primary open angle glaucoma (POAG) of both eyes, moderate stage 10/1/2018    Sciatica 12/15/2015    Skin cancer     History of multiple skin cancers.  Following with Dr. Paola Mckeon--- invasive squamous cell Ca 8/17 L forearm    Syncope and collapse 1/21/2015    Tremor 12/23/2018    Trichiasis of left lower eyelid without entropion     Visual disturbance 10/26/2017        Reported Dysphagia Symptoms:  []Coughing [x]Food catching in throat  []Drooling  []Reflux               []Wet Vocal Quality  [x]Other: choking episode    Reason for referral:   [x]Assess physiology of swallow   [x]Determine appropriate diet, posture, or compensatory strategies  [x]Rule out aspiration    []Other:      Prior MBSS:  [x]No   []Yes    Date:      Results:        Current Diet: [] NPO [x] Regular diet [] Soft and bite sized (Dysphagia III)  [] Minced and moist (Dysphagia II)   [] Pureed (Dysphagia I)  [] Liquidised       Current Liquids: [x] Thin [] Mildly Thick (Nectar thick)  [] Moderately Thick (Honey thick) [] Extremely Thick (Spoon-Pudding)    Respiratory Status: [x] Independent [] Nasal Cannula [] Oxygen Mask        [] Tracheostomy [] Other:       [] Ventilator/Settings:    Behavioral Observation: [x] Alert   [] Oriented   [x] Confused  [] Lethargic      [] Dysarthric  [] Limited Direction Following  [] Agitated  [] Other:    Patient was evaluated using:   [x] Thin liquid   [] Mildly thick (Nectar thick) liquid  [] Moderately thick (Honey thick) liquid/Liquidised   [] Extremely thick (Pudding thick) liquid      [x] Solid [x] Soft and bite sized [] Minced and moist [x] Puree  [] barium tablet      Dentition   [x]natural, condition:    [x]good  []fair  []poor   []edentulous   []partials:   []upper []lower []loose []not worn for MBSS   []dentures:   []upper []lower []loose []not worn for MBSS        ORAL PREPARATION PHASE: [x] WFL  [] Impaired/Reduced   [] Slow mastication     [] Uncoordinated mastication    [] Decreased bolus control   [] Decreased bolus formation   [] Loss of bolus from lips / Anterior spillage   [] OTHER:       ORAL PHASE: [x] [facility]              [] Impaired/Reduced   [] Residue in the anterior sulcus                   [] Residue in the lateral sulcus - right   [] Residue in the lateral sulcus - left               [] Uncoordinated AP movement   [] Slow AP movement                             [] Decreased lingual elevation   [x] Decreased tongue base retraction               [] Uncontrolled bolus/diffuse fall over tongue base   [] Piecemeal deglutition     [x] Base of tongue residue   [] OTHER:     ORAL PHASE CRUZITO SCORE: (Dysphagia outcome and severity scale)  [] 7 = Normal in all situations  [x] 6 = WFL / Mod I; Normal diet; May have mild oral delay  [] 5 = Mild dysphagia; May have one diet consistency restricted; Mild oral residue -clears. [] 4 = Mild-Moderate dysphagia; May have one or two diet consistencies restricted; Oral residue clears with cue; Intermittent supervision or cueing. [] 3 = Moderate dysphagia; Total assist with strategies; Two or more consistencies restricted; Moderate oral residue clears with cue;   [] 2 = Moderately Severe dysphagia; Tolerates at least one consistency safely with total use of strategies; Severe oral residue and unable to clear or needs multiple cues; Non-oral nutrition. [] 1 = Severe dysphagia; NPO; Unable to tolerate any po safely; Severe oral loss of bolus or oral residue; Non-oral nutrition.         PHARYNGEAL PHASE: [x] WFL  [] Impaired   [] Absent Swallow                [] Delayed Swallow               [] Decreased airway protection   [] Decreased epiglottic inversion     [] Decreased hyolaryngeal elevation   [x] Residue in the valleculae               [] Residue in the pyriform sinus    [] Cricopharyngeal dysfunction              [] Residue along posterior pharyngeal wall     [] Residue along the ariepiglottic folds    [] Decreased pharyngeal contraction   [] OTHER:    PHARYNGEAL PHASE CRUZITO SCORE: (Dysphagia outcome and severity scale)  [] 7 = Normal in all situations; Normal diet; No strategies  [x] 6 = WFL / Mod I; May have mild pharyngeal delay or residue but clears spontaneously; No aspiration or laryngeal penetration  [] 5 = Mild dysphagia:  May need one consistency restricted; May have one or more of the following:  Aspiration with thin - cough to clear; Airway penetration midway to the vocal cords with one or more consistency or to the vocal folds with one consistency, but clears spontaneously; Residue in the pharynx clears spontaneously. [] 4 = Mild - Moderate dysphagia: One or two consistencies restricted; May exhibit one or more of the following:  Residue clears with cue; Aspiration of one consistency with weak or no reflexive cough; Laryngeal penetration to the vocal cords with cough with two consistencies; Laryngeal penetration to the vocal cords without cough on one consistency. [] 3 = Moderate dysphagia:  Two or more diet consistencies restricted; May exhibit one or more of the following: Moderate residue clears with cue; Airway penetration to the level of the vocal folds without cough with two or more consistencies; Aspiration with two consistencies with weak or no reflexive cough; Aspiration of one consistency, no cough and airway penetration with one consistency, no cough. [] 2 = Moderately Severe dysphagia: Tolerates at least one consistency safely with total use of strategies; Non-oral nutrition; Severe residue unable to clear; Aspiration with two or more consistencies with no cough; Aspiration with one or more consistency, no cough and airway penetration to the vocal folds with one or more consistency, no cough. [] 1 = Severe dysphagia:  NPO; Unable to tolerate any po safely; Severe residue unable to clear; Silent aspiration with two or more consistencies, non-functional cough;  OR Unable to achieve a swallow.         SIGNS AND SYMPTOMS OF LARYNGEAL PENETRATION / ASPIRATION:  [x] No evidence of laryngeal penetration  [x] No evidence of aspiration  [] Laryngeal penetration evident with:  [] Audible aspiration evident with:  [] Silent aspiration evident with:    PENETRATION-ASPIRATION SCALE (PAS):  [x] 1 = Material does not enter the airway  [] 2 = Material enters the airway, remains above the vocal folds, and is ejected from the airway  [] 3 = Material enters the airway, remains above the vocal folds, and is not ejected from airway  [] 4 = Material enters the airway, contacts the vocal folds, and is ejected from the airway  [] 5 = Material enters the airway, contacts the vocal folds, and is not ejected from the airway  [] 6 = Material enters the airway, passes below the vocal folds, and is ejected into the larynx or out of the airway  [] 7 = Material enters the airway, passes below the vocal folds, and is not ejected from the trachea despite effort  [] 8 = Material enters the airway, passes below the vocal folds, and no effort is made to eject    ESOPHAGEAL PHASE:   [x] No significant findings  [] See Radiology Report for details  [] Recommend further esophageal testing    ATTEMPTED TECHNIQUES:                                 Comments:  [x]Small bolus size [x] Effective []Not Effective    []Straw [] Effective []Not Effective    [x]Cup [x] Effective []Not Effective    []Chin tuck [] Effective []Not Effective    []Head Turn [] Effective []Not Effective    []Spoon presentations [] Effective []Not Effective    [] Volitional cough [] Effective []Not Effective    []Spontaneous cough [] Effective []Not Effective    []OTHER: [] Effective []Not Effective      DIAGNOSTIC IMPRESSIONS:  Patient presents with oral and pharyngeal phases of swallow that [facility]. Adequate mastication and bolus formation throughout. Trace residue on base of tongue and trace residue in valleculae with all trials that cleared spontaneously. Adequate hyolaryngeal elevation and full epiglottic closure noted. Good pharyngeal constriction.  No penetration or aspiration. Patient is at minimal risk for pulmonary compromise and aspiration at this time. SLP provided education to patient and aide to cut up meats into small bites. DIET RECOMMENDATIONS:    Diet:  [] NPO [x] Regular diet   [] Soft and bite sized (Dysphagia III)  [] Minced and moist (Dysphagia II)   [] Pureed (Dysphagia I)  [] Liquidised       Liquids: [x] Thin [] Mildly Thick (Nectar thick)  [] Moderately Thick (Honey thick) [] Extremely Thick (Spoon-Pudding)        STRATEGIES:   [x] Full upright position [x] Small bite/sip   [] No Straw   [] Multiple Swallow  [] Chin tuck   [] Head turn   [] Pulmonary monitoring [] Oral care after all meals  [] Supervision  [] Medication in applesauce   [] Direct 1:1 Supervision [] Spoon all liquids   [] Alternate solid / liquid [] Limit distractions   [] Monitor for fatigue [] PMV in place for all po   [] OTHER:    PATIENT STRENGTHS:  [x] Motivated [x] Cooperative   [] Good family support    [] Prior level of function  [] OTHER:    REHAB POTENTIAL:   [x] Excellent [] Good [] Fair  [] Poor    EDUCATION:   Learner: [x] Patient            [] Significant other                                   [] Son/Daughter [] Parent [x] Other: [facility] aide     Education: [x] Reviewed results and recommendations of this evaluation     [x] Reviewed diet and strategies     [] Reviewed signs, symptoms and risk of aspiration     [] Demonstrated how to thick liquid appropriately. [] Reviewed goals and Plan of Care     [] OTHER:     Method: [x] Discussion  [] Demonstration [] Hand-out     [] Other:     Evaluation of Education:     [x] Verbalizes understanding      [] Demonstrates with assistance     [] Demonstrates without assistance                                  []Needs further instruction      [] No evidence of learning                                    [] Family not present    PATIENT GOALS: [x] Pt did not state; will further assess during treatment.      [] Return to the least restricted diet possible     [] Return to previous level of function     [] Other:    PLAN / TREATMENT RECOMMENDATIONS:  [x] No further speech therapy services indicated. [] Speech Therapy evaluation to assess speech, language, cognition and/or voice  [] Skilled dysphagia treatment ___ times per week for ___ weeks.   [] OTHER:        Electronically signed by: Nikki Escoto MS, CF-SLP          Date: 2/23/2023

## 2023-02-24 RX ORDER — OLANZAPINE 2.5 MG/1
TABLET ORAL
Qty: 90 TABLET | Refills: 3 | Status: SHIPPED | OUTPATIENT
Start: 2023-02-24

## 2023-02-24 NOTE — TELEPHONE ENCOUNTER
Tatianna Chester called requesting a refill of the below medication which has been pended for you:     Requested Prescriptions     Pending Prescriptions Disp Refills    OLANZapine (ZYPREXA) 2.5 MG tablet [Pharmacy Med Name: OLANZAPINE TABS 2.5MG] 90 tablet 3     Sig: TAKE 1 TABLET NIGHTLY       Last Appointment Date: 1/5/2023  Next Appointment Date: Visit date not found    No Known Allergies

## 2023-03-02 ENCOUNTER — HOSPITAL ENCOUNTER (OUTPATIENT)
Age: 88
Setting detail: SPECIMEN
Discharge: HOME | End: 2023-03-02
Payer: MEDICARE

## 2023-03-02 DIAGNOSIS — I50.9 HEART FAILURE, UNSPECIFIED HF CHRONICITY, UNSPECIFIED HEART FAILURE TYPE (HCC): ICD-10-CM

## 2023-03-02 LAB
ANION GAP SERPL CALCULATED.3IONS-SCNC: 9 MMOL/L (ref 9–17)
BUN SERPL-MCNC: 16 MG/DL (ref 8–23)
BUN/CREAT BLD: 28 (ref 9–20)
CALCIUM SERPL-MCNC: 8.9 MG/DL (ref 8.6–10.4)
CHLORIDE SERPL-SCNC: 106 MMOL/L (ref 98–107)
CO2 SERPL-SCNC: 26 MMOL/L (ref 20–31)
CREAT SERPL-MCNC: 0.57 MG/DL (ref 0.5–0.9)
GFR SERPL CREATININE-BSD FRML MDRD: >60 ML/MIN/1.73M2
GLUCOSE SERPL-MCNC: 131 MG/DL (ref 70–99)
POTASSIUM SERPL-SCNC: 3.6 MMOL/L (ref 3.7–5.3)
SODIUM SERPL-SCNC: 141 MMOL/L (ref 135–144)

## 2023-03-02 PROCEDURE — 80048 BASIC METABOLIC PNL TOTAL CA: CPT

## 2023-03-02 PROCEDURE — 36415 COLL VENOUS BLD VENIPUNCTURE: CPT

## 2023-03-04 ENCOUNTER — HOSPITAL ENCOUNTER (OUTPATIENT)
Age: 88
Setting detail: SPECIMEN
Discharge: HOME | End: 2023-03-04
Payer: MEDICARE

## 2023-03-04 LAB
BACTERIA: ABNORMAL
BILIRUBIN URINE: NEGATIVE
COLOR: YELLOW
EPITHELIAL CELLS UA: ABNORMAL /HPF (ref 0–5)
GLUCOSE UR STRIP.AUTO-MCNC: NEGATIVE MG/DL
KETONES UR STRIP.AUTO-MCNC: NEGATIVE MG/DL
LEUKOCYTE ESTERASE UR QL STRIP.AUTO: ABNORMAL
NITRITE UR QL STRIP.AUTO: POSITIVE
PROT UR STRIP.AUTO-MCNC: 6 MG/DL (ref 5–6)
PROT UR STRIP.AUTO-MCNC: NEGATIVE MG/DL
RBC CLUMPS #/AREA URNS AUTO: ABNORMAL /HPF (ref 0–4)
SPECIFIC GRAVITY UA: 1.02 (ref 1.01–1.02)
TURBIDITY: CLEAR
URINE HGB: ABNORMAL
UROBILINOGEN, URINE: NORMAL
WBC UA: ABNORMAL /HPF (ref 0–4)

## 2023-03-04 PROCEDURE — 87086 URINE CULTURE/COLONY COUNT: CPT

## 2023-03-04 PROCEDURE — 81001 URINALYSIS AUTO W/SCOPE: CPT

## 2023-03-06 RX ORDER — CEFUROXIME AXETIL 250 MG/1
250 TABLET ORAL 2 TIMES DAILY
Qty: 10 TABLET | Refills: 0 | Status: SHIPPED | OUTPATIENT
Start: 2023-03-06 | End: 2023-03-11

## 2023-03-09 RX ORDER — LOSARTAN POTASSIUM 100 MG/1
TABLET ORAL
Qty: 90 TABLET | Refills: 3 | OUTPATIENT
Start: 2023-03-09

## 2023-03-09 NOTE — TELEPHONE ENCOUNTER
Can we confirm with Yg Cain if this is on her medication list? It was not listed at most recent visit 02/21/23

## 2023-03-09 NOTE — TELEPHONE ENCOUNTER
Pharmacy called requesting a refill of the below medication which has been pended for you:     Requested Prescriptions     Pending Prescriptions Disp Refills    losartan (COZAAR) 100 MG tablet [Pharmacy Med Name: LOSARTAN TABS 100MG] 90 tablet 3     Sig: TAKE 1 TABLET DAILY       Last Appointment Date: 1/5/2023  Next Appointment Date: Visit date not found    No Known Allergies

## 2023-03-09 NOTE — TELEPHONE ENCOUNTER
Spoke with GP nurse Paula Lot)- pt no longer takes Losartan. Med refused as \"Medication discontinued\".   Also, pt no longer uses [facility]; uses their pharmacy [facility])

## 2023-03-10 ENCOUNTER — OFFICE VISIT (OUTPATIENT)
Dept: CARDIOLOGY | Age: 88
End: 2023-03-10
Payer: MEDICARE

## 2023-03-10 VITALS
BODY MASS INDEX: 28.94 KG/M2 | WEIGHT: 168.6 LBS | SYSTOLIC BLOOD PRESSURE: 106 MMHG | DIASTOLIC BLOOD PRESSURE: 64 MMHG | HEART RATE: 92 BPM

## 2023-03-10 DIAGNOSIS — I48.91 ATRIAL FIBRILLATION, UNSPECIFIED TYPE (HCC): Primary | ICD-10-CM

## 2023-03-10 DIAGNOSIS — I25.10 CORONARY ARTERY DISEASE INVOLVING NATIVE CORONARY ARTERY OF NATIVE HEART WITHOUT ANGINA PECTORIS: ICD-10-CM

## 2023-03-10 PROCEDURE — 99214 OFFICE O/P EST MOD 30 MIN: CPT | Performed by: SURGERY

## 2023-03-10 PROCEDURE — 93005 ELECTROCARDIOGRAM TRACING: CPT | Performed by: SURGERY

## 2023-03-10 NOTE — PROGRESS NOTES
Today's Date: 3/10/2023  Patient's Name: Rodriguez Lujan  Patient's age: 80 y.o., 3/21/1930    Subjective: The patient is a 80y.o. year old, , female with history of CHF, dementia, hypertension, PAF, and a history of an MI for post hospital follow-up A-fib . States that she is doing well denies chest pain or increased shortness of breath and has no complaint, A-fib on EKG today with heart rate 92  Family History:  Family History   Problem Relation Age of Onset    Coronary Art Dis Father     Colon Cancer Brother     Diabetes Neg Hx     Cataracts Neg Hx     Glaucoma Neg Hx          Past Medical History:   has a past medical history of Adenomatous polyp, Cataracts, bilateral, Closed right hip fracture, initial encounter ([address] Combined forms of age-related cataract of both eyes, Corneal scar, right eye, Cystocele, Dementia ([address] Difficulty controlling anger, Dyslipidemia, GERD (gastroesophageal reflux disease), Goiter, Hiatal hernia, Hypertension, Hypokalemia, Hypothyroidism, Impaired fasting glucose, Malignant melanoma in situ ([address] Migraines, Murmur, functional, Osteopenia, Posterior vitreous detachment of both eyes, Primary open angle glaucoma (POAG) of both eyes, moderate stage, Sciatica, Skin cancer, Syncope and collapse, Tremor, 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 Past Surgical History:   has a past surgical history that includes other surgical history (11/11/2008); Cholecystectomy; Colonoscopy (06/08/2010); Mohs surgery (10/07/2009); Vein Surgery (06/08/2009); Colonoscopy (07/18/2002); Cystocele repair (02/12/1999); Appendectomy; Dilation and curettage of uterus; other surgical history (01/01/1988); Hysterectomy (01/01/1995); Upper gastrointestinal endoscopy (04/08/2015); Intracapsular cataract extraction (Left, 08/25/2020); Intracapsular cataract extraction (Right, 10/08/2020); Hip fracture surgery (Right, 11/02/2021);  Femur fracture surgery (Left, 09/04/2022); and Femur fracture surgery (Left, 9/4/2022). Home Medications:  Prior to Admission medications    Medication Sig Start Date End Date Taking?  Authorizing Provider   cefUROXime (CEFTIN) 250 MG tablet Take 1 tablet by mouth 2 times daily for 5 days 3/6/23 3/11/23 Yes Lazarus Britain, APRN - CNP   OLANZapine (ZYPREXA) 2.5 MG tablet TAKE 1 TABLET NIGHTLY 2/24/23  Yes Lazarus Britain, APRN - CNP   furosemide (LASIX) 40 MG tablet Take 1 tablet by mouth 2 times daily 2/18/23 3/20/23 Yes Jhon Goss MD   hydrOXYzine HCl (ATARAX) 25 MG tablet Take 1 tablet by mouth nightly as needed for Anxiety 2/14/23 3/16/23 Yes Lazarus Britain, APRN - CNP   amLODIPine (NORVASC) 5 MG tablet TAKE 1 TABLET DAILY 2/6/23  Yes Lazarus Britain, APRN - CNP   memantine (NAMENDA) 5 MG tablet TAKE 1 TABLET DAILY 1/16/23  Yes Lazarus Britain, APRN - CNP   levothyroxine (SYNTHROID) 137 MCG tablet Take 1 tablet by mouth daily 11/3/22  Yes Lazarus Britain, APRN - CNP   acetaminophen (TYLENOL) 500 MG tablet Take 2 tablets by mouth every 8 hours as needed for Pain 9/20/22  Yes Lazarus Britain, APRN - CNP   calcium carbonate-vitamin D3 (CALTRATE) 600-400 MG-UNIT TABS per tab Take 1 tablet by mouth daily   Yes Historical Provider, MD   atorvastatin (LIPITOR) 40 MG tablet Take 1 tablet by mouth daily 2/1/21  Yes Isabel Larios MD   aspirin 81 MG EC tablet Take 1 tablet by mouth daily 12/21/20  Yes Historical Provider, MD   potassium chloride (KLOR-CON M) 20 MEQ extended release tablet Take 1 tablet by mouth 2 times daily 10/16/22   Thaddeus Blood, MD   Ciclopirox 1 % SHAM Apply topically once a week on Tuesdays- AKA Loprox Shampoo    Historical Provider, MD   Nutritional Supplements (NUTRITIONAL DRINK PO) Mighty shakes bid with meals    Historical Provider, MD   FLUoxetine (PROZAC) 20 MG capsule TAKE 1 CAPSULE DAILY 5/31/22   Isabel Larios MD   BRILINTA 90 MG TABS tablet Take 1 tablet by mouth 2 times daily 4/20/22 9/6/22  Regi Smith Rory Santoro MD   nitroGLYCERIN (NITROSTAT) 0.4 MG SL tablet Place 0.4 mg under the tongue every 5 minutes as needed for Chest pain up to max of 3 total doses. If no relief after 3 dose, call 911.   9/6/22  Historical Provider, MD   ibuprofen (ADVIL;MOTRIN) 600 MG tablet Take 1 tablet by mouth 3 times daily as needed for Pain (Take with food.) 12/27/20 12/27/20  Erin Kwan MD   Multiple Vitamin (DAILY MULTIVITAMIN PO) Take 1 tablet by mouth daily   9/6/22  Historical Provider, MD       Allergies:  Patient has no known allergies. Social History:   reports that she has never smoked. She has never used smokeless tobacco. She reports that she does not currently use alcohol. She reports that she does not use drugs. Review of Systems:  Review of Systems   Constitutional:  Negative for chills, fatigue and fever. HENT:  Negative for congestion and dental problem. Eyes:  Negative for discharge and itching. Respiratory:  Negative for chest tightness and shortness of breath. Cardiovascular:  Negative for chest pain and palpitations. Gastrointestinal:  Negative for abdominal distention, abdominal pain and vomiting. Endocrine: Negative for cold intolerance and heat intolerance. Musculoskeletal:  positive arthralgias and back pain. Neurological:  Negative for syncope. Psychiatric/Behavioral:  Negative for agitation and behavioral problems. Physical Exam:  /64 (Site: Right Upper Arm, Position: Sitting, Cuff Size: Medium Adult)   Wt 168 lb 9.6 oz (76.5 kg)   LMP  (LMP Unknown)   BMI 28.94 kg/m²   HEENT: PERRL, no cervical lymphadenopathy. No masses palpable. Physical Exam  Constitutional:       Appearance: Normal appearance. Cardiovascular:   I irregular heart rate and rhythm, positive for murmur     Pulses: Normal pulses. Heart sounds: Normal heart sounds. Pulmonary:      Effort: Pulmonary effort is normal. No respiratory distress. Breath sounds: Normal breath sounds. No stridor. Abdominal:      General: Abdomen is flat. Palpations: Abdomen is soft. Musculoskeletal:         Generalized weakness  Skin:     Capillary Refill: Capillary refill takes less than 2 seconds. Coloration: Skin is not jaundiced or pale. Neurological:      General: No focal deficit present. Mental Status: She is alert and oriented to place and person, forgetful to time  Psychiatric:         Mood and Affect: Mood normal.        Cardiac Data:  EKG: A-fib    Labs:     CBC: No results for input(s): WBC, HGB, HCT, PLT in the last 72 hours. BMP: No results for input(s): NA, K, CO2, BUN, CREATININE, LABGLOM, GLUCOSE in the last 72 hours. PT/INR: No results for input(s): PROTIME, INR in the last 72 hours. FASTING LIPID PANEL:  Lab Results   Component Value Date/Time    HDL 53 01/05/2023 06:28 AM    TRIG 62 01/05/2023 06:28 AM     LIVER PROFILE:No results for input(s): AST, ALT, LABALBU in the last 72 hours.     IMPRESSION:    Patient Active Problem List   Diagnosis    Dyslipidemia    Osteopenia    Impaired fasting glucose    Adenomatous polyp    Essential hypertension    Hypothyroidism    Sciatica    GERD (gastroesophageal reflux disease)    Visual disturbance    Malignant melanoma in situ ([address]    Difficulty controlling anger    Primary open angle glaucoma (POAG) of both eyes, moderate stage    PVD (posterior vitreous detachment), both eyes    Combined forms of age-related cataract of both eyes    Dementia with behavioral disturbance    Tremor    Essential tremor    Dizziness    Chronic cerebral ischemia    Acquired cerebral atrophy (HCC)    Cerebral infarction ([address]    Anxiety    Paroxysmal A-fib ([address]    Closed displaced intertrochanteric fracture of left femur (HCC)    Acute ST segment elevation myocardial infarction ([address]    Heart murmur    Migraine    Female bladder prolapse    Falls    Hiatal hernia    Hypercholesterolemia    Coronary artery disease involving native coronary artery without angina pectoris    Cataract    Heart failure, unspecified HF chronicity, unspecified heart failure type ([address]    Chronic renal disease, stage III ([address] [811130]    Pseudophakia of both eyes    Nondisp intertroch fx left femur, init for opn fx type I/2 (HCC)    Bilateral pleural effusion    Acute on chronic diastolic heart failure (HCC)    Pulmonary hypertension (HCC)    Acute respiratory failure with hypoxia ([address]    New onset atrial fibrillation (HCC)       RECOMMENDATIONS:        1. CAD with STEMI on 12/22020. ALLIE of proximal and distal RCA. Mild to moderate disease of other arteries. Recent echo on February 17, 2023 preserved LV function on recent echo, mild MR , moderate TR- continue ASA  - no more brilinta due to falls       2. HPL. On Statin. 3. PAF - in afib HR 92 -will DC Norvasc and start Lopressor 12.5 mg p.o. twice daily to help with heart rate, not appropriate for [facility] [place], operated by [facility] due to age and history of fall and dementia     4. Hypothyroidism.  Followed by PCP       Follow-up in 6 month sooner if needed    Christine Lesches, [address] Cardiology Consult           [phone]

## 2023-03-17 ENCOUNTER — HOSPITAL ENCOUNTER (EMERGENCY)
Age: 88
Discharge: HOME | End: 2023-03-17
Attending: EMERGENCY MEDICINE
Payer: MEDICARE

## 2023-03-17 ENCOUNTER — TELEPHONE (OUTPATIENT)
Dept: INTERNAL MEDICINE | Age: 88
End: 2023-03-17

## 2023-03-17 ENCOUNTER — APPOINTMENT (OUTPATIENT)
Dept: GENERAL RADIOLOGY | Age: 88
End: 2023-03-17
Payer: MEDICARE

## 2023-03-17 VITALS
BODY MASS INDEX: 28.94 KG/M2 | HEART RATE: 76 BPM | HEIGHT: 64 IN | SYSTOLIC BLOOD PRESSURE: 129 MMHG | TEMPERATURE: 97.7 F | OXYGEN SATURATION: 96 % | RESPIRATION RATE: 16 BRPM | DIASTOLIC BLOOD PRESSURE: 67 MMHG

## 2023-03-17 DIAGNOSIS — I50.9 ACUTE CONGESTIVE HEART FAILURE, UNSPECIFIED HEART FAILURE TYPE (HCC): Primary | ICD-10-CM

## 2023-03-17 DIAGNOSIS — E87.6 HYPOKALEMIA: ICD-10-CM

## 2023-03-17 LAB
ANION GAP SERPL CALCULATED.3IONS-SCNC: 11 MMOL/L (ref 9–17)
BNP SERPL-MCNC: 2610 PG/ML
BUN SERPL-MCNC: 16 MG/DL (ref 8–23)
BUN/CREAT BLD: 16 (ref 9–20)
CALCIUM SERPL-MCNC: 9 MG/DL (ref 8.6–10.4)
CHLORIDE SERPL-SCNC: 102 MMOL/L (ref 98–107)
CO2 SERPL-SCNC: 29 MMOL/L (ref 20–31)
CREAT SERPL-MCNC: 0.97 MG/DL (ref 0.5–0.9)
EKG ATRIAL RATE: 66 BPM
EKG Q-T INTERVAL: 416 MS
EKG QRS DURATION: 80 MS
EKG QTC CALCULATION (BAZETT): 432 MS
EKG R AXIS: -35 DEGREES
EKG T AXIS: 70 DEGREES
EKG VENTRICULAR RATE: 65 BPM
GFR SERPL CREATININE-BSD FRML MDRD: 55 ML/MIN/1.73M2
GLUCOSE SERPL-MCNC: 115 MG/DL (ref 70–99)
HCT VFR BLD AUTO: 36.5 % (ref 36.3–47.1)
HGB BLD-MCNC: 11.5 G/DL (ref 11.9–15.1)
MCH RBC QN AUTO: 29.7 PG (ref 25.2–33.5)
MCHC RBC AUTO-ENTMCNC: 31.5 G/DL (ref 25.2–33.5)
MCV RBC AUTO: 94.3 FL (ref 82.6–102.9)
NRBC AUTOMATED: 0 PER 100 WBC
PDW BLD-RTO: 14.8 % (ref 11.8–14.4)
PLATELET # BLD AUTO: 246 K/UL (ref 138–453)
PMV BLD AUTO: 10.1 FL (ref 8.1–13.5)
POTASSIUM SERPL-SCNC: 3.3 MMOL/L (ref 3.7–5.3)
RBC # BLD: 3.87 M/UL (ref 3.95–5.11)
SODIUM SERPL-SCNC: 142 MMOL/L (ref 135–144)
TROPONIN I SERPL DL<=0.01 NG/ML-MCNC: 16 NG/L (ref 0–14)
WBC # BLD AUTO: 9.6 K/UL (ref 3.5–11.3)

## 2023-03-17 PROCEDURE — 71045 X-RAY EXAM CHEST 1 VIEW: CPT

## 2023-03-17 PROCEDURE — 6370000000 HC RX 637 (ALT 250 FOR IP): Performed by: EMERGENCY MEDICINE

## 2023-03-17 PROCEDURE — 96372 THER/PROPH/DIAG INJ SC/IM: CPT

## 2023-03-17 PROCEDURE — 83880 ASSAY OF NATRIURETIC PEPTIDE: CPT

## 2023-03-17 PROCEDURE — 93005 ELECTROCARDIOGRAM TRACING: CPT | Performed by: EMERGENCY MEDICINE

## 2023-03-17 PROCEDURE — 85027 COMPLETE CBC AUTOMATED: CPT

## 2023-03-17 PROCEDURE — 80048 BASIC METABOLIC PNL TOTAL CA: CPT

## 2023-03-17 PROCEDURE — 84484 ASSAY OF TROPONIN QUANT: CPT

## 2023-03-17 PROCEDURE — 99285 EMERGENCY DEPT VISIT HI MDM: CPT

## 2023-03-17 PROCEDURE — 36415 COLL VENOUS BLD VENIPUNCTURE: CPT

## 2023-03-17 PROCEDURE — 6360000002 HC RX W HCPCS: Performed by: EMERGENCY MEDICINE

## 2023-03-17 RX ORDER — FUROSEMIDE 10 MG/ML
40 INJECTION INTRAMUSCULAR; INTRAVENOUS ONCE
Status: COMPLETED | OUTPATIENT
Start: 2023-03-17 | End: 2023-03-17

## 2023-03-17 RX ORDER — POTASSIUM CHLORIDE 20 MEQ/1
20 TABLET, EXTENDED RELEASE ORAL ONCE
Status: COMPLETED | OUTPATIENT
Start: 2023-03-17 | End: 2023-03-17

## 2023-03-17 RX ADMIN — POTASSIUM CHLORIDE 20 MEQ: 1500 TABLET, EXTENDED RELEASE ORAL at 15:23

## 2023-03-17 RX ADMIN — FUROSEMIDE 40 MG: 10 INJECTION, SOLUTION INTRAMUSCULAR; INTRAVENOUS at 14:08

## 2023-03-17 ASSESSMENT — PAIN - FUNCTIONAL ASSESSMENT
PAIN_FUNCTIONAL_ASSESSMENT: NONE - DENIES PAIN
PAIN_FUNCTIONAL_ASSESSMENT: NONE - DENIES PAIN

## 2023-03-17 ASSESSMENT — ENCOUNTER SYMPTOMS
DIARRHEA: 0
SHORTNESS OF BREATH: 0
VOMITING: 0
SORE THROAT: 0

## 2023-03-17 NOTE — ED PROVIDER NOTES
[facility] ED  [address]  Phone: [phone]        [facility]  EMERGENCY DEPARTMENT ENCOUNTER      Pt Name: Tasha Choe  MRN: [id number]  [place]ftam 3/21/1930  Date of evaluation: 3/17/2023  Provider: Lucio Gee DO    CHIEF COMPLAINT       Chief Complaint   Patient presents with    Weight Gain     5lb wt gain from the 1st of the month to today. HISTORY OF PRESENT ILLNESS   (Location/Symptom, Timing/Onset,Context/Setting, Quality, Duration, Modifying Factors, Severity)  Note limiting factors. Tasha Choe is a 80 y.o. female who presents to the emergency department for the evaluation of weight gain. Patient reports that she has had a 5 pound weight gain over the past 17 days. She was at her cardiologist office 7 days ago. They switched her over to Lopressor to help with A-fib/rate control which is a recent diagnosis. She has had some swelling around her ankles and mild weight gain. No new shortness of breath. No cough. No fever. She states she feels good. Nursing Notes were reviewed. REVIEW OF SYSTEMS    (2-9systems for level 4, 10 or more for level 5)     Review of Systems   Constitutional:  Negative for fever. HENT:  Negative for sore throat. Respiratory:  Negative for shortness of breath. Cardiovascular:  Positive for leg swelling. Negative for chest pain. Gastrointestinal:  Negative for diarrhea and vomiting. Genitourinary:  Negative for dysuria. Skin:  Negative for rash. Neurological:  Negative for weakness. All other systems reviewed and are negative. Except asnoted above the remainder of the review of systems was reviewed and negative. PAST MEDICAL HISTORY     Past Medical History:   Diagnosis Date    Adenomatous polyp 06/10    With recommendation for repeat 06/15. Also, diverticulosis was noted.      Cataracts, bilateral     Closed right hip fracture, initial encounter ([facility] [address] 10/30/2021    Combined forms of age-related cataract of both eyes 10/1/2018    Corneal scar, right eye     Cystocele     history of     Dementia ([facility] [address]     Mini-Mental Status exam 27/30 6/14  declines meds at this point,  MMSE 29/30 on June 13, 2017  MMSE 26/30 4/2018    Difficulty controlling anger 9/16/2018    Dyslipidemia     GERD (gastroesophageal reflux disease)     egd  4/15 ok    Goiter     benign, history of     Hiatal hernia     Hypertension     Hypokalemia     Hypothyroidism     Impaired fasting glucose     Malignant melanoma in situ ([facility] [address]     R upper Back 8/17    Migraines     Murmur, functional     Grade 1/6 systolic, history of     Osteopenia     T -1.0 hip, 03/09, T -0.3 spine, 03/09. 1) Repeat DEXA scan 09/12 with T +0.15, T -1.2 at the hip but -1.8 at the mean femoral neck giving her a FRAX score of 4.3 at the hip. Reclast 10/13 and given 2014,2015, and 1/4/2017, on calcium and vit d,  Redo Dexa 10/14 Frax 4.2% , Frax 4.8% 10 yr hip fracture risk on Dexa 1/17  On Reclast    Posterior vitreous detachment of both eyes     Primary open angle glaucoma (POAG) of both eyes, moderate stage 10/1/2018    Sciatica 12/15/2015    Skin cancer     History of multiple skin cancers. Following with Dr. Gloria Rae--- invasive squamous cell Ca 8/17 L forearm    Syncope and collapse 1/21/2015    Tremor 12/23/2018    Trichiasis of left lower eyelid without entropion     Visual disturbance 10/26/2017         SURGICAL HISTORY       Past Surgical History:   Procedure Laterality Date    APPENDECTOMY      CHOLECYSTECTOMY      Remote. COLONOSCOPY  06/08/2010    COLONOSCOPY  07/18/2002    CYSTOCELE REPAIR  02/12/1999    Vaginal prolapse, cystocele plus pelvic relaxation.      DILATION AND CURETTAGE OF UTERUS      with hysteroscopy    FEMUR FRACTURE SURGERY Left 09/04/2022    LEFT TFNA NAIL INSERTION    FEMUR FRACTURE SURGERY Left 9/4/2022    LEFT TFNA NAIL INSERTION performed by Paula Quiroz DO at [address] [place] [address] SURGERY Right 11/02/2021    Right Hip Intertan performed by Stiven Jacob MD at [address] ([address])  01/01/1995    still has ovaries     INTRACAPSULAR CATARACT EXTRACTION Left 08/25/2020    Left Cataract Extraction w/ IOL Implant performed by Preston Car MD at [facility] Right 10/08/2020    Right Cataract Extraction w/ IOL Implant performed by Preston Car MD at [facility]  10/07/2009    SCC, left nasal sidewall. OTHER SURGICAL HISTORY  11/11/2008    Anterior repair. OTHER SURGICAL HISTORY  01/01/1988    laser cone    UPPER GASTROINTESTINAL ENDOSCOPY  04/08/2015    hiatal hernia    VEIN SURGERY  06/08/2009    Laser ablation of right greater saphenous vein. CURRENT MEDICATIONS     Previous Medications    ACETAMINOPHEN (TYLENOL) 500 MG TABLET    Take 2 tablets by mouth every 8 hours as needed for Pain    ASPIRIN 81 MG EC TABLET    Take 1 tablet by mouth daily    ATORVASTATIN (LIPITOR) 40 MG TABLET    Take 1 tablet by mouth daily    CALCIUM CARBONATE-VITAMIN D3 (CALTRATE) 600-400 MG-UNIT TABS PER TAB    Take 1 tablet by mouth daily    CICLOPIROX 1 % SHAM    Apply topically once a week on Tuesdays- AKA Loprox Shampoo    FLUOXETINE (PROZAC) 20 MG CAPSULE    TAKE 1 CAPSULE DAILY    FUROSEMIDE (LASIX) 40 MG TABLET    Take 1 tablet by mouth 2 times daily    LEVOTHYROXINE (SYNTHROID) 137 MCG TABLET    Take 1 tablet by mouth daily    MEMANTINE (NAMENDA) 5 MG TABLET    TAKE 1 TABLET DAILY    METOPROLOL TARTRATE (LOPRESSOR) 25 MG TABLET    Take 0.5 tablets by mouth 2 times daily    NUTRITIONAL SUPPLEMENTS (NUTRITIONAL DRINK PO)    Mighty shakes bid with meals    OLANZAPINE (ZYPREXA) 2.5 MG TABLET    TAKE 1 TABLET NIGHTLY    POTASSIUM CHLORIDE (KLOR-CON M) 20 MEQ EXTENDED RELEASE TABLET    Take 1 tablet by mouth 2 times daily       ALLERGIES     Patient has no known allergies.     FAMILY HISTORY       Family History Problem Relation Age of Onset    Coronary Art Dis Father     Colon Cancer Brother     Diabetes Neg Hx     Cataracts Neg Hx     Glaucoma Neg Hx           SOCIAL HISTORY       Social History     Socioeconomic History    Marital status:      Spouse name: Asya Rodney    Number of children: 3   Tobacco Use    Smoking status: Never    Smokeless tobacco: Never   Vaping Use    Vaping Use: Never used   Substance and Sexual Activity    Alcohol use: Not Currently    Drug use: No    Sexual activity: Not Currently     Social Determinants of Health     Financial Resource Strain: Low Risk     Difficulty of Paying Living Expenses: Not hard at all   Food Insecurity: No Food Insecurity    Worried About Running Out of Food in the Last Year: Never true    Ran Out of Food in the Last Year: Never true   Transportation Needs: No Transportation Needs    Lack of Transportation (Medical): No    Lack of Transportation (Non-Medical): No   Physical Activity: Inactive    Days of Exercise per Week: 0 days    Minutes of Exercise per Session: 0 min   Stress: No Stress Concern Present    Feeling of Stress : Not at all   Social Connections: Moderately Isolated    Frequency of Communication with Friends and Family: Three times a week    Frequency of Social Gatherings with Friends and Family: Three times a week    Attends Zoroastrianism Services: More than 4 times per year    Active Member of Clubs or Organizations: No    Attends Club or Organization Meetings: Never    Marital Status:     Intimate Partner Violence: Not At Risk    Fear of Current or Ex-Partner: No    Emotionally Abused: No    Physically Abused: No    Sexually Abused: No   Housing Stability: Unknown    Unable to Pay for Housing in the Last Year: No    Unstable Housing in the Last Year: No       SCREENINGS    [place] Coma Scale  Eye Opening: Spontaneous  Best Verbal Response: Confused  Best Motor Response: Obeys commands  [place] Coma Scale Score: 14        PHYSICAL EXAM (up to 7 for level 4, 8 or more for level 5)     ED Triage Vitals [03/17/23 1400]   BP Temp Temp Source Heart Rate Resp SpO2 Height Weight   (!) 137/52 97.7 °F (36.5 °C) Tympanic 78 16 93 % 5' 4\" (1.626 m) --       Physical Exam  Vitals and nursing note reviewed. Constitutional:       General: She is not in acute distress. Appearance: Normal appearance. She is not ill-appearing or toxic-appearing. HENT:      Head: Normocephalic and atraumatic. Nose: Nose normal. No congestion. Mouth/Throat:      Mouth: Mucous membranes are moist.   Eyes:      General:         Right eye: No discharge. Left eye: No discharge. Conjunctiva/sclera: Conjunctivae normal.   Cardiovascular:      Rate and Rhythm: Normal rate. Rhythm irregular. Pulses: Normal pulses. Heart sounds: Normal heart sounds. No murmur heard. Pulmonary:      Effort: Pulmonary effort is normal. No respiratory distress. Breath sounds: Normal breath sounds. No wheezing. Abdominal:      General: Abdomen is flat. There is no distension. Palpations: Abdomen is soft. There is no pulsatile mass. Tenderness: There is no abdominal tenderness. There is no guarding or rebound. Comments: No pulsatile mass   Musculoskeletal:         General: No deformity or signs of injury. Normal range of motion. Cervical back: Normal range of motion. Right lower leg: Edema present. Left lower leg: Edema present. Comments: 1+ pitting edema distally in the lower extremities   Skin:     General: Skin is warm and dry. Capillary Refill: Capillary refill takes less than 2 seconds. Findings: No rash. Neurological:      General: No focal deficit present. Mental Status: She is alert. Mental status is at baseline. Motor: No weakness. Comments: Speaking normally. No facial asymmetry. Moving all 4 extremities. Normal gait.     Psychiatric:         Mood and Affect: Mood normal.       EMERGENCY DEPARTMENT COURSE and DIFFERENTIAL DIAGNOSIS/MDM:   Vitals:    Vitals:    03/17/23 1400 03/17/23 1430   BP: (!) 137/52 (!) 141/65   Pulse: 78    Resp: 16    Temp: 97.7 °F (36.5 °C)    TempSrc: Tympanic    SpO2: 93% (!) 89%   Height: 5' 4\" (1.626 m)      Patient presents to the emergency department with the complaint described above. Vital signs showed pulse ox 94% on room air, otherwise grossly unremarkable. She has a little bit of lower extremity CORI. She has history of CHF, dementia, hypertension, PAF, and a history of an MI for post hospital follow-up A-fib. Recently switched to Lopressor 12.5mg daily for A-fib/rate control. Follows up with PCP and cardiology. At this time we will get twelve-lead EKG and routine blood work that includes cardiac enzymes and a BNP and I will get a chest x-ray. Low suspicion for an acute coronary syndrome or severe decompensated heart failure. I have ordered additional dose of Lasix and will reevaluate    DIAGNOSTIC RESULTS     Twelve lead EKG interpreted by myself:  A 12 lead EKG done at 1417, interpreted by myself, showed a irregular rhythm at a rate of 65bpm.  The NC interval was calculated. The QRS complex was normal.  There was no ST segment elevation or depression, T wave inversion not present. QRS progression through precordial leads was delayed.   Interpretation: Atrial fibrillation, rate is controlled, no evidence of acute ischemia or infarct    LABS:  Labs Reviewed   CBC - Abnormal; Notable for the following components:       Result Value    RBC 3.87 (*)     Hemoglobin 11.5 (*)     RDW 14.8 (*)     All other components within normal limits   BASIC METABOLIC PANEL - Abnormal; Notable for the following components:    Glucose 115 (*)     Creatinine 0.97 (*)     Est, Glom Filt Rate 55 (*)     Potassium 3.3 (*)     All other components within normal limits   TROPONIN - Abnormal; Notable for the following components:    Troponin, High Sensitivity 16 (*)     All other components within normal limits   BRAIN NATRIURETIC PEPTIDE - Abnormal; Notable for the following components:    Pro-BNP 2,610 (*)     All other components within normal limits       All other labs were within normal range or not returned as of this dictation. RADIOLOGY:  XR CHEST PORTABLE   Final Result   Similar congestive changes/fluid overload with interstitial edema probably   superimposed on chronic changes. ED Course as of 03/17/23 1553   Fri Mar 17, 2023   1517 Laboratory results reveal stable chronic anemia on CBC, potassium is a little bit low on a basic metabolic panel and I will start replacement here. Troponin is stable for this patient at 16, BNP is just minimally elevated for this patient at 2600. [TS]   9582 Chest x-ray shows evidence of chronic heart failure. Possibly mild worse than before. Going to recommend increasing Lasix for the next 3 days and reassessing [TS]      ED Course User Index  [TS] Jeannette Fontaine DO     At this time the patient is without objective evidence of an acute process requiring hospitalization or inpatient management. They have remained hemodynamically stable and are stable for discharge with outpatient follow-up. Standard anticipatory guidance given to patient upon discharge. Have given them a specific time frame in which to follow-up and who to follow-up with. I have also advised them that they should return to the emergency department if they get worse, or not getting better or develop any new or concerning symptoms. Patient demonstrates understanding. PROCEDURES:  Unless otherwise noted below, none     Procedures    FINAL IMPRESSION      1. Acute congestive heart failure, unspecified heart failure type ([facility] [address]    2. Hypokalemia          DISPOSITION/PLAN   DISPOSITION Decision To Discharge 03/17/2023 03:48:58 PM      PATIENT REFERRED TO:  No follow-up provider specified.     DISCHARGE MEDICATIONS:  New Prescriptions    No medications on file          (Please note that portions of this note were completed with a voice recognition program.  Efforts were made to edit the dictations but occasionally words are mis-transcribed.)    Kavita Lyon DO,(electronically signed)  Board Certified Emergency Physician          Kavita Lyon DO  03/17/23 1556

## 2023-03-17 NOTE — DISCHARGE INSTRUCTIONS
1 extra dose Lasix in the morning for 5 days    1 extra dose of potassium daily for 3 days    Please make an appointment to follow up with your primary doctor and/or the specialist as we discussed. Take all medications as prescribed. Return to ER if condition worsens or you develop any new/concerning symptoms as we discussed.

## 2023-03-17 NOTE — TELEPHONE ENCOUNTER
Madie Wilson from [facility] call to let us know patient has been complaining of SOB . Patient pO2 this morning was 84. Patient was given a breathing treatment . Patient continuing  to complain of SOB. Patient has had a 5 lb weight gain . Patient weight on march 1st was 172 . Patient weight today was 177.4. Patient pO2 is stay between 86-93. Per  recommendations patient was sent to ER for further elevation.

## 2023-03-20 NOTE — PROGRESS NOTES
DRINK PO) Mighty shakes bid with meals      calcium carbonate-vitamin D3 (CALTRATE) 600-400 MG-UNIT TABS per tab Take 1 tablet by mouth daily      FLUoxetine (PROZAC) 20 MG capsule TAKE 1 CAPSULE DAILY 90 capsule 3    atorvastatin (LIPITOR) 40 MG tablet Take 1 tablet by mouth daily 90 tablet 3    aspirin 81 MG EC tablet Take 1 tablet by mouth daily       No current facility-administered medications for this visit. No Known Allergies    Health Maintenance   Topic Date Due    Lipids  01/05/2024    Depression Screen  01/05/2024    Annual Wellness Visit (AWV)  01/06/2024    DTaP/Tdap/Td vaccine (3 - Td or Tdap) 07/22/2025    Flu vaccine  Completed    Shingles vaccine  Completed    Pneumococcal 65+ years Vaccine  Completed    COVID-19 Vaccine  Completed    Hepatitis A vaccine  Aged Out    Hib vaccine  Aged Out    Meningococcal (ACWY) vaccine  Aged Out       Subjective:      Review of Systems   Constitutional:  Positive for fatigue. Negative for chills and fever. HENT:  Negative for congestion and rhinorrhea. Respiratory:  Negative for shortness of breath and wheezing. Cardiovascular:  Positive for leg swelling. Negative for chest pain. Gastrointestinal:  Negative for diarrhea, nausea and vomiting. Neurological:  Negative for dizziness and weakness. Due to patient's clinical status, ROS of symptoms was completed by discussion with long term care facility staff, as well as with input from patient. Objective:     Vitals:    03/20/23 2005   BP: 121/76   Pulse: 78   Resp: 15   Temp: 97.9 °F (36.6 °C)     Physical Exam  Vitals reviewed. Constitutional:       General: She is not in acute distress. Appearance: She is not ill-appearing. HENT:      Head: Normocephalic and atraumatic. Right Ear: External ear normal.      Left Ear: External ear normal.   Eyes:      Extraocular Movements: Extraocular movements intact.       Conjunctiva/sclera: Conjunctivae normal.   Cardiovascular:      Rate and

## 2023-03-21 ENCOUNTER — HOSPITAL ENCOUNTER (OUTPATIENT)
Age: 88
Setting detail: SPECIMEN
Discharge: HOME | End: 2023-03-21
Payer: MEDICARE

## 2023-03-21 ENCOUNTER — OUTSIDE SERVICES (OUTPATIENT)
Dept: INTERNAL MEDICINE | Age: 88
End: 2023-03-21
Payer: MEDICARE

## 2023-03-21 VITALS
DIASTOLIC BLOOD PRESSURE: 76 MMHG | SYSTOLIC BLOOD PRESSURE: 121 MMHG | HEART RATE: 78 BPM | RESPIRATION RATE: 15 BRPM | TEMPERATURE: 97.9 F

## 2023-03-21 DIAGNOSIS — R41.0 CONFUSION: ICD-10-CM

## 2023-03-21 DIAGNOSIS — I50.9 HEART FAILURE, UNSPECIFIED HF CHRONICITY, UNSPECIFIED HEART FAILURE TYPE (HCC): Primary | ICD-10-CM

## 2023-03-21 LAB
BACTERIA: ABNORMAL
BILIRUBIN URINE: NEGATIVE
COLOR: YELLOW
EPITHELIAL CELLS UA: ABNORMAL /HPF (ref 0–5)
GLUCOSE UR STRIP.AUTO-MCNC: NEGATIVE MG/DL
KETONES UR STRIP.AUTO-MCNC: NEGATIVE MG/DL
LEUKOCYTE ESTERASE UR QL STRIP.AUTO: ABNORMAL
NITRITE UR QL STRIP.AUTO: POSITIVE
OTHER OBSERVATIONS UA: ABNORMAL
PROT UR STRIP.AUTO-MCNC: 8 MG/DL (ref 5–6)
PROT UR STRIP.AUTO-MCNC: ABNORMAL MG/DL
RBC CLUMPS #/AREA URNS AUTO: ABNORMAL /HPF (ref 0–4)
SPECIFIC GRAVITY UA: 1.01 (ref 1.01–1.02)
TURBIDITY: ABNORMAL
URINE HGB: ABNORMAL
UROBILINOGEN, URINE: NORMAL
WBC UA: ABNORMAL /HPF (ref 0–4)

## 2023-03-21 PROCEDURE — 99347 HOME/RES VST EST SF MDM 20: CPT | Performed by: NURSE PRACTITIONER

## 2023-03-21 PROCEDURE — 87088 URINE BACTERIA CULTURE: CPT

## 2023-03-21 PROCEDURE — 87186 SC STD MICRODIL/AGAR DIL: CPT

## 2023-03-21 PROCEDURE — 87086 URINE CULTURE/COLONY COUNT: CPT

## 2023-03-21 PROCEDURE — 81001 URINALYSIS AUTO W/SCOPE: CPT

## 2023-03-21 RX ORDER — CEFUROXIME AXETIL 250 MG/1
250 TABLET ORAL 2 TIMES DAILY
Qty: 10 TABLET | Refills: 0 | Status: ON HOLD | OUTPATIENT
Start: 2023-03-21 | End: 2023-03-25 | Stop reason: HOSPADM

## 2023-03-21 RX ORDER — FUROSEMIDE 40 MG/1
TABLET ORAL
Status: ON HOLD | COMMUNITY
Start: 2023-03-16 | End: 2023-03-25 | Stop reason: HOSPADM

## 2023-03-21 RX ORDER — HYDROXYZINE HYDROCHLORIDE 25 MG/1
25 TABLET, FILM COATED ORAL NIGHTLY PRN
COMMUNITY

## 2023-03-21 ASSESSMENT — ENCOUNTER SYMPTOMS
SHORTNESS OF BREATH: 0
VOMITING: 0
DIARRHEA: 0
NAUSEA: 0
RHINORRHEA: 0
WHEEZING: 0

## 2023-03-23 ENCOUNTER — HOSPITAL ENCOUNTER (INPATIENT)
Age: 88
LOS: 2 days | Discharge: NURSING, REHAB OR LONG-TERM CARE | DRG: 280 | End: 2023-03-25
Attending: EMERGENCY MEDICINE | Admitting: INTERNAL MEDICINE
Payer: MEDICARE

## 2023-03-23 ENCOUNTER — APPOINTMENT (OUTPATIENT)
Dept: CT IMAGING | Age: 88
DRG: 280 | End: 2023-03-23
Payer: MEDICARE

## 2023-03-23 ENCOUNTER — PATIENT MESSAGE (OUTPATIENT)
Dept: INTERNAL MEDICINE | Age: 88
End: 2023-03-23

## 2023-03-23 ENCOUNTER — HOSPITAL ENCOUNTER (OUTPATIENT)
Age: 88
Setting detail: SPECIMEN
Discharge: HOME | End: 2023-03-23
Payer: MEDICARE

## 2023-03-23 DIAGNOSIS — I50.43 CHF (CONGESTIVE HEART FAILURE), NYHA CLASS I, ACUTE ON CHRONIC, COMBINED (HCC): ICD-10-CM

## 2023-03-23 DIAGNOSIS — I50.9 HEART FAILURE, UNSPECIFIED HF CHRONICITY, UNSPECIFIED HEART FAILURE TYPE (HCC): ICD-10-CM

## 2023-03-23 DIAGNOSIS — I21.4 NSTEMI (NON-ST ELEVATED MYOCARDIAL INFARCTION) (HCC): Primary | ICD-10-CM

## 2023-03-23 DIAGNOSIS — I50.9 ACUTE CONGESTIVE HEART FAILURE, UNSPECIFIED HEART FAILURE TYPE (HCC): ICD-10-CM

## 2023-03-23 DIAGNOSIS — N39.0 ACUTE UTI: ICD-10-CM

## 2023-03-23 LAB
ABSOLUTE EOS #: 0.16 K/UL (ref 0–0.44)
ABSOLUTE IMMATURE GRANULOCYTE: 0.03 K/UL (ref 0–0.3)
ABSOLUTE LYMPH #: 0.88 K/UL (ref 1.1–3.7)
ABSOLUTE MONO #: 1 K/UL (ref 0.1–1.2)
ANION GAP SERPL CALCULATED.3IONS-SCNC: 12 MMOL/L (ref 9–17)
BASOPHILS # BLD: 0 % (ref 0–2)
BASOPHILS ABSOLUTE: 0.04 K/UL (ref 0–0.2)
BNP SERPL-MCNC: 2912 PG/ML
BNP SERPL-MCNC: 3226 PG/ML
BUN SERPL-MCNC: 13 MG/DL (ref 8–23)
BUN/CREAT BLD: 16 (ref 9–20)
CALCIUM SERPL-MCNC: 9.1 MG/DL (ref 8.6–10.4)
CHLORIDE SERPL-SCNC: 102 MMOL/L (ref 98–107)
CO2 SERPL-SCNC: 30 MMOL/L (ref 20–31)
CREAT SERPL-MCNC: 0.81 MG/DL (ref 0.5–0.9)
D DIMER BLD IA.RAPID-MCNC: 1.71 MG/L FEU (ref 0–0.59)
EOSINOPHILS RELATIVE PERCENT: 2 % (ref 1–4)
GFR SERPL CREATININE-BSD FRML MDRD: >60 ML/MIN/1.73M2
GLUCOSE SERPL-MCNC: 141 MG/DL (ref 70–99)
HCT VFR BLD AUTO: 39.8 % (ref 36.3–47.1)
HGB BLD-MCNC: 12.5 G/DL (ref 11.9–15.1)
IMMATURE GRANULOCYTES: 0 %
LYMPHOCYTES # BLD: 10 % (ref 24–43)
MCH RBC QN AUTO: 29.4 PG (ref 25.2–33.5)
MCHC RBC AUTO-ENTMCNC: 31.4 G/DL (ref 25.2–33.5)
MCV RBC AUTO: 93.6 FL (ref 82.6–102.9)
MICROORGANISM SPEC CULT: ABNORMAL
MONOCYTES # BLD: 11 % (ref 3–12)
NRBC AUTOMATED: 0 PER 100 WBC
PDW BLD-RTO: 14.7 % (ref 11.8–14.4)
PLATELET # BLD AUTO: 259 K/UL (ref 138–453)
PMV BLD AUTO: 10.2 FL (ref 8.1–13.5)
POTASSIUM SERPL-SCNC: 3.6 MMOL/L (ref 3.7–5.3)
RBC # BLD: 4.25 M/UL (ref 3.95–5.11)
RBC # BLD: ABNORMAL 10*6/UL
SARS-COV-2 RDRP RESP QL NAA+PROBE: NOT DETECTED
SEG NEUTROPHILS: 77 % (ref 36–65)
SEGMENTED NEUTROPHILS ABSOLUTE COUNT: 7.02 K/UL (ref 1.5–8.1)
SODIUM SERPL-SCNC: 144 MMOL/L (ref 135–144)
SPECIMEN DESCRIPTION: ABNORMAL
SPECIMEN DESCRIPTION: NORMAL
TROPONIN I SERPL DL<=0.01 NG/ML-MCNC: 117 NG/L (ref 0–14)
TROPONIN I SERPL DL<=0.01 NG/ML-MCNC: 134 NG/L (ref 0–14)
TROPONIN I SERPL DL<=0.01 NG/ML-MCNC: 137 NG/L (ref 0–14)
TROPONIN I SERPL DL<=0.01 NG/ML-MCNC: 144 NG/L (ref 0–14)
TSH SERPL-ACNC: 2.18 UIU/ML (ref 0.3–5)
WBC # BLD AUTO: 9.1 K/UL (ref 3.5–11.3)

## 2023-03-23 PROCEDURE — 99285 EMERGENCY DEPT VISIT HI MDM: CPT

## 2023-03-23 PROCEDURE — 36415 COLL VENOUS BLD VENIPUNCTURE: CPT

## 2023-03-23 PROCEDURE — 6370000000 HC RX 637 (ALT 250 FOR IP)

## 2023-03-23 PROCEDURE — 2060000000 HC ICU INTERMEDIATE R&B

## 2023-03-23 PROCEDURE — 2580000003 HC RX 258: Performed by: EMERGENCY MEDICINE

## 2023-03-23 PROCEDURE — 84484 ASSAY OF TROPONIN QUANT: CPT

## 2023-03-23 PROCEDURE — 85025 COMPLETE CBC W/AUTO DIFF WBC: CPT

## 2023-03-23 PROCEDURE — 83880 ASSAY OF NATRIURETIC PEPTIDE: CPT

## 2023-03-23 PROCEDURE — 6360000002 HC RX W HCPCS

## 2023-03-23 PROCEDURE — 84443 ASSAY THYROID STIM HORMONE: CPT

## 2023-03-23 PROCEDURE — 6360000002 HC RX W HCPCS: Performed by: EMERGENCY MEDICINE

## 2023-03-23 PROCEDURE — 80048 BASIC METABOLIC PNL TOTAL CA: CPT

## 2023-03-23 PROCEDURE — 93005 ELECTROCARDIOGRAM TRACING: CPT | Performed by: EMERGENCY MEDICINE

## 2023-03-23 PROCEDURE — 2709999900 CT CHEST PULMONARY EMBOLISM W CONTRAST

## 2023-03-23 PROCEDURE — 6370000000 HC RX 637 (ALT 250 FOR IP): Performed by: EMERGENCY MEDICINE

## 2023-03-23 PROCEDURE — 6360000004 HC RX CONTRAST MEDICATION: Performed by: EMERGENCY MEDICINE

## 2023-03-23 PROCEDURE — 87635 SARS-COV-2 COVID-19 AMP PRB: CPT

## 2023-03-23 PROCEDURE — 85379 FIBRIN DEGRADATION QUANT: CPT

## 2023-03-23 PROCEDURE — 99222 1ST HOSP IP/OBS MODERATE 55: CPT

## 2023-03-23 PROCEDURE — 71260 CT THORAX DX C+: CPT | Performed by: EMERGENCY MEDICINE

## 2023-03-23 PROCEDURE — 2580000003 HC RX 258

## 2023-03-23 RX ORDER — ACETAMINOPHEN 650 MG/1
650 SUPPOSITORY RECTAL EVERY 6 HOURS PRN
Status: DISCONTINUED | OUTPATIENT
Start: 2023-03-23 | End: 2023-03-25 | Stop reason: HOSPADM

## 2023-03-23 RX ORDER — ATORVASTATIN CALCIUM 40 MG/1
40 TABLET, FILM COATED ORAL NIGHTLY
Status: DISCONTINUED | OUTPATIENT
Start: 2023-03-23 | End: 2023-03-25 | Stop reason: HOSPADM

## 2023-03-23 RX ORDER — POTASSIUM CHLORIDE 7.45 MG/ML
10 INJECTION INTRAVENOUS PRN
Status: DISCONTINUED | OUTPATIENT
Start: 2023-03-23 | End: 2023-03-24

## 2023-03-23 RX ORDER — SODIUM CHLORIDE 0.9 % (FLUSH) 0.9 %
5-40 SYRINGE (ML) INJECTION EVERY 12 HOURS SCHEDULED
Status: DISCONTINUED | OUTPATIENT
Start: 2023-03-23 | End: 2023-03-24

## 2023-03-23 RX ORDER — FLUOXETINE HYDROCHLORIDE 20 MG/1
20 CAPSULE ORAL DAILY
Status: DISCONTINUED | OUTPATIENT
Start: 2023-03-24 | End: 2023-03-25 | Stop reason: HOSPADM

## 2023-03-23 RX ORDER — ACETAMINOPHEN 325 MG/1
650 TABLET ORAL EVERY 6 HOURS PRN
Status: DISCONTINUED | OUTPATIENT
Start: 2023-03-23 | End: 2023-03-25 | Stop reason: HOSPADM

## 2023-03-23 RX ORDER — LISINOPRIL 5 MG/1
5 TABLET ORAL DAILY
Status: DISCONTINUED | OUTPATIENT
Start: 2023-03-23 | End: 2023-03-25 | Stop reason: HOSPADM

## 2023-03-23 RX ORDER — MEMANTINE HYDROCHLORIDE 5 MG/1
5 TABLET ORAL DAILY
Status: DISCONTINUED | OUTPATIENT
Start: 2023-03-24 | End: 2023-03-25 | Stop reason: HOSPADM

## 2023-03-23 RX ORDER — POTASSIUM CHLORIDE 20 MEQ/1
40 TABLET, EXTENDED RELEASE ORAL PRN
Status: DISCONTINUED | OUTPATIENT
Start: 2023-03-23 | End: 2023-03-25 | Stop reason: HOSPADM

## 2023-03-23 RX ORDER — FUROSEMIDE 10 MG/ML
20 INJECTION INTRAMUSCULAR; INTRAVENOUS
Status: DISCONTINUED | OUTPATIENT
Start: 2023-03-24 | End: 2023-03-24

## 2023-03-23 RX ORDER — ONDANSETRON 2 MG/ML
4 INJECTION INTRAMUSCULAR; INTRAVENOUS EVERY 6 HOURS PRN
Status: DISCONTINUED | OUTPATIENT
Start: 2023-03-23 | End: 2023-03-24

## 2023-03-23 RX ORDER — OLANZAPINE 2.5 MG/1
2.5 TABLET ORAL NIGHTLY
Status: DISCONTINUED | OUTPATIENT
Start: 2023-03-23 | End: 2023-03-25 | Stop reason: HOSPADM

## 2023-03-23 RX ORDER — FUROSEMIDE 10 MG/ML
20 INJECTION INTRAMUSCULAR; INTRAVENOUS ONCE
Status: COMPLETED | OUTPATIENT
Start: 2023-03-23 | End: 2023-03-23

## 2023-03-23 RX ORDER — POTASSIUM CHLORIDE 20 MEQ/1
40 TABLET, EXTENDED RELEASE ORAL ONCE
Status: COMPLETED | OUTPATIENT
Start: 2023-03-23 | End: 2023-03-23

## 2023-03-23 RX ORDER — SODIUM CHLORIDE FOR INHALATION 0.9 %
3 VIAL, NEBULIZER (ML) INHALATION
Status: DISCONTINUED | OUTPATIENT
Start: 2023-03-23 | End: 2023-03-25 | Stop reason: HOSPADM

## 2023-03-23 RX ORDER — ASPIRIN 81 MG/1
81 TABLET ORAL DAILY
Status: DISCONTINUED | OUTPATIENT
Start: 2023-03-24 | End: 2023-03-25 | Stop reason: HOSPADM

## 2023-03-23 RX ORDER — POLYETHYLENE GLYCOL 3350 17 G/17G
17 POWDER, FOR SOLUTION ORAL DAILY PRN
Status: DISCONTINUED | OUTPATIENT
Start: 2023-03-23 | End: 2023-03-25 | Stop reason: HOSPADM

## 2023-03-23 RX ORDER — HYDROXYZINE HYDROCHLORIDE 25 MG/1
25 TABLET, FILM COATED ORAL NIGHTLY PRN
Status: DISCONTINUED | OUTPATIENT
Start: 2023-03-23 | End: 2023-03-25 | Stop reason: HOSPADM

## 2023-03-23 RX ORDER — SODIUM CHLORIDE 0.9 % (FLUSH) 0.9 %
5-40 SYRINGE (ML) INJECTION PRN
Status: DISCONTINUED | OUTPATIENT
Start: 2023-03-23 | End: 2023-03-24

## 2023-03-23 RX ORDER — ENOXAPARIN SODIUM 100 MG/ML
40 INJECTION SUBCUTANEOUS DAILY
Status: DISCONTINUED | OUTPATIENT
Start: 2023-03-23 | End: 2023-03-25 | Stop reason: HOSPADM

## 2023-03-23 RX ORDER — ALBUTEROL SULFATE 2.5 MG/3ML
2.5 SOLUTION RESPIRATORY (INHALATION)
Status: DISCONTINUED | OUTPATIENT
Start: 2023-03-23 | End: 2023-03-25 | Stop reason: HOSPADM

## 2023-03-23 RX ORDER — ASPIRIN 81 MG/1
324 TABLET, CHEWABLE ORAL ONCE
Status: COMPLETED | OUTPATIENT
Start: 2023-03-23 | End: 2023-03-23

## 2023-03-23 RX ORDER — POTASSIUM CHLORIDE 20 MEQ/1
20 TABLET, EXTENDED RELEASE ORAL 2 TIMES DAILY
Status: DISCONTINUED | OUTPATIENT
Start: 2023-03-23 | End: 2023-03-25 | Stop reason: HOSPADM

## 2023-03-23 RX ORDER — SODIUM CHLORIDE 9 MG/ML
INJECTION, SOLUTION INTRAVENOUS PRN
Status: DISCONTINUED | OUTPATIENT
Start: 2023-03-23 | End: 2023-03-24

## 2023-03-23 RX ORDER — ONDANSETRON 4 MG/1
4 TABLET, ORALLY DISINTEGRATING ORAL EVERY 8 HOURS PRN
Status: DISCONTINUED | OUTPATIENT
Start: 2023-03-23 | End: 2023-03-25 | Stop reason: HOSPADM

## 2023-03-23 RX ADMIN — ENOXAPARIN SODIUM 40 MG: 100 INJECTION SUBCUTANEOUS at 21:44

## 2023-03-23 RX ADMIN — FUROSEMIDE 20 MG: 10 INJECTION, SOLUTION INTRAMUSCULAR; INTRAVENOUS at 18:39

## 2023-03-23 RX ADMIN — ASPIRIN 81 MG 324 MG: 81 TABLET ORAL at 14:38

## 2023-03-23 RX ADMIN — IOPAMIDOL 100 ML: 755 INJECTION, SOLUTION INTRAVENOUS at 15:04

## 2023-03-23 RX ADMIN — POTASSIUM CHLORIDE 20 MEQ: 1500 TABLET, EXTENDED RELEASE ORAL at 21:44

## 2023-03-23 RX ADMIN — CEFTRIAXONE 1000 MG: 1 INJECTION, POWDER, FOR SOLUTION INTRAMUSCULAR; INTRAVENOUS at 18:47

## 2023-03-23 RX ADMIN — ATORVASTATIN CALCIUM 40 MG: 40 TABLET, FILM COATED ORAL at 21:45

## 2023-03-23 RX ADMIN — OLANZAPINE 2.5 MG: 2.5 TABLET, FILM COATED ORAL at 21:45

## 2023-03-23 RX ADMIN — METOPROLOL TARTRATE 12.5 MG: 25 TABLET, FILM COATED ORAL at 21:44

## 2023-03-23 RX ADMIN — POTASSIUM CHLORIDE 40 MEQ: 1500 TABLET, EXTENDED RELEASE ORAL at 22:02

## 2023-03-23 RX ADMIN — LISINOPRIL 5 MG: 5 TABLET ORAL at 21:56

## 2023-03-23 RX ADMIN — SODIUM CHLORIDE, PRESERVATIVE FREE 10 ML: 5 INJECTION INTRAVENOUS at 22:03

## 2023-03-23 ASSESSMENT — LIFESTYLE VARIABLES
HOW OFTEN DO YOU HAVE A DRINK CONTAINING ALCOHOL: NEVER
HOW MANY STANDARD DRINKS CONTAINING ALCOHOL DO YOU HAVE ON A TYPICAL DAY: PATIENT DOES NOT DRINK
HOW OFTEN DO YOU HAVE A DRINK CONTAINING ALCOHOL: NEVER

## 2023-03-23 NOTE — TELEPHONE ENCOUNTER
From: Jamey Tse  To: Naseem Brice  Sent: 3/23/2023 12:42 PM EDT  Subject: General update    Gerry Chowdary,  Could we speak at you convenience about Moms current state of health about what we might expect ahead? If we could schedule a date and time that would be great. My number is [phone].   Thank you ,  Isaiah Ferris

## 2023-03-23 NOTE — H&P
EKG---11.2.2021---postoperatively---SR----64         EKG----10.30.2021---NSR---64---LAD         2D ECHO--11.1.2021--RAUL--NLVSF---MICHELLE without stenosis-                        MAC MV thickening--mild MR--moderate TR---RVSP ~                    83 mm Hg---severe pulmonary hypertension---increased                    IVC diameter and impaired or no inspiratory variation---                   LVEF ~ 55%          STEMI--12.22.2020          ALLIE---RCA---12.2020          2D ECHO---12.2020--mild-to-moderate TR--moderate MR--                   RVSP ~ 46 mm Hg--LVEF ~ 45-50%               2D ECHO--1.6.2015--NLVSF--LAE--MAC--mild MR--                             MICHELLE--mild-to-moderate TR--RVSP ~ 30 mm Hg--                             Grade 2 DD--LVEF ~ 65%           MI ruled out-----1.22.2015--1.6.2015    Hypertension                     II/VI PHILIP  Dyslipidemia  CKD---Stage 2  Cerebrovascular disease         CT head---10.30.2021---no MBS--moderate atrophy and                               chronic small vessel ischemic disease---remote                           cortical  infarctions         DUS--CV--1.22.2015--no hemodynamically significant                              stenosis    Hypothyroidism  Impaired fasting glucose  GERD   Dementia--severe--with episodic agitation   Anger management issues  PMH:   osteopenia, adenomatous polyp, SCC--nose, bilateral               cataracts, trichiasis left lower eyelid, bilateral posterior               vitreous detachment, HH, goiter, I/VI PHILIP, right eye               corneal scar, migraine headaches, UTI--1.5.2014,                leukocytosis--1.6.2015, hypokalemia--1.5.2014, syncope--              collapse--1.21.2015, malignant melanoma--right upper               back--2017, osteopenia, tremor---2019, right hip fracture---                10.30.2021---traumatic osteopenic fracture---acute                comminuted and moderately displaced IT right femur               fracture with free-floating greater trochanteric and lesser               trochanter fragments, hypocalcemia, hypokalemia,              hypomagnesemia,  pulmonary hypertension   PSH:   cholecystectomy, colonoscopy--2010--2002, Mohs--2009,               laser ablation right GSV--2009, cystocele repair--1999,               appendectomy, D&C-uterus, hysterectomy--1988,               anterior repair--2008, laser conization--1988, EGD--2015,               cataract extraction--IOL--OU--2020, cephalomedullary                nail---right hip fracture--11.2.2021    Allergies:  Flagyl--rash     Attending Supervising [de-identified] Attestation Statement  I performed a history and physical examination on the patient and discussed the management with the nurse practitioner. I reviewed and agree with the findings and plan as documented in her note . Electronically signed by Suellen Suazo MD on 3/24/23 at 1:00 PM EDT     PLAN:    1. CHF, Atrial fib, NSTEMI: Diuretics, Fluid restriction, Cardiology consult, Telemetry monitoring, Daily weight, VS per unit protocol, EKG, RT protocol, Oxygen protocol, I&O, CXR.  2. UTI (POA): Urine C&S pending from outside testing, Rocephin. Monitor labs, Lovenox, Case management/Social work consults. Home medications reviewed  See orders     Note that over 55 minutes was spent in evaluation of the patient, review of the chart and pertinent records, discussion with family/staff, etc    FELICIA Hein NP    8:57 PM  3/23/2023     Add: telephone conference with patient's daughter Pauline---re: Code Status---She and brothers have discussed---selected DNR-CC similar to prior family member's status under similar circumstance.

## 2023-03-23 NOTE — ED NOTES
Writer noted pt's cardiac monitoring leads to be off at central station. Writer entered room, pt is working on pulling off all of her cardiac monitoring patches. She has taken off her oxygen, SpO2, and pulled out her IV. IV catheter is intact on top of linens, no bleeding or bruising noted at IV site. When asked what she is up to pt states \"I don't need any of this\". Writer reminded her she is in the hospital, she states \"I know, but I don't need this\". Writer reapplies the patches pt has removed and puts the leads through the right sleeve of the gown to try to deter pt from messing with things. Pt asks to have the BP cuff taken off. Writer did remove the cuff and left off the SpO2 as well to try to have the least amount of items present so as to not increase pt's agitation. Writer offered for pt to go to the bathroom, to be repositioned on the cart, have a drink and/or meal. Pt declines all offers stating she is fine and doesn't need anything. Side rails are up x2. Call light in reach. Pt verbalizes understanding of use of call light.      Matteo Izaguirre RN  03/23/23 [id number]

## 2023-03-23 NOTE — ED NOTES
Writer called pt's daughter, Estefania Reina, updating her on pt condition and troponin value. Writer advised her we can contact cardiology to see if pt can be kept at this facility since her troponin has gone down so long as they (the family) do not want pt to have an intervention (such as a heart cath). She verbalizes understanding and is agreeable to pt being kept in [place].       Kimberly Marx RN  03/23/23 5546

## 2023-03-23 NOTE — ED PROVIDER NOTES
Dyslipidemia, GERD (gastroesophageal reflux disease), Goiter, Hiatal hernia, Hypertension, Hypokalemia, Hypothyroidism, Impaired fasting glucose, Malignant melanoma in situ ([address] Migraines, Murmur, functional, Osteopenia, Posterior vitreous detachment of both eyes, Primary open angle glaucoma (POAG) of both eyes, moderate stage, Sciatica, Skin cancer, Syncope and collapse, Tremor, 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 SURGICAL HISTORY      has a past surgical history that includes other surgical history (11/11/2008); Cholecystectomy; Colonoscopy (06/08/2010); Mohs surgery (10/07/2009); Vein Surgery (06/08/2009); Colonoscopy (07/18/2002); Cystocele repair (02/12/1999); Appendectomy; Dilation and curettage of uterus; other surgical history (01/01/1988); Hysterectomy (01/01/1995); Upper gastrointestinal endoscopy (04/08/2015); Intracapsular cataract extraction (Left, 08/25/2020); Intracapsular cataract extraction (Right, 10/08/2020); Hip fracture surgery (Right, 11/02/2021); Femur fracture surgery (Left, 09/04/2022); and Femur fracture surgery (Left, 9/4/2022).     CURRENT MEDICATIONS       Previous Medications    ACETAMINOPHEN (TYLENOL) 500 MG TABLET    Take 2 tablets by mouth every 8 hours as needed for Pain    ASPIRIN 81 MG EC TABLET    Take 1 tablet by mouth daily    ATORVASTATIN (LIPITOR) 40 MG TABLET    Take 1 tablet by mouth daily    CALCIUM CARBONATE-VITAMIN D3 (CALTRATE) 600-400 MG-UNIT TABS PER TAB    Take 1 tablet by mouth daily    CEFUROXIME (CEFTIN) 250 MG TABLET    Take 1 tablet by mouth 2 times daily for 5 days    CICLOPIROX 1 % SHAM    Apply topically once a week on Tuesdays- AKA Loprox Shampoo    COMPRESSION STOCKINGS MISC    PAPO Hose on 8 am, off 8 pm    FLUOXETINE (PROZAC) 20 MG CAPSULE    TAKE 1 CAPSULE DAILY    FUROSEMIDE (LASIX) 40 MG TABLET    Take 1 tablet by mouth 2 times daily    FUROSEMIDE (LASIX) 40 MG TABLET    Take Extra dose of Lasix daily X 5 days with cardiology. We did talk with family who do not want her transferred. 5:05 PM discussed with Dr. Valere Boxer who agrees the patient does not need to be transferred and agrees to admission here under the hospitalist.  Will discuss with Dr. Jacy Alex. 5:30 Dr. Jacy Alex agrees to admission. Positive UTI will start on Rocephin    CRITICAL CARE:    None    PROCEDURES:    None      OARRS Report if indicated             FINAL IMPRESSION      1. NSTEMI (non-ST elevated myocardial infarction) ([facility] [address]    2. Acute congestive heart failure, unspecified heart failure type ([facility] [facility]    3. Acute UTI          DISPOSITION/PLAN   DISPOSITION Admitted 03/23/2023 05:48:41 PM        CONDITION ON DISPOSITION: STABLE       PATIENT REFERRED TO:  No follow-up provider specified.     DISCHARGE MEDICATIONS:  New Prescriptions    No medications on file       (Please note that portions of this note were completed with a voice recognition program.  Efforts were made to edit the dictations but occasionally words are mis-transcribed.)    Santiago Rodriguez DO   Attending Emergency Physician     Santiago Rodriguez, DO  03/23/23 [address] [place] [address],   03/23/23 0455

## 2023-03-24 ENCOUNTER — APPOINTMENT (OUTPATIENT)
Dept: GENERAL RADIOLOGY | Age: 88
DRG: 280 | End: 2023-03-24
Payer: MEDICARE

## 2023-03-24 LAB
ABSOLUTE EOS #: 0.26 K/UL (ref 0–0.44)
ABSOLUTE IMMATURE GRANULOCYTE: <0.03 K/UL (ref 0–0.3)
ABSOLUTE LYMPH #: 1.28 K/UL (ref 1.1–3.7)
ABSOLUTE MONO #: 0.89 K/UL (ref 0.1–1.2)
ANION GAP SERPL CALCULATED.3IONS-SCNC: 9 MMOL/L (ref 9–17)
BASOPHILS # BLD: 1 % (ref 0–2)
BASOPHILS ABSOLUTE: 0.06 K/UL (ref 0–0.2)
BUN SERPL-MCNC: 13 MG/DL (ref 8–23)
BUN/CREAT BLD: 18 (ref 9–20)
CALCIUM SERPL-MCNC: 8.9 MG/DL (ref 8.6–10.4)
CHLORIDE SERPL-SCNC: 105 MMOL/L (ref 98–107)
CHOLEST SERPL-MCNC: 105 MG/DL
CHOLESTEROL/HDL RATIO: 2.5
CO2 SERPL-SCNC: 30 MMOL/L (ref 20–31)
CREAT SERPL-MCNC: 0.71 MG/DL (ref 0.5–0.9)
EOSINOPHILS RELATIVE PERCENT: 3 % (ref 1–4)
GFR SERPL CREATININE-BSD FRML MDRD: >60 ML/MIN/1.73M2
GLUCOSE SERPL-MCNC: 133 MG/DL (ref 70–99)
HCT VFR BLD AUTO: 40.4 % (ref 36.3–47.1)
HDLC SERPL-MCNC: 42 MG/DL
HGB BLD-MCNC: 12.7 G/DL (ref 11.9–15.1)
IMMATURE GRANULOCYTES: 0 %
LDLC SERPL CALC-MCNC: 44 MG/DL (ref 0–130)
LYMPHOCYTES # BLD: 16 % (ref 24–43)
MAGNESIUM SERPL-MCNC: 2.1 MG/DL (ref 1.6–2.6)
MCH RBC QN AUTO: 29.4 PG (ref 25.2–33.5)
MCHC RBC AUTO-ENTMCNC: 31.4 G/DL (ref 25.2–33.5)
MCV RBC AUTO: 93.5 FL (ref 82.6–102.9)
MONOCYTES # BLD: 11 % (ref 3–12)
NRBC AUTOMATED: 0 PER 100 WBC
PDW BLD-RTO: 14.7 % (ref 11.8–14.4)
PLATELET # BLD AUTO: 255 K/UL (ref 138–453)
PMV BLD AUTO: 10.3 FL (ref 8.1–13.5)
POTASSIUM SERPL-SCNC: 4.1 MMOL/L (ref 3.7–5.3)
RBC # BLD: 4.32 M/UL (ref 3.95–5.11)
RBC # BLD: ABNORMAL 10*6/UL
SEG NEUTROPHILS: 69 % (ref 36–65)
SEGMENTED NEUTROPHILS ABSOLUTE COUNT: 5.3 K/UL (ref 1.5–8.1)
SODIUM SERPL-SCNC: 144 MMOL/L (ref 135–144)
TRIGL SERPL-MCNC: 95 MG/DL
WBC # BLD AUTO: 7.8 K/UL (ref 3.5–11.3)

## 2023-03-24 PROCEDURE — 2060000000 HC ICU INTERMEDIATE R&B

## 2023-03-24 PROCEDURE — 94760 N-INVAS EAR/PLS OXIMETRY 1: CPT

## 2023-03-24 PROCEDURE — 83735 ASSAY OF MAGNESIUM: CPT

## 2023-03-24 PROCEDURE — 99222 1ST HOSP IP/OBS MODERATE 55: CPT | Performed by: INTERNAL MEDICINE

## 2023-03-24 PROCEDURE — 2580000003 HC RX 258

## 2023-03-24 PROCEDURE — 85025 COMPLETE CBC W/AUTO DIFF WBC: CPT

## 2023-03-24 PROCEDURE — 6370000000 HC RX 637 (ALT 250 FOR IP)

## 2023-03-24 PROCEDURE — 6370000000 HC RX 637 (ALT 250 FOR IP): Performed by: INTERNAL MEDICINE

## 2023-03-24 PROCEDURE — 36415 COLL VENOUS BLD VENIPUNCTURE: CPT

## 2023-03-24 PROCEDURE — 6360000002 HC RX W HCPCS

## 2023-03-24 PROCEDURE — 71045 X-RAY EXAM CHEST 1 VIEW: CPT

## 2023-03-24 PROCEDURE — 80048 BASIC METABOLIC PNL TOTAL CA: CPT

## 2023-03-24 PROCEDURE — 80061 LIPID PANEL: CPT

## 2023-03-24 RX ORDER — CEFDINIR 300 MG/1
300 CAPSULE ORAL EVERY 12 HOURS SCHEDULED
Status: DISCONTINUED | OUTPATIENT
Start: 2023-03-24 | End: 2023-03-25 | Stop reason: HOSPADM

## 2023-03-24 RX ORDER — FUROSEMIDE 40 MG/1
40 TABLET ORAL 2 TIMES DAILY
Status: DISCONTINUED | OUTPATIENT
Start: 2023-03-24 | End: 2023-03-25 | Stop reason: HOSPADM

## 2023-03-24 RX ORDER — FUROSEMIDE 10 MG/ML
40 INJECTION INTRAMUSCULAR; INTRAVENOUS 2 TIMES DAILY
Status: DISCONTINUED | OUTPATIENT
Start: 2023-03-24 | End: 2023-03-24

## 2023-03-24 RX ADMIN — OLANZAPINE 2.5 MG: 2.5 TABLET, FILM COATED ORAL at 20:44

## 2023-03-24 RX ADMIN — ATORVASTATIN CALCIUM 40 MG: 40 TABLET, FILM COATED ORAL at 20:45

## 2023-03-24 RX ADMIN — HYDROXYZINE HYDROCHLORIDE 25 MG: 25 TABLET, FILM COATED ORAL at 18:10

## 2023-03-24 RX ADMIN — ENOXAPARIN SODIUM 40 MG: 100 INJECTION SUBCUTANEOUS at 09:10

## 2023-03-24 RX ADMIN — METOPROLOL TARTRATE 12.5 MG: 25 TABLET, FILM COATED ORAL at 20:44

## 2023-03-24 RX ADMIN — CEFDINIR 300 MG: 300 CAPSULE ORAL at 12:15

## 2023-03-24 RX ADMIN — LEVOTHYROXINE SODIUM 137 MCG: 0.11 TABLET ORAL at 05:50

## 2023-03-24 RX ADMIN — POTASSIUM CHLORIDE 20 MEQ: 1500 TABLET, EXTENDED RELEASE ORAL at 09:09

## 2023-03-24 RX ADMIN — MEMANTINE 5 MG: 5 TABLET ORAL at 09:10

## 2023-03-24 RX ADMIN — ASPIRIN 81 MG: 81 TABLET, COATED ORAL at 09:09

## 2023-03-24 RX ADMIN — LISINOPRIL 5 MG: 5 TABLET ORAL at 09:09

## 2023-03-24 RX ADMIN — SODIUM CHLORIDE, PRESERVATIVE FREE 10 ML: 5 INJECTION INTRAVENOUS at 09:11

## 2023-03-24 RX ADMIN — METOPROLOL TARTRATE 12.5 MG: 25 TABLET, FILM COATED ORAL at 09:10

## 2023-03-24 RX ADMIN — CEFDINIR 300 MG: 300 CAPSULE ORAL at 20:46

## 2023-03-24 RX ADMIN — POTASSIUM CHLORIDE 20 MEQ: 1500 TABLET, EXTENDED RELEASE ORAL at 20:45

## 2023-03-24 RX ADMIN — FLUOXETINE 20 MG: 20 CAPSULE ORAL at 09:09

## 2023-03-24 RX ADMIN — FUROSEMIDE 40 MG: 40 TABLET ORAL at 16:44

## 2023-03-24 RX ADMIN — FUROSEMIDE 20 MG: 10 INJECTION, SOLUTION INTRAMUSCULAR; INTRAVENOUS at 05:50

## 2023-03-24 NOTE — PROGRESS NOTES
rounding on PCU    Assessment: Patient is sitting up in bed and awake. She is a resident of assisted living. Her   a few months ago, Her children are supportive but [place]. She is connected to her [employer]. Intervention: Engaged in conversation. Patient expressed appreciation for visit and offer of continued prayer. Plan: Chaplains are available on site or on call  for spiritual and emotional support.
03/24/23 1051   Encounter Summary   Encounter Overview/Reason  Spiritual/Emotional Needs   Service Provided For: Patient   Referral/Consult From: Seema [address] [place] [address]   Last Encounter  03/24/23   Complexity of Encounter Low   Begin Time 1025   End Time  1030   Total Time Calculated 5 min   Spiritual/Emotional needs   Type Spiritual Support   Assessment/Intervention/Outcome   Assessment Calm   Intervention Active listening;Prayer (assurance of)/[place]   Outcome Acceptance;Engaged in conversation;Expressed Gratitude
The patient pulled out her IV. Dr. Ana Aguiar updated and OK to keep IV out at this time. Oral diuretic orders will be placed.
3. 33 3.54 3.75 46 - 49 2.70 2.92 3.14 3.37 3.61 3.85 4.10 4.36   50 - 53 2.31 2.48 2.66 2.85 3.04 3.24 3.45 3.66 50 - 53 2.58 2.80 3.02 3.25 3.49 3.73 3.98 4.24   54 - 57 2.21 2.38 2.57 2.75 2.95 3.14 3.35 3.56 54 - 57 2.46 2.67 2.89 3.12 3.36 3.60 3.85 4.11   58 - 61 2.10 2.28 2.46 2.65 2.84 3.04 3.24 3.45 58 - 61 2.32 2.54 2.76 2.99 3.23 3.47 3.72 3.98   62 - 65 1.99 2.17 2.35 2.54 2.73 2.93 3.13 3.34 62 - 65 2.19 2.40 2.62 2.85 3.09 3.33 3.58 3.84   66 - 69 1.88 2.05 2.23 2.42 2.61 2.81 3.02 3.23 66 - 69 2.04 2.26 2.48 2.71 2.95 3.19 3.44 3.70   70+ 1.82 1.99 2.17 2.36 2.55 2.75 2.95 3.16 70+ 1.97 2.19 2.41 2.64 2.87 3.12 3.37 3.62             Predicted Peak Expiratory Flow Rate                                       Height (in)  Female       Height (in) Male           Age 64 63 56 61 58 73 78 74 Age            21 344 357 372 387 402 417 432 446  60 62 64 66 68 70 72 74 76   25 337 352 366 381 396 411 426 441 25 447 476 [phone] [phone]   30 329 344 359 374 389 404 419 434 30 437 466 494 523 552 [phone] 667   35 322 337 351 366 381 396 411 426 35 426 455 484 512 541 [phone]   40 314 329 344 359 374 389 404 419 40 416 445 473 [phone] 647   45 307 322 336 351 366 381 396 411 45 405 434 463 491 520 549 577 606 636   50 299 314 329 344 359 374 389 404 50 395 424 452 481 [phone] 625   55 292 307 321 336 351 366 381 396 55 384 413 442 470 499 528 556 585 615   60 284 299 314 329 344 359 374 389 60 374 403 431 460 489 517 546 575 605   65 277 292 306 321 336 351 366 381 65 363 392 421 449 478 507 535 564 594   70 269 284 299 314 329 344 359 374 70 353 382 410 439 468 [phone] 583   75 261 274 289 305 319 334 348 364 75 344 372 400 429 458 487 515 544 573   80 253 266 282 296 312 327 342 356 80 335 362 390 419 448 476 [phone]

## 2023-03-24 NOTE — CONSULTS
gastrointestinal endoscopy (04/08/2015); Intracapsular cataract extraction (Left, 08/25/2020); Intracapsular cataract extraction (Right, 10/08/2020); Hip fracture surgery (Right, 11/02/2021); Femur fracture surgery (Left, 09/04/2022); and Femur fracture surgery (Left, 9/4/2022). Home Medications:    Prior to Admission medications    Medication Sig Start Date End Date Taking?  Authorizing Provider   Compression Stockings MISC PAPO Hose on 8 am, off 8 pm    Historical Provider, MD   furosemide (LASIX) 40 MG tablet Take Extra dose of Lasix daily X 5 days (3/16/23 through 3/22/23 3/16/23 3/22/23  Historical Provider, MD   hydrOXYzine HCl (ATARAX) 25 MG tablet Take 25 mg by mouth nightly as needed for Anxiety    Historical Provider, MD   cefUROXime (CEFTIN) 250 MG tablet Take 1 tablet by mouth 2 times daily for 5 days 3/21/23 3/26/23  Chantale Dec, APRN - CNP   metoprolol tartrate (LOPRESSOR) 25 MG tablet Take 0.5 tablets by mouth 2 times daily 3/10/23   Ascencion Toro APRN - NP   OLANZapine (ZYPREXA) 2.5 MG tablet TAKE 1 TABLET NIGHTLY 2/24/23   Chantale Dec, APRN - CNP   furosemide (LASIX) 40 MG tablet Take 1 tablet by mouth 2 times daily 2/18/23 3/23/23  Carol Junior MD   memantine (NAMENDA) 5 MG tablet TAKE 1 TABLET DAILY 1/16/23   Chantale Dec, APRN - CNP   levothyroxine (SYNTHROID) 137 MCG tablet Take 1 tablet by mouth daily 11/3/22   Chantale Dec, APRN - CNP   potassium chloride (KLOR-CON M) 20 MEQ extended release tablet Take 1 tablet by mouth 2 times daily 10/16/22   Apurva Laird MD   acetaminophen (TYLENOL) 500 MG tablet Take 2 tablets by mouth every 8 hours as needed for Pain 9/20/22   Chantale Dec, APRN - CNP   Ciclopirox 1 % SHAM Apply topically once a week on Tuesdays- AKA Loprox Shampoo    Historical Provider, MD   Nutritional Supplements (NUTRITIONAL DRINK PO) Mighty shakes bid with meals    Historical Provider, MD   calcium carbonate-vitamin D3 (CALTRATE) 600-400 MG-UNIT TABS per tab Take 1 tablet by mouth daily    Historical Provider, MD   FLUoxetine (PROZAC) 20 MG capsule TAKE 1 CAPSULE DAILY 5/31/22   Stefnaia Meeks MD   BRILINTA 90 MG TABS tablet Take 1 tablet by mouth 2 times daily 4/20/22 9/6/22  Bala Santo MD   atorvastatin (LIPITOR) 40 MG tablet Take 1 tablet by mouth daily 2/1/21   Stefania Meeks MD   aspirin 81 MG EC tablet Take 1 tablet by mouth daily 12/21/20   Historical Provider, MD   nitroGLYCERIN (NITROSTAT) 0.4 MG SL tablet Place 0.4 mg under the tongue every 5 minutes as needed for Chest pain up to max of 3 total doses. If no relief after 3 dose, call 911.   9/6/22  Historical Provider, MD   ibuprofen (ADVIL;MOTRIN) 600 MG tablet Take 1 tablet by mouth 3 times daily as needed for Pain (Take with food.) 12/27/20 12/27/20  Rossy Marquez MD   Multiple Vitamin (DAILY MULTIVITAMIN PO) Take 1 tablet by mouth daily   9/6/22  Historical Provider, MD       Allergies:  Patient has no known allergies. Social History:   reports that she has never smoked. She has never used smokeless tobacco. She reports that she does not currently use alcohol. She reports that she does not use drugs. Family History: family history includes Colon Cancer in her brother; Coronary Art Dis in her father. No h/o sudden cardiac death. No for premature CAD    REVIEW OF SYSTEMS:    Constitutional: there has been no unanticipated weight loss. There's been Yes change in energy level, Yes change in activity level. Eyes: No visual changes or diplopia. No scleral icterus. ENT: No Headaches, hearing loss or vertigo. No mouth sores or sore throat. Cardiovascular: No chest pain, Yes dyspnea on exertion, No palpitations or No loss of consciousness. No cough, hemoptysis, No pleuritic pain, or phlebitis. Respiratory: No cough or wheezing, no sputum production. No hematemesis. Gastrointestinal: No abdominal pain, appetite loss, blood in stools. No change in bowel or bladder habits.   Genitourinary: No

## 2023-03-24 NOTE — CARE COORDINATION
Case Management Assessment  Initial Evaluation    Date/Time of Evaluation: 3/24/2023 11:15 AM  Assessment Completed by: BASIA Zuniga    If patient is discharged prior to next notation, then this note serves as note for discharge by case management. Patient Name: Ana Reid                   YOB: 1930  Diagnosis: Acute UTI [N39.0]  NSTEMI (non-ST elevated myocardial infarction) ([facility] [address] [I21.4]  CHF (congestive heart failure), NYHA class I, acute on chronic, combined ([facility] [address] [I50.43]  Acute congestive heart failure, unspecified heart failure type ([facility] [address] [I50.9]                   Date / Time: 3/23/2023  1:05 PM    Patient Admission Status: Inpatient   Readmission Risk (Low < 19, Mod (19-27), High > 27): Readmission Risk Score: 17.6    Current PCP: FELICIA Morgan CNP  PCP verified by CM? Chart Reviewed: Yes      History Provided by: (P) Patient  Patient Orientation: (P) Alert and Oriented    Patient Cognition: (P) Alert    Hospitalization in the last 30 days (Readmission):  No    If yes, Readmission Assessment in CM Navigator will be completed.     Advance Directives:      Code Status: DNR-CC   Patient's Primary Decision Maker is:      Primary Decision Maker: Sparkle Roberts - Child - [phone]    Primary Decision Maker: Bello Wilburn - Child - [phone]    Primary Decision Maker: Jesus Headley - Child - [phone]    Primary Decision Maker: Dalila Evans - Other - [phone]    Discharge Planning:    Patient lives with: Alone Type of Home: Assisted living  Primary Care Giver: (P) Self  Patient Support Systems include: (P) Children   Current Financial resources:    Current community resources: (P) Assisted Living  Current services prior to admission: Extended Care Facility            Current DME:              Type of Home Care services:  Aide Services, OT, PT, Housekeeping, Skilled Therapy, Nursing Services    ADLS  Prior functional level: (P) Assistance with the individualized plan of care/goals and shares the quality data associated with the providers was provided to:     Patient Representative Name:       The Patient and/or Patient Representative Agree with the Discharge Plan?       BASIA Viramontes  Case Management Department

## 2023-03-24 NOTE — CARE COORDINATION
DISCHARGE BARRIERS       Reason for Referral:  SW completed a Psychosocial Assessment for evaluation of patient's mental health, social status, and functional capacity within the community. Ryan Donnelly is a 80 y.o. female admitted due to CHF (congestive heart failure), NYHA class I, acute on chronic, combined ([facility] [address] SW provided supportive listening while patient discussed events leading up to hospitalization. Mental Status:  Alert, oriented, and engaging during assessment. Decision Making:  Makes own decisions. Family/Social/Home Environment: Resident of [facility]     Support: Discussed a good social support network     Current Services: [facility]    Current DMEs: Walker and shower chair     PCP: FELICIA Evans CNP and kayla no issues affording medication.  status:  None     ADLs and means of transportation: Assisted in ADLs prior to hospitalization and unable to transport self. Food insecurity or needed financial assistance: Denies any food insecurity or financial concerns at this time. ACP and Code Status:  Ryan Donnelly is a DNR-CCA status and does have an Advance Directive. Collaborative List of SNF/ECF/HH were provided: offered, declined. Patient plans to return to [facility] No discharge order at this time. Anticipated Needs/Discharge Plan:  Spoke with patient/family/representative about discharge plan. Patient/Family/Representative verbalizes understanding of the plan of care and denies discharge needs or further services at this time. SW provided business card. SW will continue to monitor needs and assist as appropriate.          Electronically signed by BASIA Golden on 3/24/2023 at 11:11 AM

## 2023-03-24 NOTE — PLAN OF CARE
Problem: Discharge Planning  Goal: Discharge to home or other facility with appropriate resources  Outcome: Progressing  Flowsheets  Taken 3/23/2023 2221 by Molly Alcocer RN  Discharge to home or other facility with appropriate resources:   Identify barriers to discharge with patient and caregiver   Identify discharge learning needs (meds, wound care, etc)   Arrange for needed discharge resources and transportation as appropriate  Taken 3/23/2023 2144 by Jw Leger RN  Discharge to home or other facility with appropriate resources: Identify barriers to discharge with patient and caregiver     Problem: Safety - Adult  Goal: Free from fall injury  Outcome: Progressing     Problem: ABCDS Injury Assessment  Goal: Absence of physical injury  Outcome: Progressing     Problem: Skin/Tissue Integrity  Goal: Absence of new skin breakdown  Description: 1. Monitor for areas of redness and/or skin breakdown  2. Assess vascular access sites hourly  3. Every 4-6 hours minimum:  Change oxygen saturation probe site  4. Every 4-6 hours:  If on nasal continuous positive airway pressure, respiratory therapy assess nares and determine need for appliance change or resting period.   Outcome: Progressing

## 2023-03-25 ENCOUNTER — APPOINTMENT (OUTPATIENT)
Dept: GENERAL RADIOLOGY | Age: 88
DRG: 280 | End: 2023-03-25
Payer: MEDICARE

## 2023-03-25 VITALS
RESPIRATION RATE: 16 BRPM | HEIGHT: 64 IN | DIASTOLIC BLOOD PRESSURE: 95 MMHG | HEART RATE: 74 BPM | WEIGHT: 162.8 LBS | OXYGEN SATURATION: 92 % | BODY MASS INDEX: 27.79 KG/M2 | TEMPERATURE: 97.4 F | SYSTOLIC BLOOD PRESSURE: 133 MMHG

## 2023-03-25 LAB
ABSOLUTE EOS #: 0.23 K/UL (ref 0–0.44)
ABSOLUTE IMMATURE GRANULOCYTE: 0.03 K/UL (ref 0–0.3)
ABSOLUTE LYMPH #: 1.43 K/UL (ref 1.1–3.7)
ABSOLUTE MONO #: 0.97 K/UL (ref 0.1–1.2)
ANION GAP SERPL CALCULATED.3IONS-SCNC: 12 MMOL/L (ref 9–17)
BASOPHILS # BLD: 1 % (ref 0–2)
BASOPHILS ABSOLUTE: 0.06 K/UL (ref 0–0.2)
BUN SERPL-MCNC: 17 MG/DL (ref 8–23)
BUN/CREAT BLD: 25 (ref 9–20)
CALCIUM SERPL-MCNC: 9 MG/DL (ref 8.6–10.4)
CHLORIDE SERPL-SCNC: 104 MMOL/L (ref 98–107)
CO2 SERPL-SCNC: 28 MMOL/L (ref 20–31)
CREAT SERPL-MCNC: 0.67 MG/DL (ref 0.5–0.9)
EOSINOPHILS RELATIVE PERCENT: 3 % (ref 1–4)
GFR SERPL CREATININE-BSD FRML MDRD: >60 ML/MIN/1.73M2
GLUCOSE SERPL-MCNC: 134 MG/DL (ref 70–99)
HCT VFR BLD AUTO: 41.1 % (ref 36.3–47.1)
HGB BLD-MCNC: 13.1 G/DL (ref 11.9–15.1)
IMMATURE GRANULOCYTES: 0 %
LYMPHOCYTES # BLD: 18 % (ref 24–43)
MCH RBC QN AUTO: 29.2 PG (ref 25.2–33.5)
MCHC RBC AUTO-ENTMCNC: 31.9 G/DL (ref 25.2–33.5)
MCV RBC AUTO: 91.5 FL (ref 82.6–102.9)
MONOCYTES # BLD: 12 % (ref 3–12)
NRBC AUTOMATED: 0 PER 100 WBC
PDW BLD-RTO: 14.6 % (ref 11.8–14.4)
PLATELET # BLD AUTO: 274 K/UL (ref 138–453)
PMV BLD AUTO: 10.5 FL (ref 8.1–13.5)
POTASSIUM SERPL-SCNC: 4 MMOL/L (ref 3.7–5.3)
RBC # BLD: 4.49 M/UL (ref 3.95–5.11)
RBC # BLD: ABNORMAL 10*6/UL
SEG NEUTROPHILS: 66 % (ref 36–65)
SEGMENTED NEUTROPHILS ABSOLUTE COUNT: 5.47 K/UL (ref 1.5–8.1)
SODIUM SERPL-SCNC: 144 MMOL/L (ref 135–144)
WBC # BLD AUTO: 8.2 K/UL (ref 3.5–11.3)

## 2023-03-25 PROCEDURE — 80048 BASIC METABOLIC PNL TOTAL CA: CPT

## 2023-03-25 PROCEDURE — 6370000000 HC RX 637 (ALT 250 FOR IP)

## 2023-03-25 PROCEDURE — 6360000002 HC RX W HCPCS

## 2023-03-25 PROCEDURE — 71045 X-RAY EXAM CHEST 1 VIEW: CPT

## 2023-03-25 PROCEDURE — 6370000000 HC RX 637 (ALT 250 FOR IP): Performed by: INTERNAL MEDICINE

## 2023-03-25 PROCEDURE — 99239 HOSP IP/OBS DSCHRG MGMT >30: CPT | Performed by: INTERNAL MEDICINE

## 2023-03-25 PROCEDURE — 36415 COLL VENOUS BLD VENIPUNCTURE: CPT

## 2023-03-25 PROCEDURE — 85025 COMPLETE CBC W/AUTO DIFF WBC: CPT

## 2023-03-25 RX ORDER — LISINOPRIL 10 MG/1
10 TABLET ORAL DAILY
Qty: 30 TABLET | Refills: 0 | Status: SHIPPED | OUTPATIENT
Start: 2023-03-25

## 2023-03-25 RX ADMIN — CEFDINIR 300 MG: 300 CAPSULE ORAL at 08:36

## 2023-03-25 RX ADMIN — ENOXAPARIN SODIUM 40 MG: 100 INJECTION SUBCUTANEOUS at 08:34

## 2023-03-25 RX ADMIN — LEVOTHYROXINE SODIUM 137 MCG: 0.11 TABLET ORAL at 06:22

## 2023-03-25 RX ADMIN — FLUOXETINE 20 MG: 20 CAPSULE ORAL at 08:36

## 2023-03-25 RX ADMIN — LISINOPRIL 5 MG: 5 TABLET ORAL at 08:36

## 2023-03-25 RX ADMIN — CALCIUM CARBONATE-VITAMIN D TAB 500 MG-200 UNIT 1 TABLET: 500-200 TAB at 08:36

## 2023-03-25 RX ADMIN — POTASSIUM CHLORIDE 20 MEQ: 1500 TABLET, EXTENDED RELEASE ORAL at 08:36

## 2023-03-25 RX ADMIN — MEMANTINE 5 MG: 5 TABLET ORAL at 08:36

## 2023-03-25 RX ADMIN — FUROSEMIDE 40 MG: 40 TABLET ORAL at 08:36

## 2023-03-25 RX ADMIN — ASPIRIN 81 MG: 81 TABLET, COATED ORAL at 08:36

## 2023-03-25 RX ADMIN — METOPROLOL TARTRATE 12.5 MG: 25 TABLET, FILM COATED ORAL at 08:36

## 2023-03-26 LAB
EKG ATRIAL RATE: 133 BPM
EKG Q-T INTERVAL: 378 MS
EKG QRS DURATION: 84 MS
EKG QTC CALCULATION (BAZETT): 427 MS
EKG R AXIS: -41 DEGREES
EKG T AXIS: 125 DEGREES
EKG VENTRICULAR RATE: 77 BPM

## 2023-03-27 RX ORDER — CEFUROXIME AXETIL 500 MG/1
500 TABLET ORAL 2 TIMES DAILY
COMMUNITY
Start: 2023-03-26 | End: 2023-03-30

## 2023-03-28 ENCOUNTER — OUTSIDE SERVICES (OUTPATIENT)
Dept: INTERNAL MEDICINE | Age: 88
End: 2023-03-28
Payer: MEDICARE

## 2023-03-28 VITALS
DIASTOLIC BLOOD PRESSURE: 85 MMHG | HEART RATE: 101 BPM | TEMPERATURE: 98.7 F | RESPIRATION RATE: 16 BRPM | SYSTOLIC BLOOD PRESSURE: 131 MMHG

## 2023-03-28 DIAGNOSIS — F03.918 DEMENTIA WITH BEHAVIORAL DISTURBANCE (HCC): ICD-10-CM

## 2023-03-28 DIAGNOSIS — I21.4 NSTEMI (NON-ST ELEVATED MYOCARDIAL INFARCTION) (HCC): ICD-10-CM

## 2023-03-28 DIAGNOSIS — Z09 HOSPITAL DISCHARGE FOLLOW-UP: Primary | ICD-10-CM

## 2023-03-28 DIAGNOSIS — I50.43 CHF (CONGESTIVE HEART FAILURE), NYHA CLASS I, ACUTE ON CHRONIC, COMBINED (HCC): ICD-10-CM

## 2023-03-28 DIAGNOSIS — I48.0 PAROXYSMAL A-FIB (HCC): ICD-10-CM

## 2023-03-28 PROCEDURE — 1111F DSCHRG MED/CURRENT MED MERGE: CPT | Performed by: NURSE PRACTITIONER

## 2023-03-28 PROCEDURE — 99348 HOME/RES VST EST LOW MDM 30: CPT | Performed by: NURSE PRACTITIONER

## 2023-03-28 ASSESSMENT — ENCOUNTER SYMPTOMS
DIARRHEA: 0
COUGH: 0
RHINORRHEA: 0
SHORTNESS OF BREATH: 0
VOMITING: 0
WHEEZING: 0
BACK PAIN: 0

## 2023-03-28 NOTE — PROGRESS NOTES
Post-Discharge Transitional Care Follow Up      Zaina Chaudhry   YOB: 1930    Date of Office Visit:  3/28/2023  Date of Hospital Admission: 3/23/23  Date of Hospital Discharge: 3/25/23  Readmission Risk Score (high >=14%. Medium >=10%):Readmission Risk Score: 17.3      Care management risk score Rising risk (score 2-5) and Complex Care (Scores >=6): No Risk Score On File     Non face to face  following discharge, date last encounter closed (first attempt may have been earlier): *No documented post hospital discharge outreach found in the last 14 days Visit completed within 2 business days of hospital discharge    Call initiated 2 business days of discharge: *No response recorded in the last 14 days Visit completed within 2 business days of hospital discharge    Hospital discharge follow-up  -     WI DISCHARGE MEDS RECONCILED W/ CURRENT OUTPATIENT MED LIST  CHF (congestive heart failure), NYHA class I, acute on chronic, combined (HCC)  NSTEMI (non-ST elevated myocardial infarction) ([facility] [address]  Paroxysmal A-fib ([facility] [address]  Dementia with behavioral disturbance    Medical Decision Making: moderate complexity  No follow-ups on file. Subjective:   HPI    Inpatient course: Discharge summary reviewed- see chart. Patient presents for transition of care visit following hospitalization for acute on chronic congestive heart failure, atrial fibrillation, and NSTEMI. Dementia had been declining over the past several months. Code status was changed to [facility] after consultation with hospitalist. She was treated with IV diuretics and responded well to this treatment. Lisinopril was added due to some mild hypertension. She was discharged with lasix and has not had any recurrence of BLE edema or dyspnea since returning to the facility. I did have a detailed care conference with patient's daughter and the DON at [facility] today.  Discussed following up with cardiology, but will not plan to start hospice at this

## 2023-03-29 ENCOUNTER — OFFICE VISIT (OUTPATIENT)
Dept: CARDIOLOGY | Age: 88
End: 2023-03-29
Payer: MEDICARE

## 2023-03-29 ENCOUNTER — PATIENT MESSAGE (OUTPATIENT)
Dept: INTERNAL MEDICINE | Age: 88
End: 2023-03-29

## 2023-03-29 VITALS — HEART RATE: 84 BPM | DIASTOLIC BLOOD PRESSURE: 58 MMHG | SYSTOLIC BLOOD PRESSURE: 108 MMHG

## 2023-03-29 DIAGNOSIS — I48.91 ATRIAL FIBRILLATION, UNSPECIFIED TYPE (HCC): Primary | ICD-10-CM

## 2023-03-29 PROCEDURE — 93005 ELECTROCARDIOGRAM TRACING: CPT | Performed by: NURSE PRACTITIONER

## 2023-03-29 PROCEDURE — 93010 ELECTROCARDIOGRAM REPORT: CPT | Performed by: NURSE PRACTITIONER

## 2023-03-29 PROCEDURE — 99214 OFFICE O/P EST MOD 30 MIN: CPT | Performed by: NURSE PRACTITIONER

## 2023-03-29 PROCEDURE — 1123F ACP DISCUSS/DSCN MKR DOCD: CPT | Performed by: NURSE PRACTITIONER

## 2023-03-29 RX ORDER — FUROSEMIDE 40 MG/1
TABLET ORAL
COMMUNITY

## 2023-03-29 RX ORDER — AMLODIPINE BESYLATE 5 MG/1
5 TABLET ORAL DAILY
COMMUNITY
End: 2023-03-29

## 2023-03-29 NOTE — PROGRESS NOTES
fracture surgery (Right, 11/02/2021); Femur fracture surgery (Left, 09/04/2022); and Femur fracture surgery (Left, 9/4/2022). Home Medications:  Prior to Admission medications    Medication Sig Start Date End Date Taking?  Authorizing Provider   amLODIPine (NORVASC) 5 MG tablet Take 5 mg by mouth daily   Yes Historical Provider, MD   cefUROXime (CEFTIN) 500 MG tablet Take 500 mg by mouth 2 times daily X 5 days (started 3/26/23) 3/26/23 3/30/23 Yes Historical Provider, MD   Compression Stockings MISC PAPO Hose on 8 am, off 8 pm   Yes Historical Provider, MD   hydrOXYzine HCl (ATARAX) 25 MG tablet Take 25 mg by mouth nightly as needed for Anxiety   Yes Historical Provider, MD   metoprolol tartrate (LOPRESSOR) 25 MG tablet Take 0.5 tablets by mouth 2 times daily 3/10/23  Yes FELICIA Acharya NP   OLANZapine (ZYPREXA) 2.5 MG tablet TAKE 1 TABLET NIGHTLY 2/24/23  Yes FELICIA Pardo CNP   furosemide (LASIX) 40 MG tablet Take 1 tablet by mouth 2 times daily 2/18/23 3/29/23 Yes Jadon Patel MD   memantine (NAMENDA) 5 MG tablet TAKE 1 TABLET DAILY 1/16/23  Yes FELICIA Pardo CNP   levothyroxine (SYNTHROID) 137 MCG tablet Take 1 tablet by mouth daily 11/3/22  Yes FELICIA Pardo CNP   potassium chloride (KLOR-CON M) 20 MEQ extended release tablet Take 1 tablet by mouth 2 times daily 10/16/22  Yes Bhanu Craft MD   acetaminophen (TYLENOL) 500 MG tablet Take 2 tablets by mouth every 8 hours as needed for Pain 9/20/22  Yes FELICIA Pardo CNP   FLUoxetine (PROZAC) 20 MG capsule TAKE 1 CAPSULE DAILY 5/31/22  Yes Alpa Mantilla MD   atorvastatin (LIPITOR) 40 MG tablet Take 1 tablet by mouth daily 2/1/21  Yes Alpa Mantilla MD   aspirin 81 MG EC tablet Take 1 tablet by mouth daily 12/21/20  Yes Historical Provider, MD   lisinopril (PRINIVIL;ZESTRIL) 10 MG tablet Take 1 tablet by mouth daily 3/25/23   Jayde Burden MD   Ciclopirox 1 % SHAM Apply topically once a week on Tuesdays- AKA

## 2023-03-29 NOTE — TELEPHONE ENCOUNTER
From: Aurora Gonzalez  To: Darleen Nicholson  Sent: 3/29/2023 11:57 AM EDT  Subject: Followup    Thank you for yesterday. It helped and clarified some things. I really appreciated it. Mom had a cardiology appointment this morning that I was unaware of. I was kind of surprised that she went out in the state she[place] in. The cardiologist just prescribed the same things they always have been prescribing. I thought it would be something new. They never put notes in my chart. I just had Delia Mazariegos cancel an ortho appointment that she had today. Im thinking thats what we decided. I am a little confused as to my role in her care.  If we could have a conversation or a brief phone call to clarify some things it would help me, thank you   Sparkle

## 2023-03-29 NOTE — TELEPHONE ENCOUNTER
Gerry Villagran,    I did take a look at the cardiology note, and it does appear that they changed her lasix dose. This should help address the frequent leg swelling and hopefully prevent future hospitalizations for her CHF. I am out of the office for the remainder of today, but if you have further questions, let me know and we can set up a time for another phone call.     Thanks,  Elsa Alonzo

## 2023-03-30 ENCOUNTER — HOSPITAL ENCOUNTER (OUTPATIENT)
Age: 88
Setting detail: SPECIMEN
Discharge: HOME | End: 2023-03-30
Payer: MEDICARE

## 2023-03-30 DIAGNOSIS — I50.43 CHF (CONGESTIVE HEART FAILURE), NYHA CLASS I, ACUTE ON CHRONIC, COMBINED (HCC): ICD-10-CM

## 2023-03-30 LAB
ANION GAP SERPL CALCULATED.3IONS-SCNC: 13 MMOL/L (ref 9–17)
BUN SERPL-MCNC: 25 MG/DL (ref 8–23)
BUN/CREAT BLD: 31 (ref 9–20)
CALCIUM SERPL-MCNC: 9.2 MG/DL (ref 8.6–10.4)
CHLORIDE SERPL-SCNC: 102 MMOL/L (ref 98–107)
CO2 SERPL-SCNC: 28 MMOL/L (ref 20–31)
CREAT SERPL-MCNC: 0.8 MG/DL (ref 0.5–0.9)
GFR SERPL CREATININE-BSD FRML MDRD: >60 ML/MIN/1.73M2
GLUCOSE SERPL-MCNC: 170 MG/DL (ref 70–99)
POTASSIUM SERPL-SCNC: 3.9 MMOL/L (ref 3.7–5.3)
SODIUM SERPL-SCNC: 143 MMOL/L (ref 135–144)

## 2023-03-30 PROCEDURE — 80048 BASIC METABOLIC PNL TOTAL CA: CPT

## 2023-03-30 PROCEDURE — 36415 COLL VENOUS BLD VENIPUNCTURE: CPT

## 2023-04-04 ENCOUNTER — OUTSIDE SERVICES (OUTPATIENT)
Dept: INTERNAL MEDICINE | Age: 88
End: 2023-04-04
Payer: MEDICARE

## 2023-04-04 VITALS
OXYGEN SATURATION: 96 % | HEART RATE: 84 BPM | TEMPERATURE: 97.1 F | BODY MASS INDEX: 26.78 KG/M2 | DIASTOLIC BLOOD PRESSURE: 76 MMHG | SYSTOLIC BLOOD PRESSURE: 132 MMHG | WEIGHT: 156 LBS

## 2023-04-04 DIAGNOSIS — R29.6 MULTIPLE FALLS: ICD-10-CM

## 2023-04-04 DIAGNOSIS — R53.1 GENERALIZED WEAKNESS: Primary | ICD-10-CM

## 2023-04-04 DIAGNOSIS — S72.142D CLOSED DISPLACED INTERTROCHANTERIC FRACTURE OF LEFT FEMUR WITH ROUTINE HEALING, SUBSEQUENT ENCOUNTER: ICD-10-CM

## 2023-04-04 DIAGNOSIS — R26.9 GAIT DISTURBANCE: ICD-10-CM

## 2023-04-04 DIAGNOSIS — F03.918 DEMENTIA WITH BEHAVIORAL DISTURBANCE (HCC): ICD-10-CM

## 2023-04-04 DIAGNOSIS — I50.9 HEART FAILURE, UNSPECIFIED HF CHRONICITY, UNSPECIFIED HEART FAILURE TYPE (HCC): ICD-10-CM

## 2023-04-04 PROCEDURE — 99347 HOME/RES VST EST SF MDM 20: CPT | Performed by: NURSE PRACTITIONER

## 2023-04-06 ENCOUNTER — HOSPITAL ENCOUNTER (OUTPATIENT)
Age: 88
Setting detail: SPECIMEN
Discharge: HOME | End: 2023-04-06
Payer: MEDICARE

## 2023-04-06 DIAGNOSIS — I50.9 HEART FAILURE, UNSPECIFIED HF CHRONICITY, UNSPECIFIED HEART FAILURE TYPE (HCC): ICD-10-CM

## 2023-04-06 LAB
ANION GAP SERPL CALCULATED.3IONS-SCNC: 10 MMOL/L (ref 9–17)
BUN SERPL-MCNC: 22 MG/DL (ref 8–23)
BUN/CREAT BLD: 29 (ref 9–20)
CALCIUM SERPL-MCNC: 8.8 MG/DL (ref 8.6–10.4)
CHLORIDE SERPL-SCNC: 106 MMOL/L (ref 98–107)
CO2 SERPL-SCNC: 29 MMOL/L (ref 20–31)
CREAT SERPL-MCNC: 0.77 MG/DL (ref 0.5–0.9)
GFR SERPL CREATININE-BSD FRML MDRD: >60 ML/MIN/1.73M2
GLUCOSE SERPL-MCNC: 148 MG/DL (ref 70–99)
POTASSIUM SERPL-SCNC: 3.8 MMOL/L (ref 3.7–5.3)
SODIUM SERPL-SCNC: 145 MMOL/L (ref 135–144)

## 2023-04-06 PROCEDURE — 80048 BASIC METABOLIC PNL TOTAL CA: CPT

## 2023-04-06 PROCEDURE — 36415 COLL VENOUS BLD VENIPUNCTURE: CPT

## 2023-04-06 ASSESSMENT — ENCOUNTER SYMPTOMS
NAUSEA: 0
RHINORRHEA: 0
WHEEZING: 0
DIARRHEA: 0
VOMITING: 0
COUGH: 0

## 2023-04-06 NOTE — PROGRESS NOTES
Visit note faxed to GP, with request they forward to their Therapy Services.
Affect: Mood normal.         Behavior: Behavior normal.       Assessment/Plan:        1. Generalized weakness  2. Gait disturbance  3. Closed displaced intertrochanteric fracture of left femur with routine healing, subsequent encounter  4. Dementia with behavioral disturbance ([facility] [address]  5. Multiple falls  - PT/OT evaluate and treat    6. Heart failure, unspecified HF chronicity, unspecified heart failure type ([facility] [address]  - Repeat BMP  - Basic Metabolic Panel; Future        No follow-ups on file. Orders Placed This Encounter   Procedures    Basic Metabolic Panel     Standing Status:   Future     Number of Occurrences:   1     Standing Expiration Date:   4/4/2024     No orders of the defined types were placed in this encounter.               Electronically signed by FELICIA Abbott CNP on 4/6/2023 at 1:06 PM

## 2023-04-07 ENCOUNTER — HOSPITAL ENCOUNTER (OUTPATIENT)
Age: 88
Setting detail: SPECIMEN
Discharge: HOME | End: 2023-04-07
Payer: MEDICARE

## 2023-04-07 ENCOUNTER — TELEPHONE (OUTPATIENT)
Dept: INTERNAL MEDICINE | Age: 88
End: 2023-04-07

## 2023-04-07 LAB
BACTERIA: ABNORMAL
BILIRUBIN URINE: NEGATIVE
COLOR: YELLOW
EPITHELIAL CELLS UA: ABNORMAL /HPF (ref 0–5)
GLUCOSE UR STRIP.AUTO-MCNC: NEGATIVE MG/DL
KETONES UR STRIP.AUTO-MCNC: NEGATIVE MG/DL
LEUKOCYTE ESTERASE UR QL STRIP.AUTO: ABNORMAL
NITRITE UR QL STRIP.AUTO: NEGATIVE
PROT UR STRIP.AUTO-MCNC: 5.5 MG/DL (ref 5–6)
PROT UR STRIP.AUTO-MCNC: NEGATIVE MG/DL
RBC CLUMPS #/AREA URNS AUTO: ABNORMAL /HPF (ref 0–4)
SPECIFIC GRAVITY UA: 1.02 (ref 1.01–1.02)
TURBIDITY: ABNORMAL
URINE HGB: ABNORMAL
UROBILINOGEN, URINE: NORMAL
WBC UA: ABNORMAL /HPF (ref 0–4)
YEAST: ABNORMAL

## 2023-04-07 PROCEDURE — 87086 URINE CULTURE/COLONY COUNT: CPT

## 2023-04-07 PROCEDURE — 81001 URINALYSIS AUTO W/SCOPE: CPT

## 2023-04-07 RX ORDER — CEPHALEXIN 500 MG/1
500 CAPSULE ORAL 2 TIMES DAILY
Qty: 14 CAPSULE | Refills: 0 | Status: SHIPPED | OUTPATIENT
Start: 2023-04-07 | End: 2023-04-14

## 2023-04-09 LAB
MICROORGANISM SPEC CULT: NORMAL
SPECIMEN DESCRIPTION: NORMAL

## 2023-04-18 ENCOUNTER — OUTSIDE SERVICES (OUTPATIENT)
Dept: INTERNAL MEDICINE | Age: 88
End: 2023-04-18
Payer: MEDICARE

## 2023-04-18 VITALS
HEART RATE: 84 BPM | OXYGEN SATURATION: 96 % | RESPIRATION RATE: 17 BRPM | DIASTOLIC BLOOD PRESSURE: 78 MMHG | SYSTOLIC BLOOD PRESSURE: 132 MMHG | TEMPERATURE: 97.5 F

## 2023-04-18 DIAGNOSIS — E03.9 HYPOTHYROIDISM, UNSPECIFIED TYPE: ICD-10-CM

## 2023-04-18 DIAGNOSIS — K21.9 GASTROESOPHAGEAL REFLUX DISEASE WITHOUT ESOPHAGITIS: ICD-10-CM

## 2023-04-18 DIAGNOSIS — E78.5 DYSLIPIDEMIA: ICD-10-CM

## 2023-04-18 DIAGNOSIS — F03.918 DEMENTIA WITH BEHAVIORAL DISTURBANCE (HCC): ICD-10-CM

## 2023-04-18 DIAGNOSIS — I10 ESSENTIAL HYPERTENSION: ICD-10-CM

## 2023-04-18 DIAGNOSIS — D36.9 ADENOMATOUS POLYP: ICD-10-CM

## 2023-04-18 DIAGNOSIS — I48.0 PAROXYSMAL A-FIB (HCC): ICD-10-CM

## 2023-04-18 DIAGNOSIS — I25.2 HISTORY OF NON-ST ELEVATION MYOCARDIAL INFARCTION (NSTEMI): ICD-10-CM

## 2023-04-18 DIAGNOSIS — I27.20 PULMONARY HYPERTENSION (HCC): ICD-10-CM

## 2023-04-18 DIAGNOSIS — I50.9 HEART FAILURE, UNSPECIFIED HF CHRONICITY, UNSPECIFIED HEART FAILURE TYPE (HCC): ICD-10-CM

## 2023-04-18 DIAGNOSIS — I25.10 CORONARY ARTERY DISEASE INVOLVING NATIVE CORONARY ARTERY OF NATIVE HEART WITHOUT ANGINA PECTORIS: ICD-10-CM

## 2023-04-18 DIAGNOSIS — R73.01 IMPAIRED FASTING GLUCOSE: Primary | ICD-10-CM

## 2023-04-18 DIAGNOSIS — F41.9 ANXIETY: ICD-10-CM

## 2023-04-18 DIAGNOSIS — D03.59 MELANOMA IN SITU OF TORSO EXCLUDING BREAST (HCC): ICD-10-CM

## 2023-04-18 PROBLEM — I50.33 ACUTE ON CHRONIC DIASTOLIC HEART FAILURE (HCC): Status: RESOLVED | Noted: 2022-01-01 | Resolved: 2023-01-01

## 2023-04-18 PROBLEM — I48.91 NEW ONSET ATRIAL FIBRILLATION (HCC): Status: RESOLVED | Noted: 2023-02-16 | Resolved: 2023-04-18

## 2023-04-18 PROBLEM — J96.01 ACUTE RESPIRATORY FAILURE WITH HYPOXIA (HCC): Status: RESOLVED | Noted: 2022-10-14 | Resolved: 2023-04-18

## 2023-04-18 PROBLEM — J90 BILATERAL PLEURAL EFFUSION: Status: RESOLVED | Noted: 2022-10-14 | Resolved: 2023-04-18

## 2023-04-18 PROCEDURE — 99348 HOME/RES VST EST LOW MDM 30: CPT | Performed by: NURSE PRACTITIONER

## 2023-04-18 ASSESSMENT — ENCOUNTER SYMPTOMS
VOMITING: 0
BACK PAIN: 0
RHINORRHEA: 0
SHORTNESS OF BREATH: 0
COUGH: 0
WHEEZING: 0
DIARRHEA: 0

## 2023-04-18 NOTE — PROGRESS NOTES
with long term care facility staff, as well as with input from patient. Objective:     Vitals:    04/18/23 1014   BP: 132/78   Pulse: 84   Resp: 17   Temp: 97.5 °F (36.4 °C)   SpO2: 96%     Physical Exam  Vitals reviewed. Constitutional:       General: She is not in acute distress. Appearance: She is not ill-appearing. HENT:      Head: Normocephalic and atraumatic. Right Ear: External ear normal.      Left Ear: External ear normal.   Eyes:      Extraocular Movements: Extraocular movements intact. Conjunctiva/sclera: Conjunctivae normal.   Cardiovascular:      Rate and Rhythm: Normal rate and regular rhythm. Pulmonary:      Effort: Pulmonary effort is normal. No respiratory distress. Breath sounds: Normal breath sounds. Skin:     General: Skin is warm and dry. Neurological:      General: No focal deficit present. Mental Status: She is alert. Mental status is at baseline. Psychiatric:         Mood and Affect: Mood normal.         Behavior: Behavior normal.      Comments: Pleasantly confused, improved cognition as compared to previous assessment       Assessment/Plan:        1. Impaired fasting glucose, stable  - Continue to monitor    2. Essential hypertension,  stable  - Continue current medications    3. Dyslipidemia,  stable  - Continue current medications    4. Hypothyroidism, unspecified type,  stable  - Continue current medications    5. Paroxysmal A-fib ([address] stable  6. Heart failure, unspecified HF chronicity, unspecified heart failure type ([address] stable  7. History of non-ST elevation myocardial infarction (NSTEMI), stable  8. Coronary artery disease involving native coronary artery of native heart without angina pectoris, stable  9. Pulmonary hypertension ([address] stable  - Continue to follow with cardiology    10. Gastroesophageal reflux disease without esophagitis, stable  - Continue to monitor    11.  Dementia with behavioral disturbance ([address]  stable  - Continue

## 2023-04-22 PROBLEM — N39.0 UTI (URINARY TRACT INFECTION): Status: RESOLVED | Noted: 2023-03-23 | Resolved: 2023-04-22

## 2023-05-02 ENCOUNTER — OUTSIDE SERVICES (OUTPATIENT)
Dept: INTERNAL MEDICINE | Age: 88
End: 2023-05-02

## 2023-05-02 VITALS
DIASTOLIC BLOOD PRESSURE: 56 MMHG | HEART RATE: 69 BPM | SYSTOLIC BLOOD PRESSURE: 87 MMHG | TEMPERATURE: 97.3 F | RESPIRATION RATE: 18 BRPM

## 2023-05-02 DIAGNOSIS — I25.10 CORONARY ARTERY DISEASE INVOLVING NATIVE CORONARY ARTERY OF NATIVE HEART WITHOUT ANGINA PECTORIS: ICD-10-CM

## 2023-05-02 DIAGNOSIS — F03.918 DEMENTIA WITH BEHAVIORAL DISTURBANCE (HCC): Primary | ICD-10-CM

## 2023-05-02 DIAGNOSIS — I50.43 CHF (CONGESTIVE HEART FAILURE), NYHA CLASS I, ACUTE ON CHRONIC, COMBINED (HCC): ICD-10-CM

## 2023-05-02 NOTE — PROGRESS NOTES
[facility]      Jasson Muñoz is a 80 y.o. female resident of [facility]. HPI:     HPI  Patient presents for status decline. Staff noted SpO2 in the 80's yesterday evening with significant confusion and change in mental status. I previously had care conference with staff and POA after her most recent admission at [facility], and family had determined at that time that if any further decline, would like to pursue hospice. Family was contacted yesterday and did agree to proceed with hospice care. Patient was started on supplemental oxygen, and her oxygen saturation has been stable. Known history of CHF, CAD, and dementia that has been gradually worsening over the past year. On exam today, she is resting comfortably and has no acute concerns. Denies chest pain or dyspnea. Has not yet had hospice consult.      Current Outpatient Medications   Medication Sig Dispense Refill    potassium chloride (KLOR-CON M) 20 MEQ extended release tablet Take 1 tablet by mouth daily (per GP 4/11/23 MAR)      furosemide (LASIX) 40 MG tablet 80 mg every am, 40 mg every pm (per 3/28/23 Cardio note)      lisinopril (PRINIVIL;ZESTRIL) 10 MG tablet Take 1 tablet by mouth daily 30 tablet 0    Compression Stockings MISC PAPO Hose on 8 am, off 8 pm      hydrOXYzine HCl (ATARAX) 25 MG tablet Take 1 tablet by mouth nightly as needed for Anxiety      metoprolol tartrate (LOPRESSOR) 25 MG tablet Take 0.5 tablets by mouth 2 times daily 30 tablet 5    OLANZapine (ZYPREXA) 2.5 MG tablet TAKE 1 TABLET NIGHTLY 90 tablet 3    memantine (NAMENDA) 5 MG tablet TAKE 1 TABLET DAILY 90 tablet 3    levothyroxine (SYNTHROID) 137 MCG tablet Take 1 tablet by mouth daily 90 tablet 1    acetaminophen (TYLENOL) 500 MG tablet Take 2 tablets by mouth every 8 hours as needed for Pain 180 tablet 5    Ciclopirox 1 % SHAM Apply topically once a week on Tuesdays- AKA Loprox Shampoo      Nutritional Supplements (NUTRITIONAL DRINK PO) Mighty shakes bid with

## 2023-05-04 ASSESSMENT — ENCOUNTER SYMPTOMS
NAUSEA: 0
COUGH: 0
WHEEZING: 0
DIARRHEA: 0
VOMITING: 0
RHINORRHEA: 0

## 2023-05-09 ENCOUNTER — OUTSIDE SERVICES (OUTPATIENT)
Dept: INTERNAL MEDICINE | Age: 88
End: 2023-05-09

## 2023-05-09 VITALS
RESPIRATION RATE: 18 BRPM | HEART RATE: 65 BPM | SYSTOLIC BLOOD PRESSURE: 121 MMHG | TEMPERATURE: 97.7 F | DIASTOLIC BLOOD PRESSURE: 68 MMHG

## 2023-05-09 DIAGNOSIS — I50.9 HEART FAILURE, UNSPECIFIED HF CHRONICITY, UNSPECIFIED HEART FAILURE TYPE (HCC): Primary | ICD-10-CM

## 2023-05-09 NOTE — PROGRESS NOTES
[facility]      Olya Vail is a 80 y.o. female resident of [facility]. HPI:     HPI  Patient presents for evaluation and management of BLE edema. Patient has been placed on hospice since previous visit. She has not had any significant dyspnea. BLE edema has been intermittently problematic. Yesterday had some significant edema, but improved upon exam this morning. She is currently on lasix 80 mg QAM, 40 mg QPM. No longer following with cardiology as she has entered hospice care. Memory continues to have gradual decline, patient is currently poor historian.      Current Outpatient Medications   Medication Sig Dispense Refill    OXYGEN Inhale 2-4 L into the lungs as needed (comfort)      potassium chloride (KLOR-CON M) 20 MEQ extended release tablet Take 1 tablet by mouth daily (per GP 4/11/23 MAR)      furosemide (LASIX) 40 MG tablet 80 mg every am, 40 mg every pm (per 3/28/23 Cardio note)      lisinopril (PRINIVIL;ZESTRIL) 10 MG tablet Take 1 tablet by mouth daily 30 tablet 0    Compression Stockings MISC PAPO Hose on 8 am, off 8 pm      hydrOXYzine HCl (ATARAX) 25 MG tablet Take 1 tablet by mouth nightly as needed for Anxiety      metoprolol tartrate (LOPRESSOR) 25 MG tablet Take 0.5 tablets by mouth 2 times daily 30 tablet 5    OLANZapine (ZYPREXA) 2.5 MG tablet TAKE 1 TABLET NIGHTLY 90 tablet 3    memantine (NAMENDA) 5 MG tablet TAKE 1 TABLET DAILY 90 tablet 3    levothyroxine (SYNTHROID) 137 MCG tablet Take 1 tablet by mouth daily 90 tablet 1    acetaminophen (TYLENOL) 500 MG tablet Take 2 tablets by mouth every 8 hours as needed for Pain 180 tablet 5    Ciclopirox 1 % SHAM Apply topically once a week on Tuesdays- AKA Loprox Shampoo      Nutritional Supplements (NUTRITIONAL DRINK PO) Mighty shakes bid with meals      calcium carbonate-vitamin D3 (CALTRATE) 600-400 MG-UNIT TABS per tab Take 1 tablet by mouth daily      FLUoxetine (PROZAC) 20 MG capsule TAKE 1 CAPSULE DAILY 90 capsule 3

## 2023-05-10 ASSESSMENT — ENCOUNTER SYMPTOMS
RHINORRHEA: 0
COUGH: 0
WHEEZING: 0
NAUSEA: 0
VOMITING: 0
DIARRHEA: 0

## 2023-05-25 RX ORDER — FLUOXETINE HYDROCHLORIDE 20 MG/1
CAPSULE ORAL
Qty: 90 CAPSULE | Refills: 3 | OUTPATIENT
Start: 2023-05-25

## 2023-05-25 RX ORDER — LEVOTHYROXINE SODIUM 137 UG/1
TABLET ORAL
Qty: 90 TABLET | Refills: 3 | OUTPATIENT
Start: 2023-05-25

## (undated) DEVICE — KNIFE OPHTH SLT 45 DEG 2.4 MM ANGLED BVL UP SS NS XSTAR DISP

## (undated) DEVICE — DRESSING FOAM W4XL4IN AG SIL FACE BORD IONIC ANTIMIC ADH

## (undated) DEVICE — GUIDEWIRE ORTH L400MM DIA3.2MM FOR TFN

## (undated) DEVICE — Device

## (undated) DEVICE — ROD RMR L950MM DIA2.5MM W/ EXTN BALL TIP

## (undated) DEVICE — COVER LT HNDL BLU PLAS

## (undated) DEVICE — KNIFE OPHTHLMC 17.6X2.03X1.09MM 11MM ANGLD SDPRT LSRDGE ARRO

## (undated) DEVICE — SOLUTION IRRIGATION BAL SALT SOLUTION 500 ML BTL 6/CA BSS +

## (undated) DEVICE — BIT DRL L145MM DIA4.2MM NONSTERILE 3 FLUT NDL PNT QUIK CPL

## (undated) DEVICE — GLOVE ORANGE PI 7   MSG9070

## (undated) DEVICE — 450 ML BOTTLE OF 0.05% CHLORHEXIDINE GLUCONATE IN 99.95% STERILE WATER FOR IRRIGATION, USP AND APPLICATOR.: Brand: IRRISEPT ANTIMICROBIAL WOUND LAVAGE

## (undated) DEVICE — GUIDE PIN 3.2MM X 343MM: Brand: TRIGEN

## (undated) DEVICE — COUNTER NEEDLE 10/20 COUNT DEVON   96/CS

## (undated) DEVICE — GOWN,SIRUS,NONRNF,SETINSLV,XL,20/CS: Brand: MEDLINE

## (undated) DEVICE — 0.9 MM 45° KELMAN® MINI-FLARED ABS® TIP: Brand: ALCON, INFINITI, INTREPID

## (undated) DEVICE — SUTURE MCRYL SZ 2-0 L27IN ABSRB UD SH L26MM TAPERPOINT NDL Y417H

## (undated) DEVICE — SINGLE USE MEDICAL DEVICE FOR OPHTHALMIC SURGERY: Brand: SIL. COATED I/A 45 MIL 12/B

## (undated) DEVICE — 4.0MM SHORT PILOT DRILL WITH AO CONNECTOR: Brand: TRIGEN

## (undated) DEVICE — GLOVE ORANGE PI 8 1/2   MSG9085

## (undated) DEVICE — GLOVE ORANGE PI 7 1/2   MSG9075

## (undated) DEVICE — AGENT VISCOELASTIC 085ML NONPYROGENIC SOD HYALURONATE

## (undated) DEVICE — SVMMC ORTH SPL DRP PK

## (undated) DEVICE — SOLUTION IRRIGATION BAL SALT SOLUTION 15 ML STRL BSS

## (undated) DEVICE — CANNULA HYDRDISECT CHANG 90 DEG 27 GAX16 MM STRL DISP

## (undated) DEVICE — SURGICAL PROCEDURE PACK EYE

## (undated) DEVICE — TOWEL,OR,DSP,ST,BLUE,STD,4/PK,20PK/CS: Brand: MEDLINE

## (undated) DEVICE — INTENDED FOR TISSUE SEPARATION, AND OTHER PROCEDURES THAT REQUIRE A SHARP SURGICAL BLADE TO PUNCTURE OR CUT.: Brand: BARD-PARKER ® CARBON RIB-BACK BLADES

## (undated) DEVICE — GOWN,AURORA,NONREINFORCED,LARGE: Brand: MEDLINE

## (undated) DEVICE — 1016 S-DRAPE IRRIG POUCH 10/BOX: Brand: STERI-DRAPE™

## (undated) DEVICE — VISCOAT OPTH SOLN 0.5ML VISCOELASTIC

## (undated) DEVICE — CYSTO/BLADDER IRRIGATION SET, REGULATING CLAMP

## (undated) DEVICE — CHLORAPREP 26ML ORANGE

## (undated) DEVICE — C-ARMOR C-ARM EQUIPMENT COVERS CLEAR STERILE UNIVERSAL FIT 12 PER CASE: Brand: C-ARMOR

## (undated) DEVICE — APPLICATOR MEDICATED 26 CC SOLUTION HI LT ORNG CHLORAPREP

## (undated) DEVICE — MARKER W/PRE PRINTED CUSTOM LABEL

## (undated) DEVICE — SURGICAL SUCTION CONNECTING TUBE WITH MALE CONNECTOR AND SUCTION CLAMP, 2 FT. LONG (.6 M), 5 MM I.D.: Brand: CONMED

## (undated) DEVICE — GOWN,AURORA,NON-REINFORCED,2XL: Brand: MEDLINE

## (undated) DEVICE — BANDAGE COBAN 4 IN COMPR W4INXL5YD FOAM COHESIVE QUIK STK SELF ADH SFT

## (undated) DEVICE — GLOVE SURG SZ 65 THK91MIL LTX FREE SYN POLYISOPRENE

## (undated) DEVICE — GLOVE ORANGE PI 8   MSG9080

## (undated) DEVICE — 6619 2 PTNT ISO SYS INCISE AREA&LT;(&GT;&&LT;)&GT;P: Brand: STERI-DRAPE™ IOBAN™ 2

## (undated) DEVICE — SUTURE VCRL 2-0 L27IN ABSRB UD CP-2 L26MM 1/2 CIR REV CUT J869H

## (undated) DEVICE — SYSTEM SKIN CLSR 22CM DERMBND PRINEO

## (undated) DEVICE — SUTURE PDS II SZ 0 L27IN ABSRB VLT L36MM CT-1 1/2 CIR Z340H

## (undated) DEVICE — GLOVE PROTCT PF XL ANTIMICROBIAL A4 CUT RESIST SCEPTER LTX

## (undated) DEVICE — SCRUB DRY SURG EZ SCRUB BRUSH PREOPERATIVE GRN

## (undated) DEVICE — PACK SET UP

## (undated) DEVICE — DRESSING FOAM W4XL10IN AG SIL ADH ANTIMIC POSTOP OPTIFOAM

## (undated) DEVICE — SPONGE LAP W18XL18IN WHT COT 4 PLY FLD STRUNG RADPQ DISP ST

## (undated) DEVICE — BANDAGE,GAUZE,BULKEE II,4.5"X4.1YD,STRL: Brand: MEDLINE

## (undated) DEVICE — GLOVE SURG SZ 6 THK91MIL LTX FREE SYN POLYISOPRENE ANTI